# Patient Record
Sex: MALE | Race: WHITE | NOT HISPANIC OR LATINO | Employment: OTHER | URBAN - METROPOLITAN AREA
[De-identification: names, ages, dates, MRNs, and addresses within clinical notes are randomized per-mention and may not be internally consistent; named-entity substitution may affect disease eponyms.]

---

## 2017-01-27 ENCOUNTER — OFFICE VISIT (OUTPATIENT)
Dept: FAMILY MEDICINE CLINIC | Facility: CLINIC | Age: 77
End: 2017-01-27

## 2017-01-27 VITALS
TEMPERATURE: 97.5 F | DIASTOLIC BLOOD PRESSURE: 84 MMHG | HEART RATE: 82 BPM | SYSTOLIC BLOOD PRESSURE: 136 MMHG | WEIGHT: 228.4 LBS | BODY MASS INDEX: 32.7 KG/M2 | HEIGHT: 70 IN | OXYGEN SATURATION: 89 %

## 2017-01-27 DIAGNOSIS — J01.91 ACUTE RECURRENT SINUSITIS, UNSPECIFIED LOCATION: Primary | ICD-10-CM

## 2017-01-27 DIAGNOSIS — I10 BENIGN ESSENTIAL HYPERTENSION: ICD-10-CM

## 2017-01-27 DIAGNOSIS — J40 BRONCHITIS: ICD-10-CM

## 2017-01-27 PROCEDURE — 99214 OFFICE O/P EST MOD 30 MIN: CPT | Performed by: FAMILY MEDICINE

## 2017-01-27 RX ORDER — BENAZEPRIL HYDROCHLORIDE 20 MG/1
20 TABLET ORAL DAILY
Qty: 90 TABLET | Refills: 1 | Status: SHIPPED | OUTPATIENT
Start: 2017-01-27 | End: 2017-05-22

## 2017-01-27 RX ORDER — PREDNISONE 20 MG/1
20 TABLET ORAL 2 TIMES DAILY
Qty: 10 TABLET | Refills: 1 | Status: SHIPPED | OUTPATIENT
Start: 2017-01-27 | End: 2017-02-01

## 2017-01-27 RX ORDER — LEVOFLOXACIN 500 MG/1
500 TABLET, FILM COATED ORAL DAILY
Qty: 10 TABLET | Refills: 1 | Status: SHIPPED | OUTPATIENT
Start: 2017-01-27 | End: 2017-02-06

## 2017-01-27 RX ORDER — AMLODIPINE BESYLATE 5 MG/1
5 TABLET ORAL DAILY
Qty: 90 TABLET | Refills: 1 | Status: SHIPPED | OUTPATIENT
Start: 2017-01-27 | End: 2017-05-22 | Stop reason: SDUPTHER

## 2017-01-27 NOTE — MR AVS SNAPSHOT
Ankit Mack Sr.   1/27/2017 9:45 AM   Office Visit    Provider:  Chloe Erickson MD   Department:  [facility]   Dept Phone:  [phone]                Your Full Care Plan              Today's Medication Changes          These changes are accurate as of: 1/27/17 10:19 AM.  If you have any questions, ask your nurse or doctor.               New Medication(s)Ordered:     levoFLOXacin 500 MG tablet   Commonly known as:  LEVAQUIN   Take 1 tablet by mouth Daily for 10 days.   Started by:  Chloe Erickson MD       predniSONE 20 MG tablet   Commonly known as:  DELTASONE   Take 1 tablet by mouth 2 (Two) Times a Day for 5 days.   Started by:  Chloe Erickson MD         Medication(s)that have changed:     amLODIPine 5 MG tablet   Commonly known as:  NORVASC   Take 1 tablet by mouth Daily.   What changed:  See the new instructions.   Changed by:  Chloe Erickson MD       benazepril 20 MG tablet   Commonly known as:  LOTENSIN   Take 1 tablet by mouth Daily.   What changed:  when to take this   Changed by:  Chloe Erickson MD            Where to Get Your Medications      These medications were sent to [facility]/pharmacy #[id number] - [facility], [place] - [address] AT [facility] [phone] [facility] [phone]   [address] [facility] [address]     Phone:  [phone]     amLODIPine 5 MG tablet    benazepril 20 MG tablet    levoFLOXacin 500 MG tablet    predniSONE 20 MG tablet                  Your Updated Medication List          This list is accurate as of: 1/27/17 10:19 AM.  Always use your most recent med list.                albuterol 2 MG tablet   Commonly known as:  PROVENTIL       ALPRAZolam 1 MG tablet   Commonly known as:  XANAX   Take 1 tablet by mouth 4 (Four) Times a Day As Needed for anxiety.       amLODIPine 5 MG tablet   Commonly known as:  NORVASC   Take 1 tablet by mouth Daily.       * atorvastatin 10 MG tablet      Commonly known as:  LIPITOR       * atorvastatin 10 MG tablet   Commonly known as:  LIPITOR   TAKE 1 TABLET BY MOUTH EVERY DAY       azelastine 0.1 % nasal spray   Commonly known as:  ASTELIN       benazepril 20 MG tablet   Commonly known as:  LOTENSIN   Take 1 tablet by mouth Daily.       budesonide 0.5 MG/2ML nebulizer solution   Commonly known as:  PULMICORT       CIPRODEX 0.3-0.1 % otic suspension   Generic drug:  ciprofloxacin-dexamethasone       cromolyn 4 % ophthalmic solution   Commonly known as:  OPTICROM       finasteride 5 MG tablet   Commonly known as:  PROSCAR       fluticasone 50 MCG/ACT nasal spray   Commonly known as:  FLONASE       levoFLOXacin 500 MG tablet   Commonly known as:  LEVAQUIN   Take 1 tablet by mouth Daily for 10 days.       lisinopril 20 MG tablet   Commonly known as:  PRINIVIL,ZESTRIL   Take 1 tablet by mouth Daily.       predniSONE 20 MG tablet   Commonly known as:  DELTASONE   Take 1 tablet by mouth 2 (Two) Times a Day for 5 days.       SYMBICORT 160-4.5 MCG/ACT inhaler   Generic drug:  budesonide-formoterol       triamcinolone 0.1 % cream   Commonly known as:  KENALOG       * Notice:  This list has 2 medication(s) that are the same as other medications prescribed for you. Read the directions carefully, and ask your doctor or other care provider to review them with you.            You Were Diagnosed With        Codes Comments    Acute recurrent sinusitis, unspecified location    -  Primary ICD-10-CM: J01.91  ICD-9-CM: 461.9     Bronchitis     ICD-10-CM: J40  ICD-9-CM: 490     Benign essential hypertension     ICD-10-CM: I10  ICD-9-CM: 401.1       Instructions     None    Patient Instructions History      Vatler Signup     [facility] allows you to send messages to your doctor, view your test results, renew your prescriptions, schedule appointments, and more. To sign up, go to [employer] and click on the Sign Up Now link in the New User? box. Enter your  "[employer] Activation Code exactly as it appears below along with the last four digits of your Social Security Number and your Date of Birth () to complete the sign-up process. If you do not sign up before the expiration date, you must request a new code.    [employer] Activation Code: [email or web address]  Expires: 2/10/2017 10:19 AM    If you have questions, you can email [email or web address] or call [phone] to talk to our [employer] staff. Remember, [employer] is NOT to be used for urgent needs. For medical emergencies, dial 911.               Other Info from Your Visit           Your Appointments     Feb 15, 2017 10:00 AM EST   CT betty chest wo contrast with BETTY CT 2   [facility] ([place])    [address] [facility] [address] [phone]           Bring current list of meds Please arrive 15 minutes prior to exam            2017  1:00 PM EST   Office Visit with Tanner Mendoza III, MD   [facility] (--)    [address]  [facility] [address] [phone]           Arrive 15 minutes prior to appointment.              Allergies     No Known Allergies      Reason for Visit     Sore Throat     Shortness of Breath     Cough     Nasal Congestion     URI     Wheezing     Med Refill           Vital Signs     Blood Pressure Pulse Temperature Height Weight Oxygen Saturation    136/84 (BP Location: Left arm, Patient Position: Sitting, Cuff Size: Large Adult) 82 97.5 °F (36.4 °C) (Oral) 70\" (177.8 cm) 228 lb 6.4 oz (104 kg) 89%    Body Mass Index Smoking Status                32.77 kg/m2 Former Smoker          Problems and Diagnoses Noted     Benign essential hypertension    Recurrent acute sinusitis    -  Primary    Bronchitis          "

## 2017-01-27 NOTE — PROGRESS NOTES
Subjective   Ankit Mack Sr. is a 76 y.o. male.     History of Present Illness Ankit Mack Sr. 76 y.o. male who presents for evaluation of upper respiratory congestion, sinus pressure and congestion. Symptoms include congestion, sore throat, post nasal drip, productive cough, exertional SOA and wheezing.  Onset of symptoms was 2 days ago, gradually worsening since that time. Patient denies fever, vomiting.   Evaluation to date: none Treatment to date:  none.       The following portions of the patient's history were reviewed and updated as appropriate: allergies, current medications, past family history, past medical history, past social history, past surgical history and problem list.    Review of Systems   Constitutional: Negative for fever.   HENT: Positive for congestion, postnasal drip, sinus pressure and sore throat.    Respiratory: Positive for cough, shortness of breath and wheezing.    Gastrointestinal: Negative for nausea and vomiting.   Skin: Negative.        Objective   Physical Exam   Constitutional: He appears well-developed and well-nourished.   HENT:   Head: Normocephalic.   Right Ear: Tympanic membrane normal.   Left Ear: Tympanic membrane normal.   Mouth/Throat: Posterior oropharyngeal erythema present.   Eyes: Conjunctivae and EOM are normal. Pupils are equal, round, and reactive to light.   Cardiovascular: Normal rate, regular rhythm and normal heart sounds.    Pulmonary/Chest: Effort normal and breath sounds normal.   Lymphadenopathy:     He has no cervical adenopathy.   Skin: Skin is warm and dry.   Psychiatric: He has a normal mood and affect. His behavior is normal. Judgment and thought content normal.   Vitals reviewed.      Assessment/Plan   Ankit was seen today for sore throat, shortness of breath, cough, nasal congestion, uri, wheezing and med refill.    Diagnoses and all orders for this visit:    Acute recurrent sinusitis, unspecified location  -     levoFLOXacin (LEVAQUIN) 500  MG tablet; Take 1 tablet by mouth Daily for 10 days.  -     predniSONE (DELTASONE) 20 MG tablet; Take 1 tablet by mouth 2 (Two) Times a Day for 5 days.    Bronchitis  -     levoFLOXacin (LEVAQUIN) 500 MG tablet; Take 1 tablet by mouth Daily for 10 days.  -     predniSONE (DELTASONE) 20 MG tablet; Take 1 tablet by mouth 2 (Two) Times a Day for 5 days.    Benign essential hypertension  -     amLODIPine (NORVASC) 5 MG tablet; Take 1 tablet by mouth Daily.  -     benazepril (LOTENSIN) 20 MG tablet; Take 1 tablet by mouth Daily.

## 2017-02-03 DIAGNOSIS — E78.5 HYPERLIPIDEMIA: ICD-10-CM

## 2017-02-03 RX ORDER — ATORVASTATIN CALCIUM 10 MG/1
TABLET, FILM COATED ORAL
Qty: 90 TABLET | Refills: 1 | Status: SHIPPED | OUTPATIENT
Start: 2017-02-03 | End: 2017-05-22 | Stop reason: SDUPTHER

## 2017-02-15 ENCOUNTER — HOSPITAL ENCOUNTER (OUTPATIENT)
Dept: CT IMAGING | Facility: HOSPITAL | Age: 77
Discharge: HOME | End: 2017-02-15

## 2017-03-04 ENCOUNTER — OFFICE VISIT (OUTPATIENT)
Dept: RETAIL CLINIC | Facility: CLINIC | Age: 77
End: 2017-03-04

## 2017-03-04 DIAGNOSIS — T14.8XXA ABRASION: Primary | ICD-10-CM

## 2017-03-04 PROCEDURE — 99213 OFFICE O/P EST LOW 20 MIN: CPT | Performed by: NURSE PRACTITIONER

## 2017-03-04 NOTE — PROGRESS NOTES
Subjective   Patient ID: Ankit Mack Sr. is a 77 y.o. male presents with   Chief Complaint   Patient presents with   • Abrasion     3d       HPI Comments: 76 yo wm  Cc: skin tear to R dorsal FA x 3d. He sustained injury by bumping arm on a door. Are cont to bleed with removal of dressings.      Abrasion   Pertinent negatives include no abdominal pain, chest pain, fatigue, fever, joint swelling, nausea, rash or vomiting.       No Known Allergies    The following portions of the patient's history were reviewed and updated as appropriate: allergies, current medications, past family history, past medical history, past social history, past surgical history and problem list.      Review of Systems   Constitutional: Negative for activity change, appetite change, fatigue, fever and unexpected weight change.   HENT: Negative for facial swelling, mouth sores and trouble swallowing.    Eyes: Negative for pain, discharge, itching and visual disturbance.   Respiratory: Negative for chest tightness, shortness of breath and wheezing.    Cardiovascular: Negative for chest pain and palpitations.   Gastrointestinal: Negative for abdominal pain, diarrhea, nausea and vomiting.   Endocrine: Negative.    Genitourinary: Negative.    Musculoskeletal: Negative for joint swelling.   Skin: Positive for wound. Negative for color change and rash.   Allergic/Immunologic: Negative.    Neurological: Negative for seizures and syncope.   Hematological: Does not bruise/bleed easily.   Psychiatric/Behavioral: Negative.        Objective     There were no vitals filed for this visit.      Physical Exam   Constitutional: He is oriented to person, place, and time. He appears well-developed and well-nourished. No distress.   HENT:   Head: Normocephalic and atraumatic.   Right Ear: External ear normal.   Left Ear: External ear normal.   Eyes: Conjunctivae and EOM are normal. Pupils are equal, round, and reactive to light. Right eye exhibits no  discharge. Left eye exhibits no discharge. No scleral icterus.   Neck: Normal range of motion. Neck supple.   Pulmonary/Chest: Effort normal.   Musculoskeletal: Normal range of motion.   Neurological: He is alert and oriented to person, place, and time. He exhibits normal muscle tone.   Skin: Skin is warm and dry. Lesion (triangular superficial skin tear approx 1.5cm emilee to R dorsal fa. no evidence of infection. mild sanguinous d/c easily controlled with gentle pressure.) noted. No rash noted. He is not diaphoretic.   Psychiatric: He has a normal mood and affect. His behavior is normal. Judgment and thought content normal.   Nursing note and vitals reviewed.        Ankit was seen today for abrasion.    Diagnoses and all orders for this visit:    Abrasion  paper rx given to patient for bactroban tristian 2% APPLY TOPICALLY BID, telfa dsg changes bid. Observe for inf    Follow-up with Primary Care Physician in 24-48 hours if these symptoms worsen or fail to improve as anticipated.

## 2017-05-09 ENCOUNTER — HOSPITAL ENCOUNTER (OUTPATIENT)
Dept: CT IMAGING | Facility: HOSPITAL | Age: 77
Discharge: HOME | End: 2017-05-09
Admitting: NURSE PRACTITIONER

## 2017-05-09 DIAGNOSIS — R91.8 PULMONARY NODULES/LESIONS, MULTIPLE: ICD-10-CM

## 2017-05-09 PROCEDURE — 71250 CT THORAX DX C-: CPT

## 2017-05-16 ENCOUNTER — OFFICE VISIT (OUTPATIENT)
Dept: SURGERY | Facility: CLINIC | Age: 77
End: 2017-05-16

## 2017-05-16 VITALS
SYSTOLIC BLOOD PRESSURE: 134 MMHG | WEIGHT: 220 LBS | BODY MASS INDEX: 31.5 KG/M2 | DIASTOLIC BLOOD PRESSURE: 78 MMHG | RESPIRATION RATE: 18 BRPM | HEIGHT: 70 IN | HEART RATE: 90 BPM | OXYGEN SATURATION: 93 %

## 2017-05-16 DIAGNOSIS — Z48.3 AFTERCARE FOLLOWING SURGERY FOR NEOPLASM: ICD-10-CM

## 2017-05-16 DIAGNOSIS — C34.32 MALIGNANT NEOPLASM OF LOWER LOBE OF LEFT LUNG (HCC): Primary | ICD-10-CM

## 2017-05-16 PROBLEM — C34.30: Status: ACTIVE | Noted: 2017-05-16

## 2017-05-16 PROCEDURE — 99213 OFFICE O/P EST LOW 20 MIN: CPT | Performed by: THORACIC SURGERY (CARDIOTHORACIC VASCULAR SURGERY)

## 2017-05-16 RX ORDER — AMLODIPINE BESYLATE AND BENAZEPRIL HYDROCHLORIDE 5; 20 MG/1; MG/1
1 CAPSULE ORAL
COMMUNITY
End: 2017-05-22

## 2017-05-22 ENCOUNTER — OFFICE VISIT (OUTPATIENT)
Dept: FAMILY MEDICINE CLINIC | Facility: CLINIC | Age: 77
End: 2017-05-22

## 2017-05-22 VITALS
HEIGHT: 70 IN | BODY MASS INDEX: 32.93 KG/M2 | TEMPERATURE: 98.4 F | SYSTOLIC BLOOD PRESSURE: 124 MMHG | WEIGHT: 230 LBS | OXYGEN SATURATION: 95 % | DIASTOLIC BLOOD PRESSURE: 72 MMHG | HEART RATE: 102 BPM | RESPIRATION RATE: 16 BRPM

## 2017-05-22 DIAGNOSIS — G47.33 OBSTRUCTIVE SLEEP APNEA SYNDROME: ICD-10-CM

## 2017-05-22 DIAGNOSIS — F41.9 ANXIETY: ICD-10-CM

## 2017-05-22 DIAGNOSIS — I10 BENIGN ESSENTIAL HYPERTENSION: Primary | ICD-10-CM

## 2017-05-22 DIAGNOSIS — E78.2 MIXED HYPERLIPIDEMIA: ICD-10-CM

## 2017-05-22 PROBLEM — G47.30 SLEEP APNEA: Status: ACTIVE | Noted: 2017-05-22

## 2017-05-22 PROCEDURE — 99213 OFFICE O/P EST LOW 20 MIN: CPT | Performed by: PHYSICIAN ASSISTANT

## 2017-05-22 RX ORDER — LISINOPRIL 20 MG/1
20 TABLET ORAL DAILY
Qty: 90 TABLET | Refills: 1 | Status: SHIPPED | OUTPATIENT
Start: 2017-05-22 | End: 2017-11-22

## 2017-05-22 RX ORDER — ATORVASTATIN CALCIUM 10 MG/1
10 TABLET, FILM COATED ORAL DAILY
Qty: 90 TABLET | Refills: 1 | Status: SHIPPED | OUTPATIENT
Start: 2017-05-22 | End: 2017-11-22 | Stop reason: SDUPTHER

## 2017-05-22 RX ORDER — AMLODIPINE BESYLATE 5 MG/1
5 TABLET ORAL DAILY
Qty: 90 TABLET | Refills: 1 | Status: SHIPPED | OUTPATIENT
Start: 2017-05-22 | End: 2017-11-22 | Stop reason: SDUPTHER

## 2017-05-22 RX ORDER — LISINOPRIL 20 MG/1
20 TABLET ORAL DAILY
Qty: 90 TABLET | Refills: 1 | Status: SHIPPED | OUTPATIENT
Start: 2017-05-22 | End: 2017-05-22 | Stop reason: SDUPTHER

## 2017-06-05 LAB
ALBUMIN SERPL-MCNC: 4.6 G/DL (ref 3.5–5.2)
ALBUMIN/GLOB SERPL: 1.9 G/DL
ALP SERPL-CCNC: 79 U/L (ref 39–117)
ALT SERPL-CCNC: 22 U/L (ref 1–41)
AST SERPL-CCNC: 23 U/L (ref 1–40)
BILIRUB SERPL-MCNC: 0.6 MG/DL (ref 0.1–1.2)
BUN SERPL-MCNC: 21 MG/DL (ref 8–23)
BUN/CREAT SERPL: 19.1 (ref 7–25)
CALCIUM SERPL-MCNC: 9.5 MG/DL (ref 8.6–10.5)
CHLORIDE SERPL-SCNC: 104 MMOL/L (ref 98–107)
CO2 SERPL-SCNC: 27.1 MMOL/L (ref 22–29)
CREAT SERPL-MCNC: 1.1 MG/DL (ref 0.76–1.27)
GLOBULIN SER CALC-MCNC: 2.4 GM/DL
GLUCOSE SERPL-MCNC: 138 MG/DL (ref 65–99)
POTASSIUM SERPL-SCNC: 4 MMOL/L (ref 3.5–5.2)
PROT SERPL-MCNC: 7 G/DL (ref 6–8.5)
SODIUM SERPL-SCNC: 146 MMOL/L (ref 136–145)

## 2017-06-06 DIAGNOSIS — R73.01 IMPAIRED FASTING GLUCOSE: Primary | ICD-10-CM

## 2017-06-10 LAB — HBA1C MFR BLD: 6.3 % (ref 4.8–5.6)

## 2017-10-06 ENCOUNTER — FLU SHOT (OUTPATIENT)
Dept: FAMILY MEDICINE CLINIC | Facility: CLINIC | Age: 77
End: 2017-10-06

## 2017-10-06 PROCEDURE — G0008 ADMIN INFLUENZA VIRUS VAC: HCPCS | Performed by: PHYSICIAN ASSISTANT

## 2017-10-23 DIAGNOSIS — I10 BENIGN ESSENTIAL HYPERTENSION: ICD-10-CM

## 2017-10-24 RX ORDER — LISINOPRIL 20 MG/1
TABLET ORAL
Qty: 30 TABLET | Refills: 0 | Status: SHIPPED | OUTPATIENT
Start: 2017-10-24 | End: 2017-11-20 | Stop reason: SDUPTHER

## 2017-10-31 ENCOUNTER — OFFICE VISIT (OUTPATIENT)
Dept: SURGERY | Facility: CLINIC | Age: 77
End: 2017-10-31

## 2017-10-31 VITALS
HEIGHT: 70 IN | HEART RATE: 88 BPM | BODY MASS INDEX: 32.5 KG/M2 | OXYGEN SATURATION: 88 % | WEIGHT: 227 LBS | DIASTOLIC BLOOD PRESSURE: 90 MMHG | SYSTOLIC BLOOD PRESSURE: 140 MMHG

## 2017-10-31 DIAGNOSIS — Z48.3 AFTERCARE FOLLOWING SURGERY FOR NEOPLASM: ICD-10-CM

## 2017-10-31 DIAGNOSIS — Z85.29 HISTORY OF MALIGNANT NEOPLASM OF THORACIC CAVITY STRUCTURE: Primary | ICD-10-CM

## 2017-10-31 PROCEDURE — 99212 OFFICE O/P EST SF 10 MIN: CPT | Performed by: THORACIC SURGERY (CARDIOTHORACIC VASCULAR SURGERY)

## 2017-10-31 NOTE — PROGRESS NOTES
Subjective   Patient ID: Ankit Mack Sr. is a 77 y.o. male is here today for follow-up.    History of Present Illness  Dear Colleague,  Ankit Mack Sr. 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  Since his last visit here he continues to do well.  He remains short of breath with moderate exertion he uses CPAP and oxygen at night.  He does not require oxygen during the day.  He reports no cough or hemoptysis.  He has no hoarseness or change in his voice.  He has had no unexplained weight loss.  He has no pleuritic pain.    The following portions of the patient's history were reviewed and updated as appropriate: allergies, current medications, past family history, past medical history, past social history, past surgical history and problem list.  Review of Systems   Constitution: Negative.   HENT: Negative.    Eyes: Negative.    Cardiovascular: Negative.    Respiratory: Positive for shortness of breath.    Endocrine: Negative.    Hematologic/Lymphatic: Negative.    Skin: Negative.    Musculoskeletal: Negative.    Gastrointestinal: Negative.    Genitourinary: Negative.    Neurological: Negative.    Psychiatric/Behavioral: Negative.      Patient Active Problem List   Diagnosis   • Anxiety   • Hyperlipidemia   • Benign essential hypertension   • Osteoarthritis, knee   • Abrasion   • Aneurysm of thoracic aorta   • Chronic obstructive pulmonary disease   • Mechanical complication of aortic graft   • History of malignant neoplasm of thoracic cavity structure   • Hypertension   • Cancer of lower lobe of lung   • Adiposity   • Sleep apnea     Past Medical History:   Diagnosis Date   • Anxiety    • Benign essential hypertension    • COPD (chronic obstructive pulmonary disease)    • Essential hypertension    • Heart attack    • History of chest x-ray 12/08/2013    RLL infiltrate   • History of  pulmonary function tests 06/10/2014   • Hyperlipidemia    • Lung cancer    • Osteoarthritis, knee    • Prostate cancer    • Sleep apnea      Past Surgical History:   Procedure Laterality Date   • ABDOMINAL AORTIC ANEURYSM REPAIR  2010    Dr. Adarsh Dennis   • CATARACT EXTRACTION  10/2014    also 11/14   • JOINT REPLACEMENT Left 10/2010    knee; Dr. Wolf   • JOINT REPLACEMENT Right 09/2011    knee; Dr. Wolf   • LUNG LOBECTOMY Left 01/16/2012    LLL; Dr. Mendoza   • PROSTATE SURGERY  2000    Dr. Naranjo     Family History   Problem Relation Age of Onset   • Cancer Mother    • Cancer Father      lung   • Cancer Other      colon   • Diabetes Other    • Emphysema Other    • Hypertension Other    • Kidney disease Other    • Lung cancer Other      Social History     Social History   • Marital status: Single     Spouse name: N/A   • Number of children: N/A   • Years of education: N/A     Occupational History   • Not on file.     Social History Main Topics   • Smoking status: Former Smoker     Packs/day: 1.00   • Smokeless tobacco: Never Used   • Alcohol use Yes      Comment: rare   • Drug use: Not on file   • Sexual activity: Defer     Other Topics Concern   • Not on file     Social History Narrative       Current Outpatient Prescriptions:   •  albuterol (PROVENTIL) 2 MG tablet, Take 2 mg by mouth., Disp: , Rfl:   •  ALPRAZolam (XANAX) 1 MG tablet, Take 1 tablet by mouth 4 (Four) Times a Day As Needed for anxiety., Disp: 360 tablet, Rfl: 1  •  amLODIPine (NORVASC) 5 MG tablet, Take 1 tablet by mouth Daily. For BP with Lisinopril, Disp: 90 tablet, Rfl: 1  •  atorvastatin (LIPITOR) 10 MG tablet, Take 1 tablet by mouth Daily. For cholesterol, Disp: 90 tablet, Rfl: 1  •  budesonide (PULMICORT) 0.5 MG/2ML nebulizer solution, Inhale 0.5 mg., Disp: , Rfl:   •  budesonide-formoterol (SYMBICORT) 160-4.5 MCG/ACT inhaler, , Disp: , Rfl:   •  lisinopril (PRINIVIL,ZESTRIL) 20 MG tablet, TAKE 1 TABLET BY MOUTH DAILY FOR 90 DAYS., Disp:  30 tablet, Rfl: 0  •  triamcinolone (KENALOG) 0.1 % cream, APPLY TWICE A DAY, Disp: , Rfl: 2  •  lisinopril (PRINIVIL,ZESTRIL) 20 MG tablet, Take 1 tablet by mouth Daily for 90 days. For BP with Amlodipine, Disp: 90 tablet, Rfl: 1  No Known Allergies     Objective   Vitals:    10/31/17 0856   BP: 140/90   Pulse: 88   SpO2: (!) 88%     Physical Exam   Constitutional: He is oriented to person, place, and time. He appears well-developed and well-nourished.   HENT:   Head: Normocephalic.   Eyes: Conjunctivae, EOM and lids are normal. Pupils are equal, round, and reactive to light.   Neck: Trachea normal and normal range of motion. Neck supple. No hepatojugular reflux and no JVD present. Carotid bruit is not present. No thyroid mass and no thyromegaly present.   Cardiovascular: Normal rate, regular rhythm, S1 normal, S2 normal, normal heart sounds and normal pulses.   No extrasystoles are present. PMI is not displaced.    Pulmonary/Chest: Effort normal and breath sounds normal.   Abdominal: Soft. Normal appearance and bowel sounds are normal. He exhibits no mass. There is no hepatosplenomegaly. There is no tenderness. No hernia.   Musculoskeletal: Normal range of motion.   Neurological: He is alert and oriented to person, place, and time. He has normal strength and normal reflexes. No cranial nerve deficit or sensory deficit. He displays a negative Romberg sign.   Skin: Skin is warm, dry and intact.   Psychiatric: He has a normal mood and affect. His speech is normal and behavior is normal. Judgment and thought content normal. Cognition and memory are normal.     Independent Review of Radiographic Studies:    CT angiogram performed at the [facility] on October 4 was reviewed.  There are no new pulmonary infiltrates nodules or masses.  There is parenchymal scarring and atelectasis at both bases.  There is no hilar or mediastinal adenopathy.  Upper lobe predominant centrilobular emphysema and mild diffuse  bronchial wall thickening is unchanged.    Assessment/Plan       Overall the patient remained stable.  There has been no evidence for recurrence of his fibrous tumor.  We will recheck him in one year with a repeat CT of the chest.  I will be glad to see him sooner if you think it's necessary.  Thank you for allowing me to dissipate in the care of Mr. Mack.    Diagnoses and all orders for this visit:    History of malignant neoplasm of thoracic cavity structure    Aftercare following surgery for neoplasm

## 2017-11-06 ENCOUNTER — APPOINTMENT (OUTPATIENT)
Dept: GENERAL RADIOLOGY | Facility: HOSPITAL | Age: 77
End: 2017-11-06

## 2017-11-06 PROCEDURE — 71020 HC CHEST PA AND LATERAL: CPT | Performed by: EMERGENCY MEDICINE

## 2017-11-20 DIAGNOSIS — I10 BENIGN ESSENTIAL HYPERTENSION: ICD-10-CM

## 2017-11-20 RX ORDER — LISINOPRIL 20 MG/1
TABLET ORAL
Qty: 30 TABLET | Refills: 0 | Status: SHIPPED | OUTPATIENT
Start: 2017-11-20 | End: 2017-11-22

## 2017-11-22 ENCOUNTER — OFFICE VISIT (OUTPATIENT)
Dept: FAMILY MEDICINE CLINIC | Facility: CLINIC | Age: 77
End: 2017-11-22

## 2017-11-22 VITALS
HEIGHT: 70 IN | RESPIRATION RATE: 16 BRPM | HEART RATE: 99 BPM | SYSTOLIC BLOOD PRESSURE: 158 MMHG | DIASTOLIC BLOOD PRESSURE: 85 MMHG | BODY MASS INDEX: 32.93 KG/M2 | WEIGHT: 230 LBS | TEMPERATURE: 98.1 F | OXYGEN SATURATION: 90 %

## 2017-11-22 DIAGNOSIS — F41.9 ANXIETY: ICD-10-CM

## 2017-11-22 DIAGNOSIS — R73.01 IFG (IMPAIRED FASTING GLUCOSE): Primary | ICD-10-CM

## 2017-11-22 DIAGNOSIS — E78.2 MIXED HYPERLIPIDEMIA: ICD-10-CM

## 2017-11-22 DIAGNOSIS — I10 BENIGN ESSENTIAL HYPERTENSION: ICD-10-CM

## 2017-11-22 DIAGNOSIS — J44.0 CHRONIC OBSTRUCTIVE PULMONARY DISEASE WITH ACUTE LOWER RESPIRATORY INFECTION (HCC): ICD-10-CM

## 2017-11-22 PROCEDURE — 99214 OFFICE O/P EST MOD 30 MIN: CPT | Performed by: FAMILY MEDICINE

## 2017-11-22 RX ORDER — PREDNISONE 20 MG/1
TABLET ORAL
Qty: 20 TABLET | Refills: 0 | OUTPATIENT
Start: 2017-11-22 | End: 2018-04-03

## 2017-11-22 RX ORDER — ATORVASTATIN CALCIUM 10 MG/1
10 TABLET, FILM COATED ORAL DAILY
Qty: 90 TABLET | Refills: 1 | Status: SHIPPED | OUTPATIENT
Start: 2017-11-22 | End: 2018-01-31 | Stop reason: SDUPTHER

## 2017-11-22 RX ORDER — AMLODIPINE BESYLATE 5 MG/1
5 TABLET ORAL DAILY
Qty: 90 TABLET | Refills: 1 | Status: SHIPPED | OUTPATIENT
Start: 2017-11-22 | End: 2018-06-12 | Stop reason: SDUPTHER

## 2017-11-22 RX ORDER — LISINOPRIL 20 MG/1
20 TABLET ORAL DAILY
Qty: 90 TABLET | Refills: 1 | Status: SHIPPED | OUTPATIENT
Start: 2017-11-22 | End: 2018-06-12 | Stop reason: SDUPTHER

## 2017-11-22 RX ORDER — ALPRAZOLAM 1 MG/1
1 TABLET ORAL NIGHTLY PRN
Qty: 90 TABLET | Refills: 0 | Status: SHIPPED | OUTPATIENT
Start: 2017-11-22 | End: 2018-06-12 | Stop reason: SDUPTHER

## 2017-11-22 NOTE — PROGRESS NOTES
"Subjective   Ankit Mack Sr. is a 77 y.o. male.     History of Present Illness     Chief Complaint:   Chief Complaint   Patient presents with   • acute bronchitis     follow up urgent care   - cxr done    • Abdominal Pain     lower abd pain since this am - no diarrhea    • COPD     shortness of breath today 02 - 90%    • Hypertension     MED REIFLL - MARY    • Hyperlipidemia   • Anxiety       Ankit Mack Sr. 77 y.o. male who presents today for Medical Management of the below listed issues and medication refills.  he has a problem list of   Patient Active Problem List   Diagnosis   • Anxiety   • Hyperlipidemia   • Benign essential hypertension   • Osteoarthritis, knee   • Abrasion   • Aneurysm of thoracic aorta   • Chronic obstructive pulmonary disease   • Mechanical complication of aortic graft   • History of malignant neoplasm of thoracic cavity structure   • Hypertension   • Cancer of lower lobe of lung   • Adiposity   • Sleep apnea   .  Since the last visit, he has been trying to recover from a URI he developed and [place] [facility] yet is still dyspnic and coughing, although is some better. Still with mild cough along with \"gravely voice\". Is using home NC O2 and this helps, too.   he has been compliant with   Current Outpatient Prescriptions:   •  albuterol (PROVENTIL) 2 MG tablet, Take 2 mg by mouth., Disp: , Rfl:   •  ALPRAZolam (XANAX) 1 MG tablet, Take 1 tablet by mouth At Night As Needed for Anxiety., Disp: 90 tablet, Rfl: 0  •  amLODIPine (NORVASC) 5 MG tablet, Take 1 tablet by mouth Daily. For BP with Lisinopril, Disp: 90 tablet, Rfl: 1  •  atorvastatin (LIPITOR) 10 MG tablet, Take 1 tablet by mouth Daily. For cholesterol, Disp: 90 tablet, Rfl: 1  •  budesonide (PULMICORT) 0.5 MG/2ML nebulizer solution, Inhale 0.5 mg., Disp: , Rfl:   •  budesonide-formoterol (SYMBICORT) 160-4.5 MCG/ACT inhaler, , Disp: , Rfl:   •  lisinopril (PRINIVIL,ZESTRIL) 20 MG tablet, Take 1 tablet by mouth " "Daily for 90 days. For BP with Amlodipine, Disp: 90 tablet, Rfl: 1  •  predniSONE (DELTASONE) 20 MG tablet, Take 3 tabs QDPC x 3 days then 2 tabs QDPC x 4 days then 1 tab QDPC x 3 days, Disp: 20 tablet, Rfl: 0  •  triamcinolone (KENALOG) 0.1 % cream, APPLY TWICE A DAY, Disp: , Rfl: 2.  he denies medication side effects.    All of the chronic condition(s) listed above are stable w/o issues.    /85  Pulse 99  Temp 98.1 °F (36.7 °C) (Oral)   Resp 16  Ht 70\" (177.8 cm)  Wt 230 lb (104 kg)  SpO2 90%  BMI 33 kg/m2    Results for orders placed or performed during the hospital encounter of 11/06/17   POCT Influenza A/B   Result Value Ref Range    Rapid Influenza A Ag negative     Rapid Influenza B Ag negative     Internal Control Passed Passed    Lot Number [id number]     Expiration Date 2102020            The following portions of the patient's history were reviewed and updated as appropriate: allergies, current medications, past family history, past medical history, past social history, past surgical history and problem list.    Review of Systems   Constitutional: Positive for fatigue. Negative for activity change, chills and fever.   Respiratory: Positive for cough. Negative for shortness of breath.    Cardiovascular: Negative for chest pain and palpitations.   Gastrointestinal: Negative for abdominal pain.   Endocrine: Negative for cold intolerance.   Psychiatric/Behavioral: Negative for behavioral problems and dysphoric mood. The patient is not nervous/anxious.        Objective   Physical Exam   Constitutional: He appears well-developed and well-nourished.   Neck: Neck supple. No thyromegaly present.   Cardiovascular: Normal rate and regular rhythm.    No murmur heard.  Pulmonary/Chest: Effort normal and breath sounds normal.   Abdominal: Bowel sounds are normal.   Psychiatric: He has a normal mood and affect. His behavior is normal.   Nursing note and vitals reviewed.  Take just one Xanax QHS due to his CPAP " machine use and works very well.    The patient has read and signed the [facility] Controlled Substance Contract.  I will continue to see patient for regular follow up appointments.  They are well controlled on their medication.  MARY has been reviewed by me and is updated every 3 months. The patient is aware of the potential for addiction and dependence.      Assessment/Plan   Ankit was seen today for acute bronchitis, abdominal pain, copd, hypertension, hyperlipidemia and anxiety.    Diagnoses and all orders for this visit:    IFG (impaired fasting glucose)  -     Hemoglobin A1c    Benign essential hypertension  -     lisinopril (PRINIVIL,ZESTRIL) 20 MG tablet; Take 1 tablet by mouth Daily for 90 days. For BP with Amlodipine  -     amLODIPine (NORVASC) 5 MG tablet; Take 1 tablet by mouth Daily. For BP with Lisinopril  -     Comprehensive metabolic panel  -     CBC and Differential  -     TSH    Mixed hyperlipidemia  -     atorvastatin (LIPITOR) 10 MG tablet; Take 1 tablet by mouth Daily. For cholesterol  -     Lipid panel    Anxiety  -     ALPRAZolam (XANAX) 1 MG tablet; Take 1 tablet by mouth At Night As Needed for Anxiety.    Chronic obstructive pulmonary disease with acute lower respiratory infection  -     predniSONE (DELTASONE) 20 MG tablet; Take 3 tabs QDPC x 3 days then 2 tabs QDPC x 4 days then 1 tab QDPC x 3 days

## 2017-12-08 ENCOUNTER — APPOINTMENT (OUTPATIENT)
Dept: CT IMAGING | Facility: HOSPITAL | Age: 77
End: 2017-12-08

## 2017-12-08 ENCOUNTER — APPOINTMENT (OUTPATIENT)
Dept: GENERAL RADIOLOGY | Facility: HOSPITAL | Age: 77
End: 2017-12-08

## 2017-12-08 ENCOUNTER — HOSPITAL ENCOUNTER (EMERGENCY)
Facility: HOSPITAL | Age: 77
Discharge: HOME | End: 2017-12-09
Attending: EMERGENCY MEDICINE | Admitting: NURSE PRACTITIONER

## 2017-12-08 DIAGNOSIS — J44.1 COPD WITH EXACERBATION (HCC): Primary | ICD-10-CM

## 2017-12-08 DIAGNOSIS — N20.0 KIDNEY STONE ON LEFT SIDE: ICD-10-CM

## 2017-12-08 LAB
ALBUMIN SERPL-MCNC: 4 G/DL (ref 3.5–5.2)
ALBUMIN/GLOB SERPL: 1.4 G/DL
ALP SERPL-CCNC: 62 U/L (ref 39–117)
ALT SERPL W P-5'-P-CCNC: 18 U/L (ref 1–41)
ANION GAP SERPL CALCULATED.3IONS-SCNC: 7.4 MMOL/L
ARTERIAL PATENCY WRIST A: POSITIVE
AST SERPL-CCNC: 16 U/L (ref 1–40)
ATMOSPHERIC PRESS: 750.5 MMHG
BASE EXCESS BLDA CALC-SCNC: 1.2 MMOL/L (ref 0–2)
BDY SITE: ABNORMAL
BILIRUB SERPL-MCNC: 0.8 MG/DL (ref 0.1–1.2)
BUN BLD-MCNC: 25 MG/DL (ref 8–23)
BUN/CREAT SERPL: 22.9 (ref 7–25)
CALCIUM SPEC-SCNC: 8.9 MG/DL (ref 8.6–10.5)
CHLORIDE SERPL-SCNC: 105 MMOL/L (ref 98–107)
CO2 SERPL-SCNC: 31.6 MMOL/L (ref 22–29)
CREAT BLD-MCNC: 1.09 MG/DL (ref 0.76–1.27)
D-LACTATE SERPL-SCNC: 1 MMOL/L (ref 0.5–2)
DEPRECATED RDW RBC AUTO: 51.9 FL (ref 37–54)
EOSINOPHIL # BLD MANUAL: 0.3 10*3/MM3 (ref 0–0.7)
EOSINOPHIL NFR BLD MANUAL: 7 % (ref 0.3–6.2)
ERYTHROCYTE [DISTWIDTH] IN BLOOD BY AUTOMATED COUNT: 13.9 % (ref 11.5–14.5)
FLUAV AG NPH QL: NEGATIVE
FLUBV AG NPH QL IA: NEGATIVE
GFR SERPL CREATININE-BSD FRML MDRD: 66 ML/MIN/1.73
GLOBULIN UR ELPH-MCNC: 2.9 GM/DL
GLUCOSE BLD-MCNC: 99 MG/DL (ref 65–99)
HCO3 BLDA-SCNC: 25.5 MMOL/L (ref 22–28)
HCT VFR BLD AUTO: 39.5 % (ref 40.4–52.2)
HGB BLD-MCNC: 13.2 G/DL (ref 13.7–17.6)
LIPASE SERPL-CCNC: 38 U/L (ref 13–60)
LYMPHOCYTES # BLD MANUAL: 0.68 10*3/MM3 (ref 0.9–4.8)
LYMPHOCYTES NFR BLD MANUAL: 16 % (ref 19.6–45.3)
LYMPHOCYTES NFR BLD MANUAL: 27 % (ref 5–12)
MCH RBC QN AUTO: 34.5 PG (ref 27–32.7)
MCHC RBC AUTO-ENTMCNC: 33.4 G/DL (ref 32.6–36.4)
MCV RBC AUTO: 103.1 FL (ref 79.8–96.2)
MODALITY: ABNORMAL
MONOCYTES # BLD AUTO: 1.15 10*3/MM3 (ref 0.2–1.2)
NEUTROPHILS # BLD AUTO: 2.13 10*3/MM3 (ref 1.9–8.1)
NEUTROPHILS NFR BLD MANUAL: 50 % (ref 42.7–76)
NRBC SPEC MANUAL: 1 /100 WBC (ref 0–0)
NT-PROBNP SERPL-MCNC: 297.9 PG/ML (ref 0–1800)
PCO2 BLDA: 38.2 MM HG (ref 35–45)
PH BLDA: 7.43 PH UNITS (ref 7.35–7.45)
PLAT MORPH BLD: NORMAL
PLATELET # BLD AUTO: 123 10*3/MM3 (ref 140–500)
PMV BLD AUTO: 11.3 FL (ref 6–12)
PO2 BLDA: 61.2 MM HG (ref 80–100)
POTASSIUM BLD-SCNC: 3.9 MMOL/L (ref 3.5–5.2)
PROT SERPL-MCNC: 6.9 G/DL (ref 6–8.5)
RBC # BLD AUTO: 3.83 10*6/MM3 (ref 4.6–6)
RBC MORPH BLD: NORMAL
SAO2 % BLDCOA: 91.8 % (ref 92–99)
SCAN SLIDE: NORMAL
SET MECH RESP RATE: 20
SODIUM BLD-SCNC: 144 MMOL/L (ref 136–145)
TOTAL RATE: 20 BREATHS/MINUTE
WBC MORPH BLD: NORMAL
WBC NRBC COR # BLD: 4.25 10*3/MM3 (ref 4.5–10.7)

## 2017-12-08 PROCEDURE — 71020 HC CHEST PA AND LATERAL: CPT

## 2017-12-08 PROCEDURE — 83690 ASSAY OF LIPASE: CPT | Performed by: NURSE PRACTITIONER

## 2017-12-08 PROCEDURE — 99284 EMERGENCY DEPT VISIT MOD MDM: CPT

## 2017-12-08 PROCEDURE — 94799 UNLISTED PULMONARY SVC/PX: CPT

## 2017-12-08 PROCEDURE — 94640 AIRWAY INHALATION TREATMENT: CPT

## 2017-12-08 PROCEDURE — 83880 ASSAY OF NATRIURETIC PEPTIDE: CPT | Performed by: NURSE PRACTITIONER

## 2017-12-08 PROCEDURE — 0 IOPAMIDOL 61 % SOLUTION: Performed by: NURSE PRACTITIONER

## 2017-12-08 PROCEDURE — 74177 CT ABD & PELVIS W/CONTRAST: CPT

## 2017-12-08 PROCEDURE — 85007 BL SMEAR W/DIFF WBC COUNT: CPT | Performed by: NURSE PRACTITIONER

## 2017-12-08 PROCEDURE — 83605 ASSAY OF LACTIC ACID: CPT | Performed by: NURSE PRACTITIONER

## 2017-12-08 PROCEDURE — 85025 COMPLETE CBC W/AUTO DIFF WBC: CPT | Performed by: NURSE PRACTITIONER

## 2017-12-08 PROCEDURE — 36600 WITHDRAWAL OF ARTERIAL BLOOD: CPT

## 2017-12-08 PROCEDURE — 80053 COMPREHEN METABOLIC PANEL: CPT | Performed by: NURSE PRACTITIONER

## 2017-12-08 PROCEDURE — 87804 INFLUENZA ASSAY W/OPTIC: CPT | Performed by: NURSE PRACTITIONER

## 2017-12-08 PROCEDURE — 71275 CT ANGIOGRAPHY CHEST: CPT

## 2017-12-08 PROCEDURE — 82803 BLOOD GASES ANY COMBINATION: CPT

## 2017-12-08 RX ORDER — IPRATROPIUM BROMIDE AND ALBUTEROL SULFATE 2.5; .5 MG/3ML; MG/3ML
3 SOLUTION RESPIRATORY (INHALATION) ONCE
Status: COMPLETED | OUTPATIENT
Start: 2017-12-08 | End: 2017-12-08

## 2017-12-08 RX ORDER — ALBUTEROL SULFATE 2.5 MG/3ML
2.5 SOLUTION RESPIRATORY (INHALATION)
COMMUNITY

## 2017-12-08 RX ORDER — ALBUTEROL SULFATE 90 UG/1
2 AEROSOL, METERED RESPIRATORY (INHALATION) EVERY 4 HOURS PRN
COMMUNITY

## 2017-12-08 RX ADMIN — IOPAMIDOL 95 ML: 612 INJECTION, SOLUTION INTRAVENOUS at 19:57

## 2017-12-08 RX ADMIN — IPRATROPIUM BROMIDE AND ALBUTEROL SULFATE 3 ML: .5; 3 SOLUTION RESPIRATORY (INHALATION) at 22:25

## 2017-12-08 NOTE — ED PROVIDER NOTES
EMERGENCY DEPARTMENT ENCOUNTER    CHIEF COMPLAINT  Chief Complaint: abd bloating  History given by: pt   History limited by: none  Room Number: 06/06  PMD: MD Dr. Saadia Wen, pulmonologist   Dr. Gilliland, pulmonologist    HPI:  Pt is a 77 y.o. male who presents with abd bloating for the past 4 weeks. Pt also c/o SOA, congestion, productive cough with generally yellow sputum, and subjective fever. Pt denies CP, chills, or generalized myalgias. Pt state that that he initially was seen at [facility] by Dr. Lagos for this, and was found to have pneumonia. Pt states that he was d/c on antibiotics and steroids with improvement, but that his symptoms worsened after he finished his steroids. Pt states that he does not normally use O2. Pt states a Hx of lung cancer, prostates cancer, and aortic aneurysm. Pt states that he has received a flu vaccine this year.     Duration: 4 weeks   Timing: constant   Location: abd   Quality: bloating   Intensity/Severity: moderate   Progression: unchanged   Associated Symptoms: SOA, congestion, cough with yellow sputum, subjective fever  Aggravating Factors: none stated   Alleviating Factors: steroids, antibiotics  Previous Episodes:none stated   Treatment before arrival: steroids, antibiotics    PAST MEDICAL HISTORY  Active Ambulatory Problems     Diagnosis Date Noted   • Anxiety    • Hyperlipidemia    • Benign essential hypertension    • Osteoarthritis, knee    • Abrasion 03/04/2017   • Aneurysm of thoracic aorta 06/13/2013   • Chronic obstructive pulmonary disease 12/17/2013   • 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   • History of malignant neoplasm of thoracic cavity structure 12/13/2012   • Hypertension 12/13/2012   • Cancer of lower lobe of lung 05/16/2017   • Adiposity 12/17/2013   • Sleep apnea 05/22/2017     Resolved Ambulatory Problems     Diagnosis Date Noted   • No Resolved Ambulatory Problems     Past Medical History:   Diagnosis Date   •  Anxiety    • Benign essential hypertension    • COPD (chronic obstructive pulmonary disease)    • Essential hypertension    • H/O complete eye exam 01/01/2016   • Heart attack    • History of chest x-ray 12/08/2013   • History of pulmonary function tests 06/10/2014   • Hyperlipidemia    • Lung cancer    • Osteoarthritis, knee    • Prostate cancer    • Sleep apnea        PAST SURGICAL HISTORY  Past Surgical History:   Procedure Laterality Date   • ABDOMINAL AORTIC ANEURYSM REPAIR  2010    Dr. Adarsh Dennis   • CATARACT EXTRACTION  10/2014    also 11/14   • COLONOSCOPY     • JOINT REPLACEMENT Left 10/2010    knee; Dr. Wolf   • JOINT REPLACEMENT Right 09/2011    knee; Dr. Wolf   • LUNG LOBECTOMY Left 01/16/2012    LLL; Dr. Mendoza   • PROSTATE SURGERY  2000    Dr. Naranjo       FAMILY HISTORY  Family History   Problem Relation Age of Onset   • Cancer Mother    • Cancer Father      lung   • Cancer Other      colon   • Diabetes Other    • Emphysema Other    • Hypertension Other    • Kidney disease Other    • Lung cancer Other        SOCIAL HISTORY  Social History     Social History   • Marital status: Single     Spouse name: N/A   • Number of children: N/A   • Years of education: N/A     Occupational History   • Not on file.     Social History Main Topics   • Smoking status: Former Smoker     Packs/day: 1.00   • Smokeless tobacco: Never Used   • Alcohol use Yes      Comment: rare   • Drug use: Not on file   • Sexual activity: Defer     Other Topics Concern   • Not on file     Social History Narrative         ALLERGIES  Erythromycin    REVIEW OF SYSTEMS  Review of Systems   Constitutional: Positive for fever (subjective). Negative for activity change, appetite change ( decreased) and chills.   HENT: Positive for congestion. Negative for ear pain, rhinorrhea, sinus pressure and sore throat.    Eyes: Negative.    Respiratory: Positive for cough (yellow sputum) and shortness of breath.    Cardiovascular: Negative.   Negative for chest pain, palpitations and leg swelling ( pedal).   Gastrointestinal: Positive for abdominal distention (bloating sensation). Negative for abdominal pain, diarrhea, nausea and vomiting.   Endocrine: Negative.    Genitourinary: Negative.  Negative for decreased urine volume, difficulty urinating, dysuria, frequency and urgency.   Musculoskeletal: Negative.  Negative for back pain and myalgias.   Skin: Negative.  Negative for rash.   Allergic/Immunologic: Negative.    Neurological: Negative.  Negative for dizziness, weakness, light-headedness, numbness and headaches.   Hematological: Negative.    Psychiatric/Behavioral: Negative.  The patient is not nervous/anxious.    All other systems reviewed and are negative.      PHYSICAL EXAM  ED Triage Vitals   Temp Heart Rate Resp BP SpO2   -- 12/08/17 1756 12/08/17 1756 -- 12/08/17 1756    88 24  83 %      Temp src Heart Rate Source Patient Position BP Location FiO2 (%)   -- 12/08/17 1756 -- -- --    Monitor          Physical Exam   Constitutional: He is well-developed, well-nourished, and in no distress. No distress.   HENT:   Head: Normocephalic.   Mouth/Throat: Oropharynx is clear and moist and mucous membranes are normal.   Eyes: Pupils are equal, round, and reactive to light.   Neck: Normal range of motion.   Cardiovascular: Normal rate, regular rhythm and normal heart sounds.    Pulmonary/Chest: Effort normal. Tachypnea noted. He has decreased breath sounds. He has no wheezes.   Abdominal: Soft. Bowel sounds are normal. There is no tenderness. There is no rigidity and no guarding.   Musculoskeletal: Normal range of motion. He exhibits no edema.   Neurological: He is alert.   Skin: Skin is warm and dry. No rash noted.   Psychiatric: Mood, memory, affect and judgment normal.   Nursing note and vitals reviewed.      LAB RESULTS  Recent Results (from the past 24 hour(s))   Comprehensive Metabolic Panel    Collection Time: 12/08/17  6:32 PM   Result Value Ref  Range    Glucose 99 65 - 99 mg/dL    BUN 25 (H) 8 - 23 mg/dL    Creatinine 1.09 0.76 - 1.27 mg/dL    Sodium 144 136 - 145 mmol/L    Potassium 3.9 3.5 - 5.2 mmol/L    Chloride 105 98 - 107 mmol/L    CO2 31.6 (H) 22.0 - 29.0 mmol/L    Calcium 8.9 8.6 - 10.5 mg/dL    Total Protein 6.9 6.0 - 8.5 g/dL    Albumin 4.00 3.50 - 5.20 g/dL    ALT (SGPT) 18 1 - 41 U/L    AST (SGOT) 16 1 - 40 U/L    Alkaline Phosphatase 62 39 - 117 U/L    Total Bilirubin 0.8 0.1 - 1.2 mg/dL    eGFR Non African Amer 66 >60 mL/min/1.73    Globulin 2.9 gm/dL    A/G Ratio 1.4 g/dL    BUN/Creatinine Ratio 22.9 7.0 - 25.0    Anion Gap 7.4 mmol/L   CBC Auto Differential    Collection Time: 12/08/17  6:32 PM   Result Value Ref Range    WBC 4.25 (L) 4.50 - 10.70 10*3/mm3    RBC 3.83 (L) 4.60 - 6.00 10*6/mm3    Hemoglobin 13.2 (L) 13.7 - 17.6 g/dL    Hematocrit 39.5 (L) 40.4 - 52.2 %    .1 (H) 79.8 - 96.2 fL    MCH 34.5 (H) 27.0 - 32.7 pg    MCHC 33.4 32.6 - 36.4 g/dL    RDW 13.9 11.5 - 14.5 %    RDW-SD 51.9 37.0 - 54.0 fl    MPV 11.3 6.0 - 12.0 fL    Platelets 123 (L) 140 - 500 10*3/mm3   Lactic Acid, Plasma    Collection Time: 12/08/17  6:32 PM   Result Value Ref Range    Lactate 1.0 0.5 - 2.0 mmol/L   Lipase    Collection Time: 12/08/17  6:32 PM   Result Value Ref Range    Lipase 38 13 - 60 U/L   Scan Slide    Collection Time: 12/08/17  6:32 PM   Result Value Ref Range    Scan Slide     Manual Differential    Collection Time: 12/08/17  6:32 PM   Result Value Ref Range    Neutrophil % 50.0 42.7 - 76.0 %    Lymphocyte % 16.0 (L) 19.6 - 45.3 %    Monocyte % 27.0 (H) 5.0 - 12.0 %    Eosinophil % 7.0 (H) 0.3 - 6.2 %    Neutrophils Absolute 2.13 1.90 - 8.10 10*3/mm3    Lymphocytes Absolute 0.68 (L) 0.90 - 4.80 10*3/mm3    Monocytes Absolute 1.15 0.20 - 1.20 10*3/mm3    Eosinophils Absolute 0.30 0.00 - 0.70 10*3/mm3    nRBC 1.0 (H) 0.0 - 0.0 /100 WBC    RBC Morphology Normal Normal    WBC Morphology Normal Normal    Platelet Morphology Normal Normal  "  Influenza Antigen, Rapid - Swab, Nasopharynx    Collection Time: 12/08/17  6:35 PM   Result Value Ref Range    Influenza A Ag, EIA Negative Negative    Influenza B Ag, EIA Negative Negative       I ordered the above labs and reviewed the results    RADIOLOGY  XR Chest 2 View   Final Result   Small likely atelectasis in the lower lungs. Mild pulmonary   vascular congestion and edema, follow-up suggested. COPD change.       This report was finalized on 12/8/2017 7:28 PM by Dr. Jakob Siu MD.          CT Abdomen Pelvis With Contrast    (Results Pending)   CT Angiogram Chest With Contrast    (Results Pending)         PROGRESS AND CONSULTS  1943  Rechecked pt, who is resting comfortably. Discussed the plan to CTA the pt's chest and CT the pt's abd/pel for further evaluation. Pt states at this time that his aortic aneurysm has a known leak, and that he was told to \"not worry about it,\" and understands and agrees with the plan; all questions were answered.     Reviewed pt's history and workup with Dr. Love.  After a bedside evaluation; Dr Love agrees with the plan of care    COURSE & MEDICAL DECISION MAKING  Pertinent Labs and Imaging studies that were ordered and reviewed are noted above.  Results were reviewed/discussed with the patient and they were also made aware of online assess.   Pt also made aware that some labs, such as cultures, will not be resulted during ER visit and follow up with PMD is necessary.     MEDICATIONS GIVEN IN ER  Medications - No data to display    /83  Pulse 97  Temp 98.1 °F (36.7 °C) (Oral)   Resp 18  Ht 177.8 cm (70\")  Wt 104 kg (229 lb)  SpO2 (!) 89%  BMI 32.86 kg/m2    Pt care turned over to  Dr. Love pending CT and CTA results.       Documentation assistance provided by ramon Durand for SCOTTY Arriaga.  Information recorded by the ramon was done at my direction and has been verified and validated by me.       Theodore Durand  12/08/17 1946     "   Cathy Hwang, APRN  12/08/17 1959

## 2017-12-09 VITALS
DIASTOLIC BLOOD PRESSURE: 96 MMHG | HEART RATE: 87 BPM | BODY MASS INDEX: 32.78 KG/M2 | RESPIRATION RATE: 18 BRPM | OXYGEN SATURATION: 90 % | TEMPERATURE: 97.8 F | WEIGHT: 229 LBS | SYSTOLIC BLOOD PRESSURE: 148 MMHG | HEIGHT: 70 IN

## 2017-12-09 RX ORDER — PREDNISONE 20 MG/1
20 TABLET ORAL DAILY
Qty: 30 TABLET | Refills: 0 | Status: SHIPPED | OUTPATIENT
Start: 2017-12-09 | End: 2018-05-30 | Stop reason: HOSPADM

## 2017-12-09 NOTE — ED PROVIDER NOTES
The patient presents complaining of abd pain and bloating. The pt has oxygen but does not use it very often. The pt has LLQ pain due to a kidney stone which passed. The pt had an appointment with a pulmonologist yesterday who stated that the pt's lungs were clear. The pt states that he has the most difficult times breathing after eating.   Discussed CTA chest, which shows no blood clot but does show new nodules on the lungs. The pt's problems are not arising from a blood clot.   Discussed the plan to discharge the pt with a prescription for a low dose steroid, and for the pt to follow up with his doctor for the new lung nodules.    PEx:  Lungs clear with no wheezing, but diminished throughout  Alert, no distress  Exhibits pursed lip breathing   Abd soft, nontender    I supervised care provided by the midlevel provider.   We have discussed this patient's history, physical exam, and treatment plan.  I have reviewed the note and personally saw and examined the patient and agree with the plan of care. See attending note.    Documentation assistance provided by ramon Zimmerman for Dr. Love.  Information recorded by the kdibe was done at my direction and has been verified and validated by me.             Becki Zimmerman  12/09/17 0116       Last Love MD  12/09/17 0132

## 2017-12-11 ENCOUNTER — TELEPHONE (OUTPATIENT)
Dept: SOCIAL WORK | Facility: HOSPITAL | Age: 77
End: 2017-12-11

## 2017-12-18 ENCOUNTER — TRANSCRIBE ORDERS (OUTPATIENT)
Dept: ADMINISTRATIVE | Facility: HOSPITAL | Age: 77
End: 2017-12-18

## 2017-12-18 DIAGNOSIS — R91.1 LUNG NODULE: Primary | ICD-10-CM

## 2018-01-25 LAB
ALBUMIN SERPL-MCNC: 4.5 G/DL (ref 3.5–5.2)
ALBUMIN/GLOB SERPL: 2.5 G/DL
ALP SERPL-CCNC: 80 U/L (ref 39–117)
ALT SERPL-CCNC: 14 U/L (ref 1–41)
AST SERPL-CCNC: 16 U/L (ref 1–40)
BASOPHILS # BLD AUTO: 0.02 10*3/MM3 (ref 0–0.2)
BASOPHILS NFR BLD AUTO: 0.6 % (ref 0–1.5)
BILIRUB SERPL-MCNC: 0.7 MG/DL (ref 0.1–1.2)
BUN SERPL-MCNC: 21 MG/DL (ref 8–23)
BUN/CREAT SERPL: 18.3 (ref 7–25)
CALCIUM SERPL-MCNC: 8.9 MG/DL (ref 8.6–10.5)
CHLORIDE SERPL-SCNC: 108 MMOL/L (ref 98–107)
CHOLEST SERPL-MCNC: 102 MG/DL (ref 0–200)
CO2 SERPL-SCNC: 28.1 MMOL/L (ref 22–29)
CREAT SERPL-MCNC: 1.15 MG/DL (ref 0.76–1.27)
DIFFERENTIAL COMMENT: NORMAL
EOSINOPHIL # BLD AUTO: 0.22 10*3/MM3 (ref 0–0.7)
EOSINOPHIL NFR BLD AUTO: 6.8 % (ref 0.3–6.2)
ERYTHROCYTE [DISTWIDTH] IN BLOOD BY AUTOMATED COUNT: 15.3 % (ref 11.5–14.5)
GFR SERPLBLD CREATININE-BSD FMLA CKD-EPI: 62 ML/MIN/1.73
GFR SERPLBLD CREATININE-BSD FMLA CKD-EPI: 75 ML/MIN/1.73
GLOBULIN SER CALC-MCNC: 1.8 GM/DL
GLUCOSE SERPL-MCNC: 121 MG/DL (ref 65–99)
HBA1C MFR BLD: 5.6 % (ref 4.8–5.6)
HCT VFR BLD AUTO: 36.4 % (ref 40.4–52.2)
HDLC SERPL-MCNC: 40 MG/DL (ref 40–60)
HGB BLD-MCNC: 12.1 G/DL (ref 13.7–17.6)
IMM GRANULOCYTES # BLD: 0.03 10*3/MM3 (ref 0–0.03)
IMM GRANULOCYTES NFR BLD: 0.9 % (ref 0–0.5)
LDLC SERPL CALC-MCNC: 40 MG/DL (ref 0–100)
LYMPHOCYTES # BLD AUTO: 1.12 10*3/MM3 (ref 0.9–4.8)
LYMPHOCYTES NFR BLD AUTO: 34.5 % (ref 19.6–45.3)
MCH RBC QN AUTO: 34.8 PG (ref 27–32.7)
MCHC RBC AUTO-ENTMCNC: 33.2 G/DL (ref 32.6–36.4)
MCV RBC AUTO: 104.6 FL (ref 79.8–96.2)
MONOCYTES # BLD AUTO: 0.66 10*3/MM3 (ref 0.2–1.2)
MONOCYTES NFR BLD AUTO: 20.3 % (ref 5–12)
NEUTROPHILS # BLD AUTO: 1.2 10*3/MM3 (ref 1.9–8.1)
NEUTROPHILS NFR BLD AUTO: 36.9 % (ref 42.7–76)
PLATELET # BLD AUTO: 212 10*3/MM3 (ref 140–500)
PLATELET BLD QL SMEAR: NORMAL
POTASSIUM SERPL-SCNC: 4.6 MMOL/L (ref 3.5–5.2)
PROT SERPL-MCNC: 6.3 G/DL (ref 6–8.5)
RBC # BLD AUTO: 3.48 10*6/MM3 (ref 4.6–6)
RBC MORPH BLD: NORMAL
SODIUM SERPL-SCNC: 149 MMOL/L (ref 136–145)
TRIGL SERPL-MCNC: 110 MG/DL (ref 0–150)
TSH SERPL DL<=0.005 MIU/L-ACNC: 1.34 MIU/ML (ref 0.27–4.2)
VLDLC SERPL CALC-MCNC: 22 MG/DL (ref 5–40)
WBC # BLD AUTO: 3.25 10*3/MM3 (ref 4.5–10.7)

## 2018-01-29 ENCOUNTER — TELEPHONE (OUTPATIENT)
Dept: FAMILY MEDICINE CLINIC | Facility: CLINIC | Age: 78
End: 2018-01-29

## 2018-01-29 DIAGNOSIS — D64.9 ANEMIA, UNSPECIFIED TYPE: Primary | ICD-10-CM

## 2018-01-29 NOTE — TELEPHONE ENCOUNTER
----- Message from Gopi Lagos MD sent at 1/25/2018  7:46 AM EST -----  Please call the patient regarding his abnormal result. Slowly more anemic. Send to GI as looks like due.

## 2018-01-31 ENCOUNTER — OFFICE VISIT (OUTPATIENT)
Dept: FAMILY MEDICINE CLINIC | Facility: CLINIC | Age: 78
End: 2018-01-31

## 2018-01-31 VITALS
RESPIRATION RATE: 18 BRPM | HEART RATE: 77 BPM | BODY MASS INDEX: 32.5 KG/M2 | OXYGEN SATURATION: 91 % | WEIGHT: 227 LBS | SYSTOLIC BLOOD PRESSURE: 136 MMHG | DIASTOLIC BLOOD PRESSURE: 83 MMHG | TEMPERATURE: 97.4 F | HEIGHT: 70 IN

## 2018-01-31 DIAGNOSIS — D64.9 ANEMIA, UNSPECIFIED TYPE: Primary | ICD-10-CM

## 2018-01-31 DIAGNOSIS — R49.0 HOARSENESS OF VOICE: ICD-10-CM

## 2018-01-31 DIAGNOSIS — I10 BENIGN ESSENTIAL HYPERTENSION: ICD-10-CM

## 2018-01-31 DIAGNOSIS — E78.2 MIXED HYPERLIPIDEMIA: ICD-10-CM

## 2018-01-31 PROCEDURE — 99214 OFFICE O/P EST MOD 30 MIN: CPT | Performed by: FAMILY MEDICINE

## 2018-01-31 RX ORDER — ATORVASTATIN CALCIUM 10 MG/1
10 TABLET, FILM COATED ORAL DAILY
Qty: 90 TABLET | Refills: 1 | Status: SHIPPED | OUTPATIENT
Start: 2018-01-31 | End: 2018-06-12 | Stop reason: SDUPTHER

## 2018-01-31 RX ORDER — AMOXICILLIN 500 MG/1
500 CAPSULE ORAL 3 TIMES DAILY
Qty: 30 CAPSULE | Refills: 2 | Status: SHIPPED | OUTPATIENT
Start: 2018-01-31 | End: 2018-05-30 | Stop reason: HOSPADM

## 2018-01-31 NOTE — PROGRESS NOTES
Subjective   Ankit Mack Sr. is a 77 y.o. male.     History of Present Illness     Chief Complaint:   Chief Complaint   Patient presents with   • Hyperlipidemia     med refill   • Hypertension       Ankit Mack Sr. 77 y.o. male who presents today for Medical Management of the below listed issues and medication refills.  he has a problem list of   Patient Active Problem List   Diagnosis   • Anxiety   • Hyperlipidemia   • Benign essential hypertension   • Osteoarthritis, knee   • Abrasion   • Aneurysm of thoracic aorta   • Chronic obstructive pulmonary disease   • Mechanical complication of aortic graft   • History of malignant neoplasm of thoracic cavity structure   • Hypertension   • Cancer of lower lobe of lung   • Adiposity   • Sleep apnea   .  Since the last visit, he has been having some continual hoarseness issues and desires to look into that. He has a h/o several cancers and doesn't have a local hematologist.   he has been compliant with   Current Outpatient Prescriptions:   •  atorvastatin (LIPITOR) 10 MG tablet, Take 1 tablet by mouth Daily. For cholesterol, Disp: 90 tablet, Rfl: 1  •  albuterol (PROVENTIL HFA;VENTOLIN HFA) 108 (90 Base) MCG/ACT inhaler, Inhale 2 puffs Every 4 (Four) Hours As Needed for Wheezing., Disp: , Rfl:   •  albuterol (PROVENTIL) (2.5 MG/3ML) 0.083% nebulizer solution, Take 2.5 mg by nebulization 4 (Four) Times a Day., Disp: , Rfl:   •  albuterol (PROVENTIL) 2 MG tablet, Take 2 mg by mouth., Disp: , Rfl:   •  ALPRAZolam (XANAX) 1 MG tablet, Take 1 tablet by mouth At Night As Needed for Anxiety., Disp: 90 tablet, Rfl: 0  •  amLODIPine (NORVASC) 5 MG tablet, Take 1 tablet by mouth Daily. For BP with Lisinopril, Disp: 90 tablet, Rfl: 1  •  amoxicillin (AMOXIL) 500 MG capsule, Take 1 capsule by mouth 3 (Three) Times a Day., Disp: 30 capsule, Rfl: 2  •  budesonide (PULMICORT) 0.5 MG/2ML nebulizer solution, Inhale 0.5 mg., Disp: , Rfl:   •  budesonide-formoterol (SYMBICORT) 160-4.5  "MCG/ACT inhaler, , Disp: , Rfl:   •  ipratropium (ATROVENT) 0.02 % nebulizer solution, Take 500 mcg by nebulization 2 (Two) Times a Day., Disp: , Rfl:   •  lisinopril (PRINIVIL,ZESTRIL) 20 MG tablet, Take 1 tablet by mouth Daily for 90 days. For BP with Amlodipine, Disp: 90 tablet, Rfl: 1  •  predniSONE (DELTASONE) 20 MG tablet, Take 3 tabs QDPC x 3 days then 2 tabs QDPC x 4 days then 1 tab QDPC x 3 days, Disp: 20 tablet, Rfl: 0  •  predniSONE (DELTASONE) 20 MG tablet, Take 1 tablet by mouth Daily., Disp: 30 tablet, Rfl: 0  •  triamcinolone (KENALOG) 0.1 % cream, APPLY TWICE A DAY, Disp: , Rfl: 2.  he denies medication side effects.    All of the chronic condition(s) listed above are stable w/o issues.    /83  Pulse 77  Temp 97.4 °F (36.3 °C)  Resp 18  Ht 177.8 cm (70\")  Wt 103 kg (227 lb)  SpO2 91%  BMI 32.57 kg/m2    Results for orders placed or performed during the hospital encounter of 12/08/17   Influenza Antigen, Rapid - Swab, Nasopharynx   Result Value Ref Range    Influenza A Ag, EIA Negative Negative    Influenza B Ag, EIA Negative Negative   Comprehensive Metabolic Panel   Result Value Ref Range    Glucose 99 65 - 99 mg/dL    BUN 25 (H) 8 - 23 mg/dL    Creatinine 1.09 0.76 - 1.27 mg/dL    Sodium 144 136 - 145 mmol/L    Potassium 3.9 3.5 - 5.2 mmol/L    Chloride 105 98 - 107 mmol/L    CO2 31.6 (H) 22.0 - 29.0 mmol/L    Calcium 8.9 8.6 - 10.5 mg/dL    Total Protein 6.9 6.0 - 8.5 g/dL    Albumin 4.00 3.50 - 5.20 g/dL    ALT (SGPT) 18 1 - 41 U/L    AST (SGOT) 16 1 - 40 U/L    Alkaline Phosphatase 62 39 - 117 U/L    Total Bilirubin 0.8 0.1 - 1.2 mg/dL    eGFR Non African Amer 66 >60 mL/min/1.73    Globulin 2.9 gm/dL    A/G Ratio 1.4 g/dL    BUN/Creatinine Ratio 22.9 7.0 - 25.0    Anion Gap 7.4 mmol/L   CBC Auto Differential   Result Value Ref Range    WBC 4.25 (L) 4.50 - 10.70 10*3/mm3    RBC 3.83 (L) 4.60 - 6.00 10*6/mm3    Hemoglobin 13.2 (L) 13.7 - 17.6 g/dL    Hematocrit 39.5 (L) 40.4 - 52.2 % "    .1 (H) 79.8 - 96.2 fL    MCH 34.5 (H) 27.0 - 32.7 pg    MCHC 33.4 32.6 - 36.4 g/dL    RDW 13.9 11.5 - 14.5 %    RDW-SD 51.9 37.0 - 54.0 fl    MPV 11.3 6.0 - 12.0 fL    Platelets 123 (L) 140 - 500 10*3/mm3   Lactic Acid, Plasma   Result Value Ref Range    Lactate 1.0 0.5 - 2.0 mmol/L   Lipase   Result Value Ref Range    Lipase 38 13 - 60 U/L   Scan Slide   Result Value Ref Range    Scan Slide     BNP   Result Value Ref Range    proBNP 297.9 0.0 - 1800.0 pg/mL   Blood Gas, Arterial   Result Value Ref Range    Site Arterial: right radial     Tim's Test Positive     pH, Arterial 7.432 7.350 - 7.450 pH units    pCO2, Arterial 38.2 35.0 - 45.0 mm Hg    pO2, Arterial 61.2 (L) 80.0 - 100.0 mm Hg    HCO3, Arterial 25.5 22.0 - 28.0 mmol/L    Base Excess, Arterial 1.2 0.0 - 2.0 mmol/L    O2 Saturation Calculated 91.8 (L) 92.0 - 99.0 %    Barometric Pressure for Blood Gas 750.5 mmHg    Modality Cannula     Set [place] Resp Rate 20     Rate 20 Breaths/minute   Manual Differential   Result Value Ref Range    Neutrophil % 50.0 42.7 - 76.0 %    Lymphocyte % 16.0 (L) 19.6 - 45.3 %    Monocyte % 27.0 (H) 5.0 - 12.0 %    Eosinophil % 7.0 (H) 0.3 - 6.2 %    Neutrophils Absolute 2.13 1.90 - 8.10 10*3/mm3    Lymphocytes Absolute 0.68 (L) 0.90 - 4.80 10*3/mm3    Monocytes Absolute 1.15 0.20 - 1.20 10*3/mm3    Eosinophils Absolute 0.30 0.00 - 0.70 10*3/mm3    nRBC 1.0 (H) 0.0 - 0.0 /100 WBC    RBC Morphology Normal Normal    WBC Morphology Normal Normal    Platelet Morphology Normal Normal           The following portions of the patient's history were reviewed and updated as appropriate: allergies, current medications, past family history, past medical history, past social history, past surgical history and problem list.    Review of Systems   Constitutional: Negative for activity change, chills, fatigue and fever.   Respiratory: Negative for cough and shortness of breath.    Cardiovascular: Negative for chest pain and  palpitations.   Gastrointestinal: Negative for abdominal pain.   Endocrine: Negative for cold intolerance.   Psychiatric/Behavioral: Negative for behavioral problems and dysphoric mood. The patient is not nervous/anxious.        Objective   Physical Exam   Constitutional: He appears well-developed and well-nourished.   Neck: Neck supple. No thyromegaly present.   Cardiovascular: Normal rate and regular rhythm.    Murmur heard.   Systolic murmur is present with a grade of 3/6   Pulmonary/Chest: Effort normal and breath sounds normal.   Abdominal: Bowel sounds are normal.   Psychiatric: He has a normal mood and affect. His behavior is normal.   Nursing note and vitals reviewed.  Labs reviewed with pt today during visit. All questions answered.      Assessment/Plan   Ankit was seen today for hyperlipidemia and hypertension.    Diagnoses and all orders for this visit:    Anemia, unspecified type  -     Ambulatory Referral to Hematology / Oncology    Hoarseness of voice  -     Ambulatory Referral to ENT (Otolaryngology)    Mixed hyperlipidemia  -     atorvastatin (LIPITOR) 10 MG tablet; Take 1 tablet by mouth Daily. For cholesterol    Benign essential hypertension    Other orders  -     amoxicillin (AMOXIL) 500 MG capsule; Take 1 capsule by mouth 3 (Three) Times a Day.

## 2018-02-21 ENCOUNTER — APPOINTMENT (OUTPATIENT)
Dept: OTHER | Facility: HOSPITAL | Age: 78
End: 2018-02-21
Attending: FAMILY MEDICINE

## 2018-02-21 ENCOUNTER — APPOINTMENT (OUTPATIENT)
Dept: ONCOLOGY | Facility: CLINIC | Age: 78
End: 2018-02-21

## 2018-04-02 ENCOUNTER — HOSPITAL ENCOUNTER (OUTPATIENT)
Dept: CT IMAGING | Facility: HOSPITAL | Age: 78
Discharge: HOME | End: 2018-04-02
Attending: INTERNAL MEDICINE | Admitting: INTERNAL MEDICINE

## 2018-04-02 DIAGNOSIS — R91.1 LUNG NODULE: ICD-10-CM

## 2018-04-02 PROCEDURE — 71250 CT THORAX DX C-: CPT

## 2018-04-18 ENCOUNTER — TRANSCRIBE ORDERS (OUTPATIENT)
Dept: ADMINISTRATIVE | Facility: HOSPITAL | Age: 78
End: 2018-04-18

## 2018-04-18 DIAGNOSIS — R91.8 LUNG NODULES: Primary | ICD-10-CM

## 2018-05-27 ENCOUNTER — APPOINTMENT (OUTPATIENT)
Dept: GENERAL RADIOLOGY | Facility: HOSPITAL | Age: 78
End: 2018-05-27

## 2018-05-27 ENCOUNTER — HOSPITAL ENCOUNTER (INPATIENT)
Facility: HOSPITAL | Age: 78
LOS: 3 days | Discharge: HOME | End: 2018-05-30
Attending: EMERGENCY MEDICINE | Admitting: INTERNAL MEDICINE

## 2018-05-27 DIAGNOSIS — J20.9 COPD (CHRONIC OBSTRUCTIVE PULMONARY DISEASE) WITH ACUTE BRONCHITIS (HCC): Primary | ICD-10-CM

## 2018-05-27 DIAGNOSIS — J44.0 COPD (CHRONIC OBSTRUCTIVE PULMONARY DISEASE) WITH ACUTE BRONCHITIS (HCC): Primary | ICD-10-CM

## 2018-05-27 DIAGNOSIS — J96.01 ACUTE RESPIRATORY FAILURE WITH HYPOXIA (HCC): ICD-10-CM

## 2018-05-27 LAB
ALBUMIN SERPL-MCNC: 4.4 G/DL (ref 3.5–5.2)
ALBUMIN/GLOB SERPL: 2 G/DL
ALP SERPL-CCNC: 86 U/L (ref 39–117)
ALT SERPL W P-5'-P-CCNC: 15 U/L (ref 1–41)
ANION GAP SERPL CALCULATED.3IONS-SCNC: 14.5 MMOL/L
AST SERPL-CCNC: 13 U/L (ref 1–40)
BASOPHILS # BLD AUTO: 0.03 10*3/MM3 (ref 0–0.2)
BASOPHILS NFR BLD AUTO: 0.2 % (ref 0–1.5)
BILIRUB SERPL-MCNC: 0.7 MG/DL (ref 0.1–1.2)
BUN BLD-MCNC: 20 MG/DL (ref 8–23)
BUN/CREAT SERPL: 19 (ref 7–25)
CALCIUM SPEC-SCNC: 8.9 MG/DL (ref 8.6–10.5)
CHLORIDE SERPL-SCNC: 104 MMOL/L (ref 98–107)
CO2 SERPL-SCNC: 25.5 MMOL/L (ref 22–29)
CREAT BLD-MCNC: 1.05 MG/DL (ref 0.76–1.27)
D-LACTATE SERPL-SCNC: 1.7 MMOL/L (ref 0.5–2)
DEPRECATED RDW RBC AUTO: 51.3 FL (ref 37–54)
EOSINOPHIL # BLD AUTO: 0.23 10*3/MM3 (ref 0–0.7)
EOSINOPHIL NFR BLD AUTO: 1.8 % (ref 0.3–6.2)
ERYTHROCYTE [DISTWIDTH] IN BLOOD BY AUTOMATED COUNT: 13.8 % (ref 11.5–14.5)
GFR SERPL CREATININE-BSD FRML MDRD: 68 ML/MIN/1.73
GLOBULIN UR ELPH-MCNC: 2.2 GM/DL
GLUCOSE BLD-MCNC: 122 MG/DL (ref 65–99)
HCT VFR BLD AUTO: 39.6 % (ref 40.4–52.2)
HGB BLD-MCNC: 13.3 G/DL (ref 13.7–17.6)
IMM GRANULOCYTES # BLD: 0.13 10*3/MM3 (ref 0–0.03)
IMM GRANULOCYTES NFR BLD: 1 % (ref 0–0.5)
INR PPP: 1.08 (ref 0.9–1.1)
LYMPHOCYTES # BLD AUTO: 1.26 10*3/MM3 (ref 0.9–4.8)
LYMPHOCYTES NFR BLD AUTO: 9.7 % (ref 19.6–45.3)
MCH RBC QN AUTO: 34.4 PG (ref 27–32.7)
MCHC RBC AUTO-ENTMCNC: 33.6 G/DL (ref 32.6–36.4)
MCV RBC AUTO: 102.3 FL (ref 79.8–96.2)
MONOCYTES # BLD AUTO: 2.32 10*3/MM3 (ref 0.2–1.2)
MONOCYTES NFR BLD AUTO: 17.9 % (ref 5–12)
NEUTROPHILS # BLD AUTO: 9.13 10*3/MM3 (ref 1.9–8.1)
NEUTROPHILS NFR BLD AUTO: 70.4 % (ref 42.7–76)
NT-PROBNP SERPL-MCNC: 556.8 PG/ML (ref 0–1800)
PLATELET # BLD AUTO: 169 10*3/MM3 (ref 140–500)
PMV BLD AUTO: 11.8 FL (ref 6–12)
POTASSIUM BLD-SCNC: 4.2 MMOL/L (ref 3.5–5.2)
PROCALCITONIN SERPL-MCNC: 0.15 NG/ML (ref 0.1–0.25)
PROT SERPL-MCNC: 6.6 G/DL (ref 6–8.5)
PROTHROMBIN TIME: 13.8 SECONDS (ref 11.7–14.2)
RBC # BLD AUTO: 3.87 10*6/MM3 (ref 4.6–6)
SODIUM BLD-SCNC: 144 MMOL/L (ref 136–145)
TROPONIN T SERPL-MCNC: 0.04 NG/ML (ref 0–0.03)
WBC NRBC COR # BLD: 12.97 10*3/MM3 (ref 4.5–10.7)

## 2018-05-27 PROCEDURE — 83880 ASSAY OF NATRIURETIC PEPTIDE: CPT | Performed by: EMERGENCY MEDICINE

## 2018-05-27 PROCEDURE — 85025 COMPLETE CBC W/AUTO DIFF WBC: CPT | Performed by: EMERGENCY MEDICINE

## 2018-05-27 PROCEDURE — 84484 ASSAY OF TROPONIN QUANT: CPT | Performed by: EMERGENCY MEDICINE

## 2018-05-27 PROCEDURE — 87205 SMEAR GRAM STAIN: CPT | Performed by: INTERNAL MEDICINE

## 2018-05-27 PROCEDURE — 85610 PROTHROMBIN TIME: CPT | Performed by: EMERGENCY MEDICINE

## 2018-05-27 PROCEDURE — 87486 CHLMYD PNEUM DNA AMP PROBE: CPT | Performed by: INTERNAL MEDICINE

## 2018-05-27 PROCEDURE — 99284 EMERGENCY DEPT VISIT MOD MDM: CPT

## 2018-05-27 PROCEDURE — 93010 ELECTROCARDIOGRAM REPORT: CPT | Performed by: INTERNAL MEDICINE

## 2018-05-27 PROCEDURE — 87070 CULTURE OTHR SPECIMN AEROBIC: CPT | Performed by: INTERNAL MEDICINE

## 2018-05-27 PROCEDURE — 80053 COMPREHEN METABOLIC PANEL: CPT | Performed by: EMERGENCY MEDICINE

## 2018-05-27 PROCEDURE — 94799 UNLISTED PULMONARY SVC/PX: CPT

## 2018-05-27 PROCEDURE — 94640 AIRWAY INHALATION TREATMENT: CPT

## 2018-05-27 PROCEDURE — 25010000002 PIPERACILLIN SOD-TAZOBACTAM PER 1 G: Performed by: EMERGENCY MEDICINE

## 2018-05-27 PROCEDURE — 84145 PROCALCITONIN (PCT): CPT | Performed by: EMERGENCY MEDICINE

## 2018-05-27 PROCEDURE — 25010000002 METHYLPREDNISOLONE PER 125 MG: Performed by: EMERGENCY MEDICINE

## 2018-05-27 PROCEDURE — 87040 BLOOD CULTURE FOR BACTERIA: CPT | Performed by: EMERGENCY MEDICINE

## 2018-05-27 PROCEDURE — 71046 X-RAY EXAM CHEST 2 VIEWS: CPT

## 2018-05-27 PROCEDURE — 87633 RESP VIRUS 12-25 TARGETS: CPT | Performed by: INTERNAL MEDICINE

## 2018-05-27 PROCEDURE — 83605 ASSAY OF LACTIC ACID: CPT | Performed by: EMERGENCY MEDICINE

## 2018-05-27 PROCEDURE — 87798 DETECT AGENT NOS DNA AMP: CPT | Performed by: INTERNAL MEDICINE

## 2018-05-27 PROCEDURE — 87581 M.PNEUMON DNA AMP PROBE: CPT | Performed by: INTERNAL MEDICINE

## 2018-05-27 PROCEDURE — 93005 ELECTROCARDIOGRAM TRACING: CPT | Performed by: EMERGENCY MEDICINE

## 2018-05-27 RX ORDER — METHYLPREDNISOLONE SODIUM SUCCINATE 125 MG/2ML
125 INJECTION, POWDER, LYOPHILIZED, FOR SOLUTION INTRAMUSCULAR; INTRAVENOUS ONCE
Status: COMPLETED | OUTPATIENT
Start: 2018-05-27 | End: 2018-05-27

## 2018-05-27 RX ORDER — SODIUM CHLORIDE 0.9 % (FLUSH) 0.9 %
10 SYRINGE (ML) INJECTION AS NEEDED
Status: DISCONTINUED | OUTPATIENT
Start: 2018-05-27 | End: 2018-05-30 | Stop reason: HOSPADM

## 2018-05-27 RX ORDER — SODIUM CHLORIDE 9 MG/ML
75 INJECTION, SOLUTION INTRAVENOUS CONTINUOUS
Status: DISCONTINUED | OUTPATIENT
Start: 2018-05-27 | End: 2018-05-29

## 2018-05-27 RX ORDER — PREDNISONE 20 MG/1
40 TABLET ORAL
Status: DISCONTINUED | OUTPATIENT
Start: 2018-05-28 | End: 2018-05-30 | Stop reason: HOSPADM

## 2018-05-27 RX ORDER — ALPRAZOLAM 0.5 MG/1
1 TABLET ORAL NIGHTLY PRN
Status: DISCONTINUED | OUTPATIENT
Start: 2018-05-27 | End: 2018-05-30 | Stop reason: HOSPADM

## 2018-05-27 RX ORDER — BISACODYL 10 MG
10 SUPPOSITORY, RECTAL RECTAL DAILY PRN
Status: DISCONTINUED | OUTPATIENT
Start: 2018-05-27 | End: 2018-05-30 | Stop reason: HOSPADM

## 2018-05-27 RX ORDER — SODIUM CHLORIDE 0.9 % (FLUSH) 0.9 %
1-10 SYRINGE (ML) INJECTION AS NEEDED
Status: DISCONTINUED | OUTPATIENT
Start: 2018-05-27 | End: 2018-05-30 | Stop reason: HOSPADM

## 2018-05-27 RX ORDER — ACETAMINOPHEN 325 MG/1
650 TABLET ORAL EVERY 4 HOURS PRN
Status: DISCONTINUED | OUTPATIENT
Start: 2018-05-27 | End: 2018-05-30 | Stop reason: HOSPADM

## 2018-05-27 RX ORDER — BISACODYL 5 MG/1
5 TABLET, DELAYED RELEASE ORAL DAILY PRN
Status: DISCONTINUED | OUTPATIENT
Start: 2018-05-27 | End: 2018-05-30 | Stop reason: HOSPADM

## 2018-05-27 RX ORDER — IPRATROPIUM BROMIDE AND ALBUTEROL SULFATE 2.5; .5 MG/3ML; MG/3ML
3 SOLUTION RESPIRATORY (INHALATION)
Status: DISCONTINUED | OUTPATIENT
Start: 2018-05-27 | End: 2018-05-30 | Stop reason: HOSPADM

## 2018-05-27 RX ORDER — ATORVASTATIN CALCIUM 10 MG/1
10 TABLET, FILM COATED ORAL NIGHTLY
Status: DISCONTINUED | OUTPATIENT
Start: 2018-05-28 | End: 2018-05-30 | Stop reason: HOSPADM

## 2018-05-27 RX ORDER — HYDROCODONE BITARTRATE AND ACETAMINOPHEN 5; 325 MG/1; MG/1
1 TABLET ORAL EVERY 4 HOURS PRN
Status: DISCONTINUED | OUTPATIENT
Start: 2018-05-27 | End: 2018-05-30 | Stop reason: HOSPADM

## 2018-05-27 RX ADMIN — DOXYCYCLINE 100 MG: 100 INJECTION, POWDER, LYOPHILIZED, FOR SOLUTION INTRAVENOUS at 23:27

## 2018-05-27 RX ADMIN — SODIUM CHLORIDE 75 ML/HR: 9 INJECTION, SOLUTION INTRAVENOUS at 22:21

## 2018-05-27 RX ADMIN — TAZOBACTAM SODIUM AND PIPERACILLIN SODIUM 4.5 G: 500; 4 INJECTION, SOLUTION INTRAVENOUS at 19:35

## 2018-05-27 RX ADMIN — METHYLPREDNISOLONE SODIUM SUCCINATE 125 MG: 125 INJECTION, POWDER, FOR SOLUTION INTRAMUSCULAR; INTRAVENOUS at 19:35

## 2018-05-27 RX ADMIN — IPRATROPIUM BROMIDE AND ALBUTEROL SULFATE 3 ML: .5; 3 SOLUTION RESPIRATORY (INHALATION) at 22:53

## 2018-05-27 RX ADMIN — ATORVASTATIN CALCIUM 10 MG: 10 TABLET, FILM COATED ORAL at 23:49

## 2018-05-27 RX ADMIN — ALPRAZOLAM 1 MG: 0.5 TABLET ORAL at 23:49

## 2018-05-28 LAB
B PERT DNA SPEC QL NAA+PROBE: NOT DETECTED
C PNEUM DNA NPH QL NAA+NON-PROBE: NOT DETECTED
FLUAV H1 2009 PAND RNA NPH QL NAA+PROBE: NOT DETECTED
FLUAV H1 HA GENE NPH QL NAA+PROBE: NOT DETECTED
FLUAV H3 RNA NPH QL NAA+PROBE: NOT DETECTED
FLUAV SUBTYP SPEC NAA+PROBE: NOT DETECTED
FLUBV RNA ISLT QL NAA+PROBE: NOT DETECTED
HADV DNA SPEC NAA+PROBE: NOT DETECTED
HCOV 229E RNA SPEC QL NAA+PROBE: NOT DETECTED
HCOV HKU1 RNA SPEC QL NAA+PROBE: NOT DETECTED
HCOV NL63 RNA SPEC QL NAA+PROBE: NOT DETECTED
HCOV OC43 RNA SPEC QL NAA+PROBE: NOT DETECTED
HMPV RNA NPH QL NAA+NON-PROBE: NOT DETECTED
HPIV1 RNA SPEC QL NAA+PROBE: NOT DETECTED
HPIV2 RNA SPEC QL NAA+PROBE: NOT DETECTED
HPIV3 RNA NPH QL NAA+PROBE: NOT DETECTED
HPIV4 P GENE NPH QL NAA+PROBE: NOT DETECTED
M PNEUMO IGG SER IA-ACNC: NOT DETECTED
RHINOVIRUS RNA SPEC NAA+PROBE: NOT DETECTED
RSV RNA NPH QL NAA+NON-PROBE: NOT DETECTED
TROPONIN T SERPL-MCNC: 0.01 NG/ML (ref 0–0.03)
TROPONIN T SERPL-MCNC: 0.02 NG/ML (ref 0–0.03)
TROPONIN T SERPL-MCNC: 0.03 NG/ML (ref 0–0.03)

## 2018-05-28 PROCEDURE — 94799 UNLISTED PULMONARY SVC/PX: CPT

## 2018-05-28 PROCEDURE — 63710000001 PREDNISONE PER 1 MG: Performed by: INTERNAL MEDICINE

## 2018-05-28 PROCEDURE — 84484 ASSAY OF TROPONIN QUANT: CPT | Performed by: INTERNAL MEDICINE

## 2018-05-28 PROCEDURE — 94640 AIRWAY INHALATION TREATMENT: CPT

## 2018-05-28 PROCEDURE — 36415 COLL VENOUS BLD VENIPUNCTURE: CPT | Performed by: INTERNAL MEDICINE

## 2018-05-28 RX ORDER — ALBUTEROL SULFATE 2.5 MG/3ML
2.5 SOLUTION RESPIRATORY (INHALATION)
Status: DISCONTINUED | OUTPATIENT
Start: 2018-05-28 | End: 2018-05-30 | Stop reason: HOSPADM

## 2018-05-28 RX ORDER — CETIRIZINE HYDROCHLORIDE 10 MG/1
5 TABLET ORAL DAILY
Status: DISCONTINUED | OUTPATIENT
Start: 2018-05-28 | End: 2018-05-29

## 2018-05-28 RX ORDER — CETIRIZINE HYDROCHLORIDE 10 MG/1
10 TABLET ORAL DAILY PRN
Status: DISCONTINUED | OUTPATIENT
Start: 2018-05-28 | End: 2018-05-30 | Stop reason: HOSPADM

## 2018-05-28 RX ORDER — FOLIC ACID 1 MG/1
1 TABLET ORAL DAILY
COMMUNITY

## 2018-05-28 RX ADMIN — IPRATROPIUM BROMIDE AND ALBUTEROL SULFATE 3 ML: .5; 3 SOLUTION RESPIRATORY (INHALATION) at 15:39

## 2018-05-28 RX ADMIN — DOXYCYCLINE 100 MG: 100 INJECTION, POWDER, LYOPHILIZED, FOR SOLUTION INTRAVENOUS at 10:01

## 2018-05-28 RX ADMIN — IPRATROPIUM BROMIDE AND ALBUTEROL SULFATE 3 ML: .5; 3 SOLUTION RESPIRATORY (INHALATION) at 20:18

## 2018-05-28 RX ADMIN — IPRATROPIUM BROMIDE AND ALBUTEROL SULFATE 3 ML: .5; 3 SOLUTION RESPIRATORY (INHALATION) at 07:27

## 2018-05-28 RX ADMIN — CETIRIZINE HYDROCHLORIDE 10 MG: 10 TABLET, FILM COATED ORAL at 05:30

## 2018-05-28 RX ADMIN — CETIRIZINE HYDROCHLORIDE 5 MG: 10 TABLET, FILM COATED ORAL at 17:18

## 2018-05-28 RX ADMIN — IPRATROPIUM BROMIDE AND ALBUTEROL SULFATE 3 ML: .5; 3 SOLUTION RESPIRATORY (INHALATION) at 11:31

## 2018-05-28 RX ADMIN — PREDNISONE 40 MG: 20 TABLET ORAL at 08:25

## 2018-05-28 RX ADMIN — ATORVASTATIN CALCIUM 10 MG: 10 TABLET, FILM COATED ORAL at 20:27

## 2018-05-28 RX ADMIN — DOXYCYCLINE 100 MG: 100 INJECTION, POWDER, LYOPHILIZED, FOR SOLUTION INTRAVENOUS at 20:27

## 2018-05-28 RX ADMIN — ALBUTEROL SULFATE 2.5 MG: 2.5 SOLUTION RESPIRATORY (INHALATION) at 23:51

## 2018-05-28 RX ADMIN — SODIUM CHLORIDE 75 ML/HR: 9 INJECTION, SOLUTION INTRAVENOUS at 14:14

## 2018-05-29 PROCEDURE — 94799 UNLISTED PULMONARY SVC/PX: CPT

## 2018-05-29 PROCEDURE — 63710000001 PREDNISONE PER 1 MG: Performed by: INTERNAL MEDICINE

## 2018-05-29 RX ORDER — OXYMETAZOLINE HYDROCHLORIDE 0.05 G/100ML
2 SPRAY NASAL 2 TIMES DAILY
Status: COMPLETED | OUTPATIENT
Start: 2018-05-29 | End: 2018-05-29

## 2018-05-29 RX ADMIN — IPRATROPIUM BROMIDE AND ALBUTEROL SULFATE 3 ML: .5; 3 SOLUTION RESPIRATORY (INHALATION) at 07:26

## 2018-05-29 RX ADMIN — DOXYCYCLINE 100 MG: 100 INJECTION, POWDER, LYOPHILIZED, FOR SOLUTION INTRAVENOUS at 09:50

## 2018-05-29 RX ADMIN — CETIRIZINE HYDROCHLORIDE 5 MG: 10 TABLET, FILM COATED ORAL at 09:50

## 2018-05-29 RX ADMIN — IPRATROPIUM BROMIDE AND ALBUTEROL SULFATE 3 ML: .5; 3 SOLUTION RESPIRATORY (INHALATION) at 11:25

## 2018-05-29 RX ADMIN — SODIUM CHLORIDE 75 ML/HR: 9 INJECTION, SOLUTION INTRAVENOUS at 04:46

## 2018-05-29 RX ADMIN — PREDNISONE 40 MG: 20 TABLET ORAL at 09:50

## 2018-05-29 RX ADMIN — ALBUTEROL SULFATE 2.5 MG: 2.5 SOLUTION RESPIRATORY (INHALATION) at 23:16

## 2018-05-29 RX ADMIN — OXYMETAZOLINE HYDROCHLORIDE 2 SPRAY: 5 SPRAY NASAL at 23:00

## 2018-05-29 RX ADMIN — OXYMETAZOLINE HYDROCHLORIDE 2 SPRAY: 5 SPRAY NASAL at 15:47

## 2018-05-29 RX ADMIN — ALPRAZOLAM 1 MG: 0.5 TABLET ORAL at 00:11

## 2018-05-29 RX ADMIN — IPRATROPIUM BROMIDE AND ALBUTEROL SULFATE 3 ML: .5; 3 SOLUTION RESPIRATORY (INHALATION) at 15:38

## 2018-05-29 RX ADMIN — ATORVASTATIN CALCIUM 10 MG: 10 TABLET, FILM COATED ORAL at 21:07

## 2018-05-29 RX ADMIN — ALPRAZOLAM 1 MG: 0.5 TABLET ORAL at 23:00

## 2018-05-29 RX ADMIN — ACETAMINOPHEN 650 MG: 325 TABLET, FILM COATED ORAL at 10:36

## 2018-05-29 RX ADMIN — IPRATROPIUM BROMIDE AND ALBUTEROL SULFATE 3 ML: .5; 3 SOLUTION RESPIRATORY (INHALATION) at 20:08

## 2018-05-30 VITALS
WEIGHT: 221.4 LBS | SYSTOLIC BLOOD PRESSURE: 115 MMHG | RESPIRATION RATE: 16 BRPM | OXYGEN SATURATION: 94 % | TEMPERATURE: 98.4 F | HEIGHT: 71 IN | BODY MASS INDEX: 31 KG/M2 | HEART RATE: 98 BPM | DIASTOLIC BLOOD PRESSURE: 81 MMHG

## 2018-05-30 LAB
BACTERIA SPEC RESP CULT: NORMAL
GRAM STN SPEC: NORMAL

## 2018-05-30 PROCEDURE — 94799 UNLISTED PULMONARY SVC/PX: CPT

## 2018-05-30 PROCEDURE — 94618 PULMONARY STRESS TESTING: CPT

## 2018-05-30 PROCEDURE — 63710000001 PREDNISONE PER 1 MG: Performed by: INTERNAL MEDICINE

## 2018-05-30 RX ORDER — ECHINACEA PURPUREA EXTRACT 125 MG
2 TABLET ORAL AS NEEDED
Status: DISCONTINUED | OUTPATIENT
Start: 2018-05-30 | End: 2018-05-30 | Stop reason: HOSPADM

## 2018-05-30 RX ORDER — PREDNISONE 20 MG/1
40 TABLET ORAL
Qty: 5 TABLET | Refills: 0 | Status: SHIPPED | OUTPATIENT
Start: 2018-05-31 | End: 2018-06-05

## 2018-05-30 RX ADMIN — IPRATROPIUM BROMIDE AND ALBUTEROL SULFATE 3 ML: .5; 3 SOLUTION RESPIRATORY (INHALATION) at 14:59

## 2018-05-30 RX ADMIN — IPRATROPIUM BROMIDE AND ALBUTEROL SULFATE 3 ML: .5; 3 SOLUTION RESPIRATORY (INHALATION) at 07:37

## 2018-05-30 RX ADMIN — IPRATROPIUM BROMIDE AND ALBUTEROL SULFATE 3 ML: .5; 3 SOLUTION RESPIRATORY (INHALATION) at 11:14

## 2018-05-30 RX ADMIN — PREDNISONE 40 MG: 20 TABLET ORAL at 10:05

## 2018-05-30 RX ADMIN — CETIRIZINE HYDROCHLORIDE 10 MG: 10 TABLET, FILM COATED ORAL at 12:56

## 2018-05-31 ENCOUNTER — EPISODE CHANGES (OUTPATIENT)
Dept: CASE MANAGEMENT | Facility: OTHER | Age: 78
End: 2018-05-31

## 2018-06-01 LAB
BACTERIA SPEC AEROBE CULT: NORMAL
BACTERIA SPEC AEROBE CULT: NORMAL

## 2018-06-12 ENCOUNTER — OFFICE VISIT (OUTPATIENT)
Dept: FAMILY MEDICINE CLINIC | Facility: CLINIC | Age: 78
End: 2018-06-12

## 2018-06-12 VITALS
HEART RATE: 80 BPM | BODY MASS INDEX: 31.36 KG/M2 | HEIGHT: 71 IN | DIASTOLIC BLOOD PRESSURE: 75 MMHG | WEIGHT: 224 LBS | OXYGEN SATURATION: 94 % | RESPIRATION RATE: 16 BRPM | TEMPERATURE: 97.7 F | SYSTOLIC BLOOD PRESSURE: 158 MMHG

## 2018-06-12 DIAGNOSIS — E78.2 MIXED HYPERLIPIDEMIA: ICD-10-CM

## 2018-06-12 DIAGNOSIS — I10 BENIGN ESSENTIAL HYPERTENSION: Primary | ICD-10-CM

## 2018-06-12 DIAGNOSIS — J44.0 CHRONIC OBSTRUCTIVE PULMONARY DISEASE WITH ACUTE LOWER RESPIRATORY INFECTION (HCC): ICD-10-CM

## 2018-06-12 DIAGNOSIS — F41.9 ANXIETY: ICD-10-CM

## 2018-06-12 DIAGNOSIS — Z09 HOSPITAL DISCHARGE FOLLOW-UP: ICD-10-CM

## 2018-06-12 PROCEDURE — 99214 OFFICE O/P EST MOD 30 MIN: CPT | Performed by: FAMILY MEDICINE

## 2018-06-12 RX ORDER — ALPRAZOLAM 1 MG/1
1 TABLET ORAL NIGHTLY PRN
Qty: 90 TABLET | Refills: 0 | Status: SHIPPED | OUTPATIENT
Start: 2018-06-12 | End: 2018-08-07 | Stop reason: SDUPTHER

## 2018-06-12 RX ORDER — AMLODIPINE BESYLATE 5 MG/1
5 TABLET ORAL DAILY
Qty: 90 TABLET | Refills: 1 | Status: SHIPPED | OUTPATIENT
Start: 2018-06-12 | End: 2018-11-14

## 2018-06-12 RX ORDER — ATORVASTATIN CALCIUM 10 MG/1
10 TABLET, FILM COATED ORAL DAILY
Qty: 90 TABLET | Refills: 1 | Status: SHIPPED | OUTPATIENT
Start: 2018-06-12 | End: 2018-12-04 | Stop reason: SDUPTHER

## 2018-06-12 RX ORDER — LISINOPRIL 20 MG/1
20 TABLET ORAL DAILY
Qty: 90 TABLET | Refills: 1 | Status: SHIPPED | OUTPATIENT
Start: 2018-06-12 | End: 2018-11-14

## 2018-06-12 NOTE — PROGRESS NOTES
Subjective   Ankit Mack Sr. is a 78 y.o. male.     CC: Hospital F/U for COPD Exacerbation         Medical Management    History of Present Illness     Chief Complaint:   Chief Complaint   Patient presents with   • COPD     hospital follow up    • acute respitory failure   • Hypertension     med refill - joseline    • Hyperlipidemia   • Anxiety       Ankit Mack Sr. 78 y.o. male who presents today for Medical Management of the below listed issues and medication refills.  he has a problem list of   Patient Active Problem List   Diagnosis   • Anxiety   • Hyperlipidemia   • Benign essential hypertension   • Osteoarthritis, knee   • Abrasion   • Aneurysm of thoracic aorta   • Chronic obstructive pulmonary disease   • Mechanical complication of aortic graft   • History of malignant neoplasm of thoracic cavity structure   • Hypertension   • Cancer of lower lobe of lung   • Adiposity   • Sleep apnea   • COPD (chronic obstructive pulmonary disease) with acute bronchitis   .  Since the last visit, he has overall felt well until a recent hospitalization. That visit was as follows:    Admit date: 5/27/2018  Discharge date and time: May 30, 2018   Discharged Condition: good     Discharge Diagnoses:   COPD (chronic obstructive pulmonary disease) with acute bronchitis  COPD exacerbation  Acute bronchitis  Acute respiratory failure        Hospital Course: Ankit Mack Sr. presented to [facility]  with shortness of breath, wheezing, chest tightness and cough.  He was severely dyspneic.  His dyspnea has improved, but he is still very limited during exertion.  He adamantly refuses discharge to a rehabilitation skilled unit, and he adamantly refuses home health.     In the hospital, it was determined that the patient needs additional oxygen.  He does have oxygen at home which she uses only at night, but here in the hospital and ambulatory oximetry was obtained and it showed that he needed 3 L at  baseline, and 5 L during exertion in order to keep oxygen saturation greater than 83%.     I tried convincing the patient to be discharged to inpatient pulmonary rehabilitation or skilled rehabilitation facility but he adamantly refuses.  He says he can function at home independently and he has 2 people who live with him that we'll help him daily.     All respiratory cultures were negative for any pathogens.     He received intravenous steroids, bronchodilators and supplemental oxygen.     He will follow-up in the office with Dr. Alejandro within 1-2 weeks.    Current outpatient and discharge medications have been reconciled for the patient.  Reviewed by: Gopi Lagos MD      he has been compliant with   Current Outpatient Prescriptions:   •  ALPRAZolam (XANAX) 1 MG tablet, Take 1 tablet by mouth At Night As Needed for Anxiety., Disp: 90 tablet, Rfl: 0  •  amLODIPine (NORVASC) 5 MG tablet, Take 1 tablet by mouth Daily. For BP with Lisinopril, Disp: 90 tablet, Rfl: 1  •  atorvastatin (LIPITOR) 10 MG tablet, Take 1 tablet by mouth Daily. For cholesterol, Disp: 90 tablet, Rfl: 1  •  lisinopril (PRINIVIL,ZESTRIL) 20 MG tablet, Take 1 tablet by mouth Daily for 90 days. For BP with Amlodipine, Disp: 90 tablet, Rfl: 1  •  albuterol (PROVENTIL HFA;VENTOLIN HFA) 108 (90 Base) MCG/ACT inhaler, Inhale 2 puffs Every 4 (Four) Hours As Needed for Wheezing., Disp: , Rfl:   •  albuterol (PROVENTIL) (2.5 MG/3ML) 0.083% nebulizer solution, Take 2.5 mg by nebulization 4 (Four) Times a Day., Disp: , Rfl:   •  budesonide (PULMICORT) 0.5 MG/2ML nebulizer solution, Inhale 0.5 mg., Disp: , Rfl:   •  folic acid (FOLVITE) 1 MG tablet, Take 1 mg by mouth Daily., Disp: , Rfl:   •  ipratropium (ATROVENT) 0.02 % nebulizer solution, Take 500 mcg by nebulization 2 (Two) Times a Day., Disp: , Rfl: .  he denies medication side effects.    All of the chronic condition(s) listed above are stable w/o issues.    /75   Pulse 80   Temp 97.7 °F (36.5  "°C) (Oral)   Resp 16   Ht 180.3 cm (71\")   Wt 102 kg (224 lb)   SpO2 94%   BMI 31.24 kg/m²     Results for orders placed or performed during the hospital encounter of 05/27/18   Blood Culture - Blood,   Result Value Ref Range    Blood Culture No growth at 5 days    Blood Culture - Blood,   Result Value Ref Range    Blood Culture No growth at 5 days    Respiratory Panel, PCR - Swab, Nasopharynx   Result Value Ref Range    ADENOVIRUS, PCR Not Detected Not Detected    Coronavirus 229E Not Detected Not Detected    Coronavirus HKU1 Not Detected Not Detected    Coronavirus NL63 Not Detected Not Detected    Coronavirus OC43 Not Detected Not Detected    Human Metapneumovirus Not Detected Not Detected    Human Rhinovirus/Enterovirus Not Detected Not Detected    Influenza B PCR Not Detected Not Detected    Parainfluenza Virus 1 Not Detected Not Detected    Parainfluenza Virus 2 Not Detected Not Detected    Parainfluenza Virus 3 Not Detected Not Detected    Parainfluenza Virus 4 Not Detected Not Detected    Bordetella pertussis pcr Not Detected Not Detected    Influenza A H1 2009 PCR Not Detected Not Detected    Chlamydophila pneumoniae PCR Not Detected Not Detected    Mycoplasma pneumo by PCR Not Detected Not Detected    Influenza A PCR Not Detected Not Detected    Influenza A H3 Not Detected Not Detected    Influenza A H1 Not Detected Not Detected    RSV, PCR Not Detected Not Detected   Respiratory Culture - Sputum, Cough   Result Value Ref Range    Respiratory Culture Scant growth (1+) Normal Respiratory Harleen     Gram Stain Result Many (4+) WBCs per low power field     Gram Stain Result Occasional Gram positive cocci     Gram Stain Result Rare (1+) Epithelial cells per low power field    Comprehensive Metabolic Panel   Result Value Ref Range    Glucose 122 (H) 65 - 99 mg/dL    BUN 20 8 - 23 mg/dL    Creatinine 1.05 0.76 - 1.27 mg/dL    Sodium 144 136 - 145 mmol/L    Potassium 4.2 3.5 - 5.2 mmol/L    Chloride 104 98 - " 107 mmol/L    CO2 25.5 22.0 - 29.0 mmol/L    Calcium 8.9 8.6 - 10.5 mg/dL    Total Protein 6.6 6.0 - 8.5 g/dL    Albumin 4.40 3.50 - 5.20 g/dL    ALT (SGPT) 15 1 - 41 U/L    AST (SGOT) 13 1 - 40 U/L    Alkaline Phosphatase 86 39 - 117 U/L    Total Bilirubin 0.7 0.1 - 1.2 mg/dL    eGFR Non African Amer 68 >60 mL/min/1.73    Globulin 2.2 gm/dL    A/G Ratio 2.0 g/dL    BUN/Creatinine Ratio 19.0 7.0 - 25.0    Anion Gap 14.5 mmol/L   Protime-INR   Result Value Ref Range    Protime 13.8 11.7 - 14.2 Seconds    INR 1.08 0.90 - 1.10   BNP   Result Value Ref Range    proBNP 556.8 0.0-1,800.0 pg/mL   Troponin   Result Value Ref Range    Troponin T 0.040 (H) 0.000 - 0.030 ng/mL   Lactic Acid, Plasma   Result Value Ref Range    Lactate 1.7 0.5 - 2.0 mmol/L   Procalcitonin   Result Value Ref Range    Procalcitonin 0.15 0.10 - 0.25 ng/mL   CBC Auto Differential   Result Value Ref Range    WBC 12.97 (H) 4.50 - 10.70 10*3/mm3    RBC 3.87 (L) 4.60 - 6.00 10*6/mm3    Hemoglobin 13.3 (L) 13.7 - 17.6 g/dL    Hematocrit 39.6 (L) 40.4 - 52.2 %    .3 (H) 79.8 - 96.2 fL    MCH 34.4 (H) 27.0 - 32.7 pg    MCHC 33.6 32.6 - 36.4 g/dL    RDW 13.8 11.5 - 14.5 %    RDW-SD 51.3 37.0 - 54.0 fl    MPV 11.8 6.0 - 12.0 fL    Platelets 169 140 - 500 10*3/mm3    Neutrophil % 70.4 42.7 - 76.0 %    Lymphocyte % 9.7 (L) 19.6 - 45.3 %    Monocyte % 17.9 (H) 5.0 - 12.0 %    Eosinophil % 1.8 0.3 - 6.2 %    Basophil % 0.2 0.0 - 1.5 %    Immature Grans % 1.0 (H) 0.0 - 0.5 %    Neutrophils, Absolute 9.13 (H) 1.90 - 8.10 10*3/mm3    Lymphocytes, Absolute 1.26 0.90 - 4.80 10*3/mm3    Monocytes, Absolute 2.32 (H) 0.20 - 1.20 10*3/mm3    Eosinophils, Absolute 0.23 0.00 - 0.70 10*3/mm3    Basophils, Absolute 0.03 0.00 - 0.20 10*3/mm3    Immature Grans, Absolute 0.13 (H) 0.00 - 0.03 10*3/mm3   Troponin   Result Value Ref Range    Troponin T 0.027 0.000 - 0.030 ng/mL   Troponin   Result Value Ref Range    Troponin T 0.018 0.000 - 0.030 ng/mL   Troponin   Result  Value Ref Range    Troponin T 0.014 0.000 - 0.030 ng/mL           The following portions of the patient's history were reviewed and updated as appropriate: allergies, current medications, past family history, past medical history, past social history, past surgical history and problem list.    Review of Systems   Constitutional: Negative for activity change, chills, fatigue and fever.   Respiratory: Negative for cough and shortness of breath.    Cardiovascular: Negative for chest pain and palpitations.   Gastrointestinal: Negative for abdominal pain.   Endocrine: Negative for cold intolerance.   Psychiatric/Behavioral: Negative for behavioral problems and dysphoric mood. The patient is not nervous/anxious.        Objective   Physical Exam   Constitutional: He appears well-developed and well-nourished.   Neck: Neck supple. No thyromegaly present.   Cardiovascular: Normal rate and regular rhythm.    No murmur heard.  Pulmonary/Chest: Effort normal and breath sounds normal.   Abdominal: Bowel sounds are normal. There is no tenderness.   Psychiatric: He has a normal mood and affect. His behavior is normal.   Nursing note and vitals reviewed.  Hospital records reviewed with pt confirming HPI.      Assessment/Plan   Ankit was seen today for copd, acute respitory failure, hypertension, hyperlipidemia and anxiety.    Diagnoses and all orders for this visit:    Benign essential hypertension  -     amLODIPine (NORVASC) 5 MG tablet; Take 1 tablet by mouth Daily. For BP with Lisinopril  -     lisinopril (PRINIVIL,ZESTRIL) 20 MG tablet; Take 1 tablet by mouth Daily for 90 days. For BP with Amlodipine    Mixed hyperlipidemia  -     atorvastatin (LIPITOR) 10 MG tablet; Take 1 tablet by mouth Daily. For cholesterol    Anxiety  -     ALPRAZolam (XANAX) 1 MG tablet; Take 1 tablet by mouth At Night As Needed for Anxiety.    Chronic obstructive pulmonary disease with acute lower respiratory infection    Hospital discharge  follow-up

## 2018-06-18 ENCOUNTER — ON CAMPUS - OUTPATIENT (AMBULATORY)
Dept: URBAN - METROPOLITAN AREA HOSPITAL 108 | Facility: HOSPITAL | Age: 78
End: 2018-06-18

## 2018-06-18 DIAGNOSIS — D50.9 IRON DEFICIENCY ANEMIA, UNSPECIFIED: ICD-10-CM

## 2018-06-18 PROCEDURE — 43235 EGD DIAGNOSTIC BRUSH WASH: CPT | Performed by: INTERNAL MEDICINE

## 2018-06-18 PROCEDURE — 45378 DIAGNOSTIC COLONOSCOPY: CPT | Performed by: INTERNAL MEDICINE

## 2018-07-05 ENCOUNTER — EPISODE CHANGES (OUTPATIENT)
Dept: CASE MANAGEMENT | Facility: OTHER | Age: 78
End: 2018-07-05

## 2018-07-06 ENCOUNTER — APPOINTMENT (OUTPATIENT)
Dept: CT IMAGING | Facility: HOSPITAL | Age: 78
End: 2018-07-06
Attending: INTERNAL MEDICINE

## 2018-07-08 ENCOUNTER — HOSPITAL ENCOUNTER (OUTPATIENT)
Dept: CT IMAGING | Facility: HOSPITAL | Age: 78
Discharge: HOME | End: 2018-07-08
Attending: INTERNAL MEDICINE | Admitting: INTERNAL MEDICINE

## 2018-07-08 DIAGNOSIS — R91.8 LUNG NODULES: ICD-10-CM

## 2018-07-08 PROCEDURE — 71250 CT THORAX DX C-: CPT

## 2018-07-11 ENCOUNTER — TRANSCRIBE ORDERS (OUTPATIENT)
Dept: ADMINISTRATIVE | Facility: HOSPITAL | Age: 78
End: 2018-07-11

## 2018-07-11 ENCOUNTER — APPOINTMENT (OUTPATIENT)
Dept: CT IMAGING | Facility: HOSPITAL | Age: 78
End: 2018-07-11
Attending: INTERNAL MEDICINE

## 2018-07-11 DIAGNOSIS — R06.00 DYSPNEA, UNSPECIFIED TYPE: Primary | ICD-10-CM

## 2018-07-19 ENCOUNTER — HOSPITAL ENCOUNTER (OUTPATIENT)
Dept: CARDIOLOGY | Facility: HOSPITAL | Age: 78
Discharge: HOME | End: 2018-07-19
Attending: INTERNAL MEDICINE | Admitting: INTERNAL MEDICINE

## 2018-07-19 VITALS
HEART RATE: 76 BPM | SYSTOLIC BLOOD PRESSURE: 136 MMHG | OXYGEN SATURATION: 88 % | WEIGHT: 221 LBS | BODY MASS INDEX: 31.64 KG/M2 | DIASTOLIC BLOOD PRESSURE: 72 MMHG | HEIGHT: 70 IN

## 2018-07-19 DIAGNOSIS — R06.00 DYSPNEA, UNSPECIFIED TYPE: ICD-10-CM

## 2018-07-19 LAB
ASCENDING AORTA: 3.5 CM
BH CV ECHO MEAS - ACS: 2.5 CM
BH CV ECHO MEAS - AO MAX PG (FULL): 11 MMHG
BH CV ECHO MEAS - AO MAX PG: 15.1 MMHG
BH CV ECHO MEAS - AO MEAN PG (FULL): 6.3 MMHG
BH CV ECHO MEAS - AO MEAN PG: 8.7 MMHG
BH CV ECHO MEAS - AO ROOT AREA (BSA CORRECTED): 2.2
BH CV ECHO MEAS - AO ROOT AREA: 17.6 CM^2
BH CV ECHO MEAS - AO ROOT DIAM: 3.7 CM
BH CV ECHO MEAS - AO V2 MAX: 194.6 CM/SEC
BH CV ECHO MEAS - AO V2 MEAN: 138 CM/SEC
BH CV ECHO MEAS - AO V2 VTI: 36.9 CM
BH CV ECHO MEAS - AVA(I,A): 2.7 CM^2
BH CV ECHO MEAS - AVA(I,D): 2.7 CM^2
BH CV ECHO MEAS - AVA(V,A): 2.2 CM^2
BH CV ECHO MEAS - AVA(V,D): 2.2 CM^2
BH CV ECHO MEAS - BSA(HAYCOCK): 2.3 M^2
BH CV ECHO MEAS - BSA: 2.2 M^2
BH CV ECHO MEAS - BZI_BMI: 31.7 KILOGRAMS/M^2
BH CV ECHO MEAS - BZI_METRIC_HEIGHT: 177.8 CM
BH CV ECHO MEAS - BZI_METRIC_WEIGHT: 100.2 KG
BH CV ECHO MEAS - CONTRAST EF (2CH): 53.8 ML/M^2
BH CV ECHO MEAS - CONTRAST EF 4CH: 58.2 ML/M^2
BH CV ECHO MEAS - EDV(MOD-SP2): 78 ML
BH CV ECHO MEAS - EDV(MOD-SP4): 98 ML
BH CV ECHO MEAS - EDV(TEICH): 159.4 ML
BH CV ECHO MEAS - EF(CUBED): 51.4 %
BH CV ECHO MEAS - EF(MOD-BP): 58 %
BH CV ECHO MEAS - EF(MOD-SP2): 53.8 %
BH CV ECHO MEAS - EF(MOD-SP4): 58.2 %
BH CV ECHO MEAS - EF(TEICH): 42.8 %
BH CV ECHO MEAS - ESV(MOD-SP2): 36 ML
BH CV ECHO MEAS - ESV(MOD-SP4): 41 ML
BH CV ECHO MEAS - ESV(TEICH): 91.2 ML
BH CV ECHO MEAS - IVS/LVPW: 0.97
BH CV ECHO MEAS - IVSD: 1.2 CM
BH CV ECHO MEAS - LAT PEAK E' VEL: 8 CM/SEC
BH CV ECHO MEAS - LV DIASTOLIC VOL/BSA (35-75): 45 ML/M^2
BH CV ECHO MEAS - LV MASS(C)D: 300.3 GRAMS
BH CV ECHO MEAS - LV MASS(C)DI: 137.9 GRAMS/M^2
BH CV ECHO MEAS - LV MAX PG: 4.2 MMHG
BH CV ECHO MEAS - LV MEAN PG: 2.5 MMHG
BH CV ECHO MEAS - LV SYSTOLIC VOL/BSA (12-30): 18.8 ML/M^2
BH CV ECHO MEAS - LV V1 MAX: 102.3 CM/SEC
BH CV ECHO MEAS - LV V1 MEAN: 74 CM/SEC
BH CV ECHO MEAS - LV V1 VTI: 24 CM
BH CV ECHO MEAS - LVIDD: 5.7 CM
BH CV ECHO MEAS - LVIDS: 4.5 CM
BH CV ECHO MEAS - LVLD AP2: 7.3 CM
BH CV ECHO MEAS - LVLD AP4: 8.4 CM
BH CV ECHO MEAS - LVLS AP2: 6.6 CM
BH CV ECHO MEAS - LVLS AP4: 7.2 CM
BH CV ECHO MEAS - LVOT AREA (M): 4.2 CM^2
BH CV ECHO MEAS - LVOT AREA: 4.2 CM^2
BH CV ECHO MEAS - LVOT DIAM: 2.3 CM
BH CV ECHO MEAS - LVPWD: 1.3 CM
BH CV ECHO MEAS - MED PEAK E' VEL: 7 CM/SEC
BH CV ECHO MEAS - MV A DUR: 0.15 SEC
BH CV ECHO MEAS - MV A MAX VEL: 105.3 CM/SEC
BH CV ECHO MEAS - MV DEC SLOPE: 145.6 CM/SEC^2
BH CV ECHO MEAS - MV DEC TIME: 0.35 SEC
BH CV ECHO MEAS - MV E MAX VEL: 54.4 CM/SEC
BH CV ECHO MEAS - MV E/A: 0.52
BH CV ECHO MEAS - MV MAX PG: 8 MMHG
BH CV ECHO MEAS - MV MEAN PG: 2.1 MMHG
BH CV ECHO MEAS - MV P1/2T MAX VEL: 52.9 CM/SEC
BH CV ECHO MEAS - MV P1/2T: 106.3 MSEC
BH CV ECHO MEAS - MV V2 MAX: 141.6 CM/SEC
BH CV ECHO MEAS - MV V2 MEAN: 63.7 CM/SEC
BH CV ECHO MEAS - MV V2 VTI: 31.4 CM
BH CV ECHO MEAS - MVA P1/2T LCG: 4.2 CM^2
BH CV ECHO MEAS - MVA(P1/2T): 2.1 CM^2
BH CV ECHO MEAS - MVA(VTI): 3.2 CM^2
BH CV ECHO MEAS - PA MAX PG (FULL): 2.2 MMHG
BH CV ECHO MEAS - PA MAX PG: 5.8 MMHG
BH CV ECHO MEAS - PA V2 MAX: 120.2 CM/SEC
BH CV ECHO MEAS - PULM A REVS DUR: 0.11 SEC
BH CV ECHO MEAS - PULM A REVS VEL: 34.1 CM/SEC
BH CV ECHO MEAS - PULM DIAS VEL: 48.3 CM/SEC
BH CV ECHO MEAS - PULM S/D: 1.4
BH CV ECHO MEAS - PULM SYS VEL: 66 CM/SEC
BH CV ECHO MEAS - RAP SYSTOLE: 8 MMHG
BH CV ECHO MEAS - RV MAX PG: 3.6 MMHG
BH CV ECHO MEAS - RV MEAN PG: 2.4 MMHG
BH CV ECHO MEAS - RV V1 MAX: 95.1 CM/SEC
BH CV ECHO MEAS - RV V1 MEAN: 72.2 CM/SEC
BH CV ECHO MEAS - RV V1 VTI: 18 CM
BH CV ECHO MEAS - SI(AO): 298.5 ML/M^2
BH CV ECHO MEAS - SI(CUBED): 43.5 ML/M^2
BH CV ECHO MEAS - SI(LVOT): 46.5 ML/M^2
BH CV ECHO MEAS - SI(MOD-SP2): 19.3 ML/M^2
BH CV ECHO MEAS - SI(MOD-SP4): 26.2 ML/M^2
BH CV ECHO MEAS - SI(TEICH): 31.3 ML/M^2
BH CV ECHO MEAS - SUP REN AO DIAM: 3 CM
BH CV ECHO MEAS - SV(AO): 650 ML
BH CV ECHO MEAS - SV(CUBED): 94.7 ML
BH CV ECHO MEAS - SV(LVOT): 101.3 ML
BH CV ECHO MEAS - SV(MOD-SP2): 42 ML
BH CV ECHO MEAS - SV(MOD-SP4): 57 ML
BH CV ECHO MEAS - SV(TEICH): 68.2 ML
BH CV ECHO MEAS - TAPSE (>1.6): 1.7 CM2
BH CV ECHO MEAS - TR MAX VEL: 193.9 CM/SEC
BH CV ECHO MEASUREMENTS AVERAGE E/E' RATIO: 7.25
BH CV XLRA - RV BASE: 3.7 CM
BH CV XLRA - TDI S': 17 CM/SEC
LEFT ATRIUM VOLUME INDEX: 23 ML/M2
LV EF 2D ECHO EST: 58 %
SINUS: 3.6 CM
STJ: 3.4 CM

## 2018-07-19 PROCEDURE — 93306 TTE W/DOPPLER COMPLETE: CPT

## 2018-07-19 PROCEDURE — 93306 TTE W/DOPPLER COMPLETE: CPT | Performed by: INTERNAL MEDICINE

## 2018-08-07 ENCOUNTER — OFFICE VISIT (OUTPATIENT)
Dept: FAMILY MEDICINE CLINIC | Facility: CLINIC | Age: 78
End: 2018-08-07

## 2018-08-07 VITALS
OXYGEN SATURATION: 93 % | TEMPERATURE: 97.9 F | HEIGHT: 70 IN | DIASTOLIC BLOOD PRESSURE: 78 MMHG | SYSTOLIC BLOOD PRESSURE: 139 MMHG | BODY MASS INDEX: 32.21 KG/M2 | WEIGHT: 225 LBS | HEART RATE: 88 BPM | RESPIRATION RATE: 18 BRPM

## 2018-08-07 DIAGNOSIS — J40 BRONCHITIS: Primary | ICD-10-CM

## 2018-08-07 DIAGNOSIS — F41.9 ANXIETY: ICD-10-CM

## 2018-08-07 PROCEDURE — 99214 OFFICE O/P EST MOD 30 MIN: CPT | Performed by: FAMILY MEDICINE

## 2018-08-07 RX ORDER — ALPRAZOLAM 1 MG/1
1 TABLET ORAL NIGHTLY PRN
Qty: 90 TABLET | Refills: 0 | Status: SHIPPED | OUTPATIENT
Start: 2018-08-07 | End: 2018-12-04 | Stop reason: SDUPTHER

## 2018-08-07 RX ORDER — PREDNISONE 20 MG/1
TABLET ORAL
Qty: 20 TABLET | Refills: 0 | Status: SHIPPED | OUTPATIENT
Start: 2018-08-07 | End: 2018-09-26

## 2018-08-07 RX ORDER — AMOXICILLIN AND CLAVULANATE POTASSIUM 875; 125 MG/1; MG/1
1 TABLET, FILM COATED ORAL EVERY 12 HOURS SCHEDULED
Qty: 20 TABLET | Refills: 0 | Status: SHIPPED | OUTPATIENT
Start: 2018-08-07 | End: 2018-09-14 | Stop reason: SDUPTHER

## 2018-08-07 NOTE — PROGRESS NOTES
Subjective   Ankit Mack Sr. is a 78 y.o. male.     History of Present Illness     Chief Complaint:   Chief Complaint   Patient presents with   • Cough   • Laryngitis     this am    • Anxiety     med refill - joseline        Ankit Mack Sr. 78 y.o. male who presents today for Medical Management of the below listed issues and medication refills.  he has a problem list of   Patient Active Problem List   Diagnosis   • Anxiety   • Hyperlipidemia   • Benign essential hypertension   • Osteoarthritis, knee   • Abrasion   • Aneurysm of thoracic aorta (CMS/HCC)   • Chronic obstructive pulmonary disease (CMS/HCC)   • 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HCC)   • History of malignant neoplasm of thoracic cavity structure   • Hypertension   • Cancer of lower lobe of lung (CMS/HCC)   • Adiposity   • Sleep apnea   • COPD (chronic obstructive pulmonary disease) with acute bronchitis (CMS/HCC)   .  Since the last visit, he has overall felt until the past several days. Has been traveling and is back from [place] yesterday and is coughing and with yellow sputum. Denies f/c. Does have some ST and voice changes. Using his NC O2 machine.  he has been compliant with   Current Outpatient Prescriptions:   •  ALPRAZolam (XANAX) 1 MG tablet, Take 1 tablet by mouth At Night As Needed for Anxiety., Disp: 90 tablet, Rfl: 0  •  albuterol (PROVENTIL HFA;VENTOLIN HFA) 108 (90 Base) MCG/ACT inhaler, Inhale 2 puffs Every 4 (Four) Hours As Needed for Wheezing., Disp: , Rfl:   •  albuterol (PROVENTIL) (2.5 MG/3ML) 0.083% nebulizer solution, Take 2.5 mg by nebulization 4 (Four) Times a Day., Disp: , Rfl:   •  amLODIPine (NORVASC) 5 MG tablet, Take 1 tablet by mouth Daily. For BP with Lisinopril, Disp: 90 tablet, Rfl: 1  •  amoxicillin-clavulanate (AUGMENTIN) 875-125 MG per tablet, Take 1 tablet by mouth Every 12 (Twelve) Hours., Disp: 20 tablet, Rfl: 0  •  atorvastatin (LIPITOR) 10 MG tablet, Take 1 tablet by mouth Daily. For cholesterol,  "Disp: 90 tablet, Rfl: 1  •  budesonide (PULMICORT) 0.5 MG/2ML nebulizer solution, Inhale 0.5 mg., Disp: , Rfl:   •  folic acid (FOLVITE) 1 MG tablet, Take 1 mg by mouth Daily., Disp: , Rfl:   •  ipratropium (ATROVENT) 0.02 % nebulizer solution, Take 500 mcg by nebulization 2 (Two) Times a Day., Disp: , Rfl:   •  lisinopril (PRINIVIL,ZESTRIL) 20 MG tablet, Take 1 tablet by mouth Daily for 90 days. For BP with Amlodipine, Disp: 90 tablet, Rfl: 1  •  predniSONE (DELTASONE) 20 MG tablet, Take 3 tabs QDPC x 3 days then 2 tabs QDPC x 4 days then 1 tab QDPC x 3 days, Disp: 20 tablet, Rfl: 0.  he denies medication side effects.    All of the chronic condition(s) listed above are stable w/o issues.    /78   Pulse 88   Temp 97.9 °F (36.6 °C) (Oral)   Resp 18   Ht 177.8 cm (70\")   Wt 102 kg (225 lb)   SpO2 93%   BMI 32.28 kg/m²     Results for orders placed or performed during the hospital encounter of 07/19/18   Adult Transthoracic Echo Complete W/ Cont if Necessary Per Protocol   Result Value Ref Range    Sinus 3.60 cm    STJ 3.40 cm    Ascending aorta 3.50 cm    TDI S' 17.00 cm/sec    RV Base 3.70 cm    LA Volume Index 23.0 mL/m2    EF(MOD-bp) 58 %    Lat Peak E' Shaan 8.0 cm/sec    Med Peak E' Shaan 7.00 cm/sec    RAP systole 8 mmHg    Abdo Ao Diam 3.00 cm    TAPSE (>1.6) 1.70 cm2    BSA 2.2 m^2    IVSd 1.2 cm    LVIDd 5.7 cm    LVIDs 4.5 cm    LVPWd 1.3 cm    IVS/LVPW 0.97     EDV(Teich) 159.4 ml    ESV(Teich) 91.2 ml    EF(Teich) 42.8 %    EF(cubed) 51.4 %    LV mass(C)d 300.3 grams    LV mass(C)dI 137.9 grams/m^2    SV(Teich) 68.2 ml    SI(Teich) 31.3 ml/m^2    SV(cubed) 94.7 ml    SI(cubed) 43.5 ml/m^2    Ao root diam 3.7 cm    Ao root area 17.6 cm^2    ACS 2.5 cm    LVOT diam 2.3 cm    LVOT area 4.2 cm^2    LVOT area(traced) 4.2 cm^2    LVLd ap4 8.4 cm    EDV(MOD-sp4) 98.0 ml    LVLs ap4 7.2 cm    ESV(MOD-sp4) 41.0 ml    EF(MOD-sp4) 58.2 %    LVLd ap2 7.3 cm    EDV(MOD-sp2) 78.0 ml    LVLs ap2 6.6 cm    " ESV(MOD-sp2) 36.0 ml    EF(MOD-sp2) 53.8 %    SV(MOD-sp4) 57.0 ml    SI(MOD-sp4) 26.2 ml/m^2    SV(MOD-sp2) 42.0 ml    SI(MOD-sp2) 19.3 ml/m^2    Ao root area (BSA corrected) 2.2     EF - Contrast (2Ch) 53.8 ml/m^2    EF - Contrast (4Ch) 58.2 ml/m^2    LV Saunders Vol (BSA corrected) 45.0 ml/m^2    LV Sys Vol (BSA corrected) 18.8 ml/m^2    MV A dur 0.15 sec    MV E max shaan 54.4 cm/sec    MV A max shaan 105.3 cm/sec    MV E/A 0.52     MV V2 max 141.6 cm/sec    MV max PG 8.0 mmHg    MV V2 mean 63.7 cm/sec    MV mean PG 2.1 mmHg    MV V2 VTI 31.4 cm    MVA(VTI) 3.2 cm^2    MV P1/2t max shaan 52.9 cm/sec    MV P1/2t 106.3 msec    MVA(P1/2t) 2.1 cm^2    MV dec slope 145.6 cm/sec^2    MV dec time 0.35 sec    Ao pk shaan 194.6 cm/sec    Ao max PG 15.1 mmHg    Ao max PG (full) 11.0 mmHg    Ao V2 mean 138.0 cm/sec    Ao mean PG 8.7 mmHg    Ao mean PG (full) 6.3 mmHg    Ao V2 VTI 36.9 cm    POONAM(I,A) 2.7 cm^2    POONAM(I,D) 2.7 cm^2    POONAM(V,A) 2.2 cm^2    POONAM(V,D) 2.2 cm^2    LV V1 max PG 4.2 mmHg    LV V1 mean PG 2.5 mmHg    LV V1 max 102.3 cm/sec    LV V1 mean 74.0 cm/sec    LV V1 VTI 24.0 cm    SV(Ao) 650.0 ml    SI(Ao) 298.5 ml/m^2    SV(LVOT) 101.3 ml    SI(LVOT) 46.5 ml/m^2    PA V2 max 120.2 cm/sec    PA max PG 5.8 mmHg    PA max PG (full) 2.2 mmHg    RV V1 max PG 3.6 mmHg    RV V1 mean PG 2.4 mmHg    RV V1 max 95.1 cm/sec    RV V1 mean 72.2 cm/sec    RV V1 VTI 18.0 cm    TR max shaan 193.9 cm/sec    Pulm Sys Shaan 66.0 cm/sec    Pulm Saunders Shaan 48.3 cm/sec    Pulm S/D 1.4     Pulm A Revs Dur 0.11 sec    Pulm A Revs Shaan 34.1 cm/sec    MVA P1/2T LCG 4.2 cm^2     CV ECHO GAUTAM - BZI_BMI 31.7 kilograms/m^2     CV ECHO GAUTAM - BSA(HAYCOCK) 2.3 m^2     CV ECHO GAUTAM - BZI_METRIC_WEIGHT 100.2 kg     CV ECHO GAUTAM - BZI_METRIC_HEIGHT 177.8 cm    Avg E/e' ratio 7.25     Echo EF Estimated 58 %           The following portions of the patient's history were reviewed and updated as appropriate: allergies, current medications, past family  history, past medical history, past social history, past surgical history and problem list.    Review of Systems   Constitutional: Negative for activity change, chills, fatigue and fever.   HENT: Positive for voice change.    Respiratory: Positive for cough. Negative for shortness of breath.    Cardiovascular: Negative for chest pain and palpitations.   Gastrointestinal: Negative for abdominal pain.   Endocrine: Negative for cold intolerance.   Psychiatric/Behavioral: Negative for behavioral problems and dysphoric mood. The patient is not nervous/anxious.        Objective   Physical Exam   Constitutional: He appears well-developed and well-nourished.   Neck: Neck supple. No thyromegaly present.   Cardiovascular: Normal rate and regular rhythm.    No murmur heard.  Pulmonary/Chest: Effort normal. He has wheezes (mild).   Abdominal: Bowel sounds are normal. There is no tenderness.   Psychiatric: He has a normal mood and affect. His behavior is normal.   Nursing note and vitals reviewed.    The patient has read and signed the [facility] Controlled Substance Contract.  I will continue to see patient for regular follow up appointments.  They are well controlled on their medication.  MARY has been reviewed by me and is updated every 3 months. The patient is aware of the potential for addiction and dependence.    Assessment/Plan   Ankit was seen today for cough, laryngitis and anxiety.    Diagnoses and all orders for this visit:    Bronchitis  -     amoxicillin-clavulanate (AUGMENTIN) 875-125 MG per tablet; Take 1 tablet by mouth Every 12 (Twelve) Hours.  -     predniSONE (DELTASONE) 20 MG tablet; Take 3 tabs QDPC x 3 days then 2 tabs QDPC x 4 days then 1 tab QDPC x 3 days    Anxiety  -     ALPRAZolam (XANAX) 1 MG tablet; Take 1 tablet by mouth At Night As Needed for Anxiety.

## 2018-08-15 ENCOUNTER — TELEPHONE (OUTPATIENT)
Dept: FAMILY MEDICINE CLINIC | Facility: CLINIC | Age: 78
End: 2018-08-15

## 2018-08-15 NOTE — TELEPHONE ENCOUNTER
[facility] called  And states that pt is >than 30 days ahead of his rx on xanax. Also the pt states he cant sleep without it. The pharmacy and I agree maybe we can change to trazadone until able to get filled

## 2018-08-16 RX ORDER — TRAZODONE HYDROCHLORIDE 50 MG/1
TABLET ORAL
Qty: 45 TABLET | Refills: 0 | Status: SHIPPED | OUTPATIENT
Start: 2018-08-16 | End: 2018-09-13 | Stop reason: SDUPTHER

## 2018-09-05 RX ORDER — TRAZODONE HYDROCHLORIDE 50 MG/1
TABLET ORAL
Qty: 45 TABLET | Refills: 0 | OUTPATIENT
Start: 2018-09-05

## 2018-09-14 ENCOUNTER — OFFICE VISIT (OUTPATIENT)
Dept: FAMILY MEDICINE CLINIC | Facility: CLINIC | Age: 78
End: 2018-09-14

## 2018-09-14 VITALS
DIASTOLIC BLOOD PRESSURE: 70 MMHG | WEIGHT: 228 LBS | BODY MASS INDEX: 32.64 KG/M2 | TEMPERATURE: 97.8 F | HEART RATE: 76 BPM | SYSTOLIC BLOOD PRESSURE: 168 MMHG | OXYGEN SATURATION: 97 % | RESPIRATION RATE: 16 BRPM | HEIGHT: 70 IN

## 2018-09-14 DIAGNOSIS — J06.9 ACUTE URI: Primary | ICD-10-CM

## 2018-09-14 PROCEDURE — 99213 OFFICE O/P EST LOW 20 MIN: CPT | Performed by: NURSE PRACTITIONER

## 2018-09-14 RX ORDER — SACCHAROMYCES BOULARDII 250 MG
250 CAPSULE ORAL DAILY
Qty: 30 CAPSULE | Refills: 0 | Status: ON HOLD | OUTPATIENT
Start: 2018-09-14 | End: 2018-11-04

## 2018-09-14 RX ORDER — AMOXICILLIN AND CLAVULANATE POTASSIUM 875; 125 MG/1; MG/1
1 TABLET, FILM COATED ORAL EVERY 12 HOURS SCHEDULED
Qty: 20 TABLET | Refills: 0 | Status: SHIPPED | OUTPATIENT
Start: 2018-09-14 | End: 2018-09-26

## 2018-09-14 NOTE — PROGRESS NOTES
Subjective   Ankit Mack Sr. is a 78 y.o. male.     History of Present Illness   Ankit Mack Sr. 78 y.o. male who presents for evaluation of upper respiratory congestion. Symptoms include congestion, sore throat, post nasal drip and productive cough.  Onset of symptoms was 2 days ago, gradually worsening since that time. Patient denies shortness of breath, fever.   Evaluation to date: none Treatment to date:  none.   Has hx of lung cancer and had left lower lobe removed. and recurrent pneumonia.     The following portions of the patient's history were reviewed and updated as appropriate: allergies, current medications, past family history, past medical history, past social history, past surgical history and problem list.    Review of Systems   Constitutional: Negative for chills and fever.   HENT: Positive for congestion, postnasal drip and sore throat. Negative for ear pain, sinus pain and sinus pressure.    Respiratory: Positive for cough and chest tightness. Negative for shortness of breath and wheezing.        Objective   Physical Exam   Constitutional: He is oriented to person, place, and time. He appears well-developed and well-nourished.   HENT:   Right Ear: Tympanic membrane, external ear and ear canal normal.   Left Ear: Tympanic membrane, external ear and ear canal normal.   Nose: Right sinus exhibits no maxillary sinus tenderness and no frontal sinus tenderness. Left sinus exhibits no maxillary sinus tenderness and no frontal sinus tenderness.   Mouth/Throat: Uvula is midline and oropharynx is clear and moist.   Cardiovascular: Normal rate and regular rhythm.    Pulmonary/Chest: Effort normal. He has decreased breath sounds in the left lower field.   Neurological: He is oriented to person, place, and time.   Skin: Skin is warm and dry.   Psychiatric: He has a normal mood and affect. His behavior is normal. Judgment and thought content normal.   Nursing note and vitals  reviewed.      Assessment/Plan   Ankit was seen today for sore throat.    Diagnoses and all orders for this visit:    Acute URI  -     amoxicillin-clavulanate (AUGMENTIN) 875-125 MG per tablet; Take 1 tablet by mouth Every 12 (Twelve) Hours.    Other orders  -     saccharomyces boulardii (FLORASTOR) 250 MG capsule; Take 1 capsule by mouth Daily.

## 2018-09-15 RX ORDER — TRAZODONE HYDROCHLORIDE 50 MG/1
TABLET ORAL
Qty: 45 TABLET | Refills: 0 | Status: SHIPPED | OUTPATIENT
Start: 2018-09-15 | End: 2018-11-17 | Stop reason: ALTCHOICE

## 2018-09-26 ENCOUNTER — OFFICE VISIT (OUTPATIENT)
Dept: FAMILY MEDICINE CLINIC | Facility: CLINIC | Age: 78
End: 2018-09-26

## 2018-09-26 VITALS
WEIGHT: 228 LBS | SYSTOLIC BLOOD PRESSURE: 136 MMHG | OXYGEN SATURATION: 91 % | HEART RATE: 50 BPM | HEIGHT: 70 IN | TEMPERATURE: 97.8 F | RESPIRATION RATE: 16 BRPM | BODY MASS INDEX: 32.64 KG/M2 | DIASTOLIC BLOOD PRESSURE: 64 MMHG

## 2018-09-26 DIAGNOSIS — J44.0 CHRONIC OBSTRUCTIVE PULMONARY DISEASE WITH ACUTE LOWER RESPIRATORY INFECTION (HCC): Primary | ICD-10-CM

## 2018-09-26 PROCEDURE — 99213 OFFICE O/P EST LOW 20 MIN: CPT | Performed by: FAMILY MEDICINE

## 2018-09-26 RX ORDER — TRAZODONE HYDROCHLORIDE 50 MG/1
TABLET ORAL
Qty: 45 TABLET | Refills: 0 | OUTPATIENT
Start: 2018-09-26

## 2018-09-26 RX ORDER — PREDNISONE 20 MG/1
TABLET ORAL
Qty: 20 TABLET | Refills: 0 | Status: SHIPPED | OUTPATIENT
Start: 2018-09-26 | End: 2018-10-29 | Stop reason: SDUPTHER

## 2018-09-26 RX ORDER — AZITHROMYCIN 250 MG/1
TABLET, FILM COATED ORAL
Qty: 6 TABLET | Refills: 0 | Status: SHIPPED | OUTPATIENT
Start: 2018-09-26 | End: 2018-10-03

## 2018-09-26 NOTE — PROGRESS NOTES
"Subjective   Ankit Mack Sr. is a 78 y.o. male.     CC: Management of URI    History of Present Illness     Pt returns after seeing Lynsey on 9/14. That HPI was as follows:    History of Present Illness   Ankit Mack Sr. 78 y.o. male who presents for evaluation of upper respiratory congestion. Symptoms include congestion, sore throat, post nasal drip and productive cough.  Onset of symptoms was 2 days ago, gradually worsening since that time. Patient denies shortness of breath, fever.   Evaluation to date: none Treatment to date:  none.   Has hx of lung cancer and had left lower lobe removed. and recurrent pneumonia.     He was treated for 10 days with Augmentin x 10 days.    Did get some better with the Augmentin yet reports continued cough (green) and some mild dyspnea. Sees pulmonary and is on NC O2. Denies F/C.    The following portions of the patient's history were reviewed and updated as appropriate: allergies, current medications, past family history, past medical history, past social history, past surgical history and problem list.    Review of Systems   Constitutional: Negative for activity change, chills, fatigue and fever.   Respiratory: Positive for cough and shortness of breath.    Cardiovascular: Negative for chest pain and palpitations.   Gastrointestinal: Negative for abdominal pain.   Endocrine: Negative for cold intolerance.   Psychiatric/Behavioral: Negative for behavioral problems and dysphoric mood. The patient is not nervous/anxious.      /64   Pulse 50   Temp 97.8 °F (36.6 °C) (Oral)   Resp 16   Ht 177.8 cm (70\")   Wt 103 kg (228 lb)   SpO2 91%   BMI 32.71 kg/m²     Objective   Physical Exam   Constitutional: He appears well-developed and well-nourished.  Non-toxic appearance. He does not have a sickly appearance. He does not appear ill.   Neck: Neck supple. No thyromegaly present.   Cardiovascular: Normal rate and regular rhythm.    No murmur heard.  Pulmonary/Chest: Effort " normal. He has wheezes (faint, diffuse).   Abdominal: Bowel sounds are normal. There is no tenderness.   Neurological: He is alert.   Psychiatric: He has a normal mood and affect. His behavior is normal.   Nursing note and vitals reviewed.      Assessment/Plan   Ankit was seen today for acute uri and shortness of breath.    Diagnoses and all orders for this visit:    Chronic obstructive pulmonary disease with acute lower respiratory infection (CMS/HCC)  Comments:  with exacerbation  Orders:  -     azithromycin (ZITHROMAX Z-BEATRIZ) 250 MG tablet; Take 2 tablets the first day, then 1 tablet daily for 4 days.  -     predniSONE (DELTASONE) 20 MG tablet; Take 3 tabs QDPC x 3 days then 2 tabs QDPC x 4 days then 1 tab QDPC x 3 days

## 2018-10-03 ENCOUNTER — OFFICE VISIT (OUTPATIENT)
Dept: SURGERY | Facility: CLINIC | Age: 78
End: 2018-10-03

## 2018-10-03 VITALS
WEIGHT: 228 LBS | SYSTOLIC BLOOD PRESSURE: 181 MMHG | BODY MASS INDEX: 32.64 KG/M2 | HEIGHT: 70 IN | DIASTOLIC BLOOD PRESSURE: 83 MMHG | OXYGEN SATURATION: 92 % | HEART RATE: 82 BPM

## 2018-10-03 DIAGNOSIS — C34.32 MALIGNANT NEOPLASM OF LOWER LOBE OF LEFT LUNG (HCC): Primary | ICD-10-CM

## 2018-10-03 PROCEDURE — 99213 OFFICE O/P EST LOW 20 MIN: CPT | Performed by: THORACIC SURGERY (CARDIOTHORACIC VASCULAR SURGERY)

## 2018-10-03 NOTE — PROGRESS NOTES
Subjective   Patient ID: Ankit Mack Sr. is a 78 y.o. male is here today for follow-up.    History of Present Illness  Dear Colleague,  Ankit Mack Sr. 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  Patient has bad COPD.  It seems to be getting worse.  He was recently hospitalized with what sounds like an exacerbation of his COPD.  He has a chronic cough that is sometimes productive of thick sputum.  He is short of breath and this seems to be worsening.  He has no hemoptysis.  He has no pleuritic pain.  He has no hoarseness or change in his voice.  He has had no unexplained weight loss.    The following portions of the patient's history were reviewed and updated as appropriate: allergies, current medications, past family history, past medical history, past social history, past surgical history and problem list.  Review of Systems   Constitution: Negative.   HENT: Negative.    Eyes: Negative.    Cardiovascular: Negative.    Respiratory: Positive for shortness of breath.    Endocrine: Negative.    Hematologic/Lymphatic: Negative.    Skin: Negative.    Musculoskeletal: Negative.    Gastrointestinal: Negative.    Genitourinary: Negative.    Neurological: Negative.    Psychiatric/Behavioral: Negative.      Patient Active Problem List   Diagnosis   • Anxiety   • Hyperlipidemia   • Benign essential hypertension   • Osteoarthritis, knee   • Abrasion   • Aneurysm of thoracic aorta (CMS/HCC)   • Chronic obstructive pulmonary disease (CMS/HCC)   • 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HCC)   • History of malignant neoplasm of thoracic cavity structure   • Hypertension   • Cancer of lower lobe of lung (CMS/HCC)   • Adiposity   • Sleep apnea   • COPD (chronic obstructive pulmonary disease) with acute bronchitis (CMS/HCC)     Past Medical History:   Diagnosis Date   • Anxiety    •  Benign essential hypertension    • COPD (chronic obstructive pulmonary disease) (CMS/[facility])    • Essential hypertension    • H/O complete eye exam 06/2018   • Heart attack (CMS/[facility])    • History of chest x-ray 12/08/2013    RLL infiltrate   • History of pulmonary function tests 06/10/2014   • Hyperlipidemia    • Lung cancer (CMS/[facility])    • Osteoarthritis, knee    • Prostate cancer (CMS/[facility])    • Sleep apnea      Past Surgical History:   Procedure Laterality Date   • ABDOMINAL AORTIC ANEURYSM REPAIR  2010    Dr. Adarsh Dennis   • CATARACT EXTRACTION  10/2014    also 11/14   • COLONOSCOPY     • JOINT REPLACEMENT Left 10/2010    knee; Dr. Wolf   • JOINT REPLACEMENT Right 09/2011    knee; Dr. Wolf   • LUNG LOBECTOMY Left 01/16/2012    LLL; Dr. Mendoza   • PROSTATE SURGERY  2000    Dr. Naranjo     Family History   Problem Relation Age of Onset   • Cancer Mother    • Cancer Father         lung   • Cancer Other         colon   • Diabetes Other    • Emphysema Other    • Hypertension Other    • Kidney disease Other    • Lung cancer Other      Social History     Social History   • Marital status: Single     Spouse name: N/A   • Number of children: N/A   • Years of education: N/A     Occupational History   • Not on file.     Social History Main Topics   • Smoking status: Former Smoker     Packs/day: 1.00   • Smokeless tobacco: Never Used   • Alcohol use Yes      Comment: rare   • Drug use: Unknown   • Sexual activity: Defer     Other Topics Concern   • Not on file     Social History Narrative   • No narrative on file       Current Outpatient Prescriptions:   •  albuterol (PROVENTIL HFA;VENTOLIN HFA) 108 (90 Base) MCG/ACT inhaler, Inhale 2 puffs Every 4 (Four) Hours As Needed for Wheezing., Disp: , Rfl:   •  albuterol (PROVENTIL) (2.5 MG/3ML) 0.083% nebulizer solution, Take 2.5 mg by nebulization 4 (Four) Times a Day., Disp: , Rfl:   •  ALPRAZolam (XANAX) 1 MG tablet, Take 1 tablet by mouth At Night As Needed for Anxiety., Disp:  90 tablet, Rfl: 0  •  amLODIPine (NORVASC) 5 MG tablet, Take 1 tablet by mouth Daily. For BP with Lisinopril, Disp: 90 tablet, Rfl: 1  •  atorvastatin (LIPITOR) 10 MG tablet, Take 1 tablet by mouth Daily. For cholesterol, Disp: 90 tablet, Rfl: 1  •  budesonide (PULMICORT) 0.5 MG/2ML nebulizer solution, Inhale 0.5 mg., Disp: , Rfl:   •  folic acid (FOLVITE) 1 MG tablet, Take 1 mg by mouth Daily., Disp: , Rfl:   •  ipratropium (ATROVENT) 0.02 % nebulizer solution, Take 500 mcg by nebulization 2 (Two) Times a Day., Disp: , Rfl:   •  predniSONE (DELTASONE) 20 MG tablet, Take 3 tabs QDPC x 3 days then 2 tabs QDPC x 4 days then 1 tab QDPC x 3 days, Disp: 20 tablet, Rfl: 0  •  saccharomyces boulardii (FLORASTOR) 250 MG capsule, Take 1 capsule by mouth Daily., Disp: 30 capsule, Rfl: 0  •  traZODone (DESYREL) 50 MG tablet, TAKE 1-2 TABLETS BY MOUTH EVERY DAY AT BEDTIME *DO NOT TAKE WITH AMBIEN, Disp: 45 tablet, Rfl: 0  •  lisinopril (PRINIVIL,ZESTRIL) 20 MG tablet, Take 1 tablet by mouth Daily for 90 days. For BP with Amlodipine, Disp: 90 tablet, Rfl: 1  Allergies   Allergen Reactions   • Erythromycin Itching        Objective   Vitals:    10/03/18 0912   BP: (!) 181/83   Pulse: 82   SpO2: 92%     Physical Exam   Constitutional: He is oriented to person, place, and time. He appears well-developed and well-nourished.   HENT:   Head: Normocephalic.   Eyes: Pupils are equal, round, and reactive to light. Conjunctivae, EOM and lids are normal.   Neck: Trachea normal and normal range of motion. Neck supple. No hepatojugular reflux and no JVD present. Carotid bruit is not present. No thyroid mass and no thyromegaly present.   Cardiovascular: Normal rate, regular rhythm, S1 normal, S2 normal, normal heart sounds and normal pulses.   No extrasystoles are present. PMI is not displaced.    Pulmonary/Chest: Effort normal. He has rhonchi in the right lower field and the left lower field.   Incisions are well healed.  No subcutaneous  masses or nodules.  Chest wall is stable   Abdominal: Soft. Normal appearance and bowel sounds are normal. He exhibits no mass. There is no hepatosplenomegaly. There is no tenderness. No hernia.   Musculoskeletal: Normal range of motion.   Neurological: He is alert and oriented to person, place, and time. He has normal strength and normal reflexes. No cranial nerve deficit or sensory deficit. He displays a negative Romberg sign.   Skin: Skin is warm, dry and intact.   Psychiatric: He has a normal mood and affect. His speech is normal and behavior is normal. Judgment and thought content normal. Cognition and memory are normal.     Independent Review of Radiographic Studies:    CT scan of the chest performed at [facility] in July was independently reviewed and compared to CT scan of the chest performed at the [facility] September 27.  Nodules in the left lower lobe have resolved.  There are new nodules and groundglass opacities in the right lower lobe which have improved from July to September.  There is no evidence for recurrence of this tumor.  There is no suspicious hilar or mediastinal adenopathy.  There is no pleural effusion.  Diffuse changes of COPD    Assessment/Plan     See no evidence of recurrence of his tumor.  I believe that the changes in the left and right lower lobes are related to inflammatory/infectious process.  Patient is quite obese and has significant gastroesophageal reflux disease.  Is most likely that these changes are due to reflux with nocturnal aspiration.  I discussed with him elevation of the head of the bed, not eating or drinking for 3 ours prior to going to bed, and taking antacids at bedtime.  He will return to see me in one year with a CT scan of the [facility].  I will keep you informed of his progress.    Diagnoses and all orders for this visit:    Malignant neoplasm of lower lobe of left lung (CMS/HCC)

## 2018-10-29 ENCOUNTER — OFFICE VISIT (OUTPATIENT)
Dept: FAMILY MEDICINE CLINIC | Facility: CLINIC | Age: 78
End: 2018-10-29

## 2018-10-29 VITALS
DIASTOLIC BLOOD PRESSURE: 56 MMHG | OXYGEN SATURATION: 90 % | HEIGHT: 70 IN | BODY MASS INDEX: 32.64 KG/M2 | WEIGHT: 228 LBS | HEART RATE: 64 BPM | SYSTOLIC BLOOD PRESSURE: 108 MMHG | RESPIRATION RATE: 20 BRPM | TEMPERATURE: 97.9 F

## 2018-10-29 DIAGNOSIS — J44.0 CHRONIC OBSTRUCTIVE PULMONARY DISEASE WITH ACUTE LOWER RESPIRATORY INFECTION (HCC): ICD-10-CM

## 2018-10-29 PROCEDURE — 99213 OFFICE O/P EST LOW 20 MIN: CPT | Performed by: NURSE PRACTITIONER

## 2018-10-29 RX ORDER — LEVOFLOXACIN 500 MG/1
500 TABLET, FILM COATED ORAL DAILY
Qty: 10 TABLET | Refills: 0 | Status: SHIPPED | OUTPATIENT
Start: 2018-10-29 | End: 2018-10-29 | Stop reason: SDUPTHER

## 2018-10-29 RX ORDER — LEVOFLOXACIN 500 MG/1
500 TABLET, FILM COATED ORAL DAILY
Qty: 10 TABLET | Refills: 1 | Status: SHIPPED | OUTPATIENT
Start: 2018-10-29 | End: 2018-11-06 | Stop reason: HOSPADM

## 2018-10-29 RX ORDER — PREDNISONE 20 MG/1
TABLET ORAL
Qty: 20 TABLET | Refills: 0 | Status: ON HOLD | OUTPATIENT
Start: 2018-10-29 | End: 2018-11-06

## 2018-10-29 NOTE — PROGRESS NOTES
Subjective   Ankit Mack Sr. is a 78 y.o. male.     History of Present Illness   Ankit Mack Sr. 78 y.o. male who presents for evaluation of sore throat, cough. Symptoms include sore throat, productive cough, shortness of breath and wheezing.  Onset of symptoms was several days ago, gradually worsening since that time. Patient denies fever.   Evaluation to date: none Treatment to date:  Albuterol per nebulizer.     Patient uses continuous oxygen at 3 liters.  He left his oxygen in the car and is 90 on RA.       Patient sees Dr. Alejandro with pulmonology.  Last appt was 2 weeks ago.     The following portions of the patient's history were reviewed and updated as appropriate: allergies, current medications, past family history, past medical history, past social history, past surgical history and problem list.    Review of Systems   Constitutional: Positive for fatigue. Negative for chills and fever.   HENT: Positive for sore throat. Negative for congestion, postnasal drip, sinus pain, sinus pressure and trouble swallowing.    Respiratory: Positive for cough, chest tightness, shortness of breath and wheezing.        Objective   Physical Exam   Constitutional: He is oriented to person, place, and time. He appears well-developed and well-nourished.   HENT:   Right Ear: Tympanic membrane, external ear and ear canal normal.   Left Ear: Tympanic membrane, external ear and ear canal normal.   Nose: No mucosal edema or rhinorrhea. Right sinus exhibits no maxillary sinus tenderness and no frontal sinus tenderness. Left sinus exhibits no maxillary sinus tenderness and no frontal sinus tenderness.   Mouth/Throat: Uvula is midline. Posterior oropharyngeal erythema present. No oropharyngeal exudate.   Cardiovascular: Normal rate and regular rhythm.    Pulmonary/Chest: Effort normal. He has decreased breath sounds in the right lower field and the left lower field.   Neurological: He is oriented to person, place, and time.    Skin: Skin is warm and dry.   Psychiatric: He has a normal mood and affect. His behavior is normal. Judgment and thought content normal.   Nursing note and vitals reviewed.      Assessment/Plan   Ankit was seen today for uri and shortness of breath.    Diagnoses and all orders for this visit:    Chronic obstructive pulmonary disease with acute lower respiratory infection (CMS/HCC)  Comments:  with exacerbation  Orders:  -     Discontinue: levoFLOXacin (LEVAQUIN) 500 MG tablet; Take 1 tablet by mouth Daily for 10 days.  -     predniSONE (DELTASONE) 20 MG tablet; Take 3 tabs QDPC x 3 days then 2 tabs QDPC x 4 days then 1 tab QDPC x 3 days  -     levoFLOXacin (LEVAQUIN) 500 MG tablet; Take 1 tablet by mouth Daily for 10 days.

## 2018-11-04 ENCOUNTER — HOSPITAL ENCOUNTER (INPATIENT)
Facility: HOSPITAL | Age: 78
LOS: 2 days | Discharge: HOME HEALTH | End: 2018-11-06
Attending: EMERGENCY MEDICINE | Admitting: INTERNAL MEDICINE

## 2018-11-04 ENCOUNTER — APPOINTMENT (OUTPATIENT)
Dept: GENERAL RADIOLOGY | Facility: HOSPITAL | Age: 78
End: 2018-11-04

## 2018-11-04 DIAGNOSIS — I50.9 ACUTE CONGESTIVE HEART FAILURE, UNSPECIFIED HEART FAILURE TYPE (HCC): Primary | ICD-10-CM

## 2018-11-04 DIAGNOSIS — J44.0 CHRONIC OBSTRUCTIVE PULMONARY DISEASE WITH ACUTE LOWER RESPIRATORY INFECTION (HCC): ICD-10-CM

## 2018-11-04 PROBLEM — J30.89 NON-SEASONAL ALLERGIC RHINITIS: Status: ACTIVE | Noted: 2018-11-04

## 2018-11-04 LAB
ALBUMIN SERPL-MCNC: 3.8 G/DL (ref 3.5–5.2)
ALBUMIN/GLOB SERPL: 2 G/DL
ALP SERPL-CCNC: 77 U/L (ref 39–117)
ALT SERPL W P-5'-P-CCNC: 15 U/L (ref 1–41)
ANION GAP SERPL CALCULATED.3IONS-SCNC: 10.7 MMOL/L
AST SERPL-CCNC: 14 U/L (ref 1–40)
B PERT DNA SPEC QL NAA+PROBE: NOT DETECTED
BILIRUB SERPL-MCNC: 0.4 MG/DL (ref 0.1–1.2)
BUN BLD-MCNC: 26 MG/DL (ref 8–23)
BUN/CREAT SERPL: 23 (ref 7–25)
C PNEUM DNA NPH QL NAA+NON-PROBE: NOT DETECTED
CALCIUM SPEC-SCNC: 8.8 MG/DL (ref 8.6–10.5)
CHLORIDE SERPL-SCNC: 104 MMOL/L (ref 98–107)
CO2 SERPL-SCNC: 29.3 MMOL/L (ref 22–29)
CREAT BLD-MCNC: 1.13 MG/DL (ref 0.76–1.27)
DEPRECATED RDW RBC AUTO: 51.6 FL (ref 37–54)
EOSINOPHIL # BLD MANUAL: 0.05 10*3/MM3 (ref 0–0.7)
EOSINOPHIL NFR BLD MANUAL: 1 % (ref 0.3–6.2)
ERYTHROCYTE [DISTWIDTH] IN BLOOD BY AUTOMATED COUNT: 13.7 % (ref 11.5–14.5)
FLUAV H1 2009 PAND RNA NPH QL NAA+PROBE: NOT DETECTED
FLUAV H1 HA GENE NPH QL NAA+PROBE: NOT DETECTED
FLUAV H3 RNA NPH QL NAA+PROBE: NOT DETECTED
FLUAV SUBTYP SPEC NAA+PROBE: NOT DETECTED
FLUBV RNA ISLT QL NAA+PROBE: NOT DETECTED
GFR SERPL CREATININE-BSD FRML MDRD: 63 ML/MIN/1.73
GLOBULIN UR ELPH-MCNC: 1.9 GM/DL
GLUCOSE BLD-MCNC: 217 MG/DL (ref 65–99)
HADV DNA SPEC NAA+PROBE: NOT DETECTED
HCOV 229E RNA SPEC QL NAA+PROBE: NOT DETECTED
HCOV HKU1 RNA SPEC QL NAA+PROBE: NOT DETECTED
HCOV NL63 RNA SPEC QL NAA+PROBE: NOT DETECTED
HCOV OC43 RNA SPEC QL NAA+PROBE: NOT DETECTED
HCT VFR BLD AUTO: 35.3 % (ref 40.4–52.2)
HGB BLD-MCNC: 11.9 G/DL (ref 13.7–17.6)
HMPV RNA NPH QL NAA+NON-PROBE: NOT DETECTED
HPIV1 RNA SPEC QL NAA+PROBE: NOT DETECTED
HPIV2 RNA SPEC QL NAA+PROBE: NOT DETECTED
HPIV3 RNA NPH QL NAA+PROBE: NOT DETECTED
HPIV4 P GENE NPH QL NAA+PROBE: NOT DETECTED
LYMPHOCYTES # BLD MANUAL: 0.77 10*3/MM3 (ref 0.9–4.8)
LYMPHOCYTES NFR BLD MANUAL: 10 % (ref 5–12)
LYMPHOCYTES NFR BLD MANUAL: 14 % (ref 19.6–45.3)
M PNEUMO IGG SER IA-ACNC: NOT DETECTED
MCH RBC QN AUTO: 35.2 PG (ref 27–32.7)
MCHC RBC AUTO-ENTMCNC: 33.7 G/DL (ref 32.6–36.4)
MCV RBC AUTO: 104.4 FL (ref 79.8–96.2)
MONOCYTES # BLD AUTO: 0.55 10*3/MM3 (ref 0.2–1.2)
NEUTROPHILS # BLD AUTO: 4.1 10*3/MM3 (ref 1.9–8.1)
NEUTROPHILS NFR BLD MANUAL: 75 % (ref 42.7–76)
NT-PROBNP SERPL-MCNC: 3601 PG/ML (ref 0–1800)
PLAT MORPH BLD: NORMAL
PLATELET # BLD AUTO: 157 10*3/MM3 (ref 140–500)
PMV BLD AUTO: 12.1 FL (ref 6–12)
POTASSIUM BLD-SCNC: 4.3 MMOL/L (ref 3.5–5.2)
PROT SERPL-MCNC: 5.7 G/DL (ref 6–8.5)
RBC # BLD AUTO: 3.38 10*6/MM3 (ref 4.6–6)
RBC MORPH BLD: NORMAL
RHINOVIRUS RNA SPEC NAA+PROBE: NOT DETECTED
RSV RNA NPH QL NAA+NON-PROBE: DETECTED
SCAN SLIDE: NORMAL
SODIUM BLD-SCNC: 144 MMOL/L (ref 136–145)
TROPONIN T SERPL-MCNC: 0.01 NG/ML (ref 0–0.03)
WBC MORPH BLD: NORMAL
WBC NRBC COR # BLD: 5.47 10*3/MM3 (ref 4.5–10.7)

## 2018-11-04 PROCEDURE — 93010 ELECTROCARDIOGRAM REPORT: CPT | Performed by: INTERNAL MEDICINE

## 2018-11-04 PROCEDURE — 94799 UNLISTED PULMONARY SVC/PX: CPT

## 2018-11-04 PROCEDURE — 94640 AIRWAY INHALATION TREATMENT: CPT

## 2018-11-04 PROCEDURE — 93005 ELECTROCARDIOGRAM TRACING: CPT | Performed by: HOSPITALIST

## 2018-11-04 PROCEDURE — 85025 COMPLETE CBC W/AUTO DIFF WBC: CPT | Performed by: EMERGENCY MEDICINE

## 2018-11-04 PROCEDURE — 87486 CHLMYD PNEUM DNA AMP PROBE: CPT | Performed by: INTERNAL MEDICINE

## 2018-11-04 PROCEDURE — 25010000002 FUROSEMIDE PER 20 MG: Performed by: EMERGENCY MEDICINE

## 2018-11-04 PROCEDURE — 99285 EMERGENCY DEPT VISIT HI MDM: CPT

## 2018-11-04 PROCEDURE — 94660 CPAP INITIATION&MGMT: CPT

## 2018-11-04 PROCEDURE — 85007 BL SMEAR W/DIFF WBC COUNT: CPT | Performed by: EMERGENCY MEDICINE

## 2018-11-04 PROCEDURE — 87633 RESP VIRUS 12-25 TARGETS: CPT | Performed by: INTERNAL MEDICINE

## 2018-11-04 PROCEDURE — 25010000002 METHYLPREDNISOLONE PER 125 MG: Performed by: EMERGENCY MEDICINE

## 2018-11-04 PROCEDURE — 83880 ASSAY OF NATRIURETIC PEPTIDE: CPT | Performed by: EMERGENCY MEDICINE

## 2018-11-04 PROCEDURE — 87581 M.PNEUMON DNA AMP PROBE: CPT | Performed by: INTERNAL MEDICINE

## 2018-11-04 PROCEDURE — 87798 DETECT AGENT NOS DNA AMP: CPT | Performed by: INTERNAL MEDICINE

## 2018-11-04 PROCEDURE — 84484 ASSAY OF TROPONIN QUANT: CPT | Performed by: EMERGENCY MEDICINE

## 2018-11-04 PROCEDURE — 71045 X-RAY EXAM CHEST 1 VIEW: CPT

## 2018-11-04 PROCEDURE — 25010000002 METHYLPREDNISOLONE PER 125 MG: Performed by: INTERNAL MEDICINE

## 2018-11-04 PROCEDURE — 80053 COMPREHEN METABOLIC PANEL: CPT | Performed by: EMERGENCY MEDICINE

## 2018-11-04 RX ORDER — ACETAMINOPHEN 325 MG/1
650 TABLET ORAL EVERY 4 HOURS PRN
Status: DISCONTINUED | OUTPATIENT
Start: 2018-11-04 | End: 2018-11-06 | Stop reason: HOSPADM

## 2018-11-04 RX ORDER — LISINOPRIL 20 MG/1
20 TABLET ORAL DAILY
Status: DISCONTINUED | OUTPATIENT
Start: 2018-11-04 | End: 2018-11-06 | Stop reason: HOSPADM

## 2018-11-04 RX ORDER — TRAZODONE HYDROCHLORIDE 50 MG/1
50 TABLET ORAL NIGHTLY PRN
Status: DISCONTINUED | OUTPATIENT
Start: 2018-11-04 | End: 2018-11-06 | Stop reason: HOSPADM

## 2018-11-04 RX ORDER — IPRATROPIUM BROMIDE AND ALBUTEROL SULFATE 2.5; .5 MG/3ML; MG/3ML
3 SOLUTION RESPIRATORY (INHALATION)
Status: DISCONTINUED | OUTPATIENT
Start: 2018-11-04 | End: 2018-11-06 | Stop reason: HOSPADM

## 2018-11-04 RX ORDER — SODIUM CHLORIDE 0.9 % (FLUSH) 0.9 %
3 SYRINGE (ML) INJECTION EVERY 12 HOURS SCHEDULED
Status: DISCONTINUED | OUTPATIENT
Start: 2018-11-04 | End: 2018-11-06 | Stop reason: HOSPADM

## 2018-11-04 RX ORDER — CETIRIZINE HYDROCHLORIDE 10 MG/1
5 TABLET ORAL DAILY
Status: DISCONTINUED | OUTPATIENT
Start: 2018-11-04 | End: 2018-11-06 | Stop reason: HOSPADM

## 2018-11-04 RX ORDER — BUDESONIDE 0.5 MG/2ML
0.5 INHALANT ORAL
Status: DISCONTINUED | OUTPATIENT
Start: 2018-11-04 | End: 2018-11-06 | Stop reason: HOSPADM

## 2018-11-04 RX ORDER — ALPRAZOLAM 0.5 MG/1
1 TABLET ORAL NIGHTLY PRN
Status: DISCONTINUED | OUTPATIENT
Start: 2018-11-04 | End: 2018-11-06 | Stop reason: HOSPADM

## 2018-11-04 RX ORDER — FOLIC ACID 1 MG/1
1 TABLET ORAL DAILY
Status: DISCONTINUED | OUTPATIENT
Start: 2018-11-04 | End: 2018-11-06 | Stop reason: HOSPADM

## 2018-11-04 RX ORDER — METHYLPREDNISOLONE SODIUM SUCCINATE 125 MG/2ML
125 INJECTION, POWDER, LYOPHILIZED, FOR SOLUTION INTRAMUSCULAR; INTRAVENOUS ONCE
Status: COMPLETED | OUTPATIENT
Start: 2018-11-04 | End: 2018-11-04

## 2018-11-04 RX ORDER — HYDROCODONE BITARTRATE AND ACETAMINOPHEN 5; 325 MG/1; MG/1
1 TABLET ORAL EVERY 4 HOURS PRN
Status: DISCONTINUED | OUTPATIENT
Start: 2018-11-04 | End: 2018-11-06 | Stop reason: HOSPADM

## 2018-11-04 RX ORDER — ALBUTEROL SULFATE 2.5 MG/3ML
2.5 SOLUTION RESPIRATORY (INHALATION)
Status: DISCONTINUED | OUTPATIENT
Start: 2018-11-04 | End: 2018-11-04

## 2018-11-04 RX ORDER — ATORVASTATIN CALCIUM 10 MG/1
10 TABLET, FILM COATED ORAL DAILY
Status: DISCONTINUED | OUTPATIENT
Start: 2018-11-04 | End: 2018-11-06 | Stop reason: HOSPADM

## 2018-11-04 RX ORDER — SODIUM CHLORIDE 0.9 % (FLUSH) 0.9 %
3-10 SYRINGE (ML) INJECTION AS NEEDED
Status: DISCONTINUED | OUTPATIENT
Start: 2018-11-04 | End: 2018-11-06 | Stop reason: HOSPADM

## 2018-11-04 RX ORDER — METHYLPREDNISOLONE SODIUM SUCCINATE 125 MG/2ML
80 INJECTION, POWDER, LYOPHILIZED, FOR SOLUTION INTRAMUSCULAR; INTRAVENOUS EVERY 8 HOURS
Status: DISCONTINUED | OUTPATIENT
Start: 2018-11-04 | End: 2018-11-04

## 2018-11-04 RX ORDER — DOCUSATE SODIUM 100 MG/1
100 CAPSULE, LIQUID FILLED ORAL 2 TIMES DAILY PRN
Status: DISCONTINUED | OUTPATIENT
Start: 2018-11-04 | End: 2018-11-06 | Stop reason: HOSPADM

## 2018-11-04 RX ORDER — METHYLPREDNISOLONE SODIUM SUCCINATE 40 MG/ML
40 INJECTION, POWDER, LYOPHILIZED, FOR SOLUTION INTRAMUSCULAR; INTRAVENOUS EVERY 8 HOURS
Status: DISCONTINUED | OUTPATIENT
Start: 2018-11-05 | End: 2018-11-05

## 2018-11-04 RX ORDER — FUROSEMIDE 10 MG/ML
40 INJECTION INTRAMUSCULAR; INTRAVENOUS ONCE
Status: COMPLETED | OUTPATIENT
Start: 2018-11-04 | End: 2018-11-04

## 2018-11-04 RX ORDER — AMLODIPINE BESYLATE 5 MG/1
5 TABLET ORAL DAILY
Status: DISCONTINUED | OUTPATIENT
Start: 2018-11-04 | End: 2018-11-06 | Stop reason: HOSPADM

## 2018-11-04 RX ORDER — IPRATROPIUM BROMIDE AND ALBUTEROL SULFATE 2.5; .5 MG/3ML; MG/3ML
3 SOLUTION RESPIRATORY (INHALATION) ONCE
Status: COMPLETED | OUTPATIENT
Start: 2018-11-04 | End: 2018-11-04

## 2018-11-04 RX ORDER — BENZONATATE 100 MG/1
200 CAPSULE ORAL 3 TIMES DAILY PRN
Status: DISCONTINUED | OUTPATIENT
Start: 2018-11-04 | End: 2018-11-06 | Stop reason: HOSPADM

## 2018-11-04 RX ORDER — ONDANSETRON 2 MG/ML
4 INJECTION INTRAMUSCULAR; INTRAVENOUS EVERY 6 HOURS PRN
Status: DISCONTINUED | OUTPATIENT
Start: 2018-11-04 | End: 2018-11-06 | Stop reason: HOSPADM

## 2018-11-04 RX ADMIN — Medication 3 ML: at 20:44

## 2018-11-04 RX ADMIN — DOXYCYCLINE 100 MG: 100 INJECTION, POWDER, LYOPHILIZED, FOR SOLUTION INTRAVENOUS at 23:35

## 2018-11-04 RX ADMIN — DOXYCYCLINE 100 MG: 100 INJECTION, POWDER, LYOPHILIZED, FOR SOLUTION INTRAVENOUS at 12:50

## 2018-11-04 RX ADMIN — ALBUTEROL SULFATE 2.5 MG: 2.5 SOLUTION RESPIRATORY (INHALATION) at 12:14

## 2018-11-04 RX ADMIN — NYSTATIN 500000 UNITS: 500000 SUSPENSION ORAL at 12:50

## 2018-11-04 RX ADMIN — FOLIC ACID 1 MG: 1 TABLET ORAL at 11:07

## 2018-11-04 RX ADMIN — NYSTATIN 500000 UNITS: 500000 SUSPENSION ORAL at 17:32

## 2018-11-04 RX ADMIN — FUROSEMIDE 40 MG: 10 INJECTION, SOLUTION INTRAMUSCULAR; INTRAVENOUS at 06:28

## 2018-11-04 RX ADMIN — IPRATROPIUM BROMIDE AND ALBUTEROL SULFATE 3 ML: 2.5; .5 SOLUTION RESPIRATORY (INHALATION) at 20:08

## 2018-11-04 RX ADMIN — LISINOPRIL 20 MG: 20 TABLET ORAL at 11:07

## 2018-11-04 RX ADMIN — Medication 3 ML: at 12:51

## 2018-11-04 RX ADMIN — IPRATROPIUM BROMIDE AND ALBUTEROL SULFATE 3 ML: .5; 3 SOLUTION RESPIRATORY (INHALATION) at 02:58

## 2018-11-04 RX ADMIN — ALPRAZOLAM 1 MG: 0.5 TABLET ORAL at 23:14

## 2018-11-04 RX ADMIN — METHYLPREDNISOLONE SODIUM SUCCINATE 125 MG: 125 INJECTION, POWDER, FOR SOLUTION INTRAMUSCULAR; INTRAVENOUS at 03:21

## 2018-11-04 RX ADMIN — IPRATROPIUM BROMIDE 0.5 MG: 0.5 SOLUTION RESPIRATORY (INHALATION) at 09:13

## 2018-11-04 RX ADMIN — ATORVASTATIN CALCIUM 10 MG: 10 TABLET, FILM COATED ORAL at 11:08

## 2018-11-04 RX ADMIN — AMLODIPINE BESYLATE 5 MG: 5 TABLET ORAL at 11:08

## 2018-11-04 RX ADMIN — NYSTATIN 500000 UNITS: 500000 SUSPENSION ORAL at 20:44

## 2018-11-04 RX ADMIN — METHYLPREDNISOLONE SODIUM SUCCINATE 80 MG: 125 INJECTION, POWDER, FOR SOLUTION INTRAMUSCULAR; INTRAVENOUS at 11:08

## 2018-11-04 RX ADMIN — CETIRIZINE HYDROCHLORIDE 5 MG: 10 TABLET, FILM COATED ORAL at 12:49

## 2018-11-04 RX ADMIN — BUDESONIDE 0.5 MG: 0.5 INHALANT RESPIRATORY (INHALATION) at 09:13

## 2018-11-04 RX ADMIN — METHYLPREDNISOLONE SODIUM SUCCINATE 80 MG: 125 INJECTION, POWDER, FOR SOLUTION INTRAMUSCULAR; INTRAVENOUS at 17:32

## 2018-11-04 RX ADMIN — BUDESONIDE 0.5 MG: 0.5 INHALANT RESPIRATORY (INHALATION) at 20:08

## 2018-11-04 RX ADMIN — ALBUTEROL SULFATE 2.5 MG: 2.5 SOLUTION RESPIRATORY (INHALATION) at 14:58

## 2018-11-04 NOTE — ED PROVIDER NOTES
EMERGENCY DEPARTMENT ENCOUNTER    CHIEF COMPLAINT  Chief Complaint: SOA  History given by: Patient   History limited by: None  Room Number: 16/16  PMD: Gopi Lagos MD      HPI:  Pt is a 78 y.o. male who presents complaining of SOA which began 6 days ago. Pt also notes dry cough and mild bilateral leg swelling, but he denies fever and further sx at this time. Pt notes multiple respiratory issues for which he sees Dr. 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 Pt is on the end of a course of 10 mg prednisone taper given by PCP for COPD flareup.    Duration:  6 days  Onset: Gradual  Timing: Constant  Location: N/A  Radiation: N/A  Quality: SOA  Intensity/Severity: Moderate  Progression: Unchanged  Associated Symptoms: Dry cough, mild BLE swelling  Aggravating Factors: None specified  Alleviating Factors: None specified  Previous Episodes: Pt notes multiple respiratory issues for which he sees Dr. 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   Treatment before arrival: Pt is on the end of a course of 10 mg prednisone taper give by PCP for COPD flareup.    PAST MEDICAL HISTORY  Active Ambulatory Problems     Diagnosis Date Noted   • Anxiety    • Hyperlipidemia    • Benign essential hypertension    • Osteoarthritis, knee    • Abrasion 03/04/2017   • Aneurysm of thoracic aorta (CMS/[facility]) 06/13/2013   • Chronic obstructive pulmonary disease (CMS/[facility]) 12/17/2013   • 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[facility]) 10/04/2016   • History of malignant neoplasm of thoracic cavity structure 12/13/2012   • Hypertension 12/13/2012   • Cancer of lower lobe of lung (CMS/HCC) 05/16/2017   • Adiposity 12/17/2013   • Sleep apnea 05/22/2017   • COPD (chronic obstructive pulmonary disease) with acute bronchitis (CMS/[facility]) 05/27/2018     Resolved Ambulatory  Problems     Diagnosis Date Noted   • No Resolved Ambulatory Problems     Past Medical History:   Diagnosis Date   • Anxiety    • Benign essential hypertension    • COPD (chronic obstructive pulmonary disease) (CMS/[facility])    • Essential hypertension    • H/O complete eye exam 06/2018   • Heart attack (CMS/HCC)    • History of chest x-ray 12/08/2013   • History of pulmonary function tests 06/10/2014   • Hyperlipidemia    • Lung cancer (CMS/HCC)    • Osteoarthritis, knee    • Prostate cancer (CMS/[facility])    • Sleep apnea        PAST SURGICAL HISTORY  Past Surgical History:   Procedure Laterality Date   • ABDOMINAL AORTIC ANEURYSM REPAIR  2010    Dr. Adarsh Dennis   • CATARACT EXTRACTION  10/2014    also 11/14   • COLONOSCOPY     • JOINT REPLACEMENT Left 10/2010    knee; Dr. Wolf   • JOINT REPLACEMENT Right 09/2011    knee; Dr. Wolf   • LUNG LOBECTOMY Left 01/16/2012    LLL; Dr. Mendoza   • PROSTATE SURGERY  2000    Dr. Naranjo       FAMILY HISTORY  Family History   Problem Relation Age of Onset   • Cancer Mother    • Cancer Father         lung   • Cancer Other         colon   • Diabetes Other    • Emphysema Other    • Hypertension Other    • Kidney disease Other    • Lung cancer Other        SOCIAL HISTORY  Social History     Social History   • Marital status: Single     Spouse name: N/A   • Number of children: N/A   • Years of education: N/A     Occupational History   • Not on file.     Social History Main Topics   • Smoking status: Former Smoker     Packs/day: 1.00   • Smokeless tobacco: Never Used   • Alcohol use Yes      Comment: rare   • Drug use: No   • Sexual activity: Defer     Other Topics Concern   • Not on file     Social History Narrative   • No narrative on file       ALLERGIES  Erythromycin    REVIEW OF SYSTEMS  Review of Systems   Constitutional: Negative for activity change, appetite change and fever.   HENT: Negative for congestion and sore throat.    Eyes: Negative.    Respiratory: Positive for cough  (dry) and shortness of breath.    Cardiovascular: Positive for leg swelling (mild BLE). Negative for chest pain.   Gastrointestinal: Negative for abdominal pain, diarrhea and vomiting.   Endocrine: Negative.    Genitourinary: Negative for decreased urine volume and dysuria.   Musculoskeletal: Negative for neck pain.   Skin: Negative for rash and wound.   Allergic/Immunologic: Negative.    Neurological: Negative for weakness, numbness and headaches.   Hematological: Negative.    Psychiatric/Behavioral: Negative.    All other systems reviewed and are negative.      PHYSICAL EXAM  ED Triage Vitals [11/04/18 0258]   Temp Heart Rate Resp BP SpO2   -- 81 18 -- 94 %      Temp src Heart Rate Source Patient Position BP Location FiO2 (%)   -- Monitor -- -- --       Physical Exam   Constitutional: He is oriented to person, place, and time. No distress.   HENT:   Head: Normocephalic and atraumatic.   Eyes: Pupils are equal, round, and reactive to light. EOM are normal.   Neck: Normal range of motion. Neck supple.   Cardiovascular: Normal rate, regular rhythm and normal heart sounds.    Pulmonary/Chest: He is in respiratory distress (mild). He has no wheezes. He has rhonchi (course, bilateral). He has no rales.   Abdominal: Soft. There is no tenderness. There is no rebound and no guarding.   Musculoskeletal: Normal range of motion. He exhibits no edema (no pedal edema).   Neurological: He is alert and oriented to person, place, and time. He has normal sensation and normal strength.   Skin: Skin is warm and dry.   Psychiatric: Mood and affect normal.   Nursing note and vitals reviewed.      LAB RESULTS  Lab Results (last 24 hours)     Procedure Component Value Units Date/Time    CBC & Differential [[id number]] Collected:  11/04/18 0327    Specimen:  Blood Updated:  11/04/18 0437    Narrative:       The following orders were created for panel order CBC & Differential.  Procedure                               Abnormality          Status                     ---------                               -----------         ------                     Manual Differential[[id number]]          Abnormal            Final result               Scan Slide[[id number]]                                       Final result               CBC Auto Differential[[id number]]        Abnormal            Final result                 Please view results for these tests on the individual orders.    Comprehensive Metabolic Panel [[id number]]  (Abnormal) Collected:  11/04/18 0327    Specimen:  Blood Updated:  11/04/18 0406     Glucose 217 (H) mg/dL      BUN 26 (H) mg/dL      Creatinine 1.13 mg/dL      Sodium 144 mmol/L      Potassium 4.3 mmol/L      Chloride 104 mmol/L      CO2 29.3 (H) mmol/L      Calcium 8.8 mg/dL      Total Protein 5.7 (L) g/dL      Albumin 3.80 g/dL      ALT (SGPT) 15 U/L      AST (SGOT) 14 U/L      Alkaline Phosphatase 77 U/L      Total Bilirubin 0.4 mg/dL      eGFR Non African Amer 63 mL/min/1.73      Globulin 1.9 gm/dL      A/G Ratio 2.0 g/dL      BUN/Creatinine Ratio 23.0     Anion Gap 10.7 mmol/L     Narrative:       The MDRD GFR formula is only valid for adults with stable renal function between ages 18 and 70.    BNP [[id number]]  (Abnormal) Collected:  11/04/18 0327    Specimen:  Blood Updated:  11/04/18 0404     proBNP 3,601.0 (H) pg/mL     Narrative:       Among patients with dyspnea, NT-proBNP is highly sensitive for the detection of acute congestive heart failure. In addition NT-proBNP of <300 pg/ml effectively rules out acute congestive heart failure with 99% negative predictive value.    Troponin [[id number]]  (Normal) Collected:  11/04/18 0327    Specimen:  Blood Updated:  11/04/18 0406     Troponin T 0.011 ng/mL     Narrative:       Troponin T Reference Ranges:  Less than 0.03 ng/mL:    Negative for AMI  0.03 to 0.09 ng/mL:      Indeterminant for AMI  Greater than 0.09 ng/mL: Positive for AMI    CBC Auto Differential [[id number]]  (Abnormal)  Collected:  11/04/18 0327    Specimen:  Blood Updated:  11/04/18 0437     WBC 5.47 10*3/mm3      RBC 3.38 (L) 10*6/mm3      Hemoglobin 11.9 (L) g/dL      Hematocrit 35.3 (L) %      .4 (H) fL      MCH 35.2 (H) pg      MCHC 33.7 g/dL      RDW 13.7 %      RDW-SD 51.6 fl      MPV 12.1 (H) fL      Platelets 157 10*3/mm3     Scan Slide [[id number]] Collected:  11/04/18 0327    Specimen:  Blood Updated:  11/04/18 0437     Scan Slide --     Comment: See Manual Differential Results       Manual Differential [[id number]]  (Abnormal) Collected:  11/04/18 0327    Specimen:  Blood Updated:  11/04/18 0437     Neutrophil % 75.0 %      Lymphocyte % 14.0 (L) %      Monocyte % 10.0 %      Eosinophil % 1.0 %      Neutrophils Absolute 4.10 10*3/mm3      Lymphocytes Absolute 0.77 (L) 10*3/mm3      Monocytes Absolute 0.55 10*3/mm3      Eosinophils Absolute 0.05 10*3/mm3      RBC Morphology Normal     WBC Morphology Normal     Platelet Morphology Normal        I ordered the above labs and reviewed the results    RADIOLOGY  XR Chest 1 View    (Results Pending)     I ordered the above noted radiological studies. Interpreted by radiologist.  Reviewed by me in PACS.     PROCEDURES  Procedures   EKG          EKG time: 0218  Rhythm/Rate: Normal sinus rhythm  P waves and CT: NML  QRS, axis: RBBB   ST and T waves: No acute ST changes     Interpreted Contemporaneously by me, independently viewed  No old for comparison    PROGRESS AND CONSULTS   (DST change)     0228-Checked patient and discussed plan to order IV steroid, breathing treatment, and blood work.     0235-Ordered IV solu-medrol, duo-neb breathing treatment, and blood work.    0557-Rechecked patient who reports some relief with solu-medrol injection and discussed results of blood work which shows elevated BNP, results of CXR which shows some diffuse fluid congestion, and plan to admit for cardiac workup and to give diuretic. Pt understands and agrees with the plan, all questions  answered.    0620-Ordered lasix to treat fluid retention.    0621-Discussed patient's case with Dr. Mensah ([facility]) who agrees to admit the patient.    MEDICAL DECISION MAKING  Results were reviewed/discussed with the patient and they were also made aware of online access. Pt also made aware that some labs, such as cultures, will not be resulted during ER visit and follow up with PMD is necessary.     MDM  Number of Diagnoses or Management Options  Acute congestive heart failure, unspecified heart failure type (CMS/HCC):      Amount and/or Complexity of Data Reviewed  Clinical lab tests: reviewed and ordered (Troponin=0.011, proBNP=3,601, hemoglobin=11.9, )  Tests in the radiology section of CPT®: reviewed and ordered (CXR shows difuse fluid congestion)  Tests in the medicine section of CPT®: ordered and reviewed (See EKG procedure note.)  Decide to obtain previous medical records or to obtain history from someone other than the patient: yes  Discuss the patient with other providers: yes (Dr. Mensah ([facility]))    Patient Progress  Patient progress: stable     DIAGNOSIS  Final diagnoses:   Acute congestive heart failure, unspecified heart failure type (CMS/HCC)     DISPOSITION  ADMISSION    Discussed treatment plan and reason for admission with pt/family and admitting physician.  Pt/family voiced understanding of the plan for admission for further testing/treatment as needed.     Latest Documented Vital Signs:  As of 6:24 AM  BP- 179/92 HR- 89 Temp- 98 °F (36.7 °C) (Oral) O2 sat- 96%    --  Documentation assistance provided by ramon Portillo for Dr. Durant.  Information recorded by the scrraul was done at my direction and has been verified and validated by me.              Kadi Portillo  11/04/18 6644       Brennon Durant MD  11/04/18 3001

## 2018-11-04 NOTE — CONSULTS
CONSULT NOTE    Patient Identification:  Ankit Mack Sr.  78 y.o.  male  1940  [id number]            Requesting physician: Dr Méndez    Reason for Consultation:  COPD    CC: soa    History of Present Illness:  She is a very nice 78-year-old male with a history of COPD with an FEV1 around 50% who follows with Dr. Fred Alejandro in our group.  He describes 1 week of worsening shortness of breath associated with a dry nonproductive cough.  No high fevers.  Also noticed some intermittent chest discomfort with some lower extremity swelling.  Denies any excessive salt intake.  Using his bronchodilator therapy as prescribed.  No improvement with outpatient therapy presented to the ER today.  Chest x-ray showed potential for faint infiltrate in the right lower lobe as well as some vascular congestion.  Patient noted that out patient has bronchodilator therapy did not seem to make any difference however when he got the dose of IV Lasix with 3 L out he noticed significant improvement.    Review of Systems:  CONSTITUTIONAL:  Denies fevers or chills  EYE:  No new vision changes  EAR:  No change in hearing  CARDIAC:  + hsitory of chest pain  PULMONARY:  + non  productive cough +shortness of breath  GI:  No diarrhea, hematemesis or hematochezia,  RENAL:  No dysuria or urinary frequency  MUSCULOSKELETAL:  No musculoskeletal complaints +le swelilng  ENDOCRINE:  No heat or cold intolerance  INTEGUMENTARY: No skin rashes  NEUROLOGICAL:  No dizziness or confusion.  No seizure activity  PSYCHIATRIC:  No new anxiety or depression  12 system review of systems performed and all else negative    Past Medical History:   Diagnosis Date   • Anxiety    • Benign essential hypertension    • COPD (chronic obstructive pulmonary disease) (CMS/[facility])    • Essential hypertension    • H/O complete eye exam 06/2018   • Heart attack (CMS/HCC)    • History of chest x-ray 12/08/2013    RLL infiltrate   • History of pulmonary function tests  06/10/2014   • Hyperlipidemia    • Lung cancer (CMS/HCC)    • Osteoarthritis, knee    • Prostate cancer (CMS/[facility])    • Sleep apnea        Past Surgical History:   Procedure Laterality Date   • ABDOMINAL AORTIC ANEURYSM REPAIR  2010    Dr. Adarsh Dennis   • CATARACT EXTRACTION  10/2014    also 11/14   • COLONOSCOPY     • JOINT REPLACEMENT Left 10/2010    knee; Dr. Wolf   • JOINT REPLACEMENT Right 09/2011    knee; Dr. Wolf   • LUNG LOBECTOMY Left 01/16/2012    LLL; Dr. Mendoza   • PROSTATE SURGERY  2000    Dr. Naranjo        Prescriptions Prior to Admission   Medication Sig Dispense Refill Last Dose   • albuterol (PROVENTIL HFA;VENTOLIN HFA) 108 (90 Base) MCG/ACT inhaler Inhale 2 puffs Every 4 (Four) Hours As Needed for Wheezing.   Taking   • albuterol (PROVENTIL) (2.5 MG/3ML) 0.083% nebulizer solution Take 2.5 mg by nebulization 4 (Four) Times a Day.   Taking   • ALPRAZolam (XANAX) 1 MG tablet Take 1 tablet by mouth At Night As Needed for Anxiety. 90 tablet 0 Taking   • amLODIPine (NORVASC) 5 MG tablet Take 1 tablet by mouth Daily. For BP with Lisinopril 90 tablet 1 Taking   • atorvastatin (LIPITOR) 10 MG tablet Take 1 tablet by mouth Daily. For cholesterol 90 tablet 1 Taking   • budesonide (PULMICORT) 0.5 MG/2ML nebulizer solution Inhale 0.5 mg.   Taking   • folic acid (FOLVITE) 1 MG tablet Take 1 mg by mouth Daily.   Taking   • ipratropium (ATROVENT) 0.02 % nebulizer solution Take 500 mcg by nebulization 2 (Two) Times a Day.   Taking   • levoFLOXacin (LEVAQUIN) 500 MG tablet Take 1 tablet by mouth Daily for 10 days. 10 tablet 1    • lisinopril (PRINIVIL,ZESTRIL) 20 MG tablet Take 1 tablet by mouth Daily for 90 days. For BP with Amlodipine 90 tablet 1    • predniSONE (DELTASONE) 20 MG tablet Take 3 tabs QDPC x 3 days then 2 tabs QDPC x 4 days then 1 tab QDPC x 3 days 20 tablet 0    • traZODone (DESYREL) 50 MG tablet TAKE 1-2 TABLETS BY MOUTH EVERY DAY AT BEDTIME *DO NOT TAKE WITH AMBIEN 45 tablet 0 Taking  "      Allergies   Allergen Reactions   • Erythromycin Itching       Social History     Social History   • Marital status: Single     Spouse name: N/A   • Number of children: N/A   • Years of education: N/A     Occupational History   • Not on file.     Social History Main Topics   • Smoking status: Former Smoker     Packs/day: 1.00   • Smokeless tobacco: Never Used   • Alcohol use Yes      Comment: rare   • Drug use: No   • Sexual activity: Defer     Other Topics Concern   • Not on file     Social History Narrative   • No narrative on file       Family History   Problem Relation Age of Onset   • Cancer Mother    • Cancer Father         lung   • Cancer Other         colon   • Diabetes Other    • Emphysema Other    • Hypertension Other    • Kidney disease Other    • Lung cancer Other        Physical Exam:  /91 (BP Location: Left arm, Patient Position: Lying)   Pulse 106   Temp 97.7 °F (36.5 °C) (Oral)   Resp 20   Ht 177.8 cm (70\")   Wt 103 kg (226 lb 3.2 oz)   SpO2 93%   BMI 32.46 kg/m²   Body mass index is 32.46 kg/m².   GENERAL:  NAD, Aox3  HEENT:  EOMI, nonicteric sclera, no JVD  PULMONARY:    Diminished air entry bilaterally, no wheeze, no crackles, o rhonchi, symmetric air entry  CARDIAC:  RRR no MRG, S1 S2  ABD: SNTND BS+  EXT: trace le edema pulses symmetric 2+ bilaterally  NEURO:  CNs II-XII intact, no focal deficits  SKIN: no lesions, no rash  PSYCH: slightly anxious  LYMPH: no cervical LAD    LABS:    Results from last 7 days  Lab Units 11/04/18  0327   WBC 10*3/mm3 5.47   HEMOGLOBIN g/dL 11.9*   PLATELETS 10*3/mm3 157     Results from last 7 days  Lab Units 11/04/18  0327   SODIUM mmol/L 144   POTASSIUM mmol/L 4.3   CHLORIDE mmol/L 104   CO2 mmol/L 29.3*   BUN mg/dL 26*   CREATININE mg/dL 1.13   GLUCOSE mg/dL 217*   CALCIUM mg/dL 8.8   Estimated Creatinine Clearance: 64.8 mL/min (by C-G formula based on SCr of 1.13 mg/dL).    Imaging: I personally visualized the images of scans/x-rays performed " within last 3 days.  Imaging Results (most recent)     Procedure Component Value Units Date/Time    XR Chest 1 View [[id number]] Collected:  11/04/18 0955     Updated:  11/04/18 1105    Narrative:       ONE VIEW PORTABLE CHEST AT 2:52 AM     HISTORY: Shortness of breath. Prostate cancer.     FINDINGS: The examination has now been submitted for interpretation and  was unavailable earlier. The lungs are moderately expanded with some  minimal atelectasis at the right base. There is cardiomegaly with a  stent in the descending thoracic aorta and this shows no change from  05/27/2018.     This report was finalized on 11/4/2018 11:02 AM by Dr. Lior Peñaloza M.D.             Assessment / Recommendations:  COPD without AE  Pulmonary Edema bilateral - lasix given in ed, repeat cxr in am  Oral Candidiasis - nystatin ordered  DOMINGO - continue pt home cpap  AR - cont home meds      Unclear etiology at present of his acute soa.  He has no wheeze at present however he has received therapy at this point.  We will see what the respiratory viral panel shows  Fair to continue antibiotics recheck pro-calcitonin in the morning.  As well as a chest x-ray.  Hopefully this will help.  I do agree with investigations into his fluid status and cardiac evaluation, ill repeat trop in am as well.    Discussed with patient and his daughter.  Dr Alejandro, his outpatient pulmonologist will assume care in am.    Thanks for allowing us to participate in the care of this patient.  LPC is always available to discuss any concerns, questions or to help coordinate the patient's care.      Zurdo Cleary MD  [facility]  11/04/18  5:17 PM

## 2018-11-04 NOTE — PLAN OF CARE
Problem: Patient Care Overview  Goal: Plan of Care Review  Outcome: Ongoing (interventions implemented as appropriate)   11/04/18 1411 11/04/18 1714   Plan of Care Review   Progress --  no change   OTHER   Outcome Summary --  VSS; no complaints of pain or discomfort since patient had been admitted; daily weights; strict Is and Os; pt on solu-medrol and doxycycline; on 3 1/2 L Oxygen; continue to monitor   Coping/Psychosocial   Plan of Care Reviewed With patient --      Goal: Discharge Needs Assessment   11/04/18 0831   Disability   Equipment Currently Used at Home none   Discharge Needs Assessment   Patient/Family Anticipates Transition to home   Patient/Family Anticipated Services at Transition none   Transportation Concerns car, none   Transportation Anticipated family or friend will provide       Problem: Chronic Obstructive Pulmonary Disease (Adult)  Goal: Signs and Symptoms of Listed Potential Problems Will be Absent, Minimized or Managed (Chronic Obstructive Pulmonary Disease)  Outcome: Ongoing (interventions implemented as appropriate)

## 2018-11-04 NOTE — ED NOTES
Pt presents to ED [facility] with C/O abd pain, back pain, nasal congestion and nonproductive cough since last Monday. He repots he was seen by his PCP and placed on prednisone but has had no relief of his sx. He took a stool softener Friday and has had three BMs yesterday but is is still experiencing pain. He reports taking six Benadryl yesterday with no relief. He is a&o x 4. Skin is PWD, bed is locked and in low position. Call light is in reach. Family is at bedside. Will CTM -Narrated by Aurea Torrez RN, RN  11/04/18 2711

## 2018-11-05 ENCOUNTER — APPOINTMENT (OUTPATIENT)
Dept: GENERAL RADIOLOGY | Facility: HOSPITAL | Age: 78
End: 2018-11-05
Attending: INTERNAL MEDICINE

## 2018-11-05 LAB
ANION GAP SERPL CALCULATED.3IONS-SCNC: 13.9 MMOL/L
BUN BLD-MCNC: 36 MG/DL (ref 8–23)
BUN/CREAT SERPL: 30.5 (ref 7–25)
CALCIUM SPEC-SCNC: 9 MG/DL (ref 8.6–10.5)
CHLORIDE SERPL-SCNC: 100 MMOL/L (ref 98–107)
CO2 SERPL-SCNC: 29.1 MMOL/L (ref 22–29)
CREAT BLD-MCNC: 1.18 MG/DL (ref 0.76–1.27)
DEPRECATED RDW RBC AUTO: 49.6 FL (ref 37–54)
ERYTHROCYTE [DISTWIDTH] IN BLOOD BY AUTOMATED COUNT: 13.4 % (ref 11.5–14.5)
GFR SERPL CREATININE-BSD FRML MDRD: 60 ML/MIN/1.73
GLUCOSE BLD-MCNC: 225 MG/DL (ref 65–99)
HCT VFR BLD AUTO: 34.4 % (ref 40.4–52.2)
HGB BLD-MCNC: 11.8 G/DL (ref 13.7–17.6)
MAGNESIUM SERPL-MCNC: 2.3 MG/DL (ref 1.6–2.4)
MCH RBC QN AUTO: 34.7 PG (ref 27–32.7)
MCHC RBC AUTO-ENTMCNC: 34.3 G/DL (ref 32.6–36.4)
MCV RBC AUTO: 101.2 FL (ref 79.8–96.2)
NT-PROBNP SERPL-MCNC: 3122 PG/ML (ref 0–1800)
PLATELET # BLD AUTO: 154 10*3/MM3 (ref 140–500)
PMV BLD AUTO: 11.9 FL (ref 6–12)
POTASSIUM BLD-SCNC: 3.8 MMOL/L (ref 3.5–5.2)
POTASSIUM BLD-SCNC: 4.1 MMOL/L (ref 3.5–5.2)
PROCALCITONIN SERPL-MCNC: 0.11 NG/ML (ref 0.1–0.25)
RBC # BLD AUTO: 3.4 10*6/MM3 (ref 4.6–6)
SODIUM BLD-SCNC: 143 MMOL/L (ref 136–145)
T4 FREE SERPL-MCNC: 1.02 NG/DL (ref 0.93–1.7)
TROPONIN T SERPL-MCNC: 0.01 NG/ML (ref 0–0.03)
TSH SERPL DL<=0.05 MIU/L-ACNC: 0.18 MIU/ML (ref 0.27–4.2)
WBC NRBC COR # BLD: 4.96 10*3/MM3 (ref 4.5–10.7)

## 2018-11-05 PROCEDURE — 94799 UNLISTED PULMONARY SVC/PX: CPT

## 2018-11-05 PROCEDURE — 80048 BASIC METABOLIC PNL TOTAL CA: CPT | Performed by: INTERNAL MEDICINE

## 2018-11-05 PROCEDURE — 87070 CULTURE OTHR SPECIMN AEROBIC: CPT | Performed by: INTERNAL MEDICINE

## 2018-11-05 PROCEDURE — 85027 COMPLETE CBC AUTOMATED: CPT | Performed by: INTERNAL MEDICINE

## 2018-11-05 PROCEDURE — 83735 ASSAY OF MAGNESIUM: CPT | Performed by: INTERNAL MEDICINE

## 2018-11-05 PROCEDURE — 84443 ASSAY THYROID STIM HORMONE: CPT | Performed by: INTERNAL MEDICINE

## 2018-11-05 PROCEDURE — 87205 SMEAR GRAM STAIN: CPT | Performed by: INTERNAL MEDICINE

## 2018-11-05 PROCEDURE — 71046 X-RAY EXAM CHEST 2 VIEWS: CPT

## 2018-11-05 PROCEDURE — 25010000002 METHYLPREDNISOLONE PER 40 MG: Performed by: INTERNAL MEDICINE

## 2018-11-05 PROCEDURE — 84484 ASSAY OF TROPONIN QUANT: CPT | Performed by: INTERNAL MEDICINE

## 2018-11-05 PROCEDURE — 84439 ASSAY OF FREE THYROXINE: CPT | Performed by: INTERNAL MEDICINE

## 2018-11-05 PROCEDURE — 99222 1ST HOSP IP/OBS MODERATE 55: CPT | Performed by: NURSE PRACTITIONER

## 2018-11-05 PROCEDURE — 83880 ASSAY OF NATRIURETIC PEPTIDE: CPT | Performed by: INTERNAL MEDICINE

## 2018-11-05 PROCEDURE — 84132 ASSAY OF SERUM POTASSIUM: CPT | Performed by: INTERNAL MEDICINE

## 2018-11-05 PROCEDURE — 84145 PROCALCITONIN (PCT): CPT | Performed by: INTERNAL MEDICINE

## 2018-11-05 RX ORDER — METHYLPREDNISOLONE SODIUM SUCCINATE 40 MG/ML
40 INJECTION, POWDER, LYOPHILIZED, FOR SOLUTION INTRAMUSCULAR; INTRAVENOUS EVERY 12 HOURS
Status: DISCONTINUED | OUTPATIENT
Start: 2018-11-06 | End: 2018-11-06 | Stop reason: HOSPADM

## 2018-11-05 RX ADMIN — ALPRAZOLAM 1 MG: 0.5 TABLET ORAL at 23:25

## 2018-11-05 RX ADMIN — METOPROLOL TARTRATE 12.5 MG: 25 TABLET ORAL at 20:49

## 2018-11-05 RX ADMIN — NYSTATIN 500000 UNITS: 500000 SUSPENSION ORAL at 18:40

## 2018-11-05 RX ADMIN — IPRATROPIUM BROMIDE AND ALBUTEROL SULFATE 3 ML: 2.5; .5 SOLUTION RESPIRATORY (INHALATION) at 11:23

## 2018-11-05 RX ADMIN — NYSTATIN 500000 UNITS: 500000 SUSPENSION ORAL at 20:50

## 2018-11-05 RX ADMIN — METHYLPREDNISOLONE SODIUM SUCCINATE 40 MG: 40 INJECTION, POWDER, FOR SOLUTION INTRAMUSCULAR; INTRAVENOUS at 15:50

## 2018-11-05 RX ADMIN — NYSTATIN 500000 UNITS: 500000 SUSPENSION ORAL at 08:31

## 2018-11-05 RX ADMIN — ATORVASTATIN CALCIUM 10 MG: 10 TABLET, FILM COATED ORAL at 08:30

## 2018-11-05 RX ADMIN — IPRATROPIUM BROMIDE AND ALBUTEROL SULFATE 3 ML: 2.5; .5 SOLUTION RESPIRATORY (INHALATION) at 07:38

## 2018-11-05 RX ADMIN — IPRATROPIUM BROMIDE AND ALBUTEROL SULFATE 3 ML: 2.5; .5 SOLUTION RESPIRATORY (INHALATION) at 22:11

## 2018-11-05 RX ADMIN — BUDESONIDE 0.5 MG: 0.5 INHALANT RESPIRATORY (INHALATION) at 22:11

## 2018-11-05 RX ADMIN — BUDESONIDE 0.5 MG: 0.5 INHALANT RESPIRATORY (INHALATION) at 07:39

## 2018-11-05 RX ADMIN — FOLIC ACID 1 MG: 1 TABLET ORAL at 08:30

## 2018-11-05 RX ADMIN — Medication 3 ML: at 08:32

## 2018-11-05 RX ADMIN — AMLODIPINE BESYLATE 5 MG: 5 TABLET ORAL at 08:31

## 2018-11-05 RX ADMIN — IPRATROPIUM BROMIDE AND ALBUTEROL SULFATE 3 ML: 2.5; .5 SOLUTION RESPIRATORY (INHALATION) at 16:01

## 2018-11-05 RX ADMIN — CETIRIZINE HYDROCHLORIDE 5 MG: 10 TABLET, FILM COATED ORAL at 08:30

## 2018-11-05 RX ADMIN — Medication 3 ML: at 20:50

## 2018-11-05 RX ADMIN — METHYLPREDNISOLONE SODIUM SUCCINATE 40 MG: 40 INJECTION, POWDER, FOR SOLUTION INTRAMUSCULAR; INTRAVENOUS at 06:09

## 2018-11-05 RX ADMIN — NYSTATIN 500000 UNITS: 500000 SUSPENSION ORAL at 15:50

## 2018-11-05 RX ADMIN — LISINOPRIL 20 MG: 20 TABLET ORAL at 08:30

## 2018-11-05 NOTE — CONSULTS
"    Patient Name: Ankit Mack Sr.  :1940  78 y.o.    Date of Admission: 2018  Date of Consultation:  18  Encounter Provider: SCOTTY Echevarria  Place of Service: [facility] CARDIOLOGY  Referring Provider: Rodney Mensah MD  Patient Care Team:  Gopi Lagos MD as PCP - General (Family Medicine)  Amando Black MD as PCP - Claims Attributed  Amando Black MD as Consulting Physician (Pulmonary Disease)  Tanner Mendoza III, MD as Surgeon (Thoracic Surgery)  Trey Salcedo MD as Consulting Physician (Urology)  Fred Alejandro MD as Consulting Physician (Pulmonary Disease)      Chief complaint: shortness of breath.     History of Present Illness: Mr. Mack is a 78 year old male, who is new to [facility], with history of COPD, sleep apnea (compliant with cpap), HTN, and thoracic aortic aneurysm s/p stent grafting in [place], [place]. He states he has had 2 prior \"heart attacks\" but no coronary artery disease. For the  Past week, he has had progressive shortness of breath. He has been on steroids and levaquin and has not really started to feel better. He presented to the emergency room on  where he was noted to have increased proBNP and pulmonary edema noted on CXR (report does not mention this). He was treated with IV lasix as well as IV steroids for COPD exacerbation. Viral panel was positive for RSV. An echocardiogram was obtained today and showed normal LV systolic function with grade 1 diastolic dysfunction.     Patient is feeling some better. He denies orthopnea. He eats out a lot and has eaten Chinese food once or twice a week for the past several weeks. He has long standing HTN, but states it has been well controlled with medication for over 10 years. He diuresed 1200ML. Recorded weight is actually up 3 lbs. He denies any chest pain, palpitations, syncope, near syncope.     Echo 18  · Left ventricular systolic " function is normal. Estimated EF = 58%.  · Left ventricular wall thickness is consistent with mild concentric hypertrophy.  · Left ventricular diastolic dysfunction (grade I) consistent with impaired relaxation.      Past Medical History:   Diagnosis Date   • Anxiety    • Benign essential hypertension    • COPD (chronic obstructive pulmonary disease) (CMS/[facility])    • Essential hypertension    • H/O complete eye exam 06/2018   • Heart attack (CMS/[facility])    • History of chest x-ray 12/08/2013    RLL infiltrate   • History of pulmonary function tests 06/10/2014   • Hyperlipidemia    • Lung cancer (CMS/[facility])    • Osteoarthritis, knee    • Prostate cancer (CMS/[facility])    • Sleep apnea        Past Surgical History:   Procedure Laterality Date   • ABDOMINAL AORTIC ANEURYSM REPAIR  2010    Dr. Adarsh Dennis   • CATARACT EXTRACTION  10/2014    also 11/14   • COLONOSCOPY     • JOINT REPLACEMENT Left 10/2010    knee; Dr. Wolf   • JOINT REPLACEMENT Right 09/2011    knee; Dr. Wolf   • LUNG LOBECTOMY Left 01/16/2012    LLL; Dr. Mendoza   • PROSTATE SURGERY  2000    Dr. Naranjo         Prior to Admission medications    Medication Sig Start Date End Date Taking? Authorizing Provider   albuterol (PROVENTIL HFA;VENTOLIN HFA) 108 (90 Base) MCG/ACT inhaler Inhale 2 puffs Every 4 (Four) Hours As Needed for Wheezing.    ProviderRisa MD   albuterol (PROVENTIL) (2.5 MG/3ML) 0.083% nebulizer solution Take 2.5 mg by nebulization 4 (Four) Times a Day.    ProviderRisa MD   ALPRAZolam (XANAX) 1 MG tablet Take 1 tablet by mouth At Night As Needed for Anxiety. 8/7/18   Gopi Lagos MD   amLODIPine (NORVASC) 5 MG tablet Take 1 tablet by mouth Daily. For BP with Lisinopril 6/12/18   Gopi Lagos MD   atorvastatin (LIPITOR) 10 MG tablet Take 1 tablet by mouth Daily. For cholesterol 6/12/18   Gopi Lagos MD   budesonide (PULMICORT) 0.5 MG/2ML nebulizer solution Inhale 0.5 mg.    ProviderRisa MD   folic acid (FOLVITE) 1  MG tablet Take 1 mg by mouth Daily.    Provider, MD Risa   ipratropium (ATROVENT) 0.02 % nebulizer solution Take 500 mcg by nebulization 2 (Two) Times a Day.    Provider, MD Risa   levoFLOXacin (LEVAQUIN) 500 MG tablet Take 1 tablet by mouth Daily for 10 days. 10/29/18 11/8/18  Lynsey Vela APRN   lisinopril (PRINIVIL,ZESTRIL) 20 MG tablet Take 1 tablet by mouth Daily for 90 days. For BP with Amlodipine 6/12/18 9/10/18  Gopi Lagos MD   predniSONE (DELTASONE) 20 MG tablet Take 3 tabs QDPC x 3 days then 2 tabs QDPC x 4 days then 1 tab QDPC x 3 days 10/29/18   Lynsey Vela APRN   traZODone (DESYREL) 50 MG tablet TAKE 1-2 TABLETS BY MOUTH EVERY DAY AT BEDTIME *DO NOT TAKE WITH AMBIEN 9/15/18   Gopi Lagos MD       Allergies   Allergen Reactions   • Erythromycin Itching       Social History     Social History   • Marital status: Single     Social History Main Topics   • Smoking status: Former Smoker     Packs/day: 1.00   • Smokeless tobacco: Never Used   • Alcohol use Yes      Comment: rare   • Drug use: No   • Sexual activity: Defer     Other Topics Concern   • Not on file       Family History   Problem Relation Age of Onset   • Cancer Mother    • Cancer Father         lung   • Cancer Other         colon   • Diabetes Other    • Emphysema Other    • Hypertension Other    • Kidney disease Other    • Lung cancer Other        REVIEW OF SYSTEMS:   All systems reviewed.  Pertinent positives identified in HPI.  All other systems are negative.      Objective:     Vitals:    11/05/18 1310 11/05/18 1445 11/05/18 1601 11/05/18 1611   BP: (!) 127/107 135/82     BP Location: Left arm Left arm     Patient Position: Lying Lying     Pulse: 117 114 103    Resp: 18 20 18   Temp: 96.7 °F (35.9 °C)      TempSrc: Oral      SpO2: 91%  91%    Weight:       Height:         Body mass index is 32.86 kg/m².    Physical Exam:  Constitutional: He is oriented to person, place, and time. He appears  well-developed. He does not appear ill.   HENT:   Head: Normocephalic and atraumatic. Head is without contusion.   Right Ear: Hearing normal. No drainage.   Left Ear: Hearing normal. No drainage.   Nose: No nasal deformity. No epistaxis.   Eyes: Lids are normal. Right eye exhibits no exudate. Left eye exhibits no exudate.  Neck: No JVD present. Carotid bruit is not present. No tracheal deviation present. No thyroid mass and no thyromegaly present.   Cardiovascular: Normal rate, regular rhythm and normal heart sounds.    Pulses:       Posterior tibial pulses are 2+ on the right side, and 2+ on the left side.   Pulmonary/Chest: Effort normal and diminished breath sounds. Mild expiratory wheeze RUL.   Abdominal: Soft. Normal appearance and bowel sounds are normal. There is no tenderness.   Musculoskeletal: Normal range of motion.        Right shoulder: He exhibits no deformity.        Left shoulder: He exhibits no deformity.   Neurological: He is alert and oriented to person, place, and time. He has normal strength.   Skin: Skin is warm, dry and intact. No rash noted.   Psychiatric: He has a normal mood and affect. His behavior is normal. Thought content normal.   Vitals reviewed      Lab Review:       Results from last 7 days  Lab Units 11/05/18  0454 11/04/18  0327   SODIUM mmol/L 143 144   POTASSIUM mmol/L 3.8 4.3   CHLORIDE mmol/L 100 104   CO2 mmol/L 29.1* 29.3*   BUN mg/dL 36* 26*   CREATININE mg/dL 1.18 1.13   CALCIUM mg/dL 9.0 8.8   BILIRUBIN mg/dL  --  0.4   ALK PHOS U/L  --  77   ALT (SGPT) U/L  --  15   AST (SGOT) U/L  --  14   GLUCOSE mg/dL 225* 217*       Results from last 7 days  Lab Units 11/05/18  0454 11/04/18  0327   TROPONIN T ng/mL 0.011 0.011       Results from last 7 days  Lab Units 11/05/18  0454   WBC 10*3/mm3 4.96   HEMOGLOBIN g/dL 11.8*   HEMATOCRIT % 34.4*   PLATELETS 10*3/mm3 154                            I personally viewed and interpreted the patient's EKG/Telemetry data.      Current  Facility-Administered Medications:   •  acetaminophen (TYLENOL) tablet 650 mg, 650 mg, Oral, Q4H PRN, Tiny Méndez MD  •  ALPRAZolam (XANAX) tablet 1 mg, 1 mg, Oral, Nightly PRN, Tiny Méndez MD, 1 mg at 11/04/18 2314  •  amLODIPine (NORVASC) tablet 5 mg, 5 mg, Oral, Daily, Tiny Méndez MD, 5 mg at 11/05/18 0831  •  atorvastatin (LIPITOR) tablet 10 mg, 10 mg, Oral, Daily, Tiny Méndez MD, 10 mg at 11/05/18 0830  •  benzonatate (TESSALON) capsule 200 mg, 200 mg, Oral, TID PRN, Tiny Méndez MD  •  budesonide (PULMICORT) nebulizer solution 0.5 mg, 0.5 mg, Nebulization, BID - RT, Tiny Méndez MD, 0.5 mg at 11/05/18 0739  •  cetirizine (zyrTEC) tablet 5 mg, 5 mg, Oral, Daily, Tiny Méndez MD, 5 mg at 11/05/18 0830  •  docusate sodium (COLACE) capsule 100 mg, 100 mg, Oral, BID PRN, Tiny Méndez MD  •  folic acid (FOLVITE) tablet 1 mg, 1 mg, Oral, Daily, Tiny Méndez MD, 1 mg at 11/05/18 0830  •  HYDROcodone-acetaminophen (NORCO) 5-325 MG per tablet 1 tablet, 1 tablet, Oral, Q4H PRN, Tiny Méndez MD  •  ipratropium-albuterol (DUO-NEB) nebulizer solution 3 mL, 3 mL, Nebulization, 4x Daily - RT, Zurdo Cleary MD, 3 mL at 11/05/18 1601  •  lisinopril (PRINIVIL,ZESTRIL) tablet 20 mg, 20 mg, Oral, Daily, Tiny Méndez MD, 20 mg at 11/05/18 0830  •  [START ON 11/6/2018] methylPREDNISolone sodium succinate (SOLU-Medrol) injection 40 mg, 40 mg, Intravenous, Q12H, Selma Calloway APRN  •  metoprolol tartrate (LOPRESSOR) tablet 12.5 mg, 12.5 mg, Oral, Q12H, Selma Calloway APRN  •  nystatin (MYCOSTATIN) 247008 UNIT/ML suspension 500,000 Units, 5 mL, Oral, 4x Daily, Tiny Méndez MD, 500,000 Units at 11/05/18 1550  •  ondansetron (ZOFRAN) injection 4 mg, 4 mg, Intravenous, Q6H PRN, Tiny Méndez MD  •  sodium chloride 0.9 % flush 3 mL, 3 mL, Intravenous, Q12H, Tiny Méndez MD, 3 mL at 11/05/18 0832  •  sodium chloride 0.9 % flush 3-10 mL, 3-10 mL, Intravenous, PRN,  Tiny Méndez MD  •  traZODone (DESYREL) tablet 50 mg, 50 mg, Oral, Nightly PRN, Tiny Méndez MD    Assessment and Plan:       1. Acute on chronic hypoxic respiratory failure - multifactorial due to significant lung disease, RSV, and some component of diastolic heart failure. He was diuresed and feels some better.     2. Acute diastolic heart failure - dietary indiscretions contributing. He feels better after diuresis. BUN and creatin are creeping up. Hold off on further diuresis and reassess in AM.     3. HTN - modest control. May need some med adjustments. Will monitor.     4. Sleep apnea - compliant with cpap.     5. History of thoracic aortic aneurysm s/p TEAVR and repair due to Endoleak.     6. History of L carotid-subclavian bypass    Monica Escamilla, APRN  11/05/18  5:28 PM

## 2018-11-05 NOTE — PROGRESS NOTES
"  PROGRESS NOTE  Patient Name: Ankit Mack .  Age/Sex: 78 y.o. male  : 1940  MRN: [id number]    Date of Admission: 2018  Date of Encounter Visit: 18   LOS: 1 day   Patient Care Team:  Gopi Lagos MD as PCP - General (Family Medicine)  Amando Black MD as PCP - Claims Attributed  Amando Black MD as Consulting Physician (Pulmonary Disease)  Tanner Mendoza III, MD as Surgeon (Thoracic Surgery)  Trey Salcedo MD as Consulting Physician (Urology)  Fred Alejandro MD as Consulting Physician (Pulmonary Disease)    Chief Complaint: bilateral pulmonary edema    Hospital course: patient was admitted on 18 with worsening shortness of breath and found to have bilateral pulmonary edema treated with lasix. He tested positive for RSV.     Interval History: still has swelling and shortness of breath, but improved with diuretics.     REVIEW OF SYSTEMS:   CONSTITUTIONAL: no fever or chills  CARDIOVASCULAR: No chest pain, chest pressure or chest discomfort. No palpitations or edema.   RESPIRATORY:  Shortness of breath better. No cough  GASTROINTESTINAL: No anorexia, nausea, vomiting or diarrhea. No abdominal pain or blood.   HEMATOLOGIC: No bleeding or bruising.     Ventilator/Non-Invasive Ventilation Settings     None            Vital Signs  Temp:  [96.7 °F (35.9 °C)-98 °F (36.7 °C)] 96.7 °F (35.9 °C)  Heart Rate:  [] 103  Resp:  [18-20] 18  BP: (127-151)/() 135/82  SpO2:  [91 %-95 %] 91 %  on  Flow (L/min):  [3-3.5] 3 Device (Oxygen Therapy): nasal cannula    Intake/Output Summary (Last 24 hours) at 18 1707  Last data filed at 18 0626   Gross per 24 hour   Intake                0 ml   Output              600 ml   Net             -600 ml     Flowsheet Rows      First Filed Value   Admission Height  177.8 cm (70\") Documented at 2018 032   Admission Weight  107 kg (236 lb 1.6 oz) Documented at 2018 0322        Body mass index is 32.86 " kg/m².  1    11/04/18  0322 11/04/18  0754 11/05/18  0626   Weight: 107 kg (236 lb 1.6 oz) 103 kg (226 lb 3.2 oz) 104 kg (229 lb)       Physical Exam:  GEN:  No acute distress, alert, cooperative, well developed, on oxygen at 3 LPM  EYES:   Sclera clear. No icterus. PERRL  ENT:   External ears/nose normal, no oral lesions, no thrush, mucous membranes moist  NECK:  Supple, midline trachea, no JVD  LUNGS: Normal chest on inspection, diminished BS bilaterally, no wheezes. No rhonchi. No crackles. Respirations regular, even and unlabored.   CV:  Regular rhythm and tachycardic. Normal S1/S2. No murmurs, gallops, or rubs noted.  ABD:  Soft, non-tender and non-distended. Normal bowel sounds. No guarding  EXT:  Moves all extremities well. No cyanosis. No redness. No edema.   Skin: dry, intact, no bleeding    Results Review:        Results from last 7 days  Lab Units 11/05/18  0454 11/04/18  0327   SODIUM mmol/L 143 144   POTASSIUM mmol/L 3.8 4.3   CHLORIDE mmol/L 100 104   CO2 mmol/L 29.1* 29.3*   BUN mg/dL 36* 26*   CREATININE mg/dL 1.18 1.13   CALCIUM mg/dL 9.0 8.8   AST (SGOT) U/L  --  14   ALT (SGPT) U/L  --  15   ANION GAP mmol/L 13.9 10.7   ALBUMIN g/dL  --  3.80       Results from last 7 days  Lab Units 11/05/18  0454 11/04/18  0327   TROPONIN T ng/mL 0.011 0.011       Results from last 7 days  Lab Units 11/05/18  0454   TSH mIU/mL 0.185*   Results for TATIANA GARIBAY SR. (MRN [id number]) as of 11/5/2018 15:46   Ref. Range 11/5/2018 04:54   Free T4 Latest Ref Range: 0.93 - 1.70 ng/dL 1.02     Results for TATIANA GARIBAY SR. (MRN [id number]) as of 11/5/2018 15:46   Ref. Range 11/9/2016 09:57 1/24/2018 16:01   TSH Baseline Latest Ref Range: 0.270 - 4.200 mIU/mL 1.980 1.340       Results from last 7 days  Lab Units 11/05/18  0454 11/04/18  0327   PROBNP pg/mL 3,122.0* 3,601.0*       Results from last 7 days  Lab Units 11/05/18  0454 11/04/18  0327   WBC 10*3/mm3 4.96 5.47   HEMOGLOBIN g/dL 11.8* 11.9*   HEMATOCRIT %  34.4* 35.3*   PLATELETS 10*3/mm3 154 157   MCV fL 101.2* 104.4*                   Invalid input(s): LDLCALC          No results found for: POCGLU    Results from last 7 days  Lab Units 11/05/18  0454   PROCALCITONIN ng/mL 0.11               Results from last 7 days  Lab Units 11/04/18  1737   ADENOVIRUS DETECTION BY PCR  Not Detected   CORONAVIRUS 229E  Not Detected   CORONAVIRUS HKU1  Not Detected   CORONAVIRUS NL63  Not Detected   CORONAVIRUS OC43  Not Detected   HUMAN METAPNEUMOVIRUS  Not Detected   HUMAN RHINOVIRUS/ENTEROVIRUS  Not Detected   INFLUENZA B PCR  Not Detected   PARAINFLUENZA 1  Not Detected   PARAINFLUENZA VIRUS 2  Not Detected   PARAINFLUENZA VIRUS 3  Not Detected   PARAINFLUENZA VIRUS 4  Not Detected   BORDETELLA PERTUSSIS PCR  Not Detected   CHLAMYDOPHILA PNEUMONIAE PCR  Not Detected   MYCOPLAMA PNEUMO PCR  Not Detected   INFLUENZA A PCR  Not Detected   INFLUENZA A H3  Not Detected   INFLUENZA A H1  Not Detected   RSV, PCR  Detected*           Echo: 7/19/18  · Left ventricular systolic function is normal. Estimated EF = 58%.  · Left ventricular wall thickness is consistent with mild concentric hypertrophy.  · Left ventricular diastolic dysfunction (grade I) consistent with impaired relaxation.    Imaging:   Imaging Results (all)     Procedure Component Value Units Date/Time    XR Chest 2 View [[id number]] Collected:  11/05/18 1057     Updated:  11/05/18 1106    Narrative:       XR CHEST 2 VW-     Clinical:Eval lungs.  COPD, HTN.  Former smoker.  AAA repair     COMPARISON 11/4/2018     FINDINGS: Thoracic aortic stent is again demonstrated. There is ectasia  of the aorta. Cardiac enlargement similar to the previous examination.  Stable mediastinum. Multiple hemostatic surgical clips at the left neck  base/thoracic inlet.     There is chronic parenchymal change, no acute consolidation, effusion or  edema has developed. 2 cm pleural-based nodule seen along the right  lower lobe costophrenic sulcus on  7/8/2018 CT is reidentified on the  current chest radiograph. Imaging surveillance advised. The remainder is  unremarkable.     This report was finalized on 11/5/2018 11:03 AM by Dr. Tom Concepcion M.D.       XR Chest 1 View [[id number]] Collected:  11/04/18 0955     Updated:  11/04/18 1105    Narrative:       ONE VIEW PORTABLE CHEST AT 2:52 AM     HISTORY: Shortness of breath. Prostate cancer.     FINDINGS: The examination has now been submitted for interpretation and  was unavailable earlier. The lungs are moderately expanded with some  minimal atelectasis at the right base. There is cardiomegaly with a  stent in the descending thoracic aorta and this shows no change from  05/27/2018.     This report was finalized on 11/4/2018 11:02 AM by Dr. Lior Peñaloza M.D.             I reviewed the patient's new clinical results.  I personally viewed and interpreted the patient's imaging results:        Medication Review:     amLODIPine 5 mg Oral Daily   atorvastatin 10 mg Oral Daily   budesonide 0.5 mg Nebulization BID - RT   cetirizine 5 mg Oral Daily   folic acid 1 mg Oral Daily   ipratropium-albuterol 3 mL Nebulization 4x Daily - RT   lisinopril 20 mg Oral Daily   [START ON 11/6/2018] methylPREDNISolone sodium succinate 40 mg Intravenous Q12H   metoprolol tartrate 12.5 mg Oral Q12H   nystatin 5 mL Oral 4x Daily   sodium chloride 3 mL Intravenous Q12H            ASSESSMENT:   1. Pulmonary Edema- bilateral  2. Diastolic CHF with acute exacerbation  3. COPD with AE  4. RSV viral illness  5. Oral Candidiasis   6. DOMINGO on CPAP  7. Allergic rhinitis  8. Abnormal TSH with normal Free T4  9. Anemia     PLAN:  Continue IV steroids and reduce to BID   Consult cardiology for new diastolic CHF and pulmonary edema improved with diuretics.   Continue nystatin for thrush   Continue home CPAP     Discussed with patient and staff     Disposition: per admitting   Follow up with Dr. Javon Alejandro MD  11/05/18  5:07  PM            Dictated utilizing Dragon dictation

## 2018-11-05 NOTE — PLAN OF CARE
Problem: Patient Care Overview  Goal: Plan of Care Review  Outcome: Ongoing (interventions implemented as appropriate)   11/05/18 0015 11/05/18 0337   Plan of Care Review   Progress --  no change   OTHER   Outcome Summary --  VSS. Pt up ad liv. No complaints of pain. CPAP at HS. Pt feet swollen after sitting in chair. Continue to monitor.    Coping/Psychosocial   Plan of Care Reviewed With patient --      Goal: Individualization and Mutuality  Outcome: Ongoing (interventions implemented as appropriate)    Goal: Discharge Needs Assessment  Outcome: Ongoing (interventions implemented as appropriate)    Goal: Interprofessional Rounds/Family Conf  Outcome: Ongoing (interventions implemented as appropriate)      Problem: Chronic Obstructive Pulmonary Disease (Adult)  Goal: Signs and Symptoms of Listed Potential Problems Will be Absent, Minimized or Managed (Chronic Obstructive Pulmonary Disease)  Outcome: Ongoing (interventions implemented as appropriate)

## 2018-11-05 NOTE — PROGRESS NOTES
"     LOS: 1 day   Primary Care Physician: Gopi Lagos MD     Subjective   Feels better.  Not coughing now.    Vital Signs  Body mass index is 32.86 kg/m².  Temp:  [96.7 °F (35.9 °C)-98 °F (36.7 °C)] 96.7 °F (35.9 °C)  Heart Rate:  [] 103  Resp:  [18-20] 18  BP: (127-151)/() 135/82      Objective:  General Appearance:  In no acute distress.    Vital signs: (most recent): Blood pressure 135/82, pulse 103, temperature 96.7 °F (35.9 °C), temperature source Oral, resp. rate 18, height 177.8 cm (70\"), weight 104 kg (229 lb), SpO2 91 %.    Lungs:  He is not in respiratory distress.  No stridor.  There are decreased breath sounds.  No rales, wheezes or rhonchi.    Heart: Normal rate.  Regular rhythm.  No murmur.   Abdomen: Abdomen is soft and non-distended.  Bowel sounds are normal.   There is no abdominal tenderness.   There is no splenomegaly. There is no hepatomegaly.   Extremities: There is no dependent edema.    Neurological: Patient is alert.    Skin:  Warm and dry.          Results Review:    I reviewed the patient's new clinical results.      Results from last 7 days  Lab Units 11/05/18  0454 11/04/18  0327   WBC 10*3/mm3 4.96 5.47   HEMOGLOBIN g/dL 11.8* 11.9*   PLATELETS 10*3/mm3 154 157       Results from last 7 days  Lab Units 11/05/18  0454 11/04/18  0327   SODIUM mmol/L 143 144   POTASSIUM mmol/L 3.8 4.3   CHLORIDE mmol/L 100 104   CO2 mmol/L 29.1* 29.3*   BUN mg/dL 36* 26*   CREATININE mg/dL 1.18 1.13   CALCIUM mg/dL 9.0 8.8   GLUCOSE mg/dL 225* 217*         Hemoglobin A1C:  Lab Results   Component Value Date    HGBA1C 5.60 01/24/2018       Glucose Range:No results found for: POCGLU    No results found for: CJRPDIHV24    Lab Results   Component Value Date    TSH 0.185 (L) 11/05/2018       Assessment & Plan      Medication Review: Yes    Active Hospital Problems    Diagnosis Date Noted   • **Chronic obstructive pulmonary disease with acute exacerbation (CMS/HCC) [J44.1] 12/17/2013   • Acute " congestive heart failure (CMS/HCC) [I50.9] 11/04/2018   • Non-seasonal allergic rhinitis [J30.89] 11/04/2018   • Sleep apnea [G47.30] 05/22/2017   • Cancer of lower lobe of lung (CMS/[facility]) [C34.30] 05/16/2017   • Hypertension [I10] 12/13/2012   • 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       Assessment/Plan  1.  RSV with increased shortness of breath and cough, better now.  Antibiotics discontinued.  Prednisone taper begun.  O2 dependent COPD  2.  Thrush.  Continue nystatin  3.  Possible congestive heart failure.  Chest x-ray today clear.  Last echo July 2018.  Cardiology consulted per pulmonary.  Volume status appears fine today.  4.  Probable sick euthyroid.  Outpatient follow-up.  Free T4 is normal  5.  Hypertension.  Numbers noted.  No changes for now.  Continue Norvasc and lisinopril    Tiny Méndez MD  11/05/18  6:14 PM

## 2018-11-06 VITALS
TEMPERATURE: 96.9 F | SYSTOLIC BLOOD PRESSURE: 126 MMHG | BODY MASS INDEX: 32.87 KG/M2 | HEIGHT: 70 IN | DIASTOLIC BLOOD PRESSURE: 68 MMHG | OXYGEN SATURATION: 91 % | WEIGHT: 229.6 LBS | HEART RATE: 102 BPM | RESPIRATION RATE: 18 BRPM

## 2018-11-06 PROBLEM — J21.0 RSV (ACUTE BRONCHIOLITIS DUE TO RESPIRATORY SYNCYTIAL VIRUS): Status: ACTIVE | Noted: 2018-11-06

## 2018-11-06 LAB
ANION GAP SERPL CALCULATED.3IONS-SCNC: 11.8 MMOL/L
BUN BLD-MCNC: 44 MG/DL (ref 8–23)
BUN/CREAT SERPL: 43.1 (ref 7–25)
CALCIUM SPEC-SCNC: 8.8 MG/DL (ref 8.6–10.5)
CHLORIDE SERPL-SCNC: 100 MMOL/L (ref 98–107)
CO2 SERPL-SCNC: 28.2 MMOL/L (ref 22–29)
CREAT BLD-MCNC: 1.02 MG/DL (ref 0.76–1.27)
GFR SERPL CREATININE-BSD FRML MDRD: 71 ML/MIN/1.73
GLUCOSE BLD-MCNC: 196 MG/DL (ref 65–99)
POTASSIUM BLD-SCNC: 4.2 MMOL/L (ref 3.5–5.2)
SODIUM BLD-SCNC: 140 MMOL/L (ref 136–145)

## 2018-11-06 PROCEDURE — 94799 UNLISTED PULMONARY SVC/PX: CPT

## 2018-11-06 PROCEDURE — 99232 SBSQ HOSP IP/OBS MODERATE 35: CPT | Performed by: INTERNAL MEDICINE

## 2018-11-06 PROCEDURE — 80048 BASIC METABOLIC PNL TOTAL CA: CPT | Performed by: NURSE PRACTITIONER

## 2018-11-06 PROCEDURE — 25010000002 METHYLPREDNISOLONE PER 40 MG: Performed by: NURSE PRACTITIONER

## 2018-11-06 RX ORDER — PREDNISONE 20 MG/1
TABLET ORAL
Qty: 20 TABLET | Refills: 0 | Status: SHIPPED | OUTPATIENT
Start: 2018-11-06 | End: 2018-11-12

## 2018-11-06 RX ADMIN — Medication 3 ML: at 08:37

## 2018-11-06 RX ADMIN — ATORVASTATIN CALCIUM 10 MG: 10 TABLET, FILM COATED ORAL at 08:36

## 2018-11-06 RX ADMIN — LISINOPRIL 20 MG: 20 TABLET ORAL at 08:36

## 2018-11-06 RX ADMIN — NYSTATIN 500000 UNITS: 500000 SUSPENSION ORAL at 08:36

## 2018-11-06 RX ADMIN — METOPROLOL TARTRATE 12.5 MG: 25 TABLET ORAL at 08:35

## 2018-11-06 RX ADMIN — FOLIC ACID 1 MG: 1 TABLET ORAL at 08:36

## 2018-11-06 RX ADMIN — BUDESONIDE 0.5 MG: 0.5 INHALANT RESPIRATORY (INHALATION) at 06:55

## 2018-11-06 RX ADMIN — IPRATROPIUM BROMIDE AND ALBUTEROL SULFATE 3 ML: 2.5; .5 SOLUTION RESPIRATORY (INHALATION) at 06:56

## 2018-11-06 RX ADMIN — AMLODIPINE BESYLATE 5 MG: 5 TABLET ORAL at 08:36

## 2018-11-06 RX ADMIN — CETIRIZINE HYDROCHLORIDE 5 MG: 10 TABLET, FILM COATED ORAL at 08:36

## 2018-11-06 RX ADMIN — METHYLPREDNISOLONE SODIUM SUCCINATE 40 MG: 40 INJECTION, POWDER, FOR SOLUTION INTRAMUSCULAR; INTRAVENOUS at 05:54

## 2018-11-06 NOTE — PROGRESS NOTES
"Patient Name: Ankit Mack Sr.  :1940  78 y.o.      Patient Care Team:  Gopi Lagos MD as PCP - General (Family Medicine)  Amando Black MD as PCP - Claims Attributed  Amando Black MD as Consulting Physician (Pulmonary Disease)  Tanner Mendoza III, MD as Surgeon (Thoracic Surgery)  Trey Salcedo MD as Consulting Physician (Urology)  Fred Alejandro MD as Consulting Physician (Pulmonary Disease)    Interval History:   No further diuretics yesterday    Subjective:  Following for diastolic volume overload     Objective   Vital Signs  Temp:  [96.7 °F (35.9 °C)-97.5 °F (36.4 °C)] 96.9 °F (36.1 °C)  Heart Rate:  [] 102  Resp:  [14-20] 18  BP: (122-177)/() 126/68    Intake/Output Summary (Last 24 hours) at 18 0838  Last data filed at 18 0506   Gross per 24 hour   Intake                0 ml   Output             1250 ml   Net            -1250 ml     Flowsheet Rows      First Filed Value   Admission Height  177.8 cm (70\") Documented at 2018 0322   Admission Weight  107 kg (236 lb 1.6 oz) Documented at 2018 0322          Physical Exam:   General Appearance:    Alert, cooperative, in no acute distress   Lungs:     Clear to auscultation.  Normal respiratory effort and rate.      Heart:    Regular rhythm and normal rate, normal S1 and S2, no murmurs, gallops or rubs.     Chest Wall:    No abnormalities observed   Abdomen:     Soft, nontender, positive bowel sounds.     Extremities:   no cyanosis, clubbing or edema.  No marked joint deformities.  Adequate musculoskeletal strength.       Results Review:      Results from last 7 days  Lab Units 18  0352   SODIUM mmol/L 140   POTASSIUM mmol/L 4.2   CHLORIDE mmol/L 100   CO2 mmol/L 28.2   BUN mg/dL 44*   CREATININE mg/dL 1.02   GLUCOSE mg/dL 196*   CALCIUM mg/dL 8.8       Results from last 7 days  Lab Units 18  0454 18  0327   TROPONIN T ng/mL 0.011 0.011       Results from last 7 " days  Lab Units 11/05/18  0454   WBC 10*3/mm3 4.96   HEMOGLOBIN g/dL 11.8*   HEMATOCRIT % 34.4*   PLATELETS 10*3/mm3 154               Results from last 7 days  Lab Units 11/05/18  2157   MAGNESIUM mg/dL 2.3             Medication Review:     amLODIPine 5 mg Oral Daily   atorvastatin 10 mg Oral Daily   budesonide 0.5 mg Nebulization BID - RT   cetirizine 5 mg Oral Daily   folic acid 1 mg Oral Daily   ipratropium-albuterol 3 mL Nebulization 4x Daily - RT   lisinopril 20 mg Oral Daily   methylPREDNISolone sodium succinate 40 mg Intravenous Q12H   metoprolol tartrate 12.5 mg Oral Q12H   nystatin 5 mL Oral 4x Daily   sodium chloride 3 mL Intravenous Q12H             Assessment/Plan     1.  Acute on chronic hypoxic respiratory failure.  COPD/RSV/diastolic heart failure.  Follow up with me in the office. Agree with not DC'ing on lasix at this time.  2.  Acute on chronic diastolic heart failure.  Better after Lasix.  3.  Hypertension  4.  Obstructive sleep apnea.  CPAP.  5.  History of thoracic aortic aneurysm status post TEAVR  6.  History of left carotid subclavian bypass.    Amna Gomez MD, [facility]  11/06/18  8:38 AM

## 2018-11-06 NOTE — DISCHARGE SUMMARY
"Date of Admission: 11/4/2018  Date of Discharge:  11/6/2018  Primary Care Physician: Gopi Lagos MD     Discharge Diagnosis:  Active Hospital Problems    Diagnosis Date Noted   • **Chronic obstructive pulmonary disease with acute exacerbation (CMS/[facility]) [J44.1] 12/17/2013   • RSV (acute bronchiolitis due to respiratory syncytial virus) [J21.0] 11/06/2018   • Acute congestive heart failure (CMS/[facility]) [I50.9] 11/04/2018   • Non-seasonal allergic rhinitis [J30.89] 11/04/2018   • Sleep apnea [G47.30] 05/22/2017   • Cancer of lower lobe of lung (CMS/[facility]) [C34.30] 05/16/2017   • Hypertension [I10] 12/13/2012   • 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       Presenting Problem/History of Present Illness:  Acute congestive heart failure, unspecified heart failure type (CMS/[facility]) [I50.9]     Hospital Course:  The patient is a 78 y.o. man admitted for shortness of breath and cough.  He had COPD exacerbation secondary to RSV.  Initial chest x-ray was read as vascular congestion.  He received 1 dose of Lasix in the emergency room.  He was seen by pulmonary and cardiology.  He required no further diuresis.  He was started on mini nebs and IV steroids.  He was treated for oral thrush.  Thyroid levels consistent with sick euthyroid.  His symptoms have improved significantly.  He is feeling better and ready for discharge home.      Stable condition; good prognosis    Exam Today:  Feels \"great\".  Minimal cough.  Vital signs noted.  I checked blood pressure with the manual cuff.  It was 128/68 on the left.  No distress lungs fairly clear with equal though decreased breath sounds.  Heart regular without murmur.  Extremities no edema    Procedures Performed:  None         Labs:  Lab Results   Component Value Date    WBC 4.96 11/05/2018    HGB 11.8 (L) 11/05/2018    HCT 34.4 (L) 11/05/2018    "  11/05/2018     Lab Results   Component Value Date     11/06/2018    K 4.2 11/06/2018     11/06/2018    CO2 28.2 11/06/2018    BUN 44 (H) 11/06/2018    CREATININE 1.02 11/06/2018    GLUCOSE 196 (H) 11/06/2018     Lab Results   Component Value Date    TROPONINT 0.011 11/05/2018     Magnesium 2.3  ProBNP 3122  TSH 0.185 with normal free T4 at 1  Viral respiratory panel positive for RSV      Results from last 7 days  Lab Units 11/05/18  0044   RESPCX  Heavy growth (4+) Normal Respiratory Harleen   GRAM STAIN RESULT  Rare (1+) Epithelial cells per low power field  Rare (1+) WBCs seen  Moderate (3+) Mixed bacterial morphotypes seen on Gram Stain       Consults:   Dr. Zurdo Gomez     Discharge Disposition:  Home or Self Care    Discharge Medications:     Discharge Medications      New Medications      Instructions Start Date   metoprolol tartrate 25 MG tablet  Commonly known as:  LOPRESSOR   12.5 mg, Oral, Every 12 Hours Scheduled         Continue These Medications      Instructions Start Date   albuterol (2.5 MG/3ML) 0.083% nebulizer solution  Commonly known as:  PROVENTIL   2.5 mg, Nebulization, 4 Times Daily - RT      albuterol 108 (90 Base) MCG/ACT inhaler  Commonly known as:  PROVENTIL HFA;VENTOLIN HFA   2 puffs, Inhalation, Every 4 Hours PRN      ALPRAZolam 1 MG tablet  Commonly known as:  XANAX   1 mg, Oral, Nightly PRN      amLODIPine 5 MG tablet  Commonly known as:  NORVASC   5 mg, Oral, Daily, For BP with Lisinopril      atorvastatin 10 MG tablet  Commonly known as:  LIPITOR   10 mg, Oral, Daily, For cholesterol      budesonide 0.5 MG/2ML nebulizer solution  Commonly known as:  PULMICORT   0.5 mg, Inhalation      folic acid 1 MG tablet  Commonly known as:  FOLVITE   1 mg, Oral, Daily      ipratropium 0.02 % nebulizer solution  Commonly known as:  ATROVENT   500 mcg, Nebulization, 2 Times Daily - RT      lisinopril 20 MG tablet  Commonly known as:  PRINIVIL,ZESTRIL   20 mg,  Oral, Daily, For BP with Amlodipine      predniSONE 20 MG tablet  Commonly known as:  DELTASONE   Take 3 tabs QDPC x 3 days then 2 tabs QDPC x 4 days then 1 tab QDPC x 3 days      traZODone 50 MG tablet  Commonly known as:  DESYREL   TAKE 1-2 TABLETS BY MOUTH EVERY DAY AT BEDTIME *DO NOT TAKE WITH AMBIEN         Stop These Medications    levoFLOXacin 500 MG tablet  Commonly known as:  LEVAQUIN            Discharge Diet:   Diet Instructions     Diet: Regular       Discharge Diet:  Regular          Activity at Discharge:   Activity Instructions     Activity as Tolerated             Follow-up Appointments:  Future Appointments  Date Time Provider Department Center   11/12/2018 11:00 AM Gopi Lagos MD [id number] None     Follow-up Information     Gopi Lagos MD. Go on 11/12/2018.    Specialty:  Family Medicine  Why:  at 11:00am  Contact information:  [address] [facility] [address]  [facility] [id number] [phone]             Amando Black MD Follow up.    Specialties:  Pulmonary Disease, Sleep Medicine, Internal Medicine  Contact information:  [address] [facility] [address]  [place] [id number] [phone]                     Test Results Pending at Discharge:   Order Current Status    Respiratory Culture - Sputum, Cough Preliminary result           Tiny Méndez MD  11/06/18  12:02 PM    Time Spent on Discharge Activities: 35 minutes.  Discussed with Dr. Gomez.  Medical record reviewed

## 2018-11-06 NOTE — PLAN OF CARE
Problem: Patient Care Overview  Goal: Plan of Care Review  Outcome: Ongoing (interventions implemented as appropriate)   11/06/18 0031 11/06/18 0102   Plan of Care Review   Progress --  no change   OTHER   Outcome Summary --  VSS. Pt up ad liv. CPAP at HS. No complaints of pain. Continue to monitor.    Coping/Psychosocial   Plan of Care Reviewed With patient --      Goal: Individualization and Mutuality  Outcome: Ongoing (interventions implemented as appropriate)    Goal: Discharge Needs Assessment  Outcome: Ongoing (interventions implemented as appropriate)    Goal: Interprofessional Rounds/Family Conf  Outcome: Ongoing (interventions implemented as appropriate)      Problem: Chronic Obstructive Pulmonary Disease (Adult)  Goal: Signs and Symptoms of Listed Potential Problems Will be Absent, Minimized or Managed (Chronic Obstructive Pulmonary Disease)  Outcome: Ongoing (interventions implemented as appropriate)

## 2018-11-06 NOTE — PROGRESS NOTES
Case Management Discharge Note    Final Note: Home with [facility]    Destination     No service has been selected for the patient.      Durable Medical Equipment     No service has been selected for the patient.      Dialysis/Infusion     No service has been selected for the patient.      Home Medical Care     No service has been selected for the patient.      Social Care     No service has been selected for the patient.        Other: Other    Final Discharge Disposition Code: 06 - home with home health care

## 2018-11-06 NOTE — PROGRESS NOTES
Discharge Planning Assessment  [facility]     Patient Name: Ankit Mack Sr.  MRN: [id number]  Today's Date: 11/6/2018    Admit Date: 11/4/2018          Discharge Needs Assessment    No documentation.           Discharge Plan     Row Name 11/06/18 1123       Plan    Plan Home with [facility]    Plan Comments IMM 11/04   CCP spoke to patient at bedside.  CCP role explained and discharge planning discussed   Face sheet verified.  IMM noted. Pt's emergency contact is his son Ankit, [phone].  The patient PCP is Dr Gopi Lagos.  Pt lives in a house with his granddaughter.  He states she assists him when needed.  He is independent with ADL's.  He uses oxygen from [facility] patient.  He obtains his medications from [facility] pharmacy on [facility] [address] [facility] [address] [facility].  Pt agrees to Home Health with [facility] for CHF.  Ana notified and will follow.  Plan is home with [facility].  CCP following.         Destination     No service coordination in this encounter.      Durable Medical Equipment     No service coordination in this encounter.      Dialysis/Infusion     No service coordination in this encounter.      Home Medical Care     Service Request Status Selected Specialties Address Phone Number Fax Number    [facility] Pending - Request Sent N/A [address] [address] [phone]      Social Care     No service coordination in this encounter.        Expected Discharge Date and Time     Expected Discharge Date Expected Discharge Time    Nov 6, 2018               Demographic Summary    No documentation.           Functional Status    No documentation.           Psychosocial    No documentation.           Abuse/Neglect    No documentation.           Legal    No documentation.           Substance Abuse    No documentation.           Patient Forms    No documentation.         Bonita Rivera RN

## 2018-11-06 NOTE — DISCHARGE PLACEMENT REQUEST
"Martina Tatiana L (78 y.o. Male)     Date of Birth Social Security Number Address Home Phone MRN    [id number]  [id number]  [facility] [phone] [id number]    Rastafarian Marital Status          None Single       Admission Date Admission Type Admitting Provider Attending Provider Department, Room/Bed    11/4/18 Emergency RayRodney MD Hayden, Juliana, MD [address], [address]    Discharge Date Discharge Disposition Discharge Destination         Home or Self Care              Attending Provider:  Tiny Méndez MD    Allergies:  Erythromycin    Isolation:  Contact   Infection:  RSV (11/04/18)   Code Status:  CPR    Ht:  177.8 cm (70\")   Wt:  104 kg (229 lb 9.6 oz)    Admission Cmt:  None   Principal Problem:  Chronic obstructive pulmonary disease with acute exacerbation (CMS/HCC) [J44.1]                 Active Insurance as of 11/4/2018     Primary Coverage     Payor Plan Insurance Group Employer/Plan Group    MEDICARE MEDICARE A & B      Payor Plan Address Payor Plan Phone Number Effective From Effective To    [address] [phone] 2/1/2005     [facility] [id number]       Subscriber Name Subscriber Birth Date Member ID       TATIANA MACK SR. 1940 [id number]           Secondary Coverage     Payor Plan Insurance Group Employer/Plan Group    [facility] SUPP [id number]     Payor Plan Address Payor Plan Phone Number Effective From Effective To    [address]  12/1/2016     [facility] [id number]       Subscriber Name Subscriber Birth Date Member ID       TATIANA MACK SR. 1940 [id number]                 Emergency Contacts      (Rel.) Home Phone Work Phone Mobile Phone    Tatiana Mack Jr (Son) [phone] -- [phone]              "

## 2018-11-07 ENCOUNTER — TELEPHONE (OUTPATIENT)
Dept: FAMILY MEDICINE CLINIC | Facility: CLINIC | Age: 78
End: 2018-11-07

## 2018-11-07 ENCOUNTER — READMISSION MANAGEMENT (OUTPATIENT)
Dept: CALL CENTER | Facility: HOSPITAL | Age: 78
End: 2018-11-07

## 2018-11-07 LAB
BACTERIA SPEC RESP CULT: NORMAL
GRAM STN SPEC: NORMAL

## 2018-11-07 NOTE — OUTREACH NOTE
Prep Survey      Responses   Facility patient discharged from?  [facility]   Is patient eligible?  Yes   Discharge diagnosis  Copd with acute exac. 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 on neoplasm of thoracic cavity structure,   Does the patient have one of the following disease processes/diagnoses(primary or secondary)?  CHF   Does the patient have Home health ordered?  Yes   What is the Home health agency?   [facility]   Is there a DME ordered?  No   What DME was ordered?  Pt h as oxygen from [facility]   Prep survey completed?  Yes          Yara Watson RN

## 2018-11-09 ENCOUNTER — TELEPHONE (OUTPATIENT)
Dept: FAMILY MEDICINE CLINIC | Facility: CLINIC | Age: 78
End: 2018-11-09

## 2018-11-09 NOTE — TELEPHONE ENCOUNTER
Yes. He needs to contact his cardiologist as was JUST in the hospital for CHF and has NOT been back here since, thus they are better acquainted with his care.

## 2018-11-09 NOTE — TELEPHONE ENCOUNTER
PT left message stating he has gained 3 lbs today and was told to call PCP if that happened    Spoke to pt, he has gained 2.5 lb from 9am to 1 pm. I advised pt Dr. Lagos is not in the office today and that he should call Dr. Mclaughlin office. If he did not speak to anyone to go to the ER.     Pt states he is not going to the ER that he will call Dr. Mclaughlin office. I did tell him I would go ahead and send a message to Dr. Lagos too.

## 2018-11-10 ENCOUNTER — READMISSION MANAGEMENT (OUTPATIENT)
Dept: CALL CENTER | Facility: HOSPITAL | Age: 78
End: 2018-11-10

## 2018-11-10 NOTE — OUTREACH NOTE
CHF Week 1 Survey      Responses   Facility patient discharged from?  [facility]   Does the patient have one of the following disease processes/diagnoses(primary or secondary)?  CHF   Is there a successful TCM telephone encounter documented?  No   CHF Week 1 attempt successful?  Yes   Call start time  1610   Call end time  1614   Discharge diagnosis  Copd with acute exac. 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 on neoplasm of thoracic cavity structure,   Meds reviewed with patient/caregiver?  Yes   Is the patient having any side effects they believe may be caused by any medication additions or changes?  No   Does the patient have all medications ordered at discharge?  Yes   Is the patient taking all medications as directed (includes completed medication regime)?  Yes   Does the patient have a primary care provider?   Yes   Does the patient have an appointment with their PCP within 7 days of discharge?  Yes   Has the patient kept scheduled appointments due by today?  N/A   What is the Home health agency?   [facility]   Has home health visited the patient within 72 hours of discharge?  Yes   Comments  Pt is not monitoring BP at home but is monitoring daily wts.    Did the patient receive a copy of their discharge instructions?  Yes   Nursing interventions  Reviewed instructions with patient   What is the patient's perception of their health status since discharge?  Improving   Nursing interventions  Nurse provided patient education   Is the patient weighing daily?  Yes   Does the patient have scales?  Yes   Daily weight interventions  Education provided on importance of daily weight   Is the patient able to teach back Heart Failure diet management?  Yes   Is the patient able to teach back Heart Failure Zones?  Yes   Is the patient able to teach back signs and symptoms of worsening condition? (i.e. weight gain, shortness of air, etc.)  Yes   Is the  patient/caregiver able to teach back the hierarchy of who to call/visit for symptoms/problems? PCP, Specialist, Home health nurse, Urgent Care, ED, 911  Yes   Additional teach back comments  Pt went up 2.5# from Thurs to Fri but down 3# this morning.     CHF Week 1 call completed?  Yes          Mary Oliver RN

## 2018-11-12 ENCOUNTER — OFFICE VISIT (OUTPATIENT)
Dept: FAMILY MEDICINE CLINIC | Facility: CLINIC | Age: 78
End: 2018-11-12

## 2018-11-12 VITALS
BODY MASS INDEX: 32.35 KG/M2 | RESPIRATION RATE: 18 BRPM | OXYGEN SATURATION: 94 % | HEIGHT: 70 IN | SYSTOLIC BLOOD PRESSURE: 129 MMHG | WEIGHT: 226 LBS | TEMPERATURE: 97.5 F | HEART RATE: 78 BPM | DIASTOLIC BLOOD PRESSURE: 76 MMHG

## 2018-11-12 DIAGNOSIS — I50.9 ACUTE CONGESTIVE HEART FAILURE, UNSPECIFIED HEART FAILURE TYPE (HCC): ICD-10-CM

## 2018-11-12 DIAGNOSIS — R53.81 PHYSICAL DECONDITIONING: ICD-10-CM

## 2018-11-12 DIAGNOSIS — J44.1 CHRONIC OBSTRUCTIVE PULMONARY DISEASE WITH ACUTE EXACERBATION (HCC): Primary | ICD-10-CM

## 2018-11-12 DIAGNOSIS — Z09 HOSPITAL DISCHARGE FOLLOW-UP: ICD-10-CM

## 2018-11-12 PROBLEM — D61.818 PANCYTOPENIA: Status: ACTIVE | Noted: 2018-02-06

## 2018-11-12 PROCEDURE — 99214 OFFICE O/P EST MOD 30 MIN: CPT | Performed by: FAMILY MEDICINE

## 2018-11-12 RX ORDER — AZITHROMYCIN 250 MG/1
TABLET, FILM COATED ORAL
Qty: 6 TABLET | Refills: 0 | Status: SHIPPED | OUTPATIENT
Start: 2018-11-12 | End: 2018-11-14

## 2018-11-12 NOTE — PROGRESS NOTES
Subjective   Ankit Mack Sr. is a 78 y.o. male.     CC: Hospital F/U for COPD Exacerbation/CHF    History of Present Illness     Pt returns today after recent hospitalization. That visit was as follows:    Discharge Summary        Date of Admission: 11/4/2018  Date of Discharge:  11/6/2018  Primary Care Physician: Gopi Lagos MD      Discharge Diagnosis:        Active Hospital Problems     Diagnosis Date Noted   • **Chronic obstructive pulmonary disease with acute exacerbation (CMS/[facility]) [J44.1] 12/17/2013   • RSV (acute bronchiolitis due to respiratory syncytial virus) [J21.0] 11/06/2018   • Acute congestive heart failure (CMS/[facility]) [I50.9] 11/04/2018   • Non-seasonal allergic rhinitis [J30.89] 11/04/2018   • Sleep apnea [G47.30] 05/22/2017   • Cancer of lower lobe of lung (CMS/[facility]) [C34.30] 05/16/2017   • Hypertension [I10] 12/13/2012   • 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         Presenting Problem/History of Present Illness:  Acute congestive heart failure, unspecified heart failure type (CMS/[facility]) [I50.9]        Hospital Course:  The patient is a 78 y.o. man admitted for shortness of breath and cough.  He had COPD exacerbation secondary to RSV.  Initial chest x-ray was read as vascular congestion.  He received 1 dose of Lasix in the emergency room.  He was seen by pulmonary and cardiology.  He required no further diuresis.  He was started on mini nebs and IV steroids.  He was treated for oral thrush.  Thyroid levels consistent with sick euthyroid.  His symptoms have improved significantly.  He is feeling better and ready for discharge home.        New Medications      Instructions Start Date   metoprolol tartrate 25 MG tablet  Commonly known as:  LOPRESSOR    12.5 mg, Oral, Every 12 Hours Scheduled          He has f/u appts with cardiology and  pulmonary.    Current medication list is compared to recent hospital d/c and the medications are reconciled.    He has been improving yet is weakened physically since his admission, leading to some difficulties moving around.    The following portions of the patient's history were reviewed and updated as appropriate: allergies, current medications, past family history, past medical history, past social history, past surgical history and problem list.    Review of Systems   Constitutional: Negative for activity change, chills, fatigue and fever.   Respiratory: Negative for cough and shortness of breath.    Cardiovascular: Negative for chest pain and palpitations.   Gastrointestinal: Negative for abdominal pain.   Endocrine: Negative for cold intolerance.   Psychiatric/Behavioral: Negative for behavioral problems and dysphoric mood. The patient is not nervous/anxious.        Objective   Physical Exam   Constitutional: He appears well-developed and well-nourished.   Neck: Neck supple. No thyromegaly present.   Cardiovascular: Normal rate and regular rhythm.   No murmur heard.  Pulmonary/Chest: Effort normal and breath sounds normal.   Abdominal: Bowel sounds are normal. There is no tenderness.   Psychiatric: He has a normal mood and affect. His behavior is normal.   Nursing note and vitals reviewed.  Hospital records reviewed with pt confirming HPI.      Assessment/Plan   Ankit was seen today for congestive heart failure and copd.    Diagnoses and all orders for this visit:    Chronic obstructive pulmonary disease with acute exacerbation (CMS/HCC)  -     Ambulatory Referral to Pulmonology    Acute congestive heart failure, unspecified heart failure type (CMS/HCC)  -     metoprolol tartrate (LOPRESSOR) 25 MG tablet; Take 0.5 tablets by mouth Every 12 (Twelve) Hours.    Hospital discharge follow-up    Physical deconditioning  -     Ambulatory Referral to Physical Therapy Evaluate and treat    Other orders  -      azithromycin (ZITHROMAX Z-BEATRIZ) 250 MG tablet; Take 2 tablets the first day, then 1 tablet daily for 4 days. Don't fill until pt calls    Pt to slowly increase activity and keep f/u with his specialists.

## 2018-11-14 ENCOUNTER — HOSPITAL ENCOUNTER (OUTPATIENT)
Dept: GENERAL RADIOLOGY | Facility: HOSPITAL | Age: 78
Discharge: HOME | End: 2018-11-14

## 2018-11-14 ENCOUNTER — HOSPITAL ENCOUNTER (OUTPATIENT)
Dept: CARDIOLOGY | Facility: HOSPITAL | Age: 78
Discharge: HOME | End: 2018-11-14
Admitting: INTERNAL MEDICINE

## 2018-11-14 ENCOUNTER — OFFICE VISIT (OUTPATIENT)
Dept: CARDIOLOGY | Facility: CLINIC | Age: 78
End: 2018-11-14

## 2018-11-14 VITALS
DIASTOLIC BLOOD PRESSURE: 77 MMHG | OXYGEN SATURATION: 97 % | HEART RATE: 152 BPM | SYSTOLIC BLOOD PRESSURE: 103 MMHG | RESPIRATION RATE: 16 BRPM

## 2018-11-14 VITALS
OXYGEN SATURATION: 87 % | BODY MASS INDEX: 33.21 KG/M2 | WEIGHT: 232 LBS | DIASTOLIC BLOOD PRESSURE: 80 MMHG | HEART RATE: 168 BPM | RESPIRATION RATE: 20 BRPM | SYSTOLIC BLOOD PRESSURE: 142 MMHG | HEIGHT: 70 IN

## 2018-11-14 DIAGNOSIS — I10 BENIGN ESSENTIAL HYPERTENSION: ICD-10-CM

## 2018-11-14 DIAGNOSIS — R00.2 PALPITATIONS: ICD-10-CM

## 2018-11-14 DIAGNOSIS — J44.1 CHRONIC OBSTRUCTIVE PULMONARY DISEASE WITH ACUTE EXACERBATION (HCC): ICD-10-CM

## 2018-11-14 DIAGNOSIS — R05.9 COUGH: ICD-10-CM

## 2018-11-14 DIAGNOSIS — R06.02 SHORTNESS OF BREATH: ICD-10-CM

## 2018-11-14 DIAGNOSIS — R07.9 CHEST PAIN, UNSPECIFIED TYPE: ICD-10-CM

## 2018-11-14 DIAGNOSIS — I48.92 ATRIAL FLUTTER WITH RAPID VENTRICULAR RESPONSE (HCC): Primary | ICD-10-CM

## 2018-11-14 DIAGNOSIS — I71.20 THORACIC AORTIC ANEURYSM WITHOUT RUPTURE (HCC): ICD-10-CM

## 2018-11-14 DIAGNOSIS — D72.829 LEUKOCYTOSIS, UNSPECIFIED TYPE: ICD-10-CM

## 2018-11-14 LAB
ALBUMIN SERPL-MCNC: 3.4 G/DL (ref 3.5–5.2)
ALBUMIN/GLOB SERPL: 1.5 G/DL
ALP SERPL-CCNC: 58 U/L (ref 39–117)
ALT SERPL W P-5'-P-CCNC: 25 U/L (ref 1–41)
ANION GAP SERPL CALCULATED.3IONS-SCNC: 7.9 MMOL/L
AST SERPL-CCNC: 15 U/L (ref 1–40)
BILIRUB SERPL-MCNC: 0.6 MG/DL (ref 0.1–1.2)
BUN BLD-MCNC: 37 MG/DL (ref 8–23)
BUN/CREAT SERPL: 32.2 (ref 7–25)
CALCIUM SPEC-SCNC: 8.8 MG/DL (ref 8.6–10.5)
CHLORIDE SERPL-SCNC: 103 MMOL/L (ref 98–107)
CO2 SERPL-SCNC: 32.1 MMOL/L (ref 22–29)
CREAT BLD-MCNC: 1.15 MG/DL (ref 0.76–1.27)
D DIMER PPP FEU-MCNC: 0.45 MCGFEU/ML (ref 0–0.49)
DEPRECATED RDW RBC AUTO: 54.8 FL (ref 37–54)
EOSINOPHIL # BLD MANUAL: 0.34 10*3/MM3 (ref 0–0.7)
EOSINOPHIL NFR BLD MANUAL: 3 % (ref 0.3–6.2)
ERYTHROCYTE [DISTWIDTH] IN BLOOD BY AUTOMATED COUNT: 14.1 % (ref 11.5–14.5)
GFR SERPL CREATININE-BSD FRML MDRD: 62 ML/MIN/1.73
GLOBULIN UR ELPH-MCNC: 2.2 GM/DL
GLUCOSE BLD-MCNC: 152 MG/DL (ref 65–99)
HCT VFR BLD AUTO: 38.3 % (ref 40.4–52.2)
HGB BLD-MCNC: 12.2 G/DL (ref 13.7–17.6)
LYMPHOCYTES # BLD MANUAL: 1.35 10*3/MM3 (ref 0.9–4.8)
LYMPHOCYTES NFR BLD MANUAL: 12 % (ref 19.6–45.3)
LYMPHOCYTES NFR BLD MANUAL: 15 % (ref 5–12)
MCH RBC QN AUTO: 34.8 PG (ref 27–32.7)
MCHC RBC AUTO-ENTMCNC: 31.9 G/DL (ref 32.6–36.4)
MCV RBC AUTO: 109.1 FL (ref 79.8–96.2)
METAMYELOCYTES NFR BLD MANUAL: 3 % (ref 0–0)
MONOCYTES # BLD AUTO: 1.69 10*3/MM3 (ref 0.2–1.2)
MYELOCYTES NFR BLD MANUAL: 2 % (ref 0–0)
NEUTROPHILS # BLD AUTO: 6.55 10*3/MM3 (ref 1.9–8.1)
NEUTROPHILS NFR BLD MANUAL: 58 % (ref 42.7–76)
NT-PROBNP SERPL-MCNC: 2600 PG/ML (ref 0–1800)
PLAT MORPH BLD: NORMAL
PLATELET # BLD AUTO: 100 10*3/MM3 (ref 140–500)
PMV BLD AUTO: 11.7 FL (ref 6–12)
POTASSIUM BLD-SCNC: 4.4 MMOL/L (ref 3.5–5.2)
PROT SERPL-MCNC: 5.6 G/DL (ref 6–8.5)
RBC # BLD AUTO: 3.51 10*6/MM3 (ref 4.6–6)
RBC MORPH BLD: NORMAL
SCAN SLIDE: NORMAL
SODIUM BLD-SCNC: 143 MMOL/L (ref 136–145)
T-UPTAKE NFR SERPL: 1 TBI (ref 0.8–1.3)
T4 SERPL-MCNC: 5.16 MCG/DL (ref 4.5–11.7)
TROPONIN T SERPL-MCNC: 0.03 NG/ML (ref 0–0.03)
TSH SERPL DL<=0.05 MIU/L-ACNC: 2.54 MIU/ML (ref 0.27–4.2)
VARIANT LYMPHS NFR BLD MANUAL: 7 % (ref 0–5)
WBC MORPH BLD: NORMAL
WBC NRBC COR # BLD: 11.29 10*3/MM3 (ref 4.5–10.7)

## 2018-11-14 PROCEDURE — 71046 X-RAY EXAM CHEST 2 VIEWS: CPT

## 2018-11-14 PROCEDURE — 84479 ASSAY OF THYROID (T3 OR T4): CPT | Performed by: INTERNAL MEDICINE

## 2018-11-14 PROCEDURE — 85379 FIBRIN DEGRADATION QUANT: CPT | Performed by: INTERNAL MEDICINE

## 2018-11-14 PROCEDURE — 99215 OFFICE O/P EST HI 40 MIN: CPT | Performed by: NURSE PRACTITIONER

## 2018-11-14 PROCEDURE — 84484 ASSAY OF TROPONIN QUANT: CPT | Performed by: INTERNAL MEDICINE

## 2018-11-14 PROCEDURE — 93005 ELECTROCARDIOGRAM TRACING: CPT

## 2018-11-14 PROCEDURE — 85007 BL SMEAR W/DIFF WBC COUNT: CPT | Performed by: INTERNAL MEDICINE

## 2018-11-14 PROCEDURE — 93000 ELECTROCARDIOGRAM COMPLETE: CPT | Performed by: NURSE PRACTITIONER

## 2018-11-14 PROCEDURE — 84436 ASSAY OF TOTAL THYROXINE: CPT | Performed by: INTERNAL MEDICINE

## 2018-11-14 PROCEDURE — 94760 N-INVAS EAR/PLS OXIMETRY 1: CPT

## 2018-11-14 PROCEDURE — 85025 COMPLETE CBC W/AUTO DIFF WBC: CPT | Performed by: INTERNAL MEDICINE

## 2018-11-14 PROCEDURE — 36415 COLL VENOUS BLD VENIPUNCTURE: CPT

## 2018-11-14 PROCEDURE — 80053 COMPREHEN METABOLIC PANEL: CPT | Performed by: INTERNAL MEDICINE

## 2018-11-14 PROCEDURE — 83880 ASSAY OF NATRIURETIC PEPTIDE: CPT | Performed by: INTERNAL MEDICINE

## 2018-11-14 PROCEDURE — 84443 ASSAY THYROID STIM HORMONE: CPT | Performed by: INTERNAL MEDICINE

## 2018-11-14 RX ORDER — DILTIAZEM HYDROCHLORIDE 60 MG/1
60 TABLET, FILM COATED ORAL EVERY 6 HOURS
Qty: 120 TABLET | Refills: 0 | Status: SHIPPED | OUTPATIENT
Start: 2018-11-14 | End: 2018-11-21 | Stop reason: HOSPADM

## 2018-11-14 RX ORDER — NITROGLYCERIN 0.4 MG/1
0.4 TABLET SUBLINGUAL
Status: DISCONTINUED | OUTPATIENT
Start: 2018-11-14 | End: 2018-11-14

## 2018-11-14 RX ORDER — SODIUM CHLORIDE 0.9 % (FLUSH) 0.9 %
10 SYRINGE (ML) INJECTION AS NEEDED
Status: DISCONTINUED | OUTPATIENT
Start: 2018-11-14 | End: 2018-11-14

## 2018-11-14 NOTE — PROGRESS NOTES
Date of Office Visit: 2018  Encounter Provider: Cheri Méndez, MARCIAL, APRN  Place of Service: [facility] CARDIOLOGY  Patient Name: Ankit Mack Sr.  :1940      Subjective:     Chief Complaint:  Follow-up diastolic heart failure, hypertension, sleep apnea.    History of Present Illness:  Ankit Mack Sr. is a 78 y.o. male patient of Dr. Gomez.  This is my first time seeing this patient in the office and I reviewed his records.    Patient has a history of COPD, sleep apnea on CPAP therapy, hypertension, thoracic aortic aneurysm status post stent grafting in [place], [place].    Patient was admitted 18 after complaining of progressive shortness of breath.  He had been on steroids and Levaquin but had not started to feel better.  He presented to the ER  where it was noted that he had an increased BNP and pulmonary edema noted on chest x-ray.  He was treated with IV Lasix as well as IV steroids for COPD exacerbation.  Viral panel was positive for RSV.  An echocardiogram was obtained which showed normal LV systolic function with grade 1 diastolic dysfunction.  Patient had reported that he would eat out a lot and had eaten Chinese food 1-2 times a week for the previous several weeks.  He reported his hypertension had been well-controlled with medication for over 10 years.  He denied any chest pain, palpitations, syncope, or near-syncope. It was noted that patient diuresed 1200ml.  patient improved and was felt to be stable to go home.  He was instructed to follow-up outpatient in one week.    Echo 18  · Left ventricular systolic function is normal. Estimated EF = 58%.  · Left ventricular wall thickness is consistent with mild concentric hypertrophy.  · Left ventricular diastolic dysfunction (grade I) consistent with impaired relaxation.      Patient presents to office today for 1 week hospital follow-up appointment.  Patient reports he has been doing okay  overall since hospital discharge.  Patient reports chronic shortness of breath with exertion, however he reports that it is not worsening.  He has a history of sleep apnea and reports he uses his CPAP machine all night every night with oxygen.  Patient has not seen sleep medicine recently and will schedule next available follow-up appointment with his sleep medicine provider.  Patient reports he did see his primary care provider since hospital discharge but does not think he had labs done.  He is continuing to watch his salt intake and check daily weights.  Blood pressure in office today is stable at 142/80 and he reports blood pressure outside the office yesterday was around 120/80.  He denies any chest pain, palpitations, lower extremity edema, dizziness, lightheadedness, syncope, near syncope, falls, abnormal fatigue, or abnormal bleeding.  He does feel like he sometimes gets a racing heartbeat sensation with exertion.  Patient did not know that he was in atrial flutter with rapid ventricular rates until he was told so today.  Patient was transferred over to [facility] and had labs drawn, was on the cardiac monitor, and seen by Dr. Gomez.  His initial EKG looked like SVT, however repeat EKG was clear her to read and after consulting with Dr. Agustin he felt it was atrial flutter with rapid ventricular rate.  Dr. Agustin feels that since patient is clinically stable, oxygen levels look good, and he is asymptomatic that it is fine to send him home and transfer him over to diltiazem for better rate control.  Patient to notify the office right away if he develops any problems or concerns.  We will start patient on Eliquis 5 mg twice a day, per Dr. Agustin's recommendations.  GFR normal today.  All patient's questions were answered, as well as family members.  Strict return to office/go to ER instructions were provided.  Patient will follow-up in our office in one to 2 weeks for an EKG check and heart rate check.  He  currently has home health checking his blood pressures.  He is to return sooner if needed.        Past Medical History:   Diagnosis Date   • Acute congestive heart failure (CMS/HCC)    • Anxiety    • Benign essential hypertension    • COPD (chronic obstructive pulmonary disease) (CMS/[facility])    • Essential hypertension    • H/O complete eye exam 06/2018   • Heart attack (CMS/HCC)    • History of chest x-ray 12/08/2013    RLL infiltrate   • History of pulmonary function tests 06/10/2014   • Hyperlipidemia    • Lung cancer (CMS/[facility])    • Osteoarthritis, knee    • Prostate cancer (CMS/[facility])    • Sleep apnea      Past Surgical History:   Procedure Laterality Date   • ABDOMINAL AORTIC ANEURYSM REPAIR  2010    Dr. Adarsh Dennis   • CATARACT EXTRACTION  10/2014    also 11/14   • COLONOSCOPY     • JOINT REPLACEMENT Left 10/2010    knee; Dr. Wolf   • JOINT REPLACEMENT Right 09/2011    knee; Dr. Wolf   • LUNG LOBECTOMY Left 01/16/2012    LLL; Dr. Mendoza   • PROSTATE SURGERY  2000    Dr. Naranjo     Outpatient Medications Prior to Visit   Medication Sig Dispense Refill   • albuterol (PROVENTIL HFA;VENTOLIN HFA) 108 (90 Base) MCG/ACT inhaler Inhale 2 puffs Every 4 (Four) Hours As Needed for Wheezing.     • albuterol (PROVENTIL) (2.5 MG/3ML) 0.083% nebulizer solution Take 2.5 mg by nebulization 4 (Four) Times a Day.     • ALPRAZolam (XANAX) 1 MG tablet Take 1 tablet by mouth At Night As Needed for Anxiety. 90 tablet 0   • atorvastatin (LIPITOR) 10 MG tablet Take 1 tablet by mouth Daily. For cholesterol 90 tablet 1   • budesonide (PULMICORT) 0.5 MG/2ML nebulizer solution Inhale 0.5 mg.     • folic acid (FOLVITE) 1 MG tablet Take 1 mg by mouth Daily.     • ipratropium (ATROVENT) 0.02 % nebulizer solution Take 500 mcg by nebulization 2 (Two) Times a Day.     • metoprolol tartrate (LOPRESSOR) 25 MG tablet Take 0.5 tablets by mouth Every 12 (Twelve) Hours. 30 tablet 5   • traZODone (DESYREL) 50 MG tablet TAKE 1-2 TABLETS BY MOUTH  EVERY DAY AT BEDTIME *DO NOT TAKE WITH AMBIEN 45 tablet 0   • amLODIPine (NORVASC) 5 MG tablet Take 1 tablet by mouth Daily. For BP with Lisinopril 90 tablet 1   • azithromycin (ZITHROMAX Z-BEATRIZ) 250 MG tablet Take 2 tablets the first day, then 1 tablet daily for 4 days. Don't fill until pt calls 6 tablet 0   • lisinopril (PRINIVIL,ZESTRIL) 20 MG tablet Take 1 tablet by mouth Daily for 90 days. For BP with Amlodipine 90 tablet 1     No facility-administered medications prior to visit.        Allergies as of 11/14/2018 - Reviewed 11/14/2018   Allergen Reaction Noted   • Erythromycin Itching 11/22/2017     Social History     Socioeconomic History   • Marital status: Single     Spouse name: Not on file   • Number of children: Not on file   • Years of education: Not on file   • Highest education level: Not on file   Social Needs   • Financial resource strain: Not on file   • Food insecurity - worry: Not on file   • Food insecurity - inability: Not on file   • Transportation needs - medical: Not on file   • Transportation needs - non-medical: Not on file   Occupational History   • Not on file   Tobacco Use   • Smoking status: Former Smoker     Packs/day: 1.00   • Smokeless tobacco: Never Used   Substance and Sexual Activity   • Alcohol use: Yes     Comment: rare   • Drug use: No   • Sexual activity: Defer   Other Topics Concern   • Not on file   Social History Narrative   • Not on file     Family History   Problem Relation Age of Onset   • Cancer Mother    • Cancer Father         lung   • Cancer Other         colon   • Diabetes Other    • Emphysema Other    • Hypertension Other    • Kidney disease Other    • Lung cancer Other        Review of Systems   Constitution: Negative for chills, fever, malaise/fatigue, night sweats, weight gain and weight loss.   HENT: Negative for ear pain, hearing loss, nosebleeds and sore throat.    Eyes: Negative for blurred vision, double vision, redness, vision loss in left eye, vision loss  "in right eye and visual disturbance.   Cardiovascular: Positive for dyspnea on exertion.        SEE HPI.    Respiratory: Negative for cough, hemoptysis, shortness of breath, snoring and wheezing.    Endocrine: Negative for cold intolerance and heat intolerance.   Skin: Negative for itching, rash and suspicious lesions.   Musculoskeletal: Negative for joint pain, joint swelling and myalgias.   Gastrointestinal: Negative for abdominal pain, diarrhea, hematemesis, melena, nausea and vomiting.   Genitourinary: Negative for dysuria, frequency and hematuria.   Neurological: Negative for dizziness, headaches, numbness, paresthesias and seizures.   Psychiatric/Behavioral: Negative for altered mental status and depression. The patient is not nervous/anxious.           Objective:     Vitals:    11/14/18 0936   BP: 142/80   BP Location: Right arm   Patient Position: Sitting   Cuff Size: Large Adult   Pulse: (!) 168   Resp: 20   SpO2: (!) 87%   Weight: 105 kg (232 lb)   Height: 177.8 cm (70\")     Body mass index is 33.29 kg/m².      PHYSICAL EXAM:  Physical Exam   Constitutional: He is oriented to person, place, and time. He appears well-developed and well-nourished. No distress.   Obese   HENT:   Head: Normocephalic and atraumatic.   Eyes: Pupils are equal, round, and reactive to light.   Neck: Neck supple. No JVD present. Carotid bruit is not present. No tracheal deviation present.   Cardiovascular: Normal rate, regular rhythm, normal heart sounds and intact distal pulses. Exam reveals no gallop and no friction rub.   No murmur heard.  Pulses:       Radial pulses are 2+ on the right side, and 2+ on the left side.        Posterior tibial pulses are 2+ on the right side, and 2+ on the left side.   Pulmonary/Chest: Effort normal. No respiratory distress. He has wheezes (Faint wheezes). He has no rales.   Mildly diminished breath sounds, throughout lung fields.    Abdominal: Soft. Bowel sounds are normal. He exhibits no " distension. There is no tenderness. There is no guarding.   Musculoskeletal: He exhibits no edema (Trace sockline edema), tenderness or deformity.   Neurological: He is alert and oriented to person, place, and time.   Skin: Skin is warm and dry. No rash noted. He is not diaphoretic. No erythema.   Psychiatric: He has a normal mood and affect. His behavior is normal. Judgment normal.         ECG 12 Lead  Date/Time: 11/14/2018 10:27 AM  Performed by: Cheri Méndez DNP, SCOTTY  Authorized by: Cheri Méndez DNP, SCOTTY   Comparison: compared with previous ECG   Rhythm: atrial flutter  Ectopy: PVCs  Rate: tachycardic  BPM: 168  Conduction: incomplete RBBB and LAFB  Clinical impression: abnormal ECG  Comments: PATIENT TAKEN TO [facility], PER DR. NAVARRO            Assessment:       Diagnosis Plan   1. Atrial flutter with rapid ventricular response (CMS/HCC)  diltiaZEM (CARDIZEM) 60 MG tablet    apixaban (ELIQUIS) 5 MG tablet tablet   2. Benign essential hypertension     3. Leukocytosis, unspecified type  XR Chest 2 View   4. Cough  XR Chest 2 View   5. Chronic obstructive pulmonary disease with acute exacerbation (CMS/HCC)     6. Thoracic aortic aneurysm without rupture (CMS/HCC)           Plan:     1. Atrial flutter with rapid ventricular rates: Patient had new onset of what appeared to be SVT in office today but turned out to be atrial flutter.  He was also having PVCs and rapid ventricular rate.  Patient reports he did notice some racing heartbeat sensations with exertion, such as walking in from the parking lot.  However he denies any chest pain, palpitations, lower extremity edema, dizziness, lightheadedness, syncope, near-syncope, falls, abnormal fatigue, or feeling poorly.  Dr. Agustin was consulted who felt that since patient was feeling well he could be transitioned till diltiazem outpatient.  Patient instructed to call office and go to the ER if he develops any symptoms or concerns.  We also discussed benefits  versus risks of blood thinner.  Patient verbalized understanding.  He denies any recent falls.  He was instructed that if he were to fall and hit his head he would need to go to the ER for head CT.  Patient also to notify office of any bleeding occurs.  Patient verbalized understanding of plan.  2. Diastolic heart failure: Patient reports chronic shortness of breath with exertion, however he denies any worsening of symptoms since hospital discharge.  He only has trace sock line edema at this time.  His BNP today has improved.  He will continue to avoid salt and check daily weights and notify office of any worsening edema, shortness of breath, or increased weight.  3. Hypertension: Blood pressure stable in office today at 142/80 mmHg.  Patient on amlodipine and lisinopril as well as metoprolol at home.  Per Dr. Agustin's recommendations, we will DC amlodipine and lisinopril at this time and start patient on diltiazem 60 mg 4 times daily for better rate control.  Patient currently has home health nurse who is checking his blood pressure.  4. Sleep apnea: On CPAP machine.  Patient to schedule next available follow-up appointment with his sleep medicine provider to have CPAP machine checked and make sure there are no leaks and that settings do not need to be adjusted.  Patient reports he uses CPAP machine at night with oxygen all night every night.  5. COPD with history of hypoxic respiratory failure: O2 sats stable in office today, in the 90s.  Patient denies any worsening shortness of breath.  He remains on his home oxygen.  We will check a chest x-ray today as white blood cell count was elevated and patient has some wheezing on exam.  Patient reports he is feeling well overall.  He will schedule next available follow-up appointment with Dr. Alejandro.  6. History of thoracic aortic aneurysm status post repair.  7. History of left carotid subclavian bypass.      Plan of care reviewed with Dr. Patricia MD.    Patient to  schedule one to two-week office follow-up appointment with myself and a 3 month follow-up with Dr. Gomez.      Greater than 45 minutes was spent face-to-face with this patient in the office today.         Your medication list           Accurate as of 11/14/18  1:00 PM. If you have any questions, ask your nurse or doctor.               START taking these medications      Instructions Last Dose Given Next Dose Due   apixaban 5 MG tablet tablet  Commonly known as:  ELIQUIS  Started by:  Cheri Méndez DNP, APRN      Take 1 tablet by mouth Every 12 (Twelve) Hours.       diltiaZEM 60 MG tablet  Commonly known as:  CARDIZEM  Started by:  Cheri Méndez DNP, APRN      Take 1 tablet by mouth Every 6 (Six) Hours.          CONTINUE taking these medications      Instructions Last Dose Given Next Dose Due   albuterol (2.5 MG/3ML) 0.083% nebulizer solution  Commonly known as:  PROVENTIL      Take 2.5 mg by nebulization 4 (Four) Times a Day.       albuterol 108 (90 Base) MCG/ACT inhaler  Commonly known as:  PROVENTIL HFA;VENTOLIN HFA      Inhale 2 puffs Every 4 (Four) Hours As Needed for Wheezing.       ALPRAZolam 1 MG tablet  Commonly known as:  XANAX      Take 1 tablet by mouth At Night As Needed for Anxiety.       atorvastatin 10 MG tablet  Commonly known as:  LIPITOR      Take 1 tablet by mouth Daily. For cholesterol       budesonide 0.5 MG/2ML nebulizer solution  Commonly known as:  PULMICORT      Inhale 0.5 mg.       folic acid 1 MG tablet  Commonly known as:  FOLVITE      Take 1 mg by mouth Daily.       ipratropium 0.02 % nebulizer solution  Commonly known as:  ATROVENT      Take 500 mcg by nebulization 2 (Two) Times a Day.       metoprolol tartrate 25 MG tablet  Commonly known as:  LOPRESSOR      Take 0.5 tablets by mouth Every 12 (Twelve) Hours.       traZODone 50 MG tablet  Commonly known as:  DESYREL      TAKE 1-2 TABLETS BY MOUTH EVERY DAY AT BEDTIME *DO NOT TAKE WITH AMBIEN          STOP taking these medications     amLODIPine 5 MG tablet  Commonly known as:  NORVASC  Stopped by:  Cheri Méndez DNP, SCOTTY        azithromycin 250 MG tablet  Commonly known as:  ZITHROMAX Z-BEATRIZ  Stopped by:  Cheri Méndez DNP, SCOTTY        lisinopril 20 MG tablet  Commonly known as:  PRINIVIL,ZESTRIL  Stopped by:  Cheri Méndez DNP, APRN              Where to Get Your Medications      These medications were sent to [facility]/pharmacy #[id number] - [facility], [place] - [address] AT [facility] [phone] [facility] [phone]   [address], [facility] [address]    Phone:  [phone]   · apixaban 5 MG tablet tablet  · diltiaZEM 60 MG tablet     I did not stop or change the above medications.  Patient's medication list was updated to reflect medications he is currently taking including medication changes made by other providers.           Thanks,    Cheri Méndez DNP, APRN  11/14/2018       Dictated utilizing Dragon dictation

## 2018-11-15 ENCOUNTER — TELEPHONE (OUTPATIENT)
Dept: CARDIOLOGY | Facility: CLINIC | Age: 78
End: 2018-11-15

## 2018-11-15 NOTE — TELEPHONE ENCOUNTER
[facility] is requesting that the new orders be faxed to their office so that they can go over them with the Pt today

## 2018-11-15 NOTE — PROGRESS NOTES
Please let patient know that there were no signs of any acute infection.    The small right lung nodule that was seen on previous CT scan of his chest is still present.  He should follow-up with his primary care provider to find out whether or not repeat CT chest is needed.

## 2018-11-17 ENCOUNTER — APPOINTMENT (OUTPATIENT)
Dept: CT IMAGING | Facility: HOSPITAL | Age: 78
End: 2018-11-17

## 2018-11-17 ENCOUNTER — HOSPITAL ENCOUNTER (INPATIENT)
Facility: HOSPITAL | Age: 78
LOS: 4 days | Discharge: HOME HEALTH | End: 2018-11-21
Attending: EMERGENCY MEDICINE | Admitting: INTERNAL MEDICINE

## 2018-11-17 ENCOUNTER — APPOINTMENT (OUTPATIENT)
Dept: GENERAL RADIOLOGY | Facility: HOSPITAL | Age: 78
End: 2018-11-17

## 2018-11-17 DIAGNOSIS — J96.12 CHRONIC RESPIRATORY ACIDOSIS (HCC): ICD-10-CM

## 2018-11-17 DIAGNOSIS — J96.01 ACUTE HYPOXEMIC RESPIRATORY FAILURE (HCC): ICD-10-CM

## 2018-11-17 DIAGNOSIS — I48.91 ATRIAL FIBRILLATION, UNSPECIFIED TYPE (HCC): ICD-10-CM

## 2018-11-17 DIAGNOSIS — R53.1 WEAKNESS: ICD-10-CM

## 2018-11-17 DIAGNOSIS — R04.2 HEMOPTYSIS: Primary | ICD-10-CM

## 2018-11-17 DIAGNOSIS — J20.9 COPD (CHRONIC OBSTRUCTIVE PULMONARY DISEASE) WITH ACUTE BRONCHITIS (HCC): ICD-10-CM

## 2018-11-17 DIAGNOSIS — J44.1 COPD EXACERBATION (HCC): ICD-10-CM

## 2018-11-17 DIAGNOSIS — J44.0 COPD (CHRONIC OBSTRUCTIVE PULMONARY DISEASE) WITH ACUTE BRONCHITIS (HCC): ICD-10-CM

## 2018-11-17 DIAGNOSIS — J18.9 HCAP (HEALTHCARE-ASSOCIATED PNEUMONIA): ICD-10-CM

## 2018-11-17 DIAGNOSIS — I50.9 ACUTE CONGESTIVE HEART FAILURE, UNSPECIFIED HEART FAILURE TYPE (HCC): ICD-10-CM

## 2018-11-17 PROBLEM — J96.21 ACUTE ON CHRONIC RESPIRATORY FAILURE WITH HYPOXEMIA (HCC): Status: ACTIVE | Noted: 2018-11-17

## 2018-11-17 PROBLEM — D69.6 THROMBOCYTOPENIA (HCC): Status: ACTIVE | Noted: 2018-11-17

## 2018-11-17 PROBLEM — I48.19 PERSISTENT ATRIAL FIBRILLATION (HCC): Status: ACTIVE | Noted: 2018-11-17

## 2018-11-17 PROBLEM — R73.9 HYPERGLYCEMIA: Status: ACTIVE | Noted: 2018-11-17

## 2018-11-17 LAB
ALBUMIN SERPL-MCNC: 3.4 G/DL (ref 3.5–5.2)
ALBUMIN/GLOB SERPL: 1.3 G/DL
ALP SERPL-CCNC: 56 U/L (ref 39–117)
ALT SERPL W P-5'-P-CCNC: 19 U/L (ref 1–41)
ANION GAP SERPL CALCULATED.3IONS-SCNC: 7 MMOL/L
ARTERIAL PATENCY WRIST A: POSITIVE
AST SERPL-CCNC: 11 U/L (ref 1–40)
ATMOSPHERIC PRESS: 758.9 MMHG
B PERT DNA SPEC QL NAA+PROBE: NOT DETECTED
BASE EXCESS BLDA CALC-SCNC: 5.7 MMOL/L (ref 0–2)
BDY SITE: ABNORMAL
BILIRUB SERPL-MCNC: 0.6 MG/DL (ref 0.1–1.2)
BUN BLD-MCNC: 19 MG/DL (ref 8–23)
BUN/CREAT SERPL: 18.6 (ref 7–25)
C PNEUM DNA NPH QL NAA+NON-PROBE: NOT DETECTED
CALCIUM SPEC-SCNC: 8.6 MG/DL (ref 8.6–10.5)
CHLORIDE SERPL-SCNC: 104 MMOL/L (ref 98–107)
CO2 SERPL-SCNC: 32 MMOL/L (ref 22–29)
CREAT BLD-MCNC: 1.02 MG/DL (ref 0.76–1.27)
D-LACTATE SERPL-SCNC: 0.9 MMOL/L (ref 0.5–2)
DEPRECATED RDW RBC AUTO: 57.6 FL (ref 37–54)
EOSINOPHIL # BLD MANUAL: 0.32 10*3/MM3 (ref 0–0.7)
EOSINOPHIL NFR BLD MANUAL: 4 % (ref 0.3–6.2)
ERYTHROCYTE [DISTWIDTH] IN BLOOD BY AUTOMATED COUNT: 14 % (ref 11.5–14.5)
FLUAV H1 2009 PAND RNA NPH QL NAA+PROBE: NOT DETECTED
FLUAV H1 HA GENE NPH QL NAA+PROBE: NOT DETECTED
FLUAV H3 RNA NPH QL NAA+PROBE: NOT DETECTED
FLUAV SUBTYP SPEC NAA+PROBE: NOT DETECTED
FLUBV RNA ISLT QL NAA+PROBE: NOT DETECTED
GAS FLOW AIRWAY: 3 LPM
GFR SERPL CREATININE-BSD FRML MDRD: 71 ML/MIN/1.73
GLOBULIN UR ELPH-MCNC: 2.6 GM/DL
GLUCOSE BLD-MCNC: 194 MG/DL (ref 65–99)
HADV DNA SPEC NAA+PROBE: NOT DETECTED
HCO3 BLDA-SCNC: 32.3 MMOL/L (ref 22–28)
HCOV 229E RNA SPEC QL NAA+PROBE: NOT DETECTED
HCOV HKU1 RNA SPEC QL NAA+PROBE: NOT DETECTED
HCOV NL63 RNA SPEC QL NAA+PROBE: NOT DETECTED
HCOV OC43 RNA SPEC QL NAA+PROBE: NOT DETECTED
HCT VFR BLD AUTO: 36.1 % (ref 40.4–52.2)
HGB BLD-MCNC: 11 G/DL (ref 13.7–17.6)
HMPV RNA NPH QL NAA+NON-PROBE: NOT DETECTED
HPIV1 RNA SPEC QL NAA+PROBE: NOT DETECTED
HPIV2 RNA SPEC QL NAA+PROBE: NOT DETECTED
HPIV3 RNA NPH QL NAA+PROBE: NOT DETECTED
HPIV4 P GENE NPH QL NAA+PROBE: NOT DETECTED
INR PPP: 1.27 (ref 0.9–1.1)
LYMPHOCYTES # BLD MANUAL: 1.61 10*3/MM3 (ref 0.9–4.8)
LYMPHOCYTES NFR BLD MANUAL: 14 % (ref 5–12)
LYMPHOCYTES NFR BLD MANUAL: 20 % (ref 19.6–45.3)
M PNEUMO IGG SER IA-ACNC: NOT DETECTED
MCH RBC QN AUTO: 34.5 PG (ref 27–32.7)
MCHC RBC AUTO-ENTMCNC: 30.5 G/DL (ref 32.6–36.4)
MCV RBC AUTO: 113.2 FL (ref 79.8–96.2)
MODALITY: ABNORMAL
MONOCYTES # BLD AUTO: 1.13 10*3/MM3 (ref 0.2–1.2)
NEUTROPHILS # BLD AUTO: 4.98 10*3/MM3 (ref 1.9–8.1)
NEUTROPHILS NFR BLD MANUAL: 62 % (ref 42.7–76)
NT-PROBNP SERPL-MCNC: 3246 PG/ML (ref 0–1800)
PCO2 BLDA: 56.5 MM HG (ref 35–45)
PH BLDA: 7.37 PH UNITS (ref 7.35–7.45)
PLAT MORPH BLD: NORMAL
PLATELET # BLD AUTO: 95 10*3/MM3 (ref 140–500)
PMV BLD AUTO: 11.3 FL (ref 6–12)
PO2 BLDA: 59.3 MM HG (ref 80–100)
POTASSIUM BLD-SCNC: 4.3 MMOL/L (ref 3.5–5.2)
PROCALCITONIN SERPL-MCNC: 0.15 NG/ML (ref 0.1–0.25)
PROT SERPL-MCNC: 6 G/DL (ref 6–8.5)
PROTHROMBIN TIME: 15.6 SECONDS (ref 11.7–14.2)
RBC # BLD AUTO: 3.19 10*6/MM3 (ref 4.6–6)
RBC MORPH BLD: NORMAL
RHINOVIRUS RNA SPEC NAA+PROBE: NOT DETECTED
RSV RNA NPH QL NAA+NON-PROBE: NOT DETECTED
SAO2 % BLDCOA: 88.6 % (ref 92–99)
SCAN SLIDE: NORMAL
SODIUM BLD-SCNC: 143 MMOL/L (ref 136–145)
TOTAL RATE: 24 BREATHS/MINUTE
TROPONIN T SERPL-MCNC: 0.03 NG/ML (ref 0–0.03)
WBC MORPH BLD: NORMAL
WBC NRBC COR # BLD: 8.04 10*3/MM3 (ref 4.5–10.7)

## 2018-11-17 PROCEDURE — 87633 RESP VIRUS 12-25 TARGETS: CPT | Performed by: EMERGENCY MEDICINE

## 2018-11-17 PROCEDURE — 25010000002 METHYLPREDNISOLONE PER 125 MG: Performed by: EMERGENCY MEDICINE

## 2018-11-17 PROCEDURE — 80053 COMPREHEN METABOLIC PANEL: CPT | Performed by: EMERGENCY MEDICINE

## 2018-11-17 PROCEDURE — 94640 AIRWAY INHALATION TREATMENT: CPT

## 2018-11-17 PROCEDURE — 84145 PROCALCITONIN (PCT): CPT | Performed by: EMERGENCY MEDICINE

## 2018-11-17 PROCEDURE — 94799 UNLISTED PULMONARY SVC/PX: CPT

## 2018-11-17 PROCEDURE — 71260 CT THORAX DX C+: CPT

## 2018-11-17 PROCEDURE — 87798 DETECT AGENT NOS DNA AMP: CPT | Performed by: EMERGENCY MEDICINE

## 2018-11-17 PROCEDURE — 36600 WITHDRAWAL OF ARTERIAL BLOOD: CPT

## 2018-11-17 PROCEDURE — 25010000002 IOPAMIDOL 61 % SOLUTION: Performed by: HOSPITALIST

## 2018-11-17 PROCEDURE — 82803 BLOOD GASES ANY COMBINATION: CPT

## 2018-11-17 PROCEDURE — 83880 ASSAY OF NATRIURETIC PEPTIDE: CPT | Performed by: EMERGENCY MEDICINE

## 2018-11-17 PROCEDURE — 93005 ELECTROCARDIOGRAM TRACING: CPT | Performed by: EMERGENCY MEDICINE

## 2018-11-17 PROCEDURE — 85610 PROTHROMBIN TIME: CPT | Performed by: EMERGENCY MEDICINE

## 2018-11-17 PROCEDURE — 87040 BLOOD CULTURE FOR BACTERIA: CPT | Performed by: INTERNAL MEDICINE

## 2018-11-17 PROCEDURE — 85025 COMPLETE CBC W/AUTO DIFF WBC: CPT | Performed by: EMERGENCY MEDICINE

## 2018-11-17 PROCEDURE — 25010000002 PIPERACILLIN SOD-TAZOBACTAM PER 1 G: Performed by: EMERGENCY MEDICINE

## 2018-11-17 PROCEDURE — 71045 X-RAY EXAM CHEST 1 VIEW: CPT

## 2018-11-17 PROCEDURE — 87040 BLOOD CULTURE FOR BACTERIA: CPT | Performed by: EMERGENCY MEDICINE

## 2018-11-17 PROCEDURE — 83605 ASSAY OF LACTIC ACID: CPT | Performed by: EMERGENCY MEDICINE

## 2018-11-17 PROCEDURE — 87581 M.PNEUMON DNA AMP PROBE: CPT | Performed by: EMERGENCY MEDICINE

## 2018-11-17 PROCEDURE — 84484 ASSAY OF TROPONIN QUANT: CPT | Performed by: EMERGENCY MEDICINE

## 2018-11-17 PROCEDURE — 93010 ELECTROCARDIOGRAM REPORT: CPT | Performed by: INTERNAL MEDICINE

## 2018-11-17 PROCEDURE — 25010000002 VANCOMYCIN 10 G RECONSTITUTED SOLUTION: Performed by: EMERGENCY MEDICINE

## 2018-11-17 PROCEDURE — 85007 BL SMEAR W/DIFF WBC COUNT: CPT | Performed by: EMERGENCY MEDICINE

## 2018-11-17 PROCEDURE — 87486 CHLMYD PNEUM DNA AMP PROBE: CPT | Performed by: EMERGENCY MEDICINE

## 2018-11-17 PROCEDURE — 25010000002 FUROSEMIDE PER 20 MG: Performed by: EMERGENCY MEDICINE

## 2018-11-17 PROCEDURE — 99285 EMERGENCY DEPT VISIT HI MDM: CPT

## 2018-11-17 RX ORDER — ACETAMINOPHEN 325 MG/1
650 TABLET ORAL EVERY 4 HOURS PRN
Status: DISCONTINUED | OUTPATIENT
Start: 2018-11-17 | End: 2018-11-21 | Stop reason: HOSPADM

## 2018-11-17 RX ORDER — IPRATROPIUM BROMIDE AND ALBUTEROL SULFATE 2.5; .5 MG/3ML; MG/3ML
3 SOLUTION RESPIRATORY (INHALATION)
Status: DISCONTINUED | OUTPATIENT
Start: 2018-11-17 | End: 2018-11-21 | Stop reason: HOSPADM

## 2018-11-17 RX ORDER — ACETAMINOPHEN 500 MG
1000 TABLET ORAL ONCE
Status: COMPLETED | OUTPATIENT
Start: 2018-11-17 | End: 2018-11-17

## 2018-11-17 RX ORDER — IPRATROPIUM BROMIDE AND ALBUTEROL SULFATE 2.5; .5 MG/3ML; MG/3ML
3 SOLUTION RESPIRATORY (INHALATION) ONCE
Status: COMPLETED | OUTPATIENT
Start: 2018-11-17 | End: 2018-11-17

## 2018-11-17 RX ORDER — SODIUM CHLORIDE 0.9 % (FLUSH) 0.9 %
3 SYRINGE (ML) INJECTION EVERY 12 HOURS SCHEDULED
Status: DISCONTINUED | OUTPATIENT
Start: 2018-11-17 | End: 2018-11-21 | Stop reason: HOSPADM

## 2018-11-17 RX ORDER — SODIUM CHLORIDE 0.9 % (FLUSH) 0.9 %
10 SYRINGE (ML) INJECTION AS NEEDED
Status: DISCONTINUED | OUTPATIENT
Start: 2018-11-17 | End: 2018-11-21 | Stop reason: HOSPADM

## 2018-11-17 RX ORDER — ALPRAZOLAM 0.5 MG/1
1 TABLET ORAL NIGHTLY PRN
Status: DISCONTINUED | OUTPATIENT
Start: 2018-11-17 | End: 2018-11-21 | Stop reason: HOSPADM

## 2018-11-17 RX ORDER — ATORVASTATIN CALCIUM 10 MG/1
10 TABLET, FILM COATED ORAL DAILY
Status: DISCONTINUED | OUTPATIENT
Start: 2018-11-18 | End: 2018-11-21 | Stop reason: HOSPADM

## 2018-11-17 RX ORDER — DILTIAZEM HYDROCHLORIDE 60 MG/1
60 TABLET, FILM COATED ORAL EVERY 6 HOURS
Status: DISCONTINUED | OUTPATIENT
Start: 2018-11-17 | End: 2018-11-18

## 2018-11-17 RX ORDER — IPRATROPIUM BROMIDE AND ALBUTEROL SULFATE 2.5; .5 MG/3ML; MG/3ML
3 SOLUTION RESPIRATORY (INHALATION) EVERY 4 HOURS PRN
Status: DISCONTINUED | OUTPATIENT
Start: 2018-11-17 | End: 2018-11-21 | Stop reason: HOSPADM

## 2018-11-17 RX ORDER — FOLIC ACID 1 MG/1
1 TABLET ORAL DAILY
Status: DISCONTINUED | OUTPATIENT
Start: 2018-11-18 | End: 2018-11-21 | Stop reason: HOSPADM

## 2018-11-17 RX ORDER — ALBUTEROL SULFATE 2.5 MG/3ML
2.5 SOLUTION RESPIRATORY (INHALATION)
Status: COMPLETED | OUTPATIENT
Start: 2018-11-17 | End: 2018-11-17

## 2018-11-17 RX ORDER — PREDNISONE 20 MG/1
40 TABLET ORAL
Status: DISCONTINUED | OUTPATIENT
Start: 2018-11-18 | End: 2018-11-20

## 2018-11-17 RX ORDER — BUDESONIDE 0.5 MG/2ML
0.5 INHALANT ORAL
Status: DISCONTINUED | OUTPATIENT
Start: 2018-11-17 | End: 2018-11-21 | Stop reason: HOSPADM

## 2018-11-17 RX ORDER — ONDANSETRON 2 MG/ML
4 INJECTION INTRAMUSCULAR; INTRAVENOUS EVERY 6 HOURS PRN
Status: DISCONTINUED | OUTPATIENT
Start: 2018-11-17 | End: 2018-11-21 | Stop reason: HOSPADM

## 2018-11-17 RX ORDER — FUROSEMIDE 10 MG/ML
40 INJECTION INTRAMUSCULAR; INTRAVENOUS ONCE
Status: COMPLETED | OUTPATIENT
Start: 2018-11-17 | End: 2018-11-17

## 2018-11-17 RX ORDER — SODIUM CHLORIDE 0.9 % (FLUSH) 0.9 %
3-10 SYRINGE (ML) INJECTION AS NEEDED
Status: DISCONTINUED | OUTPATIENT
Start: 2018-11-17 | End: 2018-11-21 | Stop reason: HOSPADM

## 2018-11-17 RX ORDER — METOPROLOL SUCCINATE 50 MG/1
50 TABLET, EXTENDED RELEASE ORAL ONCE
Status: COMPLETED | OUTPATIENT
Start: 2018-11-17 | End: 2018-11-18

## 2018-11-17 RX ORDER — METHYLPREDNISOLONE SODIUM SUCCINATE 125 MG/2ML
125 INJECTION, POWDER, LYOPHILIZED, FOR SOLUTION INTRAMUSCULAR; INTRAVENOUS ONCE
Status: COMPLETED | OUTPATIENT
Start: 2018-11-17 | End: 2018-11-17

## 2018-11-17 RX ADMIN — SODIUM CHLORIDE, PRESERVATIVE FREE 3 ML: 5 INJECTION INTRAVENOUS at 22:53

## 2018-11-17 RX ADMIN — ALBUTEROL SULFATE 2.5 MG: 2.5 SOLUTION RESPIRATORY (INHALATION) at 18:05

## 2018-11-17 RX ADMIN — IOPAMIDOL 75 ML: 612 INJECTION, SOLUTION INTRAVENOUS at 19:28

## 2018-11-17 RX ADMIN — VANCOMYCIN HYDROCHLORIDE 2000 MG: 10 INJECTION, POWDER, LYOPHILIZED, FOR SOLUTION INTRAVENOUS at 21:26

## 2018-11-17 RX ADMIN — FUROSEMIDE 40 MG: 10 INJECTION, SOLUTION INTRAMUSCULAR; INTRAVENOUS at 18:34

## 2018-11-17 RX ADMIN — TAZOBACTAM SODIUM AND PIPERACILLIN SODIUM 3.38 G: 375; 3 INJECTION, SOLUTION INTRAVENOUS at 20:09

## 2018-11-17 RX ADMIN — METHYLPREDNISOLONE SODIUM SUCCINATE 125 MG: 125 INJECTION, POWDER, FOR SOLUTION INTRAMUSCULAR; INTRAVENOUS at 18:30

## 2018-11-17 RX ADMIN — DILTIAZEM HYDROCHLORIDE 60 MG: 60 TABLET, FILM COATED ORAL at 22:52

## 2018-11-17 RX ADMIN — ACETAMINOPHEN 1000 MG: 500 TABLET, FILM COATED ORAL at 18:58

## 2018-11-17 RX ADMIN — IPRATROPIUM BROMIDE AND ALBUTEROL SULFATE 3 ML: 2.5; .5 SOLUTION RESPIRATORY (INHALATION) at 17:06

## 2018-11-17 RX ADMIN — METOPROLOL TARTRATE 25 MG: 25 TABLET ORAL at 22:52

## 2018-11-17 RX ADMIN — ALPRAZOLAM 1 MG: 0.5 TABLET ORAL at 23:05

## 2018-11-17 RX ADMIN — ALBUTEROL SULFATE 2.5 MG: 2.5 SOLUTION RESPIRATORY (INHALATION) at 18:11

## 2018-11-17 RX ADMIN — IPRATROPIUM BROMIDE AND ALBUTEROL SULFATE 3 ML: 2.5; .5 SOLUTION RESPIRATORY (INHALATION) at 21:49

## 2018-11-17 RX ADMIN — APIXABAN 5 MG: 5 TABLET, FILM COATED ORAL at 22:52

## 2018-11-18 ENCOUNTER — READMISSION MANAGEMENT (OUTPATIENT)
Dept: CALL CENTER | Facility: HOSPITAL | Age: 78
End: 2018-11-18

## 2018-11-18 LAB
ANION GAP SERPL CALCULATED.3IONS-SCNC: 12.7 MMOL/L
BASOPHILS # BLD AUTO: 0.02 10*3/MM3 (ref 0–0.2)
BASOPHILS NFR BLD AUTO: 0.6 % (ref 0–1.5)
BUN BLD-MCNC: 23 MG/DL (ref 8–23)
BUN/CREAT SERPL: 18.1 (ref 7–25)
CALCIUM SPEC-SCNC: 8.7 MG/DL (ref 8.6–10.5)
CHLORIDE SERPL-SCNC: 103 MMOL/L (ref 98–107)
CO2 SERPL-SCNC: 28.3 MMOL/L (ref 22–29)
CREAT BLD-MCNC: 1.27 MG/DL (ref 0.76–1.27)
DEPRECATED RDW RBC AUTO: 53.4 FL (ref 37–54)
DEPRECATED RDW RBC AUTO: 54 FL (ref 37–54)
EOSINOPHIL # BLD AUTO: 0 10*3/MM3 (ref 0–0.7)
EOSINOPHIL NFR BLD AUTO: 0 % (ref 0.3–6.2)
ERYTHROCYTE [DISTWIDTH] IN BLOOD BY AUTOMATED COUNT: 13.7 % (ref 11.5–14.5)
ERYTHROCYTE [DISTWIDTH] IN BLOOD BY AUTOMATED COUNT: 13.8 % (ref 11.5–14.5)
GFR SERPL CREATININE-BSD FRML MDRD: 55 ML/MIN/1.73
GLUCOSE BLD-MCNC: 295 MG/DL (ref 65–99)
GLUCOSE BLDC GLUCOMTR-MCNC: 250 MG/DL (ref 70–130)
GLUCOSE BLDC GLUCOMTR-MCNC: 305 MG/DL (ref 70–130)
GLUCOSE BLDC GLUCOMTR-MCNC: 329 MG/DL (ref 70–130)
HCT VFR BLD AUTO: 32.2 % (ref 40.4–52.2)
HCT VFR BLD AUTO: 32.7 % (ref 40.4–52.2)
HGB BLD-MCNC: 10.5 G/DL (ref 13.7–17.6)
HGB BLD-MCNC: 10.5 G/DL (ref 13.7–17.6)
IMM GRANULOCYTES # BLD: 0.05 10*3/MM3 (ref 0–0.03)
IMM GRANULOCYTES NFR BLD: 1.5 % (ref 0–0.5)
LYMPHOCYTES # BLD AUTO: 0.34 10*3/MM3 (ref 0.9–4.8)
LYMPHOCYTES NFR BLD AUTO: 10 % (ref 19.6–45.3)
MCH RBC QN AUTO: 34.8 PG (ref 27–32.7)
MCH RBC QN AUTO: 35 PG (ref 27–32.7)
MCHC RBC AUTO-ENTMCNC: 32.1 G/DL (ref 32.6–36.4)
MCHC RBC AUTO-ENTMCNC: 32.6 G/DL (ref 32.6–36.4)
MCV RBC AUTO: 107.3 FL (ref 79.8–96.2)
MCV RBC AUTO: 108.3 FL (ref 79.8–96.2)
MONOCYTES # BLD AUTO: 0.41 10*3/MM3 (ref 0.2–1.2)
MONOCYTES NFR BLD AUTO: 12.1 % (ref 5–12)
NEUTROPHILS # BLD AUTO: 2.63 10*3/MM3 (ref 1.9–8.1)
NEUTROPHILS NFR BLD AUTO: 77.3 % (ref 42.7–76)
PLATELET # BLD AUTO: 90 10*3/MM3 (ref 140–500)
PLATELET # BLD AUTO: 97 10*3/MM3 (ref 140–500)
PMV BLD AUTO: 11.5 FL (ref 6–12)
PMV BLD AUTO: 11.8 FL (ref 6–12)
POTASSIUM BLD-SCNC: 4.5 MMOL/L (ref 3.5–5.2)
RBC # BLD AUTO: 3 10*6/MM3 (ref 4.6–6)
RBC # BLD AUTO: 3.02 10*6/MM3 (ref 4.6–6)
SODIUM BLD-SCNC: 144 MMOL/L (ref 136–145)
VANCOMYCIN TROUGH SERPL-MCNC: 16.1 MCG/ML (ref 5–20)
WBC NRBC COR # BLD: 3.28 10*3/MM3 (ref 4.5–10.7)
WBC NRBC COR # BLD: 3.4 10*3/MM3 (ref 4.5–10.7)

## 2018-11-18 PROCEDURE — 94799 UNLISTED PULMONARY SVC/PX: CPT

## 2018-11-18 PROCEDURE — 80048 BASIC METABOLIC PNL TOTAL CA: CPT | Performed by: HOSPITALIST

## 2018-11-18 PROCEDURE — 63710000001 PREDNISONE PER 1 MG: Performed by: INTERNAL MEDICINE

## 2018-11-18 PROCEDURE — 85027 COMPLETE CBC AUTOMATED: CPT | Performed by: HOSPITALIST

## 2018-11-18 PROCEDURE — 87081 CULTURE SCREEN ONLY: CPT | Performed by: INTERNAL MEDICINE

## 2018-11-18 PROCEDURE — 99222 1ST HOSP IP/OBS MODERATE 55: CPT | Performed by: INTERNAL MEDICINE

## 2018-11-18 PROCEDURE — 25010000002 PIPERACILLIN SOD-TAZOBACTAM PER 1 G: Performed by: INTERNAL MEDICINE

## 2018-11-18 PROCEDURE — 82962 GLUCOSE BLOOD TEST: CPT

## 2018-11-18 PROCEDURE — 25010000002 DIGOXIN PER 500 MCG: Performed by: INTERNAL MEDICINE

## 2018-11-18 PROCEDURE — 63710000001 INSULIN LISPRO (HUMAN) PER 5 UNITS: Performed by: INTERNAL MEDICINE

## 2018-11-18 PROCEDURE — 85025 COMPLETE CBC W/AUTO DIFF WBC: CPT | Performed by: INTERNAL MEDICINE

## 2018-11-18 PROCEDURE — 25010000002 VANCOMYCIN 10 G RECONSTITUTED SOLUTION: Performed by: INTERNAL MEDICINE

## 2018-11-18 PROCEDURE — 80202 ASSAY OF VANCOMYCIN: CPT | Performed by: INTERNAL MEDICINE

## 2018-11-18 RX ORDER — DIGOXIN 0.25 MG/ML
500 INJECTION INTRAMUSCULAR; INTRAVENOUS ONCE
Status: COMPLETED | OUTPATIENT
Start: 2018-11-18 | End: 2018-11-18

## 2018-11-18 RX ORDER — DEXTROSE MONOHYDRATE 25 G/50ML
25 INJECTION, SOLUTION INTRAVENOUS
Status: DISCONTINUED | OUTPATIENT
Start: 2018-11-18 | End: 2018-11-21 | Stop reason: HOSPADM

## 2018-11-18 RX ORDER — DIGOXIN 0.25 MG/ML
250 INJECTION INTRAMUSCULAR; INTRAVENOUS EVERY 6 HOURS
Status: COMPLETED | OUTPATIENT
Start: 2018-11-18 | End: 2018-11-19

## 2018-11-18 RX ORDER — NICOTINE POLACRILEX 4 MG
15 LOZENGE BUCCAL
Status: DISCONTINUED | OUTPATIENT
Start: 2018-11-18 | End: 2018-11-21 | Stop reason: HOSPADM

## 2018-11-18 RX ADMIN — DILTIAZEM HYDROCHLORIDE 30 MG: 30 TABLET, FILM COATED ORAL at 16:52

## 2018-11-18 RX ADMIN — IPRATROPIUM BROMIDE AND ALBUTEROL SULFATE 3 ML: 2.5; .5 SOLUTION RESPIRATORY (INHALATION) at 16:50

## 2018-11-18 RX ADMIN — METOPROLOL SUCCINATE 50 MG: 50 TABLET, FILM COATED, EXTENDED RELEASE ORAL at 01:08

## 2018-11-18 RX ADMIN — FOLIC ACID 1 MG: 1 TABLET ORAL at 08:28

## 2018-11-18 RX ADMIN — DIGOXIN 500 MCG: 0.25 INJECTION INTRAMUSCULAR; INTRAVENOUS at 12:25

## 2018-11-18 RX ADMIN — PREDNISONE 40 MG: 20 TABLET ORAL at 08:27

## 2018-11-18 RX ADMIN — TAZOBACTAM SODIUM AND PIPERACILLIN SODIUM 3.38 G: 375; 3 INJECTION, SOLUTION INTRAVENOUS at 03:07

## 2018-11-18 RX ADMIN — SODIUM CHLORIDE, PRESERVATIVE FREE 3 ML: 5 INJECTION INTRAVENOUS at 08:29

## 2018-11-18 RX ADMIN — BUDESONIDE 0.5 MG: 0.5 INHALANT RESPIRATORY (INHALATION) at 20:19

## 2018-11-18 RX ADMIN — DIGOXIN 250 MCG: 0.25 INJECTION INTRAMUSCULAR; INTRAVENOUS at 18:01

## 2018-11-18 RX ADMIN — IPRATROPIUM BROMIDE AND ALBUTEROL SULFATE 3 ML: 2.5; .5 SOLUTION RESPIRATORY (INHALATION) at 20:19

## 2018-11-18 RX ADMIN — APIXABAN 5 MG: 5 TABLET, FILM COATED ORAL at 21:15

## 2018-11-18 RX ADMIN — INSULIN LISPRO 6 UNITS: 100 INJECTION, SOLUTION INTRAVENOUS; SUBCUTANEOUS at 09:06

## 2018-11-18 RX ADMIN — BUDESONIDE 0.5 MG: 0.5 INHALANT RESPIRATORY (INHALATION) at 07:11

## 2018-11-18 RX ADMIN — ALPRAZOLAM 1 MG: 0.5 TABLET ORAL at 21:15

## 2018-11-18 RX ADMIN — TAZOBACTAM SODIUM AND PIPERACILLIN SODIUM 3.38 G: 375; 3 INJECTION, SOLUTION INTRAVENOUS at 10:57

## 2018-11-18 RX ADMIN — INSULIN LISPRO 7 UNITS: 100 INJECTION, SOLUTION INTRAVENOUS; SUBCUTANEOUS at 12:24

## 2018-11-18 RX ADMIN — SODIUM CHLORIDE, PRESERVATIVE FREE 3 ML: 5 INJECTION INTRAVENOUS at 21:17

## 2018-11-18 RX ADMIN — INSULIN LISPRO 7 UNITS: 100 INJECTION, SOLUTION INTRAVENOUS; SUBCUTANEOUS at 21:16

## 2018-11-18 RX ADMIN — ATORVASTATIN CALCIUM 10 MG: 10 TABLET, FILM COATED ORAL at 08:28

## 2018-11-18 RX ADMIN — DILTIAZEM HYDROCHLORIDE 60 MG: 60 TABLET, FILM COATED ORAL at 05:10

## 2018-11-18 RX ADMIN — TAZOBACTAM SODIUM AND PIPERACILLIN SODIUM 3.38 G: 375; 3 INJECTION, SOLUTION INTRAVENOUS at 18:00

## 2018-11-18 RX ADMIN — DILTIAZEM HYDROCHLORIDE 30 MG: 30 TABLET, FILM COATED ORAL at 23:02

## 2018-11-18 RX ADMIN — IPRATROPIUM BROMIDE AND ALBUTEROL SULFATE 3 ML: 2.5; .5 SOLUTION RESPIRATORY (INHALATION) at 07:06

## 2018-11-18 RX ADMIN — INSULIN LISPRO 6 UNITS: 100 INJECTION, SOLUTION INTRAVENOUS; SUBCUTANEOUS at 17:33

## 2018-11-18 RX ADMIN — VANCOMYCIN HYDROCHLORIDE 1250 MG: 10 INJECTION, POWDER, LYOPHILIZED, FOR SOLUTION INTRAVENOUS at 08:34

## 2018-11-18 RX ADMIN — APIXABAN 5 MG: 5 TABLET, FILM COATED ORAL at 08:28

## 2018-11-19 ENCOUNTER — EPISODE CHANGES (OUTPATIENT)
Dept: SOCIAL WORK | Facility: HOSPITAL | Age: 78
End: 2018-11-19

## 2018-11-19 LAB
ANION GAP SERPL CALCULATED.3IONS-SCNC: 9.2 MMOL/L
BASOPHILS # BLD AUTO: 0.01 10*3/MM3 (ref 0–0.2)
BASOPHILS NFR BLD AUTO: 0.1 % (ref 0–1.5)
BUN BLD-MCNC: 34 MG/DL (ref 8–23)
BUN/CREAT SERPL: 29.8 (ref 7–25)
CALCIUM SPEC-SCNC: 8.6 MG/DL (ref 8.6–10.5)
CHLORIDE SERPL-SCNC: 103 MMOL/L (ref 98–107)
CO2 SERPL-SCNC: 30.8 MMOL/L (ref 22–29)
CREAT BLD-MCNC: 1.14 MG/DL (ref 0.76–1.27)
DEPRECATED RDW RBC AUTO: 53.3 FL (ref 37–54)
EOSINOPHIL # BLD AUTO: 0 10*3/MM3 (ref 0–0.7)
EOSINOPHIL NFR BLD AUTO: 0 % (ref 0.3–6.2)
ERYTHROCYTE [DISTWIDTH] IN BLOOD BY AUTOMATED COUNT: 13.6 % (ref 11.5–14.5)
GFR SERPL CREATININE-BSD FRML MDRD: 62 ML/MIN/1.73
GLUCOSE BLD-MCNC: 185 MG/DL (ref 65–99)
GLUCOSE BLDC GLUCOMTR-MCNC: 174 MG/DL (ref 70–130)
GLUCOSE BLDC GLUCOMTR-MCNC: 255 MG/DL (ref 70–130)
GLUCOSE BLDC GLUCOMTR-MCNC: 355 MG/DL (ref 70–130)
GLUCOSE BLDC GLUCOMTR-MCNC: 417 MG/DL (ref 70–130)
HBA1C MFR BLD: 6.5 % (ref 4.8–5.6)
HCT VFR BLD AUTO: 30.8 % (ref 40.4–52.2)
HGB BLD-MCNC: 9.9 G/DL (ref 13.7–17.6)
IMM GRANULOCYTES # BLD: 0.29 10*3/MM3 (ref 0–0.03)
IMM GRANULOCYTES NFR BLD: 3.9 % (ref 0–0.5)
LYMPHOCYTES # BLD AUTO: 0.47 10*3/MM3 (ref 0.9–4.8)
LYMPHOCYTES NFR BLD AUTO: 6.3 % (ref 19.6–45.3)
MACROCYTES BLD QL SMEAR: NORMAL
MCH RBC QN AUTO: 34.9 PG (ref 27–32.7)
MCHC RBC AUTO-ENTMCNC: 32.1 G/DL (ref 32.6–36.4)
MCV RBC AUTO: 108.5 FL (ref 79.8–96.2)
MONOCYTES # BLD AUTO: 0.95 10*3/MM3 (ref 0.2–1.2)
MONOCYTES NFR BLD AUTO: 12.6 % (ref 5–12)
NEUTROPHILS # BLD AUTO: 5.79 10*3/MM3 (ref 1.9–8.1)
NEUTROPHILS NFR BLD AUTO: 77.1 % (ref 42.7–76)
OVALOCYTES BLD QL SMEAR: NORMAL
PLAT MORPH BLD: NORMAL
PLATELET # BLD AUTO: 99 10*3/MM3 (ref 140–500)
PMV BLD AUTO: 10.8 FL (ref 6–12)
POLYCHROMASIA BLD QL SMEAR: NORMAL
POTASSIUM BLD-SCNC: 4.3 MMOL/L (ref 3.5–5.2)
RBC # BLD AUTO: 2.84 10*6/MM3 (ref 4.6–6)
SODIUM BLD-SCNC: 143 MMOL/L (ref 136–145)
WBC MORPH BLD: NORMAL
WBC NRBC COR # BLD: 7.51 10*3/MM3 (ref 4.5–10.7)

## 2018-11-19 PROCEDURE — 63710000001 INSULIN LISPRO (HUMAN) PER 5 UNITS: Performed by: HOSPITALIST

## 2018-11-19 PROCEDURE — 25010000002 DIGOXIN PER 500 MCG: Performed by: INTERNAL MEDICINE

## 2018-11-19 PROCEDURE — 93010 ELECTROCARDIOGRAM REPORT: CPT | Performed by: INTERNAL MEDICINE

## 2018-11-19 PROCEDURE — 25010000002 PIPERACILLIN SOD-TAZOBACTAM PER 1 G: Performed by: INTERNAL MEDICINE

## 2018-11-19 PROCEDURE — 85007 BL SMEAR W/DIFF WBC COUNT: CPT | Performed by: INTERNAL MEDICINE

## 2018-11-19 PROCEDURE — 94799 UNLISTED PULMONARY SVC/PX: CPT

## 2018-11-19 PROCEDURE — 63710000001 PREDNISONE PER 1 MG: Performed by: INTERNAL MEDICINE

## 2018-11-19 PROCEDURE — 99232 SBSQ HOSP IP/OBS MODERATE 35: CPT | Performed by: INTERNAL MEDICINE

## 2018-11-19 PROCEDURE — 85025 COMPLETE CBC W/AUTO DIFF WBC: CPT | Performed by: INTERNAL MEDICINE

## 2018-11-19 PROCEDURE — 25010000002 VANCOMYCIN 10 G RECONSTITUTED SOLUTION: Performed by: INTERNAL MEDICINE

## 2018-11-19 PROCEDURE — 63710000001 INSULIN LISPRO (HUMAN) PER 5 UNITS: Performed by: INTERNAL MEDICINE

## 2018-11-19 PROCEDURE — 80048 BASIC METABOLIC PNL TOTAL CA: CPT | Performed by: INTERNAL MEDICINE

## 2018-11-19 PROCEDURE — 82962 GLUCOSE BLOOD TEST: CPT

## 2018-11-19 PROCEDURE — 93005 ELECTROCARDIOGRAM TRACING: CPT | Performed by: INTERNAL MEDICINE

## 2018-11-19 PROCEDURE — 83036 HEMOGLOBIN GLYCOSYLATED A1C: CPT | Performed by: INTERNAL MEDICINE

## 2018-11-19 RX ORDER — DILTIAZEM HYDROCHLORIDE 60 MG/1
60 TABLET, FILM COATED ORAL EVERY 6 HOURS
Status: DISCONTINUED | OUTPATIENT
Start: 2018-11-19 | End: 2018-11-20

## 2018-11-19 RX ADMIN — PREDNISONE 40 MG: 20 TABLET ORAL at 08:25

## 2018-11-19 RX ADMIN — INSULIN LISPRO 2 UNITS: 100 INJECTION, SOLUTION INTRAVENOUS; SUBCUTANEOUS at 08:25

## 2018-11-19 RX ADMIN — BUDESONIDE 0.5 MG: 0.5 INHALANT RESPIRATORY (INHALATION) at 07:45

## 2018-11-19 RX ADMIN — DILTIAZEM HYDROCHLORIDE 60 MG: 60 TABLET, FILM COATED ORAL at 22:01

## 2018-11-19 RX ADMIN — DILTIAZEM HYDROCHLORIDE 30 MG: 30 TABLET, FILM COATED ORAL at 04:37

## 2018-11-19 RX ADMIN — IPRATROPIUM BROMIDE AND ALBUTEROL SULFATE 3 ML: 2.5; .5 SOLUTION RESPIRATORY (INHALATION) at 21:40

## 2018-11-19 RX ADMIN — DIGOXIN 250 MCG: 0.25 INJECTION INTRAMUSCULAR; INTRAVENOUS at 00:55

## 2018-11-19 RX ADMIN — INSULIN LISPRO 3 UNITS: 100 INJECTION, SOLUTION INTRAVENOUS; SUBCUTANEOUS at 20:41

## 2018-11-19 RX ADMIN — DILTIAZEM HYDROCHLORIDE 60 MG: 60 TABLET, FILM COATED ORAL at 16:15

## 2018-11-19 RX ADMIN — VANCOMYCIN HYDROCHLORIDE 1750 MG: 10 INJECTION, POWDER, LYOPHILIZED, FOR SOLUTION INTRAVENOUS at 08:50

## 2018-11-19 RX ADMIN — INSULIN LISPRO 8 UNITS: 100 INJECTION, SOLUTION INTRAVENOUS; SUBCUTANEOUS at 17:31

## 2018-11-19 RX ADMIN — APIXABAN 5 MG: 5 TABLET, FILM COATED ORAL at 08:25

## 2018-11-19 RX ADMIN — DILTIAZEM HYDROCHLORIDE 30 MG: 30 TABLET, FILM COATED ORAL at 11:13

## 2018-11-19 RX ADMIN — IPRATROPIUM BROMIDE AND ALBUTEROL SULFATE 3 ML: 2.5; .5 SOLUTION RESPIRATORY (INHALATION) at 16:16

## 2018-11-19 RX ADMIN — SODIUM CHLORIDE, PRESERVATIVE FREE 3 ML: 5 INJECTION INTRAVENOUS at 08:26

## 2018-11-19 RX ADMIN — ATORVASTATIN CALCIUM 10 MG: 10 TABLET, FILM COATED ORAL at 08:25

## 2018-11-19 RX ADMIN — INSULIN LISPRO 6 UNITS: 100 INJECTION, SOLUTION INTRAVENOUS; SUBCUTANEOUS at 12:35

## 2018-11-19 RX ADMIN — INSULIN LISPRO 9 UNITS: 100 INJECTION, SOLUTION INTRAVENOUS; SUBCUTANEOUS at 20:40

## 2018-11-19 RX ADMIN — FOLIC ACID 1 MG: 1 TABLET ORAL at 08:25

## 2018-11-19 RX ADMIN — APIXABAN 5 MG: 5 TABLET, FILM COATED ORAL at 20:40

## 2018-11-19 RX ADMIN — TAZOBACTAM SODIUM AND PIPERACILLIN SODIUM 3.38 G: 375; 3 INJECTION, SOLUTION INTRAVENOUS at 03:53

## 2018-11-19 RX ADMIN — ALPRAZOLAM 1 MG: 0.5 TABLET ORAL at 23:45

## 2018-11-19 RX ADMIN — TAZOBACTAM SODIUM AND PIPERACILLIN SODIUM 4.5 G: 500; 4 INJECTION, SOLUTION INTRAVENOUS at 18:36

## 2018-11-19 RX ADMIN — BUDESONIDE 0.5 MG: 0.5 INHALANT RESPIRATORY (INHALATION) at 21:40

## 2018-11-19 RX ADMIN — TAZOBACTAM SODIUM AND PIPERACILLIN SODIUM 4.5 G: 500; 4 INJECTION, SOLUTION INTRAVENOUS at 12:07

## 2018-11-19 RX ADMIN — IPRATROPIUM BROMIDE AND ALBUTEROL SULFATE 3 ML: 2.5; .5 SOLUTION RESPIRATORY (INHALATION) at 12:26

## 2018-11-19 RX ADMIN — SODIUM CHLORIDE, PRESERVATIVE FREE 3 ML: 5 INJECTION INTRAVENOUS at 20:46

## 2018-11-19 RX ADMIN — IPRATROPIUM BROMIDE AND ALBUTEROL SULFATE 3 ML: 2.5; .5 SOLUTION RESPIRATORY (INHALATION) at 07:45

## 2018-11-19 NOTE — OUTREACH NOTE
CHF Week 2 Survey      Responses   Facility patient discharged from?  [facility]   Does the patient have one of the following disease processes/diagnoses(primary or secondary)?  CHF   Week 2 attempt successful?  No   Revoke  Readmitted          Miri Dunbar RN

## 2018-11-20 ENCOUNTER — EPISODE CHANGES (OUTPATIENT)
Dept: CASE MANAGEMENT | Facility: OTHER | Age: 78
End: 2018-11-20

## 2018-11-20 LAB
ANION GAP SERPL CALCULATED.3IONS-SCNC: 10.8 MMOL/L
BUN BLD-MCNC: 31 MG/DL (ref 8–23)
BUN/CREAT SERPL: 26.5 (ref 7–25)
CALCIUM SPEC-SCNC: 8.7 MG/DL (ref 8.6–10.5)
CHLORIDE SERPL-SCNC: 103 MMOL/L (ref 98–107)
CO2 SERPL-SCNC: 28.2 MMOL/L (ref 22–29)
CREAT BLD-MCNC: 1.17 MG/DL (ref 0.76–1.27)
DEPRECATED RDW RBC AUTO: 53.7 FL (ref 37–54)
ERYTHROCYTE [DISTWIDTH] IN BLOOD BY AUTOMATED COUNT: 13.7 % (ref 11.5–14.5)
FOLATE SERPL-MCNC: >20 NG/ML (ref 4.78–24.2)
GFR SERPL CREATININE-BSD FRML MDRD: 60 ML/MIN/1.73
GLUCOSE BLD-MCNC: 191 MG/DL (ref 65–99)
GLUCOSE BLDC GLUCOMTR-MCNC: 164 MG/DL (ref 70–130)
GLUCOSE BLDC GLUCOMTR-MCNC: 252 MG/DL (ref 70–130)
GLUCOSE BLDC GLUCOMTR-MCNC: 257 MG/DL (ref 70–130)
GLUCOSE BLDC GLUCOMTR-MCNC: 284 MG/DL (ref 70–130)
HCT VFR BLD AUTO: 31.7 % (ref 40.4–52.2)
HGB BLD-MCNC: 10.1 G/DL (ref 13.7–17.6)
MCH RBC QN AUTO: 34.7 PG (ref 27–32.7)
MCHC RBC AUTO-ENTMCNC: 31.9 G/DL (ref 32.6–36.4)
MCV RBC AUTO: 108.9 FL (ref 79.8–96.2)
MRSA SPEC QL CULT: NORMAL
PLATELET # BLD AUTO: 105 10*3/MM3 (ref 140–500)
PMV BLD AUTO: 10.8 FL (ref 6–12)
POTASSIUM BLD-SCNC: 3.8 MMOL/L (ref 3.5–5.2)
RBC # BLD AUTO: 2.91 10*6/MM3 (ref 4.6–6)
SODIUM BLD-SCNC: 142 MMOL/L (ref 136–145)
VIT B12 BLD-MCNC: 1410 PG/ML (ref 211–946)
WBC NRBC COR # BLD: 6.46 10*3/MM3 (ref 4.5–10.7)

## 2018-11-20 PROCEDURE — 82746 ASSAY OF FOLIC ACID SERUM: CPT | Performed by: INTERNAL MEDICINE

## 2018-11-20 PROCEDURE — 94799 UNLISTED PULMONARY SVC/PX: CPT

## 2018-11-20 PROCEDURE — 85027 COMPLETE CBC AUTOMATED: CPT | Performed by: INTERNAL MEDICINE

## 2018-11-20 PROCEDURE — 80048 BASIC METABOLIC PNL TOTAL CA: CPT | Performed by: INTERNAL MEDICINE

## 2018-11-20 PROCEDURE — 63710000001 INSULIN LISPRO (HUMAN) PER 5 UNITS: Performed by: INTERNAL MEDICINE

## 2018-11-20 PROCEDURE — 63710000001 PREDNISONE PER 1 MG: Performed by: INTERNAL MEDICINE

## 2018-11-20 PROCEDURE — 82607 VITAMIN B-12: CPT | Performed by: INTERNAL MEDICINE

## 2018-11-20 PROCEDURE — 82962 GLUCOSE BLOOD TEST: CPT

## 2018-11-20 PROCEDURE — 25010000002 PIPERACILLIN SOD-TAZOBACTAM PER 1 G: Performed by: INTERNAL MEDICINE

## 2018-11-20 PROCEDURE — 99233 SBSQ HOSP IP/OBS HIGH 50: CPT | Performed by: INTERNAL MEDICINE

## 2018-11-20 RX ORDER — AMOXICILLIN AND CLAVULANATE POTASSIUM 875; 125 MG/1; MG/1
1 TABLET, FILM COATED ORAL EVERY 12 HOURS SCHEDULED
Status: DISCONTINUED | OUTPATIENT
Start: 2018-11-20 | End: 2018-11-21 | Stop reason: HOSPADM

## 2018-11-20 RX ORDER — PREDNISONE 10 MG/1
10 TABLET ORAL DAILY
Status: DISCONTINUED | OUTPATIENT
Start: 2018-11-24 | End: 2018-11-21 | Stop reason: HOSPADM

## 2018-11-20 RX ORDER — PREDNISONE 10 MG/1
10 TABLET ORAL DAILY
Status: DISCONTINUED | OUTPATIENT
Start: 2018-11-23 | End: 2018-11-20

## 2018-11-20 RX ORDER — DILTIAZEM HCL 90 MG
90 TABLET ORAL EVERY 6 HOURS
Status: DISCONTINUED | OUTPATIENT
Start: 2018-11-20 | End: 2018-11-21 | Stop reason: HOSPADM

## 2018-11-20 RX ORDER — PREDNISONE 20 MG/1
20 TABLET ORAL
Status: DISCONTINUED | OUTPATIENT
Start: 2018-11-21 | End: 2018-11-20

## 2018-11-20 RX ORDER — PREDNISONE 20 MG/1
20 TABLET ORAL
Status: DISCONTINUED | OUTPATIENT
Start: 2018-11-21 | End: 2018-11-21 | Stop reason: HOSPADM

## 2018-11-20 RX ORDER — DIGOXIN 250 MCG
250 TABLET ORAL
Status: DISCONTINUED | OUTPATIENT
Start: 2018-11-20 | End: 2018-11-21 | Stop reason: HOSPADM

## 2018-11-20 RX ADMIN — DILTIAZEM HYDROCHLORIDE 90 MG: 90 TABLET, FILM COATED ORAL at 10:13

## 2018-11-20 RX ADMIN — TAZOBACTAM SODIUM AND PIPERACILLIN SODIUM 4.5 G: 500; 4 INJECTION, SOLUTION INTRAVENOUS at 02:56

## 2018-11-20 RX ADMIN — BUDESONIDE 0.5 MG: 0.5 INHALANT RESPIRATORY (INHALATION) at 09:04

## 2018-11-20 RX ADMIN — IPRATROPIUM BROMIDE AND ALBUTEROL SULFATE 3 ML: 2.5; .5 SOLUTION RESPIRATORY (INHALATION) at 15:48

## 2018-11-20 RX ADMIN — INSULIN LISPRO 6 UNITS: 100 INJECTION, SOLUTION INTRAVENOUS; SUBCUTANEOUS at 20:17

## 2018-11-20 RX ADMIN — INSULIN LISPRO 6 UNITS: 100 INJECTION, SOLUTION INTRAVENOUS; SUBCUTANEOUS at 11:23

## 2018-11-20 RX ADMIN — APIXABAN 5 MG: 5 TABLET, FILM COATED ORAL at 08:37

## 2018-11-20 RX ADMIN — FOLIC ACID 1 MG: 1 TABLET ORAL at 08:37

## 2018-11-20 RX ADMIN — DILTIAZEM HYDROCHLORIDE 60 MG: 60 TABLET, FILM COATED ORAL at 04:56

## 2018-11-20 RX ADMIN — TAZOBACTAM SODIUM AND PIPERACILLIN SODIUM 4.5 G: 500; 4 INJECTION, SOLUTION INTRAVENOUS at 10:13

## 2018-11-20 RX ADMIN — METFORMIN HYDROCHLORIDE 1000 MG: 1000 TABLET ORAL at 08:37

## 2018-11-20 RX ADMIN — INSULIN LISPRO 6 UNITS: 100 INJECTION, SOLUTION INTRAVENOUS; SUBCUTANEOUS at 17:46

## 2018-11-20 RX ADMIN — PREDNISONE 40 MG: 20 TABLET ORAL at 08:37

## 2018-11-20 RX ADMIN — BUDESONIDE 0.5 MG: 0.5 INHALANT RESPIRATORY (INHALATION) at 23:10

## 2018-11-20 RX ADMIN — APIXABAN 5 MG: 5 TABLET, FILM COATED ORAL at 20:17

## 2018-11-20 RX ADMIN — IPRATROPIUM BROMIDE AND ALBUTEROL SULFATE 3 ML: 2.5; .5 SOLUTION RESPIRATORY (INHALATION) at 23:11

## 2018-11-20 RX ADMIN — AMOXICILLIN AND CLAVULANATE POTASSIUM 1 TABLET: 875; 125 TABLET, FILM COATED ORAL at 20:17

## 2018-11-20 RX ADMIN — IPRATROPIUM BROMIDE AND ALBUTEROL SULFATE 3 ML: 2.5; .5 SOLUTION RESPIRATORY (INHALATION) at 09:04

## 2018-11-20 RX ADMIN — DIGOXIN 250 MCG: 250 TABLET ORAL at 11:24

## 2018-11-20 RX ADMIN — ALPRAZOLAM 1 MG: 0.5 TABLET ORAL at 23:29

## 2018-11-20 RX ADMIN — SODIUM CHLORIDE, PRESERVATIVE FREE 3 ML: 5 INJECTION INTRAVENOUS at 20:17

## 2018-11-20 RX ADMIN — INSULIN LISPRO 2 UNITS: 100 INJECTION, SOLUTION INTRAVENOUS; SUBCUTANEOUS at 08:37

## 2018-11-20 RX ADMIN — DILTIAZEM HYDROCHLORIDE 90 MG: 90 TABLET, FILM COATED ORAL at 23:29

## 2018-11-20 RX ADMIN — DILTIAZEM HYDROCHLORIDE 90 MG: 90 TABLET, FILM COATED ORAL at 17:47

## 2018-11-20 RX ADMIN — IPRATROPIUM BROMIDE AND ALBUTEROL SULFATE 3 ML: 2.5; .5 SOLUTION RESPIRATORY (INHALATION) at 12:50

## 2018-11-20 RX ADMIN — SODIUM CHLORIDE, PRESERVATIVE FREE 3 ML: 5 INJECTION INTRAVENOUS at 08:38

## 2018-11-20 RX ADMIN — ATORVASTATIN CALCIUM 10 MG: 10 TABLET, FILM COATED ORAL at 10:12

## 2018-11-20 NOTE — TELEPHONE ENCOUNTER
I am sorry I do not know, I spoke to Alisha about this Pt and sent the message to Kay. I was uncertain about any of the orders because this Pt had not been seen in office by . He was seen in office by you initially.

## 2018-11-21 ENCOUNTER — NURSE TRIAGE (OUTPATIENT)
Dept: CALL CENTER | Facility: HOSPITAL | Age: 78
End: 2018-11-21

## 2018-11-21 VITALS
WEIGHT: 229.6 LBS | SYSTOLIC BLOOD PRESSURE: 136 MMHG | TEMPERATURE: 97.5 F | DIASTOLIC BLOOD PRESSURE: 77 MMHG | BODY MASS INDEX: 43.35 KG/M2 | HEART RATE: 113 BPM | HEIGHT: 61 IN | OXYGEN SATURATION: 94 % | RESPIRATION RATE: 18 BRPM

## 2018-11-21 PROBLEM — R04.2 HEMOPTYSIS: Status: RESOLVED | Noted: 2018-11-17 | Resolved: 2018-11-21

## 2018-11-21 LAB
ANION GAP SERPL CALCULATED.3IONS-SCNC: 7.5 MMOL/L
BUN BLD-MCNC: 27 MG/DL (ref 8–23)
BUN/CREAT SERPL: 26.5 (ref 7–25)
CALCIUM SPEC-SCNC: 8.8 MG/DL (ref 8.6–10.5)
CHLORIDE SERPL-SCNC: 103 MMOL/L (ref 98–107)
CO2 SERPL-SCNC: 32.5 MMOL/L (ref 22–29)
CREAT BLD-MCNC: 1.02 MG/DL (ref 0.76–1.27)
DEPRECATED RDW RBC AUTO: 51.9 FL (ref 37–54)
ERYTHROCYTE [DISTWIDTH] IN BLOOD BY AUTOMATED COUNT: 13.8 % (ref 11.5–14.5)
GFR SERPL CREATININE-BSD FRML MDRD: 71 ML/MIN/1.73
GLUCOSE BLD-MCNC: 162 MG/DL (ref 65–99)
GLUCOSE BLDC GLUCOMTR-MCNC: 130 MG/DL (ref 70–130)
GLUCOSE BLDC GLUCOMTR-MCNC: 161 MG/DL (ref 70–130)
HCT VFR BLD AUTO: 31 % (ref 40.4–52.2)
HGB BLD-MCNC: 10.2 G/DL (ref 13.7–17.6)
MCH RBC QN AUTO: 34.5 PG (ref 27–32.7)
MCHC RBC AUTO-ENTMCNC: 32.9 G/DL (ref 32.6–36.4)
MCV RBC AUTO: 104.7 FL (ref 79.8–96.2)
PLATELET # BLD AUTO: 100 10*3/MM3 (ref 140–500)
PMV BLD AUTO: 11.1 FL (ref 6–12)
POTASSIUM BLD-SCNC: 4.3 MMOL/L (ref 3.5–5.2)
RBC # BLD AUTO: 2.96 10*6/MM3 (ref 4.6–6)
SODIUM BLD-SCNC: 143 MMOL/L (ref 136–145)
VANCOMYCIN TROUGH SERPL-MCNC: 6.3 MCG/ML (ref 5–20)
WBC NRBC COR # BLD: 4.03 10*3/MM3 (ref 4.5–10.7)

## 2018-11-21 PROCEDURE — 85027 COMPLETE CBC AUTOMATED: CPT | Performed by: INTERNAL MEDICINE

## 2018-11-21 PROCEDURE — 80202 ASSAY OF VANCOMYCIN: CPT | Performed by: INTERNAL MEDICINE

## 2018-11-21 PROCEDURE — 63710000001 PREDNISONE PER 1 MG: Performed by: NURSE PRACTITIONER

## 2018-11-21 PROCEDURE — 99232 SBSQ HOSP IP/OBS MODERATE 35: CPT | Performed by: INTERNAL MEDICINE

## 2018-11-21 PROCEDURE — 80048 BASIC METABOLIC PNL TOTAL CA: CPT | Performed by: INTERNAL MEDICINE

## 2018-11-21 PROCEDURE — 82962 GLUCOSE BLOOD TEST: CPT

## 2018-11-21 PROCEDURE — 94799 UNLISTED PULMONARY SVC/PX: CPT

## 2018-11-21 PROCEDURE — 63710000001 INSULIN LISPRO (HUMAN) PER 5 UNITS: Performed by: INTERNAL MEDICINE

## 2018-11-21 RX ORDER — DILTIAZEM HYDROCHLORIDE 120 MG/1
240 CAPSULE, EXTENDED RELEASE ORAL 2 TIMES DAILY
Qty: 120 CAPSULE | Refills: 11 | Status: SHIPPED | OUTPATIENT
Start: 2018-11-21 | End: 2018-11-29

## 2018-11-21 RX ORDER — AMOXICILLIN AND CLAVULANATE POTASSIUM 875; 125 MG/1; MG/1
1 TABLET, FILM COATED ORAL EVERY 12 HOURS SCHEDULED
Qty: 12 TABLET | Refills: 0 | Status: SHIPPED | OUTPATIENT
Start: 2018-11-21 | End: 2018-11-27

## 2018-11-21 RX ORDER — PREDNISONE 20 MG/1
20 TABLET ORAL
Qty: 2 TABLET | Refills: 0 | Status: SHIPPED | OUTPATIENT
Start: 2018-11-22 | End: 2018-11-24

## 2018-11-21 RX ORDER — DIGOXIN 250 MCG
250 TABLET ORAL
Qty: 30 TABLET | Refills: 0 | Status: SHIPPED | OUTPATIENT
Start: 2018-11-22 | End: 2018-11-29

## 2018-11-21 RX ORDER — PREDNISONE 10 MG/1
10 TABLET ORAL DAILY
Qty: 3 TABLET | Refills: 0 | Status: SHIPPED | OUTPATIENT
Start: 2018-11-24 | End: 2018-11-27

## 2018-11-21 RX ORDER — BUDESONIDE 0.5 MG/2ML
0.5 INHALANT ORAL
Qty: 120 EACH | Refills: 1 | Status: SHIPPED | OUTPATIENT
Start: 2018-11-21 | End: 2020-01-01

## 2018-11-21 RX ADMIN — IPRATROPIUM BROMIDE AND ALBUTEROL SULFATE 3 ML: 2.5; .5 SOLUTION RESPIRATORY (INHALATION) at 09:26

## 2018-11-21 RX ADMIN — DIGOXIN 250 MCG: 250 TABLET ORAL at 11:35

## 2018-11-21 RX ADMIN — DILTIAZEM HYDROCHLORIDE 90 MG: 90 TABLET, FILM COATED ORAL at 04:56

## 2018-11-21 RX ADMIN — SODIUM CHLORIDE, PRESERVATIVE FREE 3 ML: 5 INJECTION INTRAVENOUS at 10:01

## 2018-11-21 RX ADMIN — METFORMIN HYDROCHLORIDE 1000 MG: 1000 TABLET ORAL at 09:00

## 2018-11-21 RX ADMIN — BUDESONIDE 0.5 MG: 0.5 INHALANT RESPIRATORY (INHALATION) at 09:26

## 2018-11-21 RX ADMIN — PREDNISONE 20 MG: 20 TABLET ORAL at 09:00

## 2018-11-21 RX ADMIN — INSULIN LISPRO 2 UNITS: 100 INJECTION, SOLUTION INTRAVENOUS; SUBCUTANEOUS at 09:00

## 2018-11-21 RX ADMIN — ATORVASTATIN CALCIUM 10 MG: 10 TABLET, FILM COATED ORAL at 09:00

## 2018-11-21 RX ADMIN — DILTIAZEM HYDROCHLORIDE 90 MG: 90 TABLET, FILM COATED ORAL at 11:35

## 2018-11-21 RX ADMIN — AMOXICILLIN AND CLAVULANATE POTASSIUM 1 TABLET: 875; 125 TABLET, FILM COATED ORAL at 09:00

## 2018-11-21 RX ADMIN — FOLIC ACID 1 MG: 1 TABLET ORAL at 09:00

## 2018-11-21 RX ADMIN — IPRATROPIUM BROMIDE AND ALBUTEROL SULFATE 3 ML: 2.5; .5 SOLUTION RESPIRATORY (INHALATION) at 12:41

## 2018-11-21 RX ADMIN — APIXABAN 5 MG: 5 TABLET, FILM COATED ORAL at 09:00

## 2018-11-22 ENCOUNTER — NURSE TRIAGE (OUTPATIENT)
Dept: CALL CENTER | Facility: HOSPITAL | Age: 78
End: 2018-11-22

## 2018-11-22 ENCOUNTER — READMISSION MANAGEMENT (OUTPATIENT)
Dept: CALL CENTER | Facility: HOSPITAL | Age: 78
End: 2018-11-22

## 2018-11-22 LAB
BACTERIA SPEC AEROBE CULT: NORMAL

## 2018-11-22 RX ORDER — FUROSEMIDE 40 MG/1
40 TABLET ORAL DAILY
Qty: 30 TABLET | Refills: 0
Start: 2018-11-22 | End: 2018-11-26 | Stop reason: SDUPTHER

## 2018-11-22 NOTE — TELEPHONE ENCOUNTER
Caller is the daughter, who states her father has developed swelling of  Feet and legs since last evening,  Has some shoirtness of air,  Dc from the hospital yesterday and feet were not swollen, uses a CPAP machine, last night he took the machine off in sleep, oxygen level  Dropped to 84,but now is 94 %    Reason for Disposition  • [1] Difficulty breathing with exertion (e.g., walking) AND [2] new onset or worsening    Additional Information  • Negative: Chest pain  • Negative: Followed an ankle injury  • Negative: Ankle pain is main symptom  • Negative: Swelling of calf or leg  • Negative: Difficulty breathing  • Negative: Entire foot is cool or blue in comparison to other side  • Negative: Fever  • Negative: Patient sounds very sick or weak to the triager  • Negative: [1] SEVERE pain (e.g., excruciating, unable to walk) AND [2] not improved after 2 hours of pain medicine  • Negative: [1] Can't move swollen joint at all AND [2] no fever  • Negative: [1] Redness AND [2] painful when touched AND [3] no fever  • Negative: [1] Red area or streak [2] large (> 2 in. or 5 cm)  • Negative: [1] Thigh or calf pain AND [2] only 1 side AND [3] present > 1 hour  • Negative: [1] Thigh, calf, or ankle swelling AND [2] only 1 side  • Negative: [1] Thigh, calf, or ankle swelling AND [2] bilateral AND [3] 1 side is more swollen  • Swelling of both ankles (i.e., pedal edema)  • Negative: Severe difficulty breathing (e.g., struggling for each breath, speaks in single words)  • Negative: Looks like a broken bone or dislocated joint (e.g., crooked or deformed)  • Negative: Sounds like a life-threatening emergency to the triager  • Negative: Chest pain  • Negative: Followed a leg injury  • Negative: [1] Small area of swelling AND [2] followed an insect bite to the area  • Negative: Swelling of one ankle joint  • Negative: Swelling of knee is main symptom  • Negative: Pregnant  • Negative: Postpartum (< 1 month since delivery)  •  "Negative: Difficulty breathing at rest  • Negative: Entire foot is cool or blue in comparison to other side  • Negative: [1] Can't walk or can barely walk AND [2] new onset    Answer Assessment - Initial Assessment Questions  1. LOCATION: \"Which joint is swollen?\"      Symptoms started yesterday and unable to get his shoes on, no reddness  2. ONSET: \"When did the swelling start?\"      Symptoms started yesterday  3. SIZE: \"How large is the swelling?\"      Unable to get shoes on ,  Some shortness of air  4. PAIN: \"Is there any pain?\" If so, ask: \"How bad is it?\" (Scale 1-10; or mild, moderate, severe)      No pain  5. CAUSE: \"What do you think caused the swollen joint?\"      unknown  6. OTHER SYMPTOMS: \"Do you have any other symptoms?\" (e.g., fever, chest pain, difficulty breathing, calf pain)      Has recently discharge  7. PREGNANCY: \"Is there any chance you are pregnant?\" \"When was your last menstrual period?\"      na    Answer Assessment - Initial Assessment Questions  1. ONSET: \"When did the swelling start?\" (e.g., minutes, hours, days)      Patient awaken with swelling of feet and legs and unable to wear shoes  2. LOCATION: \"What part of the leg is swollen?\"  \"Are both legs swollen or just one leg?\"      Feet and legs  3. SEVERITY: \"How bad is the swelling?\" (e.g., localized; mild, moderate, severe)   - Localized - small area of swelling localized to one leg   - MILD pedal edema - swelling limited to foot and ankle, pitting edema < 1/4 inch (6 mm) deep, rest and elevation eliminate most or all swelling   - MODERATE edema - swelling of lower leg to knee, pitting edema > 1/4 inch (6 mm) deep, rest and elevation only partially reduce swelling   - SEVERE edema - swelling extends above knee, facial or hand swelling present       severe  4. REDNESS: \"Does the swelling look red or infected?\"      none  5. PAIN: \"Is the swelling painful to touch?\" If so, ask: \"How painful is it?\"   (Scale 1-10; mild, moderate or " "severe)      none  6. FEVER: \"Do you have a fever?\" If so, ask: \"What is it, how was it measured, and when did it start?\"       afebrile  7. CAUSE: \"What do you think is causing the leg swelling?\"      unsure  8. MEDICAL HISTORY: \"Do you have a history of heart failure, kidney disease, liver failure, or cancer?\"      no  9. RECURRENT SYMPTOM: \"Have you had leg swelling before?\" If so, ask: \"When was the last time?\" \"What happened that time?\"      unknown  10. OTHER SYMPTOMS: \"Do you have any other symptoms?\" (e.g., chest pain, difficulty breathing)        Some shortness of air and productive cough  11. PREGNANCY: \"Is there any chance you are pregnant?\" \"When was your last menstrual period?\"        na    Protocols used: LEG SWELLING AND EDEMA-ADULT-AH, ANKLE SWELLING-ADULT-AH      "

## 2018-11-22 NOTE — OUTREACH NOTE
Prep Survey      Responses   Facility patient discharged from?  [facility]   Is patient eligible?  Yes   Discharge diagnosis  Acute on chronic respiratory failure with hypoxemia COPD   Does the patient have one of the following disease processes/diagnoses(primary or secondary)?  COPD/Pneumonia   Does the patient have Home health ordered?  Yes   What is the Home health agency?   [facility]   Is there a DME ordered?  No   Prep survey completed?  Yes          Jonny Correa RN

## 2018-11-22 NOTE — TELEPHONE ENCOUNTER
"Caller advised speak with Cardiologist about not being able to obtain medication at pharmacy. Caller states they are all telling me they have to order it and it will be day's. Caller advised call back if unable to reach.     Reason for Disposition  • [1] Prescription not at pharmacy AND [2] was prescribed today by PCP    Additional Information  • Negative: Drug overdose and nurse unable to answer question  • Negative: Caller requesting information not related to medicine  • Negative: Caller requesting a prescription for Strep throat and has a positive culture result  • Negative: Rash while taking a medication or within 3 days of stopping it  • Negative: Immunization reaction suspected  • Negative: [1] Asthma and [2] having symptoms of asthma (cough, wheezing, etc)  • Negative: MORE THAN A DOUBLE DOSE of a prescription or over-the-counter (OTC) drug  • Negative: [1] DOUBLE DOSE (an extra dose or lesser amount) of over-the-counter (OTC) drug AND [2] any symptoms (e.g., dizziness, nausea, pain, sleepiness)  • Negative: [1] DOUBLE DOSE (an extra dose or lesser amount) of prescription drug AND [2] any symptoms (e.g., dizziness, nausea, pain, sleepiness)  • Negative: Took another person's prescription drug  • Negative: [1] DOUBLE DOSE (an extra dose or lesser amount) of prescription drug AND [2] NO symptoms (Exception: a double dose of antibiotics)  • Negative: Diabetes drug error or overdose (e.g., insulin or extra dose)  • Negative: [1] Request for URGENT new prescription or refill of \"essential\" medication (i.e., likelihood of harm to patient if not taken) AND [2] triager unable to fill per unit policy    Answer Assessment - Initial Assessment Questions  1. SYMPTOMS: \"Do you have any symptoms?\"      Denies   2. SEVERITY: If symptoms are present, ask \"Are they mild, moderate or severe?\"      Denies states the pharmacy is out of [facility] what do I do?    Protocols used: MEDICATION QUESTION CALL-ADULT-      "

## 2018-11-23 ENCOUNTER — EPISODE CHANGES (OUTPATIENT)
Dept: SOCIAL WORK | Facility: HOSPITAL | Age: 78
End: 2018-11-23

## 2018-11-24 ENCOUNTER — TELEPHONE (OUTPATIENT)
Dept: CARDIOLOGY | Facility: CLINIC | Age: 78
End: 2018-11-24

## 2018-11-24 DIAGNOSIS — I50.9 ACUTE CONGESTIVE HEART FAILURE, UNSPECIFIED HEART FAILURE TYPE (HCC): Primary | ICD-10-CM

## 2018-11-24 NOTE — TELEPHONE ENCOUNTER
Ankit Mack Sr.  1940    Mr. Mack's daughter called on 11/22/18. He was discharged on 11/21/18. She reports that his feet are very swollen. This was noticed upon waking on 11/22/18. He does not have worsening dyspnea or chest pain.    She denies that Mr. Mack has eaten too much sodium.     He has no diuretics ordered but has received IV doses of lasix intermittently.    I spoke with Dr. Brooks about this patient. She ordered 40mg oral lasix. He was instructed to take a dose on 11/22 and 11/23. If edema was not improved, they are to call back this weekend.    Aruna HASSAN RN

## 2018-11-25 ENCOUNTER — READMISSION MANAGEMENT (OUTPATIENT)
Dept: CALL CENTER | Facility: HOSPITAL | Age: 78
End: 2018-11-25

## 2018-11-25 NOTE — OUTREACH NOTE
COPD/PN Week 1 Survey      Responses   Facility patient discharged from?  [place]   Does the patient have one of the following disease processes/diagnoses(primary or secondary)?  COPD/Pneumonia   Is there a successful TCM telephone encounter documented?  No   Was the primary reason for admission:  COPD exacerbation   Week 1 attempt successful?  Yes   Call start time  1508   Call end time  1514   Medication alerts for this patient  has  been placed on  water pill because feet were swollen   Meds reviewed with patient/caregiver?  Yes   Is the patient having any side effects they believe may be caused by any medication additions or changes?  No   Does the patient have all medications ordered at discharge?  Yes   Is the patient taking all medications as directed (includes completed medication regime)?  Yes   Comments regarding appointments  will be keeping his appointment   Does the patient have a primary care provider?   Yes   Does the patient have an appointment with their PCP or pulmonologist within 7 days of discharge?  Yes   Has the patient kept scheduled appointments due by today?  N/A   What is the Home health agency?   [facility]   Has home health visited the patient within 72 hours of discharge?  Yes   What DME was ordered?  Pt h as oxygen from [facility]   Comments  133/76 bp today and weight 236   Did the patient receive a copy of their discharge instructions?  Yes   Nursing interventions  Reviewed instructions with patient, Educated on MyChart   What is the patient's perception of their health status since discharge?  Improving   Are the patient's immunizations up to date?   Yes   Is the patient/caregiver able to teach back the hierarchy of who to call/visit for symptoms/problems? PCP, Specialist, Home health nurse, Urgent Care, ED, 911  Yes   Is the patient able to teach back COPD zones?  Yes   Patient reports what zone on this call?  Yellow Zone   Yellow Zone  Unable to complete daily activities, Increased  swelling of ankles   Yellow interventions  Continue to use daily medications, Use oxygen as ordered, Get plenty of rest   Week 1 call completed?  Yes   Wrap up additional comments  feels he is in the yellow zone, feet are swelling and weight today 236          Julieta Lewis RN

## 2018-11-26 ENCOUNTER — EPISODE CHANGES (OUTPATIENT)
Dept: CASE MANAGEMENT | Facility: OTHER | Age: 78
End: 2018-11-26

## 2018-11-26 DIAGNOSIS — F41.9 ANXIETY: ICD-10-CM

## 2018-11-27 RX ORDER — FUROSEMIDE 40 MG/1
TABLET ORAL
Qty: 30 TABLET | Refills: 0 | Status: SHIPPED | OUTPATIENT
Start: 2018-11-27 | End: 2018-11-29

## 2018-11-27 RX ORDER — ALPRAZOLAM 1 MG/1
TABLET ORAL
Qty: 90 TABLET | Refills: 0 | OUTPATIENT
Start: 2018-11-27

## 2018-11-28 NOTE — TELEPHONE ENCOUNTER
I did not place home health orders, they were placed by his PCP.    Please find out if they still need orders since hospital discharge.    If so, please as patient the name of the home health agency.        Thanks,  SCOTTY Choi

## 2018-11-29 ENCOUNTER — LAB (OUTPATIENT)
Dept: LAB | Facility: HOSPITAL | Age: 78
End: 2018-11-29
Attending: INTERNAL MEDICINE

## 2018-11-29 ENCOUNTER — OFFICE VISIT (OUTPATIENT)
Dept: CARDIOLOGY | Facility: CLINIC | Age: 78
End: 2018-11-29

## 2018-11-29 ENCOUNTER — OFFICE VISIT (OUTPATIENT)
Dept: SLEEP MEDICINE | Facility: HOSPITAL | Age: 78
End: 2018-11-29
Attending: INTERNAL MEDICINE

## 2018-11-29 VITALS
BODY MASS INDEX: 32.2 KG/M2 | SYSTOLIC BLOOD PRESSURE: 126 MMHG | HEIGHT: 70 IN | RESPIRATION RATE: 16 BRPM | WEIGHT: 224.9 LBS | HEART RATE: 65 BPM | DIASTOLIC BLOOD PRESSURE: 70 MMHG

## 2018-11-29 VITALS
HEART RATE: 74 BPM | OXYGEN SATURATION: 86 % | WEIGHT: 224 LBS | BODY MASS INDEX: 32.07 KG/M2 | HEIGHT: 70 IN | DIASTOLIC BLOOD PRESSURE: 61 MMHG | SYSTOLIC BLOOD PRESSURE: 133 MMHG

## 2018-11-29 DIAGNOSIS — J44.1 CHRONIC OBSTRUCTIVE PULMONARY DISEASE WITH ACUTE EXACERBATION (HCC): ICD-10-CM

## 2018-11-29 DIAGNOSIS — T82.390S: ICD-10-CM

## 2018-11-29 DIAGNOSIS — G47.33 OSA ON CPAP: Primary | ICD-10-CM

## 2018-11-29 DIAGNOSIS — G47.33 OBSTRUCTIVE SLEEP APNEA SYNDROME: ICD-10-CM

## 2018-11-29 DIAGNOSIS — I71.20 THORACIC AORTIC ANEURYSM WITHOUT RUPTURE (HCC): ICD-10-CM

## 2018-11-29 DIAGNOSIS — I50.9 ACUTE CONGESTIVE HEART FAILURE, UNSPECIFIED HEART FAILURE TYPE (HCC): ICD-10-CM

## 2018-11-29 DIAGNOSIS — I10 BENIGN ESSENTIAL HYPERTENSION: ICD-10-CM

## 2018-11-29 DIAGNOSIS — R53.1 WEAKNESS: ICD-10-CM

## 2018-11-29 DIAGNOSIS — E78.2 MIXED HYPERLIPIDEMIA: ICD-10-CM

## 2018-11-29 DIAGNOSIS — I48.19 PERSISTENT ATRIAL FIBRILLATION (HCC): Primary | ICD-10-CM

## 2018-11-29 DIAGNOSIS — Z99.89 OSA ON CPAP: Primary | ICD-10-CM

## 2018-11-29 LAB
ANION GAP SERPL CALCULATED.3IONS-SCNC: 8.3 MMOL/L
BUN BLD-MCNC: 23 MG/DL (ref 8–23)
BUN/CREAT SERPL: 21.1 (ref 7–25)
CALCIUM SPEC-SCNC: 9 MG/DL (ref 8.6–10.5)
CHLORIDE SERPL-SCNC: 103 MMOL/L (ref 98–107)
CO2 SERPL-SCNC: 33.7 MMOL/L (ref 22–29)
CREAT BLD-MCNC: 1.09 MG/DL (ref 0.76–1.27)
GFR SERPL CREATININE-BSD FRML MDRD: 65 ML/MIN/1.73
GLUCOSE BLD-MCNC: 162 MG/DL (ref 65–99)
POTASSIUM BLD-SCNC: 4.2 MMOL/L (ref 3.5–5.2)
SODIUM BLD-SCNC: 145 MMOL/L (ref 136–145)

## 2018-11-29 PROCEDURE — 99214 OFFICE O/P EST MOD 30 MIN: CPT | Performed by: INTERNAL MEDICINE

## 2018-11-29 PROCEDURE — 93000 ELECTROCARDIOGRAM COMPLETE: CPT | Performed by: INTERNAL MEDICINE

## 2018-11-29 PROCEDURE — 80048 BASIC METABOLIC PNL TOTAL CA: CPT

## 2018-11-29 PROCEDURE — 36415 COLL VENOUS BLD VENIPUNCTURE: CPT

## 2018-11-29 PROCEDURE — G0463 HOSPITAL OUTPT CLINIC VISIT: HCPCS

## 2018-11-29 RX ORDER — FUROSEMIDE 20 MG/1
20 TABLET ORAL DAILY
Qty: 90 TABLET | Refills: 3 | Status: SHIPPED | OUTPATIENT
Start: 2018-11-29 | End: 2019-07-16

## 2018-11-29 RX ORDER — DILTIAZEM HYDROCHLORIDE 240 MG/1
240 CAPSULE, EXTENDED RELEASE ORAL DAILY
Qty: 90 CAPSULE | Refills: 3 | Status: SHIPPED | OUTPATIENT
Start: 2018-11-29 | End: 2019-05-31 | Stop reason: HOSPADM

## 2018-11-29 RX ORDER — DIGOXIN 125 MCG
125 TABLET ORAL DAILY
Qty: 90 TABLET | Refills: 3 | Status: SHIPPED | OUTPATIENT
Start: 2018-11-29 | End: 2018-12-19

## 2018-11-29 NOTE — PROGRESS NOTES
SLEEP CLINIC FOLLOW UP PROGRESS NOTE.    [facility] SLEEP MEDICINE    Ankit Mack Sr.  78 y.o.  male  1940    PCP: Gopi Lagos MD   Ref Dr PARISA Alejandro. MD [facility]      Date of visit: 11/29/2018    INTERM HISTORY:  This is a 78 y.o. years old patient who has a history of sleep apnea and is on CPAP.  Patient reports good compliance with the device and has no problems.  He also has a very advanced COPD along with chronic hypoxic respiratory failure was on home oxygen.  He is on 4-5 L of oxygen and also uses oxygen at nighttime.  Recently he was admitted to the hospital with RSV pneumonia and feels that he is tired all the time.  He was my patient at [facility] for many years and now he follows Dr. Alejandro.  Patient reports that when he wakes up and checks his oxygen his oxygen reads in mid eighties even though he is using oxygen and the CPAP.    The Smart card download has been reviewed and shows the following..  Compliance;99 %  > 4 hr use, 92 %  Residual AHI: 3.1 /hr (normal less than 5)      PAST MEDICAL HISTORY:  · Obstructive sleep apnea  Past Medical History:   Diagnosis Date   • Acute congestive heart failure (CMS/HCC)    • Anxiety    • Atrial flutter with rapid ventricular response (CMS/HCC)    • Benign essential hypertension    • COPD (chronic obstructive pulmonary disease) (CMS/HCC)    • Essential hypertension    • H/O complete eye exam 06/2018   • Heart attack (CMS/HCC)    • History of chest x-ray 12/08/2013    RLL infiltrate   • History of pulmonary function tests 06/10/2014   • Hyperglycemia    • Hyperlipidemia    • Leukocytosis    • Lung cancer (CMS/HCC)    • Osteoarthritis, knee    • Persistent atrial fibrillation (CMS/HCC)    • Prostate cancer (CMS/HCC)    • Sleep apnea    • Thoracic aortic aneurysm without rupture (CMS/HCC)    • Thrombocytopenia (CMS/HCC)        MEDICATIONS:    Current Outpatient Medications:   •  albuterol (PROVENTIL HFA;VENTOLIN HFA) 108 (90 Base) MCG/ACT inhaler, Inhale  2 puffs Every 4 (Four) Hours As Needed for Wheezing., Disp: , Rfl:   •  albuterol (PROVENTIL) (2.5 MG/3ML) 0.083% nebulizer solution, Take 2.5 mg by nebulization 4 (Four) Times a Day., Disp: , Rfl:   •  ALPRAZolam (XANAX) 1 MG tablet, Take 1 tablet by mouth At Night As Needed for Anxiety., Disp: 90 tablet, Rfl: 0  •  apixaban (ELIQUIS) 5 MG tablet tablet, Take 1 tablet by mouth Every 12 (Twelve) Hours., Disp: 60 tablet, Rfl: 1  •  atorvastatin (LIPITOR) 10 MG tablet, Take 1 tablet by mouth Daily. For cholesterol, Disp: 90 tablet, Rfl: 1  •  budesonide (PULMICORT) 0.5 MG/2ML nebulizer solution, Inhale 0.5 mg Daily., Disp: , Rfl:   •  budesonide (PULMICORT) 0.5 MG/2ML nebulizer solution, Take 2 mL by nebulization 2 (Two) Times a Day., Disp: 120 each, Rfl: 1  •  digoxin (LANOXIN) 125 MCG tablet, Take 1 tablet by mouth Daily., Disp: 90 tablet, Rfl: 3  •  diltiazem XR (DILACOR XR) 240 MG 24 hr capsule, Take 1 capsule by mouth Daily., Disp: 90 capsule, Rfl: 3  •  folic acid (FOLVITE) 1 MG tablet, Take 1 mg by mouth Daily., Disp: , Rfl:   •  furosemide (LASIX) 20 MG tablet, Take 1 tablet by mouth Daily., Disp: 90 tablet, Rfl: 3  •  ipratropium (ATROVENT) 0.02 % nebulizer solution, Take 500 mcg by nebulization 2 (Two) Times a Day., Disp: , Rfl:   •  metFORMIN (GLUCOPHAGE) 1000 MG tablet, Take 1 tablet by mouth Daily With Breakfast., Disp: 30 tablet, Rfl: 0  •  metoprolol tartrate (LOPRESSOR) 25 MG tablet, Take 0.5 tablets by mouth Every 12 (Twelve) Hours., Disp: 30 tablet, Rfl: 5    Allergies   Allergen Reactions   • Erythromycin Itching       SOCIAL, FAMILY HISTORY: Medical records are reviewed and noted.    REVIEW OF SYSTEMS:   [place] Sleepiness Scale :Total score: 4   Snoring no  Feels refreshed after waking up: no  Morning headaches no  Nasal congestion no  Leg movements no    PHYSICAL EXAMINATION:  Vitals:    11/29/18 1419   BP: 133/61   BP Location: Left arm   Patient Position: Sitting   Pulse: 74   SpO2: (!) 86%  "  Weight: 102 kg (224 lb)   Height: 177.8 cm (70\")    Body mass index is 32.14 kg/m². Neck Circumference: 16.5 inches  HEENT: Unremarkable, pupils are round equal and reacting to light, nasal passage is clear   NECK: No lymphadenopathy, throat is clear, oral airway Mallampati class III  RESPRATORY SYSTEM: Breath sounds are equal on both sides and are normal, no wheezes or crackles  CARDIOVASULAR SYSTEM: Heart rate is regular without murmur  ABDOMEN: Soft, no ascites, no hepatosplenomegaly.  EXTREMITIES: No cyanosis, clubbing or edema       ASSESSMENT AND PLAN:  · Obstructive sleep apnea, patient is using the CPAP with good compliance for treatment of sleep apnea.  I have reviewed the smart card down load and discussed with the patient the download data and encouarged the patient to continue to use the CPAP. The residual AHI is acceptable.  The device is benefiting the patient.  The patient is also instructed to get the CPAP supplies from the DME company and see me in 1 year for follow-up.  The DME company is [employer]  · Obesity, I have discussed weight reduction and the health benefits.  I have also discuss the relationship between the weight and the sleep apnea and encouraged the patient to lose weight  · Chronic hypoxic respiratory failure on home oxygen.  I have asked her [employer] to do a nighttime oximetry on CPAP on oxygen at 4 L to see adequacy of oxygen.  · Patient to follow-up with the Dr. Alejandro at [facility] office       Amando Black MD, [facility]  Pulmonary, Critical Care and sleep Medicine                 "

## 2018-11-29 NOTE — PROGRESS NOTES
PATIENTINFORMATION    Date of Office Visit: 2018  Encounter Provider: Amna Gomez MD  Place of Service: [facility] CARDIOLOGY  Patient Name: Ankit Mack Sr.  : 1940    Subjective:     Encounter Date:2018      Patient ID: Ankit Mack Sr. is a 78 y.o. male.      History of Present Illness    Patient was admitted 18 after complaining of progressive shortness of breath.  He had been on steroids and Levaquin but had not started to feel better.  He presented to the ER  where it was noted that he had an increased BNP and pulmonary edema noted on chest x-ray.  He was treated with IV Lasix as well as IV steroids for COPD exacerbation.  Viral panel was positive for RSV.  An echocardiogram was obtained which showed normal LV systolic function with grade 1 diastolic dysfunction.  Patient had reported that he would eat out a lot and had eaten Chinese food 1-2 times a week for the previous several weeks.  He reported his hypertension had been well-controlled with medication for over 10 years.  He denied any chest pain, palpitations, syncope, or near-syncope. It was noted that patient diuresed 1200ml.  patient improved and was felt to be stable to go home.  He was instructed to follow-up outpatient in one week.     Echo 18  · Left ventricular systolic function is normal. Estimated EF = 58%.  · Left ventricular wall thickness is consistent with mild concentric hypertrophy.  · Left ventricular diastolic dysfunction (grade I) consistent with impaired relaxation.     He came to the office and saw Evelyn Méndez on 2018.  At that time he was markedly tachycardic.  Dr. Agustin looked at the EKG and felt like he was in rapid atrial flutter.  He was started on Eliquis and diltiazem.  He was admitted to the hospital on 2018 with RSV and acute respiratory failure.  I titrated his diltiazem and started him on digoxin and his heart rate  improved.  He was discharged on November 21, 2018.    He comes in today for his one week follow-up.  His EKG shows he has converted to sinus rhythm with a heart rate of 65 bpm.  He has improved in the sense that he is no longer swelling is much.  He has no chest pain.  He wears oxygen all the time and a CPAP at night.  He has no palpitations.  He cannot tell that he is back in a normal rhythm or when he was in atrial fibrillation.  He has poor energy which is his biggest complaint.  He nods off constantly during the day.  He is not exerting himself.  He feels weak.    Review of Systems   Constitution: Positive for malaise/fatigue and weight loss. Negative for fever and weight gain.   HENT: Negative for ear pain, hearing loss, nosebleeds and sore throat.    Eyes: Negative for double vision, pain, vision loss in left eye and vision loss in right eye.   Cardiovascular:        See history of present illness.   Respiratory: Positive for shortness of breath. Negative for cough, sleep disturbances due to breathing, snoring and wheezing.    Endocrine: Negative for cold intolerance, heat intolerance and polyuria.   Skin: Negative for itching, poor wound healing and rash.   Musculoskeletal: Positive for joint swelling. Negative for joint pain and myalgias.   Gastrointestinal: Negative for abdominal pain, diarrhea, hematochezia, nausea and vomiting.   Genitourinary: Positive for frequency. Negative for hematuria and hesitancy.   Neurological: Negative for numbness, paresthesias and seizures.   Psychiatric/Behavioral: Negative for depression. The patient is not nervous/anxious.            ECG 12 Lead  Date/Time: 11/29/2018 11:24 AM  Performed by: Amna Gomez MD  Authorized by: Amna Gomez MD   Comparison: compared with previous ECG from 11/29/2018  Comparison to previous ECG: No longer in atrial fibrillation  Rhythm: sinus rhythm  BPM: 65  Conduction: complete RBBB and LAFB  Clinical impression: abnormal  "ECG               Objective:     /70 (BP Location: Right arm, Patient Position: Sitting, Cuff Size: Adult)   Pulse 65   Resp 16   Ht 177.8 cm (70\")   Wt 102 kg (224 lb 14.4 oz)   BMI 32.27 kg/m²  Body mass index is 32.27 kg/m².     Physical Exam   Constitutional: He appears well-developed.   HENT:   Head: Normocephalic and atraumatic.   Eyes: Conjunctivae and lids are normal. Pupils are equal, round, and reactive to light. Lids are everted and swept, no foreign bodies found.   Neck: Normal range of motion. No JVD present. Carotid bruit is not present. No tracheal deviation present. No thyroid mass present.   Cardiovascular: Normal rate, regular rhythm and normal heart sounds.   Pulses:       Dorsalis pedis pulses are 2+ on the right side, and 2+ on the left side.   Pulmonary/Chest: Effort normal and breath sounds normal.   Abdominal: Normal appearance and bowel sounds are normal.   Musculoskeletal: Normal range of motion.   Neurological: He is alert. He has normal strength.   Skin: Skin is warm, dry and intact.   Psychiatric: He has a normal mood and affect. His behavior is normal.   Vitals reviewed.      Lab Review: I reviewed his lab work from today.  Kidney function stable.      Assessment/Plan:       1.  Persistent atrial fibrillation with rapid ventricular response.  He has converted to sinus rhythm.  He cannot tell the difference between when he is in sinus versus atrial fibrillation.  I'm going to decrease the digoxin to 0.125 mg once a day.  They are having trouble finding the 12 hour sustained-release diltiazem.  I'm going to switch that to 24 acting diltiazem 240 mg once a day.  He is anticoagulated with Eliquis.  Atrial Fibrillation and Atrial Flutter  Assessment  • The patient has paroxysmal atrial fibrillation  • This is non-valvular in etiology  • The patient's CHADS2-VASc score is 3  • A [id number] score of 2 or more is considered a high risk for a thromboembolic event    2.  Acute on " chronic respiratory failure with hypoxia/community acquired pneumonia/COPD. Resolved.   3.  Chronic diastolic congestive heart failure.  Volume getting better with being off steroids and being back in NSR.   4.  Obstructive sleep apnea.  CPAP  5.  Hypertension. Controlled.     Follow up with me in 4 weeks.     Orders Placed This Encounter   Procedures   • Ambulatory Referral to Physical Therapy Evaluate and treat; Full weight bearing     Referral Priority:   Routine     Referral Type:   Therapy     Referral Reason:   Specialty Services Required     Requested Specialty:   Physical Therapy     Number of Visits Requested:   1   • ECG 12 Lead     This order was created via procedure documentation        Discharge Medications           Accurate as of 11/29/18 12:42 PM. If you have any questions, ask your nurse or doctor.               New Medications      Instructions Start Date   diltiazem  MG 24 hr capsule  Commonly known as:  DILACOR XR  Replaces:  diltiaZEM  MG 12 hr capsule  Started by:  Amna Gomez MD   240 mg, Oral, Daily         Changes to Medications      Instructions Start Date   digoxin 125 MCG tablet  Commonly known as:  LANOXIN  What changed:    · medication strength  · how much to take  · when to take this  Changed by:  Amna Gomez MD   125 mcg, Oral, Daily      furosemide 20 MG tablet  Commonly known as:  LASIX  What changed:    · medication strength  · how much to take  Changed by:  Amna Gomez MD   20 mg, Oral, Daily         Continue These Medications      Instructions Start Date   albuterol (2.5 MG/3ML) 0.083% nebulizer solution  Commonly known as:  PROVENTIL   2.5 mg, Nebulization, 4 Times Daily - RT      albuterol 108 (90 Base) MCG/ACT inhaler  Commonly known as:  PROVENTIL HFA;VENTOLIN HFA;PROAIR HFA   2 puffs, Inhalation, Every 4 Hours PRN      ALPRAZolam 1 MG tablet  Commonly known as:  XANAX   1 mg, Oral, Nightly PRN      apixaban 5 MG tablet tablet  Commonly known  as:  ELIQUIS   5 mg, Oral, Every 12 Hours Scheduled      atorvastatin 10 MG tablet  Commonly known as:  LIPITOR   10 mg, Oral, Daily, For cholesterol      budesonide 0.5 MG/2ML nebulizer solution  Commonly known as:  PULMICORT   0.5 mg, Inhalation, Daily - RT      budesonide 0.5 MG/2ML nebulizer solution  Commonly known as:  PULMICORT   0.5 mg, Nebulization, 2 Times Daily - RT      folic acid 1 MG tablet  Commonly known as:  FOLVITE   1 mg, Oral, Daily      ipratropium 0.02 % nebulizer solution  Commonly known as:  ATROVENT   500 mcg, Nebulization, 2 Times Daily - RT      metFORMIN 1000 MG tablet  Commonly known as:  GLUCOPHAGE   1,000 mg, Oral, Daily With Breakfast      metoprolol tartrate 25 MG tablet  Commonly known as:  LOPRESSOR   12.5 mg, Oral, Every 12 Hours Scheduled         Stop These Medications    diltiaZEM  MG 12 hr capsule  Commonly known as:  CARDIZEM SR  Replaced by:  diltiazem  MG 24 hr capsule  Stopped by:  MD Amna Quintana MD  11/29/18  12:42 PM

## 2018-11-30 ENCOUNTER — TELEPHONE (OUTPATIENT)
Dept: CARDIOLOGY | Facility: CLINIC | Age: 78
End: 2018-11-30

## 2018-12-03 ENCOUNTER — READMISSION MANAGEMENT (OUTPATIENT)
Dept: CALL CENTER | Facility: HOSPITAL | Age: 78
End: 2018-12-03

## 2018-12-03 NOTE — OUTREACH NOTE
COPD/PN Week 2 Survey      Responses   Facility patient discharged from?  [place]   Does the patient have one of the following disease processes/diagnoses(primary or secondary)?  COPD/Pneumonia   Was the primary reason for admission:  COPD exacerbation   Week 2 attempt successful?  Yes   Call start time  1321   Discharge diagnosis  Acute on chronic respiratory failure with hypoxemia COPD   Meds reviewed with patient/caregiver?  Yes   Is the patient taking all medications as directed (includes completed medication regime)?  Yes   Medication comments  Cardiologist changed B/P medication   Has the patient kept scheduled appointments due by today?  Yes   What is the Home health agency?   [facility]   What DME was ordered?  Oxygen 24/7   Psychosocial issues?  No   What is the patient's perception of their health status since discharge?  Improving   Is the patient able to teach back COPD zones?  Yes   Patient reports what zone on this call?  Yellow Zone   Yellow Zone  Unable to complete daily activities   Yellow interventions  Continue to use daily medications, Use oxygen as ordered, Call provider immediatly if symptoms do not improve   Week 2 call completed?  Yes          Chelsy Valencia RN

## 2018-12-04 ENCOUNTER — OFFICE VISIT (OUTPATIENT)
Dept: FAMILY MEDICINE CLINIC | Facility: CLINIC | Age: 78
End: 2018-12-04

## 2018-12-04 VITALS
RESPIRATION RATE: 18 BRPM | BODY MASS INDEX: 32.07 KG/M2 | SYSTOLIC BLOOD PRESSURE: 142 MMHG | OXYGEN SATURATION: 93 % | HEIGHT: 70 IN | DIASTOLIC BLOOD PRESSURE: 73 MMHG | TEMPERATURE: 97.9 F | HEART RATE: 98 BPM | WEIGHT: 224 LBS

## 2018-12-04 DIAGNOSIS — I48.19 PERSISTENT ATRIAL FIBRILLATION (HCC): ICD-10-CM

## 2018-12-04 DIAGNOSIS — E11.9 TYPE 2 DIABETES MELLITUS WITHOUT COMPLICATION, WITHOUT LONG-TERM CURRENT USE OF INSULIN (HCC): ICD-10-CM

## 2018-12-04 DIAGNOSIS — F41.9 ANXIETY: ICD-10-CM

## 2018-12-04 DIAGNOSIS — Z09 HOSPITAL DISCHARGE FOLLOW-UP: ICD-10-CM

## 2018-12-04 DIAGNOSIS — E78.2 MIXED HYPERLIPIDEMIA: ICD-10-CM

## 2018-12-04 DIAGNOSIS — J44.1 CHRONIC OBSTRUCTIVE PULMONARY DISEASE WITH ACUTE EXACERBATION (HCC): Primary | ICD-10-CM

## 2018-12-04 PROCEDURE — 99214 OFFICE O/P EST MOD 30 MIN: CPT | Performed by: FAMILY MEDICINE

## 2018-12-04 RX ORDER — ATORVASTATIN CALCIUM 10 MG/1
10 TABLET, FILM COATED ORAL DAILY
Qty: 90 TABLET | Refills: 1 | Status: SHIPPED | OUTPATIENT
Start: 2018-12-04 | End: 2019-07-15 | Stop reason: SDUPTHER

## 2018-12-04 RX ORDER — ALPRAZOLAM 1 MG/1
1 TABLET ORAL NIGHTLY PRN
Qty: 90 TABLET | Refills: 0 | Status: SHIPPED | OUTPATIENT
Start: 2018-12-04 | End: 2019-03-10 | Stop reason: SDUPTHER

## 2018-12-04 NOTE — PROGRESS NOTES
Subjective   Ankit Mack Sr. is a 78 y.o. male.     CC: Hospital F/U for COPD Exacerbation and A.Fib.    History of Present Illness     Pt returns today after recent hospitalization for the above. That visit was as follows:    Discharge Summary        Date of Admission: 11/17/2018  Date of Discharge:  11/21/2018  Primary Care Physician: Gopi Lagos MD      Discharge Diagnosis:        Active Hospital Problems     Diagnosis Date Noted   • **Acute on chronic respiratory failure with hypoxemia (CMS/HCC) [J96.21] 11/17/2018   • Persistent atrial fibrillation (CMS/HCC) [I48.1] 11/17/2018   • Pneumonia due to infectious organism [J18.9] 11/17/2018   • Thrombocytopenia (CMS/HCC) [D69.6] 11/17/2018   • Hyperglycemia [R73.9] 11/17/2018   • COPD (chronic obstructive pulmonary disease) with acute bronchitis (CMS/HCC) [J44.0, J20.9] 05/27/2018   • Sleep apnea [G47.30] 05/22/2017   • Chronic obstructive pulmonary disease with acute exacerbation (CMS/HCC) [J44.1] 12/17/2013   • Hypertension [I10] 12/13/2012       Resolved Hospital Problems     Diagnosis Date Noted Date Resolved   • Hemoptysis [R04.2] 11/17/2018 11/21/2018         Presenting Problem/History of Present Illness:  Chronic respiratory acidosis [E87.2]  COPD exacerbation (CMS/HCC) [J44.1]  HCAP (healthcare-associated pneumonia) [J18.9]  Hemoptysis [R04.2]  Acute hypoxemic respiratory failure (CMS/HCC) [J96.01]  Atrial fibrillation, unspecified type (CMS/HCC) [I48.91]  Acute congestive heart failure, unspecified heart failure type (CMS/HCC) [I50.9]        Hospital Course:  The patient is a 78 y.o. male with a history of COPD, afib, and chronic diastolic CHF who presented with hemoptysis.  Please see admission H&P from 11/17/18 for further details.  He was found to have RSV with evidence of superimposed bacterial pneumonia.  He was started on antibiotics and bronchodilators as well as expectorants and steroids.  He steadily improved and was on his home oxygen dose  "of 4L by nasal cannula at the time of discharge.  He will continue his nebulizer treatments as well as finish his courses of steroids and antibiotics as an outpatient.  He will need to follow up with Dr. Alejandro at [facility] in 2 weeks.  He will also need follow up with Dr. Black in sleep clinic for his DOMINGO.  He did have issues with his atrial fibrillation with rapid ventricular response.  Dr. Gomez with [facility] evaluated the patient and increased his diltiazem as well as started him on digoxin.  His hear rate improved and was controlled at the time of discharge.  He will need to follow up with [facility] in clinic in a week.    He was also noted to be hyperglycemic with steroids.  His A1C was 6.5%.  Metformin was prescribed and he tolerated this well.  He is to follow this up with his PCP.          Current outpatient and discharge medications have been reconciled for the patient.  Reviewed by: Gopi Lagos MD    The following portions of the patient's history were reviewed and updated as appropriate: allergies, current medications, past family history, past medical history, past social history, past surgical history and problem list.    Review of Systems   Constitutional: Negative for activity change, chills, fatigue and fever.   Respiratory: Negative for cough and shortness of breath.    Cardiovascular: Negative for chest pain and palpitations.   Gastrointestinal: Negative for abdominal pain.   Endocrine: Negative for cold intolerance.   Psychiatric/Behavioral: Negative for behavioral problems and dysphoric mood. The patient is not nervous/anxious.      /73   Pulse 98   Temp 97.9 °F (36.6 °C) (Oral)   Resp 18   Ht 177.8 cm (70\")   Wt 102 kg (224 lb)   SpO2 93%   BMI 32.14 kg/m²     Objective   Physical Exam   Constitutional: He appears well-developed and well-nourished.   Neck: Neck supple. No thyromegaly present.   Cardiovascular: Normal rate. An irregularly irregular rhythm present.   No murmur " heard.  Pulmonary/Chest: Effort normal and breath sounds normal.   Abdominal: Bowel sounds are normal. There is no tenderness.   Psychiatric: He has a normal mood and affect. His behavior is normal.   Nursing note and vitals reviewed.  Hospital records reviewed with pt confirming HPI.    The patient has read and signed the [facility] Controlled Substance Contract.  I will continue to see patient for regular follow up appointments.  They are well controlled on their medication.  MARY has been reviewed by me and is updated every 3 months. The patient is aware of the potential for addiction and dependence.    Assessment/Plan   Ankit was seen today for hemoptysis, copd, atrial fibrillation and congestive heart failure.    Diagnoses and all orders for this visit:    Chronic obstructive pulmonary disease with acute exacerbation (CMS/HCC)    Persistent atrial fibrillation (CMS/HCC)    Hospital discharge follow-up    Type 2 diabetes mellitus without complication, without long-term current use of insulin (CMS/HCC)  -     metFORMIN (GLUCOPHAGE) 1000 MG tablet; Take 1 tablet by mouth Daily With Breakfast.    Anxiety  -     ALPRAZolam (XANAX) 1 MG tablet; Take 1 tablet by mouth At Night As Needed for Anxiety.    Mixed hyperlipidemia  -     atorvastatin (LIPITOR) 10 MG tablet; Take 1 tablet by mouth Daily. For cholesterol    Pt seeing pulmonary tomorrow and is getting Home Health.

## 2018-12-11 ENCOUNTER — READMISSION MANAGEMENT (OUTPATIENT)
Dept: CALL CENTER | Facility: HOSPITAL | Age: 78
End: 2018-12-11

## 2018-12-11 DIAGNOSIS — I48.92 ATRIAL FLUTTER WITH RAPID VENTRICULAR RESPONSE (HCC): ICD-10-CM

## 2018-12-11 NOTE — OUTREACH NOTE
COPD/PN Week 3 Survey      Responses   Facility patient discharged from?  [facility]   Does the patient have one of the following disease processes/diagnoses(primary or secondary)?  COPD/Pneumonia   Was the primary reason for admission:  COPD exacerbation   Week 3 attempt successful?  Yes   Call start time  1257   Call end time  1308   Discharge diagnosis  Acute on chronic respiratory failure with hypoxemia COPD   Meds reviewed with patient/caregiver?  Yes   Is the patient having any side effects they believe may be caused by any medication additions or changes?  Yes   Side effects comments   diarrhea but he has called the MD and is taking immodium   Does the patient have all medications ordered at discharge?  Yes   Is the patient taking all medications as directed (includes completed medication regime)?  Yes   Comments regarding appointments  will be keeping his appointment   Does the patient have a primary care provider?   Yes   Has the patient kept scheduled appointments due by today?  Yes   What is the Home health agency?   [facility]   What DME was ordered?  Oxygen 24/7,  3L during day and 4L at night   Psychosocial issues?  No   Did the patient receive a copy of their discharge instructions?  Yes   Nursing interventions  Reviewed instructions with patient   What is the patient's perception of their health status since discharge?  Improving   Nursing Interventions  Nurse provided patient education   Are the patient's immunizations up to date?   Yes   Is the patient/caregiver able to teach back the hierarchy of who to call/visit for symptoms/problems? PCP, Specialist, Home health nurse, Urgent Care, ED, 911  Yes   Is the patient able to teach back COPD zones?  Yes   Nursing interventions  Education provided on various zones   Patient reports what zone on this call?  Green Zone   Green Zone  Reports doing well, Breathing without shortness of breath, Sleeping well   Green Zone interventions:  Take daily medications, Use  oxygen as prescribed, Do not smoke, Avoid indoor/outdoor triggers   Week 3 call completed?  Yes   Wrap up additional comments  States he is doing pretty well. Has overnight pulse ox delivered and will do tonight.           Amna Hebert RN

## 2018-12-12 RX ORDER — DILTIAZEM HYDROCHLORIDE 60 MG/1
TABLET, FILM COATED ORAL
Qty: 120 TABLET | Refills: 0 | OUTPATIENT
Start: 2018-12-12

## 2018-12-13 ENCOUNTER — DOCUMENTATION (OUTPATIENT)
Dept: SLEEP MEDICINE | Facility: HOSPITAL | Age: 78
End: 2018-12-13

## 2018-12-13 NOTE — PROGRESS NOTES
Patient requested to switch from [facility] to [facility] as patient has had issues with [facility]. All required documents faxed to [facility] per patient request.     Note entered in for Mary Ann RUCKER

## 2018-12-14 ENCOUNTER — TELEPHONE (OUTPATIENT)
Dept: CARDIOLOGY | Facility: CLINIC | Age: 78
End: 2018-12-14

## 2018-12-14 NOTE — TELEPHONE ENCOUNTER
12/14/18  3:05 PM  Ankit Mack Sr.  1940    Home Phone [phone]   Mobile [phone]       Ankit Mack Sr. is a patient of   with history of Persistent A-fib, acute chronic respiratory failure with hypoxia, chronic diastolic congestive heart failure, DOIMNGO, hypertension. He was last seen in the office 11/29/18. Ivanna with [employer] , calls to report that the Pt has been salting his food with killian's seasoning and eating processed food. After some more education on heart healthy diet she is calling asking for recommendations due to her assessment revealing 2+ edema bilateral lower extremities and crackles in bilateral lower lungs. Pt baseline weight is 222, he has increased to 227 in just 2 days. I have left a message for her to contact me with vitals on the Pt, she is concerned with going into a weekend and would like to know if you would like to increase his diuretic    Atorvastatin 10 mg QD  Digoxin 125 mcg QD  Diltiazem 240 mg QD  Furosemide 20 mg QD  Eliquis 5 mg BID  Metoprolol Tart 25 mg, 1/2 tablet BID        Do they need medication adjustment?    Do they need to be seen ([facility]/ in office). The next available appointment is on (date/ time) with (provider).

## 2018-12-17 ENCOUNTER — TELEPHONE (OUTPATIENT)
Dept: CARDIOLOGY | Facility: CLINIC | Age: 78
End: 2018-12-17

## 2018-12-17 NOTE — TELEPHONE ENCOUNTER
Pt only took the increased dose of lasix on Saturday, he is down three pounds. He has +1 edema in bilateral lower extremities and he does not have any worsening shortness of breath.     Pt has an appointment with you on Wednesday.     Ivanna wants to know if you want the Pt to continue with the increased dose for today and tomorrow of to resume his normal dose?

## 2018-12-17 NOTE — TELEPHONE ENCOUNTER
I called and spoke with patient.  He reports that he only took the Lasix 3 times a day on Saturday because he forgot to take it 3 times a day on Sunday.  He only took it once on Sunday.    He does report that his shortness of breath and lower extremity edema have improved, though the lower extremity edema is still slightly present.    I instructed patient to take the Lasix 20mg twice a day both today and tomorrow and then keep his Wednesday follow-up appointment with Dr. Gomez as scheduled for further evaluation/instructions.    He will call sooner if he has any new, recurrent, or worsening symptoms or other problems/concerns.

## 2018-12-17 NOTE — TELEPHONE ENCOUNTER
Dr. Patricia Rivas was only rounding this morning and the Pt needs to have this addressed. She left at 12. Can you advise?

## 2018-12-18 ENCOUNTER — READMISSION MANAGEMENT (OUTPATIENT)
Dept: CALL CENTER | Facility: HOSPITAL | Age: 78
End: 2018-12-18

## 2018-12-18 NOTE — OUTREACH NOTE
COPD/PN Week 4 Survey      Responses   Facility patient discharged from?  [facility]   Does the patient have one of the following disease processes/diagnoses(primary or secondary)?  COPD/Pneumonia   Was the primary reason for admission:  COPD exacerbation   Week 4 attempt successful?  Yes   Call start time  1622   Call end time  1627   Discharge diagnosis  Acute on chronic respiratory failure with hypoxemia COPD   Meds reviewed with patient/caregiver?  Yes   Is the patient having any side effects they believe may be caused by any medication additions or changes?  Yes   Side effects comments   diarrhea but he has called the MD and is taking immodium   Is the patient taking all medications as directed (includes completed medication regime)?  Yes   Medication comments  Cardiologist changed B/P medication   Has the patient kept scheduled appointments due by today?  Yes   Is the patient still receiving Home Health Services?  N/A   Psychosocial issues?  No   What is the patient's perception of their health status since discharge?  Improving   Nursing Interventions  Nurse provided patient education   Are the patient's immunizations up to date?   Yes   Nursing interventions  Educated on importance of maintaining up to date immunizations as advised by provider   Is the patient/caregiver able to teach back the hierarchy of who to call/visit for symptoms/problems? PCP, Specialist, Home health nurse, Urgent Care, ED, 911  Yes   Is the patient able to teach back COPD zones?  Yes   Nursing interventions  Education provided on various zones   Patient reports what zone on this call?  Green Zone   Green Zone  Reports doing well, Breathing without shortness of breath, Sleeping well, Appetite is good, Usual activity and exercise level, Usual amount of phlegm/mucus without difficulty coughing up   Green Zone interventions:  Take daily medications, Use oxygen as prescribed, Do not smoke, Avoid indoor/outdoor triggers   Week 4 call  completed?  Yes   Would the patient like one additional call?  No   Graduated  Yes   Did the patient feel the follow up calls were helpful during their recovery period?  Yes   Was the number of calls appropriate?  Yes          Maximo Ochoa RN

## 2018-12-19 ENCOUNTER — PATIENT OUTREACH (OUTPATIENT)
Dept: CASE MANAGEMENT | Facility: OTHER | Age: 78
End: 2018-12-19

## 2018-12-19 ENCOUNTER — OFFICE VISIT (OUTPATIENT)
Dept: CARDIOLOGY | Facility: CLINIC | Age: 78
End: 2018-12-19

## 2018-12-19 ENCOUNTER — EPISODE CHANGES (OUTPATIENT)
Dept: CASE MANAGEMENT | Facility: OTHER | Age: 78
End: 2018-12-19

## 2018-12-19 VITALS
RESPIRATION RATE: 16 BRPM | HEART RATE: 77 BPM | SYSTOLIC BLOOD PRESSURE: 150 MMHG | BODY MASS INDEX: 32.33 KG/M2 | DIASTOLIC BLOOD PRESSURE: 90 MMHG | WEIGHT: 225.8 LBS | HEIGHT: 70 IN

## 2018-12-19 DIAGNOSIS — J44.0 COPD (CHRONIC OBSTRUCTIVE PULMONARY DISEASE) WITH ACUTE BRONCHITIS (HCC): ICD-10-CM

## 2018-12-19 DIAGNOSIS — J20.9 COPD (CHRONIC OBSTRUCTIVE PULMONARY DISEASE) WITH ACUTE BRONCHITIS (HCC): ICD-10-CM

## 2018-12-19 DIAGNOSIS — I10 ESSENTIAL HYPERTENSION: ICD-10-CM

## 2018-12-19 DIAGNOSIS — I48.19 PERSISTENT ATRIAL FIBRILLATION (HCC): Primary | ICD-10-CM

## 2018-12-19 DIAGNOSIS — I50.32 CHRONIC DIASTOLIC CONGESTIVE HEART FAILURE (HCC): ICD-10-CM

## 2018-12-19 PROBLEM — I50.9 ACUTE CONGESTIVE HEART FAILURE (HCC): Status: RESOLVED | Noted: 2018-11-04 | Resolved: 2018-12-19

## 2018-12-19 PROCEDURE — 99214 OFFICE O/P EST MOD 30 MIN: CPT | Performed by: INTERNAL MEDICINE

## 2018-12-19 PROCEDURE — 93000 ELECTROCARDIOGRAM COMPLETE: CPT | Performed by: INTERNAL MEDICINE

## 2018-12-19 NOTE — PROGRESS NOTES
PATIENTINFORMATION    Date of Office Visit: 2018  Encounter Provider: Amna Gomez MD  Place of Service: [facility]  Patient Name: Ankit Mack Sr.  : 1940    Subjective:     Encounter Date:2018      Patient ID: Ankit Mack Sr. is a 78 y.o. male.      History of Present Illness    Patient was admitted 18 after complaining of progressive shortness of breath.  He had been on steroids and Levaquin but had not started to feel better.  He presented to the ER  where it was noted that he had an increased BNP and pulmonary edema noted on chest x-ray.  He was treated with IV Lasix as well as IV steroids for COPD exacerbation.  Viral panel was positive for RSV.  An echocardiogram was obtained which showed normal LV systolic function with grade 1 diastolic dysfunction.  Patient had reported that he would eat out a lot and had eaten Chinese food 1-2 times a week for the previous several weeks.  He reported his hypertension had been well-controlled with medication for over 10 years.  He denied any chest pain, palpitations, syncope, or near-syncope. It was noted that patient diuresed 1200ml.  patient improved and was felt to be stable to go home.  He was instructed to follow-up outpatient in one week.     Echo 18  · Left ventricular systolic function is normal. Estimated EF = 58%.  · Left ventricular wall thickness is consistent with mild concentric hypertrophy.  · Left ventricular diastolic dysfunction (grade I) consistent with impaired relaxation.     He came to the office and saw Evelyn Méndez on 2018.  At that time he was markedly tachycardic.  Dr. Agustin looked at the EKG and felt like he was in rapid atrial flutter.  He was started on Eliquis and diltiazem.  He was admitted to the hospital on 2018 with RSV and acute respiratory failure.  I titrated his diltiazem and started him on digoxin and his heart rate  improved.  He was discharged on November 21, 2018.     He followed up with me in November 2018 and was back into normal rhythm.  He has back in again today and remains in a normal rhythm.  He says when he checks his blood pressure at home he gets a systolic in the 120s and diastolic in the 60s or 70s.  He never sees a fast heart rate when he checks his blood pressure.  His biggest complaint is fatigue.  He says he just doesn't have any energy.  He has some shortness of breath with walking.  He is on oxygen.  He has some mild right ankle edema but nothing severe.  He is anxious to start coming off of some of the medication he is taking particularly the Eliquis because of the cost     Review of Systems   Constitution: Positive for malaise/fatigue. Negative for fever, weight gain and weight loss.   HENT: Positive for hearing loss. Negative for ear pain, nosebleeds and sore throat.    Eyes: Negative for double vision, pain, vision loss in left eye and vision loss in right eye.   Cardiovascular: Positive for leg swelling.        See history of present illness.   Respiratory: Positive for shortness of breath. Negative for cough, sleep disturbances due to breathing, snoring and wheezing.    Endocrine: Negative for cold intolerance, heat intolerance and polyuria.   Skin: Positive for color change. Negative for itching, poor wound healing and rash.   Musculoskeletal: Negative for joint pain, joint swelling and myalgias.   Gastrointestinal: Negative for abdominal pain, diarrhea, hematochezia, nausea and vomiting.   Genitourinary: Negative for hematuria and hesitancy.   Neurological: Negative for numbness, paresthesias and seizures.   Psychiatric/Behavioral: Negative for depression. The patient is not nervous/anxious.            ECG 12 Lead  Date/Time: 12/19/2018 1:04 PM  Performed by: Amna Gomez MD  Authorized by: Amna Gomez MD   Comparison: compared with previous ECG from 11/29/2018  Similar to previous  "ECG  Rhythm: sinus rhythm  Rhythm comments:  Ectopic atrial rhythm  Conduction: right bundle branch block and LAFB  Clinical impression: abnormal ECG               Objective:     /90 (BP Location: Right arm, Patient Position: Sitting, Cuff Size: Adult)   Pulse 77   Resp 16   Ht 177.8 cm (70\")   Wt 102 kg (225 lb 12.8 oz)   BMI 32.40 kg/m²  Body mass index is 32.4 kg/m².     Physical Exam   Constitutional: He appears well-developed.   HENT:   Head: Normocephalic and atraumatic.   Eyes: Conjunctivae and lids are normal. Pupils are equal, round, and reactive to light. Lids are everted and swept, no foreign bodies found.   Neck: Normal range of motion. No JVD present. Carotid bruit is not present. No tracheal deviation present. No thyroid mass present.   Cardiovascular: Normal rate, regular rhythm and normal heart sounds.   Pulses:       Dorsalis pedis pulses are 2+ on the right side, and 2+ on the left side.   Pulmonary/Chest: Effort normal and breath sounds normal.   Abdominal: Normal appearance and bowel sounds are normal.   Musculoskeletal: Normal range of motion.   Neurological: He is alert. He has normal strength.   Skin: Skin is warm, dry and intact.   Psychiatric: He has a normal mood and affect. His behavior is normal.   Vitals reviewed.      Lab Review:       Assessment/Plan:       1.  Persistent atrial fibrillation with rapid ventricular response.  He has converted to sinus rhythm.   he was in atrial fibrillation/flutter when he was ill with an upper respiratory tract infection.  He has been in sinus rhythm the last 2 visits.  He wants to come off Eliquis because of cost.  I am going to have him wear a Zio patch.  If he does not have any recurrence of the A. fib, we can have a longer discussion on risks and benefits of anticoagulation.  If he does have paroxysms of A. fib, I will want him to stay on the medication.  I will discontinue digoxin today.  Continue with diltiazem.    Atrial Fibrillation " and Atrial Flutter  Assessment  • The patient has paroxysmal atrial fibrillation  • This is non-valvular in etiology  • The patient's CHADS2-VASc score is 3  • A [id number] score of 2 or more is considered a high risk for a thromboembolic event     2.  Acute on chronic respiratory failure with hypoxia/community acquired pneumonia/COPD. Resolved.   3.  Chronic diastolic congestive heart failure.  Volume getting better with being off steroids and being back in NSR.  he did note that he ate salt one time and he swelled  4.  Obstructive sleep apnea.  CPAP  5.  Hypertension. Controlled.     He will have a Zio patch placed today.  I will call him with the results and we can make a decision on anticoagulation.    FU with Evelyn in 3 months.        Orders Placed This Encounter   Procedures   • Holter Monitor - 72 Hour Up To 21 Days     Standing Status:   Future     Number of Occurrences:   1     Standing Expiration Date:   12/19/2019     Order Specific Question:   Reason for exam?     Answer:   AFib   • ECG 12 Lead     This order was created via procedure documentation        Discharge Medications           Accurate as of 12/19/18  1:29 PM. If you have any questions, ask your nurse or doctor.               Continue These Medications      Instructions Start Date   albuterol (2.5 MG/3ML) 0.083% nebulizer solution  Commonly known as:  PROVENTIL   2.5 mg, Nebulization, 4 Times Daily - RT      albuterol sulfate  (90 Base) MCG/ACT inhaler  Commonly known as:  PROVENTIL HFA;VENTOLIN HFA;PROAIR HFA   2 puffs, Inhalation, Every 4 Hours PRN      ALPRAZolam 1 MG tablet  Commonly known as:  XANAX   1 mg, Oral, Nightly PRN      apixaban 5 MG tablet tablet  Commonly known as:  ELIQUIS   5 mg, Oral, Every 12 Hours Scheduled      atorvastatin 10 MG tablet  Commonly known as:  LIPITOR   10 mg, Oral, Daily, For cholesterol      budesonide 0.5 MG/2ML nebulizer solution  Commonly known as:  PULMICORT   0.5 mg, Inhalation, Daily - RT       budesonide 0.5 MG/2ML nebulizer solution  Commonly known as:  PULMICORT   0.5 mg, Nebulization, 2 Times Daily - RT      diltiazem  MG 24 hr capsule  Commonly known as:  DILACOR XR   240 mg, Oral, Daily      folic acid 1 MG tablet  Commonly known as:  FOLVITE   1 mg, Oral, Daily      furosemide 20 MG tablet  Commonly known as:  LASIX   20 mg, Oral, Daily      ipratropium 0.02 % nebulizer solution  Commonly known as:  ATROVENT   500 mcg, Nebulization, 2 Times Daily - RT      metFORMIN 1000 MG tablet  Commonly known as:  GLUCOPHAGE   1,000 mg, Oral, Daily With Breakfast      metoprolol tartrate 25 MG tablet  Commonly known as:  LOPRESSOR   12.5 mg, Oral, Every 12 Hours Scheduled         Stop These Medications    digoxin 125 MCG tablet  Commonly known as:  LANOXIN  Stopped by:  MD Amna Quintana MD  12/19/18  1:29 PM

## 2018-12-31 ENCOUNTER — TELEPHONE (OUTPATIENT)
Dept: FAMILY MEDICINE CLINIC | Facility: CLINIC | Age: 78
End: 2018-12-31

## 2018-12-31 NOTE — TELEPHONE ENCOUNTER
Rosalina from [facility] calling for referral/order update.  Patient is scheduled for PT evaluate and treat 1/3/2019 needs new order dated for 1/3/2019 faxed to [phone]

## 2019-01-02 ENCOUNTER — TELEPHONE (OUTPATIENT)
Dept: FAMILY MEDICINE CLINIC | Facility: CLINIC | Age: 79
End: 2019-01-02

## 2019-01-02 DIAGNOSIS — R53.81 PHYSICAL DECONDITIONING: Primary | ICD-10-CM

## 2019-01-11 ENCOUNTER — TELEPHONE (OUTPATIENT)
Dept: CARDIOLOGY | Facility: CLINIC | Age: 79
End: 2019-01-11

## 2019-01-11 NOTE — TELEPHONE ENCOUNTER
Result Text   ·   A relatively benign monitor study.  · Occasional premature atrial and premature ventricular contractions were noted.89                     1.  Persistent atrial fibrillation with rapid ventricular response.  He has converted to sinus rhythm.   he was in atrial fibrillation/flutter when he was ill with an upper respiratory tract infection.  He has been in sinus rhythm the last 2 visits.  He wants to come off Eliquis because of cost.  I am going to have him wear a Zio patch.  If he does not have any recurrence of the A. fib, we can have a longer discussion on risks and benefits of anticoagulation.  If he does have paroxysms of A. fib, I will want him to stay on the medication.  I will discontinue digoxin today.  Continue with diltiazem.     Atrial Fibrillation and Atrial Flutter  Assessment  • The patient has paroxysmal atrial fibrillation  • This is non-valvular in etiology  • The patient's CHADS2-VASc score is 3  • A [id number] score of 2 or more is considered a high risk for a thromboembolic event     2.  Acute on chronic respiratory failure with hypoxia/community acquired pneumonia/COPD. Resolved.   3.  Chronic diastolic congestive heart failure.  Volume getting better with being off steroids and being back in NSR.  he did note that he ate salt one time and he swelled  4.  Obstructive sleep apnea.  CPAP  5.  Hypertension. Controlled.      He will have a Zio patch placed today.  I will call him with the results and we can make a decision on anticoagulation.      I called and spoke with him.  Monitor did not show any atrial fibrillation.  He would like to discontinue Eliquis.  He is going to do so when he runs out and then follow-up with us in March

## 2019-01-16 ENCOUNTER — PATIENT OUTREACH (OUTPATIENT)
Dept: CASE MANAGEMENT | Facility: OTHER | Age: 79
End: 2019-01-16

## 2019-01-16 NOTE — OUTREACH NOTE
"Care Management Plan 1/16/2019   Lifestyle Goals Fewer ER/urgent care visits;Increase physical activity;Routine follow-up with doctor(s)   Barriers Disease education   Self Management Medication Adherence;Increase Physical Activities   Annual Wellness Visit:  Care Coordinator Will Schedule   Does patient have depression diagnosis? No   Advanced Directives: Patient Has   Hospitalizations past 12 months 2 or 3   Medication Adherence Medications understood   Goal Progress Making Progress Toward Goal(s)   Readiness Scale 8   Confidence Scale 7   Health Literacy Good     The main concerns and/or symptoms the patient would like to address are: Pt. Reports he is \"feeling better than he has in the past 5 years\" Pt. Has good knowledge base for care of COPD \"I'll have it forever\" Pt. Has not smoked in greater than 5 years, he is watching his salt intake closely and has lost 25 lbs. He will be starting Physical therapy classes next week to help build his strength up. Reviewed need to schedule his AWV, he asked CA to schedule this for him. Pt given info on date for AWV he will be seen 5/15/19 at 2pm. Reviewed use of low salt diet and increased hydration. Pt. States he drinks \"lots of tea\" and can not drink water.    Education/instruction provided by Care Coordinator: Explained role of Care Advisor and contact information given to patient.Nurse provided patient education.No other questions or concerns voiced at this time.    Follow Up Outreach Due: 3 weeks    Demetria Licona RN    "

## 2019-01-18 RX ORDER — FUROSEMIDE 40 MG/1
TABLET ORAL
Qty: 30 TABLET | Refills: 0 | Status: SHIPPED | OUTPATIENT
Start: 2019-01-18 | End: 2019-02-21 | Stop reason: SDUPTHER

## 2019-01-28 ENCOUNTER — APPOINTMENT (OUTPATIENT)
Dept: GENERAL RADIOLOGY | Facility: HOSPITAL | Age: 79
End: 2019-01-28

## 2019-01-28 PROCEDURE — 71046 X-RAY EXAM CHEST 2 VIEWS: CPT | Performed by: FAMILY MEDICINE

## 2019-02-06 ENCOUNTER — PATIENT OUTREACH (OUTPATIENT)
Dept: CASE MANAGEMENT | Facility: OTHER | Age: 79
End: 2019-02-06

## 2019-02-06 NOTE — OUTREACH NOTE
"Pt. Reports he was seen 1/28/19 at an  and diagnosed with an URI. He continues to take his antibiotic and steroid regimen prescribed 1/28/19. He continues to have a cough but it is not productive each time he coughs, sputum color is light yellow. Pt. States he is better but \"can't knock this stuff\" and plans to make an appointment with his PCP if he is not feeling any better by this afternoon. He continues to wear his oxygen at 3 LPM continuous. Nurse provided patient education.No other questions or concerns voiced at this time.  "

## 2019-02-08 ENCOUNTER — PATIENT OUTREACH (OUTPATIENT)
Dept: CASE MANAGEMENT | Facility: OTHER | Age: 79
End: 2019-02-08

## 2019-02-08 NOTE — OUTREACH NOTE
"Pt. Continues to cough and sounds congested, encouraged him to see PCP or Dr. Alejandro. Pt. Is using his prescribed mini-neb treatments and inhalers. Pt. States \"he has done this a long time and this is the best it gets\" Pt. Knows to seek help if condition worsens. Nurse provided patient education.No other questions or concerns voiced at this time.  "

## 2019-02-15 ENCOUNTER — PATIENT OUTREACH (OUTPATIENT)
Dept: CASE MANAGEMENT | Facility: OTHER | Age: 79
End: 2019-02-15

## 2019-02-15 NOTE — OUTREACH NOTE
"Pt. Reports he feels \"so much better\" he is no longer coughing and does not feel as SOA. Reviewed use of Mini-neb treatments and mouth care after treatments. Pt. Continues to use Oxygen 24/7 but does not have humidified air. He c/o of nostrils feeling dry and his nasal cannula's feeling hard. Teaching done on maintenance of concentrator and need to call [facility] for more supplies. Pt. states he is in the process of getting [employer] to take over his O2 needs. But will call [facility] for supplies.Nurse provided patient education.No other questions or concerns voiced at this time.  AWV is scheduled for 5/15/19, ACP on file.   "

## 2019-02-21 ENCOUNTER — OFFICE VISIT (OUTPATIENT)
Dept: FAMILY MEDICINE CLINIC | Facility: CLINIC | Age: 79
End: 2019-02-21

## 2019-02-21 VITALS
HEIGHT: 70 IN | BODY MASS INDEX: 32.5 KG/M2 | DIASTOLIC BLOOD PRESSURE: 92 MMHG | OXYGEN SATURATION: 95 % | RESPIRATION RATE: 16 BRPM | HEART RATE: 84 BPM | WEIGHT: 227 LBS | TEMPERATURE: 97.5 F | SYSTOLIC BLOOD PRESSURE: 150 MMHG

## 2019-02-21 DIAGNOSIS — J20.9 COPD (CHRONIC OBSTRUCTIVE PULMONARY DISEASE) WITH ACUTE BRONCHITIS (HCC): Primary | ICD-10-CM

## 2019-02-21 DIAGNOSIS — J44.0 COPD (CHRONIC OBSTRUCTIVE PULMONARY DISEASE) WITH ACUTE BRONCHITIS (HCC): Primary | ICD-10-CM

## 2019-02-21 PROCEDURE — 99214 OFFICE O/P EST MOD 30 MIN: CPT | Performed by: FAMILY MEDICINE

## 2019-02-21 RX ORDER — PREDNISONE 20 MG/1
TABLET ORAL
Qty: 20 TABLET | Refills: 0 | Status: SHIPPED | OUTPATIENT
Start: 2019-02-21 | End: 2019-03-12

## 2019-02-21 RX ORDER — LEVOFLOXACIN 500 MG/1
500 TABLET, FILM COATED ORAL DAILY
Qty: 10 TABLET | Refills: 0 | Status: SHIPPED | OUTPATIENT
Start: 2019-02-21 | End: 2019-03-03

## 2019-02-21 RX ORDER — FUROSEMIDE 40 MG/1
TABLET ORAL
Qty: 30 TABLET | Refills: 0 | Status: SHIPPED | OUTPATIENT
Start: 2019-02-21 | End: 2019-03-12 | Stop reason: DRUGHIGH

## 2019-02-21 NOTE — PROGRESS NOTES
Subjective   Ankit Mack Maria is a 79 y.o. male.     CC: Illness    History of Present Illness     Pt with COPD, sees pulmonary, comes in today with a several week h/o increasing cough and dyspnea and some chills. Denies fever or other sx.  Uses NC O2 at 3 liters.    The following portions of the patient's history were reviewed and updated as appropriate: allergies, current medications, past family history, past medical history, past social history, past surgical history and problem list.    Review of Systems   Constitutional: Negative for activity change, chills, fatigue and fever.   HENT: Positive for congestion.    Respiratory: Positive for cough, shortness of breath and wheezing.    Cardiovascular: Negative for chest pain and palpitations.   Gastrointestinal: Negative for abdominal pain.   Endocrine: Negative for cold intolerance.   Psychiatric/Behavioral: Negative for behavioral problems and dysphoric mood. The patient is not nervous/anxious.        Objective   Physical Exam   Constitutional: He appears well-developed and well-nourished. No distress.   Neck: Neck supple. No thyromegaly present.   Cardiovascular: Normal rate and regular rhythm.   No murmur heard.  Pulmonary/Chest: Effort normal. He has wheezes (faint, diffuse). He has no rales.   Abdominal: Bowel sounds are normal. There is no tenderness.   Skin: He is not diaphoretic.   Psychiatric: He has a normal mood and affect. His behavior is normal.   Nursing note and vitals reviewed.      Assessment/Plan   Ankit was seen today for nasal congestion, sinusitis, cough, copd and chest pressure.    Diagnoses and all orders for this visit:    COPD (chronic obstructive pulmonary disease) with acute bronchitis (CMS/[facility])  -     predniSONE (DELTASONE) 20 MG tablet; Take 3 tabs QDPC x 3 days then 2 tabs QDPC x 4 days then 1 tab QDPC x 3 days  -     levoFLOXacin (LEVAQUIN) 500 MG tablet; Take 1 tablet by mouth Daily for 10 days.  -     Ambulatory Referral to  Pulmonology    Pt encouraged to go to the ED is ANY worsening of sx. Pt and daughter agree. Pt to use his MN machine q6 right now.

## 2019-03-01 ENCOUNTER — PATIENT OUTREACH (OUTPATIENT)
Dept: CASE MANAGEMENT | Facility: OTHER | Age: 79
End: 2019-03-01

## 2019-03-04 ENCOUNTER — PATIENT OUTREACH (OUTPATIENT)
Dept: CASE MANAGEMENT | Facility: OTHER | Age: 79
End: 2019-03-04

## 2019-03-10 DIAGNOSIS — F41.9 ANXIETY: ICD-10-CM

## 2019-03-11 RX ORDER — ALPRAZOLAM 1 MG/1
1 TABLET ORAL NIGHTLY PRN
Qty: 90 TABLET | Refills: 0 | Status: SHIPPED | OUTPATIENT
Start: 2019-03-11 | End: 2019-05-15 | Stop reason: SDUPTHER

## 2019-03-12 ENCOUNTER — OFFICE VISIT (OUTPATIENT)
Dept: CARDIOLOGY | Facility: CLINIC | Age: 79
End: 2019-03-12

## 2019-03-12 VITALS
DIASTOLIC BLOOD PRESSURE: 70 MMHG | HEART RATE: 75 BPM | HEIGHT: 70 IN | BODY MASS INDEX: 31.92 KG/M2 | WEIGHT: 223 LBS | SYSTOLIC BLOOD PRESSURE: 130 MMHG

## 2019-03-12 DIAGNOSIS — E11.9 TYPE 2 DIABETES MELLITUS WITHOUT COMPLICATION, WITHOUT LONG-TERM CURRENT USE OF INSULIN (HCC): ICD-10-CM

## 2019-03-12 DIAGNOSIS — G47.33 OSA ON CPAP: ICD-10-CM

## 2019-03-12 DIAGNOSIS — I10 ESSENTIAL HYPERTENSION: ICD-10-CM

## 2019-03-12 DIAGNOSIS — Z87.09 HISTORY OF COPD: ICD-10-CM

## 2019-03-12 DIAGNOSIS — I48.0 PAROXYSMAL ATRIAL FIBRILLATION (HCC): Primary | ICD-10-CM

## 2019-03-12 DIAGNOSIS — Z99.89 OSA ON CPAP: ICD-10-CM

## 2019-03-12 PROCEDURE — 99214 OFFICE O/P EST MOD 30 MIN: CPT | Performed by: NURSE PRACTITIONER

## 2019-03-12 PROCEDURE — 93000 ELECTROCARDIOGRAM COMPLETE: CPT | Performed by: NURSE PRACTITIONER

## 2019-03-12 NOTE — PROGRESS NOTES
Date of Office Visit: 2019  Encounter Provider: Cheri Méndez, MARCIAL, APRN  Place of Service: [facility] CARDIOLOGY  Patient Name: Ankit Mack Sr.  :1940        Subjective:     Chief Complaint:  Follow-up, paroxysmal atrial fibrillation, hypertension,      History of Present Illness:  Ankti Mack Sr. is a 79 y.o. male patient of Dr. Gomez.  I am seeing this patient in the office today and I have reviewed his records.     Patient has a history of COPD, paroxysmal atrial fibrillation, diastolic CHF, sleep apnea on CPAP therapy, hypertension, thoracic aortic aneurysm status post stent grafting in [place], [place].     Patient was admitted 18 after complaining of progressive shortness of breath.  He had been on steroids and Levaquin but had not started to feel better.  He presented to the ER  where it was noted that he had an increased BNP and pulmonary edema noted on chest x-ray.  He was treated with IV Lasix as well as IV steroids for COPD exacerbation.  Viral panel was positive for RSV.  An echocardiogram was obtained which showed normal LV systolic function with grade 1 diastolic dysfunction.  Patient had reported that he would eat out a lot and had eaten Chinese food 1-2 times a week for the previous several weeks.  He reported his hypertension had been well-controlled with medication for over 10 years.  He denied any chest pain, palpitations, syncope, or near-syncope. It was noted that patient diuresed 1200ml.  patient improved and was felt to be stable to go home.  He was instructed to follow-up outpatient in one week.     Echo 18  · Left ventricular systolic function is normal. Estimated EF = 58%.  · Left ventricular wall thickness is consistent with mild concentric hypertrophy.  · Left ventricular diastolic dysfunction (grade I) consistent with impaired relaxation.     When patient came in for his 18 office visit he had market tachycardia.   Dr. Agustin reviewed the EKG and felt that he was in rapid atrial flutter.  He was started on Eliquis and diltiazem.  He ended up being admitted 11/17/18 with RSV and acute respiratory failure.  His diltiazem was titrated.  He was started on digoxin and heart rate improved.  He was discharged 11/21/18.    Patient was seen back in office by Dr. Gomez November 2018 and he was back in normal sinus rhythm.  He was seen again 12/19/18 and was still in normal rhythm.  Blood pressure at home had been reported to be staying 120s over 60s or 70s.  He did not see any elevated heart rates when checking blood pressure.  Fatigue was his biggest complaint.  He had shortness of breath with walking was on home oxygen.  He had some mild right ankle swelling, but nothing severe.  He was wanting to stop Eliquis because the cost as well as other medications.  His digoxin was stopped and he was continued on diltiazem.  He had a Zio patch ordered with plans to stop the Eliquis if there are no signs of atrial fibrillation on it.    Zio patch did not show any signs of atrial fibrillation.  Patient wanted to discontinue Eliquis so he decided to do so when he ran out.      Patient presents to office today for follow-up appointment.  Family member with patient in the office today, per patient preference.  Patient reports he has been doing okay overall since last visit.  He reports he recently saw his pulmonologist who told him that he does not need to be seen for a year, so he was happy with this news.  He will of course follow-up sooner if he develops any new or worsening symptoms.  He has chronic shortness of breath which she reports is not any worse since last visit.  He remains on 3 L home oxygen.  Chronic fatigue, also about the same.  History of sleep apnea using CPAP with oxygen every night.  He denies chest pain, palpitations, racing heartbeat sensation, lower extremity edema, dizziness, syncope, near syncope, falls, or abnormal  bleeding.  Unfortunately does have some heel pain and will discuss this with his primary care provider.  He reports that for the last few months we will get an occasional lower left-sided chest pressure that occurs at the lower left chest/upper abdominal area on his left side.  He does not know of any associated symptoms or factors or alleviating or worsening factors.  He does have a history of left lower lung lobe being removed due to cancer.  He will follow-up with primary care provider to rule out other causes.  If he has any reproducible or develops atypical chest discomfort symptoms he will notify the office right away.  Blood pressure well controlled in the office today.  Patient remains in sinus rhythm.    Patient to follow-up with Dr. Gomez in 3 months or sooner if needed for any new, recurrent, or worsening symptoms or other problems/concerns.        Past Medical History:   Diagnosis Date   • Acute congestive heart failure (CMS/HCC)    • Anxiety    • Atrial flutter with rapid ventricular response (CMS/HCC)    • Benign essential hypertension    • COPD (chronic obstructive pulmonary disease) (CMS/HCC)    • Essential hypertension    • H/O complete eye exam 06/2018   • Heart attack (CMS/HCC)    • History of chest x-ray 12/08/2013    RLL infiltrate   • History of pulmonary function tests 06/10/2014   • Hyperglycemia    • Hyperlipidemia    • Leukocytosis    • Lung cancer (CMS/HCC)    • Osteoarthritis, knee    • Persistent atrial fibrillation (CMS/HCC)    • Prostate cancer (CMS/HCC)    • Sleep apnea    • Thoracic aortic aneurysm without rupture (CMS/HCC)    • Thrombocytopenia (CMS/HCC)      Past Surgical History:   Procedure Laterality Date   • ABDOMINAL AORTIC ANEURYSM REPAIR  2010    Dr. Adarsh Dennis   • CATARACT EXTRACTION  10/2014    also 11/14   • COLONOSCOPY     • JOINT REPLACEMENT Left 10/2010    knee; Dr. Wolf   • JOINT REPLACEMENT Right 09/2011    knee; Dr. Wolf   • LUNG LOBECTOMY Left 01/16/2012    LLL;  Dr. Mendoza   • LUNG LOBECTOMY Left 2012   • PROSTATE SURGERY  2000    Dr. Naranjo     Outpatient Medications Prior to Visit   Medication Sig Dispense Refill   • albuterol (PROVENTIL HFA;VENTOLIN HFA) 108 (90 Base) MCG/ACT inhaler Inhale 2 puffs Every 4 (Four) Hours As Needed for Wheezing.     • albuterol (PROVENTIL) (2.5 MG/3ML) 0.083% nebulizer solution Take 2.5 mg by nebulization 4 (Four) Times a Day.     • ALPRAZolam (XANAX) 1 MG tablet TAKE 1 TABLET BY MOUTH AT NIGHT AS NEEDED FOR ANXIETY. 90 tablet 0   • atorvastatin (LIPITOR) 10 MG tablet Take 1 tablet by mouth Daily. For cholesterol 90 tablet 1   • budesonide (PULMICORT) 0.5 MG/2ML nebulizer solution Inhale 0.5 mg As Needed.     • budesonide (PULMICORT) 0.5 MG/2ML nebulizer solution Take 2 mL by nebulization 2 (Two) Times a Day. 120 each 1   • diltiazem XR (DILACOR XR) 240 MG 24 hr capsule Take 1 capsule by mouth Daily. 90 capsule 3   • folic acid (FOLVITE) 1 MG tablet Take 1 mg by mouth Daily.     • furosemide (LASIX) 20 MG tablet Take 1 tablet by mouth Daily. 90 tablet 3   • metFORMIN (GLUCOPHAGE) 1000 MG tablet Take 1 tablet by mouth Daily With Breakfast. 90 tablet 1   • metoprolol tartrate (LOPRESSOR) 25 MG tablet Take 0.5 tablets by mouth Every 12 (Twelve) Hours. 30 tablet 5   • O2 (OXYGEN) Inhale 3 L/min 1 (One) Time. Patient using 3-4 liters, depending on activity level     • apixaban (ELIQUIS) 5 MG tablet tablet Take 1 tablet by mouth Every 12 (Twelve) Hours. 60 tablet 1   • cefdinir (OMNICEF) 300 MG capsule Take 1 capsule by mouth 2 (Two) Times a Day. 20 capsule 0   • furosemide (LASIX) 40 MG tablet TAKE 1 TABLET BY MOUTH EVERY DAY 30 tablet 0   • ipratropium (ATROVENT) 0.02 % nebulizer solution Take 500 mcg by nebulization 2 (Two) Times a Day.     • predniSONE (DELTASONE) 20 MG tablet Take 3 tabs QDPC x 3 days then 2 tabs QDPC x 4 days then 1 tab QDPC x 3 days 20 tablet 0     No facility-administered medications prior to visit.        Allergies as  of 03/12/2019 - Reviewed 03/12/2019   Allergen Reaction Noted   • Erythromycin Itching 11/22/2017     Social History     Socioeconomic History   • Marital status: Single     Spouse name: Not on file   • Number of children: Not on file   • Years of education: Not on file   • Highest education level: Not on file   Social Needs   • Financial resource strain: Not on file   • Food insecurity - worry: Not on file   • Food insecurity - inability: Not on file   • Transportation needs - medical: Not on file   • Transportation needs - non-medical: Not on file   Occupational History   • Not on file   Tobacco Use   • Smoking status: Former Smoker     Packs/day: 1.00   • Smokeless tobacco: Never Used   Substance and Sexual Activity   • Alcohol use: No     Frequency: Never     Comment: rare   • Drug use: No   • Sexual activity: Defer   Other Topics Concern   • Not on file   Social History Narrative   • Not on file     Family History   Problem Relation Age of Onset   • Cancer Mother    • Cancer Father         lung   • Cancer Other         colon   • Diabetes Other    • Emphysema Other    • Hypertension Other    • Kidney disease Other    • Lung cancer Other        Review of Systems   Constitution: Positive for malaise/fatigue. Negative for chills, fever, weight gain and weight loss.   HENT: Positive for hearing loss. Negative for ear pain, nosebleeds and sore throat.    Eyes: Negative for blurred vision, double vision, redness, vision loss in left eye, vision loss in right eye and visual disturbance.   Cardiovascular: Positive for dyspnea on exertion.   Respiratory: Positive for shortness of breath. Negative for cough, snoring and wheezing.    Endocrine: Negative for cold intolerance and heat intolerance.   Hematologic/Lymphatic: Negative for bleeding problem.   Skin: Positive for itching. Negative for rash and suspicious lesions.   Musculoskeletal: Negative for falls, joint pain, joint swelling and myalgias.   Gastrointestinal:  "Negative for abdominal pain, diarrhea, hematemesis, melena, nausea and vomiting.   Genitourinary: Negative for dysuria, frequency and hematuria.   Neurological: Negative for dizziness, headaches, numbness, paresthesias and seizures.   Psychiatric/Behavioral: Negative for altered mental status and depression. The patient is not nervous/anxious.           Objective:     Vitals:    03/12/19 1347   BP: 130/70   BP Location: Left arm   Pulse: 75   Weight: 101 kg (223 lb)   Height: 177.8 cm (70\")     Body mass index is 32 kg/m².      PHYSICAL EXAM:  Physical Exam   Constitutional: He is oriented to person, place, and time. He appears well-developed and well-nourished. No distress.   Obese   HENT:   Head: Normocephalic and atraumatic.   NC O2 in place   Eyes: Pupils are equal, round, and reactive to light.   Neck: Neck supple. No JVD present. Carotid bruit is not present. No tracheal deviation present.   Cardiovascular: Normal rate, regular rhythm, normal heart sounds and intact distal pulses. Exam reveals no gallop and no friction rub.   No murmur heard.  Pulses:       Radial pulses are 2+ on the right side, and 2+ on the left side.        Posterior tibial pulses are 2+ on the right side, and 2+ on the left side.   Pulmonary/Chest: Effort normal and breath sounds normal. No respiratory distress. He has no wheezes. He has no rales.   Abdominal: Soft. Bowel sounds are normal. He exhibits no distension. There is no tenderness.   Musculoskeletal: He exhibits no edema, tenderness or deformity.   Neurological: He is alert and oriented to person, place, and time.   Skin: Skin is warm and dry. No rash noted. He is not diaphoretic. No erythema.   Psychiatric: He has a normal mood and affect. His behavior is normal. Judgment normal.           ECG 12 Lead  Date/Time: 3/13/2019 8:59 AM  Performed by: Cheri Méndez DNP, APRN  Authorized by: Cheri Méndez DNP, APRN   Comparison: compared with previous ECG from " 12/19/2018  Similar to previous ECG  Rhythm: sinus rhythm  Rate: normal  BPM: 75  Conduction: incomplete right bundle branch block and left anterior fascicular block  Conduction comments:  ms    Clinical impression: abnormal EKG              Assessment:       Diagnosis Plan   1. Paroxysmal atrial fibrillation (CMS/HCC)     2. Essential hypertension     3. DOMINGO on CPAP     4. History of COPD     5. Type 2 diabetes mellitus without complication, without long-term current use of insulin (CMS/HCC)           Plan:     1. Paroxysmal atrial fibrillation: Patient remains in sinus rhythm today.  Denies any palpitations or racing heartbeat sensation.    Atrial Fibrillation and Atrial Flutter  Assessment  • The patient has paroxysmal atrial fibrillation  • This is non-valvular in etiology  • The patient's CHADS2-VASc score is 4  • A [id number] score of 2 or more is considered a high risk for a thromboembolic event    2. Intermittent lower left-sided chest pressure/upper abdominal pressure: Reports it is right where he had his left lower lung lobectomy in the past.  Not exertional or worse with activities.  No known associated factors or alleviating or relieving factors.  Has been occurring for at least the last few months.  Sounds atypical for cardiac pain.  Follow-up with PCP and pulmonology.  Patient to notify the office if symptoms become reproducible or exertional or occurring more often.  We will continue to monitor at this time  3. History of respiratory failure: Resolved.  4. COPD: On home oxygen.  Managed by pulmonology.  5. Chronic diastolic congestive heart failure: Patient appears euvolemic in the office today.  He is watching salt intake.  Recommend continuing to watch salt intake.  6. Obstructive sleep apnea: On CPAP with oxygen at night.  He uses every night.  7. Hypertension: Blood pressure well controlled in the office today.  8. Type 2 diabetes: Managed by outside provider.    Plan of care reviewed with  Dr. Patricia MD.    Patient to follow-up with Dr. Gomez in 3 months or sooner if needed for any new, recurrent, or worsening symptoms or other problems/concerns.    Greater than 13 minutes of 25 minute office visit was spent face to face with patient counseling on monitoring of heart rate and blood pressure at home, watching salt intake, reading nutrition labels, needed follow-up with our office and outside offices, as well as signs/symptoms that warrant sooner follow-up with our office or ER visit.         Your medication list           Accurate as of 3/12/19 11:59 PM. If you have any questions, ask your nurse or doctor.               CHANGE how you take these medications      Instructions Last Dose Given Next Dose Due   furosemide 20 MG tablet  Commonly known as:  LASIX  What changed:  Another medication with the same name was removed. Continue taking this medication, and follow the directions you see here.  Changed by:  Cheri Méndez, DNP, APRN      Take 1 tablet by mouth Daily.          CONTINUE taking these medications      Instructions Last Dose Given Next Dose Due   albuterol (2.5 MG/3ML) 0.083% nebulizer solution  Commonly known as:  PROVENTIL      Take 2.5 mg by nebulization 4 (Four) Times a Day.       albuterol sulfate  (90 Base) MCG/ACT inhaler  Commonly known as:  PROVENTIL HFA;VENTOLIN HFA;PROAIR HFA      Inhale 2 puffs Every 4 (Four) Hours As Needed for Wheezing.       ALPRAZolam 1 MG tablet  Commonly known as:  XANAX      TAKE 1 TABLET BY MOUTH AT NIGHT AS NEEDED FOR ANXIETY.       atorvastatin 10 MG tablet  Commonly known as:  LIPITOR      Take 1 tablet by mouth Daily. For cholesterol       budesonide 0.5 MG/2ML nebulizer solution  Commonly known as:  PULMICORT      Inhale 0.5 mg As Needed.       budesonide 0.5 MG/2ML nebulizer solution  Commonly known as:  PULMICORT      Take 2 mL by nebulization 2 (Two) Times a Day.       diltiazem  MG 24 hr capsule  Commonly known as:  DILACOR XR       Take 1 capsule by mouth Daily.       folic acid 1 MG tablet  Commonly known as:  FOLVITE      Take 1 mg by mouth Daily.       metFORMIN 1000 MG tablet  Commonly known as:  GLUCOPHAGE      Take 1 tablet by mouth Daily With Breakfast.       metoprolol tartrate 25 MG tablet  Commonly known as:  LOPRESSOR      Take 0.5 tablets by mouth Every 12 (Twelve) Hours.       O2  Commonly known as:  OXYGEN      Inhale 3 L/min 1 (One) Time. Patient using 3-4 liters, depending on activity level              I did not stop or change the above medications.  Patient's medication list was updated to reflect medications they are currently taking including medication changes made by other providers.            Thanks,    Cheri Méndez, DNP, APRN  03/12/2019             Dictated utilizing Dragon dictation

## 2019-03-13 PROBLEM — Z87.09 HISTORY OF COPD: Status: ACTIVE | Noted: 2019-03-13

## 2019-03-13 PROBLEM — I48.0 PAROXYSMAL ATRIAL FIBRILLATION: Status: ACTIVE | Noted: 2018-11-17

## 2019-03-19 ENCOUNTER — PATIENT OUTREACH (OUTPATIENT)
Dept: CASE MANAGEMENT | Facility: OTHER | Age: 79
End: 2019-03-19

## 2019-03-19 NOTE — OUTREACH NOTE
"Pt. Reports he is doing well, he saw his cardiologist 3/12/19, and \"got a good report\" He has recently changed oxygen companies to [employer] which he is adjusting to. He will see PCP 5/15/19 for his AWV. No needs voiced at this call, patient is looking forward to warmer weather and \"staying well\" Nurse provided patient education.No other questions or concerns voiced at this time.  "

## 2019-03-26 ENCOUNTER — EPISODE CHANGES (OUTPATIENT)
Dept: CASE MANAGEMENT | Facility: OTHER | Age: 79
End: 2019-03-26

## 2019-04-08 ENCOUNTER — OFFICE VISIT (OUTPATIENT)
Dept: FAMILY MEDICINE CLINIC | Facility: CLINIC | Age: 79
End: 2019-04-08

## 2019-04-08 VITALS
WEIGHT: 223 LBS | SYSTOLIC BLOOD PRESSURE: 170 MMHG | DIASTOLIC BLOOD PRESSURE: 83 MMHG | HEART RATE: 82 BPM | HEIGHT: 70 IN | RESPIRATION RATE: 16 BRPM | TEMPERATURE: 97.8 F | BODY MASS INDEX: 31.92 KG/M2

## 2019-04-08 DIAGNOSIS — L50.9 URTICARIA: Primary | ICD-10-CM

## 2019-04-08 PROCEDURE — 99213 OFFICE O/P EST LOW 20 MIN: CPT | Performed by: FAMILY MEDICINE

## 2019-04-08 RX ORDER — TRIAMCINOLONE ACETONIDE 1 MG/G
CREAM TOPICAL
COMMUNITY
Start: 2019-04-01 | End: 2019-06-20

## 2019-04-08 RX ORDER — PREDNISONE 20 MG/1
20 TABLET ORAL 2 TIMES DAILY
Qty: 14 TABLET | Refills: 0 | Status: SHIPPED | OUTPATIENT
Start: 2019-04-08 | End: 2019-04-15

## 2019-04-08 NOTE — PROGRESS NOTES
"Subjective   Ankit Mack . is a 79 y.o. male.     CC: Hives    History of Present Illness     Pt returns today reporting a 4 week h/o hives and itching. Went to a dermatologist and was given a topical (helping) yet still wants to discuss it. Reports Dermatology did a bx confirming Urticaria and the ointment, while helpful, is a \"temporary fix\". Random in occurrence and comes and goes. Pt stopped the Metformin and Diltiazem for several days thinking that was the issue, yet the hives came back. Pt only new contact is changing soda to Ginger Ale about a month ago.       The following portions of the patient's history were reviewed and updated as appropriate: allergies, current medications, past family history, past medical history, past social history, past surgical history and problem list.    Review of Systems   Constitutional: Negative for activity change, chills, fatigue and fever.   Respiratory: Negative for cough and shortness of breath.    Cardiovascular: Negative for chest pain and palpitations.   Gastrointestinal: Negative for abdominal pain.   Endocrine: Negative for cold intolerance.   Skin: Positive for rash.   Psychiatric/Behavioral: Negative for behavioral problems and dysphoric mood. The patient is not nervous/anxious.      /83   Pulse 82   Temp 97.8 °F (36.6 °C) (Oral)   Resp 16   Ht 177.8 cm (70\")   Wt 101 kg (223 lb)   BMI 32.00 kg/m²     Objective   Physical Exam   Constitutional: He appears well-developed and well-nourished.   Neck: Neck supple. No thyromegaly present.   Cardiovascular: Normal rate and regular rhythm.   No murmur heard.  Pulmonary/Chest: Effort normal and breath sounds normal.   Abdominal: Bowel sounds are normal. There is no tenderness.   Skin: Rash (scattered erythematous papules noted) noted.   Psychiatric: He has a normal mood and affect. His behavior is normal.   Nursing note and vitals reviewed.      Assessment/Plan   Ankit was seen today for urticaria and " to discuss medications.    Diagnoses and all orders for this visit:    Urticaria  -     predniSONE (DELTASONE) 20 MG tablet; Take 1 tablet by mouth 2 (Two) Times a Day for 7 days.    Pt to stop the Ginger Ale and increase water. Will send to Dr Gary for allergy testing is rash comes back.

## 2019-05-04 DIAGNOSIS — I50.9 ACUTE CONGESTIVE HEART FAILURE, UNSPECIFIED HEART FAILURE TYPE (HCC): ICD-10-CM

## 2019-05-15 ENCOUNTER — OFFICE VISIT (OUTPATIENT)
Dept: FAMILY MEDICINE CLINIC | Facility: CLINIC | Age: 79
End: 2019-05-15

## 2019-05-15 VITALS
TEMPERATURE: 97.7 F | WEIGHT: 228 LBS | RESPIRATION RATE: 16 BRPM | HEART RATE: 72 BPM | BODY MASS INDEX: 32.64 KG/M2 | DIASTOLIC BLOOD PRESSURE: 66 MMHG | HEIGHT: 70 IN | SYSTOLIC BLOOD PRESSURE: 146 MMHG

## 2019-05-15 DIAGNOSIS — R53.81 PHYSICAL DECONDITIONING: ICD-10-CM

## 2019-05-15 DIAGNOSIS — E11.9 TYPE 2 DIABETES MELLITUS WITHOUT COMPLICATION, WITHOUT LONG-TERM CURRENT USE OF INSULIN (HCC): ICD-10-CM

## 2019-05-15 DIAGNOSIS — D69.6 THROMBOCYTOPENIA (HCC): ICD-10-CM

## 2019-05-15 DIAGNOSIS — Z00.00 MEDICARE ANNUAL WELLNESS VISIT, SUBSEQUENT: Primary | ICD-10-CM

## 2019-05-15 DIAGNOSIS — D61.818 PANCYTOPENIA (HCC): ICD-10-CM

## 2019-05-15 DIAGNOSIS — F41.9 ANXIETY: ICD-10-CM

## 2019-05-15 DIAGNOSIS — C34.32 MALIGNANT NEOPLASM OF LOWER LOBE OF LEFT LUNG (HCC): ICD-10-CM

## 2019-05-15 DIAGNOSIS — I71.20 THORACIC AORTIC ANEURYSM WITHOUT RUPTURE (HCC): ICD-10-CM

## 2019-05-15 DIAGNOSIS — I50.32 CHRONIC DIASTOLIC CONGESTIVE HEART FAILURE (HCC): ICD-10-CM

## 2019-05-15 DIAGNOSIS — J96.21 ACUTE ON CHRONIC RESPIRATORY FAILURE WITH HYPOXEMIA (HCC): ICD-10-CM

## 2019-05-15 PROCEDURE — G0439 PPPS, SUBSEQ VISIT: HCPCS | Performed by: FAMILY MEDICINE

## 2019-05-15 RX ORDER — ALPRAZOLAM 1 MG/1
1 TABLET ORAL NIGHTLY PRN
Qty: 90 TABLET | Refills: 0 | Status: SHIPPED | OUTPATIENT
Start: 2019-05-15 | End: 2019-09-04 | Stop reason: SDUPTHER

## 2019-05-15 NOTE — PROGRESS NOTES
QUICK REFERENCE INFORMATION:  The ABCs of the Annual Wellness Visit    Subsequent Medicare Wellness Visit     HEALTH RISK ASSESSMENT    : 1940    Recent Hospitalizations:  Recently treated at the following:  [facility].  [facility]      Current Medical Providers:  Patient Care Team:  Gopi Lagos MD as PCP - General (Family Medicine)  Gopi Lagos MD as PCP - Claims Attributed  LinkerTanner III, MD as Surgeon (Thoracic Surgery)  Trey Salcedo MD as Consulting Physician (Urology)  Fred Alejandro MD as Consulting Physician (Pulmonary Disease)  Amna Gomez MD as Consulting Physician (Cardiology)  Gopi Pemberton MD as Consulting Physician (Dermatology)        Smoking Status:  Social History     Tobacco Use   Smoking Status Former Smoker   • Packs/day: 1.00   Smokeless Tobacco Never Used       Alcohol Consumption:  Social History     Substance and Sexual Activity   Alcohol Use No   • Frequency: Never    Comment: rare       Depression Screen:   PHQ-2/PHQ-9 Depression Screening 5/15/2019   Little interest or pleasure in doing things 0   Feeling down, depressed, or hopeless 0   Total Score 0       Health Habits and Functional and Cognitive Screening:  Functional & Cognitive Status 5/15/2019   Do you have difficulty preparing food and eating? No   Do you have difficulty bathing yourself, getting dressed or grooming yourself? No   Do you have difficulty using the toilet? No   Do you have difficulty moving around from place to place? No   Do you have trouble with steps or getting out of a bed or a chair? No   In the past year have you fallen or experienced a near fall? No   Current Diet Well Balanced Diet   Dental Exam Up to date   Eye Exam Up to date   Exercise (times per week) 0 times per week   Current Exercise Activities Include None   Do you need help using the phone?  No   Are you deaf or do you have serious difficulty hearing?  Yes   Do you need help with  transportation? No   Do you need help shopping? Yes   Do you need help preparing meals?  No   Do you need help with housework?  Yes   Do you need help with laundry? Yes   Do you need help taking your medications? No   Do you need help managing money? No   Do you ever drive or ride in a car without wearing a seat belt? No   Have you felt unusual stress, anger or loneliness in the last month? No   Who do you live with? Other   If you need help, do you have trouble finding someone available to you? Yes   Have you been bothered in the last four weeks by sexual problems? No   Do you have difficulty concentrating, remembering or making decisions? No           Does the patient have evidence of cognitive impairment? No    Asiprin use counseling: Does not need ASA (and currently is not on it)      Recent Lab Results:    Lab Results   Component Value Date     (H) 01/24/2018     Lab Results   Component Value Date    HGBA1C 6.50 (H) 11/19/2018     Lab Results   Component Value Date    TRIG 110 01/24/2018    HDL 40 01/24/2018    VLDL 22 01/24/2018           Age-appropriate Screening Schedule:  Refer to the list below for future screening recommendations based on patient's age, sex and/or medical conditions. Orders for these recommended tests are listed in the plan section. The patient has been provided with a written plan.    Health Maintenance   Topic Date Due   • URINE MICROALBUMIN  1940   • LIPID PANEL  01/24/2019   • HEMOGLOBIN A1C  05/19/2019   • INFLUENZA VACCINE  08/01/2019   • COLONOSCOPY  06/18/2023   • TDAP/TD VACCINES (2 - Td) 05/22/2027   • PNEUMOCOCCAL VACCINES (65+ LOW/MEDIUM RISK)  Discontinued   • ZOSTER VACCINE  Discontinued        Subjective   History of Present Illness    Ankit Mack . is a 79 y.o. male who presents for an Annual Wellness Visit.    The following portions of the patient's history were reviewed and updated as appropriate: allergies, current medications, past family history,  past medical history, past social history, past surgical history and problem list.    Outpatient Medications Prior to Visit   Medication Sig Dispense Refill   • ALPRAZolam (XANAX) 1 MG tablet TAKE 1 TABLET BY MOUTH AT NIGHT AS NEEDED FOR ANXIETY. 90 tablet 0   • albuterol (PROVENTIL HFA;VENTOLIN HFA) 108 (90 Base) MCG/ACT inhaler Inhale 2 puffs Every 4 (Four) Hours As Needed for Wheezing.     • albuterol (PROVENTIL) (2.5 MG/3ML) 0.083% nebulizer solution Take 2.5 mg by nebulization 4 (Four) Times a Day.     • atorvastatin (LIPITOR) 10 MG tablet Take 1 tablet by mouth Daily. For cholesterol 90 tablet 1   • budesonide (PULMICORT) 0.5 MG/2ML nebulizer solution Inhale 0.5 mg As Needed.     • budesonide (PULMICORT) 0.5 MG/2ML nebulizer solution Take 2 mL by nebulization 2 (Two) Times a Day. 120 each 1   • diltiazem XR (DILACOR XR) 240 MG 24 hr capsule Take 1 capsule by mouth Daily. 90 capsule 3   • folic acid (FOLVITE) 1 MG tablet Take 1 mg by mouth Daily.     • furosemide (LASIX) 20 MG tablet Take 1 tablet by mouth Daily. 90 tablet 3   • metFORMIN (GLUCOPHAGE) 1000 MG tablet Take 1 tablet by mouth Daily With Breakfast. 90 tablet 1   • metoprolol tartrate (LOPRESSOR) 25 MG tablet TAKE A HALF TABLET BY MOUTH EVERY 12 (TWELVE) HOURS. 30 tablet 5   • O2 (OXYGEN) Inhale 3 L/min 1 (One) Time. Patient using 3-4 liters, depending on activity level     • triamcinolone (KENALOG) 0.1 % cream        No facility-administered medications prior to visit.        Patient Active Problem List   Diagnosis   • Anxiety   • Hyperlipidemia   • Benign essential hypertension   • Osteoarthritis, knee   • Abrasion   • Aneurysm of thoracic aorta (CMS/HCC)   • Chronic obstructive pulmonary disease with acute exacerbation (CMS/HCC)   • 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HCC)   • History of malignant neoplasm of thoracic cavity structure   • Hypertension   • Cancer of lower lobe of lung (CMS/HCC)   • Adiposity   • DOMINGO on CPAP   • COPD  (chronic obstructive pulmonary disease) with acute bronchitis (CMS/[facility])   • Non-seasonal allergic rhinitis   • RSV (acute bronchiolitis due to respiratory syncytial virus)   • Pancytopenia (CMS/[facility])   • Aneurysm of thoracic aorta (CMS/[facility])   • Acute on chronic respiratory failure with hypoxemia (CMS/[facility])   • Paroxysmal atrial fibrillation (CMS/[facility])   • Pneumonia due to infectious organism   • Thrombocytopenia (CMS/[facility])   • Hyperglycemia   • Type 2 diabetes mellitus without complication, without long-term current use of insulin (CMS/[facility])   • Chronic diastolic congestive heart failure (CMS/[facility])   • History of COPD       Advance Care Planning:  Patient has an advance directive - a copy has been provided and is visible in patient header    Identification of Risk Factors:  Risk factors include: cardiovascular risk and COPD.    Review of Systems   Constitutional: Negative for activity change, chills, fatigue and fever.   Respiratory: Negative for cough and shortness of breath.    Cardiovascular: Negative for chest pain and palpitations.   Gastrointestinal: Negative for abdominal pain.   Endocrine: Negative for cold intolerance.   Psychiatric/Behavioral: Negative for behavioral problems and dysphoric mood. The patient is not nervous/anxious.        Compared to one year ago, the patient feels his physical health is worse.  Compared to one year ago, the patient feels his mental health is the same.    Objective     Physical Exam   Constitutional: He appears well-developed and well-nourished.   Neck: Neck supple. No thyromegaly present.   Cardiovascular: Normal rate and regular rhythm.   No murmur heard.  Pulmonary/Chest: Effort normal and breath sounds normal.   Abdominal: Bowel sounds are normal. There is no tenderness.   Psychiatric: He has a normal mood and affect. His behavior is normal.   Nursing note and vitals reviewed.      Vitals:    05/15/19 1403   BP: 146/66   Pulse: 72   Resp: 16   Temp: 97.7 °F (36.5 °C)  "  TempSrc: Oral   Weight: 103 kg (228 lb)   Height: 177.8 cm (70\")   PainSc: 0-No pain       Patient's Body mass index is 32.71 kg/m². BMI is above normal parameters. Recommendations include: exercise counseling and nutrition counseling.    Having a lot of issues with conditioning and getting around.    Assessment/Plan   Patient Self-Management and Personalized Health Advice  The patient has been provided with information about: diet, exercise and weight management and preventive services including:   · Exercise counseling provided, Fall Risk assessment done.    Visit Diagnoses:    ICD-10-CM ICD-9-CM   1. Medicare annual wellness visit, subsequent Z00.00 V70.0   2. Anxiety F41.9 300.00   3. Type 2 diabetes mellitus without complication, without long-term current use of insulin (CMS/HCC) E11.9 250.00   4. Thrombocytopenia (CMS/HCC) D69.6 287.5   5. Pancytopenia (CMS/HCC) D61.818 284.19   6. Chronic diastolic congestive heart failure (CMS/HCC) I50.32 428.32     428.0   7. Thoracic aortic aneurysm without rupture (CMS/HCC) I71.2 441.2   8. Malignant neoplasm of lower lobe of left lung (CMS/HCC) C34.32 162.5   9. Acute on chronic respiratory failure with hypoxemia (CMS/HCC) J96.21 518.84   10. Physical deconditioning R53.81 799.3       Orders Placed This Encounter   Procedures   • Comprehensive metabolic panel   • Lipid panel   • TSH   • Hemoglobin A1c   • MicroAlbumin, Urine, Random - Urine, Clean Catch   • Ambulatory Referral to Physical Therapy Evaluate and treat     Referral Priority:   Routine     Referral Type:   Therapy     Referral Reason:   Specialty Services Required     Requested Specialty:   Physical Therapy     Number of Visits Requested:   1   • CBC and Differential     Order Specific Question:   Manual Differential     Answer:   Yes       Outpatient Encounter Medications as of 5/15/2019   Medication Sig Dispense Refill   • ALPRAZolam (XANAX) 1 MG tablet Take 1 tablet by mouth At Night As Needed for " Anxiety. 90 tablet 0   • [DISCONTINUED] ALPRAZolam (XANAX) 1 MG tablet TAKE 1 TABLET BY MOUTH AT NIGHT AS NEEDED FOR ANXIETY. 90 tablet 0   • albuterol (PROVENTIL HFA;VENTOLIN HFA) 108 (90 Base) MCG/ACT inhaler Inhale 2 puffs Every 4 (Four) Hours As Needed for Wheezing.     • albuterol (PROVENTIL) (2.5 MG/3ML) 0.083% nebulizer solution Take 2.5 mg by nebulization 4 (Four) Times a Day.     • atorvastatin (LIPITOR) 10 MG tablet Take 1 tablet by mouth Daily. For cholesterol 90 tablet 1   • budesonide (PULMICORT) 0.5 MG/2ML nebulizer solution Inhale 0.5 mg As Needed.     • budesonide (PULMICORT) 0.5 MG/2ML nebulizer solution Take 2 mL by nebulization 2 (Two) Times a Day. 120 each 1   • diltiazem XR (DILACOR XR) 240 MG 24 hr capsule Take 1 capsule by mouth Daily. 90 capsule 3   • folic acid (FOLVITE) 1 MG tablet Take 1 mg by mouth Daily.     • furosemide (LASIX) 20 MG tablet Take 1 tablet by mouth Daily. 90 tablet 3   • metFORMIN (GLUCOPHAGE) 1000 MG tablet Take 1 tablet by mouth Daily With Breakfast. 90 tablet 1   • metoprolol tartrate (LOPRESSOR) 25 MG tablet TAKE A HALF TABLET BY MOUTH EVERY 12 (TWELVE) HOURS. 30 tablet 5   • O2 (OXYGEN) Inhale 3 L/min 1 (One) Time. Patient using 3-4 liters, depending on activity level     • triamcinolone (KENALOG) 0.1 % cream        No facility-administered encounter medications on file as of 5/15/2019.        Reviewed use of high risk medication in the elderly: not applicable  Reviewed for potential of harmful drug interactions in the elderly: not applicable    Follow Up:  No Follow-up on file.     An After Visit Summary and PPPS with all of these plans were given to the patient.

## 2019-05-15 NOTE — PATIENT INSTRUCTIONS
Medicare Wellness  Personal Prevention Plan of Service     Date of Office Visit:  05/15/2019  Encounter Provider:  Gopi Lagos MD  Place of Service:  [facility]  Patient Name: Ankit Mack Sr.  :  1940    As part of the Medicare Wellness portion of your visit today, we are providing you with this personalized preventive plan of services (PPPS). This plan is based upon recommendations of the [place] Preventive Services Task Force (USPSTF) and the Advisory Committee on Immunization Practices (ACIP).    This lists the preventive care services that should be considered, and provides dates of when you are due. Items listed as completed are up-to-date and do not require any further intervention.    Health Maintenance   Topic Date Due   • URINE MICROALBUMIN  1940   • LIPID PANEL  2019   • HEMOGLOBIN A1C  2019   • INFLUENZA VACCINE  2019   • MEDICARE ANNUAL WELLNESS  05/15/2020   • COLONOSCOPY  2023   • TDAP/TD VACCINES (2 - Td) 2027   • PNEUMOCOCCAL VACCINES (65+ LOW/MEDIUM RISK)  Discontinued   • ZOSTER VACCINE  Discontinued       Orders Placed This Encounter   Procedures   • Comprehensive metabolic panel   • Lipid panel   • TSH   • Hemoglobin A1c   • MicroAlbumin, Urine, Random - Urine, Clean Catch   • Ambulatory Referral to Physical Therapy Evaluate and treat     Referral Priority:   Routine     Referral Type:   Therapy     Referral Reason:   Specialty Services Required     Requested Specialty:   Physical Therapy     Number of Visits Requested:   1   • CBC and Differential     Order Specific Question:   Manual Differential     Answer:   Yes       Return in about 3 months (around 8/15/2019) for Recheck.

## 2019-05-16 LAB
ALBUMIN SERPL-MCNC: 4.2 G/DL (ref 3.5–5.2)
ALBUMIN/GLOB SERPL: 2.1 G/DL
ALP SERPL-CCNC: 72 U/L (ref 39–117)
ALT SERPL-CCNC: 10 U/L (ref 1–41)
AST SERPL-CCNC: 14 U/L (ref 1–40)
BASOPHILS # BLD AUTO: (no result) 10*3/UL
BASOPHILS # BLD MANUAL: 0.04 10*3/MM3 (ref 0–0.2)
BASOPHILS NFR BLD MANUAL: 1 % (ref 0–1.5)
BILIRUB SERPL-MCNC: 0.4 MG/DL (ref 0.2–1.2)
BUN SERPL-MCNC: 24 MG/DL (ref 8–23)
BUN/CREAT SERPL: 19.8 (ref 7–25)
CALCIUM SERPL-MCNC: 9.6 MG/DL (ref 8.6–10.5)
CHLORIDE SERPL-SCNC: 104 MMOL/L (ref 98–107)
CHOLEST SERPL-MCNC: 86 MG/DL (ref 0–200)
CO2 SERPL-SCNC: 31.8 MMOL/L (ref 22–29)
CREAT SERPL-MCNC: 1.21 MG/DL (ref 0.76–1.27)
DIFFERENTIAL COMMENT: ABNORMAL
EOSINOPHIL # BLD AUTO: (no result) 10*3/UL
EOSINOPHIL # BLD MANUAL: 0.35 10*3/MM3 (ref 0–0.4)
EOSINOPHIL NFR BLD AUTO: (no result) %
EOSINOPHIL NFR BLD MANUAL: 9.3 % (ref 0.3–6.2)
ERYTHROCYTE [DISTWIDTH] IN BLOOD BY AUTOMATED COUNT: 13.8 % (ref 12.3–15.4)
GLOBULIN SER CALC-MCNC: 2 GM/DL
GLUCOSE SERPL-MCNC: 109 MG/DL (ref 65–99)
HBA1C MFR BLD: 5.5 % (ref 4.8–5.6)
HCT VFR BLD AUTO: 33.7 % (ref 37.5–51)
HDLC SERPL-MCNC: 43 MG/DL (ref 40–60)
HGB BLD-MCNC: 10.6 G/DL (ref 13–17.7)
LDLC SERPL CALC-MCNC: 25 MG/DL (ref 0–100)
LYMPHOCYTES # BLD AUTO: (no result) 10*3/UL
LYMPHOCYTES # BLD MANUAL: 1.29 10*3/MM3 (ref 0.7–3.1)
LYMPHOCYTES NFR BLD AUTO: (no result) %
LYMPHOCYTES NFR BLD MANUAL: 34 % (ref 19.6–45.3)
MCH RBC QN AUTO: 34.8 PG (ref 26.6–33)
MCHC RBC AUTO-ENTMCNC: 31.5 G/DL (ref 31.5–35.7)
MCV RBC AUTO: 110.5 FL (ref 79–97)
MICROALBUMIN UR-MCNC: 265.1 UG/ML
MONOCYTES # BLD MANUAL: 0.35 10*3/MM3 (ref 0.1–0.9)
MONOCYTES NFR BLD AUTO: (no result) %
MONOCYTES NFR BLD MANUAL: 9.3 % (ref 5–12)
NEUTROPHILS # BLD MANUAL: 1.76 10*3/MM3 (ref 1.7–7)
NEUTROPHILS NFR BLD AUTO: (no result) %
NEUTROPHILS NFR BLD MANUAL: 46.4 % (ref 42.7–76)
PLATELET # BLD AUTO: 171 10*3/MM3 (ref 140–450)
PLATELET BLD QL SMEAR: ABNORMAL
POTASSIUM SERPL-SCNC: 4.9 MMOL/L (ref 3.5–5.2)
PROT SERPL-MCNC: 6.2 G/DL (ref 6–8.5)
RBC # BLD AUTO: 3.05 10*6/MM3 (ref 4.14–5.8)
RBC MORPH BLD: ABNORMAL
SODIUM SERPL-SCNC: 149 MMOL/L (ref 136–145)
TRIGL SERPL-MCNC: 89 MG/DL (ref 0–150)
TSH SERPL DL<=0.005 MIU/L-ACNC: 2.09 MIU/ML (ref 0.27–4.2)
VLDLC SERPL CALC-MCNC: 17.8 MG/DL
WBC # BLD AUTO: 3.79 10*3/MM3 (ref 3.4–10.8)

## 2019-05-20 DIAGNOSIS — R71.8 ELEVATED MCV: Primary | ICD-10-CM

## 2019-05-25 ENCOUNTER — APPOINTMENT (OUTPATIENT)
Dept: GENERAL RADIOLOGY | Facility: HOSPITAL | Age: 79
End: 2019-05-25

## 2019-05-25 ENCOUNTER — HOSPITAL ENCOUNTER (INPATIENT)
Facility: HOSPITAL | Age: 79
LOS: 6 days | Discharge: HOME HEALTH | End: 2019-05-31
Attending: EMERGENCY MEDICINE | Admitting: INTERNAL MEDICINE

## 2019-05-25 DIAGNOSIS — Z74.09 IMPAIRED FUNCTIONAL MOBILITY, BALANCE, GAIT, AND ENDURANCE: ICD-10-CM

## 2019-05-25 DIAGNOSIS — R77.8 ELEVATED TROPONIN: Primary | ICD-10-CM

## 2019-05-25 DIAGNOSIS — J96.02 ACUTE RESPIRATORY FAILURE WITH HYPOXIA AND HYPERCAPNIA (HCC): ICD-10-CM

## 2019-05-25 DIAGNOSIS — J96.01 ACUTE RESPIRATORY FAILURE WITH HYPOXIA AND HYPERCAPNIA (HCC): ICD-10-CM

## 2019-05-25 PROBLEM — J96.90 RESPIRATORY FAILURE (HCC): Status: ACTIVE | Noted: 2019-05-25

## 2019-05-25 PROBLEM — R79.89 ELEVATED TROPONIN: Status: ACTIVE | Noted: 2019-05-25

## 2019-05-25 LAB
ALBUMIN SERPL-MCNC: 4.4 G/DL (ref 3.5–5.2)
ALBUMIN/GLOB SERPL: 2.6 G/DL
ALP SERPL-CCNC: 71 U/L (ref 39–117)
ALT SERPL W P-5'-P-CCNC: 11 U/L (ref 1–41)
ANION GAP SERPL CALCULATED.3IONS-SCNC: 13.3 MMOL/L
ARTERIAL PATENCY WRIST A: POSITIVE
ARTERIAL PATENCY WRIST A: POSITIVE
AST SERPL-CCNC: 18 U/L (ref 1–40)
ATMOSPHERIC PRESS: 754.5 MMHG
ATMOSPHERIC PRESS: 755.5 MMHG
B PARAPERT DNA SPEC QL NAA+PROBE: NOT DETECTED
B PERT DNA SPEC QL NAA+PROBE: NOT DETECTED
BASE EXCESS BLDA CALC-SCNC: 5.4 MMOL/L (ref 0–2)
BASE EXCESS BLDA CALC-SCNC: 7.3 MMOL/L (ref 0–2)
BDY SITE: ABNORMAL
BDY SITE: ABNORMAL
BILIRUB SERPL-MCNC: 0.5 MG/DL (ref 0.2–1.2)
BUN BLD-MCNC: 25 MG/DL (ref 8–23)
BUN/CREAT SERPL: 18.1 (ref 7–25)
C PNEUM DNA NPH QL NAA+NON-PROBE: NOT DETECTED
CALCIUM SPEC-SCNC: 8.7 MG/DL (ref 8.6–10.5)
CHLORIDE SERPL-SCNC: 103 MMOL/L (ref 98–107)
CO2 SERPL-SCNC: 31.7 MMOL/L (ref 22–29)
CREAT BLD-MCNC: 1.38 MG/DL (ref 0.76–1.27)
DEPRECATED RDW RBC AUTO: 56.4 FL (ref 37–54)
EOSINOPHIL # BLD MANUAL: 0.1 10*3/MM3 (ref 0–0.4)
EOSINOPHIL NFR BLD MANUAL: 2 % (ref 0.3–6.2)
ERYTHROCYTE [DISTWIDTH] IN BLOOD BY AUTOMATED COUNT: 13.8 % (ref 12.3–15.4)
FLUAV H1 2009 PAND RNA NPH QL NAA+PROBE: NOT DETECTED
FLUAV H1 HA GENE NPH QL NAA+PROBE: NOT DETECTED
FLUAV H3 RNA NPH QL NAA+PROBE: NOT DETECTED
FLUAV SUBTYP SPEC NAA+PROBE: NOT DETECTED
FLUBV RNA ISLT QL NAA+PROBE: NOT DETECTED
GAS FLOW AIRWAY: 4 LPM
GFR SERPL CREATININE-BSD FRML MDRD: 50 ML/MIN/1.73
GLOBULIN UR ELPH-MCNC: 1.7 GM/DL
GLUCOSE BLD-MCNC: 161 MG/DL (ref 65–99)
GLUCOSE BLDC GLUCOMTR-MCNC: 138 MG/DL (ref 70–130)
GLUCOSE BLDC GLUCOMTR-MCNC: 171 MG/DL (ref 70–130)
HADV DNA SPEC NAA+PROBE: NOT DETECTED
HCO3 BLDA-SCNC: 32.7 MMOL/L (ref 22–28)
HCO3 BLDA-SCNC: 33.9 MMOL/L (ref 22–28)
HCOV 229E RNA SPEC QL NAA+PROBE: NOT DETECTED
HCOV HKU1 RNA SPEC QL NAA+PROBE: NOT DETECTED
HCOV NL63 RNA SPEC QL NAA+PROBE: NOT DETECTED
HCOV OC43 RNA SPEC QL NAA+PROBE: NOT DETECTED
HCT VFR BLD AUTO: 31 % (ref 37.5–51)
HGB BLD-MCNC: 9.5 G/DL (ref 13–17.7)
HMPV RNA NPH QL NAA+NON-PROBE: NOT DETECTED
HOROWITZ INDEX BLD+IHG-RTO: 25 %
HPIV1 RNA SPEC QL NAA+PROBE: NOT DETECTED
HPIV2 RNA SPEC QL NAA+PROBE: NOT DETECTED
HPIV3 RNA NPH QL NAA+PROBE: NOT DETECTED
HPIV4 P GENE NPH QL NAA+PROBE: NOT DETECTED
LYMPHOCYTES # BLD MANUAL: 0.93 10*3/MM3 (ref 0.7–3.1)
LYMPHOCYTES NFR BLD MANUAL: 10 % (ref 5–12)
LYMPHOCYTES NFR BLD MANUAL: 19 % (ref 19.6–45.3)
M PNEUMO IGG SER IA-ACNC: NOT DETECTED
MACROCYTES BLD QL SMEAR: ABNORMAL
MCH RBC QN AUTO: 34.7 PG (ref 26.6–33)
MCHC RBC AUTO-ENTMCNC: 30.6 G/DL (ref 31.5–35.7)
MCV RBC AUTO: 113.1 FL (ref 79–97)
METAMYELOCYTES NFR BLD MANUAL: 1 % (ref 0–0)
MODALITY: ABNORMAL
MODALITY: ABNORMAL
MONOCYTES # BLD AUTO: 0.49 10*3/MM3 (ref 0.1–0.9)
MYELOCYTES NFR BLD MANUAL: 2 % (ref 0–0)
NEUTROPHILS # BLD AUTO: 3.22 10*3/MM3 (ref 1.7–7)
NEUTROPHILS NFR BLD MANUAL: 66 % (ref 42.7–76)
NT-PROBNP SERPL-MCNC: 2715 PG/ML (ref 5–1800)
O2 A-A PPRESDIFF RESPIRATORY: 0.4 MMHG
PCO2 BLDA: 59.9 MM HG (ref 35–45)
PCO2 BLDA: 62.6 MM HG (ref 35–45)
PEEP RESPIRATORY: 8 CM[H2O]
PH BLDA: 7.33 PH UNITS (ref 7.35–7.45)
PH BLDA: 7.36 PH UNITS (ref 7.35–7.45)
PLAT MORPH BLD: NORMAL
PLATELET # BLD AUTO: 155 10*3/MM3 (ref 140–450)
PMV BLD AUTO: 12.1 FL (ref 6–12)
PO2 BLDA: 50.5 MM HG (ref 80–100)
PO2 BLDA: 55.2 MM HG (ref 80–100)
POLYCHROMASIA BLD QL SMEAR: ABNORMAL
POTASSIUM BLD-SCNC: 3.9 MMOL/L (ref 3.5–5.2)
PROT SERPL-MCNC: 6.1 G/DL (ref 6–8.5)
RBC # BLD AUTO: 2.74 10*6/MM3 (ref 4.14–5.8)
RHINOVIRUS RNA SPEC NAA+PROBE: NOT DETECTED
RSV RNA NPH QL NAA+NON-PROBE: NOT DETECTED
SAO2 % BLDCOA: 82.6 % (ref 92–99)
SAO2 % BLDCOA: 84.7 % (ref 92–99)
SET MECH RESP RATE: 20
SODIUM BLD-SCNC: 148 MMOL/L (ref 136–145)
TOTAL RATE: 20 BREATHS/MINUTE
TOTAL RATE: 28 BREATHS/MINUTE
TROPONIN T SERPL-MCNC: 0.08 NG/ML (ref 0–0.03)
VENTILATOR MODE: ABNORMAL
VT ON VENT VENT: 429 ML
WBC MORPH BLD: NORMAL
WBC NRBC COR # BLD: 4.88 10*3/MM3 (ref 3.4–10.8)

## 2019-05-25 PROCEDURE — 94799 UNLISTED PULMONARY SVC/PX: CPT

## 2019-05-25 PROCEDURE — 84484 ASSAY OF TROPONIN QUANT: CPT | Performed by: EMERGENCY MEDICINE

## 2019-05-25 PROCEDURE — 85007 BL SMEAR W/DIFF WBC COUNT: CPT | Performed by: EMERGENCY MEDICINE

## 2019-05-25 PROCEDURE — 85025 COMPLETE CBC W/AUTO DIFF WBC: CPT | Performed by: EMERGENCY MEDICINE

## 2019-05-25 PROCEDURE — 83880 ASSAY OF NATRIURETIC PEPTIDE: CPT | Performed by: EMERGENCY MEDICINE

## 2019-05-25 PROCEDURE — 80053 COMPREHEN METABOLIC PANEL: CPT | Performed by: EMERGENCY MEDICINE

## 2019-05-25 PROCEDURE — 36600 WITHDRAWAL OF ARTERIAL BLOOD: CPT

## 2019-05-25 PROCEDURE — 71045 X-RAY EXAM CHEST 1 VIEW: CPT

## 2019-05-25 PROCEDURE — 93010 ELECTROCARDIOGRAM REPORT: CPT | Performed by: INTERNAL MEDICINE

## 2019-05-25 PROCEDURE — 25010000002 METHYLPREDNISOLONE PER 40 MG: Performed by: INTERNAL MEDICINE

## 2019-05-25 PROCEDURE — 87581 M.PNEUMON DNA AMP PROBE: CPT | Performed by: INTERNAL MEDICINE

## 2019-05-25 PROCEDURE — 25010000002 HEPARIN (PORCINE) PER 1000 UNITS: Performed by: INTERNAL MEDICINE

## 2019-05-25 PROCEDURE — 63710000001 INSULIN LISPRO (HUMAN) PER 5 UNITS: Performed by: INTERNAL MEDICINE

## 2019-05-25 PROCEDURE — 94660 CPAP INITIATION&MGMT: CPT

## 2019-05-25 PROCEDURE — 94640 AIRWAY INHALATION TREATMENT: CPT

## 2019-05-25 PROCEDURE — 82803 BLOOD GASES ANY COMBINATION: CPT

## 2019-05-25 PROCEDURE — 82962 GLUCOSE BLOOD TEST: CPT

## 2019-05-25 PROCEDURE — 93005 ELECTROCARDIOGRAM TRACING: CPT | Performed by: EMERGENCY MEDICINE

## 2019-05-25 PROCEDURE — 99285 EMERGENCY DEPT VISIT HI MDM: CPT

## 2019-05-25 PROCEDURE — 87486 CHLMYD PNEUM DNA AMP PROBE: CPT | Performed by: INTERNAL MEDICINE

## 2019-05-25 PROCEDURE — 87633 RESP VIRUS 12-25 TARGETS: CPT | Performed by: INTERNAL MEDICINE

## 2019-05-25 PROCEDURE — 87798 DETECT AGENT NOS DNA AMP: CPT | Performed by: INTERNAL MEDICINE

## 2019-05-25 RX ORDER — SODIUM CHLORIDE 0.9 % (FLUSH) 0.9 %
3 SYRINGE (ML) INJECTION EVERY 12 HOURS SCHEDULED
Status: DISCONTINUED | OUTPATIENT
Start: 2019-05-25 | End: 2019-05-31

## 2019-05-25 RX ORDER — SODIUM CHLORIDE 0.9 % (FLUSH) 0.9 %
10 SYRINGE (ML) INJECTION AS NEEDED
Status: DISCONTINUED | OUTPATIENT
Start: 2019-05-25 | End: 2019-05-31 | Stop reason: HOSPADM

## 2019-05-25 RX ORDER — IPRATROPIUM BROMIDE AND ALBUTEROL SULFATE 2.5; .5 MG/3ML; MG/3ML
3 SOLUTION RESPIRATORY (INHALATION) ONCE
Status: COMPLETED | OUTPATIENT
Start: 2019-05-25 | End: 2019-05-25

## 2019-05-25 RX ORDER — IPRATROPIUM BROMIDE AND ALBUTEROL SULFATE 2.5; .5 MG/3ML; MG/3ML
3 SOLUTION RESPIRATORY (INHALATION)
Status: DISCONTINUED | OUTPATIENT
Start: 2019-05-25 | End: 2019-05-31 | Stop reason: HOSPADM

## 2019-05-25 RX ORDER — NICOTINE POLACRILEX 4 MG
15 LOZENGE BUCCAL
Status: DISCONTINUED | OUTPATIENT
Start: 2019-05-25 | End: 2019-05-31 | Stop reason: HOSPADM

## 2019-05-25 RX ORDER — DEXTROSE MONOHYDRATE 25 G/50ML
25 INJECTION, SOLUTION INTRAVENOUS
Status: DISCONTINUED | OUTPATIENT
Start: 2019-05-25 | End: 2019-05-31 | Stop reason: HOSPADM

## 2019-05-25 RX ORDER — DILTIAZEM HYDROCHLORIDE 240 MG/1
240 CAPSULE, COATED, EXTENDED RELEASE ORAL
Status: DISCONTINUED | OUTPATIENT
Start: 2019-05-25 | End: 2019-05-28

## 2019-05-25 RX ORDER — HEPARIN SODIUM 5000 [USP'U]/ML
5000 INJECTION, SOLUTION INTRAVENOUS; SUBCUTANEOUS EVERY 8 HOURS SCHEDULED
Status: DISCONTINUED | OUTPATIENT
Start: 2019-05-25 | End: 2019-05-27

## 2019-05-25 RX ORDER — FUROSEMIDE 20 MG/1
20 TABLET ORAL DAILY
Status: DISCONTINUED | OUTPATIENT
Start: 2019-05-25 | End: 2019-05-31 | Stop reason: HOSPADM

## 2019-05-25 RX ORDER — SODIUM CHLORIDE 0.9 % (FLUSH) 0.9 %
3-10 SYRINGE (ML) INJECTION AS NEEDED
Status: DISCONTINUED | OUTPATIENT
Start: 2019-05-25 | End: 2019-05-31 | Stop reason: HOSPADM

## 2019-05-25 RX ORDER — ARFORMOTEROL TARTRATE 15 UG/2ML
15 SOLUTION RESPIRATORY (INHALATION)
Status: DISCONTINUED | OUTPATIENT
Start: 2019-05-25 | End: 2019-05-31 | Stop reason: HOSPADM

## 2019-05-25 RX ORDER — ALPRAZOLAM 1 MG/1
1 TABLET ORAL NIGHTLY PRN
Status: DISCONTINUED | OUTPATIENT
Start: 2019-05-25 | End: 2019-05-31 | Stop reason: HOSPADM

## 2019-05-25 RX ORDER — METHYLPREDNISOLONE SODIUM SUCCINATE 40 MG/ML
40 INJECTION, POWDER, LYOPHILIZED, FOR SOLUTION INTRAMUSCULAR; INTRAVENOUS EVERY 8 HOURS
Status: COMPLETED | OUTPATIENT
Start: 2019-05-25 | End: 2019-05-27

## 2019-05-25 RX ORDER — BUDESONIDE 0.5 MG/2ML
0.5 INHALANT ORAL
Status: DISCONTINUED | OUTPATIENT
Start: 2019-05-25 | End: 2019-05-31 | Stop reason: HOSPADM

## 2019-05-25 RX ADMIN — IPRATROPIUM BROMIDE AND ALBUTEROL SULFATE 3 ML: 2.5; .5 SOLUTION RESPIRATORY (INHALATION) at 20:20

## 2019-05-25 RX ADMIN — BUDESONIDE 0.5 MG: 0.5 INHALANT RESPIRATORY (INHALATION) at 20:21

## 2019-05-25 RX ADMIN — METOPROLOL TARTRATE 12.5 MG: 25 TABLET ORAL at 20:58

## 2019-05-25 RX ADMIN — METHYLPREDNISOLONE SODIUM SUCCINATE 40 MG: 40 INJECTION, POWDER, FOR SOLUTION INTRAMUSCULAR; INTRAVENOUS at 18:37

## 2019-05-25 RX ADMIN — SODIUM CHLORIDE, PRESERVATIVE FREE 3 ML: 5 INJECTION INTRAVENOUS at 20:58

## 2019-05-25 RX ADMIN — SODIUM CHLORIDE, PRESERVATIVE FREE 3 ML: 5 INJECTION INTRAVENOUS at 18:36

## 2019-05-25 RX ADMIN — FUROSEMIDE 20 MG: 20 TABLET ORAL at 18:35

## 2019-05-25 RX ADMIN — IPRATROPIUM BROMIDE AND ALBUTEROL SULFATE 3 ML: .5; 3 SOLUTION RESPIRATORY (INHALATION) at 13:50

## 2019-05-25 RX ADMIN — ALPRAZOLAM 1 MG: 1 TABLET ORAL at 20:58

## 2019-05-25 RX ADMIN — INSULIN LISPRO 2 UNITS: 100 INJECTION, SOLUTION INTRAVENOUS; SUBCUTANEOUS at 21:16

## 2019-05-25 RX ADMIN — HEPARIN SODIUM 5000 UNITS: 5000 INJECTION INTRAVENOUS; SUBCUTANEOUS at 21:00

## 2019-05-25 RX ADMIN — HEPARIN SODIUM 5000 UNITS: 5000 INJECTION INTRAVENOUS; SUBCUTANEOUS at 14:59

## 2019-05-26 LAB
ANION GAP SERPL CALCULATED.3IONS-SCNC: 12.9 MMOL/L
BUN BLD-MCNC: 25 MG/DL (ref 8–23)
BUN/CREAT SERPL: 20.3 (ref 7–25)
CALCIUM SPEC-SCNC: 8.6 MG/DL (ref 8.6–10.5)
CHLORIDE SERPL-SCNC: 104 MMOL/L (ref 98–107)
CO2 SERPL-SCNC: 31.1 MMOL/L (ref 22–29)
CREAT BLD-MCNC: 1.23 MG/DL (ref 0.76–1.27)
DEPRECATED RDW RBC AUTO: 54.8 FL (ref 37–54)
EOSINOPHIL # BLD MANUAL: 0.05 10*3/MM3 (ref 0–0.4)
EOSINOPHIL # BLD MANUAL: 0.05 10*3/MM3 (ref 0–0.4)
EOSINOPHIL NFR BLD MANUAL: 1.4 % (ref 0.3–6.2)
EOSINOPHIL NFR BLD MANUAL: 1.4 % (ref 0.3–6.2)
ERYTHROCYTE [DISTWIDTH] IN BLOOD BY AUTOMATED COUNT: 13.7 % (ref 12.3–15.4)
GFR SERPL CREATININE-BSD FRML MDRD: 57 ML/MIN/1.73
GLUCOSE BLD-MCNC: 179 MG/DL (ref 65–99)
GLUCOSE BLDC GLUCOMTR-MCNC: 176 MG/DL (ref 70–130)
GLUCOSE BLDC GLUCOMTR-MCNC: 228 MG/DL (ref 70–130)
GLUCOSE BLDC GLUCOMTR-MCNC: 229 MG/DL (ref 70–130)
GLUCOSE BLDC GLUCOMTR-MCNC: 231 MG/DL (ref 70–130)
HCT VFR BLD AUTO: 29.9 % (ref 37.5–51)
HGB BLD-MCNC: 9.5 G/DL (ref 13–17.7)
LYMPHOCYTES # BLD MANUAL: 0.38 10*3/MM3 (ref 0.7–3.1)
LYMPHOCYTES # BLD MANUAL: 0.38 10*3/MM3 (ref 0.7–3.1)
LYMPHOCYTES NFR BLD MANUAL: 11.3 % (ref 19.6–45.3)
LYMPHOCYTES NFR BLD MANUAL: 11.3 % (ref 19.6–45.3)
MAGNESIUM SERPL-MCNC: 2 MG/DL (ref 1.6–2.4)
MCH RBC QN AUTO: 35.1 PG (ref 26.6–33)
MCHC RBC AUTO-ENTMCNC: 31.8 G/DL (ref 31.5–35.7)
MCV RBC AUTO: 110.3 FL (ref 79–97)
NEUTROPHILS # BLD AUTO: 2.95 10*3/MM3 (ref 1.7–7)
NEUTROPHILS # BLD AUTO: 2.95 10*3/MM3 (ref 1.7–7)
NEUTROPHILS NFR BLD MANUAL: 87.3 % (ref 42.7–76)
NEUTROPHILS NFR BLD MANUAL: 87.3 % (ref 42.7–76)
PLAT MORPH BLD: NORMAL
PLAT MORPH BLD: NORMAL
PLATELET # BLD AUTO: 140 10*3/MM3 (ref 140–450)
PMV BLD AUTO: 12.2 FL (ref 6–12)
POTASSIUM BLD-SCNC: 4.1 MMOL/L (ref 3.5–5.2)
POTASSIUM BLD-SCNC: 4.6 MMOL/L (ref 3.5–5.2)
RBC # BLD AUTO: 2.71 10*6/MM3 (ref 4.14–5.8)
RBC MORPH BLD: NORMAL
RBC MORPH BLD: NORMAL
SODIUM BLD-SCNC: 148 MMOL/L (ref 136–145)
TROPONIN T SERPL-MCNC: 0.02 NG/ML (ref 0–0.03)
TROPONIN T SERPL-MCNC: <0.01 NG/ML (ref 0–0.03)
WBC MORPH BLD: NORMAL
WBC MORPH BLD: NORMAL
WBC NRBC COR # BLD: 3.38 10*3/MM3 (ref 3.4–10.8)

## 2019-05-26 PROCEDURE — 85007 BL SMEAR W/DIFF WBC COUNT: CPT | Performed by: INTERNAL MEDICINE

## 2019-05-26 PROCEDURE — 94799 UNLISTED PULMONARY SVC/PX: CPT

## 2019-05-26 PROCEDURE — 82962 GLUCOSE BLOOD TEST: CPT

## 2019-05-26 PROCEDURE — 93010 ELECTROCARDIOGRAM REPORT: CPT | Performed by: INTERNAL MEDICINE

## 2019-05-26 PROCEDURE — 83735 ASSAY OF MAGNESIUM: CPT | Performed by: INTERNAL MEDICINE

## 2019-05-26 PROCEDURE — 25010000002 METHYLPREDNISOLONE PER 40 MG: Performed by: INTERNAL MEDICINE

## 2019-05-26 PROCEDURE — 63710000001 INSULIN LISPRO (HUMAN) PER 5 UNITS: Performed by: INTERNAL MEDICINE

## 2019-05-26 PROCEDURE — 93005 ELECTROCARDIOGRAM TRACING: CPT | Performed by: INTERNAL MEDICINE

## 2019-05-26 PROCEDURE — 84484 ASSAY OF TROPONIN QUANT: CPT | Performed by: INTERNAL MEDICINE

## 2019-05-26 PROCEDURE — 94660 CPAP INITIATION&MGMT: CPT

## 2019-05-26 PROCEDURE — 25010000002 HEPARIN (PORCINE) PER 1000 UNITS: Performed by: INTERNAL MEDICINE

## 2019-05-26 PROCEDURE — 84132 ASSAY OF SERUM POTASSIUM: CPT | Performed by: INTERNAL MEDICINE

## 2019-05-26 PROCEDURE — 85025 COMPLETE CBC W/AUTO DIFF WBC: CPT | Performed by: INTERNAL MEDICINE

## 2019-05-26 PROCEDURE — 80048 BASIC METABOLIC PNL TOTAL CA: CPT | Performed by: INTERNAL MEDICINE

## 2019-05-26 RX ORDER — NITROGLYCERIN 0.4 MG/1
0.4 TABLET SUBLINGUAL
Status: DISCONTINUED | OUTPATIENT
Start: 2019-05-26 | End: 2019-05-31 | Stop reason: HOSPADM

## 2019-05-26 RX ADMIN — INSULIN LISPRO 3 UNITS: 100 INJECTION, SOLUTION INTRAVENOUS; SUBCUTANEOUS at 17:10

## 2019-05-26 RX ADMIN — INSULIN LISPRO 3 UNITS: 100 INJECTION, SOLUTION INTRAVENOUS; SUBCUTANEOUS at 22:24

## 2019-05-26 RX ADMIN — METHYLPREDNISOLONE SODIUM SUCCINATE 40 MG: 40 INJECTION, POWDER, FOR SOLUTION INTRAMUSCULAR; INTRAVENOUS at 10:17

## 2019-05-26 RX ADMIN — ALPRAZOLAM 1 MG: 1 TABLET ORAL at 23:47

## 2019-05-26 RX ADMIN — IPRATROPIUM BROMIDE AND ALBUTEROL SULFATE 3 ML: 2.5; .5 SOLUTION RESPIRATORY (INHALATION) at 13:30

## 2019-05-26 RX ADMIN — ARFORMOTEROL TARTRATE 15 MCG: 15 SOLUTION RESPIRATORY (INHALATION) at 21:49

## 2019-05-26 RX ADMIN — SODIUM CHLORIDE, PRESERVATIVE FREE 3 ML: 5 INJECTION INTRAVENOUS at 22:25

## 2019-05-26 RX ADMIN — BUDESONIDE 0.5 MG: 0.5 INHALANT RESPIRATORY (INHALATION) at 21:49

## 2019-05-26 RX ADMIN — BUDESONIDE 0.5 MG: 0.5 INHALANT RESPIRATORY (INHALATION) at 07:44

## 2019-05-26 RX ADMIN — METHYLPREDNISOLONE SODIUM SUCCINATE 40 MG: 40 INJECTION, POWDER, FOR SOLUTION INTRAMUSCULAR; INTRAVENOUS at 01:49

## 2019-05-26 RX ADMIN — DILTIAZEM HYDROCHLORIDE 240 MG: 240 CAPSULE, COATED, EXTENDED RELEASE ORAL at 08:12

## 2019-05-26 RX ADMIN — SODIUM CHLORIDE, PRESERVATIVE FREE 3 ML: 5 INJECTION INTRAVENOUS at 10:18

## 2019-05-26 RX ADMIN — METOPROLOL TARTRATE 12.5 MG: 25 TABLET ORAL at 08:12

## 2019-05-26 RX ADMIN — HEPARIN SODIUM 5000 UNITS: 5000 INJECTION INTRAVENOUS; SUBCUTANEOUS at 14:41

## 2019-05-26 RX ADMIN — ARFORMOTEROL TARTRATE 15 MCG: 15 SOLUTION RESPIRATORY (INHALATION) at 07:44

## 2019-05-26 RX ADMIN — METOPROLOL TARTRATE 12.5 MG: 25 TABLET ORAL at 20:24

## 2019-05-26 RX ADMIN — METHYLPREDNISOLONE SODIUM SUCCINATE 40 MG: 40 INJECTION, POWDER, FOR SOLUTION INTRAMUSCULAR; INTRAVENOUS at 17:10

## 2019-05-26 RX ADMIN — IPRATROPIUM BROMIDE AND ALBUTEROL SULFATE 3 ML: 2.5; .5 SOLUTION RESPIRATORY (INHALATION) at 04:25

## 2019-05-26 RX ADMIN — HEPARIN SODIUM 5000 UNITS: 5000 INJECTION INTRAVENOUS; SUBCUTANEOUS at 05:53

## 2019-05-26 RX ADMIN — INSULIN LISPRO 3 UNITS: 100 INJECTION, SOLUTION INTRAVENOUS; SUBCUTANEOUS at 12:12

## 2019-05-26 RX ADMIN — FUROSEMIDE 20 MG: 20 TABLET ORAL at 08:12

## 2019-05-26 RX ADMIN — HEPARIN SODIUM 5000 UNITS: 5000 INJECTION INTRAVENOUS; SUBCUTANEOUS at 22:24

## 2019-05-26 RX ADMIN — INSULIN LISPRO 2 UNITS: 100 INJECTION, SOLUTION INTRAVENOUS; SUBCUTANEOUS at 08:10

## 2019-05-26 RX ADMIN — IPRATROPIUM BROMIDE AND ALBUTEROL SULFATE 3 ML: 2.5; .5 SOLUTION RESPIRATORY (INHALATION) at 15:43

## 2019-05-27 LAB
ANION GAP SERPL CALCULATED.3IONS-SCNC: 10.9 MMOL/L
BASOPHILS # BLD MANUAL: 0.04 10*3/MM3 (ref 0–0.2)
BASOPHILS NFR BLD AUTO: 1 % (ref 0–1.5)
BUN BLD-MCNC: 28 MG/DL (ref 8–23)
BUN/CREAT SERPL: 26.4 (ref 7–25)
CALCIUM SPEC-SCNC: 9 MG/DL (ref 8.6–10.5)
CHLORIDE SERPL-SCNC: 102 MMOL/L (ref 98–107)
CO2 SERPL-SCNC: 31.1 MMOL/L (ref 22–29)
CREAT BLD-MCNC: 1.06 MG/DL (ref 0.76–1.27)
DEPRECATED RDW RBC AUTO: 52.8 FL (ref 37–54)
ERYTHROCYTE [DISTWIDTH] IN BLOOD BY AUTOMATED COUNT: 13.4 % (ref 12.3–15.4)
GFR SERPL CREATININE-BSD FRML MDRD: 67 ML/MIN/1.73
GIANT PLATELETS: ABNORMAL
GLUCOSE BLD-MCNC: 175 MG/DL (ref 65–99)
GLUCOSE BLDC GLUCOMTR-MCNC: 197 MG/DL (ref 70–130)
GLUCOSE BLDC GLUCOMTR-MCNC: 215 MG/DL (ref 70–130)
GLUCOSE BLDC GLUCOMTR-MCNC: 245 MG/DL (ref 70–130)
GLUCOSE BLDC GLUCOMTR-MCNC: 256 MG/DL (ref 70–130)
HCT VFR BLD AUTO: 30.7 % (ref 37.5–51)
HGB BLD-MCNC: 9.9 G/DL (ref 13–17.7)
LYMPHOCYTES # BLD MANUAL: 0.48 10*3/MM3 (ref 0.7–3.1)
LYMPHOCYTES NFR BLD MANUAL: 11.1 % (ref 19.6–45.3)
MCH RBC QN AUTO: 34.6 PG (ref 26.6–33)
MCHC RBC AUTO-ENTMCNC: 32.2 G/DL (ref 31.5–35.7)
MCV RBC AUTO: 107.3 FL (ref 79–97)
METAMYELOCYTES NFR BLD MANUAL: 2 % (ref 0–0)
MYELOCYTES NFR BLD MANUAL: 2 % (ref 0–0)
NEUTROPHILS # BLD AUTO: 3.62 10*3/MM3 (ref 1.7–7)
NEUTROPHILS NFR BLD MANUAL: 83.8 % (ref 42.7–76)
NRBC SPEC MANUAL: 2 /100 WBC (ref 0–0.2)
PLATELET # BLD AUTO: 152 10*3/MM3 (ref 140–450)
PMV BLD AUTO: 11.8 FL (ref 6–12)
POLYCHROMASIA BLD QL SMEAR: ABNORMAL
POTASSIUM BLD-SCNC: 4.5 MMOL/L (ref 3.5–5.2)
RBC # BLD AUTO: 2.86 10*6/MM3 (ref 4.14–5.8)
SODIUM BLD-SCNC: 144 MMOL/L (ref 136–145)
WBC MORPH BLD: NORMAL
WBC NRBC COR # BLD: 4.32 10*3/MM3 (ref 3.4–10.8)

## 2019-05-27 PROCEDURE — 94799 UNLISTED PULMONARY SVC/PX: CPT

## 2019-05-27 PROCEDURE — 94660 CPAP INITIATION&MGMT: CPT

## 2019-05-27 PROCEDURE — 82962 GLUCOSE BLOOD TEST: CPT

## 2019-05-27 PROCEDURE — 25010000002 METHYLPREDNISOLONE PER 40 MG: Performed by: INTERNAL MEDICINE

## 2019-05-27 PROCEDURE — 25010000002 ENOXAPARIN PER 10 MG: Performed by: INTERNAL MEDICINE

## 2019-05-27 PROCEDURE — 25010000002 HEPARIN (PORCINE) PER 1000 UNITS: Performed by: INTERNAL MEDICINE

## 2019-05-27 PROCEDURE — 80048 BASIC METABOLIC PNL TOTAL CA: CPT | Performed by: INTERNAL MEDICINE

## 2019-05-27 PROCEDURE — 85025 COMPLETE CBC W/AUTO DIFF WBC: CPT | Performed by: INTERNAL MEDICINE

## 2019-05-27 PROCEDURE — 99222 1ST HOSP IP/OBS MODERATE 55: CPT | Performed by: INTERNAL MEDICINE

## 2019-05-27 PROCEDURE — 63710000001 INSULIN LISPRO (HUMAN) PER 5 UNITS: Performed by: INTERNAL MEDICINE

## 2019-05-27 PROCEDURE — 85007 BL SMEAR W/DIFF WBC COUNT: CPT | Performed by: INTERNAL MEDICINE

## 2019-05-27 RX ORDER — PREDNISONE 20 MG/1
40 TABLET ORAL
Status: DISCONTINUED | OUTPATIENT
Start: 2019-05-28 | End: 2019-05-30

## 2019-05-27 RX ORDER — DILTIAZEM HCL IN NACL,ISO-OSM 125 MG/125
5-15 PLASTIC BAG, INJECTION (ML) INTRAVENOUS
Status: DISCONTINUED | OUTPATIENT
Start: 2019-05-27 | End: 2019-05-28

## 2019-05-27 RX ORDER — DOXYCYCLINE 100 MG/1
100 CAPSULE ORAL EVERY 12 HOURS SCHEDULED
Status: DISCONTINUED | OUTPATIENT
Start: 2019-05-27 | End: 2019-05-31 | Stop reason: HOSPADM

## 2019-05-27 RX ADMIN — ARFORMOTEROL TARTRATE 15 MCG: 15 SOLUTION RESPIRATORY (INHALATION) at 19:40

## 2019-05-27 RX ADMIN — ALPRAZOLAM 1 MG: 1 TABLET ORAL at 21:47

## 2019-05-27 RX ADMIN — BUDESONIDE 0.5 MG: 0.5 INHALANT RESPIRATORY (INHALATION) at 19:40

## 2019-05-27 RX ADMIN — HEPARIN SODIUM 5000 UNITS: 5000 INJECTION INTRAVENOUS; SUBCUTANEOUS at 06:14

## 2019-05-27 RX ADMIN — SODIUM CHLORIDE, PRESERVATIVE FREE 3 ML: 5 INJECTION INTRAVENOUS at 10:17

## 2019-05-27 RX ADMIN — INSULIN LISPRO 3 UNITS: 100 INJECTION, SOLUTION INTRAVENOUS; SUBCUTANEOUS at 18:25

## 2019-05-27 RX ADMIN — DILTIAZEM HYDROCHLORIDE 5 MG/HR: 5 INJECTION INTRAVENOUS at 10:14

## 2019-05-27 RX ADMIN — METOPROLOL TARTRATE 25 MG: 25 TABLET ORAL at 21:46

## 2019-05-27 RX ADMIN — INSULIN LISPRO 2 UNITS: 100 INJECTION, SOLUTION INTRAVENOUS; SUBCUTANEOUS at 09:16

## 2019-05-27 RX ADMIN — METHYLPREDNISOLONE SODIUM SUCCINATE 40 MG: 40 INJECTION, POWDER, FOR SOLUTION INTRAMUSCULAR; INTRAVENOUS at 02:23

## 2019-05-27 RX ADMIN — METOPROLOL TARTRATE 12.5 MG: 25 TABLET ORAL at 09:16

## 2019-05-27 RX ADMIN — SODIUM CHLORIDE, PRESERVATIVE FREE 3 ML: 5 INJECTION INTRAVENOUS at 21:47

## 2019-05-27 RX ADMIN — FUROSEMIDE 20 MG: 20 TABLET ORAL at 09:16

## 2019-05-27 RX ADMIN — INSULIN LISPRO 4 UNITS: 100 INJECTION, SOLUTION INTRAVENOUS; SUBCUTANEOUS at 11:47

## 2019-05-27 RX ADMIN — DILTIAZEM HYDROCHLORIDE 240 MG: 240 CAPSULE, COATED, EXTENDED RELEASE ORAL at 09:16

## 2019-05-27 RX ADMIN — ENOXAPARIN SODIUM 110 MG: 120 INJECTION SUBCUTANEOUS at 18:24

## 2019-05-27 RX ADMIN — INSULIN LISPRO 3 UNITS: 100 INJECTION, SOLUTION INTRAVENOUS; SUBCUTANEOUS at 21:46

## 2019-05-27 RX ADMIN — IPRATROPIUM BROMIDE AND ALBUTEROL SULFATE 3 ML: 2.5; .5 SOLUTION RESPIRATORY (INHALATION) at 16:30

## 2019-05-27 RX ADMIN — METHYLPREDNISOLONE SODIUM SUCCINATE 40 MG: 40 INJECTION, POWDER, FOR SOLUTION INTRAMUSCULAR; INTRAVENOUS at 18:24

## 2019-05-27 RX ADMIN — METHYLPREDNISOLONE SODIUM SUCCINATE 40 MG: 40 INJECTION, POWDER, FOR SOLUTION INTRAMUSCULAR; INTRAVENOUS at 09:16

## 2019-05-27 RX ADMIN — DOXYCYCLINE 100 MG: 100 CAPSULE ORAL at 21:46

## 2019-05-28 ENCOUNTER — APPOINTMENT (OUTPATIENT)
Dept: ONCOLOGY | Facility: CLINIC | Age: 79
End: 2019-05-28

## 2019-05-28 ENCOUNTER — APPOINTMENT (OUTPATIENT)
Dept: CARDIOLOGY | Facility: HOSPITAL | Age: 79
End: 2019-05-28

## 2019-05-28 ENCOUNTER — APPOINTMENT (OUTPATIENT)
Dept: OTHER | Facility: HOSPITAL | Age: 79
End: 2019-05-28

## 2019-05-28 LAB
ANION GAP SERPL CALCULATED.3IONS-SCNC: 11.2 MMOL/L
AORTIC DIMENSIONLESS INDEX: 0.6 (DI)
ARTERIAL PATENCY WRIST A: POSITIVE
ATMOSPHERIC PRESS: 748 MMHG
BASE EXCESS BLDA CALC-SCNC: 6.8 MMOL/L (ref 0–2)
BDY SITE: ABNORMAL
BH CV ECHO MEAS - ACS: 1.9 CM
BH CV ECHO MEAS - AO MAX PG (FULL): 6.8 MMHG
BH CV ECHO MEAS - AO MAX PG: 15 MMHG
BH CV ECHO MEAS - AO MEAN PG (FULL): 5 MMHG
BH CV ECHO MEAS - AO MEAN PG: 10 MMHG
BH CV ECHO MEAS - AO ROOT AREA (BSA CORRECTED): 1.6
BH CV ECHO MEAS - AO ROOT AREA: 9.6 CM^2
BH CV ECHO MEAS - AO ROOT DIAM: 3.5 CM
BH CV ECHO MEAS - AO V2 MAX: 192 CM/SEC
BH CV ECHO MEAS - AO V2 MEAN: 153 CM/SEC
BH CV ECHO MEAS - AO V2 VTI: 38.6 CM
BH CV ECHO MEAS - ASC AORTA: 3.7 CM
BH CV ECHO MEAS - AVA(I,A): 2 CM^2
BH CV ECHO MEAS - AVA(I,D): 2 CM^2
BH CV ECHO MEAS - AVA(V,A): 2.3 CM^2
BH CV ECHO MEAS - AVA(V,D): 2.3 CM^2
BH CV ECHO MEAS - BSA(HAYCOCK): 2.3 M^2
BH CV ECHO MEAS - BSA: 2.2 M^2
BH CV ECHO MEAS - BZI_BMI: 35.5 KILOGRAMS/M^2
BH CV ECHO MEAS - BZI_METRIC_HEIGHT: 172 CM
BH CV ECHO MEAS - BZI_METRIC_WEIGHT: 105 KG
BH CV ECHO MEAS - EDV(CUBED): 175.6 ML
BH CV ECHO MEAS - EDV(MOD-SP2): 98 ML
BH CV ECHO MEAS - EDV(MOD-SP4): 128 ML
BH CV ECHO MEAS - EDV(TEICH): 153.7 ML
BH CV ECHO MEAS - EF(CUBED): 15.2 %
BH CV ECHO MEAS - EF(MOD-BP): 53 %
BH CV ECHO MEAS - EF(MOD-SP2): 52 %
BH CV ECHO MEAS - EF(MOD-SP4): 50.8 %
BH CV ECHO MEAS - EF(TEICH): 11.9 %
BH CV ECHO MEAS - ESV(CUBED): 148.9 ML
BH CV ECHO MEAS - ESV(MOD-SP2): 47 ML
BH CV ECHO MEAS - ESV(MOD-SP4): 63 ML
BH CV ECHO MEAS - ESV(TEICH): 135.3 ML
BH CV ECHO MEAS - FS: 5.4 %
BH CV ECHO MEAS - IVS/LVPW: 1.1
BH CV ECHO MEAS - IVSD: 1.6 CM
BH CV ECHO MEAS - LAT PEAK E' VEL: 11 CM/SEC
BH CV ECHO MEAS - LV DIASTOLIC VOL/BSA (35-75): 59 ML/M^2
BH CV ECHO MEAS - LV MASS(C)D: 383.7 GRAMS
BH CV ECHO MEAS - LV MASS(C)DI: 176.9 GRAMS/M^2
BH CV ECHO MEAS - LV MAX PG: 8 MMHG
BH CV ECHO MEAS - LV MEAN PG: 5 MMHG
BH CV ECHO MEAS - LV SYSTOLIC VOL/BSA (12-30): 29.1 ML/M^2
BH CV ECHO MEAS - LV V1 MAX: 141 CM/SEC
BH CV ECHO MEAS - LV V1 MEAN: 99.4 CM/SEC
BH CV ECHO MEAS - LV V1 VTI: 24.3 CM
BH CV ECHO MEAS - LVIDD: 5.6 CM
BH CV ECHO MEAS - LVIDS: 5.3 CM
BH CV ECHO MEAS - LVLD AP2: 8.1 CM
BH CV ECHO MEAS - LVLD AP4: 8.9 CM
BH CV ECHO MEAS - LVLS AP2: 7.6 CM
BH CV ECHO MEAS - LVLS AP4: 7.7 CM
BH CV ECHO MEAS - LVOT AREA (M): 3.1 CM^2
BH CV ECHO MEAS - LVOT AREA: 3.1 CM^2
BH CV ECHO MEAS - LVOT DIAM: 2 CM
BH CV ECHO MEAS - LVPWD: 1.4 CM
BH CV ECHO MEAS - MED PEAK E' VEL: 11 CM/SEC
BH CV ECHO MEAS - MV A DUR: 130 SEC
BH CV ECHO MEAS - MV A MAX VEL: 83.4 CM/SEC
BH CV ECHO MEAS - MV DEC SLOPE: 900 CM/SEC^2
BH CV ECHO MEAS - MV DEC TIME: 106 SEC
BH CV ECHO MEAS - MV E MAX VEL: 103 CM/SEC
BH CV ECHO MEAS - MV E/A: 1.2
BH CV ECHO MEAS - MV MAX PG: 8 MMHG
BH CV ECHO MEAS - MV MEAN PG: 3 MMHG
BH CV ECHO MEAS - MV P1/2T MAX VEL: 152 CM/SEC
BH CV ECHO MEAS - MV P1/2T: 49.5 MSEC
BH CV ECHO MEAS - MV V2 MAX: 141 CM/SEC
BH CV ECHO MEAS - MV V2 MEAN: 72.5 CM/SEC
BH CV ECHO MEAS - MV V2 VTI: 32.9 CM
BH CV ECHO MEAS - MVA P1/2T LCG: 1.4 CM^2
BH CV ECHO MEAS - MVA(P1/2T): 4.4 CM^2
BH CV ECHO MEAS - MVA(VTI): 2.3 CM^2
BH CV ECHO MEAS - PA ACC TIME: 0.1 SEC
BH CV ECHO MEAS - PA MAX PG (FULL): 6.1 MMHG
BH CV ECHO MEAS - PA MAX PG: 11 MMHG
BH CV ECHO MEAS - PA PR(ACCEL): 36.3 MMHG
BH CV ECHO MEAS - PA V2 MAX: 166 CM/SEC
BH CV ECHO MEAS - PVA(V,A): 2.2 CM^2
BH CV ECHO MEAS - PVA(V,D): 2.2 CM^2
BH CV ECHO MEAS - QP/QS: 0.77
BH CV ECHO MEAS - RV MAX PG: 4.9 MMHG
BH CV ECHO MEAS - RV MEAN PG: 2 MMHG
BH CV ECHO MEAS - RV V1 MAX: 111 CM/SEC
BH CV ECHO MEAS - RV V1 MEAN: 66.5 CM/SEC
BH CV ECHO MEAS - RV V1 VTI: 17.4 CM
BH CV ECHO MEAS - RVOT AREA: 3.4 CM^2
BH CV ECHO MEAS - RVOT DIAM: 2.1 CM
BH CV ECHO MEAS - SI(AO): 171.3 ML/M^2
BH CV ECHO MEAS - SI(CUBED): 12.3 ML/M^2
BH CV ECHO MEAS - SI(LVOT): 35.2 ML/M^2
BH CV ECHO MEAS - SI(MOD-SP2): 23.5 ML/M^2
BH CV ECHO MEAS - SI(MOD-SP4): 30 ML/M^2
BH CV ECHO MEAS - SI(TEICH): 8.4 ML/M^2
BH CV ECHO MEAS - SV(AO): 371.4 ML
BH CV ECHO MEAS - SV(CUBED): 26.7 ML
BH CV ECHO MEAS - SV(LVOT): 76.3 ML
BH CV ECHO MEAS - SV(MOD-SP2): 51 ML
BH CV ECHO MEAS - SV(MOD-SP4): 65 ML
BH CV ECHO MEAS - SV(RVOT): 58.5 ML
BH CV ECHO MEAS - SV(TEICH): 18.3 ML
BH CV ECHO MEAS - TAPSE (>1.6): 2.85 CM2
BH CV ECHO MEASUREMENTS AVERAGE E/E' RATIO: 9.36
BH CV XLRA - RV BASE: 4.3 CM
BH CV XLRA - TDI S': 19.6 CM/SEC
BUN BLD-MCNC: 35 MG/DL (ref 8–23)
BUN/CREAT SERPL: 31.5 (ref 7–25)
CALCIUM SPEC-SCNC: 9 MG/DL (ref 8.6–10.5)
CHLORIDE SERPL-SCNC: 101 MMOL/L (ref 98–107)
CO2 SERPL-SCNC: 29.8 MMOL/L (ref 22–29)
CREAT BLD-MCNC: 1.11 MG/DL (ref 0.76–1.27)
GAS FLOW AIRWAY: 4 LPM
GFR SERPL CREATININE-BSD FRML MDRD: 64 ML/MIN/1.73
GLUCOSE BLD-MCNC: 180 MG/DL (ref 65–99)
GLUCOSE BLDC GLUCOMTR-MCNC: 182 MG/DL (ref 70–130)
GLUCOSE BLDC GLUCOMTR-MCNC: 244 MG/DL (ref 70–130)
GLUCOSE BLDC GLUCOMTR-MCNC: 292 MG/DL (ref 70–130)
GLUCOSE BLDC GLUCOMTR-MCNC: 338 MG/DL (ref 70–130)
HCO3 BLDA-SCNC: 33.7 MMOL/L (ref 22–28)
LEFT ATRIUM VOLUME INDEX: 40 ML/M2
MAXIMAL PREDICTED HEART RATE: 141 BPM
MODALITY: ABNORMAL
PCO2 BLDA: 60.6 MM HG (ref 35–45)
PH BLDA: 7.35 PH UNITS (ref 7.35–7.45)
PO2 BLDA: 112.8 MM HG (ref 80–100)
POTASSIUM BLD-SCNC: 4.5 MMOL/L (ref 3.5–5.2)
SAO2 % BLDCOA: 98 % (ref 92–99)
SET MECH RESP RATE: 20
SODIUM BLD-SCNC: 142 MMOL/L (ref 136–145)
STRESS TARGET HR: 120 BPM
TOTAL RATE: 20 BREATHS/MINUTE

## 2019-05-28 PROCEDURE — 25010000002 PERFLUTREN (DEFINITY) 8.476 MG IN SODIUM CHLORIDE 0.9 % 10 ML INJECTION: Performed by: NURSE PRACTITIONER

## 2019-05-28 PROCEDURE — 36600 WITHDRAWAL OF ARTERIAL BLOOD: CPT

## 2019-05-28 PROCEDURE — 63710000001 PREDNISONE PER 1 MG: Performed by: INTERNAL MEDICINE

## 2019-05-28 PROCEDURE — 94799 UNLISTED PULMONARY SVC/PX: CPT

## 2019-05-28 PROCEDURE — 93005 ELECTROCARDIOGRAM TRACING: CPT | Performed by: NURSE PRACTITIONER

## 2019-05-28 PROCEDURE — 80048 BASIC METABOLIC PNL TOTAL CA: CPT | Performed by: INTERNAL MEDICINE

## 2019-05-28 PROCEDURE — 93010 ELECTROCARDIOGRAM REPORT: CPT | Performed by: INTERNAL MEDICINE

## 2019-05-28 PROCEDURE — 93306 TTE W/DOPPLER COMPLETE: CPT

## 2019-05-28 PROCEDURE — 94660 CPAP INITIATION&MGMT: CPT

## 2019-05-28 PROCEDURE — 93306 TTE W/DOPPLER COMPLETE: CPT | Performed by: INTERNAL MEDICINE

## 2019-05-28 PROCEDURE — 63710000001 INSULIN LISPRO (HUMAN) PER 5 UNITS: Performed by: INTERNAL MEDICINE

## 2019-05-28 PROCEDURE — 82803 BLOOD GASES ANY COMBINATION: CPT

## 2019-05-28 PROCEDURE — 25010000002 ENOXAPARIN PER 10 MG: Performed by: INTERNAL MEDICINE

## 2019-05-28 PROCEDURE — 99232 SBSQ HOSP IP/OBS MODERATE 35: CPT | Performed by: NURSE PRACTITIONER

## 2019-05-28 PROCEDURE — 82962 GLUCOSE BLOOD TEST: CPT

## 2019-05-28 RX ORDER — ECHINACEA PURPUREA EXTRACT 125 MG
1 TABLET ORAL AS NEEDED
Status: DISCONTINUED | OUTPATIENT
Start: 2019-05-28 | End: 2019-05-31 | Stop reason: HOSPADM

## 2019-05-28 RX ORDER — DILTIAZEM HYDROCHLORIDE 180 MG/1
360 CAPSULE, COATED, EXTENDED RELEASE ORAL
Status: DISCONTINUED | OUTPATIENT
Start: 2019-05-29 | End: 2019-05-31 | Stop reason: HOSPADM

## 2019-05-28 RX ADMIN — ALPRAZOLAM 1 MG: 1 TABLET ORAL at 23:46

## 2019-05-28 RX ADMIN — PERFLUTREN 2 ML: 6.52 INJECTION, SUSPENSION INTRAVENOUS at 16:50

## 2019-05-28 RX ADMIN — BUDESONIDE 0.5 MG: 0.5 INHALANT RESPIRATORY (INHALATION) at 07:13

## 2019-05-28 RX ADMIN — SODIUM CHLORIDE, PRESERVATIVE FREE 3 ML: 5 INJECTION INTRAVENOUS at 20:11

## 2019-05-28 RX ADMIN — IPRATROPIUM BROMIDE AND ALBUTEROL SULFATE 3 ML: 2.5; .5 SOLUTION RESPIRATORY (INHALATION) at 15:08

## 2019-05-28 RX ADMIN — INSULIN LISPRO 4 UNITS: 100 INJECTION, SOLUTION INTRAVENOUS; SUBCUTANEOUS at 18:27

## 2019-05-28 RX ADMIN — ENOXAPARIN SODIUM 110 MG: 120 INJECTION SUBCUTANEOUS at 04:15

## 2019-05-28 RX ADMIN — INSULIN LISPRO 2 UNITS: 100 INJECTION, SOLUTION INTRAVENOUS; SUBCUTANEOUS at 08:07

## 2019-05-28 RX ADMIN — METOPROLOL TARTRATE 25 MG: 25 TABLET ORAL at 08:07

## 2019-05-28 RX ADMIN — DOXYCYCLINE 100 MG: 100 CAPSULE ORAL at 08:07

## 2019-05-28 RX ADMIN — ENOXAPARIN SODIUM 110 MG: 120 INJECTION SUBCUTANEOUS at 15:36

## 2019-05-28 RX ADMIN — METOPROLOL TARTRATE 25 MG: 25 TABLET ORAL at 20:11

## 2019-05-28 RX ADMIN — ARFORMOTEROL TARTRATE 15 MCG: 15 SOLUTION RESPIRATORY (INHALATION) at 20:57

## 2019-05-28 RX ADMIN — BUDESONIDE 0.5 MG: 0.5 INHALANT RESPIRATORY (INHALATION) at 20:57

## 2019-05-28 RX ADMIN — PREDNISONE 40 MG: 20 TABLET ORAL at 08:07

## 2019-05-28 RX ADMIN — INSULIN LISPRO 3 UNITS: 100 INJECTION, SOLUTION INTRAVENOUS; SUBCUTANEOUS at 12:22

## 2019-05-28 RX ADMIN — IPRATROPIUM BROMIDE AND ALBUTEROL SULFATE 3 ML: 2.5; .5 SOLUTION RESPIRATORY (INHALATION) at 23:41

## 2019-05-28 RX ADMIN — ARFORMOTEROL TARTRATE 15 MCG: 15 SOLUTION RESPIRATORY (INHALATION) at 07:13

## 2019-05-28 RX ADMIN — INSULIN LISPRO 5 UNITS: 100 INJECTION, SOLUTION INTRAVENOUS; SUBCUTANEOUS at 21:47

## 2019-05-28 RX ADMIN — SALINE NASAL SPRAY 1 SPRAY: 1.5 SOLUTION NASAL at 05:04

## 2019-05-28 RX ADMIN — IPRATROPIUM BROMIDE AND ALBUTEROL SULFATE 3 ML: 2.5; .5 SOLUTION RESPIRATORY (INHALATION) at 11:22

## 2019-05-28 RX ADMIN — DOXYCYCLINE 100 MG: 100 CAPSULE ORAL at 20:11

## 2019-05-28 RX ADMIN — FUROSEMIDE 20 MG: 20 TABLET ORAL at 08:07

## 2019-05-28 RX ADMIN — DILTIAZEM HYDROCHLORIDE 240 MG: 240 CAPSULE, COATED, EXTENDED RELEASE ORAL at 08:07

## 2019-05-28 RX ADMIN — DILTIAZEM HYDROCHLORIDE 5 MG/HR: 5 INJECTION INTRAVENOUS at 05:04

## 2019-05-29 ENCOUNTER — TELEPHONE (OUTPATIENT)
Dept: FAMILY MEDICINE CLINIC | Facility: CLINIC | Age: 79
End: 2019-05-29

## 2019-05-29 LAB
ANION GAP SERPL CALCULATED.3IONS-SCNC: 9.6 MMOL/L
BUN BLD-MCNC: 38 MG/DL (ref 8–23)
BUN/CREAT SERPL: 31.9 (ref 7–25)
CALCIUM SPEC-SCNC: 8.8 MG/DL (ref 8.6–10.5)
CHLORIDE SERPL-SCNC: 102 MMOL/L (ref 98–107)
CO2 SERPL-SCNC: 31.4 MMOL/L (ref 22–29)
CREAT BLD-MCNC: 1.19 MG/DL (ref 0.76–1.27)
GFR SERPL CREATININE-BSD FRML MDRD: 59 ML/MIN/1.73
GLUCOSE BLD-MCNC: 131 MG/DL (ref 65–99)
GLUCOSE BLDC GLUCOMTR-MCNC: 117 MG/DL (ref 70–130)
GLUCOSE BLDC GLUCOMTR-MCNC: 175 MG/DL (ref 70–130)
GLUCOSE BLDC GLUCOMTR-MCNC: 235 MG/DL (ref 70–130)
GLUCOSE BLDC GLUCOMTR-MCNC: 239 MG/DL (ref 70–130)
POTASSIUM BLD-SCNC: 4.2 MMOL/L (ref 3.5–5.2)
SODIUM BLD-SCNC: 143 MMOL/L (ref 136–145)

## 2019-05-29 PROCEDURE — 63710000001 INSULIN LISPRO (HUMAN) PER 5 UNITS: Performed by: INTERNAL MEDICINE

## 2019-05-29 PROCEDURE — 99232 SBSQ HOSP IP/OBS MODERATE 35: CPT | Performed by: NURSE PRACTITIONER

## 2019-05-29 PROCEDURE — 94799 UNLISTED PULMONARY SVC/PX: CPT

## 2019-05-29 PROCEDURE — 97162 PT EVAL MOD COMPLEX 30 MIN: CPT

## 2019-05-29 PROCEDURE — 63710000001 PREDNISONE PER 1 MG: Performed by: INTERNAL MEDICINE

## 2019-05-29 PROCEDURE — 25010000002 DIGOXIN PER 500 MCG: Performed by: NURSE PRACTITIONER

## 2019-05-29 PROCEDURE — 97110 THERAPEUTIC EXERCISES: CPT

## 2019-05-29 PROCEDURE — 25010000002 ENOXAPARIN PER 10 MG: Performed by: INTERNAL MEDICINE

## 2019-05-29 PROCEDURE — 80048 BASIC METABOLIC PNL TOTAL CA: CPT | Performed by: INTERNAL MEDICINE

## 2019-05-29 PROCEDURE — 82962 GLUCOSE BLOOD TEST: CPT

## 2019-05-29 RX ORDER — DIGOXIN 0.25 MG/ML
250 INJECTION INTRAMUSCULAR; INTRAVENOUS ONCE
Status: COMPLETED | OUTPATIENT
Start: 2019-05-29 | End: 2019-05-29

## 2019-05-29 RX ORDER — METOPROLOL TARTRATE 50 MG/1
50 TABLET, FILM COATED ORAL EVERY 12 HOURS SCHEDULED
Status: DISCONTINUED | OUTPATIENT
Start: 2019-05-29 | End: 2019-05-30

## 2019-05-29 RX ADMIN — ENOXAPARIN SODIUM 110 MG: 120 INJECTION SUBCUTANEOUS at 04:47

## 2019-05-29 RX ADMIN — ARFORMOTEROL TARTRATE 15 MCG: 15 SOLUTION RESPIRATORY (INHALATION) at 20:36

## 2019-05-29 RX ADMIN — DIGOXIN 250 MCG: 0.25 INJECTION INTRAMUSCULAR; INTRAVENOUS at 17:24

## 2019-05-29 RX ADMIN — APIXABAN 5 MG: 5 TABLET, FILM COATED ORAL at 12:39

## 2019-05-29 RX ADMIN — ARFORMOTEROL TARTRATE 15 MCG: 15 SOLUTION RESPIRATORY (INHALATION) at 10:58

## 2019-05-29 RX ADMIN — DIGOXIN 250 MCG: 0.25 INJECTION INTRAMUSCULAR; INTRAVENOUS at 12:38

## 2019-05-29 RX ADMIN — DILTIAZEM HYDROCHLORIDE 360 MG: 180 CAPSULE, COATED, EXTENDED RELEASE ORAL at 08:14

## 2019-05-29 RX ADMIN — SODIUM CHLORIDE, PRESERVATIVE FREE 3 ML: 5 INJECTION INTRAVENOUS at 20:50

## 2019-05-29 RX ADMIN — IPRATROPIUM BROMIDE AND ALBUTEROL SULFATE 3 ML: 2.5; .5 SOLUTION RESPIRATORY (INHALATION) at 06:56

## 2019-05-29 RX ADMIN — BUDESONIDE 0.5 MG: 0.5 INHALANT RESPIRATORY (INHALATION) at 20:36

## 2019-05-29 RX ADMIN — PREDNISONE 40 MG: 20 TABLET ORAL at 08:14

## 2019-05-29 RX ADMIN — APIXABAN 5 MG: 5 TABLET, FILM COATED ORAL at 21:22

## 2019-05-29 RX ADMIN — DOXYCYCLINE 100 MG: 100 CAPSULE ORAL at 08:14

## 2019-05-29 RX ADMIN — METOPROLOL TARTRATE 25 MG: 25 TABLET ORAL at 08:14

## 2019-05-29 RX ADMIN — METOPROLOL TARTRATE 25 MG: 25 TABLET ORAL at 09:39

## 2019-05-29 RX ADMIN — BUDESONIDE 0.5 MG: 0.5 INHALANT RESPIRATORY (INHALATION) at 10:58

## 2019-05-29 RX ADMIN — METOPROLOL TARTRATE 5 MG: 5 INJECTION INTRAVENOUS at 09:40

## 2019-05-29 RX ADMIN — INSULIN LISPRO 2 UNITS: 100 INJECTION, SOLUTION INTRAVENOUS; SUBCUTANEOUS at 12:39

## 2019-05-29 RX ADMIN — INSULIN LISPRO 3 UNITS: 100 INJECTION, SOLUTION INTRAVENOUS; SUBCUTANEOUS at 17:25

## 2019-05-29 RX ADMIN — INSULIN LISPRO 3 UNITS: 100 INJECTION, SOLUTION INTRAVENOUS; SUBCUTANEOUS at 21:23

## 2019-05-29 RX ADMIN — IPRATROPIUM BROMIDE AND ALBUTEROL SULFATE 3 ML: 2.5; .5 SOLUTION RESPIRATORY (INHALATION) at 14:33

## 2019-05-29 RX ADMIN — DOXYCYCLINE 100 MG: 100 CAPSULE ORAL at 21:23

## 2019-05-29 RX ADMIN — METOPROLOL TARTRATE 50 MG: 50 TABLET, FILM COATED ORAL at 21:22

## 2019-05-29 RX ADMIN — FUROSEMIDE 20 MG: 20 TABLET ORAL at 08:14

## 2019-05-30 LAB
ANION GAP SERPL CALCULATED.3IONS-SCNC: 12.8 MMOL/L
BUN BLD-MCNC: 35 MG/DL (ref 8–23)
BUN/CREAT SERPL: 34 (ref 7–25)
CALCIUM SPEC-SCNC: 8.7 MG/DL (ref 8.6–10.5)
CHLORIDE SERPL-SCNC: 101 MMOL/L (ref 98–107)
CO2 SERPL-SCNC: 30.2 MMOL/L (ref 22–29)
CREAT BLD-MCNC: 1.03 MG/DL (ref 0.76–1.27)
GFR SERPL CREATININE-BSD FRML MDRD: 70 ML/MIN/1.73
GLUCOSE BLD-MCNC: 129 MG/DL (ref 65–99)
GLUCOSE BLDC GLUCOMTR-MCNC: 138 MG/DL (ref 70–130)
GLUCOSE BLDC GLUCOMTR-MCNC: 162 MG/DL (ref 70–130)
GLUCOSE BLDC GLUCOMTR-MCNC: 173 MG/DL (ref 70–130)
GLUCOSE BLDC GLUCOMTR-MCNC: 240 MG/DL (ref 70–130)
POTASSIUM BLD-SCNC: 4.3 MMOL/L (ref 3.5–5.2)
SODIUM BLD-SCNC: 144 MMOL/L (ref 136–145)

## 2019-05-30 PROCEDURE — 99233 SBSQ HOSP IP/OBS HIGH 50: CPT | Performed by: INTERNAL MEDICINE

## 2019-05-30 PROCEDURE — 97110 THERAPEUTIC EXERCISES: CPT

## 2019-05-30 PROCEDURE — 82962 GLUCOSE BLOOD TEST: CPT

## 2019-05-30 PROCEDURE — 63710000001 PREDNISONE PER 1 MG: Performed by: INTERNAL MEDICINE

## 2019-05-30 PROCEDURE — 94799 UNLISTED PULMONARY SVC/PX: CPT

## 2019-05-30 PROCEDURE — 80048 BASIC METABOLIC PNL TOTAL CA: CPT | Performed by: INTERNAL MEDICINE

## 2019-05-30 PROCEDURE — 63710000001 INSULIN LISPRO (HUMAN) PER 5 UNITS: Performed by: INTERNAL MEDICINE

## 2019-05-30 RX ORDER — METOPROLOL TARTRATE 100 MG/1
100 TABLET ORAL EVERY 12 HOURS SCHEDULED
Status: DISCONTINUED | OUTPATIENT
Start: 2019-05-30 | End: 2019-05-31 | Stop reason: HOSPADM

## 2019-05-30 RX ORDER — METOPROLOL TARTRATE 50 MG/1
50 TABLET, FILM COATED ORAL ONCE
Status: COMPLETED | OUTPATIENT
Start: 2019-05-30 | End: 2019-05-30

## 2019-05-30 RX ADMIN — IPRATROPIUM BROMIDE AND ALBUTEROL SULFATE 3 ML: 2.5; .5 SOLUTION RESPIRATORY (INHALATION) at 23:44

## 2019-05-30 RX ADMIN — METOPROLOL TARTRATE 50 MG: 50 TABLET, FILM COATED ORAL at 09:54

## 2019-05-30 RX ADMIN — DOXYCYCLINE 100 MG: 100 CAPSULE ORAL at 20:41

## 2019-05-30 RX ADMIN — FUROSEMIDE 20 MG: 20 TABLET ORAL at 08:26

## 2019-05-30 RX ADMIN — BUDESONIDE 0.5 MG: 0.5 INHALANT RESPIRATORY (INHALATION) at 07:45

## 2019-05-30 RX ADMIN — BUDESONIDE 0.5 MG: 0.5 INHALANT RESPIRATORY (INHALATION) at 20:53

## 2019-05-30 RX ADMIN — IPRATROPIUM BROMIDE AND ALBUTEROL SULFATE 3 ML: 2.5; .5 SOLUTION RESPIRATORY (INHALATION) at 00:10

## 2019-05-30 RX ADMIN — DOXYCYCLINE 100 MG: 100 CAPSULE ORAL at 08:26

## 2019-05-30 RX ADMIN — IPRATROPIUM BROMIDE AND ALBUTEROL SULFATE 3 ML: 2.5; .5 SOLUTION RESPIRATORY (INHALATION) at 12:16

## 2019-05-30 RX ADMIN — APIXABAN 5 MG: 5 TABLET, FILM COATED ORAL at 20:41

## 2019-05-30 RX ADMIN — METOPROLOL TARTRATE 100 MG: 100 TABLET, FILM COATED ORAL at 20:41

## 2019-05-30 RX ADMIN — ARFORMOTEROL TARTRATE 15 MCG: 15 SOLUTION RESPIRATORY (INHALATION) at 20:53

## 2019-05-30 RX ADMIN — SODIUM CHLORIDE, PRESERVATIVE FREE 3 ML: 5 INJECTION INTRAVENOUS at 20:43

## 2019-05-30 RX ADMIN — DILTIAZEM HYDROCHLORIDE 360 MG: 180 CAPSULE, COATED, EXTENDED RELEASE ORAL at 08:26

## 2019-05-30 RX ADMIN — INSULIN LISPRO 4 UNITS: 100 INJECTION, SOLUTION INTRAVENOUS; SUBCUTANEOUS at 20:41

## 2019-05-30 RX ADMIN — INSULIN LISPRO 2 UNITS: 100 INJECTION, SOLUTION INTRAVENOUS; SUBCUTANEOUS at 17:16

## 2019-05-30 RX ADMIN — IPRATROPIUM BROMIDE AND ALBUTEROL SULFATE 3 ML: 2.5; .5 SOLUTION RESPIRATORY (INHALATION) at 16:20

## 2019-05-30 RX ADMIN — ALPRAZOLAM 1 MG: 1 TABLET ORAL at 01:18

## 2019-05-30 RX ADMIN — INSULIN LISPRO 2 UNITS: 100 INJECTION, SOLUTION INTRAVENOUS; SUBCUTANEOUS at 12:14

## 2019-05-30 RX ADMIN — METOPROLOL TARTRATE 50 MG: 50 TABLET, FILM COATED ORAL at 08:26

## 2019-05-30 RX ADMIN — ALPRAZOLAM 1 MG: 1 TABLET ORAL at 23:32

## 2019-05-30 RX ADMIN — PREDNISONE 40 MG: 20 TABLET ORAL at 08:26

## 2019-05-30 RX ADMIN — ARFORMOTEROL TARTRATE 15 MCG: 15 SOLUTION RESPIRATORY (INHALATION) at 07:40

## 2019-05-30 RX ADMIN — APIXABAN 5 MG: 5 TABLET, FILM COATED ORAL at 08:26

## 2019-05-31 VITALS
OXYGEN SATURATION: 100 % | HEART RATE: 67 BPM | DIASTOLIC BLOOD PRESSURE: 90 MMHG | BODY MASS INDEX: 36.07 KG/M2 | HEIGHT: 68 IN | RESPIRATION RATE: 18 BRPM | WEIGHT: 238 LBS | SYSTOLIC BLOOD PRESSURE: 136 MMHG | TEMPERATURE: 97.6 F

## 2019-05-31 LAB
ANION GAP SERPL CALCULATED.3IONS-SCNC: 8.3 MMOL/L
BUN BLD-MCNC: 33 MG/DL (ref 8–23)
BUN/CREAT SERPL: 34.4 (ref 7–25)
CALCIUM SPEC-SCNC: 8.4 MG/DL (ref 8.6–10.5)
CHLORIDE SERPL-SCNC: 103 MMOL/L (ref 98–107)
CO2 SERPL-SCNC: 33.7 MMOL/L (ref 22–29)
CREAT BLD-MCNC: 0.96 MG/DL (ref 0.76–1.27)
GFR SERPL CREATININE-BSD FRML MDRD: 76 ML/MIN/1.73
GLUCOSE BLD-MCNC: 112 MG/DL (ref 65–99)
GLUCOSE BLDC GLUCOMTR-MCNC: 122 MG/DL (ref 70–130)
GLUCOSE BLDC GLUCOMTR-MCNC: 166 MG/DL (ref 70–130)
POTASSIUM BLD-SCNC: 4.2 MMOL/L (ref 3.5–5.2)
SODIUM BLD-SCNC: 145 MMOL/L (ref 136–145)

## 2019-05-31 PROCEDURE — 63710000001 INSULIN LISPRO (HUMAN) PER 5 UNITS: Performed by: INTERNAL MEDICINE

## 2019-05-31 PROCEDURE — 94799 UNLISTED PULMONARY SVC/PX: CPT

## 2019-05-31 PROCEDURE — 63710000001 PREDNISONE PER 1 MG: Performed by: INTERNAL MEDICINE

## 2019-05-31 PROCEDURE — 80048 BASIC METABOLIC PNL TOTAL CA: CPT | Performed by: INTERNAL MEDICINE

## 2019-05-31 PROCEDURE — 99233 SBSQ HOSP IP/OBS HIGH 50: CPT | Performed by: INTERNAL MEDICINE

## 2019-05-31 PROCEDURE — 82962 GLUCOSE BLOOD TEST: CPT

## 2019-05-31 PROCEDURE — 63710000001 PREDNISONE PER 5 MG: Performed by: INTERNAL MEDICINE

## 2019-05-31 RX ORDER — DOXYCYCLINE 100 MG/1
100 CAPSULE ORAL EVERY 12 HOURS SCHEDULED
Qty: 6 CAPSULE | Refills: 0 | Status: SHIPPED | OUTPATIENT
Start: 2019-05-31 | End: 2019-06-03

## 2019-05-31 RX ORDER — METOPROLOL TARTRATE 100 MG/1
100 TABLET ORAL EVERY 12 HOURS SCHEDULED
Qty: 60 TABLET | Refills: 2 | Status: SHIPPED | OUTPATIENT
Start: 2019-05-31 | End: 2019-07-10 | Stop reason: SDUPTHER

## 2019-05-31 RX ORDER — DILTIAZEM HYDROCHLORIDE 360 MG/1
360 CAPSULE, EXTENDED RELEASE ORAL
Qty: 30 CAPSULE | Refills: 2 | Status: SHIPPED | OUTPATIENT
Start: 2019-06-01 | End: 2019-07-10 | Stop reason: SDUPTHER

## 2019-05-31 RX ORDER — PREDNISONE 10 MG/1
TABLET ORAL
Qty: 12 TABLET | Refills: 0 | Status: SHIPPED | OUTPATIENT
Start: 2019-06-01 | End: 2019-06-20

## 2019-05-31 RX ADMIN — IPRATROPIUM BROMIDE AND ALBUTEROL SULFATE 3 ML: 2.5; .5 SOLUTION RESPIRATORY (INHALATION) at 11:09

## 2019-05-31 RX ADMIN — DILTIAZEM HYDROCHLORIDE 360 MG: 180 CAPSULE, COATED, EXTENDED RELEASE ORAL at 08:30

## 2019-05-31 RX ADMIN — SODIUM CHLORIDE, PRESERVATIVE FREE 3 ML: 5 INJECTION INTRAVENOUS at 09:30

## 2019-05-31 RX ADMIN — METOPROLOL TARTRATE 100 MG: 100 TABLET, FILM COATED ORAL at 08:31

## 2019-05-31 RX ADMIN — BUDESONIDE 0.5 MG: 0.5 INHALANT RESPIRATORY (INHALATION) at 07:34

## 2019-05-31 RX ADMIN — FUROSEMIDE 20 MG: 20 TABLET ORAL at 08:31

## 2019-05-31 RX ADMIN — ARFORMOTEROL TARTRATE 15 MCG: 15 SOLUTION RESPIRATORY (INHALATION) at 07:34

## 2019-05-31 RX ADMIN — APIXABAN 5 MG: 5 TABLET, FILM COATED ORAL at 08:31

## 2019-05-31 RX ADMIN — PREDNISONE 30 MG: 20 TABLET ORAL at 08:30

## 2019-05-31 RX ADMIN — DOXYCYCLINE 100 MG: 100 CAPSULE ORAL at 08:31

## 2019-05-31 RX ADMIN — INSULIN LISPRO 2 UNITS: 100 INJECTION, SOLUTION INTRAVENOUS; SUBCUTANEOUS at 12:09

## 2019-06-01 ENCOUNTER — READMISSION MANAGEMENT (OUTPATIENT)
Dept: CALL CENTER | Facility: HOSPITAL | Age: 79
End: 2019-06-01

## 2019-06-01 NOTE — OUTREACH NOTE
Prep Survey      Responses   Facility patient discharged from?  [facility]   Is patient eligible?  Yes   Discharge diagnosis  COPD exacerbation   Does the patient have one of the following disease processes/diagnoses(primary or secondary)?  COPD/Pneumonia   Does the patient have Home health ordered?  Yes   What is the Home health agency?   [facility]     Is there a DME ordered?  Yes   What DME was ordered?  [facility] from [place]    Prep survey completed?  Yes          Miri Dunbar RN

## 2019-06-03 ENCOUNTER — EPISODE CHANGES (OUTPATIENT)
Dept: CASE MANAGEMENT | Facility: OTHER | Age: 79
End: 2019-06-03

## 2019-06-03 NOTE — PROGRESS NOTES
Case Management Discharge Note    Final Note: pt dc'd home with [facility]    Destination      No service has been selected for the patient.      Durable Medical Equipment      No service has been selected for the patient.      Dialysis/Infusion      No service has been selected for the patient.      Home Medical Care      No service has been selected for the patient.      Therapy      No service has been selected for the patient.      Community Resources      No service has been selected for the patient.        Transportation Services  Private: Car    Final Discharge Disposition Code: 06 - home with home health care

## 2019-06-05 ENCOUNTER — READMISSION MANAGEMENT (OUTPATIENT)
Dept: CALL CENTER | Facility: HOSPITAL | Age: 79
End: 2019-06-05

## 2019-06-05 NOTE — OUTREACH NOTE
COPD/PN Week 1 Survey      Responses   Facility patient discharged from?  [facility]   Does the patient have one of the following disease processes/diagnoses(primary or secondary)?  COPD/Pneumonia   Is there a successful TCM telephone encounter documented?  No   Was the primary reason for admission:  COPD exacerbation   Week 1 attempt successful?  Yes   Call start time  1442   Call end time  1450   Discharge diagnosis  COPD exacerbation   Is patient permission given to speak with other caregiver?  Yes   List who call center can speak with  POA dtr   Meds reviewed with patient/caregiver?  Yes   Is the patient having any side effects they believe may be caused by any medication additions or changes?  No   Does the patient have all medications ordered at discharge?  Yes   Is the patient taking all medications as directed (includes completed medication regime)?  Yes   Does the patient have a primary care provider?   Yes   Does the patient have an appointment with their PCP or pulmonologist within 7 days of discharge?  Yes   Has the patient kept scheduled appointments due by today?  N/A   What is the Home health agency?   [facility]    Has home health visited the patient within 72 hours of discharge?  Yes   What DME was ordered?  [facility] from [facility]    Has all DME been delivered?  Yes   DME comments  He feels [facility] unit is helping tremendously. Home O2 @ 3L during days and 6L pm.   Psychosocial issues?  No   Comments  Appetite is WNL.    Did the patient receive a copy of their discharge instructions?  Yes   Nursing interventions  Reviewed instructions with patient   What is the patient's perception of their health status since discharge?  Returned to baseline/stable   Nursing Interventions  Nurse provided patient education   Is the patient/caregiver able to teach back the hierarchy of who to call/visit for symptoms/problems? PCP, Specialist, Home health nurse, Urgent Care, ED, 911  Yes   Is the patient able to teach  back COPD zones?  Yes   Nursing interventions  Education provided on various zones   Patient reports what zone on this call?  Green Zone   Green Zone  Reports doing well, Usual activity and exercise level, Sleeping well, Appetite is good   Green Zone interventions:  Take daily medications   Week 1 call completed?  Yes   Wrap up additional comments  .          Mary Oliver, RN

## 2019-06-12 ENCOUNTER — READMISSION MANAGEMENT (OUTPATIENT)
Dept: CALL CENTER | Facility: HOSPITAL | Age: 79
End: 2019-06-12

## 2019-06-12 NOTE — OUTREACH NOTE
COPD/PN Week 2 Survey      Responses   Facility patient discharged from?  [facility]   Does the patient have one of the following disease processes/diagnoses(primary or secondary)?  COPD/Pneumonia   Was the primary reason for admission:  COPD exacerbation   Week 2 attempt successful?  Yes   Call start time  1255   Call end time  1304   Discharge diagnosis  COPD exacerbation   Meds reviewed with patient/caregiver?  Yes   Is the patient having any side effects they believe may be caused by any medication additions or changes?  No   Does the patient have all medications ordered at discharge?  Yes   Is the patient taking all medications as directed (includes completed medication regime)?  Yes   Does the patient have a primary care provider?   Yes   Does the patient have an appointment with their PCP or pulmonologist within 7 days of discharge?  Yes   Comments regarding PCP  Dr. Lagos   Has the patient kept scheduled appointments due by today?  Yes   What is the Home health agency?   [facility]     Has home health visited the patient within 72 hours of discharge?  Yes   What DME was ordered?  [facility] from [place]    Has all DME been delivered?  Yes   DME comments  He feels [facility] unit is helping tremendously. Home O2 @ 3L during days and 6L pm.   Psychosocial issues?  No   Did the patient receive a copy of their discharge instructions?  Yes   Nursing interventions  Reviewed instructions with patient   What is the patient's perception of their health status since discharge?  Improving   Nursing Interventions  Nurse provided patient education   Are the patient's immunizations up to date?   Yes   Nursing interventions  Educated on importance of maintaining up to date immunizations as advised by provider   If the patient is a current smoker, are they able to teach back resources for cessation?  -- [hasn't smoked in 6-7 years]   Is the patient/caregiver able to teach back the hierarchy of who to call/visit for  symptoms/problems? PCP, Specialist, Home health nurse, Urgent Care, ED, 911  Yes   Additional teach back comments  no significant weight gain but does have some swelling in feet but nurse said it wasn't bad   Is the patient able to teach back COPD zones?  Yes   Nursing interventions  Education provided on various zones   Patient reports what zone on this call?  Green Zone   Green Zone  Reports doing well, Breathing without shortness of breath, Usual activity and exercise level, Usual amount of phlegm/mucus without difficulty coughing up, Sleeping well, Appetite is good   Green Zone interventions:  Take daily medications, Continue regular exercise/diet plan, Use oxygen as prescribed, Avoid indoor/outdoor triggers   Week 2 call completed?  Yes          Amna Hebert, RN

## 2019-06-13 ENCOUNTER — OFFICE VISIT (OUTPATIENT)
Dept: FAMILY MEDICINE CLINIC | Facility: CLINIC | Age: 79
End: 2019-06-13

## 2019-06-13 VITALS
TEMPERATURE: 97.6 F | HEART RATE: 74 BPM | RESPIRATION RATE: 18 BRPM | OXYGEN SATURATION: 95 % | DIASTOLIC BLOOD PRESSURE: 63 MMHG | HEIGHT: 68 IN | WEIGHT: 227 LBS | BODY MASS INDEX: 34.4 KG/M2 | SYSTOLIC BLOOD PRESSURE: 120 MMHG

## 2019-06-13 DIAGNOSIS — J44.1 CHRONIC OBSTRUCTIVE PULMONARY DISEASE WITH ACUTE EXACERBATION (HCC): Primary | ICD-10-CM

## 2019-06-13 DIAGNOSIS — Z09 HOSPITAL DISCHARGE FOLLOW-UP: ICD-10-CM

## 2019-06-13 PROCEDURE — 99213 OFFICE O/P EST LOW 20 MIN: CPT | Performed by: FAMILY MEDICINE

## 2019-06-13 NOTE — PROGRESS NOTES
Subjective   Ankit Mack Sr. is a 79 y.o. male.     CC: Hospital F/U for COPD Exacerbation and Respiratory Failure    History of Present Illness     Pt returns today after recent hospitalization. That visit was as follows:    Date of Admit: 5/25/2019  Date of Discharge:  05/31/19  Discharge Condition: Good     Discharge Diagnoses:  1. Acute on chronic hypercapnic respiratory failure  2. Acute on chronic hypoxic respiratory failure, on 3-4 L oxygen at baseline  3. COPD exacerbation  4. JOSE, improved  5. Chronic anemia  6. Diabetes mellitus type 2 with worsening hyperglycemia due to steroid  7. Atrial flutter with RVR  8. Chronic right bundle branch block  9. Elevated troponin, probably related to kidney disease  10. Obstructive sleep apnea on CPAP 9 cm H2O with oxygen 6 L/min        History of present illness from H&P from 5/25/2019:   This is a 79-year-old male patient, former smoker with history of COPD and respiratory failure on oxygen 3-4 L/min at baseline.  He follows in our clinic with Dr. Alejandro.  At baseline he uses albuterol neb 4 times a day and budesonide.      He was brought to the emergency department today due to increasing sleepiness and confusion.  According to the patient, he mixed up on time this morning and thought it was night.  On further questioning, he stated that he was having some chest tightness anteriorly with no radiation associated with shortness of breath which was not alleviated by the use of rescue nebulizer and cough productive of yellowish phlegm as opposed to clear phlegm at baseline.  He also said that he has been sick for about 2 days with runny nose and sinus congestion.  He denies sick contact but stated that he went for a card play on Friday and his portable oxygen concentrator ran out of battery and since then he has not been feeling well.  He also reported some sore throat yesterday.     On arrival to the ED, SPO2 was 79% reportedly.  He had decreased breath sounds on  examination and was placed on BiPAP.     Labs reviewed:  AB.32/62/55 on 4L, BNP 2700; Trop 0.076; Na 148; Cr 1.38 (baseline 1.1-1.2); Hb 9.5.        Hospital Course:   Patient was admitted with COPD exacerbation which was treated with steroid.  On discharge he was placed on 30 mg prednisone with a slow taper.  He was also treated with doxycycline which she will continue for few days until he finishes 7 days therapy.  His stay was complicated by A. fib with RVR which required intravenous Cardizem.  This was titrated off and his home medications such as metoprolol and Cardizem were increased to almost maximum dose.  His heart rate was around 60-70 on discharge.  In addition, patient was noted to be significantly hypercapnic with compensated pH despite the use of his home CPAP.  BiPAP was also tried and failed and therefore he was provided with a trilogy ventilator, adjusted to a tidal volume of 450, EPAP 9 (similar to his CPAP).  Patient use the trilogy during his stay in he clinically improved on it.  He was asked to use his usual inhalers which include inhaled corticosteroids and bronchodilators and he will see Dr. Alejandro in about 2-4 weeks.    Discharge Medications           Discharge Medications            New Medications      Instructions Start Date   apixaban 5 MG tablet tablet  Commonly known as:  ELIQUIS    5 mg, Oral, Every 12 Hours Scheduled        diltiaZEM  MG 24 hr capsule  Commonly known as:  CARDIZEM CD  Replaces:  diltiazem  MG 24 hr capsule    360 mg, Oral, Every 24 Hours Scheduled    Start Date:  2019      doxycycline 100 MG capsule  Commonly known as:  MONODOX    100 mg, Oral, Every 12 Hours Scheduled        predniSONE 10 MG tablet  Commonly known as:  DELTASONE    Take 3 tablets for 1 day then 2 tablets for 3 days then 1 tablets for 3 days    Start Date:  2019                    Changes to Medications      Instructions Start Date   budesonide 0.5 MG/2ML nebulizer  solution  Commonly known as:  PULMICORT  What changed:  Another medication with the same name was removed. Continue taking this medication, and follow the directions you see here.    0.5 mg, Nebulization, 2 Times Daily - RT        metoprolol tartrate 100 MG tablet  Commonly known as:  LOPRESSOR  What changed:    · medication strength  · See the new instructions.    100 mg, Oral, Every 12 Hours Scheduled                      Continue These Medications      Instructions Start Date   albuterol (2.5 MG/3ML) 0.083% nebulizer solution  Commonly known as:  PROVENTIL    2.5 mg, Nebulization, 4 Times Daily - RT        albuterol sulfate  (90 Base) MCG/ACT inhaler  Commonly known as:  PROVENTIL HFA;VENTOLIN HFA;PROAIR HFA    2 puffs, Inhalation, Every 4 Hours PRN        ALPRAZolam 1 MG tablet  Commonly known as:  XANAX    1 mg, Oral, Nightly PRN        atorvastatin 10 MG tablet  Commonly known as:  LIPITOR    10 mg, Oral, Daily, For cholesterol        folic acid 1 MG tablet  Commonly known as:  FOLVITE    1 mg, Oral, Daily        furosemide 20 MG tablet  Commonly known as:  LASIX    20 mg, Oral, Daily        metFORMIN 1000 MG tablet  Commonly known as:  GLUCOPHAGE    1,000 mg, Oral, Daily With Breakfast        O2  Commonly known as:  OXYGEN    3 L/min, Inhalation, Once, Patient using 3-4 liters, depending on activity level         triamcinolone 0.1 % cream  Commonly known as:  KENALOG    No dose, route, or frequency recorded.            Stop These Medications    diltiazem  MG 24 hr capsule  Commonly known as:  DILACOR XR  Replaced by:  diltiaZEM  MG 24 hr capsule       Current outpatient and discharge medications have been reconciled for the patient.  Reviewed by: Gopi Lagos MD    The following portions of the patient's history were reviewed and updated as appropriate: allergies, current medications, past family history, past medical history, past social history, past surgical history and problem  "list.    Review of Systems   Constitutional: Negative for activity change, chills, fatigue and fever.   Respiratory: Negative for cough and shortness of breath.    Cardiovascular: Negative for chest pain and palpitations.   Gastrointestinal: Negative for abdominal pain.   Endocrine: Negative for cold intolerance.   Psychiatric/Behavioral: Negative for behavioral problems and dysphoric mood. The patient is not nervous/anxious.      /63   Pulse 74   Temp 97.6 °F (36.4 °C) (Oral)   Resp 18   Ht 172 cm (67.7\")   Wt 103 kg (227 lb)   SpO2 95%   BMI 34.82 kg/m²     Objective   Physical Exam   Constitutional: He appears well-developed and well-nourished.   Neck: Neck supple. No thyromegaly present.   Cardiovascular: Normal rate. An irregularly irregular rhythm present.   No murmur heard.  Pulmonary/Chest: Effort normal and breath sounds normal.   Abdominal: Bowel sounds are normal. There is no tenderness.   Psychiatric: He has a normal mood and affect. His behavior is normal.   Nursing note and vitals reviewed.  Hospital records reviewed with pt confirming HPI.      Assessment/Plan   Ankit was seen today for respitory failure and copd.    Diagnoses and all orders for this visit:    Chronic obstructive pulmonary disease with acute exacerbation (CMS/HCC)  -     Ambulatory Referral to Pulmonology    Hospital discharge follow-up    Pt to slowly increase his activity and keep his specialist f/u.           "

## 2019-06-18 ENCOUNTER — LAB (OUTPATIENT)
Dept: OTHER | Facility: HOSPITAL | Age: 79
End: 2019-06-18

## 2019-06-18 ENCOUNTER — CONSULT (OUTPATIENT)
Dept: ONCOLOGY | Facility: CLINIC | Age: 79
End: 2019-06-18

## 2019-06-18 VITALS
DIASTOLIC BLOOD PRESSURE: 66 MMHG | HEART RATE: 70 BPM | BODY MASS INDEX: 35.43 KG/M2 | OXYGEN SATURATION: 85 % | WEIGHT: 233.8 LBS | TEMPERATURE: 97.6 F | RESPIRATION RATE: 20 BRPM | HEIGHT: 68 IN | SYSTOLIC BLOOD PRESSURE: 116 MMHG

## 2019-06-18 DIAGNOSIS — R04.2 HEMOPTYSIS: Primary | ICD-10-CM

## 2019-06-18 DIAGNOSIS — D53.9 MACROCYTIC ANEMIA: ICD-10-CM

## 2019-06-18 DIAGNOSIS — C34.32 MALIGNANT NEOPLASM OF LOWER LOBE OF LEFT LUNG (HCC): ICD-10-CM

## 2019-06-18 DIAGNOSIS — D61.818 PANCYTOPENIA (HCC): Primary | ICD-10-CM

## 2019-06-18 LAB
ALBUMIN SERPL-MCNC: 4.3 G/DL (ref 3.5–5.2)
ALBUMIN/GLOB SERPL: 2.3 G/DL
ALP SERPL-CCNC: 70 U/L (ref 39–117)
ALT SERPL W P-5'-P-CCNC: 13 U/L (ref 1–41)
ANION GAP SERPL CALCULATED.3IONS-SCNC: 9.9 MMOL/L
AST SERPL-CCNC: 12 U/L (ref 1–40)
BASOPHILS # BLD AUTO: 0.02 10*3/MM3 (ref 0–0.2)
BASOPHILS NFR BLD AUTO: 0.5 % (ref 0–1.5)
BILIRUB SERPL-MCNC: 0.7 MG/DL (ref 0.1–1.2)
BUN BLD-MCNC: 25 MG/DL (ref 8–23)
BUN/CREAT SERPL: 19.2 (ref 7–25)
CALCIUM SPEC-SCNC: 9.3 MG/DL (ref 8.6–10.5)
CHLORIDE SERPL-SCNC: 103 MMOL/L (ref 98–107)
CO2 SERPL-SCNC: 33.1 MMOL/L (ref 22–29)
CREAT BLD-MCNC: 1.3 MG/DL (ref 0.76–1.27)
DEPRECATED RDW RBC AUTO: 55.5 FL (ref 37–54)
EOSINOPHIL # BLD AUTO: 0.15 10*3/MM3 (ref 0–0.4)
EOSINOPHIL NFR BLD AUTO: 3.9 % (ref 0.3–6.2)
ERYTHROCYTE [DISTWIDTH] IN BLOOD BY AUTOMATED COUNT: 13.8 % (ref 12.3–15.4)
FERRITIN SERPL-MCNC: 275.9 NG/ML (ref 30–400)
FOLATE SERPL-MCNC: >20 NG/ML (ref 4.78–24.2)
GFR SERPL CREATININE-BSD FRML MDRD: 53 ML/MIN/1.73
GLOBULIN UR ELPH-MCNC: 1.9 GM/DL
GLUCOSE BLD-MCNC: 154 MG/DL (ref 65–99)
HCT VFR BLD AUTO: 30.5 % (ref 37.5–51)
HGB BLD-MCNC: 9.8 G/DL (ref 13–17.7)
HGB RETIC QN AUTO: 33 PG (ref 29.8–36.1)
HOLD SPECIMEN: NORMAL
IMM GRANULOCYTES # BLD AUTO: 0.05 10*3/MM3 (ref 0–0.05)
IMM GRANULOCYTES NFR BLD AUTO: 1.3 % (ref 0–0.5)
IMM RETICS NFR: 16.4 % (ref 3–15.8)
IRON 24H UR-MRATE: 89 MCG/DL (ref 59–158)
IRON SATN MFR SERPL: 31 % (ref 20–50)
LDH SERPL-CCNC: 211 U/L (ref 135–225)
LYMPHOCYTES # BLD AUTO: 0.65 10*3/MM3 (ref 0.7–3.1)
LYMPHOCYTES NFR BLD AUTO: 17.1 % (ref 19.6–45.3)
MCH RBC QN AUTO: 35 PG (ref 26.6–33)
MCHC RBC AUTO-ENTMCNC: 32.1 G/DL (ref 31.5–35.7)
MCV RBC AUTO: 108.9 FL (ref 79–97)
MONOCYTES # BLD AUTO: 0.63 10*3/MM3 (ref 0.1–0.9)
MONOCYTES NFR BLD AUTO: 16.6 % (ref 5–12)
NEUTROPHILS # BLD AUTO: 2.3 10*3/MM3 (ref 1.7–7)
NEUTROPHILS NFR BLD AUTO: 60.6 % (ref 42.7–76)
NRBC BLD AUTO-RTO: 0.5 /100 WBC (ref 0–0.2)
PLAT MORPH BLD: NORMAL
PLATELET # BLD AUTO: 165 10*3/MM3 (ref 140–450)
PMV BLD AUTO: 10.9 FL (ref 6–12)
POTASSIUM BLD-SCNC: 4.3 MMOL/L (ref 3.5–5.2)
PROT SERPL-MCNC: 6.2 G/DL (ref 6–8.5)
RBC # BLD AUTO: 2.8 10*6/MM3 (ref 4.14–5.8)
RETICS/RBC NFR AUTO: 1.44 % (ref 0.7–1.9)
SODIUM BLD-SCNC: 146 MMOL/L (ref 136–145)
SPHEROCYTES BLD QL SMEAR: NORMAL
TIBC SERPL-MCNC: 288 MCG/DL (ref 298–536)
TRANSFERRIN SERPL-MCNC: 193 MG/DL (ref 200–360)
VIT B12 BLD-MCNC: 1596 PG/ML (ref 211–946)
WBC MORPH BLD: NORMAL
WBC NRBC COR # BLD: 3.8 10*3/MM3 (ref 3.4–10.8)
WHOLE BLOOD HOLD SPECIMEN: NORMAL

## 2019-06-18 PROCEDURE — 83540 ASSAY OF IRON: CPT | Performed by: INTERNAL MEDICINE

## 2019-06-18 PROCEDURE — 85007 BL SMEAR W/DIFF WBC COUNT: CPT | Performed by: INTERNAL MEDICINE

## 2019-06-18 PROCEDURE — 85046 RETICYTE/HGB CONCENTRATE: CPT | Performed by: INTERNAL MEDICINE

## 2019-06-18 PROCEDURE — 83615 LACTATE (LD) (LDH) ENZYME: CPT | Performed by: INTERNAL MEDICINE

## 2019-06-18 PROCEDURE — 84165 PROTEIN E-PHORESIS SERUM: CPT | Performed by: INTERNAL MEDICINE

## 2019-06-18 PROCEDURE — 82746 ASSAY OF FOLIC ACID SERUM: CPT | Performed by: INTERNAL MEDICINE

## 2019-06-18 PROCEDURE — 80053 COMPREHEN METABOLIC PANEL: CPT | Performed by: INTERNAL MEDICINE

## 2019-06-18 PROCEDURE — 84466 ASSAY OF TRANSFERRIN: CPT | Performed by: INTERNAL MEDICINE

## 2019-06-18 PROCEDURE — 86334 IMMUNOFIX E-PHORESIS SERUM: CPT | Performed by: INTERNAL MEDICINE

## 2019-06-18 PROCEDURE — 83883 ASSAY NEPHELOMETRY NOT SPEC: CPT | Performed by: INTERNAL MEDICINE

## 2019-06-18 PROCEDURE — 82728 ASSAY OF FERRITIN: CPT | Performed by: INTERNAL MEDICINE

## 2019-06-18 PROCEDURE — 82607 VITAMIN B-12: CPT | Performed by: INTERNAL MEDICINE

## 2019-06-18 PROCEDURE — 36415 COLL VENOUS BLD VENIPUNCTURE: CPT

## 2019-06-18 PROCEDURE — 85025 COMPLETE CBC W/AUTO DIFF WBC: CPT | Performed by: INTERNAL MEDICINE

## 2019-06-18 PROCEDURE — 82784 ASSAY IGA/IGD/IGG/IGM EACH: CPT | Performed by: INTERNAL MEDICINE

## 2019-06-18 PROCEDURE — 99204 OFFICE O/P NEW MOD 45 MIN: CPT | Performed by: INTERNAL MEDICINE

## 2019-06-18 NOTE — PROGRESS NOTES
Subjective     REASON FOR CONSULTATION:    1. Macrocytic anemia  2. Lung mass on CT chest 11/17/2018  3. History of folate deficiency ( seen and treated by Dr. Tyrell Ca in 2018 )    Provide an opinion on any further workup or treatment                             REQUESTING PHYSICIAN: Gopi Lagos MD    RECORDS OBTAINED:  Records of the patients history including those obtained from the referring provider were reviewed and summarized in detail.    HISTORY OF PRESENT ILLNESS:  The patient is a 79 y.o. year old male who is here for an opinion about the above issue.  He is referred to us by his primary care office for evaluation of macrocytic anemia.  His MCV has been markedly elevated around 107-108..  He was hospitalized from 5/25/2019 through 5/31/2019 with COPD exacerbation and CHF.  During the hospitalization his hemoglobin was around 9.5 g/dL with an MCV as high as 113.    He had previously been evaluated at the [facility] by Dr. Tyrell Ca last year and was found to have a low folate levels.  He was placed on folic acid supplementation and apparently has continued on folic acid 1 mg daily.  He is anticoagulated with Eliquis for atrial fibrillation.  He has not seen any obvious bleeding but bruises easily.    In reviewing of the recent records on the computer he had a CT scan of the chest performed in November 2018 which showed 2.4 cm right upper lobe mass suspicious for malignancy.  We do not see any more recent CAT scans for follow-up.  He does have a prior history of non-small cell lung cancer resected about 6 years ago from the left lower lobe by Dr. Mendoza.    Reports he has had several hospitalizations for pneumonia and COPD exacerbations since November.  He currently seems to be feeling reasonably well and reports that his cough and sputum production have improved significantly since this last hospitalization in late May.    History of Present Illness     Past Medical History:    Diagnosis Date   • Acute congestive heart failure (CMS/HCC)    • Anemia    • Anxiety    • Atrial flutter with rapid ventricular response (CMS/HCC)    • Benign essential hypertension    • COPD (chronic obstructive pulmonary disease) (CMS/HCC)    • Elevated cholesterol    • Essential hypertension    • GERD (gastroesophageal reflux disease)    • H/O complete eye exam 06/2018   • Heart attack (CMS/HCC)    • History of chest x-ray 12/08/2013    RLL infiltrate   • History of pulmonary function tests 06/10/2014   • Hyperglycemia    • Hyperlipidemia    • Leukocytosis    • Lung cancer (CMS/HCC) 2012    Left   • Osteoarthritis, knee    • Persistent atrial fibrillation (CMS/HCC)    • Prostate cancer (CMS/HCC) 2000   • Seizures (CMS/HCC)    • Sleep apnea    • Thoracic aortic aneurysm without rupture (CMS/HCC)    • Thrombocytopenia (CMS/HCC)    • Type 2 diabetes mellitus without complication, without long-term current use of insulin (CMS/HCC)         Past Surgical History:   Procedure Laterality Date   • ABDOMINAL AORTIC ANEURYSM REPAIR  2010    Dr. Adarsh Dennis   • CARDIAC CATHETERIZATION Left 02/06/2004   • CATARACT EXTRACTION  10/2014    also 11/14   • COLONOSCOPY     • ENDOVASCULAR THORACIC ANEURYSM REPAIR     • JOINT REPLACEMENT Left 10/2010    an[facility]; Dr. Wolf   • JOINT REPLACEMENT Right 09/2011    anette; Dr. Wolf   • LUNG LOBECTOMY Left 01/16/2012    [facility]; Dr. Mendoza   • LUNG LOBECTOMY Left 2012   • PROSTATE SURGERY  2000    Dr. Naranjo   • VASCULAR SURGERY  05/2009    TEVAR of thoracic aortic aneurysm   • VASCULAR SURGERY  12/15/2003    Left carotid subclavian bypass graft, ligation of proximal left subclavian following carotid subclavian bypass. Right femoral sheath. Arch angiography with descending thoracic and abdominal aortography    • VASCULAR SURGERY  11/17/2003    Selective catheterization right vertebral artery, right subclavian artery, left subclavian artery, left vertebral artery.     • VASCULAR SURGERY   06/05/2002    Tracheostomy, bronchoscopy   • VASCULAR SURGERY  05/21/2002    Thoracic arteriogram and abdominal aortogram        Current Outpatient Medications on File Prior to Visit   Medication Sig Dispense Refill   • albuterol (PROVENTIL HFA;VENTOLIN HFA) 108 (90 Base) MCG/ACT inhaler Inhale 2 puffs Every 4 (Four) Hours As Needed for Wheezing.     • albuterol (PROVENTIL) (2.5 MG/3ML) 0.083% nebulizer solution Take 2.5 mg by nebulization 4 (Four) Times a Day.     • ALPRAZolam (XANAX) 1 MG tablet Take 1 tablet by mouth At Night As Needed for Anxiety. 90 tablet 0   • apixaban (ELIQUIS) 5 MG tablet tablet Take 1 tablet by mouth Every 12 (Twelve) Hours. 60 tablet 2   • atorvastatin (LIPITOR) 10 MG tablet Take 1 tablet by mouth Daily. For cholesterol 90 tablet 1   • budesonide (PULMICORT) 0.5 MG/2ML nebulizer solution Take 2 mL by nebulization 2 (Two) Times a Day. 120 each 1   • diltiaZEM (TIAZAC) 360 MG 24 hr capsule Take 1 capsule by mouth Daily. 30 capsule 2   • folic acid (FOLVITE) 1 MG tablet Take 1 mg by mouth Daily.     • furosemide (LASIX) 20 MG tablet Take 1 tablet by mouth Daily. 90 tablet 3   • metFORMIN (GLUCOPHAGE) 1000 MG tablet Take 1 tablet by mouth Daily With Breakfast. 90 tablet 1   • metoprolol tartrate (LOPRESSOR) 100 MG tablet Take 1 tablet by mouth Every 12 (Twelve) Hours. 60 tablet 2   • O2 (OXYGEN) Inhale 3 L/min 1 (One) Time. Patient using 3-4 liters, depending on activity level     • predniSONE (DELTASONE) 10 MG tablet Take 3 tablets for 1 day then 2 tablets for 3 days then 1 tablets for 3 days 12 tablet 0   • triamcinolone (KENALOG) 0.1 % cream        No current facility-administered medications on file prior to visit.         ALLERGIES:    Allergies   Allergen Reactions   • Erythromycin Itching        Social History     Socioeconomic History   • Marital status: Single     Spouse name: Not on file   • Number of children: Not on file   • Years of education: Not on file   • Highest education  "level: Not on file   Occupational History     Employer: RETIRED   Tobacco Use   • Smoking status: Former Smoker     Packs/day: 1.00     Types: Cigarettes     Last attempt to quit: 4/15/2010     Years since quittin.1   • Smokeless tobacco: Never Used   Substance and Sexual Activity   • Alcohol use: Yes     Frequency: Never     Comment: rare   • Drug use: No   • Sexual activity: Defer        Family History   Problem Relation Age of Onset   • Cancer Mother    • COPD Mother    • Cancer Father         lung   • Colon cancer Father    • Cancer Other         colon   • Diabetes Other    • Emphysema Other    • Hypertension Other    • Kidney disease Other    • Lung cancer Other         Review of Systems   Constitutional: Positive for fatigue. Negative for activity change, chills and fever.   HENT: Negative for mouth sores, trouble swallowing and voice change.    Eyes: Negative for pain and visual disturbance.   Respiratory: Positive for shortness of breath. Negative for cough and wheezing.    Cardiovascular: Positive for leg swelling. Negative for chest pain and palpitations.   Gastrointestinal: Positive for abdominal pain and nausea. Negative for constipation, diarrhea and vomiting.   Genitourinary: Negative for difficulty urinating, frequency and urgency.   Musculoskeletal: Positive for arthralgias and gait problem. Negative for joint swelling.   Skin: Negative for rash.   Neurological: Positive for weakness. Negative for dizziness, seizures and headaches.   Hematological: Negative for adenopathy. Bruises/bleeds easily.   Psychiatric/Behavioral: Negative for behavioral problems and confusion. The patient is not nervous/anxious.         Objective     Vitals:    19 1306   BP: 116/66   Pulse: 70   Resp: 20   Temp: 97.6 °F (36.4 °C)   SpO2: (!) 85%  Comment: ox on 3 liters.   Weight: 106 kg (233 lb 12.8 oz)   Height: 172 cm (67.72\")   PainSc: 0-No pain     Current Status 2019   ECOG score 0       Physical Exam "   Constitutional: He is oriented to person, place, and time. He appears well-developed and well-nourished. No distress.   Chronically ill appearing obese gentleman wearing nasal oxygen   HENT:   Head: Normocephalic.   Eyes: Conjunctivae and EOM are normal. Pupils are equal, round, and reactive to light. No scleral icterus.   Neck: Normal range of motion. Neck supple. No JVD present. No thyromegaly present.   Cardiovascular: Normal rate. An irregular rhythm present. Exam reveals distant heart sounds. Exam reveals no gallop and no friction rub.   No murmur heard.  Pulmonary/Chest: Effort normal. He has decreased breath sounds in the right lower field and the left lower field. He has rales.   Abdominal: Soft. He exhibits distension. He exhibits no mass. There is no tenderness.   Musculoskeletal: Normal range of motion. He exhibits edema. He exhibits no deformity.   Lymphadenopathy:     He has no cervical adenopathy.   Neurological: He is alert and oriented to person, place, and time. He has normal reflexes. No cranial nerve deficit.   Skin: Skin is warm and dry. Purpura and rash noted. No erythema.   Psychiatric: He has a normal mood and affect. His behavior is normal. Judgment normal.         RECENT LABS:  Hematology WBC   Date Value Ref Range Status   06/18/2019 3.80 3.40 - 10.80 10*3/mm3 Final   05/15/2019 3.79 3.40 - 10.80 10*3/mm3 Final   02/08/2018 4.11 (L) 4.5 - 11.0 10*3/uL Final     RBC   Date Value Ref Range Status   06/18/2019 2.80 (L) 4.14 - 5.80 10*6/mm3 Final   05/15/2019 3.05 (L) 4.14 - 5.80 10*6/mm3 Final   02/08/2018 3.65 (L) 4.5 - 5.9 10*6/uL Final     Hemoglobin   Date Value Ref Range Status   06/18/2019 9.8 (L) 13.0 - 17.7 g/dL Final   02/08/2018 13.0 (L) 13.5 - 17.5 g/dL Final     Hematocrit   Date Value Ref Range Status   06/18/2019 30.5 (L) 37.5 - 51.0 % Final   02/08/2018 37.5 (L) 41.0 - 53.0 % Final     Platelets   Date Value Ref Range Status   06/18/2019 165 140 - 450 10*3/mm3 Final    02/08/2018 164 140 - 440 10*3/uL Final          Lab Results   Component Value Date    GLUCOSE 154 (H) 06/18/2019    CALCIUM 9.3 06/18/2019     (H) 06/18/2019    K 4.3 06/18/2019    CO2 33.1 (H) 06/18/2019     06/18/2019    BUN 25 (H) 06/18/2019    CREATININE 1.30 (H) 06/18/2019    EGFRIFAFRI 70 05/15/2019    EGFRIFNONA 53 (L) 06/18/2019    BCR 19.2 06/18/2019    ANIONGAP 9.9 06/18/2019       CT CHEST WITH IV CONTRAST  11/18/2019     HISTORY: 78-year-old with hemoptysis. Lung mass. Coughing up blood.     TECHNIQUE: Chest CT includes axial imaging from the thoracic inlet to  the upper abdomen with IV contrast.  Radiation dose reduction techniques  were utilized, including automated exposure control and exposure  modulation based on body size.     COMPARISON: Chest CT without contrast 07/08/2018, 04/02/2018, CT  angiogram 12/08/2017.     FINDINGS: Since the CT 07/08/2018 there has developed abnormal patchy  infiltrates within the right upper lobe and superior segment right lower  lobe. Infiltrates superior segment right lower lobe and posteromedial  right upper lobe with somewhat nodular configuration with right upper  lobe nodular density measuring 2.4 cm. Small pleural effusions layer  dependently.     The heart size is enlarged. There is pulmonary arterial enlargement.  Thoracic aortic stent graft is present. There is contrast extending  external to the stent graft and along the native sac along the superior  right aspect of the distal thoracic aorta just above the diaphragm. This  segment of the stent graft exhibits native sac diameter measuring 5.4 cm  diameter when measured in the coronal plane on coronal reconstruction  sequence. This is consistent with a type I leak along the right margin  of the graft end.   There is nodular thickening of the adrenal glands.  Bilateral upper pole renal cysts are present.     IMPRESSION:  1. Since the previous CT there has developed multilobar pneumonia  best  demonstrated in the right upper lobe but also within the superior  segment right lower lobe. This exhibits nodular configuration and  recommend CT follow-up to resolution.  2. 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    Assessment/Plan   1.  Macrocytic anemia which is most likely multifactorial.  The patient likely has a component of anemia of chronic disease with multiple pulmonary infections recently.  He may also have a degree of anemia of chronic renal insufficiency with low erythropoietin.  He is on Eliquis and could be having some chronic bleeding although he has not seen any visible bleeding.  He previously been diagnosed with folic acid deficiency but has been taking folic acid supplementation.  2.  Severe emphysema/COPD.  Patient requires oxygen around-the-clock  3.  History of congestive heart failure  ? Cor pulmonale  4.  Previous aortic aneurysm stent graft.  5.  History of non-small cell lung cancer from the left lower lobe resected by Dr. Hart Linker 2013.  6.  2-1/2 cm nodular density in the left upper lobe noted on his CT scan from November.    Recommendations  1.  We will send additional lab studies to see if we can identify any potentially correctable causes of anemia.  This will include iron panel and ferritin, B12 and folate levels, reticulocyte count, serum protein electrophoresis with immunofixation, and a serum erythropoietin level.  2.  We also will order repeat CT scans of the chest abdomen and pelvis to follow-up on the 2-1/2 cm nodular density noted on his CT scan from November.  3.  Patient will follow-up in our office in 2 weeks and will review the results of the scans and lab studies and make further recommendations at that time based on the results.    Thanks for asking us to see this  nice gentleman in consultation.

## 2019-06-19 ENCOUNTER — READMISSION MANAGEMENT (OUTPATIENT)
Dept: CALL CENTER | Facility: HOSPITAL | Age: 79
End: 2019-06-19

## 2019-06-19 DIAGNOSIS — E11.9 TYPE 2 DIABETES MELLITUS WITHOUT COMPLICATION, WITHOUT LONG-TERM CURRENT USE OF INSULIN (HCC): ICD-10-CM

## 2019-06-19 LAB
ALBUMIN SERPL-MCNC: 3.6 G/DL (ref 2.9–4.4)
ALBUMIN/GLOB SERPL: 1.6 {RATIO} (ref 0.7–1.7)
ALPHA1 GLOB FLD ELPH-MCNC: 0.3 G/DL (ref 0–0.4)
ALPHA2 GLOB SERPL ELPH-MCNC: 0.7 G/DL (ref 0.4–1)
B-GLOBULIN SERPL ELPH-MCNC: 0.9 G/DL (ref 0.7–1.3)
GAMMA GLOB SERPL ELPH-MCNC: 0.4 G/DL (ref 0.4–1.8)
GLOBULIN SER CALC-MCNC: 2.3 G/DL (ref 2.2–3.9)
IGA SERPL-MCNC: 169 MG/DL (ref 61–437)
IGG SERPL-MCNC: 365 MG/DL (ref 700–1600)
IGM SERPL-MCNC: 40 MG/DL (ref 15–143)
INTERPRETATION SERPL IEP-IMP: ABNORMAL
KAPPA LC SERPL-MCNC: 25.2 MG/L (ref 3.3–19.4)
KAPPA LC/LAMBDA SER: 1.07 {RATIO} (ref 0.26–1.65)
LAMBDA LC FREE SERPL-MCNC: 23.6 MG/L (ref 5.7–26.3)
Lab: ABNORMAL
M-SPIKE: ABNORMAL G/DL
PROT SERPL-MCNC: 5.9 G/DL (ref 6–8.5)

## 2019-06-19 NOTE — OUTREACH NOTE
COPD/PN Week 3 Survey      Responses   Facility patient discharged from?  [facility]   Does the patient have one of the following disease processes/diagnoses(primary or secondary)?  COPD/Pneumonia   Was the primary reason for admission:  COPD exacerbation   Week 3 attempt successful?  Yes   Call start time  1724   Call end time  1727   Meds reviewed with patient/caregiver?  Yes   Is the patient having any side effects they believe may be caused by any medication additions or changes?  No   Does the patient have all medications ordered at discharge?  Yes   Does the patient have a primary care provider?   Yes   Does the patient have an appointment with their PCP or pulmonologist within 7 days of discharge?  Yes   Has the patient kept scheduled appointments due by today?  Yes   What is the Home health agency?   [facility]    Has home health visited the patient within 72 hours of discharge?  Yes   What DME was ordered?  Trilogy from [place]    Psychosocial issues?  No   Comments  pt states feels weak, due to anemia   Did the patient receive a copy of their discharge instructions?  Yes   Nursing interventions  Reviewed instructions with patient   What is the patient's perception of their health status since discharge?  Improving   Nursing Interventions  Nurse provided patient education   Is the patient/caregiver able to teach back the hierarchy of who to call/visit for symptoms/problems? PCP, Specialist, Home health nurse, Urgent Care, ED, 911  Yes   Is the patient able to teach back COPD zones?  Yes   Nursing interventions  Education provided on various zones   Patient reports what zone on this call?  Green Zone   Green Zone  Reports doing well, Breathing without shortness of breath, Usual activity and exercise level, Usual amount of phlegm/mucus without difficulty coughing up, Sleeping well, Appetite is good   Green Zone interventions:  Take daily medications, Continue regular exercise/diet plan   Week 3 call completed?   Yes          Tasia Naidu RN

## 2019-06-20 ENCOUNTER — APPOINTMENT (OUTPATIENT)
Dept: GENERAL RADIOLOGY | Facility: HOSPITAL | Age: 79
End: 2019-06-20

## 2019-06-20 ENCOUNTER — HOSPITAL ENCOUNTER (INPATIENT)
Facility: HOSPITAL | Age: 79
LOS: 11 days | Discharge: HOME HEALTH | End: 2019-07-01
Attending: EMERGENCY MEDICINE | Admitting: INTERNAL MEDICINE

## 2019-06-20 DIAGNOSIS — I50.9 ACUTE ON CHRONIC CONGESTIVE HEART FAILURE, UNSPECIFIED HEART FAILURE TYPE (HCC): ICD-10-CM

## 2019-06-20 DIAGNOSIS — J18.9 PNEUMONIA OF BOTH LOWER LOBES DUE TO INFECTIOUS ORGANISM: ICD-10-CM

## 2019-06-20 DIAGNOSIS — J96.21 ACUTE ON CHRONIC RESPIRATORY FAILURE WITH HYPOXIA (HCC): Primary | ICD-10-CM

## 2019-06-20 LAB
ALBUMIN SERPL-MCNC: 4.1 G/DL (ref 3.5–5.2)
ALBUMIN/GLOB SERPL: 2.2 G/DL
ALP SERPL-CCNC: 71 U/L (ref 39–117)
ALT SERPL W P-5'-P-CCNC: 12 U/L (ref 1–41)
ANION GAP SERPL CALCULATED.3IONS-SCNC: 15.1 MMOL/L
ARTERIAL PATENCY WRIST A: POSITIVE
AST SERPL-CCNC: 12 U/L (ref 1–40)
ATMOSPHERIC PRESS: 744 MMHG
BASE EXCESS BLDA CALC-SCNC: 3.2 MMOL/L (ref 0–2)
BASOPHILS # BLD MANUAL: 0.02 10*3/MM3 (ref 0–0.2)
BASOPHILS NFR BLD AUTO: 0.4 % (ref 0–1.5)
BDY SITE: ABNORMAL
BILIRUB SERPL-MCNC: 0.6 MG/DL (ref 0.2–1.2)
BUN BLD-MCNC: 28 MG/DL (ref 8–23)
BUN/CREAT SERPL: 23.7 (ref 7–25)
CALCIUM SPEC-SCNC: 8.9 MG/DL (ref 8.6–10.5)
CHLORIDE SERPL-SCNC: 104 MMOL/L (ref 98–107)
CO2 SERPL-SCNC: 26.9 MMOL/L (ref 22–29)
CREAT BLD-MCNC: 1.18 MG/DL (ref 0.76–1.27)
D-LACTATE SERPL-SCNC: 1.7 MMOL/L (ref 0.5–2)
D-LACTATE SERPL-SCNC: 2.7 MMOL/L (ref 0.5–2)
DEPRECATED RDW RBC AUTO: 60.2 FL (ref 37–54)
EOSINOPHIL # BLD MANUAL: 0.11 10*3/MM3 (ref 0–0.4)
EOSINOPHIL NFR BLD MANUAL: 1.7 % (ref 0.3–6.2)
ERYTHROCYTE [DISTWIDTH] IN BLOOD BY AUTOMATED COUNT: 14.2 % (ref 12.3–15.4)
GFR SERPL CREATININE-BSD FRML MDRD: 60 ML/MIN/1.73
GLOBULIN UR ELPH-MCNC: 1.9 GM/DL
GLUCOSE BLD-MCNC: 135 MG/DL (ref 65–99)
HCO3 BLDA-SCNC: 29 MMOL/L (ref 22–28)
HCT VFR BLD AUTO: 31.3 % (ref 37.5–51)
HGB BLD-MCNC: 9.6 G/DL (ref 13–17.7)
HOLD SPECIMEN: NORMAL
INR PPP: 1.47 (ref 0.9–1.1)
LYMPHOCYTES # BLD MANUAL: 0.47 10*3/MM3 (ref 0.7–3.1)
LYMPHOCYTES NFR BLD MANUAL: 13.3 % (ref 5–12)
LYMPHOCYTES NFR BLD MANUAL: 7.5 % (ref 19.6–45.3)
MACROCYTES BLD QL SMEAR: ABNORMAL
MCH RBC QN AUTO: 34.9 PG (ref 26.6–33)
MCHC RBC AUTO-ENTMCNC: 30.7 G/DL (ref 31.5–35.7)
MCV RBC AUTO: 113.8 FL (ref 79–97)
MODALITY: ABNORMAL
MONOCYTES # BLD AUTO: 0.83 10*3/MM3 (ref 0.1–0.9)
NEUTROPHILS # BLD AUTO: 4.81 10*3/MM3 (ref 1.7–7)
NEUTROPHILS NFR BLD MANUAL: 77.1 % (ref 42.7–76)
NRBC SPEC MANUAL: 0.8 /100 WBC (ref 0–0.2)
NT-PROBNP SERPL-MCNC: 3878 PG/ML (ref 5–1800)
PCO2 BLDA: 49.1 MM HG (ref 35–45)
PH BLDA: 7.38 PH UNITS (ref 7.35–7.45)
PLAT MORPH BLD: NORMAL
PLATELET # BLD AUTO: 189 10*3/MM3 (ref 140–450)
PMV BLD AUTO: 11.7 FL (ref 6–12)
PO2 BLDA: 38.3 MM HG (ref 80–100)
POIKILOCYTOSIS BLD QL SMEAR: ABNORMAL
POLYCHROMASIA BLD QL SMEAR: ABNORMAL
POTASSIUM BLD-SCNC: 4.4 MMOL/L (ref 3.5–5.2)
PROCALCITONIN SERPL-MCNC: 0.18 NG/ML (ref 0.1–0.25)
PROT SERPL-MCNC: 6 G/DL (ref 6–8.5)
PROTHROMBIN TIME: 17.5 SECONDS (ref 11.7–14.2)
RBC # BLD AUTO: 2.75 10*6/MM3 (ref 4.14–5.8)
SAO2 % BLDCOA: 70.5 % (ref 92–99)
SET MECH RESP RATE: 32
SODIUM BLD-SCNC: 146 MMOL/L (ref 136–145)
TROPONIN T SERPL-MCNC: 0.03 NG/ML (ref 0–0.03)
WBC MORPH BLD: NORMAL
WBC NRBC COR # BLD: 6.24 10*3/MM3 (ref 3.4–10.8)

## 2019-06-20 PROCEDURE — 87040 BLOOD CULTURE FOR BACTERIA: CPT | Performed by: EMERGENCY MEDICINE

## 2019-06-20 PROCEDURE — 80053 COMPREHEN METABOLIC PANEL: CPT | Performed by: EMERGENCY MEDICINE

## 2019-06-20 PROCEDURE — 93005 ELECTROCARDIOGRAM TRACING: CPT | Performed by: EMERGENCY MEDICINE

## 2019-06-20 PROCEDURE — 94799 UNLISTED PULMONARY SVC/PX: CPT

## 2019-06-20 PROCEDURE — 25010000002 FUROSEMIDE PER 20 MG: Performed by: EMERGENCY MEDICINE

## 2019-06-20 PROCEDURE — 85007 BL SMEAR W/DIFF WBC COUNT: CPT | Performed by: EMERGENCY MEDICINE

## 2019-06-20 PROCEDURE — 94640 AIRWAY INHALATION TREATMENT: CPT

## 2019-06-20 PROCEDURE — 99285 EMERGENCY DEPT VISIT HI MDM: CPT

## 2019-06-20 PROCEDURE — 71045 X-RAY EXAM CHEST 1 VIEW: CPT

## 2019-06-20 PROCEDURE — 25010000002 PIPERACILLIN SOD-TAZOBACTAM PER 1 G: Performed by: EMERGENCY MEDICINE

## 2019-06-20 PROCEDURE — 25010000002 METHYLPREDNISOLONE PER 125 MG: Performed by: EMERGENCY MEDICINE

## 2019-06-20 PROCEDURE — 84484 ASSAY OF TROPONIN QUANT: CPT | Performed by: EMERGENCY MEDICINE

## 2019-06-20 PROCEDURE — 83880 ASSAY OF NATRIURETIC PEPTIDE: CPT | Performed by: EMERGENCY MEDICINE

## 2019-06-20 PROCEDURE — 83605 ASSAY OF LACTIC ACID: CPT | Performed by: EMERGENCY MEDICINE

## 2019-06-20 PROCEDURE — 82803 BLOOD GASES ANY COMBINATION: CPT

## 2019-06-20 PROCEDURE — 85025 COMPLETE CBC W/AUTO DIFF WBC: CPT | Performed by: EMERGENCY MEDICINE

## 2019-06-20 PROCEDURE — 84145 PROCALCITONIN (PCT): CPT | Performed by: EMERGENCY MEDICINE

## 2019-06-20 PROCEDURE — 25010000002 VANCOMYCIN 10 G RECONSTITUTED SOLUTION: Performed by: EMERGENCY MEDICINE

## 2019-06-20 PROCEDURE — 85610 PROTHROMBIN TIME: CPT | Performed by: EMERGENCY MEDICINE

## 2019-06-20 PROCEDURE — 36600 WITHDRAWAL OF ARTERIAL BLOOD: CPT

## 2019-06-20 PROCEDURE — 93010 ELECTROCARDIOGRAM REPORT: CPT | Performed by: INTERNAL MEDICINE

## 2019-06-20 RX ORDER — DILTIAZEM HYDROCHLORIDE 180 MG/1
360 CAPSULE, COATED, EXTENDED RELEASE ORAL
Status: DISCONTINUED | OUTPATIENT
Start: 2019-06-21 | End: 2019-07-01 | Stop reason: HOSPADM

## 2019-06-20 RX ORDER — IPRATROPIUM BROMIDE AND ALBUTEROL SULFATE 2.5; .5 MG/3ML; MG/3ML
3 SOLUTION RESPIRATORY (INHALATION)
Status: DISCONTINUED | OUTPATIENT
Start: 2019-06-20 | End: 2019-07-01 | Stop reason: HOSPADM

## 2019-06-20 RX ORDER — ALBUTEROL SULFATE 2.5 MG/3ML
2.5 SOLUTION RESPIRATORY (INHALATION) ONCE
Status: COMPLETED | OUTPATIENT
Start: 2019-06-20 | End: 2019-06-20

## 2019-06-20 RX ORDER — IPRATROPIUM BROMIDE AND ALBUTEROL SULFATE 2.5; .5 MG/3ML; MG/3ML
3 SOLUTION RESPIRATORY (INHALATION) ONCE
Status: COMPLETED | OUTPATIENT
Start: 2019-06-20 | End: 2019-06-20

## 2019-06-20 RX ORDER — SODIUM CHLORIDE 0.9 % (FLUSH) 0.9 %
10 SYRINGE (ML) INJECTION AS NEEDED
Status: DISCONTINUED | OUTPATIENT
Start: 2019-06-20 | End: 2019-07-01 | Stop reason: HOSPADM

## 2019-06-20 RX ORDER — FUROSEMIDE 10 MG/ML
40 INJECTION INTRAMUSCULAR; INTRAVENOUS ONCE
Status: DISCONTINUED | OUTPATIENT
Start: 2019-06-21 | End: 2019-06-23

## 2019-06-20 RX ORDER — NICOTINE POLACRILEX 4 MG
15 LOZENGE BUCCAL
Status: DISCONTINUED | OUTPATIENT
Start: 2019-06-20 | End: 2019-07-01 | Stop reason: HOSPADM

## 2019-06-20 RX ORDER — FOLIC ACID 1 MG/1
1 TABLET ORAL DAILY
Status: DISCONTINUED | OUTPATIENT
Start: 2019-06-21 | End: 2019-07-01 | Stop reason: HOSPADM

## 2019-06-20 RX ORDER — METOPROLOL TARTRATE 100 MG/1
100 TABLET ORAL EVERY 12 HOURS SCHEDULED
Status: DISCONTINUED | OUTPATIENT
Start: 2019-06-21 | End: 2019-07-01 | Stop reason: HOSPADM

## 2019-06-20 RX ORDER — ALPRAZOLAM 0.5 MG/1
1 TABLET ORAL NIGHTLY PRN
Status: DISPENSED | OUTPATIENT
Start: 2019-06-20 | End: 2019-06-30

## 2019-06-20 RX ORDER — FUROSEMIDE 10 MG/ML
40 INJECTION INTRAMUSCULAR; INTRAVENOUS ONCE
Status: COMPLETED | OUTPATIENT
Start: 2019-06-20 | End: 2019-06-20

## 2019-06-20 RX ORDER — ATORVASTATIN CALCIUM 10 MG/1
10 TABLET, FILM COATED ORAL DAILY
Status: DISCONTINUED | OUTPATIENT
Start: 2019-06-21 | End: 2019-07-01 | Stop reason: HOSPADM

## 2019-06-20 RX ORDER — BUDESONIDE 0.5 MG/2ML
0.5 INHALANT ORAL
Status: DISCONTINUED | OUTPATIENT
Start: 2019-06-21 | End: 2019-07-01 | Stop reason: HOSPADM

## 2019-06-20 RX ORDER — METHYLPREDNISOLONE SODIUM SUCCINATE 125 MG/2ML
125 INJECTION, POWDER, LYOPHILIZED, FOR SOLUTION INTRAMUSCULAR; INTRAVENOUS ONCE
Status: COMPLETED | OUTPATIENT
Start: 2019-06-20 | End: 2019-06-20

## 2019-06-20 RX ORDER — ALBUTEROL SULFATE 2.5 MG/3ML
2.5 SOLUTION RESPIRATORY (INHALATION)
Status: COMPLETED | OUTPATIENT
Start: 2019-06-20 | End: 2019-06-20

## 2019-06-20 RX ORDER — DEXTROSE MONOHYDRATE 25 G/50ML
25 INJECTION, SOLUTION INTRAVENOUS
Status: DISCONTINUED | OUTPATIENT
Start: 2019-06-20 | End: 2019-07-01 | Stop reason: HOSPADM

## 2019-06-20 RX ADMIN — IPRATROPIUM BROMIDE AND ALBUTEROL SULFATE 3 ML: 2.5; .5 SOLUTION RESPIRATORY (INHALATION) at 18:39

## 2019-06-20 RX ADMIN — ALBUTEROL SULFATE 2.5 MG: 2.5 SOLUTION RESPIRATORY (INHALATION) at 18:43

## 2019-06-20 RX ADMIN — TAZOBACTAM SODIUM AND PIPERACILLIN SODIUM 3.38 G: 375; 3 INJECTION, SOLUTION INTRAVENOUS at 19:51

## 2019-06-20 RX ADMIN — ALBUTEROL SULFATE 2.5 MG: 2.5 SOLUTION RESPIRATORY (INHALATION) at 20:35

## 2019-06-20 RX ADMIN — METHYLPREDNISOLONE SODIUM SUCCINATE 125 MG: 125 INJECTION, POWDER, FOR SOLUTION INTRAMUSCULAR; INTRAVENOUS at 18:29

## 2019-06-20 RX ADMIN — VANCOMYCIN HYDROCHLORIDE 2250 MG: 10 INJECTION, POWDER, LYOPHILIZED, FOR SOLUTION INTRAVENOUS at 19:57

## 2019-06-20 RX ADMIN — FUROSEMIDE 40 MG: 10 INJECTION, SOLUTION INTRAMUSCULAR; INTRAVENOUS at 20:41

## 2019-06-20 RX ADMIN — ALBUTEROL SULFATE 2.5 MG: 2.5 SOLUTION RESPIRATORY (INHALATION) at 18:47

## 2019-06-21 ENCOUNTER — APPOINTMENT (OUTPATIENT)
Dept: GENERAL RADIOLOGY | Facility: HOSPITAL | Age: 79
End: 2019-06-21

## 2019-06-21 ENCOUNTER — READMISSION MANAGEMENT (OUTPATIENT)
Dept: CALL CENTER | Facility: HOSPITAL | Age: 79
End: 2019-06-21

## 2019-06-21 LAB
ANION GAP SERPL CALCULATED.3IONS-SCNC: 10.8 MMOL/L
BUN BLD-MCNC: 28 MG/DL (ref 8–23)
BUN/CREAT SERPL: 23.7 (ref 7–25)
CALCIUM SPEC-SCNC: 8.6 MG/DL (ref 8.6–10.5)
CHLORIDE SERPL-SCNC: 102 MMOL/L (ref 98–107)
CO2 SERPL-SCNC: 30.2 MMOL/L (ref 22–29)
CREAT BLD-MCNC: 1.18 MG/DL (ref 0.76–1.27)
DEPRECATED RDW RBC AUTO: 55.5 FL (ref 37–54)
ERYTHROCYTE [DISTWIDTH] IN BLOOD BY AUTOMATED COUNT: 13.8 % (ref 12.3–15.4)
GFR SERPL CREATININE-BSD FRML MDRD: 60 ML/MIN/1.73
GLUCOSE BLD-MCNC: 244 MG/DL (ref 65–99)
GLUCOSE BLDC GLUCOMTR-MCNC: 208 MG/DL (ref 70–130)
GLUCOSE BLDC GLUCOMTR-MCNC: 244 MG/DL (ref 70–130)
GLUCOSE BLDC GLUCOMTR-MCNC: 250 MG/DL (ref 70–130)
GLUCOSE BLDC GLUCOMTR-MCNC: 298 MG/DL (ref 70–130)
HCT VFR BLD AUTO: 25.7 % (ref 37.5–51)
HGB BLD-MCNC: 8.2 G/DL (ref 13–17.7)
MCH RBC QN AUTO: 35 PG (ref 26.6–33)
MCHC RBC AUTO-ENTMCNC: 31.9 G/DL (ref 31.5–35.7)
MCV RBC AUTO: 109.8 FL (ref 79–97)
NT-PROBNP SERPL-MCNC: 6227 PG/ML (ref 5–1800)
PLATELET # BLD AUTO: 155 10*3/MM3 (ref 140–450)
PMV BLD AUTO: 11.8 FL (ref 6–12)
POTASSIUM BLD-SCNC: 4.3 MMOL/L (ref 3.5–5.2)
RBC # BLD AUTO: 2.34 10*6/MM3 (ref 4.14–5.8)
SODIUM BLD-SCNC: 143 MMOL/L (ref 136–145)
TROPONIN T SERPL-MCNC: 0.01 NG/ML (ref 0–0.03)
WBC NRBC COR # BLD: 1.45 10*3/MM3 (ref 3.4–10.8)

## 2019-06-21 PROCEDURE — 93010 ELECTROCARDIOGRAM REPORT: CPT | Performed by: INTERNAL MEDICINE

## 2019-06-21 PROCEDURE — 94799 UNLISTED PULMONARY SVC/PX: CPT

## 2019-06-21 PROCEDURE — 63710000001 INSULIN GLARGINE PER 5 UNITS: Performed by: INTERNAL MEDICINE

## 2019-06-21 PROCEDURE — 99222 1ST HOSP IP/OBS MODERATE 55: CPT | Performed by: INTERNAL MEDICINE

## 2019-06-21 PROCEDURE — 80048 BASIC METABOLIC PNL TOTAL CA: CPT | Performed by: INTERNAL MEDICINE

## 2019-06-21 PROCEDURE — 63710000001 INSULIN LISPRO (HUMAN) PER 5 UNITS: Performed by: INTERNAL MEDICINE

## 2019-06-21 PROCEDURE — 71045 X-RAY EXAM CHEST 1 VIEW: CPT

## 2019-06-21 PROCEDURE — 83880 ASSAY OF NATRIURETIC PEPTIDE: CPT | Performed by: INTERNAL MEDICINE

## 2019-06-21 PROCEDURE — 93005 ELECTROCARDIOGRAM TRACING: CPT | Performed by: INTERNAL MEDICINE

## 2019-06-21 PROCEDURE — 82962 GLUCOSE BLOOD TEST: CPT

## 2019-06-21 PROCEDURE — 85027 COMPLETE CBC AUTOMATED: CPT | Performed by: INTERNAL MEDICINE

## 2019-06-21 PROCEDURE — 84484 ASSAY OF TROPONIN QUANT: CPT | Performed by: INTERNAL MEDICINE

## 2019-06-21 RX ORDER — INSULIN GLARGINE 100 [IU]/ML
15 INJECTION, SOLUTION SUBCUTANEOUS NIGHTLY
Status: DISCONTINUED | OUTPATIENT
Start: 2019-06-21 | End: 2019-07-01 | Stop reason: HOSPADM

## 2019-06-21 RX ORDER — DIPHENHYDRAMINE HCL 25 MG
50 CAPSULE ORAL NIGHTLY PRN
Status: DISCONTINUED | OUTPATIENT
Start: 2019-06-21 | End: 2019-07-01 | Stop reason: HOSPADM

## 2019-06-21 RX ADMIN — INSULIN LISPRO 4 UNITS: 100 INJECTION, SOLUTION INTRAVENOUS; SUBCUTANEOUS at 09:24

## 2019-06-21 RX ADMIN — INSULIN LISPRO 4 UNITS: 100 INJECTION, SOLUTION INTRAVENOUS; SUBCUTANEOUS at 18:18

## 2019-06-21 RX ADMIN — DILTIAZEM HYDROCHLORIDE 360 MG: 180 CAPSULE, COATED, EXTENDED RELEASE ORAL at 09:24

## 2019-06-21 RX ADMIN — BUDESONIDE 0.5 MG: 0.5 INHALANT RESPIRATORY (INHALATION) at 20:25

## 2019-06-21 RX ADMIN — METOPROLOL TARTRATE 100 MG: 100 TABLET, FILM COATED ORAL at 09:24

## 2019-06-21 RX ADMIN — BUDESONIDE 0.5 MG: 0.5 INHALANT RESPIRATORY (INHALATION) at 07:41

## 2019-06-21 RX ADMIN — IPRATROPIUM BROMIDE AND ALBUTEROL SULFATE 3 ML: 2.5; .5 SOLUTION RESPIRATORY (INHALATION) at 11:28

## 2019-06-21 RX ADMIN — APIXABAN 5 MG: 5 TABLET, FILM COATED ORAL at 01:50

## 2019-06-21 RX ADMIN — APIXABAN 5 MG: 5 TABLET, FILM COATED ORAL at 20:12

## 2019-06-21 RX ADMIN — IPRATROPIUM BROMIDE AND ALBUTEROL SULFATE 3 ML: 2.5; .5 SOLUTION RESPIRATORY (INHALATION) at 00:37

## 2019-06-21 RX ADMIN — IPRATROPIUM BROMIDE AND ALBUTEROL SULFATE 3 ML: 2.5; .5 SOLUTION RESPIRATORY (INHALATION) at 20:25

## 2019-06-21 RX ADMIN — ALPRAZOLAM 1 MG: 0.5 TABLET ORAL at 23:51

## 2019-06-21 RX ADMIN — METOPROLOL TARTRATE 100 MG: 100 TABLET, FILM COATED ORAL at 20:12

## 2019-06-21 RX ADMIN — APIXABAN 5 MG: 5 TABLET, FILM COATED ORAL at 09:24

## 2019-06-21 RX ADMIN — SODIUM CHLORIDE, PRESERVATIVE FREE 10 ML: 5 INJECTION INTRAVENOUS at 09:25

## 2019-06-21 RX ADMIN — IPRATROPIUM BROMIDE AND ALBUTEROL SULFATE 3 ML: 2.5; .5 SOLUTION RESPIRATORY (INHALATION) at 15:38

## 2019-06-21 RX ADMIN — ATORVASTATIN CALCIUM 10 MG: 10 TABLET, FILM COATED ORAL at 09:24

## 2019-06-21 RX ADMIN — BUDESONIDE 0.5 MG: 0.5 INHALANT RESPIRATORY (INHALATION) at 00:37

## 2019-06-21 RX ADMIN — INSULIN LISPRO 8 UNITS: 100 INJECTION, SOLUTION INTRAVENOUS; SUBCUTANEOUS at 20:12

## 2019-06-21 RX ADMIN — INSULIN GLARGINE 15 UNITS: 100 INJECTION, SOLUTION SUBCUTANEOUS at 20:12

## 2019-06-21 RX ADMIN — INSULIN LISPRO 6 UNITS: 100 INJECTION, SOLUTION INTRAVENOUS; SUBCUTANEOUS at 12:07

## 2019-06-21 RX ADMIN — ALPRAZOLAM 1 MG: 0.5 TABLET ORAL at 01:50

## 2019-06-21 RX ADMIN — IPRATROPIUM BROMIDE AND ALBUTEROL SULFATE 3 ML: 2.5; .5 SOLUTION RESPIRATORY (INHALATION) at 07:41

## 2019-06-21 RX ADMIN — FOLIC ACID 1 MG: 1 TABLET ORAL at 09:24

## 2019-06-21 NOTE — OUTREACH NOTE
COPD/PN Week 4 Survey      Responses   Facility patient discharged from?  [facility]   Does the patient have one of the following disease processes/diagnoses(primary or secondary)?  COPD/Pneumonia   Was the primary reason for admission:  COPD exacerbation   Week 4 attempt successful?  No   Revoke  Readmitted          Marielena Corey RN

## 2019-06-22 LAB
ALBUMIN SERPL-MCNC: 4 G/DL (ref 3.5–5.2)
ALBUMIN/GLOB SERPL: 1.9 G/DL
ALP SERPL-CCNC: 60 U/L (ref 39–117)
ALT SERPL W P-5'-P-CCNC: 12 U/L (ref 1–41)
ANION GAP SERPL CALCULATED.3IONS-SCNC: 12.2 MMOL/L
ANISOCYTOSIS BLD QL: ABNORMAL
AST SERPL-CCNC: 12 U/L (ref 1–40)
B PARAPERT DNA SPEC QL NAA+PROBE: NOT DETECTED
B PERT DNA SPEC QL NAA+PROBE: NOT DETECTED
BASOPHILS # BLD MANUAL: 0.06 10*3/MM3 (ref 0–0.2)
BASOPHILS NFR BLD AUTO: 1 % (ref 0–1.5)
BILIRUB SERPL-MCNC: 0.4 MG/DL (ref 0.2–1.2)
BUN BLD-MCNC: 33 MG/DL (ref 8–23)
BUN/CREAT SERPL: 26.8 (ref 7–25)
C PNEUM DNA NPH QL NAA+NON-PROBE: NOT DETECTED
CALCIUM SPEC-SCNC: 9.1 MG/DL (ref 8.6–10.5)
CHLORIDE SERPL-SCNC: 102 MMOL/L (ref 98–107)
CO2 SERPL-SCNC: 29.8 MMOL/L (ref 22–29)
CREAT BLD-MCNC: 1.23 MG/DL (ref 0.76–1.27)
DEPRECATED RDW RBC AUTO: 55.8 FL (ref 37–54)
ERYTHROCYTE [DISTWIDTH] IN BLOOD BY AUTOMATED COUNT: 13.9 % (ref 12.3–15.4)
FLUAV H1 2009 PAND RNA NPH QL NAA+PROBE: NOT DETECTED
FLUAV H1 HA GENE NPH QL NAA+PROBE: NOT DETECTED
FLUAV H3 RNA NPH QL NAA+PROBE: NOT DETECTED
FLUAV SUBTYP SPEC NAA+PROBE: NOT DETECTED
FLUBV RNA ISLT QL NAA+PROBE: NOT DETECTED
GFR SERPL CREATININE-BSD FRML MDRD: 57 ML/MIN/1.73
GIANT PLATELETS: ABNORMAL
GLOBULIN UR ELPH-MCNC: 2.1 GM/DL
GLUCOSE BLD-MCNC: 153 MG/DL (ref 65–99)
GLUCOSE BLDC GLUCOMTR-MCNC: 144 MG/DL (ref 70–130)
GLUCOSE BLDC GLUCOMTR-MCNC: 144 MG/DL (ref 70–130)
GLUCOSE BLDC GLUCOMTR-MCNC: 171 MG/DL (ref 70–130)
GLUCOSE BLDC GLUCOMTR-MCNC: 203 MG/DL (ref 70–130)
HADV DNA SPEC NAA+PROBE: NOT DETECTED
HCOV 229E RNA SPEC QL NAA+PROBE: NOT DETECTED
HCOV HKU1 RNA SPEC QL NAA+PROBE: NOT DETECTED
HCOV NL63 RNA SPEC QL NAA+PROBE: NOT DETECTED
HCOV OC43 RNA SPEC QL NAA+PROBE: NOT DETECTED
HCT VFR BLD AUTO: 29.3 % (ref 37.5–51)
HGB BLD-MCNC: 9.2 G/DL (ref 13–17.7)
HMPV RNA NPH QL NAA+NON-PROBE: NOT DETECTED
HPIV1 RNA SPEC QL NAA+PROBE: NOT DETECTED
HPIV2 RNA SPEC QL NAA+PROBE: NOT DETECTED
HPIV3 RNA NPH QL NAA+PROBE: NOT DETECTED
HPIV4 P GENE NPH QL NAA+PROBE: NOT DETECTED
HYPOCHROMIA BLD QL: ABNORMAL
LYMPHOCYTES # BLD MANUAL: 0.36 10*3/MM3 (ref 0.7–3.1)
LYMPHOCYTES NFR BLD MANUAL: 11.5 % (ref 5–12)
LYMPHOCYTES NFR BLD MANUAL: 6.3 % (ref 19.6–45.3)
M PNEUMO IGG SER IA-ACNC: NOT DETECTED
MACROCYTES BLD QL SMEAR: ABNORMAL
MCH RBC QN AUTO: 34.7 PG (ref 26.6–33)
MCHC RBC AUTO-ENTMCNC: 31.4 G/DL (ref 31.5–35.7)
MCV RBC AUTO: 110.6 FL (ref 79–97)
MONOCYTES # BLD AUTO: 0.66 10*3/MM3 (ref 0.1–0.9)
NEUTROPHILS # BLD AUTO: 4.67 10*3/MM3 (ref 1.7–7)
NEUTROPHILS NFR BLD MANUAL: 81.3 % (ref 42.7–76)
PLATELET # BLD AUTO: 177 10*3/MM3 (ref 140–450)
PMV BLD AUTO: 11.8 FL (ref 6–12)
POTASSIUM BLD-SCNC: 4.6 MMOL/L (ref 3.5–5.2)
PROCALCITONIN SERPL-MCNC: 0.24 NG/ML (ref 0.1–0.25)
PROT SERPL-MCNC: 6.1 G/DL (ref 6–8.5)
RBC # BLD AUTO: 2.65 10*6/MM3 (ref 4.14–5.8)
RHINOVIRUS RNA SPEC NAA+PROBE: NOT DETECTED
RSV RNA NPH QL NAA+NON-PROBE: NOT DETECTED
SODIUM BLD-SCNC: 144 MMOL/L (ref 136–145)
WBC MORPH BLD: NORMAL
WBC NRBC COR # BLD: 5.74 10*3/MM3 (ref 3.4–10.8)

## 2019-06-22 PROCEDURE — 87633 RESP VIRUS 12-25 TARGETS: CPT | Performed by: INTERNAL MEDICINE

## 2019-06-22 PROCEDURE — 63710000001 INSULIN GLARGINE PER 5 UNITS: Performed by: INTERNAL MEDICINE

## 2019-06-22 PROCEDURE — 82962 GLUCOSE BLOOD TEST: CPT

## 2019-06-22 PROCEDURE — 85025 COMPLETE CBC W/AUTO DIFF WBC: CPT | Performed by: INTERNAL MEDICINE

## 2019-06-22 PROCEDURE — 84145 PROCALCITONIN (PCT): CPT | Performed by: INTERNAL MEDICINE

## 2019-06-22 PROCEDURE — 85007 BL SMEAR W/DIFF WBC COUNT: CPT | Performed by: INTERNAL MEDICINE

## 2019-06-22 PROCEDURE — 94799 UNLISTED PULMONARY SVC/PX: CPT

## 2019-06-22 PROCEDURE — 87798 DETECT AGENT NOS DNA AMP: CPT | Performed by: INTERNAL MEDICINE

## 2019-06-22 PROCEDURE — 63710000001 INSULIN LISPRO (HUMAN) PER 5 UNITS: Performed by: INTERNAL MEDICINE

## 2019-06-22 PROCEDURE — 87581 M.PNEUMON DNA AMP PROBE: CPT | Performed by: INTERNAL MEDICINE

## 2019-06-22 PROCEDURE — 80053 COMPREHEN METABOLIC PANEL: CPT | Performed by: INTERNAL MEDICINE

## 2019-06-22 PROCEDURE — 25010000002 CEFEPIME PER 500 MG: Performed by: INTERNAL MEDICINE

## 2019-06-22 PROCEDURE — 87486 CHLMYD PNEUM DNA AMP PROBE: CPT | Performed by: INTERNAL MEDICINE

## 2019-06-22 RX ADMIN — IPRATROPIUM BROMIDE AND ALBUTEROL SULFATE 3 ML: 2.5; .5 SOLUTION RESPIRATORY (INHALATION) at 11:33

## 2019-06-22 RX ADMIN — BUDESONIDE 0.5 MG: 0.5 INHALANT RESPIRATORY (INHALATION) at 08:56

## 2019-06-22 RX ADMIN — INSULIN GLARGINE 15 UNITS: 100 INJECTION, SOLUTION SUBCUTANEOUS at 21:16

## 2019-06-22 RX ADMIN — ALPRAZOLAM 1 MG: 0.5 TABLET ORAL at 23:36

## 2019-06-22 RX ADMIN — DILTIAZEM HYDROCHLORIDE 360 MG: 180 CAPSULE, COATED, EXTENDED RELEASE ORAL at 08:40

## 2019-06-22 RX ADMIN — ATORVASTATIN CALCIUM 10 MG: 10 TABLET, FILM COATED ORAL at 08:40

## 2019-06-22 RX ADMIN — IPRATROPIUM BROMIDE AND ALBUTEROL SULFATE 3 ML: 2.5; .5 SOLUTION RESPIRATORY (INHALATION) at 23:51

## 2019-06-22 RX ADMIN — SODIUM CHLORIDE, PRESERVATIVE FREE 10 ML: 5 INJECTION INTRAVENOUS at 08:41

## 2019-06-22 RX ADMIN — APIXABAN 5 MG: 5 TABLET, FILM COATED ORAL at 08:40

## 2019-06-22 RX ADMIN — METOPROLOL TARTRATE 100 MG: 100 TABLET, FILM COATED ORAL at 08:41

## 2019-06-22 RX ADMIN — IPRATROPIUM BROMIDE AND ALBUTEROL SULFATE 3 ML: 2.5; .5 SOLUTION RESPIRATORY (INHALATION) at 00:17

## 2019-06-22 RX ADMIN — IPRATROPIUM BROMIDE AND ALBUTEROL SULFATE 3 ML: 2.5; .5 SOLUTION RESPIRATORY (INHALATION) at 20:31

## 2019-06-22 RX ADMIN — INSULIN LISPRO 3 UNITS: 100 INJECTION, SOLUTION INTRAVENOUS; SUBCUTANEOUS at 13:11

## 2019-06-22 RX ADMIN — METOPROLOL TARTRATE 100 MG: 100 TABLET, FILM COATED ORAL at 19:35

## 2019-06-22 RX ADMIN — INSULIN LISPRO 5 UNITS: 100 INJECTION, SOLUTION INTRAVENOUS; SUBCUTANEOUS at 21:15

## 2019-06-22 RX ADMIN — FOLIC ACID 1 MG: 1 TABLET ORAL at 08:41

## 2019-06-22 RX ADMIN — APIXABAN 5 MG: 5 TABLET, FILM COATED ORAL at 19:35

## 2019-06-22 RX ADMIN — CEFEPIME HYDROCHLORIDE 2 G: 2 INJECTION, POWDER, FOR SOLUTION INTRAVENOUS at 18:49

## 2019-06-22 RX ADMIN — IPRATROPIUM BROMIDE AND ALBUTEROL SULFATE 3 ML: 2.5; .5 SOLUTION RESPIRATORY (INHALATION) at 08:56

## 2019-06-22 RX ADMIN — BUDESONIDE 0.5 MG: 0.5 INHALANT RESPIRATORY (INHALATION) at 20:31

## 2019-06-22 RX ADMIN — IPRATROPIUM BROMIDE AND ALBUTEROL SULFATE 3 ML: 2.5; .5 SOLUTION RESPIRATORY (INHALATION) at 16:03

## 2019-06-23 ENCOUNTER — APPOINTMENT (OUTPATIENT)
Dept: CT IMAGING | Facility: HOSPITAL | Age: 79
End: 2019-06-23

## 2019-06-23 LAB
GLUCOSE BLDC GLUCOMTR-MCNC: 105 MG/DL (ref 70–130)
GLUCOSE BLDC GLUCOMTR-MCNC: 110 MG/DL (ref 70–130)
GLUCOSE BLDC GLUCOMTR-MCNC: 162 MG/DL (ref 70–130)
GLUCOSE BLDC GLUCOMTR-MCNC: 210 MG/DL (ref 70–130)

## 2019-06-23 PROCEDURE — 82962 GLUCOSE BLOOD TEST: CPT

## 2019-06-23 PROCEDURE — 25010000002 FUROSEMIDE PER 20 MG: Performed by: INTERNAL MEDICINE

## 2019-06-23 PROCEDURE — 63710000001 INSULIN GLARGINE PER 5 UNITS: Performed by: INTERNAL MEDICINE

## 2019-06-23 PROCEDURE — 94799 UNLISTED PULMONARY SVC/PX: CPT

## 2019-06-23 PROCEDURE — 25010000002 CEFEPIME PER 500 MG: Performed by: INTERNAL MEDICINE

## 2019-06-23 PROCEDURE — 63710000001 INSULIN LISPRO (HUMAN) PER 5 UNITS: Performed by: INTERNAL MEDICINE

## 2019-06-23 PROCEDURE — 71250 CT THORAX DX C-: CPT

## 2019-06-23 RX ORDER — POLYETHYLENE GLYCOL 3350 17 G/17G
17 POWDER, FOR SOLUTION ORAL DAILY
Status: DISCONTINUED | OUTPATIENT
Start: 2019-06-23 | End: 2019-07-01 | Stop reason: HOSPADM

## 2019-06-23 RX ORDER — FUROSEMIDE 10 MG/ML
40 INJECTION INTRAMUSCULAR; INTRAVENOUS ONCE
Status: COMPLETED | OUTPATIENT
Start: 2019-06-23 | End: 2019-06-23

## 2019-06-23 RX ADMIN — IPRATROPIUM BROMIDE AND ALBUTEROL SULFATE 3 ML: 2.5; .5 SOLUTION RESPIRATORY (INHALATION) at 11:35

## 2019-06-23 RX ADMIN — BUDESONIDE 0.5 MG: 0.5 INHALANT RESPIRATORY (INHALATION) at 20:36

## 2019-06-23 RX ADMIN — FOLIC ACID 1 MG: 1 TABLET ORAL at 10:58

## 2019-06-23 RX ADMIN — IPRATROPIUM BROMIDE AND ALBUTEROL SULFATE 3 ML: 2.5; .5 SOLUTION RESPIRATORY (INHALATION) at 20:36

## 2019-06-23 RX ADMIN — FUROSEMIDE 40 MG: 10 INJECTION, SOLUTION INTRAMUSCULAR; INTRAVENOUS at 14:18

## 2019-06-23 RX ADMIN — BUDESONIDE 0.5 MG: 0.5 INHALANT RESPIRATORY (INHALATION) at 07:39

## 2019-06-23 RX ADMIN — POLYETHYLENE GLYCOL 3350 17 G: 17 POWDER, FOR SOLUTION ORAL at 20:07

## 2019-06-23 RX ADMIN — IPRATROPIUM BROMIDE AND ALBUTEROL SULFATE 3 ML: 2.5; .5 SOLUTION RESPIRATORY (INHALATION) at 15:02

## 2019-06-23 RX ADMIN — INSULIN LISPRO 5 UNITS: 100 INJECTION, SOLUTION INTRAVENOUS; SUBCUTANEOUS at 21:31

## 2019-06-23 RX ADMIN — DILTIAZEM HYDROCHLORIDE 360 MG: 180 CAPSULE, COATED, EXTENDED RELEASE ORAL at 10:57

## 2019-06-23 RX ADMIN — CEFEPIME HYDROCHLORIDE 2 G: 2 INJECTION, POWDER, FOR SOLUTION INTRAVENOUS at 10:58

## 2019-06-23 RX ADMIN — CEFEPIME HYDROCHLORIDE 2 G: 2 INJECTION, POWDER, FOR SOLUTION INTRAVENOUS at 01:06

## 2019-06-23 RX ADMIN — INSULIN GLARGINE 15 UNITS: 100 INJECTION, SOLUTION SUBCUTANEOUS at 21:32

## 2019-06-23 RX ADMIN — IPRATROPIUM BROMIDE AND ALBUTEROL SULFATE 3 ML: 2.5; .5 SOLUTION RESPIRATORY (INHALATION) at 07:38

## 2019-06-23 RX ADMIN — APIXABAN 5 MG: 5 TABLET, FILM COATED ORAL at 10:58

## 2019-06-23 RX ADMIN — ALPRAZOLAM 1 MG: 0.5 TABLET ORAL at 23:05

## 2019-06-23 RX ADMIN — CEFEPIME HYDROCHLORIDE 2 G: 2 INJECTION, POWDER, FOR SOLUTION INTRAVENOUS at 17:47

## 2019-06-23 RX ADMIN — APIXABAN 5 MG: 5 TABLET, FILM COATED ORAL at 21:32

## 2019-06-23 RX ADMIN — IPRATROPIUM BROMIDE AND ALBUTEROL SULFATE 3 ML: 2.5; .5 SOLUTION RESPIRATORY (INHALATION) at 23:49

## 2019-06-23 RX ADMIN — ATORVASTATIN CALCIUM 10 MG: 10 TABLET, FILM COATED ORAL at 10:57

## 2019-06-23 RX ADMIN — METOPROLOL TARTRATE 100 MG: 100 TABLET, FILM COATED ORAL at 10:57

## 2019-06-23 RX ADMIN — METOPROLOL TARTRATE 100 MG: 100 TABLET, FILM COATED ORAL at 21:32

## 2019-06-23 RX ADMIN — INSULIN LISPRO 3 UNITS: 100 INJECTION, SOLUTION INTRAVENOUS; SUBCUTANEOUS at 12:20

## 2019-06-24 LAB
ARTERIAL PATENCY WRIST A: POSITIVE
ATMOSPHERIC PRESS: 739.3 MMHG
BASE EXCESS BLDA CALC-SCNC: 9.9 MMOL/L (ref 0–2)
BDY SITE: ABNORMAL
GAS FLOW AIRWAY: 15 LPM
GLUCOSE BLDC GLUCOMTR-MCNC: 120 MG/DL (ref 70–130)
GLUCOSE BLDC GLUCOMTR-MCNC: 132 MG/DL (ref 70–130)
GLUCOSE BLDC GLUCOMTR-MCNC: 140 MG/DL (ref 70–130)
GLUCOSE BLDC GLUCOMTR-MCNC: 179 MG/DL (ref 70–130)
GLUCOSE BLDC GLUCOMTR-MCNC: 192 MG/DL (ref 70–130)
HCO3 BLDA-SCNC: 35.5 MMOL/L (ref 22–28)
MODALITY: ABNORMAL
PCO2 BLDA: 52.8 MM HG (ref 35–45)
PH BLDA: 7.43 PH UNITS (ref 7.35–7.45)
PO2 BLDA: 79.1 MM HG (ref 80–100)
SAO2 % BLDCOA: 95.7 % (ref 92–99)
SET MECH RESP RATE: 20

## 2019-06-24 PROCEDURE — 82962 GLUCOSE BLOOD TEST: CPT

## 2019-06-24 PROCEDURE — 36600 WITHDRAWAL OF ARTERIAL BLOOD: CPT

## 2019-06-24 PROCEDURE — 99232 SBSQ HOSP IP/OBS MODERATE 35: CPT | Performed by: NURSE PRACTITIONER

## 2019-06-24 PROCEDURE — 94799 UNLISTED PULMONARY SVC/PX: CPT

## 2019-06-24 PROCEDURE — 63710000001 INSULIN LISPRO (HUMAN) PER 5 UNITS: Performed by: INTERNAL MEDICINE

## 2019-06-24 PROCEDURE — 63710000001 INSULIN GLARGINE PER 5 UNITS: Performed by: INTERNAL MEDICINE

## 2019-06-24 PROCEDURE — 25010000002 FUROSEMIDE PER 20 MG: Performed by: INTERNAL MEDICINE

## 2019-06-24 PROCEDURE — 82803 BLOOD GASES ANY COMBINATION: CPT

## 2019-06-24 PROCEDURE — 25010000002 CEFEPIME PER 500 MG: Performed by: INTERNAL MEDICINE

## 2019-06-24 RX ORDER — FUROSEMIDE 10 MG/ML
40 INJECTION INTRAMUSCULAR; INTRAVENOUS
Status: DISCONTINUED | OUTPATIENT
Start: 2019-06-24 | End: 2019-06-26

## 2019-06-24 RX ORDER — FUROSEMIDE 10 MG/ML
40 INJECTION INTRAMUSCULAR; INTRAVENOUS DAILY
Status: DISCONTINUED | OUTPATIENT
Start: 2019-06-24 | End: 2019-06-24

## 2019-06-24 RX ADMIN — APIXABAN 5 MG: 5 TABLET, FILM COATED ORAL at 08:09

## 2019-06-24 RX ADMIN — CEFEPIME HYDROCHLORIDE 2 G: 2 INJECTION, POWDER, FOR SOLUTION INTRAVENOUS at 03:59

## 2019-06-24 RX ADMIN — IPRATROPIUM BROMIDE AND ALBUTEROL SULFATE 3 ML: 2.5; .5 SOLUTION RESPIRATORY (INHALATION) at 07:47

## 2019-06-24 RX ADMIN — BUDESONIDE 0.5 MG: 0.5 INHALANT RESPIRATORY (INHALATION) at 20:54

## 2019-06-24 RX ADMIN — IPRATROPIUM BROMIDE AND ALBUTEROL SULFATE 3 ML: 2.5; .5 SOLUTION RESPIRATORY (INHALATION) at 20:54

## 2019-06-24 RX ADMIN — INSULIN LISPRO 3 UNITS: 100 INJECTION, SOLUTION INTRAVENOUS; SUBCUTANEOUS at 12:12

## 2019-06-24 RX ADMIN — POLYETHYLENE GLYCOL 3350 17 G: 17 POWDER, FOR SOLUTION ORAL at 08:10

## 2019-06-24 RX ADMIN — BUDESONIDE 0.5 MG: 0.5 INHALANT RESPIRATORY (INHALATION) at 07:47

## 2019-06-24 RX ADMIN — METOPROLOL TARTRATE 100 MG: 100 TABLET, FILM COATED ORAL at 08:09

## 2019-06-24 RX ADMIN — CEFEPIME HYDROCHLORIDE 2 G: 2 INJECTION, POWDER, FOR SOLUTION INTRAVENOUS at 17:48

## 2019-06-24 RX ADMIN — FOLIC ACID 1 MG: 1 TABLET ORAL at 08:09

## 2019-06-24 RX ADMIN — SODIUM CHLORIDE, PRESERVATIVE FREE 10 ML: 5 INJECTION INTRAVENOUS at 08:10

## 2019-06-24 RX ADMIN — IPRATROPIUM BROMIDE AND ALBUTEROL SULFATE 3 ML: 2.5; .5 SOLUTION RESPIRATORY (INHALATION) at 15:55

## 2019-06-24 RX ADMIN — ALPRAZOLAM 1 MG: 0.5 TABLET ORAL at 22:59

## 2019-06-24 RX ADMIN — DILTIAZEM HYDROCHLORIDE 360 MG: 180 CAPSULE, COATED, EXTENDED RELEASE ORAL at 08:09

## 2019-06-24 RX ADMIN — IPRATROPIUM BROMIDE AND ALBUTEROL SULFATE 3 ML: 2.5; .5 SOLUTION RESPIRATORY (INHALATION) at 11:30

## 2019-06-24 RX ADMIN — IPRATROPIUM BROMIDE AND ALBUTEROL SULFATE 3 ML: 2.5; .5 SOLUTION RESPIRATORY (INHALATION) at 23:49

## 2019-06-24 RX ADMIN — INSULIN GLARGINE 15 UNITS: 100 INJECTION, SOLUTION SUBCUTANEOUS at 21:58

## 2019-06-24 RX ADMIN — FUROSEMIDE 40 MG: 10 INJECTION, SOLUTION INTRAMUSCULAR; INTRAVENOUS at 09:53

## 2019-06-24 RX ADMIN — CEFEPIME HYDROCHLORIDE 2 G: 2 INJECTION, POWDER, FOR SOLUTION INTRAVENOUS at 11:22

## 2019-06-24 RX ADMIN — ATORVASTATIN CALCIUM 10 MG: 10 TABLET, FILM COATED ORAL at 08:09

## 2019-06-24 RX ADMIN — METOPROLOL TARTRATE 100 MG: 100 TABLET, FILM COATED ORAL at 21:58

## 2019-06-24 RX ADMIN — APIXABAN 5 MG: 5 TABLET, FILM COATED ORAL at 21:58

## 2019-06-25 ENCOUNTER — APPOINTMENT (OUTPATIENT)
Dept: GENERAL RADIOLOGY | Facility: HOSPITAL | Age: 79
End: 2019-06-25

## 2019-06-25 PROBLEM — D80.1 HYPOGAMMAGLOBULINEMIA (HCC): Status: ACTIVE | Noted: 2019-06-25

## 2019-06-25 LAB
ANION GAP SERPL CALCULATED.3IONS-SCNC: 7.8 MMOL/L
BACTERIA SPEC AEROBE CULT: NORMAL
BACTERIA SPEC AEROBE CULT: NORMAL
BASOPHILS # BLD AUTO: 0.02 10*3/MM3 (ref 0–0.2)
BASOPHILS NFR BLD AUTO: 0.8 % (ref 0–1.5)
BUN BLD-MCNC: 25 MG/DL (ref 8–23)
BUN/CREAT SERPL: 23.8 (ref 7–25)
CALCIUM SPEC-SCNC: 8.4 MG/DL (ref 8.6–10.5)
CHLORIDE SERPL-SCNC: 104 MMOL/L (ref 98–107)
CO2 SERPL-SCNC: 33.2 MMOL/L (ref 22–29)
CREAT BLD-MCNC: 1.05 MG/DL (ref 0.76–1.27)
DEPRECATED RDW RBC AUTO: 55.7 FL (ref 37–54)
EOSINOPHIL # BLD AUTO: 0.37 10*3/MM3 (ref 0–0.4)
EOSINOPHIL NFR BLD AUTO: 15.6 % (ref 0.3–6.2)
ERYTHROCYTE [DISTWIDTH] IN BLOOD BY AUTOMATED COUNT: 14 % (ref 12.3–15.4)
GFR SERPL CREATININE-BSD FRML MDRD: 68 ML/MIN/1.73
GLUCOSE BLD-MCNC: 90 MG/DL (ref 65–99)
GLUCOSE BLDC GLUCOMTR-MCNC: 154 MG/DL (ref 70–130)
GLUCOSE BLDC GLUCOMTR-MCNC: 157 MG/DL (ref 70–130)
GLUCOSE BLDC GLUCOMTR-MCNC: 225 MG/DL (ref 70–130)
HCT VFR BLD AUTO: 29.3 % (ref 37.5–51)
HGB BLD-MCNC: 9.1 G/DL (ref 13–17.7)
LYMPHOCYTES # BLD AUTO: 0.49 10*3/MM3 (ref 0.7–3.1)
LYMPHOCYTES NFR BLD AUTO: 20.7 % (ref 19.6–45.3)
MCH RBC QN AUTO: 33.6 PG (ref 26.6–33)
MCHC RBC AUTO-ENTMCNC: 31.1 G/DL (ref 31.5–35.7)
MCV RBC AUTO: 108.1 FL (ref 79–97)
MONOCYTES # BLD AUTO: 0.28 10*3/MM3 (ref 0.1–0.9)
MONOCYTES NFR BLD AUTO: 11.8 % (ref 5–12)
NEUTROPHILS # BLD AUTO: 1.13 10*3/MM3 (ref 1.7–7)
NEUTROPHILS NFR BLD AUTO: 47.7 % (ref 42.7–76)
NT-PROBNP SERPL-MCNC: 2318 PG/ML (ref 5–1800)
PLATELET # BLD AUTO: 149 10*3/MM3 (ref 140–450)
PMV BLD AUTO: 11.1 FL (ref 6–12)
POTASSIUM BLD-SCNC: 4.3 MMOL/L (ref 3.5–5.2)
RBC # BLD AUTO: 2.71 10*6/MM3 (ref 4.14–5.8)
SODIUM BLD-SCNC: 145 MMOL/L (ref 136–145)
TROPONIN T SERPL-MCNC: 0.05 NG/ML (ref 0–0.03)
WBC NRBC COR # BLD: 2.37 10*3/MM3 (ref 3.4–10.8)

## 2019-06-25 PROCEDURE — 99232 SBSQ HOSP IP/OBS MODERATE 35: CPT | Performed by: NURSE PRACTITIONER

## 2019-06-25 PROCEDURE — 82962 GLUCOSE BLOOD TEST: CPT

## 2019-06-25 PROCEDURE — 94799 UNLISTED PULMONARY SVC/PX: CPT

## 2019-06-25 PROCEDURE — 25010000002 IMMUNE GLOBULIN (HUMAN) 40 GM/400ML SOLUTION: Performed by: INTERNAL MEDICINE

## 2019-06-25 PROCEDURE — 63710000001 INSULIN GLARGINE PER 5 UNITS: Performed by: INTERNAL MEDICINE

## 2019-06-25 PROCEDURE — 25010000002 CEFEPIME PER 500 MG: Performed by: INTERNAL MEDICINE

## 2019-06-25 PROCEDURE — 63710000001 INSULIN LISPRO (HUMAN) PER 5 UNITS: Performed by: INTERNAL MEDICINE

## 2019-06-25 PROCEDURE — 84484 ASSAY OF TROPONIN QUANT: CPT | Performed by: INTERNAL MEDICINE

## 2019-06-25 PROCEDURE — 99223 1ST HOSP IP/OBS HIGH 75: CPT | Performed by: INTERNAL MEDICINE

## 2019-06-25 PROCEDURE — 83880 ASSAY OF NATRIURETIC PEPTIDE: CPT | Performed by: NURSE PRACTITIONER

## 2019-06-25 PROCEDURE — 25010000002 FUROSEMIDE PER 20 MG: Performed by: INTERNAL MEDICINE

## 2019-06-25 PROCEDURE — 71045 X-RAY EXAM CHEST 1 VIEW: CPT

## 2019-06-25 PROCEDURE — 80048 BASIC METABOLIC PNL TOTAL CA: CPT | Performed by: NURSE PRACTITIONER

## 2019-06-25 PROCEDURE — 85025 COMPLETE CBC W/AUTO DIFF WBC: CPT | Performed by: NURSE PRACTITIONER

## 2019-06-25 RX ORDER — FAMOTIDINE 10 MG/ML
20 INJECTION, SOLUTION INTRAVENOUS ONCE
Status: COMPLETED | OUTPATIENT
Start: 2019-06-25 | End: 2019-06-25

## 2019-06-25 RX ORDER — ACETAMINOPHEN 325 MG/1
650 TABLET ORAL DAILY
Status: COMPLETED | OUTPATIENT
Start: 2019-06-25 | End: 2019-06-25

## 2019-06-25 RX ADMIN — CEFEPIME HYDROCHLORIDE 2 G: 2 INJECTION, POWDER, FOR SOLUTION INTRAVENOUS at 02:35

## 2019-06-25 RX ADMIN — INSULIN GLARGINE 15 UNITS: 100 INJECTION, SOLUTION SUBCUTANEOUS at 21:02

## 2019-06-25 RX ADMIN — METOPROLOL TARTRATE 100 MG: 100 TABLET, FILM COATED ORAL at 09:59

## 2019-06-25 RX ADMIN — DILTIAZEM HYDROCHLORIDE 360 MG: 180 CAPSULE, COATED, EXTENDED RELEASE ORAL at 09:59

## 2019-06-25 RX ADMIN — CEFEPIME HYDROCHLORIDE 2 G: 2 INJECTION, POWDER, FOR SOLUTION INTRAVENOUS at 10:00

## 2019-06-25 RX ADMIN — FOLIC ACID 1 MG: 1 TABLET ORAL at 09:59

## 2019-06-25 RX ADMIN — IPRATROPIUM BROMIDE AND ALBUTEROL SULFATE 3 ML: 2.5; .5 SOLUTION RESPIRATORY (INHALATION) at 15:44

## 2019-06-25 RX ADMIN — APIXABAN 5 MG: 5 TABLET, FILM COATED ORAL at 09:59

## 2019-06-25 RX ADMIN — INSULIN LISPRO 3 UNITS: 100 INJECTION, SOLUTION INTRAVENOUS; SUBCUTANEOUS at 12:17

## 2019-06-25 RX ADMIN — IPRATROPIUM BROMIDE AND ALBUTEROL SULFATE 3 ML: 2.5; .5 SOLUTION RESPIRATORY (INHALATION) at 07:46

## 2019-06-25 RX ADMIN — IPRATROPIUM BROMIDE AND ALBUTEROL SULFATE 3 ML: 2.5; .5 SOLUTION RESPIRATORY (INHALATION) at 04:19

## 2019-06-25 RX ADMIN — FUROSEMIDE 40 MG: 10 INJECTION, SOLUTION INTRAMUSCULAR; INTRAVENOUS at 10:00

## 2019-06-25 RX ADMIN — POLYETHYLENE GLYCOL 3350 17 G: 17 POWDER, FOR SOLUTION ORAL at 10:00

## 2019-06-25 RX ADMIN — FAMOTIDINE 20 MG: 10 INJECTION INTRAVENOUS at 23:05

## 2019-06-25 RX ADMIN — CEFEPIME HYDROCHLORIDE 2 G: 2 INJECTION, POWDER, FOR SOLUTION INTRAVENOUS at 18:06

## 2019-06-25 RX ADMIN — ACETAMINOPHEN 650 MG: 325 TABLET, FILM COATED ORAL at 23:05

## 2019-06-25 RX ADMIN — ALPRAZOLAM 1 MG: 0.5 TABLET ORAL at 23:20

## 2019-06-25 RX ADMIN — FUROSEMIDE 40 MG: 10 INJECTION, SOLUTION INTRAMUSCULAR; INTRAVENOUS at 18:06

## 2019-06-25 RX ADMIN — BUDESONIDE 0.5 MG: 0.5 INHALANT RESPIRATORY (INHALATION) at 22:09

## 2019-06-25 RX ADMIN — METOPROLOL TARTRATE 100 MG: 100 TABLET, FILM COATED ORAL at 21:02

## 2019-06-25 RX ADMIN — ATORVASTATIN CALCIUM 10 MG: 10 TABLET, FILM COATED ORAL at 09:59

## 2019-06-25 RX ADMIN — INSULIN LISPRO 5 UNITS: 100 INJECTION, SOLUTION INTRAVENOUS; SUBCUTANEOUS at 21:02

## 2019-06-25 RX ADMIN — INSULIN LISPRO 3 UNITS: 100 INJECTION, SOLUTION INTRAVENOUS; SUBCUTANEOUS at 18:06

## 2019-06-25 RX ADMIN — IPRATROPIUM BROMIDE AND ALBUTEROL SULFATE 3 ML: 2.5; .5 SOLUTION RESPIRATORY (INHALATION) at 22:09

## 2019-06-25 RX ADMIN — IMMUNE GLOBULIN (HUMAN) 40 G: 10 INJECTION INTRAVENOUS; SUBCUTANEOUS at 23:07

## 2019-06-25 RX ADMIN — BUDESONIDE 0.5 MG: 0.5 INHALANT RESPIRATORY (INHALATION) at 07:46

## 2019-06-25 RX ADMIN — IPRATROPIUM BROMIDE AND ALBUTEROL SULFATE 3 ML: 2.5; .5 SOLUTION RESPIRATORY (INHALATION) at 11:48

## 2019-06-26 ENCOUNTER — APPOINTMENT (OUTPATIENT)
Dept: CT IMAGING | Facility: HOSPITAL | Age: 79
End: 2019-06-26

## 2019-06-26 LAB
ANION GAP SERPL CALCULATED.3IONS-SCNC: 8.9 MMOL/L
APTT PPP: 33.3 SECONDS (ref 22.7–35.4)
BUN BLD-MCNC: 27 MG/DL (ref 8–23)
BUN/CREAT SERPL: 24.8 (ref 7–25)
CALCIUM SPEC-SCNC: 8.1 MG/DL (ref 8.6–10.5)
CHLORIDE SERPL-SCNC: 99 MMOL/L (ref 98–107)
CO2 SERPL-SCNC: 33.1 MMOL/L (ref 22–29)
CREAT BLD-MCNC: 1.09 MG/DL (ref 0.76–1.27)
DEPRECATED RDW RBC AUTO: 53.2 FL (ref 37–54)
EOSINOPHIL # BLD MANUAL: 0.62 10*3/MM3 (ref 0–0.4)
EOSINOPHIL NFR BLD MANUAL: 21 % (ref 0.3–6.2)
ERYTHROCYTE [DISTWIDTH] IN BLOOD BY AUTOMATED COUNT: 14.2 % (ref 12.3–15.4)
GFR SERPL CREATININE-BSD FRML MDRD: 65 ML/MIN/1.73
GLUCOSE BLD-MCNC: 88 MG/DL (ref 65–99)
GLUCOSE BLDC GLUCOMTR-MCNC: 109 MG/DL (ref 70–130)
GLUCOSE BLDC GLUCOMTR-MCNC: 169 MG/DL (ref 70–130)
GLUCOSE BLDC GLUCOMTR-MCNC: 173 MG/DL (ref 70–130)
GLUCOSE BLDC GLUCOMTR-MCNC: 200 MG/DL (ref 70–130)
HCT VFR BLD AUTO: 26.4 % (ref 37.5–51)
HGB BLD-MCNC: 8.8 G/DL (ref 13–17.7)
INR PPP: 1.24 (ref 0.9–1.1)
LYMPHOCYTES # BLD MANUAL: 0.5 10*3/MM3 (ref 0.7–3.1)
LYMPHOCYTES NFR BLD MANUAL: 17 % (ref 19.6–45.3)
LYMPHOCYTES NFR BLD MANUAL: 17 % (ref 5–12)
MCH RBC QN AUTO: 34.4 PG (ref 26.6–33)
MCHC RBC AUTO-ENTMCNC: 33.3 G/DL (ref 31.5–35.7)
MCV RBC AUTO: 103.1 FL (ref 79–97)
MONOCYTES # BLD AUTO: 0.5 10*3/MM3 (ref 0.1–0.9)
NEUTROPHILS # BLD AUTO: 1.32 10*3/MM3 (ref 1.7–7)
NEUTROPHILS NFR BLD MANUAL: 45 % (ref 42.7–76)
NRBC BLD AUTO-RTO: 0.9 /100 WBC (ref 0–0.2)
NT-PROBNP SERPL-MCNC: 2423 PG/ML (ref 5–1800)
PLAT MORPH BLD: NORMAL
PLATELET # BLD AUTO: 146 10*3/MM3 (ref 140–450)
PMV BLD AUTO: 10.9 FL (ref 6–12)
POTASSIUM BLD-SCNC: 3.8 MMOL/L (ref 3.5–5.2)
PROCALCITONIN SERPL-MCNC: 0.15 NG/ML (ref 0.1–0.25)
PROTHROMBIN TIME: 15.3 SECONDS (ref 11.7–14.2)
RBC # BLD AUTO: 2.56 10*6/MM3 (ref 4.14–5.8)
RBC MORPH BLD: NORMAL
SODIUM BLD-SCNC: 141 MMOL/L (ref 136–145)
WBC MORPH BLD: NORMAL
WBC NRBC COR # BLD: 2.94 10*3/MM3 (ref 3.4–10.8)

## 2019-06-26 PROCEDURE — 99223 1ST HOSP IP/OBS HIGH 75: CPT | Performed by: INTERNAL MEDICINE

## 2019-06-26 PROCEDURE — 85610 PROTHROMBIN TIME: CPT | Performed by: INTERNAL MEDICINE

## 2019-06-26 PROCEDURE — 82962 GLUCOSE BLOOD TEST: CPT

## 2019-06-26 PROCEDURE — 83880 ASSAY OF NATRIURETIC PEPTIDE: CPT | Performed by: NURSE PRACTITIONER

## 2019-06-26 PROCEDURE — 82525 ASSAY OF COPPER: CPT | Performed by: INTERNAL MEDICINE

## 2019-06-26 PROCEDURE — 63710000001 INSULIN GLARGINE PER 5 UNITS: Performed by: INTERNAL MEDICINE

## 2019-06-26 PROCEDURE — 25010000002 CEFEPIME PER 500 MG: Performed by: INTERNAL MEDICINE

## 2019-06-26 PROCEDURE — 94799 UNLISTED PULMONARY SVC/PX: CPT

## 2019-06-26 PROCEDURE — 88313 SPECIAL STAINS GROUP 2: CPT

## 2019-06-26 PROCEDURE — 80048 BASIC METABOLIC PNL TOTAL CA: CPT | Performed by: NURSE PRACTITIONER

## 2019-06-26 PROCEDURE — 84630 ASSAY OF ZINC: CPT | Performed by: INTERNAL MEDICINE

## 2019-06-26 PROCEDURE — 88185 FLOWCYTOMETRY/TC ADD-ON: CPT

## 2019-06-26 PROCEDURE — 88323 CONSLTJ&REPRT MATRL PREP SLD: CPT

## 2019-06-26 PROCEDURE — 88311 DECALCIFY TISSUE: CPT | Performed by: INTERNAL MEDICINE

## 2019-06-26 PROCEDURE — 88184 FLOWCYTOMETRY/ TC 1 MARKER: CPT

## 2019-06-26 PROCEDURE — 85025 COMPLETE CBC W/AUTO DIFF WBC: CPT | Performed by: INTERNAL MEDICINE

## 2019-06-26 PROCEDURE — 85007 BL SMEAR W/DIFF WBC COUNT: CPT | Performed by: INTERNAL MEDICINE

## 2019-06-26 PROCEDURE — 82668 ASSAY OF ERYTHROPOIETIN: CPT | Performed by: INTERNAL MEDICINE

## 2019-06-26 PROCEDURE — 88342 IMHCHEM/IMCYTCHM 1ST ANTB: CPT

## 2019-06-26 PROCEDURE — 85730 THROMBOPLASTIN TIME PARTIAL: CPT | Performed by: INTERNAL MEDICINE

## 2019-06-26 PROCEDURE — 88182 CELL MARKER STUDY: CPT

## 2019-06-26 PROCEDURE — 25010000002 FUROSEMIDE PER 20 MG: Performed by: INTERNAL MEDICINE

## 2019-06-26 PROCEDURE — 88305 TISSUE EXAM BY PATHOLOGIST: CPT | Performed by: INTERNAL MEDICINE

## 2019-06-26 PROCEDURE — 94640 AIRWAY INHALATION TREATMENT: CPT

## 2019-06-26 PROCEDURE — 77012 CT SCAN FOR NEEDLE BIOPSY: CPT

## 2019-06-26 PROCEDURE — 88313 SPECIAL STAINS GROUP 2: CPT | Performed by: INTERNAL MEDICINE

## 2019-06-26 PROCEDURE — 07DR3ZX EXTRACTION OF ILIAC BONE MARROW, PERCUTANEOUS APPROACH, DIAGNOSTIC: ICD-10-PCS | Performed by: PATHOLOGY

## 2019-06-26 PROCEDURE — 99232 SBSQ HOSP IP/OBS MODERATE 35: CPT | Performed by: INTERNAL MEDICINE

## 2019-06-26 PROCEDURE — 84145 PROCALCITONIN (PCT): CPT | Performed by: INTERNAL MEDICINE

## 2019-06-26 PROCEDURE — 63710000001 INSULIN LISPRO (HUMAN) PER 5 UNITS: Performed by: INTERNAL MEDICINE

## 2019-06-26 RX ORDER — LIDOCAINE HYDROCHLORIDE 10 MG/ML
20 INJECTION, SOLUTION INFILTRATION; PERINEURAL ONCE
Status: COMPLETED | OUTPATIENT
Start: 2019-06-26 | End: 2019-06-26

## 2019-06-26 RX ORDER — FUROSEMIDE 40 MG/1
40 TABLET ORAL
Status: DISCONTINUED | OUTPATIENT
Start: 2019-06-26 | End: 2019-06-27

## 2019-06-26 RX ADMIN — INSULIN LISPRO 5 UNITS: 100 INJECTION, SOLUTION INTRAVENOUS; SUBCUTANEOUS at 12:55

## 2019-06-26 RX ADMIN — IPRATROPIUM BROMIDE AND ALBUTEROL SULFATE 3 ML: 2.5; .5 SOLUTION RESPIRATORY (INHALATION) at 19:49

## 2019-06-26 RX ADMIN — ATORVASTATIN CALCIUM 10 MG: 10 TABLET, FILM COATED ORAL at 08:18

## 2019-06-26 RX ADMIN — INSULIN LISPRO 3 UNITS: 100 INJECTION, SOLUTION INTRAVENOUS; SUBCUTANEOUS at 21:21

## 2019-06-26 RX ADMIN — BUDESONIDE 0.5 MG: 0.5 INHALANT RESPIRATORY (INHALATION) at 19:49

## 2019-06-26 RX ADMIN — IPRATROPIUM BROMIDE AND ALBUTEROL SULFATE 3 ML: 2.5; .5 SOLUTION RESPIRATORY (INHALATION) at 00:41

## 2019-06-26 RX ADMIN — INSULIN GLARGINE 15 UNITS: 100 INJECTION, SOLUTION SUBCUTANEOUS at 21:22

## 2019-06-26 RX ADMIN — IPRATROPIUM BROMIDE AND ALBUTEROL SULFATE 3 ML: 2.5; .5 SOLUTION RESPIRATORY (INHALATION) at 11:15

## 2019-06-26 RX ADMIN — FUROSEMIDE 40 MG: 40 TABLET ORAL at 18:42

## 2019-06-26 RX ADMIN — METOPROLOL TARTRATE 100 MG: 100 TABLET, FILM COATED ORAL at 21:21

## 2019-06-26 RX ADMIN — FUROSEMIDE 40 MG: 10 INJECTION, SOLUTION INTRAMUSCULAR; INTRAVENOUS at 08:17

## 2019-06-26 RX ADMIN — BUDESONIDE 0.5 MG: 0.5 INHALANT RESPIRATORY (INHALATION) at 07:45

## 2019-06-26 RX ADMIN — DILTIAZEM HYDROCHLORIDE 360 MG: 180 CAPSULE, COATED, EXTENDED RELEASE ORAL at 08:18

## 2019-06-26 RX ADMIN — APIXABAN 5 MG: 5 TABLET, FILM COATED ORAL at 21:21

## 2019-06-26 RX ADMIN — IPRATROPIUM BROMIDE AND ALBUTEROL SULFATE 3 ML: 2.5; .5 SOLUTION RESPIRATORY (INHALATION) at 07:45

## 2019-06-26 RX ADMIN — FOLIC ACID 1 MG: 1 TABLET ORAL at 08:18

## 2019-06-26 RX ADMIN — LIDOCAINE HYDROCHLORIDE 20 ML: 10 INJECTION, SOLUTION INFILTRATION; PERINEURAL at 17:04

## 2019-06-26 RX ADMIN — IPRATROPIUM BROMIDE AND ALBUTEROL SULFATE 3 ML: 2.5; .5 SOLUTION RESPIRATORY (INHALATION) at 23:52

## 2019-06-26 RX ADMIN — METOPROLOL TARTRATE 100 MG: 100 TABLET, FILM COATED ORAL at 08:17

## 2019-06-26 RX ADMIN — ALPRAZOLAM 1 MG: 0.5 TABLET ORAL at 23:13

## 2019-06-26 RX ADMIN — POLYETHYLENE GLYCOL 3350 17 G: 17 POWDER, FOR SOLUTION ORAL at 08:17

## 2019-06-26 RX ADMIN — CEFEPIME HYDROCHLORIDE 2 G: 2 INJECTION, POWDER, FOR SOLUTION INTRAVENOUS at 02:53

## 2019-06-27 ENCOUNTER — APPOINTMENT (OUTPATIENT)
Dept: GENERAL RADIOLOGY | Facility: HOSPITAL | Age: 79
End: 2019-06-27

## 2019-06-27 ENCOUNTER — TELEPHONE (OUTPATIENT)
Dept: ONCOLOGY | Facility: HOSPITAL | Age: 79
End: 2019-06-27

## 2019-06-27 LAB
ANION GAP SERPL CALCULATED.3IONS-SCNC: 8 MMOL/L (ref 5–15)
BASOPHILS # BLD AUTO: 0.02 10*3/MM3 (ref 0–0.2)
BASOPHILS NFR BLD AUTO: 0.7 % (ref 0–1.5)
BUN BLD-MCNC: 25 MG/DL (ref 8–23)
BUN/CREAT SERPL: 25.3 (ref 7–25)
CALCIUM SPEC-SCNC: 8.2 MG/DL (ref 8.6–10.5)
CHLORIDE SERPL-SCNC: 98 MMOL/L (ref 98–107)
CO2 SERPL-SCNC: 33 MMOL/L (ref 22–29)
CREAT BLD-MCNC: 0.99 MG/DL (ref 0.76–1.27)
DEPRECATED RDW RBC AUTO: 53.4 FL (ref 37–54)
EOSINOPHIL # BLD AUTO: 0.49 10*3/MM3 (ref 0–0.4)
EOSINOPHIL NFR BLD AUTO: 18.3 % (ref 0.3–6.2)
ERYTHROCYTE [DISTWIDTH] IN BLOOD BY AUTOMATED COUNT: 14.2 % (ref 12.3–15.4)
ETHNIC BACKGROUND STATED: 34.9 MIU/ML (ref 2.6–18.5)
GFR SERPL CREATININE-BSD FRML MDRD: 73 ML/MIN/1.73
GLUCOSE BLD-MCNC: 97 MG/DL (ref 65–99)
GLUCOSE BLDC GLUCOMTR-MCNC: 104 MG/DL (ref 70–130)
GLUCOSE BLDC GLUCOMTR-MCNC: 117 MG/DL (ref 70–130)
GLUCOSE BLDC GLUCOMTR-MCNC: 159 MG/DL (ref 70–130)
GLUCOSE BLDC GLUCOMTR-MCNC: 182 MG/DL (ref 70–130)
HCT VFR BLD AUTO: 25.7 % (ref 37.5–51)
HGB BLD-MCNC: 8.5 G/DL (ref 13–17.7)
HIV1+2 AB SER QL: NORMAL
IMM GRANULOCYTES # BLD AUTO: 0.06 10*3/MM3 (ref 0–0.05)
IMM GRANULOCYTES NFR BLD AUTO: 2.2 % (ref 0–0.5)
LYMPHOCYTES # BLD AUTO: 0.64 10*3/MM3 (ref 0.7–3.1)
LYMPHOCYTES NFR BLD AUTO: 23.9 % (ref 19.6–45.3)
MCH RBC QN AUTO: 34 PG (ref 26.6–33)
MCHC RBC AUTO-ENTMCNC: 33.1 G/DL (ref 31.5–35.7)
MCV RBC AUTO: 102.8 FL (ref 79–97)
MONOCYTES # BLD AUTO: 0.41 10*3/MM3 (ref 0.1–0.9)
MONOCYTES NFR BLD AUTO: 15.3 % (ref 5–12)
NEUTROPHILS # BLD AUTO: 1.12 10*3/MM3 (ref 1.7–7)
NEUTROPHILS NFR BLD AUTO: 41.8 % (ref 42.7–76)
NRBC BLD AUTO-RTO: 1 /100 WBC (ref 0–0.2)
PLATELET # BLD AUTO: 153 10*3/MM3 (ref 140–450)
PMV BLD AUTO: 11.4 FL (ref 6–12)
POTASSIUM BLD-SCNC: 3.7 MMOL/L (ref 3.5–5.2)
RBC # BLD AUTO: 2.5 10*6/MM3 (ref 4.14–5.8)
SODIUM BLD-SCNC: 139 MMOL/L (ref 136–145)
WBC NRBC COR # BLD: 2.68 10*3/MM3 (ref 3.4–10.8)

## 2019-06-27 PROCEDURE — 80048 BASIC METABOLIC PNL TOTAL CA: CPT | Performed by: NURSE PRACTITIONER

## 2019-06-27 PROCEDURE — 63710000001 INSULIN LISPRO (HUMAN) PER 5 UNITS: Performed by: INTERNAL MEDICINE

## 2019-06-27 PROCEDURE — 99232 SBSQ HOSP IP/OBS MODERATE 35: CPT | Performed by: INTERNAL MEDICINE

## 2019-06-27 PROCEDURE — 94799 UNLISTED PULMONARY SVC/PX: CPT

## 2019-06-27 PROCEDURE — 97110 THERAPEUTIC EXERCISES: CPT

## 2019-06-27 PROCEDURE — 63710000001 INSULIN GLARGINE PER 5 UNITS: Performed by: INTERNAL MEDICINE

## 2019-06-27 PROCEDURE — 82962 GLUCOSE BLOOD TEST: CPT

## 2019-06-27 PROCEDURE — G0432 EIA HIV-1/HIV-2 SCREEN: HCPCS | Performed by: INTERNAL MEDICINE

## 2019-06-27 PROCEDURE — 71045 X-RAY EXAM CHEST 1 VIEW: CPT

## 2019-06-27 PROCEDURE — 97162 PT EVAL MOD COMPLEX 30 MIN: CPT

## 2019-06-27 PROCEDURE — 85025 COMPLETE CBC W/AUTO DIFF WBC: CPT | Performed by: INTERNAL MEDICINE

## 2019-06-27 RX ORDER — FUROSEMIDE 40 MG/1
40 TABLET ORAL DAILY
Status: DISCONTINUED | OUTPATIENT
Start: 2019-06-28 | End: 2019-06-28

## 2019-06-27 RX ADMIN — FOLIC ACID 1 MG: 1 TABLET ORAL at 12:14

## 2019-06-27 RX ADMIN — ATORVASTATIN CALCIUM 10 MG: 10 TABLET, FILM COATED ORAL at 12:14

## 2019-06-27 RX ADMIN — INSULIN GLARGINE 15 UNITS: 100 INJECTION, SOLUTION SUBCUTANEOUS at 21:19

## 2019-06-27 RX ADMIN — BUDESONIDE 0.5 MG: 0.5 INHALANT RESPIRATORY (INHALATION) at 07:03

## 2019-06-27 RX ADMIN — METOPROLOL TARTRATE 100 MG: 100 TABLET, FILM COATED ORAL at 20:32

## 2019-06-27 RX ADMIN — APIXABAN 5 MG: 5 TABLET, FILM COATED ORAL at 12:14

## 2019-06-27 RX ADMIN — ALPRAZOLAM 1 MG: 0.5 TABLET ORAL at 23:53

## 2019-06-27 RX ADMIN — IPRATROPIUM BROMIDE AND ALBUTEROL SULFATE 3 ML: 2.5; .5 SOLUTION RESPIRATORY (INHALATION) at 11:22

## 2019-06-27 RX ADMIN — INSULIN LISPRO 3 UNITS: 100 INJECTION, SOLUTION INTRAVENOUS; SUBCUTANEOUS at 21:19

## 2019-06-27 RX ADMIN — METOPROLOL TARTRATE 100 MG: 100 TABLET, FILM COATED ORAL at 12:14

## 2019-06-27 RX ADMIN — DILTIAZEM HYDROCHLORIDE 360 MG: 180 CAPSULE, COATED, EXTENDED RELEASE ORAL at 12:13

## 2019-06-27 RX ADMIN — POLYETHYLENE GLYCOL 3350 17 G: 17 POWDER, FOR SOLUTION ORAL at 12:14

## 2019-06-27 RX ADMIN — INSULIN LISPRO 3 UNITS: 100 INJECTION, SOLUTION INTRAVENOUS; SUBCUTANEOUS at 12:13

## 2019-06-27 RX ADMIN — IPRATROPIUM BROMIDE AND ALBUTEROL SULFATE 3 ML: 2.5; .5 SOLUTION RESPIRATORY (INHALATION) at 07:02

## 2019-06-27 RX ADMIN — IPRATROPIUM BROMIDE AND ALBUTEROL SULFATE 3 ML: 2.5; .5 SOLUTION RESPIRATORY (INHALATION) at 14:39

## 2019-06-27 RX ADMIN — IPRATROPIUM BROMIDE AND ALBUTEROL SULFATE 3 ML: 2.5; .5 SOLUTION RESPIRATORY (INHALATION) at 23:30

## 2019-06-27 RX ADMIN — IPRATROPIUM BROMIDE AND ALBUTEROL SULFATE 3 ML: 2.5; .5 SOLUTION RESPIRATORY (INHALATION) at 20:05

## 2019-06-27 RX ADMIN — BUDESONIDE 0.5 MG: 0.5 INHALANT RESPIRATORY (INHALATION) at 20:05

## 2019-06-27 RX ADMIN — APIXABAN 5 MG: 5 TABLET, FILM COATED ORAL at 20:32

## 2019-06-27 NOTE — TELEPHONE ENCOUNTER
----- Message from Essence Carey sent at 6/27/2019  2:23 PM EDT -----  Contact: [phone]  Amanda   Calling about CT scan pt is to have on Monday.

## 2019-06-27 NOTE — TELEPHONE ENCOUNTER
PT'S DGT CALLING W/ SEVERAL QUESTIONS REGARDING PT. PT IS CURRENTLY INPT AT [facility]. ADVISED HER TO ASK THE PT'S NURSE THESE QUESTIONS AND THEY SHOULD BE ABLE TO HELP HER OUT. SHE V/U.

## 2019-06-28 LAB
ANION GAP SERPL CALCULATED.3IONS-SCNC: 6.8 MMOL/L (ref 5–15)
BASOPHILS # BLD MANUAL: 0.07 10*3/MM3 (ref 0–0.2)
BASOPHILS NFR BLD AUTO: 3 % (ref 0–1.5)
BUN BLD-MCNC: 24 MG/DL (ref 8–23)
BUN/CREAT SERPL: 20 (ref 7–25)
CALCIUM SPEC-SCNC: 9 MG/DL (ref 8.6–10.5)
CHLORIDE SERPL-SCNC: 103 MMOL/L (ref 98–107)
CO2 SERPL-SCNC: 33.2 MMOL/L (ref 22–29)
CREAT BLD-MCNC: 1.2 MG/DL (ref 0.76–1.27)
DEPRECATED RDW RBC AUTO: 55 FL (ref 37–54)
EOSINOPHIL # BLD MANUAL: 0.43 10*3/MM3 (ref 0–0.4)
EOSINOPHIL NFR BLD MANUAL: 19 % (ref 0.3–6.2)
ERYTHROCYTE [DISTWIDTH] IN BLOOD BY AUTOMATED COUNT: 14 % (ref 12.3–15.4)
GFR SERPL CREATININE-BSD FRML MDRD: 58 ML/MIN/1.73
GLUCOSE BLD-MCNC: 116 MG/DL (ref 65–99)
GLUCOSE BLDC GLUCOMTR-MCNC: 117 MG/DL (ref 70–130)
GLUCOSE BLDC GLUCOMTR-MCNC: 117 MG/DL (ref 70–130)
GLUCOSE BLDC GLUCOMTR-MCNC: 135 MG/DL (ref 70–130)
GLUCOSE BLDC GLUCOMTR-MCNC: 216 MG/DL (ref 70–130)
HCT VFR BLD AUTO: 30.5 % (ref 37.5–51)
HGB BLD-MCNC: 9.5 G/DL (ref 13–17.7)
HYPOCHROMIA BLD QL: ABNORMAL
LYMPHOCYTES # BLD MANUAL: 0.56 10*3/MM3 (ref 0.7–3.1)
LYMPHOCYTES NFR BLD MANUAL: 17 % (ref 5–12)
LYMPHOCYTES NFR BLD MANUAL: 25 % (ref 19.6–45.3)
MAGNESIUM SERPL-MCNC: 2.1 MG/DL (ref 1.6–2.4)
MCH RBC QN AUTO: 33.1 PG (ref 26.6–33)
MCHC RBC AUTO-ENTMCNC: 31.1 G/DL (ref 31.5–35.7)
MCV RBC AUTO: 106.3 FL (ref 79–97)
MONOCYTES # BLD AUTO: 0.38 10*3/MM3 (ref 0.1–0.9)
NEUTROPHILS # BLD AUTO: 0.81 10*3/MM3 (ref 1.7–7)
NEUTROPHILS NFR BLD MANUAL: 36 % (ref 42.7–76)
NRBC SPEC MANUAL: 8 /100 WBC (ref 0–0.2)
PLAT MORPH BLD: NORMAL
PLATELET # BLD AUTO: 152 10*3/MM3 (ref 140–450)
PMV BLD AUTO: 11.4 FL (ref 6–12)
POTASSIUM BLD-SCNC: 3.9 MMOL/L (ref 3.5–5.2)
POTASSIUM BLD-SCNC: 4.5 MMOL/L (ref 3.5–5.2)
RBC # BLD AUTO: 2.87 10*6/MM3 (ref 4.14–5.8)
SODIUM BLD-SCNC: 143 MMOL/L (ref 136–145)
WBC MORPH BLD: NORMAL
WBC NRBC COR # BLD: 2.25 10*3/MM3 (ref 3.4–10.8)
ZINC BLD-MCNC: 518 UG/DL (ref 440–860)

## 2019-06-28 PROCEDURE — 94799 UNLISTED PULMONARY SVC/PX: CPT

## 2019-06-28 PROCEDURE — 83735 ASSAY OF MAGNESIUM: CPT | Performed by: INTERNAL MEDICINE

## 2019-06-28 PROCEDURE — 80048 BASIC METABOLIC PNL TOTAL CA: CPT | Performed by: NURSE PRACTITIONER

## 2019-06-28 PROCEDURE — 85025 COMPLETE CBC W/AUTO DIFF WBC: CPT | Performed by: INTERNAL MEDICINE

## 2019-06-28 PROCEDURE — 84132 ASSAY OF SERUM POTASSIUM: CPT | Performed by: INTERNAL MEDICINE

## 2019-06-28 PROCEDURE — 99221 1ST HOSP IP/OBS SF/LOW 40: CPT | Performed by: THORACIC SURGERY (CARDIOTHORACIC VASCULAR SURGERY)

## 2019-06-28 PROCEDURE — 63710000001 INSULIN GLARGINE PER 5 UNITS: Performed by: INTERNAL MEDICINE

## 2019-06-28 PROCEDURE — 82962 GLUCOSE BLOOD TEST: CPT

## 2019-06-28 PROCEDURE — 85007 BL SMEAR W/DIFF WBC COUNT: CPT | Performed by: INTERNAL MEDICINE

## 2019-06-28 PROCEDURE — 63710000001 INSULIN LISPRO (HUMAN) PER 5 UNITS: Performed by: INTERNAL MEDICINE

## 2019-06-28 RX ORDER — FUROSEMIDE 20 MG/1
20 TABLET ORAL DAILY
Status: DISCONTINUED | OUTPATIENT
Start: 2019-06-29 | End: 2019-07-01 | Stop reason: HOSPADM

## 2019-06-28 RX ADMIN — METOPROLOL TARTRATE 100 MG: 100 TABLET, FILM COATED ORAL at 20:55

## 2019-06-28 RX ADMIN — IPRATROPIUM BROMIDE AND ALBUTEROL SULFATE 3 ML: 2.5; .5 SOLUTION RESPIRATORY (INHALATION) at 15:10

## 2019-06-28 RX ADMIN — IPRATROPIUM BROMIDE AND ALBUTEROL SULFATE 3 ML: 2.5; .5 SOLUTION RESPIRATORY (INHALATION) at 20:41

## 2019-06-28 RX ADMIN — DILTIAZEM HYDROCHLORIDE 360 MG: 180 CAPSULE, COATED, EXTENDED RELEASE ORAL at 08:53

## 2019-06-28 RX ADMIN — FOLIC ACID 1 MG: 1 TABLET ORAL at 08:53

## 2019-06-28 RX ADMIN — INSULIN LISPRO 5 UNITS: 100 INJECTION, SOLUTION INTRAVENOUS; SUBCUTANEOUS at 20:55

## 2019-06-28 RX ADMIN — BUDESONIDE 0.5 MG: 0.5 INHALANT RESPIRATORY (INHALATION) at 20:45

## 2019-06-28 RX ADMIN — IPRATROPIUM BROMIDE AND ALBUTEROL SULFATE 3 ML: 2.5; .5 SOLUTION RESPIRATORY (INHALATION) at 10:51

## 2019-06-28 RX ADMIN — FUROSEMIDE 40 MG: 40 TABLET ORAL at 08:53

## 2019-06-28 RX ADMIN — SODIUM CHLORIDE, PRESERVATIVE FREE 10 ML: 5 INJECTION INTRAVENOUS at 20:55

## 2019-06-28 RX ADMIN — ATORVASTATIN CALCIUM 10 MG: 10 TABLET, FILM COATED ORAL at 08:53

## 2019-06-28 RX ADMIN — APIXABAN 5 MG: 5 TABLET, FILM COATED ORAL at 20:55

## 2019-06-28 RX ADMIN — IPRATROPIUM BROMIDE AND ALBUTEROL SULFATE 3 ML: 2.5; .5 SOLUTION RESPIRATORY (INHALATION) at 23:20

## 2019-06-28 RX ADMIN — METOPROLOL TARTRATE 100 MG: 100 TABLET, FILM COATED ORAL at 08:53

## 2019-06-28 RX ADMIN — INSULIN GLARGINE 15 UNITS: 100 INJECTION, SOLUTION SUBCUTANEOUS at 20:54

## 2019-06-28 RX ADMIN — IPRATROPIUM BROMIDE AND ALBUTEROL SULFATE 3 ML: 2.5; .5 SOLUTION RESPIRATORY (INHALATION) at 06:51

## 2019-06-28 RX ADMIN — BUDESONIDE 0.5 MG: 0.5 INHALANT RESPIRATORY (INHALATION) at 06:51

## 2019-06-28 RX ADMIN — APIXABAN 5 MG: 5 TABLET, FILM COATED ORAL at 08:53

## 2019-06-28 RX ADMIN — POLYETHYLENE GLYCOL 3350 17 G: 17 POWDER, FOR SOLUTION ORAL at 08:53

## 2019-06-29 LAB
ANION GAP SERPL CALCULATED.3IONS-SCNC: 8.1 MMOL/L (ref 5–15)
BASOPHILS # BLD MANUAL: 0.03 10*3/MM3 (ref 0–0.2)
BASOPHILS NFR BLD AUTO: 1 % (ref 0–1.5)
BUN BLD-MCNC: 24 MG/DL (ref 8–23)
BUN/CREAT SERPL: 27.9 (ref 7–25)
CALCIUM SPEC-SCNC: 8.5 MG/DL (ref 8.6–10.5)
CHLORIDE SERPL-SCNC: 107 MMOL/L (ref 98–107)
CO2 SERPL-SCNC: 29.9 MMOL/L (ref 22–29)
COPPER SERPL-MCNC: 80 UG/DL (ref 72–166)
CREAT BLD-MCNC: 0.86 MG/DL (ref 0.76–1.27)
DEPRECATED RDW RBC AUTO: 55.4 FL (ref 37–54)
EOSINOPHIL # BLD MANUAL: 0.33 10*3/MM3 (ref 0–0.4)
EOSINOPHIL NFR BLD MANUAL: 13 % (ref 0.3–6.2)
ERYTHROCYTE [DISTWIDTH] IN BLOOD BY AUTOMATED COUNT: 14.1 % (ref 12.3–15.4)
GFR SERPL CREATININE-BSD FRML MDRD: 86 ML/MIN/1.73
GLUCOSE BLD-MCNC: 108 MG/DL (ref 65–99)
GLUCOSE BLDC GLUCOMTR-MCNC: 109 MG/DL (ref 70–130)
GLUCOSE BLDC GLUCOMTR-MCNC: 137 MG/DL (ref 70–130)
GLUCOSE BLDC GLUCOMTR-MCNC: 142 MG/DL (ref 70–130)
GLUCOSE BLDC GLUCOMTR-MCNC: 216 MG/DL (ref 70–130)
HCT VFR BLD AUTO: 25.9 % (ref 37.5–51)
HGB BLD-MCNC: 8.4 G/DL (ref 13–17.7)
LYMPHOCYTES # BLD MANUAL: 0.86 10*3/MM3 (ref 0.7–3.1)
LYMPHOCYTES NFR BLD MANUAL: 13 % (ref 5–12)
LYMPHOCYTES NFR BLD MANUAL: 34 % (ref 19.6–45.3)
MCH RBC QN AUTO: 35 PG (ref 26.6–33)
MCHC RBC AUTO-ENTMCNC: 32.4 G/DL (ref 31.5–35.7)
MCV RBC AUTO: 107.9 FL (ref 79–97)
MONOCYTES # BLD AUTO: 0.33 10*3/MM3 (ref 0.1–0.9)
NEUTROPHILS # BLD AUTO: 0.99 10*3/MM3 (ref 1.7–7)
NEUTROPHILS NFR BLD MANUAL: 39 % (ref 42.7–76)
NRBC BLD AUTO-RTO: 0.8 /100 WBC (ref 0–0.2)
PLAT MORPH BLD: NORMAL
PLATELET # BLD AUTO: 139 10*3/MM3 (ref 140–450)
PMV BLD AUTO: 11.8 FL (ref 6–12)
POTASSIUM BLD-SCNC: 4.4 MMOL/L (ref 3.5–5.2)
RBC # BLD AUTO: 2.4 10*6/MM3 (ref 4.14–5.8)
RBC MORPH BLD: NORMAL
SODIUM BLD-SCNC: 145 MMOL/L (ref 136–145)
WBC MORPH BLD: NORMAL
WBC NRBC COR # BLD: 2.53 10*3/MM3 (ref 3.4–10.8)

## 2019-06-29 PROCEDURE — 85007 BL SMEAR W/DIFF WBC COUNT: CPT | Performed by: INTERNAL MEDICINE

## 2019-06-29 PROCEDURE — 82962 GLUCOSE BLOOD TEST: CPT

## 2019-06-29 PROCEDURE — 85025 COMPLETE CBC W/AUTO DIFF WBC: CPT | Performed by: INTERNAL MEDICINE

## 2019-06-29 PROCEDURE — 94799 UNLISTED PULMONARY SVC/PX: CPT

## 2019-06-29 PROCEDURE — 63710000001 INSULIN GLARGINE PER 5 UNITS: Performed by: INTERNAL MEDICINE

## 2019-06-29 PROCEDURE — 63710000001 INSULIN LISPRO (HUMAN) PER 5 UNITS: Performed by: INTERNAL MEDICINE

## 2019-06-29 PROCEDURE — 80048 BASIC METABOLIC PNL TOTAL CA: CPT | Performed by: NURSE PRACTITIONER

## 2019-06-29 RX ADMIN — BUDESONIDE 0.5 MG: 0.5 INHALANT RESPIRATORY (INHALATION) at 19:26

## 2019-06-29 RX ADMIN — FOLIC ACID 1 MG: 1 TABLET ORAL at 08:03

## 2019-06-29 RX ADMIN — METOPROLOL TARTRATE 100 MG: 100 TABLET, FILM COATED ORAL at 08:03

## 2019-06-29 RX ADMIN — INSULIN GLARGINE 15 UNITS: 100 INJECTION, SOLUTION SUBCUTANEOUS at 21:35

## 2019-06-29 RX ADMIN — METOPROLOL TARTRATE 100 MG: 100 TABLET, FILM COATED ORAL at 21:37

## 2019-06-29 RX ADMIN — APIXABAN 5 MG: 5 TABLET, FILM COATED ORAL at 21:36

## 2019-06-29 RX ADMIN — IPRATROPIUM BROMIDE AND ALBUTEROL SULFATE 3 ML: 2.5; .5 SOLUTION RESPIRATORY (INHALATION) at 19:27

## 2019-06-29 RX ADMIN — ATORVASTATIN CALCIUM 10 MG: 10 TABLET, FILM COATED ORAL at 08:03

## 2019-06-29 RX ADMIN — ALPRAZOLAM 1 MG: 0.5 TABLET ORAL at 00:13

## 2019-06-29 RX ADMIN — INSULIN LISPRO 5 UNITS: 100 INJECTION, SOLUTION INTRAVENOUS; SUBCUTANEOUS at 21:36

## 2019-06-29 RX ADMIN — BUDESONIDE 0.5 MG: 0.5 INHALANT RESPIRATORY (INHALATION) at 06:57

## 2019-06-29 RX ADMIN — APIXABAN 5 MG: 5 TABLET, FILM COATED ORAL at 08:03

## 2019-06-29 RX ADMIN — IPRATROPIUM BROMIDE AND ALBUTEROL SULFATE 3 ML: 2.5; .5 SOLUTION RESPIRATORY (INHALATION) at 16:06

## 2019-06-29 RX ADMIN — DILTIAZEM HYDROCHLORIDE 360 MG: 180 CAPSULE, COATED, EXTENDED RELEASE ORAL at 08:03

## 2019-06-29 RX ADMIN — IPRATROPIUM BROMIDE AND ALBUTEROL SULFATE 3 ML: 2.5; .5 SOLUTION RESPIRATORY (INHALATION) at 06:51

## 2019-06-29 RX ADMIN — IPRATROPIUM BROMIDE AND ALBUTEROL SULFATE 3 ML: 2.5; .5 SOLUTION RESPIRATORY (INHALATION) at 11:03

## 2019-06-29 RX ADMIN — ALPRAZOLAM 1 MG: 0.5 TABLET ORAL at 23:16

## 2019-06-29 RX ADMIN — FUROSEMIDE 20 MG: 20 TABLET ORAL at 08:03

## 2019-06-29 RX ADMIN — POLYETHYLENE GLYCOL 3350 17 G: 17 POWDER, FOR SOLUTION ORAL at 08:03

## 2019-06-30 ENCOUNTER — APPOINTMENT (OUTPATIENT)
Dept: GENERAL RADIOLOGY | Facility: HOSPITAL | Age: 79
End: 2019-06-30

## 2019-06-30 LAB
ANION GAP SERPL CALCULATED.3IONS-SCNC: 8.6 MMOL/L (ref 5–15)
BASOPHILS # BLD AUTO: 0.02 10*3/MM3 (ref 0–0.2)
BASOPHILS NFR BLD AUTO: 0.8 % (ref 0–1.5)
BUN BLD-MCNC: 24 MG/DL (ref 8–23)
BUN/CREAT SERPL: 26.1 (ref 7–25)
CALCIUM SPEC-SCNC: 8.7 MG/DL (ref 8.6–10.5)
CHLORIDE SERPL-SCNC: 106 MMOL/L (ref 98–107)
CO2 SERPL-SCNC: 29.4 MMOL/L (ref 22–29)
CREAT BLD-MCNC: 0.92 MG/DL (ref 0.76–1.27)
DEPRECATED RDW RBC AUTO: 53.9 FL (ref 37–54)
EOSINOPHIL # BLD AUTO: 0.18 10*3/MM3 (ref 0–0.4)
EOSINOPHIL NFR BLD AUTO: 7.6 % (ref 0.3–6.2)
ERYTHROCYTE [DISTWIDTH] IN BLOOD BY AUTOMATED COUNT: 13.8 % (ref 12.3–15.4)
GFR SERPL CREATININE-BSD FRML MDRD: 79 ML/MIN/1.73
GLUCOSE BLD-MCNC: 103 MG/DL (ref 65–99)
GLUCOSE BLDC GLUCOMTR-MCNC: 111 MG/DL (ref 70–130)
GLUCOSE BLDC GLUCOMTR-MCNC: 125 MG/DL (ref 70–130)
GLUCOSE BLDC GLUCOMTR-MCNC: 160 MG/DL (ref 70–130)
GLUCOSE BLDC GLUCOMTR-MCNC: 186 MG/DL (ref 70–130)
HCT VFR BLD AUTO: 26.2 % (ref 37.5–51)
HGB BLD-MCNC: 8.5 G/DL (ref 13–17.7)
IMM GRANULOCYTES # BLD AUTO: 0.14 10*3/MM3 (ref 0–0.05)
IMM GRANULOCYTES NFR BLD AUTO: 5.9 % (ref 0–0.5)
LYMPHOCYTES # BLD AUTO: 0.72 10*3/MM3 (ref 0.7–3.1)
LYMPHOCYTES NFR BLD AUTO: 30.5 % (ref 19.6–45.3)
MCH RBC QN AUTO: 34.1 PG (ref 26.6–33)
MCHC RBC AUTO-ENTMCNC: 32.4 G/DL (ref 31.5–35.7)
MCV RBC AUTO: 105.2 FL (ref 79–97)
MONOCYTES # BLD AUTO: 0.59 10*3/MM3 (ref 0.1–0.9)
MONOCYTES NFR BLD AUTO: 25 % (ref 5–12)
NEUTROPHILS # BLD AUTO: 0.71 10*3/MM3 (ref 1.7–7)
NEUTROPHILS NFR BLD AUTO: 30.2 % (ref 42.7–76)
NRBC BLD AUTO-RTO: 1.3 /100 WBC (ref 0–0.2)
PLATELET # BLD AUTO: 142 10*3/MM3 (ref 140–450)
PMV BLD AUTO: 12 FL (ref 6–12)
POTASSIUM BLD-SCNC: 4.5 MMOL/L (ref 3.5–5.2)
RBC # BLD AUTO: 2.49 10*6/MM3 (ref 4.14–5.8)
SODIUM BLD-SCNC: 144 MMOL/L (ref 136–145)
WBC NRBC COR # BLD: 2.36 10*3/MM3 (ref 3.4–10.8)

## 2019-06-30 PROCEDURE — 94799 UNLISTED PULMONARY SVC/PX: CPT

## 2019-06-30 PROCEDURE — 71045 X-RAY EXAM CHEST 1 VIEW: CPT

## 2019-06-30 PROCEDURE — 80048 BASIC METABOLIC PNL TOTAL CA: CPT | Performed by: NURSE PRACTITIONER

## 2019-06-30 PROCEDURE — 63710000001 INSULIN GLARGINE PER 5 UNITS: Performed by: INTERNAL MEDICINE

## 2019-06-30 PROCEDURE — 85025 COMPLETE CBC W/AUTO DIFF WBC: CPT | Performed by: INTERNAL MEDICINE

## 2019-06-30 PROCEDURE — 82962 GLUCOSE BLOOD TEST: CPT

## 2019-06-30 PROCEDURE — 63710000001 INSULIN LISPRO (HUMAN) PER 5 UNITS: Performed by: INTERNAL MEDICINE

## 2019-06-30 RX ORDER — FUROSEMIDE 20 MG/1
20 TABLET ORAL ONCE
Status: COMPLETED | OUTPATIENT
Start: 2019-06-30 | End: 2019-06-30

## 2019-06-30 RX ADMIN — IPRATROPIUM BROMIDE AND ALBUTEROL SULFATE 3 ML: 2.5; .5 SOLUTION RESPIRATORY (INHALATION) at 11:03

## 2019-06-30 RX ADMIN — INSULIN LISPRO 3 UNITS: 100 INJECTION, SOLUTION INTRAVENOUS; SUBCUTANEOUS at 21:12

## 2019-06-30 RX ADMIN — METOPROLOL TARTRATE 100 MG: 100 TABLET, FILM COATED ORAL at 08:20

## 2019-06-30 RX ADMIN — BUDESONIDE 0.5 MG: 0.5 INHALANT RESPIRATORY (INHALATION) at 20:21

## 2019-06-30 RX ADMIN — IPRATROPIUM BROMIDE AND ALBUTEROL SULFATE 3 ML: 2.5; .5 SOLUTION RESPIRATORY (INHALATION) at 15:09

## 2019-06-30 RX ADMIN — ATORVASTATIN CALCIUM 10 MG: 10 TABLET, FILM COATED ORAL at 08:20

## 2019-06-30 RX ADMIN — FUROSEMIDE 20 MG: 20 TABLET ORAL at 16:17

## 2019-06-30 RX ADMIN — DILTIAZEM HYDROCHLORIDE 360 MG: 180 CAPSULE, COATED, EXTENDED RELEASE ORAL at 08:20

## 2019-06-30 RX ADMIN — INSULIN GLARGINE 15 UNITS: 100 INJECTION, SOLUTION SUBCUTANEOUS at 21:12

## 2019-06-30 RX ADMIN — APIXABAN 5 MG: 5 TABLET, FILM COATED ORAL at 08:20

## 2019-06-30 RX ADMIN — POLYETHYLENE GLYCOL 3350 17 G: 17 POWDER, FOR SOLUTION ORAL at 08:20

## 2019-06-30 RX ADMIN — INSULIN LISPRO 3 UNITS: 100 INJECTION, SOLUTION INTRAVENOUS; SUBCUTANEOUS at 12:00

## 2019-06-30 RX ADMIN — IPRATROPIUM BROMIDE AND ALBUTEROL SULFATE 3 ML: 2.5; .5 SOLUTION RESPIRATORY (INHALATION) at 00:00

## 2019-06-30 RX ADMIN — IPRATROPIUM BROMIDE AND ALBUTEROL SULFATE 3 ML: 2.5; .5 SOLUTION RESPIRATORY (INHALATION) at 20:21

## 2019-06-30 RX ADMIN — IPRATROPIUM BROMIDE AND ALBUTEROL SULFATE 3 ML: 2.5; .5 SOLUTION RESPIRATORY (INHALATION) at 23:36

## 2019-06-30 RX ADMIN — BUDESONIDE 0.5 MG: 0.5 INHALANT RESPIRATORY (INHALATION) at 07:15

## 2019-06-30 RX ADMIN — IPRATROPIUM BROMIDE AND ALBUTEROL SULFATE 3 ML: 2.5; .5 SOLUTION RESPIRATORY (INHALATION) at 07:10

## 2019-06-30 RX ADMIN — METOPROLOL TARTRATE 100 MG: 100 TABLET, FILM COATED ORAL at 21:13

## 2019-06-30 RX ADMIN — FOLIC ACID 1 MG: 1 TABLET ORAL at 08:20

## 2019-06-30 RX ADMIN — FUROSEMIDE 20 MG: 20 TABLET ORAL at 08:20

## 2019-06-30 RX ADMIN — APIXABAN 5 MG: 5 TABLET, FILM COATED ORAL at 21:13

## 2019-07-01 ENCOUNTER — HOSPITAL ENCOUNTER (OUTPATIENT)
Dept: CT IMAGING | Facility: HOSPITAL | Age: 79
End: 2019-07-01

## 2019-07-01 VITALS
OXYGEN SATURATION: 93 % | BODY MASS INDEX: 32.94 KG/M2 | HEIGHT: 71 IN | SYSTOLIC BLOOD PRESSURE: 135 MMHG | DIASTOLIC BLOOD PRESSURE: 63 MMHG | WEIGHT: 235.3 LBS | HEART RATE: 60 BPM | TEMPERATURE: 98 F | RESPIRATION RATE: 18 BRPM

## 2019-07-01 LAB
ANION GAP SERPL CALCULATED.3IONS-SCNC: 8.5 MMOL/L (ref 5–15)
BASOPHILS # BLD MANUAL: 0.08 10*3/MM3 (ref 0–0.2)
BASOPHILS NFR BLD AUTO: 3.1 % (ref 0–1.5)
BUN BLD-MCNC: 23 MG/DL (ref 8–23)
BUN/CREAT SERPL: 24.7 (ref 7–25)
C3 FRG RBC-MCNC: ABNORMAL
CALCIUM SPEC-SCNC: 8.6 MG/DL (ref 8.6–10.5)
CHLORIDE SERPL-SCNC: 104 MMOL/L (ref 98–107)
CO2 SERPL-SCNC: 28.5 MMOL/L (ref 22–29)
CREAT BLD-MCNC: 0.93 MG/DL (ref 0.76–1.27)
DEPRECATED RDW RBC AUTO: 54.9 FL (ref 37–54)
EOSINOPHIL # BLD MANUAL: 0.2 10*3/MM3 (ref 0–0.4)
EOSINOPHIL NFR BLD MANUAL: 7.3 % (ref 0.3–6.2)
ERYTHROCYTE [DISTWIDTH] IN BLOOD BY AUTOMATED COUNT: 14.2 % (ref 12.3–15.4)
GFR SERPL CREATININE-BSD FRML MDRD: 78 ML/MIN/1.73
GLUCOSE BLD-MCNC: 111 MG/DL (ref 65–99)
GLUCOSE BLDC GLUCOMTR-MCNC: 111 MG/DL (ref 70–130)
GLUCOSE BLDC GLUCOMTR-MCNC: 128 MG/DL (ref 70–130)
GLUCOSE BLDC GLUCOMTR-MCNC: 161 MG/DL (ref 70–130)
HCT VFR BLD AUTO: 26.5 % (ref 37.5–51)
HGB BLD-MCNC: 8.5 G/DL (ref 13–17.7)
HYPOCHROMIA BLD QL: ABNORMAL
LYMPHOCYTES # BLD MANUAL: 0.69 10*3/MM3 (ref 0.7–3.1)
LYMPHOCYTES NFR BLD MANUAL: 11.5 % (ref 5–12)
LYMPHOCYTES NFR BLD MANUAL: 25 % (ref 19.6–45.3)
MCH RBC QN AUTO: 33.9 PG (ref 26.6–33)
MCHC RBC AUTO-ENTMCNC: 32.1 G/DL (ref 31.5–35.7)
MCV RBC AUTO: 105.6 FL (ref 79–97)
MONOCYTES # BLD AUTO: 0.32 10*3/MM3 (ref 0.1–0.9)
MYELOCYTES NFR BLD MANUAL: 1 % (ref 0–0)
NEUTROPHILS # BLD AUTO: 1.4 10*3/MM3 (ref 1.7–7)
NEUTROPHILS NFR BLD MANUAL: 51 % (ref 42.7–76)
NRBC SPEC MANUAL: 4.2 /100 WBC (ref 0–0.2)
OVALOCYTES BLD QL SMEAR: ABNORMAL
PLAT MORPH BLD: NORMAL
PLATELET # BLD AUTO: 156 10*3/MM3 (ref 140–450)
PMV BLD AUTO: 11.6 FL (ref 6–12)
POTASSIUM BLD-SCNC: 4.2 MMOL/L (ref 3.5–5.2)
RBC # BLD AUTO: 2.51 10*6/MM3 (ref 4.14–5.8)
SODIUM BLD-SCNC: 141 MMOL/L (ref 136–145)
VARIANT LYMPHS NFR BLD MANUAL: 1 % (ref 0–5)
WBC MORPH BLD: NORMAL
WBC NRBC COR # BLD: 2.74 10*3/MM3 (ref 3.4–10.8)

## 2019-07-01 PROCEDURE — 85007 BL SMEAR W/DIFF WBC COUNT: CPT | Performed by: INTERNAL MEDICINE

## 2019-07-01 PROCEDURE — 94799 UNLISTED PULMONARY SVC/PX: CPT

## 2019-07-01 PROCEDURE — 85025 COMPLETE CBC W/AUTO DIFF WBC: CPT | Performed by: INTERNAL MEDICINE

## 2019-07-01 PROCEDURE — 80048 BASIC METABOLIC PNL TOTAL CA: CPT | Performed by: NURSE PRACTITIONER

## 2019-07-01 PROCEDURE — 82962 GLUCOSE BLOOD TEST: CPT

## 2019-07-01 RX ORDER — SODIUM CHLORIDE 0.9 % (FLUSH) 0.9 %
1-10 SYRINGE (ML) INJECTION AS NEEDED
Status: DISCONTINUED | OUTPATIENT
Start: 2019-07-01 | End: 2019-07-01 | Stop reason: HOSPADM

## 2019-07-01 RX ORDER — SODIUM CHLORIDE 0.9 % (FLUSH) 0.9 %
3 SYRINGE (ML) INJECTION EVERY 12 HOURS SCHEDULED
Status: DISCONTINUED | OUTPATIENT
Start: 2019-07-01 | End: 2019-07-01 | Stop reason: HOSPADM

## 2019-07-01 RX ORDER — POLYETHYLENE GLYCOL 3350 17 G/17G
17 POWDER, FOR SOLUTION ORAL DAILY
Start: 2019-07-02 | End: 2019-08-26

## 2019-07-01 RX ADMIN — IPRATROPIUM BROMIDE AND ALBUTEROL SULFATE 3 ML: 2.5; .5 SOLUTION RESPIRATORY (INHALATION) at 07:53

## 2019-07-01 RX ADMIN — IPRATROPIUM BROMIDE AND ALBUTEROL SULFATE 3 ML: 2.5; .5 SOLUTION RESPIRATORY (INHALATION) at 15:22

## 2019-07-01 RX ADMIN — ATORVASTATIN CALCIUM 10 MG: 10 TABLET, FILM COATED ORAL at 11:14

## 2019-07-01 RX ADMIN — IPRATROPIUM BROMIDE AND ALBUTEROL SULFATE 3 ML: 2.5; .5 SOLUTION RESPIRATORY (INHALATION) at 11:29

## 2019-07-01 RX ADMIN — METOPROLOL TARTRATE 100 MG: 100 TABLET, FILM COATED ORAL at 11:14

## 2019-07-01 RX ADMIN — FOLIC ACID 1 MG: 1 TABLET ORAL at 11:14

## 2019-07-01 RX ADMIN — ALPRAZOLAM 1 MG: 0.5 TABLET ORAL at 00:07

## 2019-07-01 RX ADMIN — POLYETHYLENE GLYCOL 3350 17 G: 17 POWDER, FOR SOLUTION ORAL at 11:14

## 2019-07-01 RX ADMIN — BUDESONIDE 0.5 MG: 0.5 INHALANT RESPIRATORY (INHALATION) at 07:54

## 2019-07-01 RX ADMIN — APIXABAN 5 MG: 5 TABLET, FILM COATED ORAL at 11:14

## 2019-07-01 RX ADMIN — DILTIAZEM HYDROCHLORIDE 360 MG: 180 CAPSULE, COATED, EXTENDED RELEASE ORAL at 11:14

## 2019-07-01 RX ADMIN — FUROSEMIDE 20 MG: 20 TABLET ORAL at 11:14

## 2019-07-01 NOTE — DISCHARGE SUMMARY
DISCHARGE SUMMARY    Patient Name: Ankit Mack Sr.  Age/Sex: 79 y.o. male  : 1940  MRN: [id number]  Patient Care Team:  Gopi Lagos MD as PCP - General (Family Medicine)  Gopi Lagos MD as PCP - Claims Attributed  Tanner Mendoza III, MD as Surgeon (Thoracic Surgery)  Trey Salcedo MD as Consulting Physician (Urology)  Fred Alejandro MD as Consulting Physician (Pulmonary Disease)  Amna Gomez MD as Consulting Physician (Cardiology)  Gopi Pemberton MD as Consulting Physician (Dermatology)  Gopi Lagos MD as Referring Physician (Family Medicine)  Gopi Mills II, MD as Consulting Physician (Hematology and Oncology)  Tanner Mendoza III, MD as Surgeon (Thoracic Surgery)  Demetria Licona RN as Care Coordinator (Population Health)  Beto Hayes MD as Consulting Physician (Hematology and Oncology)       Date of Admit: 2019  Date of Discharge:  19  Discharge Condition: Stable    Discharge Diagnoses:  1. Leukopenia, bone marrow biopsies showed hypercellular trilineage, awaiting final report, suspecting MDS  2. Acute on chronic hypoxic respiratory failure, gradually back to baseline  3. Diabetes II  4. Bilateral pleural effusions improving  5. Community-acquired pneumonia s/p abx course  6. Decompensated diastolic heart failure, diuresed with clinical improvement  7. Thrombocytopenia anemia, with work-up so far suggestive of  myelodysplastic syndromes, awaiting final confirmation from hematology  8. COPD with no acute exacerbation  9. Atrial for ablation with RVR currently rate controlled  10. History of non-small cell lung cancer in   11. Obesity  12. DOMINGO      History of present illness from H&P from 2019:   79-year-old male who follows Dr. Fred Alejandro in our office.  He was recently admitted at [facility] for acute on chronic hypercapnic respiratory failure with COPD exacerbation.  He was noted to have A. fib with RVR.  He was set up  with a trilogy on discharge and his cardiac meds were adjusted.  He had previously been in sinus rhythm and his anticoagulation for paroxysmal atrial fibrillation had been discontinued.  However on recent admission his anticoagulation was resumed.  Patient was thought to be euvolemic at the time of discharge.  He was advised follow-up in the office with Dr. Alejandro.  He has not had the opportunity to follow-up as yet.  Patient is on supplemental oxygen at 3 L at home.  He has been instructed to increase to 4 L at night with sleep.  With recent trilogy machine he is also using oxygen.  When he woke up this morning his saturations were in the 70s.  He increase his oxygen flow to 4 L and his saturation improved to the 80s.  However he was still very short of breath and he came into the ED.  He reports some chest pressure over the last 2 days which has since resolved coming to the ED.  He denies any cough, wheezing, phlegm, fever, chills, rigors.  He denies any increase in lower extremity edema.  He does not feel his abdomen is more distended.  He feels better now and is making some urine with the Lasix given in the ED.  He was also's given a dose of Solu-Medrol and antibiotics by the ED staff.  Patient states he quit smoking 9 years ago and used to smoke about a pack of cigarettes a day all his life.  He has not had a drink for 2 years but in the remote past had been a heavy drinker.     His medical problems include a history of prostate cancer, history of nephrolithiasis, chronic respiratory failure with hypoxia and hypercapnia, history of stage Ia lung cancer which was resected in January 2012, aortic aneurysm with graft, atrial fibrillation, obstructive sleep apnea on CPAP and now on trilogy device his last recorded FEV1 is 49% in our office and he is morbidly obese.  He has diabetes type 2 and was recently started on oral medicines.     Ankit Mack Sr.  reports that he drinks alcohol.,  reports that he quit  smoking about 9 years ago. His smoking use included cigarettes. He smoked 1.00 pack per day. He has never used smokeless tobacco.          Hospital Course:   Ankit Mack Sr. presented to [facility] with complaints of shortness of breath and was admitted and treated for both pneumonia and acute on chronic decompensated heart failure with COPD and compromised pulmonary status but no significant COPD exacerbation.  Patient has atrial fibrillation of the paroxysmal type with episode of rapid heart rate but was rate controlled specially at rest with the help of metoprolol and Cardizem.  He was resumed back on the Eliquis because of the paroxysmal A. fib in the past and seems to be tolerating the anticoagulation with no bleeding.  He was noted to have some pancytopenia, was seen in consultation by hematology and bone marrow was done, he does have hypercellular trilineage and myelodysplastic syndrome is being suspected with further follow-up in line with the hematology and it is to be continued after discharge.  He was seen in consultation by thoracic surgery with Dr. Malone, she recommended to follow-up with repeat CT scan on those areas of pulmonary infiltrate  He was seen in consultation by cardiology who manages the arrhythmia  She has diabetes and was well controlled on metformin as an outpatient, he was started on insulin and Lantus but the metformin was on hold on presentation so he will be discharged home in the metformin to follow-up with his primary care if he needs more adjustment on his oral home regimen.  Patient is doing fine with antibiotic.  He did walking oximetry and his oxygen requirement at rest or down to his baseline at 3 but was exercise he was having dyspnea and tachycardia which did improve when he was on a higher flow up to 6 L/min  At night he was using his home trilogy but 3 L/min was not enough and it took 6 L to keep his saturation above 90%    Consults:   IP CONSULT  TO PULMONOLOGY  IP CONSULT TO CARDIOLOGY  IP CONSULT TO CASE MANAGEMENT   IP CONSULT TO HEMATOLOGY AND ONCOLOGY  IP CONSULT TO INFECTIOUS DISEASES  IP CONSULT TO THORACIC SURGERY    Significant Discharge Diagnostics   Procedures Performed:         Pertinent Lab Results:  Results from last 7 days   Lab Units 07/01/19  0613 06/30/19  0606 06/29/19  0636 06/28/19  1600 06/28/19  0827 06/27/19  0608 06/26/19  0556 06/25/19  0645   SODIUM mmol/L 141 144 145  --  143 139 141 145   POTASSIUM mmol/L 4.2 4.5 4.4 4.5 3.9 3.7 3.8 4.3   CHLORIDE mmol/L 104 106 107  --  103 98 99 104   CO2 mmol/L 28.5 29.4* 29.9*  --  33.2* 33.0* 33.1* 33.2*   BUN mg/dL 23 24* 24*  --  24* 25* 27* 25*   CREATININE mg/dL 0.93 0.92 0.86  --  1.20 0.99 1.09 1.05   GLUCOSE mg/dL 111* 103* 108*  --  116* 97 88 90   CALCIUM mg/dL 8.6 8.7 8.5*  --  9.0 8.2* 8.1* 8.4*     Results from last 7 days   Lab Units 06/25/19  0645   TROPONIN T ng/mL 0.051*     Results from last 7 days   Lab Units 07/01/19  0613 06/30/19  0606 06/29/19  0636 06/28/19  0827  06/27/19  0608  06/26/19  0556  06/25/19  0913   WBC 10*3/mm3 2.74* 2.36* 2.53* 2.25*  --  2.68*  --  2.94*  --  2.37*   HEMOGLOBIN g/dL 8.5* 8.5* 8.4* 9.5*  --  8.5*  --  8.8*  --  9.1*   HEMATOCRIT % 26.5* 26.2* 25.9* 30.5*  --  25.7*  --  26.4*  --  29.3*   PLATELETS 10*3/mm3 156 142 139* 152  --  153  --  146  --  149   MCV fL 105.6* 105.2* 107.9* 106.3*  --  102.8*  --  103.1*  --  108.1*   MCH pg 33.9* 34.1* 35.0* 33.1*  --  34.0*  --  34.4*  --  33.6*   MCHC g/dL 32.1 32.4 32.4 31.1*  --  33.1  --  33.3  --  31.1*   RDW % 14.2 13.8 14.1 14.0  --  14.2  --  14.2  --  14.0   RDW-SD fl 54.9* 53.9 55.4* 55.0*  --  53.4  --  53.2  --  55.7*   MPV fL 11.6 12.0 11.8 11.4  --  11.4  --  10.9  --  11.1   NEUTROPHIL % %  --  30.2*  --   --   --  41.8*  --   --   --  47.7   LYMPHOCYTE % %  --  30.5  --   --   --  23.9  --   --   --  20.7   MONOCYTES % %  --  25.0*  --   --   --  15.3*  --   --   --   11.8   EOSINOPHIL % %  --  7.6*  --   --   --  18.3*  --   --   --  15.6*   BASOPHIL % %  --  0.8  --   --   --  0.7  --   --   --  0.8   IMM GRAN % %  --  5.9*  --   --   --  2.2*   < >  --   --   --    NEUTROS ABS 10*3/mm3 1.40* 0.71* 0.99* 0.81*  --  1.12*  --  1.32*   < > 1.13*   LYMPHS ABS 10*3/mm3  --  0.72  --   --   --  0.64*  --   --   --  0.49*   MONOS ABS 10*3/mm3  --  0.59  --   --   --  0.41  --   --   --  0.28   EOS ABS 10*3/mm3 0.20 0.18 0.33 0.43*  --  0.49*  --  0.62*   < > 0.37   BASOS ABS 10*3/mm3 0.08 0.02 0.03 0.07   < > 0.02  --   --   --  0.02   IMMATURE GRANS (ABS) 10*3/mm3  --  0.14*  --   --   --  0.06*   < >  --   --   --    NRBC /100 WBC 4.2* 1.3* 0.8* 8.0*   < > 1.0*  --  0.9*   < >  --     < > = values in this interval not displayed.     Results from last 7 days   Lab Units 06/26/19  0556   INR  1.24*   APTT seconds 33.3     Results from last 7 days   Lab Units 06/28/19  1600   MAGNESIUM mg/dL 2.1         Results from last 7 days   Lab Units 06/26/19  0556 06/25/19  0913   PROBNP pg/mL 2,423.0* 2,318.0*             Results from last 7 days   Lab Units 06/26/19  0556   PROCALCITONIN ng/mL 0.15                               Imaging Results:  Imaging Results (all)     Procedure Component Value Units Date/Time    XR Chest 1 View [[id number]] Collected:  06/30/19 1543     Updated:  06/30/19 1548    Narrative:       Portable chest radiograph     HISTORY:Shortness of     TECHNIQUE: Single AP portable radiograph of the chest     COMPARISON:Multiple chest radiographs dating back to 06/20/2019       Impression:       FINDINGS AND IMPRESSION:  Surgical clips overlie the left lower neck. There is also a stent within  the descending thoracic aorta.     Bibasilar pulmonary opacification is present, right greater than left,  with interval increased aeration on the right. Small right pleural  effusion is suspected. No pneumothorax is seen. The heart is mildly  enlarged.     This report was finalized on  6/30/2019 3:45 PM by Dr. Chuck Davison M.D.       CT Guided Biopsy Bone Marrow [[id number]] Collected:  06/26/19 1756     Updated:  06/27/19 1557    Narrative:       CT GUIDED BIOPSY BONE MARROW     HISTORY: Pancytopenia.      Radiation dose reduction techniques were utilized, including automated  exposure control and exposure modulation based on body size.     Procedure explained to the patient with potential complications. After  signed informed consent was obtained the patient was placed in the prone  position on the CT table. Scanning through the pelvis performed and the  left  iliac bone selected for sampling. After appropriate prep and drape  of the skin surface, 1% lidocaine for local anesthesia. A 14-gauge  trocar was placed into the medullary cavity without difficulty. Then,   12 mL of marrow was aspirated and submitted to pathology in 4 separate  vials. Before removing the needle, core sample obtained, submitted to  pathology in formalin. Patient tolerated the procedure well without  complication.     CONCLUSION: Successful bone marrow biopsy using CT guidance. No  complications encountered.     This report was finalized on 6/27/2019 3:54 PM by Dr. Chuck Davison M.D.       XR Chest 1 View [[id number]] Collected:  06/27/19 1304     Updated:  06/27/19 1316    Narrative:       AP PORTABLE UPRIGHT CHEST     HISTORY: COPD, CHF, thoracic aortic aneurysm, and post repair.       COMPARISON: AP chest 06/25/2019.      FINDINGS: There is cardiomegaly and there has been previous stent graft  repair of the descending thoracic aorta. There are bilateral pleural  effusions and there is patchy opacity in the right mid to lower lung  that is consistent with infiltrate, though there is also a component of  basilar atelectasis. Mid to upper lungs are comparatively clear, and  there is no evidence for pneumothorax. Cardiac monitoring leads are  evident. Numerous surgical clips overlie the left lower neck.       Impression:        1.  No significant change compared to exam 2 days ago. Right pleural  effusion is present.  There is patchy right basilar infiltrate as well  as bibasilar atelectasis.  2.  Cardiomegaly. Descending thoracic aortic stent graft.     This report was finalized on 6/27/2019 1:13 PM by Dr. Hakan Wray M.D.       XR Chest 1 View [[id number]] Collected:  06/25/19 0740     Updated:  06/25/19 0827    Narrative:       CLINICAL HISTORY: 79-year-old male for evaluation of the lungs. Right  pleural effusion.     PORTABLE AP SEMIERECT CHEST DATED 06/25/2019 AT 0539 HOURS     FINDINGS: The current exam is compared to most recent available prior  chest radiograph dated 06/21/2019 at 0514 hours, also a portable AP  semierect projection, and to CT chest exam multiplanar sections and AP   image dated 06/23/2019 at 0917 hours. Enlargement of cardiac  silhouette, aortic ectasia and descending thoracic aortic stent graft  metallic mesh components, pulmonary vascular engorgement, patchy opacity  in the perihilar to lower right lung and modest hazy to patchy opacity  in the lower lateral left lung, and blunting of right costophrenic angle  compatible with residual pleural fluid remain. Surgical clips at the  lower left neck are again demonstrated. Monitoring lead wires are  present. No acute change in bony thorax is seen. No pneumothorax is  seen.     CONCLUSION: Similar appearance of the chest. There appears to be  slightly greater patchy opacity in the right lower chest than was  demonstrated on  image for the CT scan 2 days ago.     This report was finalized on 6/25/2019 8:23 AM by Dr. Gopi Greene M.D.       CT Chest Without Contrast [[id number]] Collected:  06/23/19 0946     Updated:  06/23/19 0957    Narrative:       CT CHEST WO CONTRAST-     INDICATIONS: Pleural effusion     TECHNIQUE: Radiation dose reduction techniques were utilized, including  automated exposure control and exposure modulation based on body  size.  Unenhanced CHEST CT     COMPARISON: 11/17/2018      FINDINGS:           The heart size is borderline with minimal pericardial effusion. Coronary  artery calcification is present. A pretracheal lymph node measuring 1.5  cm in short axis on image 44 series 1, previously 1.0 cm, compatible  with interval increase, could be reactive in nature or potentially  evidence of neoplasm, clinical correlation follow-up recommended (if  indicated, positron emission tomography could be considered for further  evaluation). Dilated ascending aorta, 3.7 cm, appears stable. A stent is  noted in the aneurysm of the descending thoracic aorta, which is  measured at 5.6 cm transversely on image 101 of series 3, previously  measured at 5.4 cm at similar level. Assessment of vascular and  mediastinal structures is limited without intravenous contrast material.        The airways are patent, with minimal likely adherent mucus dependently  in the thoracic trachea.     Mild to moderate right, minimal left pleural effusions, increased from  prior exam.           The lungs show patchy and consolidative infiltrates in the right middle  lobe, right lower lobe, left lower lobe, as well as nodular density  posteriorly in the left upper lobe base to the major fissure (1.1 cm on  image 32 of series 2), appearance may represent pneumonia although  possibility of underlying neoplasm is not excluded, clinical correlation  and follow-up CT are recommended.     Upper abdominal structures show no acute findings. Bilateral renal cysts  appears similar to prior exam. Density in the gallbladder could reflect  cholelithiasis. Right adrenal myelolipoma. Left adrenal adenoma..     Degenerative changes are seen in the spine. No acute fracture is  identified.       Impression:             1. Multifocal patchy and consolidative opacities in the lung bases have  represent pneumonia although possibility of underlying neoplasm is not  excluded. Increased size of  mediastinal lymph node. Clinical correlation  and further evaluation/follow-up recommended.  2. Increased right more than left pleural effusions.  3. The descending thoracic aortic aneurysm measures slightly larger.     This report was finalized on 6/23/2019 9:54 AM by Dr. Jakob Siu M.D.       XR Chest 1 View [[id number]] Collected:  06/21/19 0819     Updated:  06/21/19 0848    Narrative:       CLINICAL HISTORY: 79-year-old male with congestive heart failure.     EXAM: PORTABLE AP SEMIERECT CHEST DATED 06/21/2019 AT 0514 HOURS.     FINDINGS: When compared to the most recent available prior chest  radiograph, the portable AP erect projection of 06/20/2019 at 1822  hours, there is diminished expansion of the lungs, even allowing for  differences of image acquisition and display. Cardiomegaly, aortic  ectasia, descending thoracic aortic stent graft metallic mesh framework,  surgical clips at the left lower neck, and monitoring lead wires are  present. There is similar to slightly diminished patchy opacity in the  left lung base but there is increased patchy to dense opacity in the  perihilar to basilar right lung, compatible with atelectasis or  infiltrate. No pneumothorax is seen. No acute change in bony thorax is  seen.     CONCLUSION: Decreased lung volumes with increased perihilar to basilar  opacity in the right lung and diminished patchy opacity in the lower  left lung. Signs of prior descending thoracic aortic stent graft  placement, cardiomegaly, and mild diffuse prominence of pulmonary  vascular and interstitial markings remain.     This report was finalized on 6/21/2019 8:44 AM by Dr. Gopi Greene M.D.       XR Chest 1 View [[id number]] Collected:  06/20/19 1923     Updated:  06/20/19 1927    Narrative:       XR CHEST 1 VW-     HISTORY: Male who is 79 years-old,  short of breath     TECHNIQUE: Frontal view of the chest     COMPARISON: 05/25/2019     FINDINGS: Heart size is borderline. Aorta is  tortuous with aortic stent  noted. Mild prominence of vascular and interstitial markings. Patchy  opacities at the mid to lower lungs suggests edema and/or infection,  minimal pleural effusions are apparent. No pneumothorax. No acute  osseous process.       Impression:       Infiltrates at the mid to lower lungs, minimal pleural  effusions, borderline heart size with pulmonary vascular congestion,  follow-up recommended. Tortuous aorta with aortic stent noted.     This report was finalized on 6/20/2019 7:24 PM by Dr. Jakob Siu M.D.             Objective:   Temp:  [97.5 °F (36.4 °C)-98.6 °F (37 °C)] 98 °F (36.7 °C)  Heart Rate:  [60-68] 60  Resp:  [18-20] 18  BP: (131-144)/(63-77) 135/63   SpO2:  [92 %-99 %] 93 %  on  Flow (L/min):  [3-6] 3 Device (Oxygen Therapy): nasal cannula    Intake/Output Summary (Last 24 hours) at 7/1/2019 1650  Last data filed at 7/1/2019 1300  Gross per 24 hour   Intake 1080 ml   Output 925 ml   Net 155 ml     Body mass index is 32.82 kg/m².      06/29/19  0500 06/30/19  0540 07/01/19  0601   Weight: 108 kg (237 lb) 107 kg (235 lb 3.2 oz) (Patient refused standing scale) 107 kg (235 lb 4.8 oz)     Weight change: 0.045 kg (1.6 oz)    Physical Exam:  GEN:  No acute distress, alert, cooperative, well developed on 3 L nasal cannula oxygen  EYES:   Sclera clear. No icterus. PERRL. Normal EOM  ENT:   External ears/nose normal, no oral lesions, no thrush, mucous membranes moist  NECK:  Supple, midline trachea, no JVD  LUNGS: Normal chest on inspection, diminished breath sounds bilaterally, very minimal fine crackles posteriorly, no wheezes or rhonchi.   CV:  Regular rate with regular rhythm . Normal S1/S2. No murmurs, gallops, or rubs noted.  ABD:  Soft, non-tender and non-distended. Normal bowel sounds. No guarding  EXT:  Moves all extremities well. No cyanosis. No redness.    Trace bilateral lower extremities edema.   Skin: dry, intact, no bleeding    .     Discharge Medications and  Instructions:     Discharge Medications     Discharge Medications      New Medications      Instructions Start Date   polyethylene glycol packet  Commonly known as:  MIRALAX   17 g, Oral, Daily   Start Date:  7/2/2019     Revefenacin 175 MCG/3ML solution  Commonly known as:  YUPELRI   175 mcg, Inhalation, Daily         Changes to Medications      Instructions Start Date   O2  Commonly known as:  OXYGEN  What changed:  how much to take   6 L/min, Inhalation, Once, Patient using 3-4 liters, depending on activity level          Continue These Medications      Instructions Start Date   albuterol (2.5 MG/3ML) 0.083% nebulizer solution  Commonly known as:  PROVENTIL   2.5 mg, Nebulization, 4 Times Daily - RT      albuterol sulfate  (90 Base) MCG/ACT inhaler  Commonly known as:  PROVENTIL HFA;VENTOLIN HFA;PROAIR HFA   2 puffs, Inhalation, Every 4 Hours PRN      ALPRAZolam 1 MG tablet  Commonly known as:  XANAX   1 mg, Oral, Nightly PRN      atorvastatin 10 MG tablet  Commonly known as:  LIPITOR   10 mg, Oral, Daily, For cholesterol      budesonide 0.5 MG/2ML nebulizer solution  Commonly known as:  PULMICORT   0.5 mg, Nebulization, 2 Times Daily - RT      diltiaZEM 360 MG 24 hr capsule  Commonly known as:  TIAZAC   360 mg, Oral, Every 24 Hours Scheduled      ELIQUIS 5 MG tablet tablet  Generic drug:  apixaban   5 mg, Oral, Every 12 Hours Scheduled      folic acid 1 MG tablet  Commonly known as:  FOLVITE   1 mg, Oral, Daily      furosemide 20 MG tablet  Commonly known as:  LASIX   20 mg, Oral, Daily      metFORMIN 1000 MG tablet  Commonly known as:  GLUCOPHAGE   1,000 mg, Oral, Daily With Breakfast      metoprolol tartrate 100 MG tablet  Commonly known as:  LOPRESSOR   100 mg, Oral, Every 12 Hours Scheduled             Discharge Diet:    Dietary Orders (From admission, onward)    Start     Ordered    06/21/19 0928  Diet Regular; Cardiac, Consistent Carbohydrate  Diet Effective Now     Question Answer Comment   Diet  Texture / Consistency Regular    Common Modifiers Cardiac    Common Modifiers Consistent Carbohydrate        06/21/19 0929          Activity at Discharge:   As tolerated    Discharge disposition: Home    Discharge Instructions and Follow ups:  Follow-up with me in 4 weeks, follow-up with Dr. Malone and with the hematology oncology group   Contact information for follow-up providers     Gopi Lagos MD Follow up in 5 day(s).    Specialty:  Family Medicine  Why:  Follow up on July 3rd @ 1:30 PM  [facility] information:  [address] [facility]  [address]  [facility] [address] [phone]                   Contact information for after-discharge care     Home Medical Care     [facility] Follow up.    Service:  Home Health Services  Contact information:  [address]  [facility] [address] [phone]                           Future Appointments   Date Time Provider Department Center   7/3/2019  1:30 PM Gopi Lagos MD [id number] None   7/9/2019  3:50 PM LAB CHAIR [facility] None   7/9/2019  4:20 PM Beto Hayes MD [facility]   7/16/2019 12:00 PM Amna Gomez MD [id number] None        Medication Reconciliation: Please see electronically completed Med Rec.    Total time spent discharging patient including evaluation, medication reconciliation, arranging follow up, and post hospitalization instructions and education total time exceeds 30 minutes.     Fred Alejandro MD  07/01/19  4:50 PM      Dictated utilizing Dragon dictation

## 2019-07-02 ENCOUNTER — READMISSION MANAGEMENT (OUTPATIENT)
Dept: CALL CENTER | Facility: HOSPITAL | Age: 79
End: 2019-07-02

## 2019-07-02 NOTE — PROGRESS NOTES
Case Management Discharge Note         Destination      No service has been selected for the patient.      Durable Medical Equipment      No service has been selected for the patient.      Dialysis/Infusion      No service has been selected for the patient.      Home Medical Care - Selection Complete      Service Provider Request Status Selected Services Address Phone Number Fax Number    [facility] Selected Home Health Services [address] [address] [phone]      Therapy      No service has been selected for the patient.      Community Resources      No service has been selected for the patient.

## 2019-07-03 ENCOUNTER — OFFICE VISIT (OUTPATIENT)
Dept: FAMILY MEDICINE CLINIC | Facility: CLINIC | Age: 79
End: 2019-07-03

## 2019-07-03 ENCOUNTER — TELEPHONE (OUTPATIENT)
Dept: CARDIOLOGY | Facility: CLINIC | Age: 79
End: 2019-07-03

## 2019-07-03 ENCOUNTER — READMISSION MANAGEMENT (OUTPATIENT)
Dept: CALL CENTER | Facility: HOSPITAL | Age: 79
End: 2019-07-03

## 2019-07-03 VITALS
OXYGEN SATURATION: 99 % | TEMPERATURE: 98.5 F | BODY MASS INDEX: 32.06 KG/M2 | HEIGHT: 71 IN | DIASTOLIC BLOOD PRESSURE: 63 MMHG | WEIGHT: 229 LBS | SYSTOLIC BLOOD PRESSURE: 127 MMHG | RESPIRATION RATE: 16 BRPM | HEART RATE: 68 BPM

## 2019-07-03 DIAGNOSIS — J96.21 ACUTE ON CHRONIC RESPIRATORY FAILURE WITH HYPOXEMIA (HCC): Primary | ICD-10-CM

## 2019-07-03 DIAGNOSIS — D72.819 LEUKOPENIA, UNSPECIFIED TYPE: ICD-10-CM

## 2019-07-03 DIAGNOSIS — Z09 HOSPITAL DISCHARGE FOLLOW-UP: ICD-10-CM

## 2019-07-03 DIAGNOSIS — R91.8 PULMONARY INFILTRATE: Primary | ICD-10-CM

## 2019-07-03 PROCEDURE — 99213 OFFICE O/P EST LOW 20 MIN: CPT | Performed by: FAMILY MEDICINE

## 2019-07-03 RX ORDER — FUROSEMIDE 40 MG/1
TABLET ORAL
Qty: 30 TABLET | Refills: 0 | Status: SHIPPED | OUTPATIENT
Start: 2019-07-03 | End: 2019-07-16

## 2019-07-03 NOTE — TELEPHONE ENCOUNTER
Pt's daughter is confused because in the hospital the Pt was taking 40 mg of Lasix twice a day, however no additional refills were sent into the pharmacy when he was discharged on Monday.    Per his medication list he takes 20 mg Lasix once daily.    Your Progress Note from 6/27/19    Medication Review:      apixaban 5 mg Oral Q12H   atorvastatin 10 mg Oral Daily   budesonide 0.5 mg Nebulization BID - RT   diltiaZEM  mg Oral Q24H   folic acid 1 mg Oral Daily   furosemide 40 mg Oral BID   insulin glargine 15 Units Subcutaneous Nightly   insulin lispro 0-14 Units Subcutaneous 4x Daily With Meals & Nightly   ipratropium-albuterol 3 mL Nebulization 4x Daily - RT   metoprolol tartrate 100 mg Oral Q12H   polyethylene glycol 17 g Oral Daily                 Assessment/Plan         1.  Paroxysmal atrial fibrillation.  Recent ZIO patch did not show atrial fibrillation so he stopped Eliquis.  However he was recently hospitalized and had more atrial fibrillation and the Eliquis was resumed.  Current heart rate and blood pressure are controlled.  Continue diltiazem and metoprolol.  2.  Acute on chronic hypoxic respiratory failure/home oxygen/COPD/bronchitis.  3.  Mildly elevated troponin.  EKG without ischemic changes.  Elevated troponin is likely due to hypoxia.  Plan was to consider stress test as an outpatient when he was more stable.  I recommend that we continue with that plan.  4.  Right bundle branch block  5.  History of chronic diastolic heart failure.  On oral Lasix.  Euvolemic.  6.  Sleep apnea.  Compliant with CPAP  7.  Thoracic aortic aneurysm status post TEAVR  8.  Left carotid subclavian bypass.  9.  Possible myelodysplastic syndrome.  Plan for a bone marrow biopsy today and follow-up with Dr. Hayes as an outpatient.     -Stable from a cardiac standpoint.  I am not going to change his medications.  He should follow-up with me in 2 weeks.     Amna Gomez MD, [facility]  06/27/19  8:26 AM       Listed in the Discharge summary is 20 mg Lasix once daily.     Pt daughter wants to make sure that he is getting the right dose due to his lengthy stay in the hosptial and his next office visit with you not being until 7/16

## 2019-07-03 NOTE — TELEPHONE ENCOUNTER
Pt's daughter calls back to report that the Pt has been taking 40 mg once daily at home prior to his hospitalization

## 2019-07-03 NOTE — TELEPHONE ENCOUNTER
Lasix 40mg QD is fine if it has been working for him. Cancel script for 20mg. OK with me if he wants to see Rdz

## 2019-07-03 NOTE — OUTREACH NOTE
Prep Survey      Responses   Facility patient discharged from?  [facility]   Is patient eligible?  Yes   Discharge diagnosis  pneumonia and acute on chronic decompensated heart failure     Does the patient have one of the following disease processes/diagnoses(primary or secondary)?  COPD/Pneumonia   Does the patient have Home health ordered?  Yes   What is the Home health agency?   [facility]     Is there a DME ordered?  Yes   What DME was ordered?  10 liter concentrator - [place]   Prep survey completed?  Yes          Miri Dunbar RN

## 2019-07-03 NOTE — PROGRESS NOTES
Subjective   Ankit Mack Sr. is a 79 y.o. male.     CC: Hospital F/U for Leukopenia and Respiratory Failuer    History of Present Illness     Pt returns today after recent hospitalization. That visit was as follows:    Date of Admit: 6/20/2019  Date of Discharge:  07/01/19  Discharge Condition: Stable     Discharge Diagnoses:  1. Leukopenia, bone marrow biopsies showed hypercellular trilineage, awaiting final report, suspecting MDS  2. Acute on chronic hypoxic respiratory failure, gradually back to baseline  3. Diabetes II  4. Bilateral pleural effusions improving  5. Community-acquired pneumonia s/p abx course  6. Decompensated diastolic heart failure, diuresed with clinical improvement  7. Thrombocytopenia anemia, with work-up so far suggestive of  myelodysplastic syndromes, awaiting final confirmation from hematology  8. COPD with no acute exacerbation  9. Atrial for ablation with RVR currently rate controlled  10. History of non-small cell lung cancer in 2012  11. Obesity  12. DOMINGO        History of present illness from H&P from 6/20/2019:   79-year-old male who follows Dr. Fred Alejandro in our office.  He was recently admitted at [facility] for acute on chronic hypercapnic respiratory failure with COPD exacerbation.  He was noted to have A. fib with RVR.  He was set up with a trilogy on discharge and his cardiac meds were adjusted.  He had previously been in sinus rhythm and his anticoagulation for paroxysmal atrial fibrillation had been discontinued.  However on recent admission his anticoagulation was resumed.  Patient was thought to be euvolemic at the time of discharge.  He was advised follow-up in the office with Dr. Alejandro.  He has not had the opportunity to follow-up as yet.  Patient is on supplemental oxygen at 3 L at home.  He has been instructed to increase to 4 L at night with sleep.  With recent trilogy machine he is also using oxygen.  When he woke up this morning his saturations were in the  70s.  He increase his oxygen flow to 4 L and his saturation improved to the 80s.  However he was still very short of breath and he came into the ED.  He reports some chest pressure over the last 2 days which has since resolved coming to the ED.  He denies any cough, wheezing, phlegm, fever, chills, rigors.  He denies any increase in lower extremity edema.  He does not feel his abdomen is more distended.  He feels better now and is making some urine with the Lasix given in the ED.  He was also's given a dose of Solu-Medrol and antibiotics by the ED staff.  Patient states he quit smoking 9 years ago and used to smoke about a pack of cigarettes a day all his life.  He has not had a drink for 2 years but in the remote past had been a heavy drinker.     His medical problems include a history of prostate cancer, history of nephrolithiasis, chronic respiratory failure with hypoxia and hypercapnia, history of stage Ia lung cancer which was resected in January 2012, aortic aneurysm with graft, atrial fibrillation, obstructive sleep apnea on CPAP and now on trilogy device his last recorded FEV1 is 49% in our office and he is morbidly obese.  He has diabetes type 2 and was recently started on oral medicines.     Ankit Bourneelin Sr.  reports that he drinks alcohol.,  reports that he quit smoking about 9 years ago. His smoking use included cigarettes. He smoked 1.00 pack per day. He has never used smokeless tobacco.              Hospital Course:   Ankit Bourneminerva Pastrana. presented to [facility] with complaints of shortness of breath and was admitted and treated for both pneumonia and acute on chronic decompensated heart failure with COPD and compromised pulmonary status but no significant COPD exacerbation.  Patient has atrial fibrillation of the paroxysmal type with episode of rapid heart rate but was rate controlled specially at rest with the help of metoprolol and Cardizem.  He was resumed back on the  Eliquis because of the paroxysmal A. fib in the past and seems to be tolerating the anticoagulation with no bleeding.  He was noted to have some pancytopenia, was seen in consultation by hematology and bone marrow was done, he does have hypercellular trilineage and myelodysplastic syndrome is being suspected with further follow-up in line with the hematology and it is to be continued after discharge.  He was seen in consultation by thoracic surgery with Dr. Malone, she recommended to follow-up with repeat CT scan on those areas of pulmonary infiltrate  He was seen in consultation by cardiology who manages the arrhythmia  She has diabetes and was well controlled on metformin as an outpatient, he was started on insulin and Lantus but the metformin was on hold on presentation so he will be discharged home in the metformin to follow-up with his primary care if he needs more adjustment on his oral home regimen.  Patient is doing fine with antibiotic.  He did walking oximetry and his oxygen requirement at rest or down to his baseline at 3 but was exercise he was having dyspnea and tachycardia which did improve when he was on a higher flow up to 6 L/min  At night he was using his home trilogy but 3 L/min was not enough and it took 6 L to keep his saturation above 90%    Current outpatient and discharge medications have been reconciled for the patient.  Reviewed by: Gopi Lagos MD      The following portions of the patient's history were reviewed and updated as appropriate: allergies, current medications, past family history, past medical history, past social history, past surgical history and problem list.    Review of Systems   Constitutional: Negative for activity change, chills, fatigue and fever.   Respiratory: Negative for cough and shortness of breath.    Cardiovascular: Negative for chest pain and palpitations.   Gastrointestinal: Negative for abdominal pain.   Endocrine: Negative for cold intolerance.  "  Psychiatric/Behavioral: Negative for behavioral problems and dysphoric mood. The patient is not nervous/anxious.        /63   Pulse 68   Temp 98.5 °F (36.9 °C) (Oral)   Resp 16   Ht 180.3 cm (71\")   Wt 104 kg (229 lb)   SpO2 99%   BMI 31.94 kg/m²     Objective   Physical Exam   Constitutional: He appears well-developed and well-nourished.   Neck: Neck supple. No thyromegaly present.   Cardiovascular: Normal rate and regular rhythm.   No murmur heard.  Pt is IN rhythm today   Pulmonary/Chest: Effort normal and breath sounds normal.   Abdominal: Bowel sounds are normal. There is no tenderness.   Psychiatric: He has a normal mood and affect. His behavior is normal.   Nursing note and vitals reviewed.  Hospital records reviewed with pt confirming HPI.      Assessment/Plan   Ankit was seen today for respiratory distress.    Diagnoses and all orders for this visit:    Acute on chronic respiratory failure with hypoxemia (CMS/HCC)    Leukopenia, unspecified type    Hospital discharge follow-up    Pt encouraged to keep his specialist f/u to continue the hematologic w/u.  Pt has chronic weakness and dyspnea issues related to his multiple medical issues. He is a fall risk. Would strongly recommend a wheelchair to be used from here on out.         "

## 2019-07-03 NOTE — TELEPHONE ENCOUNTER
Pts daughter calls in to ask if we can clarify with the pharmacy what dose Mr. Mack is actually on. She said they filled the 20 mg but would not fill the 40 mg.       Also pt would like to switch to Dr. Rdz if possible.     Pts daughter can be reached at [phone]. Her name is Anna Shaffer   [place]

## 2019-07-03 NOTE — OUTREACH NOTE
COPD/PN Week 1 Survey      Responses   Facility patient discharged from?  [facility]   Does the patient have one of the following disease processes/diagnoses(primary or secondary)?  COPD/Pneumonia   Is there a successful TCM telephone encounter documented?  No   Was the primary reason for admission:  Pneumonia   Week 1 attempt successful?  No   Unsuccessful attempts  Attempt 1          Chelsy Valencia RN

## 2019-07-05 ENCOUNTER — READMISSION MANAGEMENT (OUTPATIENT)
Dept: CALL CENTER | Facility: HOSPITAL | Age: 79
End: 2019-07-05

## 2019-07-05 NOTE — OUTREACH NOTE
COPD/PN Week 1 Survey      Responses   Facility patient discharged from?  [place]   Does the patient have one of the following disease processes/diagnoses(primary or secondary)?  COPD/Pneumonia   Is there a successful TCM telephone encounter documented?  No   Was the primary reason for admission:  Pneumonia   Week 1 attempt successful?  Yes   Call start time  1437   Call end time  1447   Discharge diagnosis  pneumonia and acute on chronic decompensated heart failure     Meds reviewed with patient/caregiver?  Yes   Is the patient having any side effects they believe may be caused by any medication additions or changes?  No   Does the patient have all medications ordered at discharge?  Yes   Is the patient taking all medications as directed (includes completed medication regime)?  Yes   Does the patient have a primary care provider?   Yes   Does the patient have an appointment with their PCP or pulmonologist within 7 days of discharge?  Yes   Has the patient kept scheduled appointments due by today?  Yes   Comments  Has seen Dr Lagos   What is the Home health agency?   [facility]     Has home health visited the patient within 72 hours of discharge?  Yes   Home health comments      What DME was ordered?  10 liter concentrator - [place]   Has all DME been delivered?  Yes   DME comments  using Trilogy and he has contacted [employer] about obtaining a new neb machine   Psychosocial issues?  No   Did the patient receive a copy of their discharge instructions?  Yes   Nursing interventions  Reviewed instructions with patient   What is the patient's perception of their health status since discharge?  Improving   Nursing Interventions  Nurse provided patient education   Is the patient/caregiver able to teach back the hierarchy of who to call/visit for symptoms/problems? PCP, Specialist, Home health nurse, Urgent Care, ED, 911  Yes   Additional teach back comments  Enc him to keep all f/u appts. He requests the therapist from  [facility] to call ahead of time before anyone comes to see him. Willl call HH with this information.   Is the patient/caregiver able to teach back signs and symptoms of worsening condition:  Fever/chills, Shortness of breath, Chest pain   Is the patient/caregiver able to teach back importance of completing antibiotic course of treatment?  Yes   Week 1 call completed?  Yes   Wrap up additional comments  Spoke to Loretta at [facility] to have them call before they come to see pt and he can plan for their visit.          Vera Momin RN

## 2019-07-09 ENCOUNTER — OFFICE VISIT (OUTPATIENT)
Dept: ONCOLOGY | Facility: CLINIC | Age: 79
End: 2019-07-09

## 2019-07-09 ENCOUNTER — LAB (OUTPATIENT)
Dept: OTHER | Facility: HOSPITAL | Age: 79
End: 2019-07-09

## 2019-07-09 VITALS
WEIGHT: 223.9 LBS | BODY MASS INDEX: 33.93 KG/M2 | TEMPERATURE: 98.1 F | DIASTOLIC BLOOD PRESSURE: 71 MMHG | OXYGEN SATURATION: 95 % | SYSTOLIC BLOOD PRESSURE: 109 MMHG | RESPIRATION RATE: 18 BRPM | HEART RATE: 65 BPM | HEIGHT: 68 IN

## 2019-07-09 DIAGNOSIS — C34.32 MALIGNANT NEOPLASM OF LOWER LOBE OF LEFT LUNG (HCC): ICD-10-CM

## 2019-07-09 DIAGNOSIS — D61.818 PANCYTOPENIA (HCC): ICD-10-CM

## 2019-07-09 DIAGNOSIS — D46.9 MYELODYSPLASIA (MYELODYSPLASTIC SYNDROME) (HCC): ICD-10-CM

## 2019-07-09 DIAGNOSIS — D53.9 MACROCYTIC ANEMIA: ICD-10-CM

## 2019-07-09 DIAGNOSIS — D80.1 HYPOGAMMAGLOBULINEMIA (HCC): Primary | ICD-10-CM

## 2019-07-09 LAB
BASOPHILS # BLD AUTO: 0.04 10*3/MM3 (ref 0–0.2)
BASOPHILS NFR BLD AUTO: 1.2 % (ref 0–1.5)
DEPRECATED RDW RBC AUTO: 53.6 FL (ref 37–54)
EOSINOPHIL # BLD AUTO: 0.12 10*3/MM3 (ref 0–0.4)
EOSINOPHIL NFR BLD AUTO: 3.7 % (ref 0.3–6.2)
ERYTHROCYTE [DISTWIDTH] IN BLOOD BY AUTOMATED COUNT: 14.1 % (ref 12.3–15.4)
HCT VFR BLD AUTO: 29.6 % (ref 37.5–51)
HGB BLD-MCNC: 9.8 G/DL (ref 13–17.7)
HGB RETIC QN AUTO: 36.1 PG (ref 29.8–36.1)
IMM GRANULOCYTES # BLD AUTO: 0.13 10*3/MM3 (ref 0–0.05)
IMM GRANULOCYTES NFR BLD AUTO: 4 % (ref 0–0.5)
IMM RETICS NFR: 6.9 % (ref 3–15.8)
LYMPHOCYTES # BLD AUTO: 0.96 10*3/MM3 (ref 0.7–3.1)
LYMPHOCYTES NFR BLD AUTO: 29.8 % (ref 19.6–45.3)
MCH RBC QN AUTO: 34.8 PG (ref 26.6–33)
MCHC RBC AUTO-ENTMCNC: 33.1 G/DL (ref 31.5–35.7)
MCV RBC AUTO: 105 FL (ref 79–97)
MONOCYTES # BLD AUTO: 0.69 10*3/MM3 (ref 0.1–0.9)
MONOCYTES NFR BLD AUTO: 21.4 % (ref 5–12)
NEUTROPHILS # BLD AUTO: 1.28 10*3/MM3 (ref 1.7–7)
NEUTROPHILS NFR BLD AUTO: 39.9 % (ref 42.7–76)
NRBC BLD AUTO-RTO: 0 /100 WBC (ref 0–0.2)
PLATELET # BLD AUTO: 191 10*3/MM3 (ref 140–450)
PMV BLD AUTO: 11.2 FL (ref 6–12)
RBC # BLD AUTO: 2.82 10*6/MM3 (ref 4.14–5.8)
RETICS/RBC NFR AUTO: 1.62 % (ref 0.7–1.9)
WBC NRBC COR # BLD: 3.22 10*3/MM3 (ref 3.4–10.8)

## 2019-07-09 PROCEDURE — 85025 COMPLETE CBC W/AUTO DIFF WBC: CPT | Performed by: INTERNAL MEDICINE

## 2019-07-09 PROCEDURE — 36415 COLL VENOUS BLD VENIPUNCTURE: CPT

## 2019-07-09 PROCEDURE — 99215 OFFICE O/P EST HI 40 MIN: CPT | Performed by: INTERNAL MEDICINE

## 2019-07-09 PROCEDURE — 82668 ASSAY OF ERYTHROPOIETIN: CPT | Performed by: INTERNAL MEDICINE

## 2019-07-09 PROCEDURE — 85046 RETICYTE/HGB CONCENTRATE: CPT | Performed by: INTERNAL MEDICINE

## 2019-07-09 NOTE — PROGRESS NOTES
Subjective     REASON FOR CONSULTATION:    1.  Myelodysplasia (refractory anemia with ring sideroblasts) diagnosed on bone marrow biopsy 6/26/2019  2. Lung mass on CT chest 11/17/2018  3.  Hypogammaglobulinemia.  First dose of IV immunoglobulin 40 g delivered 6/25/2019    HISTORY OF PRESENT ILLNESS:  The patient is a 79 y.o. year old male who was referred to us by his primary care office for evaluation of macrocytic anemia.  His MCV has been markedly elevated around 107-108..  He was hospitalized from 5/25/2019 through 5/31/2019 with COPD exacerbation and CHF.  During the hospitalization his hemoglobin was around 9.5 g/dL with an MCV as high as 113.    With his initial office consult on 6/18/2019 we ordered additional lab studies to try to further define the etiology of his anemia.  He was due to return to our office for follow-up to review those results but ended up being admitted to the hospital from 6/20/2019 to 7/1/2019 for treatment of pneumonia and COPD exacerbation.  He was seen for hematology consultation as an inpatient by Dr. Gupta on 6/25/2019.  Bone marrow biopsy was ordered which was performed on 6/26/2019 and showed dysplastic changes and ring sideroblasts.  Blasts were less than 5%.  Chromosome karyotype is pending.    Also during the hospitalization he was found to be profoundly hypogammaglobulinemic with an IgG level of 365.  He had a history of recurring pulmonary infections and during the admission received 1 dose of IV immunoglobulin at 40 g total dose on 6/25/2019.    He returns today for his first office follow-up since discharge home from the hospital on 7/1/2019.  He is accompanied by his daughter.  He reports he is feeling better in general although he still very weak.  His hemoglobin is actually improved at 9.8 g/dL today.    History of Present Illness     Past Medical History:   Diagnosis Date   • Acute congestive heart failure (CMS/HCC)    • Anemia    • Anxiety    • Atrial flutter with  rapid ventricular response (CMS/HCC)    • Benign essential hypertension    • COPD (chronic obstructive pulmonary disease) (CMS/HCC)    • Elevated cholesterol    • Essential hypertension    • GERD (gastroesophageal reflux disease)    • H/O complete eye exam 06/2018   • Heart attack (CMS/HCC)    • History of chest x-ray 12/08/2013    RLL infiltrate   • History of pulmonary function tests 06/10/2014   • Hyperglycemia    • Hyperlipidemia    • Leukocytosis    • Lung cancer (CMS/HCC) 2012    Left   • Obesity    • Osteoarthritis, knee    • Persistent atrial fibrillation (CMS/HCC)    • Prostate cancer (CMS/HCC) 2000   • Seizures (CMS/HCC)    • Sleep apnea    • Thoracic aortic aneurysm without rupture (CMS/HCC)    • Thrombocytopenia (CMS/HCC)    • Type 2 diabetes mellitus without complication, without long-term current use of insulin (CMS/HCC)         Past Surgical History:   Procedure Laterality Date   • ABDOMINAL AORTIC ANEURYSM REPAIR  2010    Dr. Adarsh Dennis   • CARDIAC CATHETERIZATION Left 02/06/2004   • CATARACT EXTRACTION  10/2014    also 11/14   • COLONOSCOPY     • ENDOVASCULAR THORACIC ANEURYSM REPAIR     • JOINT REPLACEMENT Left 10/2010    anette; Dr. Wolf   • JOINT REPLACEMENT Right 09/2011    knee; Dr. Wolf   • LUNG LOBECTOMY Left 01/16/2012    LLL; Dr. Mendoza   • LUNG LOBECTOMY Left 2012   • PROSTATE SURGERY  2000    Dr. Naranjo   • VASCULAR SURGERY  05/2009    TEVAR of thoracic aortic aneurysm   • VASCULAR SURGERY  12/15/2003    Left carotid subclavian bypass graft, ligation of proximal left subclavian following carotid subclavian bypass. Right femoral sheath. Arch angiography with descending thoracic and abdominal aortography    • VASCULAR SURGERY  11/17/2003    Selective catheterization right vertebral artery, right subclavian artery, left subclavian artery, left vertebral artery.     • VASCULAR SURGERY  06/05/2002    Tracheostomy, bronchoscopy   • VASCULAR SURGERY  05/21/2002    Thoracic arteriogram and  abdominal aortogram        Current Outpatient Medications on File Prior to Visit   Medication Sig Dispense Refill   • albuterol (PROVENTIL HFA;VENTOLIN HFA) 108 (90 Base) MCG/ACT inhaler Inhale 2 puffs Every 4 (Four) Hours As Needed for Wheezing.     • albuterol (PROVENTIL) (2.5 MG/3ML) 0.083% nebulizer solution Take 2.5 mg by nebulization 4 (Four) Times a Day.     • ALPRAZolam (XANAX) 1 MG tablet Take 1 tablet by mouth At Night As Needed for Anxiety. 90 tablet 0   • apixaban (ELIQUIS) 5 MG tablet tablet Take 1 tablet by mouth Every 12 (Twelve) Hours. 60 tablet 2   • atorvastatin (LIPITOR) 10 MG tablet Take 1 tablet by mouth Daily. For cholesterol 90 tablet 1   • budesonide (PULMICORT) 0.5 MG/2ML nebulizer solution Take 2 mL by nebulization 2 (Two) Times a Day. 120 each 1   • diltiaZEM (TIAZAC) 360 MG 24 hr capsule Take 1 capsule by mouth Daily. 30 capsule 2   • folic acid (FOLVITE) 1 MG tablet Take 1 mg by mouth Daily.     • furosemide (LASIX) 20 MG tablet Take 1 tablet by mouth Daily. 90 tablet 3   • furosemide (LASIX) 40 MG tablet TAKE 1 TABLET BY MOUTH EVERY DAY 30 tablet 0   • metFORMIN (GLUCOPHAGE) 1000 MG tablet Take 1,000 mg by mouth Daily With Breakfast.     • metoprolol tartrate (LOPRESSOR) 100 MG tablet Take 1 tablet by mouth Every 12 (Twelve) Hours. 60 tablet 2   • polyethylene glycol (MIRALAX) packet Take 17 g by mouth Daily.     • Revefenacin (YUPELRI) 175 MCG/3ML solution Inhale 3 mL (1 vial) by nebulization Daily. 90 mL 1     No current facility-administered medications on file prior to visit.         ALLERGIES:    Allergies   Allergen Reactions   • Erythromycin Itching        Social History     Socioeconomic History   • Marital status: Single     Spouse name: Not on file   • Number of children: Not on file   • Years of education: Not on file   • Highest education level: Not on file   Occupational History     Employer: RETIRED   Tobacco Use   • Smoking status: Former Smoker     Packs/day: 1.00      "Types: Cigarettes     Last attempt to quit: 4/15/2010     Years since quittin.2   • Smokeless tobacco: Never Used   Substance and Sexual Activity   • Alcohol use: Yes     Frequency: Never     Comment: rare   • Drug use: No   • Sexual activity: Defer        Family History   Problem Relation Age of Onset   • Cancer Mother    • COPD Mother    • Cancer Father         lung   • Colon cancer Father    • Cancer Other         colon   • Diabetes Other    • Emphysema Other    • Hypertension Other    • Kidney disease Other    • Lung cancer Other         Review of Systems   Constitutional: Positive for fatigue. Negative for activity change, chills and fever.   HENT: Negative for mouth sores, trouble swallowing and voice change.    Eyes: Negative for pain and visual disturbance.   Respiratory: Positive for shortness of breath. Negative for cough and wheezing.    Cardiovascular: Positive for leg swelling. Negative for chest pain and palpitations.   Gastrointestinal: Positive for abdominal pain and nausea. Negative for constipation, diarrhea and vomiting.   Genitourinary: Negative for difficulty urinating, frequency and urgency.   Musculoskeletal: Positive for arthralgias and gait problem. Negative for joint swelling.   Skin: Negative for rash.   Neurological: Positive for weakness. Negative for dizziness, seizures and headaches.   Hematological: Negative for adenopathy. Bruises/bleeds easily.   Psychiatric/Behavioral: Negative for behavioral problems and confusion. The patient is not nervous/anxious.         Objective     Vitals:    19 1526   BP: 109/71   Pulse: 65   Resp: 18   Temp: 98.1 °F (36.7 °C)   SpO2: 95%  Comment: OX ON 4L   Weight: 102 kg (223 lb 14.4 oz)   Height: 172 cm (67.72\")   PainSc: 0-No pain     Current Status 2019   ECOG score 0       Physical Exam   Constitutional: He is oriented to person, place, and time. He appears well-developed and well-nourished. No distress.   Chronically ill appearing " obese gentleman wearing nasal oxygen   HENT:   Head: Normocephalic.   Eyes: Conjunctivae and EOM are normal. Pupils are equal, round, and reactive to light. No scleral icterus.   Neck: Normal range of motion. Neck supple. No JVD present. No thyromegaly present.   Cardiovascular: Normal rate. An irregular rhythm present. Exam reveals distant heart sounds. Exam reveals no gallop and no friction rub.   No murmur heard.  Pulmonary/Chest: Effort normal. He has decreased breath sounds in the right lower field and the left lower field. He has rales.   Abdominal: Soft. He exhibits distension. He exhibits no mass. There is no tenderness.   Musculoskeletal: Normal range of motion. He exhibits edema. He exhibits no deformity.   Lymphadenopathy:     He has no cervical adenopathy.   Neurological: He is alert and oriented to person, place, and time. He has normal reflexes. No cranial nerve deficit.   Skin: Skin is warm and dry. Purpura and rash noted. No erythema.   Psychiatric: He has a normal mood and affect. His behavior is normal. Judgment normal.         RECENT LABS:  Hematology WBC   Date Value Ref Range Status   07/09/2019 3.22 (L) 3.40 - 10.80 10*3/mm3 Final   05/15/2019 3.79 3.40 - 10.80 10*3/mm3 Final   02/08/2018 4.11 (L) 4.5 - 11.0 10*3/uL Final     RBC   Date Value Ref Range Status   07/09/2019 2.82 (L) 4.14 - 5.80 10*6/mm3 Final   05/15/2019 3.05 (L) 4.14 - 5.80 10*6/mm3 Final   02/08/2018 3.65 (L) 4.5 - 5.9 10*6/uL Final     Hemoglobin   Date Value Ref Range Status   07/09/2019 9.8 (L) 13.0 - 17.7 g/dL Final   02/08/2018 13.0 (L) 13.5 - 17.5 g/dL Final     Hematocrit   Date Value Ref Range Status   07/09/2019 29.6 (L) 37.5 - 51.0 % Final   02/08/2018 37.5 (L) 41.0 - 53.0 % Final     Platelets   Date Value Ref Range Status   07/09/2019 191 140 - 450 10*3/mm3 Final   02/08/2018 164 140 - 440 10*3/uL Final          Lab Results   Component Value Date    GLUCOSE 111 (H) 07/01/2019    CALCIUM 8.6 07/01/2019      07/01/2019    K 4.2 07/01/2019    CO2 28.5 07/01/2019     07/01/2019    BUN 23 07/01/2019    CREATININE 0.93 07/01/2019    EGFRIFAFRI 70 05/15/2019    EGFRIFNONA 78 07/01/2019    BCR 24.7 07/01/2019    ANIONGAP 8.5 07/01/2019     Lab Results   Component Value Date    IRON 89 06/18/2019    TIBC 288 (L) 06/18/2019    FERRITIN 275.90 06/18/2019     Lab Results   Component Value Date    XUQYIHID90 1,596 (H) 06/18/2019     Lab Results   Component Value Date    FOLATE >20.00 06/18/2019       SPEP/ THO 6/18/2019  IgG 700 - 1600 mg/dL 365 Abnormally low     IgA 61 - 437 mg/dL 169    IgM 15 - 143 mg/dL 40    Total Protein 6.0 - 8.5 g/dL 5.9 Abnormally low     Albumin 2.9 - 4.4 g/dL 3.6    Alpha-1-Globulin 0.0 - 0.4 g/dL 0.3    Alpha-2-Globulin 0.4 - 1.0 g/dL 0.7    Beta Globulin 0.7 - 1.3 g/dL 0.9    Gamma Globulin 0.4 - 1.8 g/dL 0.4    M-Dejuan Not Observed g/dL Not Observed    Globulin 2.2 - 3.9 g/dL 2.3    A/G Ratio 0.7 - 1.7 1.6    Immunofixation Reflex, Serum  Comment    Comment: No monoclonality detected.     Erythropoietin 2.6 - 18.5 mIU/mL 34.9 Abnormally high         Bone marrow biopsy 6/26/2019  Final Diagnosis   CONSULTANT DIAGNOSIS:  Hypercellular Bone Marrow (60% total marrow cellularity) demonstrating significant dysplasia present in all three hematopoietic cell lines with 2-3+ stainable hemosiderin and 14% ringed sideroblasts.       No metastatic carcinoma, granulomas, vasculitis, viral inclusions, increase in myeloblasts, plasma cells or malignant lymphoma.       CONSULTANT COMMENT:  These changes would be most supportive of a low grade myelodysplasia; however, this must be correlated with appropriate B12 and RBC folate levels as well as pending karyotype for final disease classification.       CT CHEST WO CONTRAST 6/23/2019  IMPRESSION:   1. Multifocal patchy and consolidative opacities in the lung bases have  represent pneumonia although possibility of underlying neoplasm is not  excluded. Increased  size of mediastinal lymph node. Clinical correlation  and further evaluation/follow-up recommended.  2. Increased right more than left pleural effusions.  3. The descending thoracic aortic aneurysm measures slightly larger.      Assessment/Plan   1.  New diagnosis of myelodysplasia with chronic anemia and leukopenia.  Bone marrow biopsy seem to be consistent with low-grade myelodysplasia with refractory anemia with ringed sideroblasts.  Blasts in the marrow were less than 5%.  Marrow karyotype is pending.  2.  Severe emphysema/COPD.  Patient requires oxygen around-the-clock  3.  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  Patient received 1 dose of IV immune globulin 40 g during his hospitalization on 6/25/2019 and likely will require monthly IV immunoglobulin infusions once the supply of IVIG is restored.  4.  Pneumonia which is improved clinically following antibiotics and IV immunoglobulin.  5.  History of non-small cell lung cancer from the left lower lobe resected by Dr. Hart Linker 2013.  No definite evidence of recurrent malignancy noted on the most recent CT scan of the chest from 6/23/2019 as noted above.    Plan  1.  I discussed the diagnosis of myelodysplasia with the patient and his family at length and provided them with a printed copy of the bone marrow biopsy report.  We discussed there is no cure for myelodysplasia and that treatment may involve chemotherapy or supportive care.  Hopefully this is a low-grade myelodysplasia and in the future he may be a candidate for Procrit therapy and or periodic transfusions.  I do not think he will ever be a candidate for Vidaza or Dacogen therapy based on his marginal performance status and comorbid conditions.  2.  We also discussed his profound hypogammaglobulinemia and recurring pneumonias.  He would likely benefit from monthly IVIG infusions to try to prevent further pulmonary infections but unfortunately IVIG is in short supply  currently.  3.  As his hemoglobin has improved somewhat, we will plan to observe him for now with repeat lab in 2 weeks and MD follow-up with labs in 4 weeks.  If IV immunoglobulin is available at that time we may proceed with another dose of IVIG at that time with plans to continue monthly IVIG when available.  4.  If his hemoglobin declines over the next few weeks we may also consider initiating a trial of Procrit therapy.

## 2019-07-10 DIAGNOSIS — I50.9 ACUTE CONGESTIVE HEART FAILURE, UNSPECIFIED HEART FAILURE TYPE (HCC): ICD-10-CM

## 2019-07-10 RX ORDER — DILTIAZEM HYDROCHLORIDE 360 MG/1
360 CAPSULE, EXTENDED RELEASE ORAL
Qty: 30 CAPSULE | Refills: 2 | Status: SHIPPED | OUTPATIENT
Start: 2019-07-10 | End: 2019-10-24

## 2019-07-10 RX ORDER — METOPROLOL TARTRATE 100 MG/1
100 TABLET ORAL EVERY 12 HOURS SCHEDULED
Qty: 60 TABLET | Refills: 2 | Status: SHIPPED | OUTPATIENT
Start: 2019-07-10 | End: 2019-08-30 | Stop reason: SDUPTHER

## 2019-07-11 LAB
CYTO UR: NORMAL
DX PRELIMINARY: NORMAL
ETHNIC BACKGROUND STATED: 29.5 MIU/ML (ref 2.6–18.5)
LAB AP CASE REPORT: NORMAL
LAB AP CLINICAL INFORMATION: NORMAL
LAB AP DIAGNOSIS COMMENT: NORMAL
LAB AP FLOW CYTOMETRY SUMMARY: NORMAL
LAB AP SPECIAL STAINS: NORMAL
PATH REPORT.ADDENDUM SPEC: NORMAL
PATH REPORT.FINAL DX SPEC: NORMAL
PATH REPORT.GROSS SPEC: NORMAL

## 2019-07-12 ENCOUNTER — READMISSION MANAGEMENT (OUTPATIENT)
Dept: CALL CENTER | Facility: HOSPITAL | Age: 79
End: 2019-07-12

## 2019-07-12 NOTE — OUTREACH NOTE
COPD/PN Week 2 Survey      Responses   Facility patient discharged from?  [facility]   Does the patient have one of the following disease processes/diagnoses(primary or secondary)?  COPD/Pneumonia   Was the primary reason for admission:  Pneumonia   Week 2 attempt successful?  Yes   Call start time  1610   Call end time  1618   Discharge diagnosis  pneumonia and acute on chronic decompensated heart failure     Meds reviewed with patient/caregiver?  Yes   Is the patient having any side effects they believe may be caused by any medication additions or changes?  No   Does the patient have all medications ordered at discharge?  Yes   Is the patient taking all medications as directed (includes completed medication regime)?  Yes   Does the patient have a primary care provider?   Yes   Comments regarding PCP  Dr. Lagos   Has the patient kept scheduled appointments due by today?  Yes   What is the Home health agency?   [facility]     What DME was ordered?  Pt uses [employer] all the time   Psychosocial issues?  No   What is the patient's perception of their health status since discharge?  Improving   Nursing Interventions  Nurse provided patient education   Are the patient's immunizations up to date?   Yes   Nursing interventions  Educated on importance of maintaining up to date immunizations as advised by provider   Is the patient able to teach back COPD zones?  Yes   Nursing interventions  Education provided on various zones   Patient reports what zone on this call?  Green Zone   Green Zone  Reports doing well, Breathing without shortness of breath, Usual activity and exercise level, Sleeping well, Appetite is good, Usual amount of phlegm/mucus without difficulty coughing up   Green Zone interventions:  Take daily medications, Use oxygen as prescribed, Continue regular exercise/diet plan, Do not smoke, Avoid indoor/outdoor triggers   Is the patient/caregiver able to teach back signs and symptoms of worsening condition:  Chest  pain, Shortness of breath, Fever/chills   Is the patient/caregiver able to teach back importance of completing antibiotic course of treatment?  Yes   Week 2 call completed?  Yes          Audrey Bains RN

## 2019-07-15 DIAGNOSIS — E78.2 MIXED HYPERLIPIDEMIA: ICD-10-CM

## 2019-07-15 RX ORDER — ATORVASTATIN CALCIUM 10 MG/1
10 TABLET, FILM COATED ORAL DAILY
Qty: 90 TABLET | Refills: 1 | Status: SHIPPED | OUTPATIENT
Start: 2019-07-15 | End: 2019-12-30

## 2019-07-16 ENCOUNTER — OFFICE VISIT (OUTPATIENT)
Dept: CARDIOLOGY | Facility: CLINIC | Age: 79
End: 2019-07-16

## 2019-07-16 VITALS
DIASTOLIC BLOOD PRESSURE: 80 MMHG | OXYGEN SATURATION: 94 % | SYSTOLIC BLOOD PRESSURE: 142 MMHG | BODY MASS INDEX: 33.34 KG/M2 | HEART RATE: 70 BPM | HEIGHT: 68 IN | WEIGHT: 220 LBS

## 2019-07-16 DIAGNOSIS — G47.33 OSA ON CPAP: ICD-10-CM

## 2019-07-16 DIAGNOSIS — I45.10 RBBB: ICD-10-CM

## 2019-07-16 DIAGNOSIS — Z99.89 OSA ON CPAP: ICD-10-CM

## 2019-07-16 DIAGNOSIS — I50.32 CHRONIC DIASTOLIC CONGESTIVE HEART FAILURE (HCC): ICD-10-CM

## 2019-07-16 DIAGNOSIS — I48.0 PAROXYSMAL ATRIAL FIBRILLATION (HCC): Primary | ICD-10-CM

## 2019-07-16 PROCEDURE — 99214 OFFICE O/P EST MOD 30 MIN: CPT | Performed by: NURSE PRACTITIONER

## 2019-07-16 PROCEDURE — 93000 ELECTROCARDIOGRAM COMPLETE: CPT | Performed by: NURSE PRACTITIONER

## 2019-07-16 RX ORDER — FUROSEMIDE 40 MG/1
40 TABLET ORAL DAILY
Qty: 90 TABLET | Refills: 0 | Status: SHIPPED | OUTPATIENT
Start: 2019-07-16 | End: 2019-11-01 | Stop reason: SDUPTHER

## 2019-07-16 NOTE — PROGRESS NOTES
Date of Office Visit: 2019  Encounter Provider: Cheri Méndez, MARCIAL, APRN  Place of Service: [facility] CARDIOLOGY  Patient Name: Ankit Mack Sr.  :1940        Subjective:     Chief Complaint:  Follow-up, chronic diastolic heart failure, paroxysmal atrial fibrillation      History of Present Illness:  Ankit Mack Sr. is a 79 y.o. male patient of Dr. Rdz.  I am seeing this patient in the office today and I reviewed his records.    Patient has a history of COPD, paroxysmal atrial fibrillation, diastolic CHF, sleep apnea on CPAP therapy, hypertension, thoracic aortic aneurysm status post stent grafting in [place], [place].     Patient was admitted 18 after complaining of progressive shortness of breath.  He had been on steroids and Levaquin but had not started to feel better.  He presented to the ER  where it was noted that he had an increased BNP and pulmonary edema noted on chest x-ray.  He was treated with IV Lasix as well as IV steroids for COPD exacerbation.  Viral panel was positive for RSV.  An echocardiogram was obtained which showed normal LV systolic function with grade 1 diastolic dysfunction.  Patient had reported that he would eat out a lot and had eaten Chinese food 1-2 times a week for the previous several weeks.  He reported his hypertension had been well-controlled with medication for over 10 years.  He denied any chest pain, palpitations, syncope, or near-syncope. It was noted that patient diuresed 1200ml.  patient improved and was felt to be stable to go home.  He was instructed to follow-up outpatient in one week. When patient came in for his 18 office visit he had market tachycardia.  Dr. Agustin reviewed the EKG and felt that he was in rapid atrial flutter.  He was started on Eliquis and diltiazem.  He ended up being admitted 18 with RSV and acute respiratory failure.  His diltiazem was titrated.  He was started on digoxin  and heart rate improved.  He was discharged 11/21/18.     Patient was seen back in office by Dr. Gomez November 2018 and he was back in normal sinus rhythm.  He was seen again 12/19/18 and was still in normal rhythm. His digoxin was stopped and he was continued on diltiazem.  He had a Zio patch ordered with plans to stop the Eliquis if there are no signs of atrial fibrillation on it. Zio patch did not show any signs of atrial fibrillation.  Patient wanted to discontinue Eliquis so he decided to do so when he ran out.    Patient was admitted 5/27/2019 with recurrent atrial flutter.  Lifelong anticoagulation was recommended.  5/28/2019 echo showed normal left ventricular systolic function with EF of 56 to 60%, mildly dilated left ventricular cavity, mild to moderate concentric LVH, mildly dilated right ventricular cavity with normal right ventricular systolic function, moderately dilated left atrial cavity, mild aortic stenosis, mild mitral regurgitation.  He was discharged but later readmitted 6/21/2019 with shortness of breath and hypoxia and chest tightness.      Patient presents to office today for follow-up appointment.  His daughter phoned in on speaker phone, per patient preference.  Patient reports he has been doing well overall since hospital discharge.  He reports that he was able to walk into the office today where as in the past he has had to use a wheelchair.  He has some chronic shortness of breath and remains on home oxygen but feels like his shortness of breath has improved and is at baseline.  He gets some occasional lower extremity swelling, however nothing recently since he has been avoiding salt.  He appears euvolemic in the office today.  He has been using a trilogy machine for his sleep apnea and respiratory issues at home.  He does feel like his energy levels have improved and has noticed an increase in his stamina, as evidenced by being able to walk in today from the parking lot.  He reports  that physical therapy has also been coming to his house to help him with some strengthening and balance and he continues to pursue this at their office as he will soon no longer be eligible for home physical therapy.  He is followed by Dr. Hayes with hematology/oncology and reports that he has been diagnosed with myelodysplasia and he is going to be getting some IV infusions and injections as treatment.  The Eliquis is expensive for him so he is going to call [phone] to find out if he is eligible for reduced pricing.  He was given a month supply of samples today.  He will let us know within the next couple of weeks before running out what he is found out about whether he will be able to continue to afford it.  He denies any chest pain, palpitations, racing heartbeat sensation, dizziness, syncope, near syncope, falls, or abnormal bleeding.        Past Medical History:   Diagnosis Date   • Acute congestive heart failure (CMS/HCC)    • Anemia    • Anxiety    • Atrial flutter with rapid ventricular response (CMS/HCC)    • Benign essential hypertension    • COPD (chronic obstructive pulmonary disease) (CMS/HCC)    • Elevated cholesterol    • Essential hypertension    • GERD (gastroesophageal reflux disease)    • H/O complete eye exam 06/2018   • Heart attack (CMS/HCC)    • History of chest x-ray 12/08/2013    RLL infiltrate   • History of pulmonary function tests 06/10/2014   • Hyperglycemia    • Hyperlipidemia    • Leukocytosis    • Lung cancer (CMS/HCC) 2012    Left   • Obesity    • Osteoarthritis, knee    • Persistent atrial fibrillation (CMS/HCC)    • Prostate cancer (CMS/HCC) 2000   • Seizures (CMS/HCC)    • Sleep apnea    • Thoracic aortic aneurysm without rupture (CMS/HCC)    • Thrombocytopenia (CMS/HCC)    • Type 2 diabetes mellitus without complication, without long-term current use of insulin (CMS/HCC)      Past Surgical History:   Procedure Laterality Date   • ABDOMINAL AORTIC ANEURYSM REPAIR  2010      Adarsh Dennis   • CARDIAC CATHETERIZATION Left 02/06/2004   • CATARACT EXTRACTION  10/2014    also 11/14   • COLONOSCOPY     • ENDOVASCULAR THORACIC ANEURYSM REPAIR     • JOINT REPLACEMENT Left 10/2010    knee; Dr. Wolf   • JOINT REPLACEMENT Right 09/2011    knee; Dr. Wolf   • LUNG LOBECTOMY Left 01/16/2012    LLL; Dr. Mendoza   • LUNG LOBECTOMY Left 2012   • PROSTATE SURGERY  2000    Dr. Naranjo   • VASCULAR SURGERY  05/2009    TEVAR of thoracic aortic aneurysm   • VASCULAR SURGERY  12/15/2003    Left carotid subclavian bypass graft, ligation of proximal left subclavian following carotid subclavian bypass. Right femoral sheath. Arch angiography with descending thoracic and abdominal aortography    • VASCULAR SURGERY  11/17/2003    Selective catheterization right vertebral artery, right subclavian artery, left subclavian artery, left vertebral artery.     • VASCULAR SURGERY  06/05/2002    Tracheostomy, bronchoscopy   • VASCULAR SURGERY  05/21/2002    Thoracic arteriogram and abdominal aortogram     Outpatient Medications Prior to Visit   Medication Sig Dispense Refill   • albuterol (PROVENTIL HFA;VENTOLIN HFA) 108 (90 Base) MCG/ACT inhaler Inhale 2 puffs Every 4 (Four) Hours As Needed for Wheezing.     • albuterol (PROVENTIL) (2.5 MG/3ML) 0.083% nebulizer solution Take 2.5 mg by nebulization 4 (Four) Times a Day.     • ALPRAZolam (XANAX) 1 MG tablet Take 1 tablet by mouth At Night As Needed for Anxiety. 90 tablet 0   • apixaban (ELIQUIS) 5 MG tablet tablet Take 1 tablet by mouth Every 12 (Twelve) Hours. 60 tablet 2   • atorvastatin (LIPITOR) 10 MG tablet TAKE 1 TABLET BY MOUTH DAILY. FOR CHOLESTEROL 90 tablet 1   • budesonide (PULMICORT) 0.5 MG/2ML nebulizer solution Take 2 mL by nebulization 2 (Two) Times a Day. 120 each 1   • diltiaZEM (TIAZAC) 360 MG 24 hr capsule Take 1 capsule by mouth Daily. 30 capsule 2   • folic acid (FOLVITE) 1 MG tablet Take 1 mg by mouth Daily.     • metFORMIN (GLUCOPHAGE) 1000 MG  tablet Take 1,000 mg by mouth Daily With Breakfast.     • metoprolol tartrate (LOPRESSOR) 100 MG tablet Take 1 tablet by mouth Every 12 (Twelve) Hours. 60 tablet 2   • polyethylene glycol (MIRALAX) packet Take 17 g by mouth Daily.     • Revefenacin (YUPELRI) 175 MCG/3ML solution Inhale 3 mL (1 vial) by nebulization Daily. 90 mL 1   • furosemide (LASIX) 20 MG tablet Take 1 tablet by mouth Daily. 90 tablet 3   • furosemide (LASIX) 40 MG tablet TAKE 1 TABLET BY MOUTH EVERY DAY 30 tablet 0     No facility-administered medications prior to visit.        Allergies as of 2019 - Reviewed 2019   Allergen Reaction Noted   • Erythromycin Itching 2017     Social History     Socioeconomic History   • Marital status: Single     Spouse name: Not on file   • Number of children: Not on file   • Years of education: Not on file   • Highest education level: Not on file   Occupational History     Employer: RETIRED   Tobacco Use   • Smoking status: Former Smoker     Packs/day: 1.00     Types: Cigarettes     Last attempt to quit: 4/15/2010     Years since quittin.2   • Smokeless tobacco: Never Used   Substance and Sexual Activity   • Alcohol use: Yes     Frequency: Never     Comment: rare   • Drug use: No   • Sexual activity: Defer     Family History   Problem Relation Age of Onset   • Cancer Mother    • COPD Mother    • Cancer Father         lung   • Colon cancer Father    • Cancer Other         colon   • Diabetes Other    • Emphysema Other    • Hypertension Other    • Kidney disease Other    • Lung cancer Other        Review of Systems   Constitution: Negative for chills, fever, malaise/fatigue, weight gain and weight loss.   HENT: Negative for ear pain, hearing loss, nosebleeds and sore throat.    Eyes: Negative for blurred vision, double vision, redness, vision loss in left eye and vision loss in right eye.   Cardiovascular: Positive for dyspnea on exertion.        SEE HPI.    Respiratory: Positive for shortness  "of breath. Negative for cough, snoring and wheezing.    Endocrine: Negative for cold intolerance and heat intolerance.   Skin: Negative for itching, rash and suspicious lesions.   Musculoskeletal: Negative for joint pain, joint swelling and myalgias.   Gastrointestinal: Negative for abdominal pain, diarrhea, hematemesis, melena, nausea and vomiting.   Genitourinary: Negative for dysuria, frequency and hematuria.   Neurological: Negative for dizziness, headaches, numbness, paresthesias and seizures.   Psychiatric/Behavioral: Negative for altered mental status and depression. The patient is not nervous/anxious.           Objective:     Vitals:    07/16/19 1339   BP: 142/80   BP Location: Left arm   Pulse: 70   SpO2: 94%   Weight: 99.8 kg (220 lb)   Height: 172.7 cm (68\")     Body mass index is 33.45 kg/m².      PHYSICAL EXAM:  Physical Exam   Constitutional: He is oriented to person, place, and time. He appears well-developed and well-nourished. No distress.   Obese   HENT:   Head: Normocephalic and atraumatic.   NC O2 in place   Eyes: Pupils are equal, round, and reactive to light.   Neck: Neck supple. No JVD present. Carotid bruit is not present. No tracheal deviation present.   Cardiovascular: Normal rate, regular rhythm, normal heart sounds and intact distal pulses. Exam reveals no gallop and no friction rub.   No murmur heard.  Pulses:       Radial pulses are 2+ on the right side, and 2+ on the left side.        Posterior tibial pulses are 2+ on the right side, and 2+ on the left side.   Pulmonary/Chest: Effort normal and breath sounds normal. No respiratory distress. He has no wheezes. He has no rales.   Abdominal: Soft. Bowel sounds are normal. He exhibits no distension. There is no tenderness.   Musculoskeletal: He exhibits no edema, tenderness or deformity.   Neurological: He is alert and oriented to person, place, and time.   Skin: Skin is warm and dry. No rash noted. He is not diaphoretic. No erythema. "   Psychiatric: He has a normal mood and affect. His behavior is normal. Judgment normal.           ECG 12 Lead  Date/Time: 7/16/2019 1:52 PM  Performed by: Cheri Méndez DNP, APRN  Authorized by: Cheri Méndez DNP, APRN   Comparison: compared with previous ECG from 6/21/2019  Similar to previous ECG  Rhythm: sinus rhythm  Ectopy: atrial premature contractions  Rate: normal  BPM: 71  Conduction: right bundle branch block  Comments: No significant changes from previous ECG.              Assessment:       Diagnosis Plan   1. Paroxysmal atrial fibrillation (CMS/HCC)  ECG 12 Lead   2. Chronic diastolic congestive heart failure (CMS/HCC)  furosemide (LASIX) 40 MG tablet   3. RBBB  ECG 12 Lead   4. DOMINGO on CPAP           Plan:     1. Paroxysmal atrial fibrillation/flutter: On metoprolol, diltiazem, and Eliquis.  He is asymptomatic when in atrial fibrillation.  He denies abnormal bleeding or falls.  He is in sinus rhythm in the office today.  He is going to check with Eliquis company to see if he is eligible for reduced pricing.  He was given a month's supply of samples today will call the office within the next couple of weeks with an update prior to running out.  We will continue with current plan for now.    Atrial Fibrillation and Atrial Flutter  Assessment  • The patient has paroxysmal atrial fibrillation  • This is non-valvular in etiology  • The patient's CHADS2-VASc score is 5  • A [id number] score of 2 or more is considered a high risk for a thromboembolic event    Plan  • Attempt to maintain sinus rhythm  • Continue apixaban for antithrombotic therapy, bleeding issues discussed  • Continue beta blocker and diltiazem for rhythm control    2. Chronic diastolic heart failure: On Lasix.  Appears euvolemic in the office today.  Patient to continue to avoid salt.  Patient reports he has been taking 40 mg of Lasix for a a while now.  He had recent BMP that showed normal kidney function and potassium level.  We  will continue current dose at this time.    Heart Failure  Subjective/Objective  • The patient reports dyspnea    • Physical exam findings negative for rales and elevated JVP.      3. Right bundle branch block  4. Chronic hypoxic respiratory failure and COPD: On home oxygen.  Followed by pulmonology.  5. Obstructive sleep apnea: On CPAP therapy  6. Thoracic aortic aneurysm: Status post TEAVR  7. History of left carotid subclavian bypass    Patient to keep 8/26/2019 follow-up with Dr. Rdz as scheduled or follow-up sooner if needed for any new, recurrent, or worsening symptoms or other problems/concerns.           Your medication list           Accurate as of 7/16/19  3:17 PM. If you have any questions, ask your nurse or doctor.               CHANGE how you take these medications      Instructions Last Dose Given Next Dose Due   furosemide 40 MG tablet  Commonly known as:  LASIX  What changed:  Another medication with the same name was removed. Continue taking this medication, and follow the directions you see here.  Changed by:  Cheri Méndez, DNP, APRN      Take 1 tablet by mouth Daily.          CONTINUE taking these medications      Instructions Last Dose Given Next Dose Due   albuterol (2.5 MG/3ML) 0.083% nebulizer solution  Commonly known as:  PROVENTIL      Take 2.5 mg by nebulization 4 (Four) Times a Day.       albuterol sulfate  (90 Base) MCG/ACT inhaler  Commonly known as:  PROVENTIL HFA;VENTOLIN HFA;PROAIR HFA      Inhale 2 puffs Every 4 (Four) Hours As Needed for Wheezing.       ALPRAZolam 1 MG tablet  Commonly known as:  XANAX      Take 1 tablet by mouth At Night As Needed for Anxiety.       apixaban 5 MG tablet tablet  Commonly known as:  ELIQUIS      Take 1 tablet by mouth Every 12 (Twelve) Hours.       atorvastatin 10 MG tablet  Commonly known as:  LIPITOR      TAKE 1 TABLET BY MOUTH DAILY. FOR CHOLESTEROL       budesonide 0.5 MG/2ML nebulizer solution  Commonly known as:  PULMICORT      Take  2 mL by nebulization 2 (Two) Times a Day.       diltiaZEM 360 MG 24 hr capsule  Commonly known as:  TIAZAC      Take 1 capsule by mouth Daily.       folic acid 1 MG tablet  Commonly known as:  FOLVITE      Take 1 mg by mouth Daily.       metFORMIN 1000 MG tablet  Commonly known as:  GLUCOPHAGE      Take 1,000 mg by mouth Daily With Breakfast.       metoprolol tartrate 100 MG tablet  Commonly known as:  LOPRESSOR      Take 1 tablet by mouth Every 12 (Twelve) Hours.       polyethylene glycol packet  Commonly known as:  MIRALAX      Take 17 g by mouth Daily.       YUPELRI 175 MCG/3ML solution  Generic drug:  Revefenacin      Inhale 3 mL (1 vial) by nebulization Daily.             Where to Get Your Medications      These medications were sent to [facility]/pharmacy #[id number] - [facility], [place] - [address] AT [facility] [phone] [facility] [phone]   [address] [facility] [address]    Phone:  [phone]   · furosemide 40 MG tablet         I did not stop or change the above medications.  Patient's medication list was updated to reflect medications they are currently taking including medication changes made by other providers.          Thanks,    Cheri Méndez, MARCIAL, APRN  07/16/2019         Dictated utilizing Dragon dictation

## 2019-07-19 ENCOUNTER — READMISSION MANAGEMENT (OUTPATIENT)
Dept: CALL CENTER | Facility: HOSPITAL | Age: 79
End: 2019-07-19

## 2019-07-19 NOTE — OUTREACH NOTE
COPD/PN Week 3 Survey      Responses   Facility patient discharged from?  [facility]   Does the patient have one of the following disease processes/diagnoses(primary or secondary)?  COPD/Pneumonia   Was the primary reason for admission:  Pneumonia   Week 3 attempt successful?  No   Unsuccessful attempts  Attempt 1          Rosa Diaz RN

## 2019-07-20 ENCOUNTER — APPOINTMENT (OUTPATIENT)
Dept: GENERAL RADIOLOGY | Facility: HOSPITAL | Age: 79
End: 2019-07-20

## 2019-07-20 ENCOUNTER — READMISSION MANAGEMENT (OUTPATIENT)
Dept: CALL CENTER | Facility: HOSPITAL | Age: 79
End: 2019-07-20

## 2019-07-20 PROCEDURE — 73630 X-RAY EXAM OF FOOT: CPT | Performed by: FAMILY MEDICINE

## 2019-07-20 NOTE — OUTREACH NOTE
COPD/PN Week 3 Survey      Responses   Facility patient discharged from?  [facility]   Does the patient have one of the following disease processes/diagnoses(primary or secondary)?  COPD/Pneumonia   Was the primary reason for admission:  Pneumonia   Week 3 attempt successful?  No   Unsuccessful attempts  Attempt 2          Sarah Acosta RN

## 2019-07-22 ENCOUNTER — EPISODE CHANGES (OUTPATIENT)
Dept: CASE MANAGEMENT | Facility: OTHER | Age: 79
End: 2019-07-22

## 2019-07-23 ENCOUNTER — LAB (OUTPATIENT)
Dept: LAB | Facility: HOSPITAL | Age: 79
End: 2019-07-23

## 2019-07-23 ENCOUNTER — CLINICAL SUPPORT (OUTPATIENT)
Dept: ONCOLOGY | Facility: HOSPITAL | Age: 79
End: 2019-07-23

## 2019-07-23 VITALS
TEMPERATURE: 98.3 F | SYSTOLIC BLOOD PRESSURE: 148 MMHG | DIASTOLIC BLOOD PRESSURE: 71 MMHG | HEART RATE: 66 BPM | OXYGEN SATURATION: 95 %

## 2019-07-23 DIAGNOSIS — D46.9 MYELODYSPLASIA (MYELODYSPLASTIC SYNDROME) (HCC): ICD-10-CM

## 2019-07-23 DIAGNOSIS — D61.818 PANCYTOPENIA (HCC): ICD-10-CM

## 2019-07-23 DIAGNOSIS — D80.1 HYPOGAMMAGLOBULINEMIA (HCC): ICD-10-CM

## 2019-07-23 DIAGNOSIS — D53.9 MACROCYTIC ANEMIA: ICD-10-CM

## 2019-07-23 LAB
BASOPHILS # BLD AUTO: 0.03 10*3/MM3 (ref 0–0.2)
BASOPHILS NFR BLD AUTO: 0.9 % (ref 0–1.5)
DEPRECATED RDW RBC AUTO: 58 FL (ref 37–54)
EOSINOPHIL # BLD AUTO: 0.24 10*3/MM3 (ref 0–0.4)
EOSINOPHIL NFR BLD AUTO: 6.9 % (ref 0.3–6.2)
ERYTHROCYTE [DISTWIDTH] IN BLOOD BY AUTOMATED COUNT: 14.4 % (ref 12.3–15.4)
FERRITIN SERPL-MCNC: 319.7 NG/ML (ref 30–400)
HCT VFR BLD AUTO: 30 % (ref 37.5–51)
HGB BLD-MCNC: 9.7 G/DL (ref 13–17.7)
IGA1 MFR SER: 201 MG/DL (ref 70–400)
IGG1 SER-MCNC: 606 MG/DL (ref 700–1600)
IGM SERPL-MCNC: 45 MG/DL (ref 40–230)
IMM GRANULOCYTES # BLD AUTO: 0.11 10*3/MM3 (ref 0–0.05)
IMM GRANULOCYTES NFR BLD AUTO: 3.2 % (ref 0–0.5)
IRON 24H UR-MRATE: 187 MCG/DL (ref 59–158)
IRON SATN MFR SERPL: 67 % (ref 14–48)
LYMPHOCYTES # BLD AUTO: 0.79 10*3/MM3 (ref 0.7–3.1)
LYMPHOCYTES NFR BLD AUTO: 22.7 % (ref 19.6–45.3)
MCH RBC QN AUTO: 36.1 PG (ref 26.6–33)
MCHC RBC AUTO-ENTMCNC: 32.3 G/DL (ref 31.5–35.7)
MCV RBC AUTO: 111.5 FL (ref 79–97)
MONOCYTES # BLD AUTO: 0.85 10*3/MM3 (ref 0.1–0.9)
MONOCYTES NFR BLD AUTO: 24.4 % (ref 5–12)
NEUTROPHILS # BLD AUTO: 1.46 10*3/MM3 (ref 1.7–7)
NEUTROPHILS NFR BLD AUTO: 41.9 % (ref 42.7–76)
NRBC BLD AUTO-RTO: 0 /100 WBC (ref 0–0.2)
PLATELET # BLD AUTO: 159 10*3/MM3 (ref 140–450)
PMV BLD AUTO: 10.8 FL (ref 6–12)
RBC # BLD AUTO: 2.69 10*6/MM3 (ref 4.14–5.8)
TIBC SERPL-MCNC: 280 MCG/DL (ref 249–505)
TRANSFERRIN SERPL-MCNC: 200 MG/DL (ref 200–360)
WBC NRBC COR # BLD: 3.48 10*3/MM3 (ref 3.4–10.8)

## 2019-07-23 PROCEDURE — 83540 ASSAY OF IRON: CPT | Performed by: INTERNAL MEDICINE

## 2019-07-23 PROCEDURE — 84466 ASSAY OF TRANSFERRIN: CPT | Performed by: INTERNAL MEDICINE

## 2019-07-23 PROCEDURE — 85025 COMPLETE CBC W/AUTO DIFF WBC: CPT | Performed by: INTERNAL MEDICINE

## 2019-07-23 PROCEDURE — 82784 ASSAY IGA/IGD/IGG/IGM EACH: CPT | Performed by: INTERNAL MEDICINE

## 2019-07-23 PROCEDURE — 82728 ASSAY OF FERRITIN: CPT | Performed by: INTERNAL MEDICINE

## 2019-07-23 PROCEDURE — 36415 COLL VENOUS BLD VENIPUNCTURE: CPT | Performed by: INTERNAL MEDICINE

## 2019-07-23 NOTE — PROGRESS NOTES
Pt is here for lab with RN review.  CBC reviewed with pt, counts are stable for this pt at this time. Pt has no complaints and VSS.  Copy of labs given to pt and f/u appt reviewed. Pt is instructed to call the office with any concerns or new symptoms prior to next visit. Pt vu.   Lab Results   Component Value Date    WBC 3.48 07/23/2019    HGB 9.7 (L) 07/23/2019    HCT 30.0 (L) 07/23/2019    .5 (H) 07/23/2019     07/23/2019

## 2019-08-05 ENCOUNTER — TELEPHONE (OUTPATIENT)
Dept: GENERAL RADIOLOGY | Facility: HOSPITAL | Age: 79
End: 2019-08-05

## 2019-08-05 ENCOUNTER — HOSPITAL ENCOUNTER (OUTPATIENT)
Dept: CT IMAGING | Facility: HOSPITAL | Age: 79
Discharge: HOME | End: 2019-08-05
Admitting: THORACIC SURGERY (CARDIOTHORACIC VASCULAR SURGERY)

## 2019-08-05 ENCOUNTER — PATIENT OUTREACH (OUTPATIENT)
Dept: CASE MANAGEMENT | Facility: OTHER | Age: 79
End: 2019-08-05

## 2019-08-05 DIAGNOSIS — R91.8 PULMONARY INFILTRATE: ICD-10-CM

## 2019-08-05 PROCEDURE — 71250 CT THORAX DX C-: CPT

## 2019-08-05 NOTE — TELEPHONE ENCOUNTER
----- Message from Rita Montes De Oca sent at 8/5/2019  3:20 PM EDT -----  Regarding: FW: appts mixed up for 8/9  Might want to double check locations on  This pt per inbox message below..  ep others at [facility]?   Thanks    ----- Message -----  From: Miri Meneses  Sent: 8/5/2019   3:16 PM  To: [facility] Desk Pool  Subject: appts mixed up for 8/9

## 2019-08-05 NOTE — OUTREACH NOTE
Patient Outreach Note    Patient reports he is doing well. He was able to review all upcoming appointments with current MD's. Pt. Had a CT scan today in preparation to see Dr. Mendoza. Health Maintenance gaps are all UTD and ACP on file. MyChart is active. Explained role of Care Advisor and contact information given to patient.Nurse provided patient education.No other questions or concerns voiced at this time. No needs identified at this call.    Demetria Licona RN    8/5/2019, 4:30 PM

## 2019-08-06 ENCOUNTER — EPISODE CHANGES (OUTPATIENT)
Dept: CASE MANAGEMENT | Facility: OTHER | Age: 79
End: 2019-08-06

## 2019-08-09 ENCOUNTER — APPOINTMENT (OUTPATIENT)
Dept: LAB | Facility: HOSPITAL | Age: 79
End: 2019-08-09

## 2019-08-09 ENCOUNTER — LAB (OUTPATIENT)
Dept: OTHER | Facility: HOSPITAL | Age: 79
End: 2019-08-09

## 2019-08-09 ENCOUNTER — APPOINTMENT (OUTPATIENT)
Dept: ONCOLOGY | Facility: HOSPITAL | Age: 79
End: 2019-08-09

## 2019-08-09 ENCOUNTER — INFUSION (OUTPATIENT)
Dept: ONCOLOGY | Facility: HOSPITAL | Age: 79
End: 2019-08-09

## 2019-08-09 ENCOUNTER — OFFICE VISIT (OUTPATIENT)
Dept: ONCOLOGY | Facility: CLINIC | Age: 79
End: 2019-08-09

## 2019-08-09 VITALS — SYSTOLIC BLOOD PRESSURE: 128 MMHG | DIASTOLIC BLOOD PRESSURE: 58 MMHG | HEART RATE: 54 BPM

## 2019-08-09 VITALS
DIASTOLIC BLOOD PRESSURE: 74 MMHG | HEIGHT: 68 IN | OXYGEN SATURATION: 95 % | TEMPERATURE: 98.1 F | HEART RATE: 65 BPM | BODY MASS INDEX: 34.71 KG/M2 | RESPIRATION RATE: 20 BRPM | SYSTOLIC BLOOD PRESSURE: 146 MMHG | WEIGHT: 229 LBS

## 2019-08-09 DIAGNOSIS — D46.9 MYELODYSPLASIA (MYELODYSPLASTIC SYNDROME) (HCC): Primary | ICD-10-CM

## 2019-08-09 DIAGNOSIS — D80.1 HYPOGAMMAGLOBULINEMIA (HCC): ICD-10-CM

## 2019-08-09 DIAGNOSIS — D46.9 MYELODYSPLASIA (MYELODYSPLASTIC SYNDROME) (HCC): ICD-10-CM

## 2019-08-09 DIAGNOSIS — D53.9 MACROCYTIC ANEMIA: ICD-10-CM

## 2019-08-09 DIAGNOSIS — D61.818 PANCYTOPENIA (HCC): ICD-10-CM

## 2019-08-09 DIAGNOSIS — C34.32 MALIGNANT NEOPLASM OF LOWER LOBE OF LEFT LUNG (HCC): ICD-10-CM

## 2019-08-09 LAB
BASOPHILS # BLD AUTO: 0.02 10*3/MM3 (ref 0–0.2)
BASOPHILS NFR BLD AUTO: 0.9 % (ref 0–1.5)
DEPRECATED RDW RBC AUTO: 53.3 FL (ref 37–54)
EOSINOPHIL # BLD AUTO: 0.14 10*3/MM3 (ref 0–0.4)
EOSINOPHIL NFR BLD AUTO: 6.2 % (ref 0.3–6.2)
ERYTHROCYTE [DISTWIDTH] IN BLOOD BY AUTOMATED COUNT: 14.1 % (ref 12.3–15.4)
HCT VFR BLD AUTO: 26.4 % (ref 37.5–51)
HGB BLD-MCNC: 8.9 G/DL (ref 13–17.7)
IGA1 MFR SER: 186 MG/DL (ref 70–400)
IGG1 SER-MCNC: 511 MG/DL (ref 700–1600)
IGM SERPL-MCNC: 42 MG/DL (ref 40–230)
IMM GRANULOCYTES # BLD AUTO: 0.1 10*3/MM3 (ref 0–0.05)
IMM GRANULOCYTES NFR BLD AUTO: 4.4 % (ref 0–0.5)
LYMPHOCYTES # BLD AUTO: 0.79 10*3/MM3 (ref 0.7–3.1)
LYMPHOCYTES NFR BLD AUTO: 35.1 % (ref 19.6–45.3)
MCH RBC QN AUTO: 34.9 PG (ref 26.6–33)
MCHC RBC AUTO-ENTMCNC: 33.7 G/DL (ref 31.5–35.7)
MCV RBC AUTO: 103.5 FL (ref 79–97)
MONOCYTES # BLD AUTO: 0.52 10*3/MM3 (ref 0.1–0.9)
MONOCYTES NFR BLD AUTO: 23.1 % (ref 5–12)
NEUTROPHILS # BLD AUTO: 0.68 10*3/MM3 (ref 1.7–7)
NEUTROPHILS NFR BLD AUTO: 30.3 % (ref 42.7–76)
NRBC BLD AUTO-RTO: 0 /100 WBC (ref 0–0.2)
PLAT MORPH BLD: NORMAL
PLATELET # BLD AUTO: 152 10*3/MM3 (ref 140–450)
PMV BLD AUTO: 11.6 FL (ref 6–12)
RBC # BLD AUTO: 2.55 10*6/MM3 (ref 4.14–5.8)
SPHEROCYTES BLD QL SMEAR: NORMAL
WBC MORPH BLD: NORMAL
WBC NRBC COR # BLD: 2.25 10*3/MM3 (ref 3.4–10.8)

## 2019-08-09 PROCEDURE — 85007 BL SMEAR W/DIFF WBC COUNT: CPT | Performed by: INTERNAL MEDICINE

## 2019-08-09 PROCEDURE — 96366 THER/PROPH/DIAG IV INF ADDON: CPT

## 2019-08-09 PROCEDURE — 25010000002 EPOETIN ALFA PER 1000 UNITS: Performed by: INTERNAL MEDICINE

## 2019-08-09 PROCEDURE — 96372 THER/PROPH/DIAG INJ SC/IM: CPT

## 2019-08-09 PROCEDURE — 96365 THER/PROPH/DIAG IV INF INIT: CPT

## 2019-08-09 PROCEDURE — 36415 COLL VENOUS BLD VENIPUNCTURE: CPT

## 2019-08-09 PROCEDURE — 99215 OFFICE O/P EST HI 40 MIN: CPT | Performed by: INTERNAL MEDICINE

## 2019-08-09 PROCEDURE — 82784 ASSAY IGA/IGD/IGG/IGM EACH: CPT | Performed by: INTERNAL MEDICINE

## 2019-08-09 PROCEDURE — 25010000002 IMMUNE GLOBULIN (HUMAN) 30 GM/300ML SOLUTION: Performed by: INTERNAL MEDICINE

## 2019-08-09 PROCEDURE — 85025 COMPLETE CBC W/AUTO DIFF WBC: CPT | Performed by: INTERNAL MEDICINE

## 2019-08-09 PROCEDURE — 63710000001 DIPHENHYDRAMINE PER 50 MG: Performed by: INTERNAL MEDICINE

## 2019-08-09 RX ORDER — ACETAMINOPHEN 325 MG/1
650 TABLET ORAL ONCE
Status: COMPLETED | OUTPATIENT
Start: 2019-08-09 | End: 2019-08-09

## 2019-08-09 RX ORDER — FAMOTIDINE 10 MG/ML
20 INJECTION, SOLUTION INTRAVENOUS AS NEEDED
Status: CANCELLED | OUTPATIENT
Start: 2019-08-09

## 2019-08-09 RX ORDER — FAMOTIDINE 10 MG/ML
20 INJECTION, SOLUTION INTRAVENOUS ONCE
Status: CANCELLED | OUTPATIENT
Start: 2019-08-09

## 2019-08-09 RX ORDER — METHYLPREDNISOLONE SODIUM SUCCINATE 125 MG/2ML
125 INJECTION, POWDER, LYOPHILIZED, FOR SOLUTION INTRAMUSCULAR; INTRAVENOUS ONCE
Status: CANCELLED | OUTPATIENT
Start: 2019-08-09

## 2019-08-09 RX ORDER — CETIRIZINE HYDROCHLORIDE 10 MG/1
10 TABLET ORAL ONCE
Status: CANCELLED | OUTPATIENT
Start: 2019-08-09

## 2019-08-09 RX ORDER — ACETAMINOPHEN 325 MG/1
650 TABLET ORAL ONCE
Status: CANCELLED | OUTPATIENT
Start: 2019-08-09

## 2019-08-09 RX ORDER — SODIUM CHLORIDE 9 MG/ML
250 INJECTION, SOLUTION INTRAVENOUS ONCE
Status: CANCELLED | OUTPATIENT
Start: 2019-08-09

## 2019-08-09 RX ORDER — DIPHENHYDRAMINE HCL 25 MG
25 CAPSULE ORAL ONCE
Status: CANCELLED | OUTPATIENT
Start: 2019-08-09

## 2019-08-09 RX ORDER — DIPHENHYDRAMINE HYDROCHLORIDE 50 MG/ML
25 INJECTION INTRAMUSCULAR; INTRAVENOUS AS NEEDED
Status: CANCELLED | OUTPATIENT
Start: 2019-08-09

## 2019-08-09 RX ORDER — MEPERIDINE HYDROCHLORIDE 50 MG/ML
25 INJECTION INTRAMUSCULAR; INTRAVENOUS; SUBCUTANEOUS
Status: CANCELLED | OUTPATIENT
Start: 2019-08-09

## 2019-08-09 RX ORDER — DEXTROSE MONOHYDRATE 50 MG/ML
250 INJECTION, SOLUTION INTRAVENOUS ONCE
Status: CANCELLED | OUTPATIENT
Start: 2019-08-09

## 2019-08-09 RX ORDER — DIPHENHYDRAMINE HCL 25 MG
25 CAPSULE ORAL ONCE
Status: COMPLETED | OUTPATIENT
Start: 2019-08-09 | End: 2019-08-09

## 2019-08-09 RX ORDER — SODIUM CHLORIDE 9 MG/ML
250 INJECTION, SOLUTION INTRAVENOUS ONCE
Status: COMPLETED | OUTPATIENT
Start: 2019-08-09 | End: 2019-08-09

## 2019-08-09 RX ADMIN — IMMUNE GLOBULIN INFUSION (HUMAN) 30 G: 100 INJECTION, SOLUTION INTRAVENOUS; SUBCUTANEOUS at 11:34

## 2019-08-09 RX ADMIN — ACETAMINOPHEN 650 MG: 325 TABLET, FILM COATED ORAL at 11:09

## 2019-08-09 RX ADMIN — SODIUM CHLORIDE 250 ML: 900 INJECTION, SOLUTION INTRAVENOUS at 11:12

## 2019-08-09 RX ADMIN — DIPHENHYDRAMINE HYDROCHLORIDE 25 MG: 25 CAPSULE ORAL at 11:09

## 2019-08-09 RX ADMIN — ERYTHROPOIETIN 20000 UNITS: 20000 INJECTION, SOLUTION INTRAVENOUS; SUBCUTANEOUS at 11:09

## 2019-08-09 NOTE — PROGRESS NOTES
Subjective     REASON FOR CONSULTATION:    1.  Myelodysplasia (refractory anemia with ring sideroblasts) diagnosed on bone marrow biopsy 6/26/2019  2. Lung mass on CT chest 11/17/2018  3.  Hypogammaglobulinemia.  First dose of IV immunoglobulin 40 g delivered 6/25/2019    HISTORY OF PRESENT ILLNESS:  The patient is a 79 y.o. year old male who was referred to us by his primary care office for evaluation of macrocytic anemia.  His MCV has been markedly elevated around 107-108..  He was hospitalized from 5/25/2019 through 5/31/2019 with COPD exacerbation and CHF.  During the hospitalization his hemoglobin was around 9.5 g/dL with an MCV as high as 113.    With his initial office consult on 6/18/2019 we ordered additional lab studies to try to further define the etiology of his anemia.  He was due to return to our office for follow-up to review those results but ended up being admitted to the hospital from 6/20/2019 to 7/1/2019 for treatment of pneumonia and COPD exacerbation.  He was seen for hematology consultation as an inpatient by Dr. Gupta on 6/25/2019.  Bone marrow biopsy was ordered which was performed on 6/26/2019 and showed dysplastic changes and ring sideroblasts.  Blasts were less than 5%.  Chromosome karyotype is pending.    Also during the hospitalization he was found to be profoundly hypogammaglobulinemic with an IgG level of 365.  He had a history of recurring pulmonary infections and during the admission received 1 dose of IV immunoglobulin at 40 g total dose on 6/25/2019.    He returns today for follow-up and possibly further IVIG infusion.  Since his last visit here, he underwent a follow-up CT scan of the chest with Dr. Xiong on 8/5/2019 that showed resolution of his previous infiltrates.    History of Present Illness     Past medical history, family history, and social history reviewed and unchanged.    ALLERGIES:    Allergies   Allergen Reactions   • Erythromycin Itching        Review of Systems  "  Constitutional: Positive for fatigue. Negative for activity change, chills and fever.   HENT: Negative for mouth sores, trouble swallowing and voice change.    Eyes: Negative for pain and visual disturbance.   Respiratory: Positive for shortness of breath. Negative for cough and wheezing.    Cardiovascular: Positive for leg swelling. Negative for chest pain and palpitations.   Gastrointestinal: Positive for abdominal pain and nausea. Negative for constipation, diarrhea and vomiting.   Genitourinary: Negative for difficulty urinating, frequency and urgency.   Musculoskeletal: Positive for arthralgias and gait problem. Negative for joint swelling.   Skin: Negative for rash.   Neurological: Positive for weakness. Negative for dizziness, seizures and headaches.   Hematological: Negative for adenopathy. Bruises/bleeds easily.   Psychiatric/Behavioral: Negative for behavioral problems and confusion. The patient is not nervous/anxious.         Objective     Vitals:    08/09/19 0949   BP: 146/74   Pulse: 65   Resp: 20   Temp: 98.1 °F (36.7 °C)   SpO2: 95%  Comment: OX ON 3Liters   Weight: 104 kg (229 lb)   Height: 172 cm (67.72\")   PainSc: 0-No pain     Current Status 8/9/2019   ECOG score 1       Physical Exam   Constitutional: He is oriented to person, place, and time. He appears well-developed and well-nourished. No distress.   Chronically ill appearing obese gentleman wearing nasal oxygen   HENT:   Head: Normocephalic.   Eyes: Conjunctivae and EOM are normal. Pupils are equal, round, and reactive to light. No scleral icterus.   Neck: Normal range of motion. Neck supple. No JVD present. No thyromegaly present.   Cardiovascular: Normal rate. An irregular rhythm present. Exam reveals distant heart sounds. Exam reveals no gallop and no friction rub.   No murmur heard.  Pulmonary/Chest: Effort normal. He has decreased breath sounds in the right lower field and the left lower field. He has rales.   Abdominal: Soft. He " exhibits distension. He exhibits no mass. There is no tenderness.   Musculoskeletal: Normal range of motion. He exhibits edema. He exhibits no deformity.   Lymphadenopathy:     He has no cervical adenopathy.   Neurological: He is alert and oriented to person, place, and time. He has normal reflexes. No cranial nerve deficit.   Skin: Skin is warm and dry. Purpura and rash noted. No erythema.   Psychiatric: He has a normal mood and affect. His behavior is normal. Judgment normal.         RECENT LABS:  Hematology WBC   Date Value Ref Range Status   07/23/2019 3.48 3.40 - 10.80 10*3/mm3 Final   05/15/2019 3.79 3.40 - 10.80 10*3/mm3 Final   02/08/2018 4.11 (L) 4.5 - 11.0 10*3/uL Final     RBC   Date Value Ref Range Status   07/23/2019 2.69 (L) 4.14 - 5.80 10*6/mm3 Final   05/15/2019 3.05 (L) 4.14 - 5.80 10*6/mm3 Final   02/08/2018 3.65 (L) 4.5 - 5.9 10*6/uL Final     Hemoglobin   Date Value Ref Range Status   07/23/2019 9.7 (L) 13.0 - 17.7 g/dL Final   02/08/2018 13.0 (L) 13.5 - 17.5 g/dL Final     Hematocrit   Date Value Ref Range Status   07/23/2019 30.0 (L) 37.5 - 51.0 % Final   02/08/2018 37.5 (L) 41.0 - 53.0 % Final     Platelets   Date Value Ref Range Status   07/23/2019 159 140 - 450 10*3/mm3 Final   02/08/2018 164 140 - 440 10*3/uL Final        Lab Results   Component Value Date    GLUCOSE 111 (H) 07/01/2019    CALCIUM 8.6 07/01/2019     07/01/2019    K 4.2 07/01/2019    CO2 28.5 07/01/2019     07/01/2019    BUN 23 07/01/2019    CREATININE 0.93 07/01/2019    EGFRIFAFRI 70 05/15/2019    EGFRIFNONA 78 07/01/2019    BCR 24.7 07/01/2019    ANIONGAP 8.5 07/01/2019     Lab Results   Component Value Date    IRON 187 (H) 07/23/2019    TIBC 280 07/23/2019    FERRITIN 319.70 07/23/2019     Lab Results   Component Value Date    PGDOFETF81 1,596 (H) 06/18/2019     Lab Results   Component Value Date    FOLATE >20.00 06/18/2019      Ref Range & Units 2wk ago   IgG 700-1,600 mg/dL 606 Abnormally low     IgM 40 - 230  mg/dL 45    IgA 70 - 400 mg/dL 201            Erythropoietin 2.6 - 18.5 mIU/mL 34.9 Abnormally high         Bone marrow biopsy 6/26/2019  Final Diagnosis   CONSULTANT DIAGNOSIS:  Hypercellular Bone Marrow (60% total marrow cellularity) demonstrating significant dysplasia present in all three hematopoietic cell lines with 2-3+ stainable hemosiderin and 14% ringed sideroblasts.       No metastatic carcinoma, granulomas, vasculitis, viral inclusions, increase in myeloblasts, plasma cells or malignant lymphoma.       CONSULTANT COMMENT:  These changes would be most supportive of a low grade myelodysplasia; however, this must be correlated with appropriate B12 and RBC folate levels as well as pending karyotype for final disease classification.     CT CHEST WITHOUT CONTRAST- 8/5/2019     Radiation dose reduction techniques were utilized, including automated  exposure control and exposure modulation based on body size.     CLINICAL: Pulmonary infiltrate.     COMPARISON: 06/23/2019.     FINDINGS: Resolution of the right pleural effusion. Trace amount of  pleural fluid along the left costophrenic sulcus resolved as well.  Multifocal areas of consolidation/atelectasis at both lung bases have  either resolved or significantly improved within the interim. Few small  nodular areas of airspace disease remain and should be followed to  resolution. No new area of infiltrate or nodularity has developed. There  is extensive bullae formation compatible with emphysema. Mediastinal  lymph nodes decreasing in size. Reference pretracheal node is currently  2.2 cm compared to 2.6 cm previously. Stable cardiac enlargement. No  pericardial abnormality. The esophagus is satisfactory in course and  caliber. There is atherosclerotic calcification of the aorta with a  graft demonstrated again in position. There has been no interval change  in size or appearance of the 2 cm right adrenal nodule.     CONCLUSION:  1. Resolved pleural effusions.  2.  Multifocal areas of consolidation/atelectasis have either resolved or  significantly improved within the interim with few remaining areas of  nodularity, 4-6 month follow-up CT recommended.  3. Mediastinal lymph nodes decreasing in size.  4. Cardiomegaly.        Assessment/Plan   1.  New diagnosis of myelodysplasia with chronic anemia and leukopenia.  Bone marrow biopsy seem to be consistent with low-grade myelodysplasia with refractory anemia with ringed sideroblasts.  Blasts in the marrow were less than 5%.  Marrow karyotype shows a 7 q. deletion.  2.  Severe emphysema/COPD.  Patient requires oxygen around-the-clock  3.  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  Patient received 1 dose of IV immune globulin 40 g during his hospitalization on 6/25/2019 and likely will require monthly IV immunoglobulin infusions once the supply of IVIG is restored.  4.  Pneumonia which is improved clinically following antibiotics and IV immunoglobulin.  5.  History of non-small cell lung cancer from the left lower lobe resected by Dr. Hart Linker 2013.  No definite evidence of recurrent malignancy noted on the most recent CT scan of the chest from 8/5/2019 as noted above.    Plan  1.  I reviewed the recent lab results with patient and his family and recommended continuing to observe his blood counts for now.  I do not think he will ever be a candidate for Vidaza or Dacogen therapy based on his marginal performance status and comorbid conditions.  We also discussed the CT scan of the chest results which look better.  He still is hypogammaglobulinemic and for this reason I think he should continue monthly IVIG infusions.  We also will plan to initiate a trial of Procrit therapy 20,000 units subcu every 2 weeks.  2.  He will proceed with IV immunoglobulin infusions today at a dose of 30 g IV  3.  We will schedule lab and RN review and IVIG infusion in 4 weeks and 8 weeks.  4.  MD follow-up in 12 weeks with  lab and IVIG infusion.

## 2019-08-09 NOTE — PROGRESS NOTES
Therapy plan adjusted for premedications to be Tylenol 650 mg PO and Benadryl 25 mg PO  with each IVIG infusion  V.O. Dr. Hayes

## 2019-08-13 RX ORDER — FUROSEMIDE 40 MG/1
TABLET ORAL
Qty: 30 TABLET | Refills: 0 | Status: SHIPPED | OUTPATIENT
Start: 2019-08-13 | End: 2019-08-26 | Stop reason: SDUPTHER

## 2019-08-14 ENCOUNTER — OFFICE VISIT (OUTPATIENT)
Dept: SURGERY | Facility: CLINIC | Age: 79
End: 2019-08-14

## 2019-08-14 ENCOUNTER — TELEPHONE (OUTPATIENT)
Dept: ONCOLOGY | Facility: HOSPITAL | Age: 79
End: 2019-08-14

## 2019-08-14 VITALS
BODY MASS INDEX: 34.1 KG/M2 | DIASTOLIC BLOOD PRESSURE: 72 MMHG | HEIGHT: 68 IN | HEART RATE: 64 BPM | WEIGHT: 225 LBS | SYSTOLIC BLOOD PRESSURE: 124 MMHG | OXYGEN SATURATION: 92 %

## 2019-08-14 DIAGNOSIS — D49.2 SOLITARY FIBROUS TUMOR: Primary | ICD-10-CM

## 2019-08-14 DIAGNOSIS — R91.1 LUNG NODULE: ICD-10-CM

## 2019-08-14 DIAGNOSIS — J15.9 BACTERIAL PNEUMONIA: ICD-10-CM

## 2019-08-14 PROCEDURE — 99213 OFFICE O/P EST LOW 20 MIN: CPT | Performed by: THORACIC SURGERY (CARDIOTHORACIC VASCULAR SURGERY)

## 2019-08-14 NOTE — TELEPHONE ENCOUNTER
----- Message from Essence Carey sent at 8/14/2019  2:48 PM EDT -----  Contact: [phone]  [facility] office    Asking if the IVIG can cause headaches?

## 2019-08-14 NOTE — PROGRESS NOTES
Subjective   Patient ID: Ankit Mack Sr. is a 79 y.o. male is here today for follow-up.    History of Present Illness  Dear Colleague,  Ankit Mack Sr. was seen in our office today for continued follow up and surveillance after his recent admission for pneumonia and lung nodules.  Mr. 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  He has been following with Dr. Mendoza.  He was recently hospitalized for a COPD exacerbation/pneumonia and was found to have a abnormality on his CT chest in the hospital.  He has since recovered and presents here today with a CT of the chest to follow the new abnormality.  He has no new complaints.  He continues to have significant shortness of breath.    The following portions of the patient's history were reviewed and updated as appropriate: allergies, current medications, past family history, past medical history, past social history, past surgical history and problem list.  Review of Systems   Constitution: Negative.   HENT: Negative.    Eyes: Negative.    Cardiovascular: Negative.    Respiratory: Positive for shortness of breath.    Endocrine: Negative.    Hematologic/Lymphatic: Negative.    Skin: Negative.    Musculoskeletal: Negative.    Gastrointestinal: Negative.    Genitourinary: Negative.    Neurological: Negative.    Psychiatric/Behavioral: Negative.    Allergic/Immunologic: Negative.      Patient Active Problem List   Diagnosis   • Anxiety   • Hyperlipidemia   • Benign essential hypertension   • Osteoarthritis, knee   • Abrasion   • Aneurysm of thoracic aorta (CMS/HCC)   • Chronic obstructive pulmonary disease with acute exacerbation (CMS/HCC)   • 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HCC)   • History of malignant neoplasm of thoracic cavity structure   • Hypertension   • Cancer of lower lobe of lung (CMS/HCC)   • Adiposity   • DOMINGO on  CPAP   • COPD (chronic obstructive pulmonary disease) with acute bronchitis (CMS/[facility])   • Non-seasonal allergic rhinitis   • RSV (acute bronchiolitis due to respiratory syncytial virus)   • Pancytopenia (CMS/[facility])   • Aneurysm of thoracic aorta (CMS/[facility])   • Acute on chronic respiratory failure with hypoxemia (CMS/[facility])   • Paroxysmal atrial fibrillation (CMS/[facility])   • Pneumonia due to infectious organism   • Thrombocytopenia (CMS/[facility])   • Hyperglycemia   • Type 2 diabetes mellitus without complication, without long-term current use of insulin (CMS/[facility])   • Chronic diastolic congestive heart failure (CMS/[facility])   • History of COPD   • Respiratory failure (CMS/[facility])   • Elevated troponin   • Macrocytic anemia   • Hypogammaglobulinemia (CMS/[facility])   • Myelodysplasia (myelodysplastic syndrome) (CMS/[facility])   • RBBB     Past Medical History:   Diagnosis Date   • Acute congestive heart failure (CMS/[facility])    • Anemia    • Anxiety    • Atrial flutter with rapid ventricular response (CMS/[facility])    • Benign essential hypertension    • COPD (chronic obstructive pulmonary disease) (CMS/[facility])    • Elevated cholesterol    • Essential hypertension    • GERD (gastroesophageal reflux disease)    • H/O complete eye exam 06/2018   • Heart attack (CMS/[facility])    • History of chest x-ray 12/08/2013    RLL infiltrate   • History of pulmonary function tests 06/10/2014   • Hyperglycemia    • Hyperlipidemia    • Leukocytosis    • Lung cancer (CMS/[facility]) 2012    Left   • Obesity    • Osteoarthritis, knee    • Persistent atrial fibrillation (CMS/[facility])    • Prostate cancer (CMS/[facility]) 2000   • Seizures (CMS/[facility])    • Sleep apnea    • Thoracic aortic aneurysm without rupture (CMS/[facility])    • Thrombocytopenia (CMS/[facility])    • Type 2 diabetes mellitus without complication, without long-term current use of insulin (CMS/[facility])      Past Surgical History:   Procedure Laterality Date   • ABDOMINAL AORTIC ANEURYSM REPAIR  2010    Dr. Adarsh Dennis   • CARDIAC CATHETERIZATION Left  2004   • CATARACT EXTRACTION  10/2014    also    • COLONOSCOPY     • ENDOVASCULAR THORACIC ANEURYSM REPAIR     • JOINT REPLACEMENT Left 10/2010    knee; Dr. Wolf   • JOINT REPLACEMENT Right 2011    knee; Dr. Wolf   • LUNG LOBECTOMY Left 2012    [facility]; Dr. Mendoza   • LUNG LOBECTOMY Left    • PROSTATE SURGERY      Dr. Naranjo   • VASCULAR SURGERY  2009    TEVAR of thoracic aortic aneurysm   • VASCULAR SURGERY  12/15/2003    Left carotid subclavian bypass graft, ligation of proximal left subclavian following carotid subclavian bypass. Right femoral sheath. Arch angiography with descending thoracic and abdominal aortography    • VASCULAR SURGERY  2003    Selective catheterization right vertebral artery, right subclavian artery, left subclavian artery, left vertebral artery.     • VASCULAR SURGERY  2002    Tracheostomy, bronchoscopy   • VASCULAR SURGERY  2002    Thoracic arteriogram and abdominal aortogram     Family History   Problem Relation Age of Onset   • Cancer Mother    • COPD Mother    • Cancer Father         lung   • Colon cancer Father    • Cancer Other         colon   • Diabetes Other    • Emphysema Other    • Hypertension Other    • Kidney disease Other    • Lung cancer Other      Social History     Socioeconomic History   • Marital status: Single     Spouse name: Not on file   • Number of children: Not on file   • Years of education: Not on file   • Highest education level: Not on file   Occupational History     Employer: RETIRED   Tobacco Use   • Smoking status: Former Smoker     Packs/day: 1.00     Types: Cigarettes     Last attempt to quit: 4/15/2010     Years since quittin.3   • Smokeless tobacco: Never Used   Substance and Sexual Activity   • Alcohol use: Yes     Frequency: Never     Comment: rare   • Drug use: No   • Sexual activity: Defer       Current Outpatient Medications:   •  albuterol (PROVENTIL HFA;VENTOLIN HFA) 108 (90 Base) MCG/ACT  inhaler, Inhale 2 puffs Every 4 (Four) Hours As Needed for Wheezing., Disp: , Rfl:   •  albuterol (PROVENTIL) (2.5 MG/3ML) 0.083% nebulizer solution, Take 2.5 mg by nebulization 4 (Four) Times a Day., Disp: , Rfl:   •  ALPRAZolam (XANAX) 1 MG tablet, Take 1 tablet by mouth At Night As Needed for Anxiety., Disp: 90 tablet, Rfl: 0  •  apixaban (ELIQUIS) 5 MG tablet tablet, Take 1 tablet by mouth Every 12 (Twelve) Hours., Disp: 60 tablet, Rfl: 2  •  atorvastatin (LIPITOR) 10 MG tablet, TAKE 1 TABLET BY MOUTH DAILY. FOR CHOLESTEROL, Disp: 90 tablet, Rfl: 1  •  budesonide (PULMICORT) 0.5 MG/2ML nebulizer solution, Take 2 mL by nebulization 2 (Two) Times a Day., Disp: 120 each, Rfl: 1  •  diltiaZEM (TIAZAC) 360 MG 24 hr capsule, Take 1 capsule by mouth Daily., Disp: 30 capsule, Rfl: 2  •  folic acid (FOLVITE) 1 MG tablet, Take 1 mg by mouth Daily., Disp: , Rfl:   •  furosemide (LASIX) 40 MG tablet, TAKE 1 TABLET BY MOUTH EVERY DAY, Disp: 30 tablet, Rfl: 0  •  metFORMIN (GLUCOPHAGE) 1000 MG tablet, Take 1,000 mg by mouth Daily With Breakfast., Disp: , Rfl:   •  metoprolol tartrate (LOPRESSOR) 100 MG tablet, Take 1 tablet by mouth Every 12 (Twelve) Hours., Disp: 60 tablet, Rfl: 2  •  Revefenacin (YUPELRI) 175 MCG/3ML solution, Inhale 3 mL (1 vial) by nebulization Daily., Disp: 90 mL, Rfl: 1  •  furosemide (LASIX) 40 MG tablet, Take 1 tablet by mouth Daily., Disp: 90 tablet, Rfl: 0  •  polyethylene glycol (MIRALAX) packet, Take 17 g by mouth Daily., Disp: , Rfl:   Allergies   Allergen Reactions   • Erythromycin Itching        Objective   Vitals:    08/14/19 1427   BP: 124/72   Pulse: 64   SpO2: 92%     Physical Exam   Constitutional: He is oriented to person, place, and time. He appears well-developed and well-nourished.   HENT:   Head: Normocephalic and atraumatic.   Nose: Nose normal.   Mouth/Throat: Oropharynx is clear and moist.   Eyes: Conjunctivae and EOM are normal. Pupils are equal, round, and reactive to light.    Neck: Normal range of motion. Neck supple.   Cardiovascular: Normal rate, regular rhythm, normal heart sounds and intact distal pulses.   Pulmonary/Chest: Effort normal and breath sounds normal.   Abdominal: Soft. Bowel sounds are normal.   Musculoskeletal: Normal range of motion.   Neurological: He is alert and oriented to person, place, and time.   Skin: Skin is warm and dry. Capillary refill takes less than 2 seconds.   Psychiatric: He has a normal mood and affect. His behavior is normal. Judgment and thought content normal.   Nursing note and vitals reviewed.    Independent Review of Radiographic Studies:    I have independently reviewed his CT chest which was performed on 8/5/2019.  This demonstrates a resolution of the right pleural effusion, consolidation and atelectasis have almost resolved.  There is extensive emphysema.  His mediastinal lymphadenopathy is improving.  Assessment/Plan   Mr. Mack is a very pleasant 79-year-old gentleman with severe COPD who is status post left VATS wedge resection for a solitary fibrous tumor of the thorax in 2012.  He presents today in follow-up for new bilateral lung nodules as well as pleural effusion that was found in the hospital in June.  On his most recent CT of the chest the abnormalities in the lung fields are improving or resolved, his mediastinal lymphadenopathy is improving and the pleural effusion has resolved.  We will plan a CT chest in 6 months to make sure all of these abnormal areas have completely resolved.    Diagnoses and all orders for this visit:    Solitary fibrous tumor    Lung nodule    Bacterial pneumonia

## 2019-08-15 ENCOUNTER — TELEPHONE (OUTPATIENT)
Dept: FAMILY MEDICINE CLINIC | Facility: CLINIC | Age: 79
End: 2019-08-15

## 2019-08-15 DIAGNOSIS — R53.81 PHYSICAL DECONDITIONING: Primary | ICD-10-CM

## 2019-08-16 DIAGNOSIS — R91.8 PULMONARY INFILTRATE: Primary | ICD-10-CM

## 2019-08-23 ENCOUNTER — LAB (OUTPATIENT)
Dept: OTHER | Facility: HOSPITAL | Age: 79
End: 2019-08-23

## 2019-08-23 ENCOUNTER — INFUSION (OUTPATIENT)
Dept: ONCOLOGY | Facility: HOSPITAL | Age: 79
End: 2019-08-23

## 2019-08-23 VITALS
WEIGHT: 228 LBS | HEART RATE: 68 BPM | OXYGEN SATURATION: 98 % | TEMPERATURE: 97.7 F | SYSTOLIC BLOOD PRESSURE: 126 MMHG | RESPIRATION RATE: 16 BRPM | BODY MASS INDEX: 34.95 KG/M2 | DIASTOLIC BLOOD PRESSURE: 78 MMHG

## 2019-08-23 DIAGNOSIS — C34.32 MALIGNANT NEOPLASM OF LOWER LOBE OF LEFT LUNG (HCC): ICD-10-CM

## 2019-08-23 DIAGNOSIS — D46.9 MYELODYSPLASIA (MYELODYSPLASTIC SYNDROME) (HCC): Primary | ICD-10-CM

## 2019-08-23 DIAGNOSIS — D46.9 MYELODYSPLASIA (MYELODYSPLASTIC SYNDROME) (HCC): ICD-10-CM

## 2019-08-23 DIAGNOSIS — D53.9 MACROCYTIC ANEMIA: ICD-10-CM

## 2019-08-23 DIAGNOSIS — D80.1 HYPOGAMMAGLOBULINEMIA (HCC): ICD-10-CM

## 2019-08-23 LAB
BASOPHILS # BLD AUTO: 0.02 10*3/MM3 (ref 0–0.2)
BASOPHILS NFR BLD AUTO: 0.6 % (ref 0–1.5)
DEPRECATED RDW RBC AUTO: 53.6 FL (ref 37–54)
EOSINOPHIL # BLD AUTO: 0.19 10*3/MM3 (ref 0–0.4)
EOSINOPHIL NFR BLD AUTO: 6 % (ref 0.3–6.2)
ERYTHROCYTE [DISTWIDTH] IN BLOOD BY AUTOMATED COUNT: 14.1 % (ref 12.3–15.4)
HCT VFR BLD AUTO: 27.7 % (ref 37.5–51)
HGB BLD-MCNC: 9.3 G/DL (ref 13–17.7)
IMM GRANULOCYTES # BLD AUTO: 0.09 10*3/MM3 (ref 0–0.05)
IMM GRANULOCYTES NFR BLD AUTO: 2.9 % (ref 0–0.5)
LYMPHOCYTES # BLD AUTO: 0.9 10*3/MM3 (ref 0.7–3.1)
LYMPHOCYTES NFR BLD AUTO: 28.6 % (ref 19.6–45.3)
MCH RBC QN AUTO: 35.1 PG (ref 26.6–33)
MCHC RBC AUTO-ENTMCNC: 33.6 G/DL (ref 31.5–35.7)
MCV RBC AUTO: 104.5 FL (ref 79–97)
MONOCYTES # BLD AUTO: 0.58 10*3/MM3 (ref 0.1–0.9)
MONOCYTES NFR BLD AUTO: 18.4 % (ref 5–12)
NEUTROPHILS # BLD AUTO: 1.37 10*3/MM3 (ref 1.7–7)
NEUTROPHILS NFR BLD AUTO: 43.5 % (ref 42.7–76)
NRBC BLD AUTO-RTO: 0 /100 WBC (ref 0–0.2)
PLATELET # BLD AUTO: 158 10*3/MM3 (ref 140–450)
PMV BLD AUTO: 12.3 FL (ref 6–12)
RBC # BLD AUTO: 2.65 10*6/MM3 (ref 4.14–5.8)
WBC NRBC COR # BLD: 3.15 10*3/MM3 (ref 3.4–10.8)

## 2019-08-23 PROCEDURE — 85025 COMPLETE CBC W/AUTO DIFF WBC: CPT | Performed by: INTERNAL MEDICINE

## 2019-08-23 PROCEDURE — 25010000002 EPOETIN ALFA PER 1000 UNITS: Performed by: NURSE PRACTITIONER

## 2019-08-23 PROCEDURE — 36415 COLL VENOUS BLD VENIPUNCTURE: CPT

## 2019-08-23 PROCEDURE — 96372 THER/PROPH/DIAG INJ SC/IM: CPT

## 2019-08-23 RX ADMIN — ERYTHROPOIETIN 20000 UNITS: 20000 INJECTION, SOLUTION INTRAVENOUS; SUBCUTANEOUS at 13:50

## 2019-08-26 ENCOUNTER — TELEPHONE (OUTPATIENT)
Dept: ONCOLOGY | Facility: HOSPITAL | Age: 79
End: 2019-08-26

## 2019-08-26 ENCOUNTER — OFFICE VISIT (OUTPATIENT)
Dept: CARDIOLOGY | Facility: CLINIC | Age: 79
End: 2019-08-26

## 2019-08-26 ENCOUNTER — TELEPHONE (OUTPATIENT)
Dept: GENERAL RADIOLOGY | Facility: HOSPITAL | Age: 79
End: 2019-08-26

## 2019-08-26 VITALS
DIASTOLIC BLOOD PRESSURE: 80 MMHG | BODY MASS INDEX: 34.4 KG/M2 | SYSTOLIC BLOOD PRESSURE: 120 MMHG | HEART RATE: 64 BPM | WEIGHT: 227 LBS | HEIGHT: 68 IN

## 2019-08-26 DIAGNOSIS — I35.0 NONRHEUMATIC AORTIC VALVE STENOSIS: ICD-10-CM

## 2019-08-26 DIAGNOSIS — I50.32 CHRONIC DIASTOLIC CONGESTIVE HEART FAILURE (HCC): Primary | ICD-10-CM

## 2019-08-26 DIAGNOSIS — I48.92 ATRIAL FLUTTER, UNSPECIFIED TYPE (HCC): ICD-10-CM

## 2019-08-26 DIAGNOSIS — I48.0 PAROXYSMAL ATRIAL FIBRILLATION (HCC): ICD-10-CM

## 2019-08-26 DIAGNOSIS — I10 ESSENTIAL HYPERTENSION: ICD-10-CM

## 2019-08-26 DIAGNOSIS — I71.20 THORACIC AORTIC ANEURYSM WITHOUT RUPTURE (HCC): ICD-10-CM

## 2019-08-26 DIAGNOSIS — Z87.09 HISTORY OF COPD: ICD-10-CM

## 2019-08-26 DIAGNOSIS — D46.9 MYELODYSPLASIA (MYELODYSPLASTIC SYNDROME) (HCC): ICD-10-CM

## 2019-08-26 PROBLEM — R73.9 HYPERGLYCEMIA: Status: RESOLVED | Noted: 2018-11-17 | Resolved: 2019-08-26

## 2019-08-26 PROBLEM — J44.0 COPD (CHRONIC OBSTRUCTIVE PULMONARY DISEASE) WITH ACUTE BRONCHITIS (HCC): Status: RESOLVED | Noted: 2018-05-27 | Resolved: 2019-08-26

## 2019-08-26 PROBLEM — J96.90 RESPIRATORY FAILURE (HCC): Status: RESOLVED | Noted: 2019-05-25 | Resolved: 2019-08-26

## 2019-08-26 PROBLEM — R77.8 ELEVATED TROPONIN: Status: RESOLVED | Noted: 2019-05-25 | Resolved: 2019-08-26

## 2019-08-26 PROBLEM — E66.9 CLASS 1 OBESITY IN ADULT: Status: ACTIVE | Noted: 2019-08-26

## 2019-08-26 PROBLEM — T14.8XXA ABRASION: Status: RESOLVED | Noted: 2017-03-04 | Resolved: 2019-08-26

## 2019-08-26 PROBLEM — R79.89 ELEVATED TROPONIN: Status: RESOLVED | Noted: 2019-05-25 | Resolved: 2019-08-26

## 2019-08-26 PROBLEM — J21.0 RSV (ACUTE BRONCHIOLITIS DUE TO RESPIRATORY SYNCYTIAL VIRUS): Status: RESOLVED | Noted: 2018-11-06 | Resolved: 2019-08-26

## 2019-08-26 PROBLEM — J18.9 PNEUMONIA DUE TO INFECTIOUS ORGANISM: Status: RESOLVED | Noted: 2018-11-17 | Resolved: 2019-08-26

## 2019-08-26 PROBLEM — J20.9 COPD (CHRONIC OBSTRUCTIVE PULMONARY DISEASE) WITH ACUTE BRONCHITIS (HCC): Status: RESOLVED | Noted: 2018-05-27 | Resolved: 2019-08-26

## 2019-08-26 PROBLEM — J96.21 ACUTE ON CHRONIC RESPIRATORY FAILURE WITH HYPOXEMIA (HCC): Status: RESOLVED | Noted: 2018-11-17 | Resolved: 2019-08-26

## 2019-08-26 PROCEDURE — 93000 ELECTROCARDIOGRAM COMPLETE: CPT | Performed by: INTERNAL MEDICINE

## 2019-08-26 PROCEDURE — 99214 OFFICE O/P EST MOD 30 MIN: CPT | Performed by: INTERNAL MEDICINE

## 2019-08-26 NOTE — PROGRESS NOTES
Date of Office Visit: 2019  Encounter Provider: Catrachito Rdz MD  Place of Service: [facility] CARDIOLOGY  Patient Name: Ankit Mack Sr.  :1940    Chief Complaint   Patient presents with   • Atrial Fibrillation   :     HPI: Ankit Mack Sr. is a 79 y.o. male who presents today to establish care with me. He was previously seen by one of my partners.    He has a history of a descending thoracic aortic aneurysm which was stented in [place].  He has ectasia of the ascending aorta; it most recently measured 3.7cm.  He has paroxysmal atrial fibrillation/flutter.  He has chronic diastolic CHF.  He has mild AS by an echo in May 2019.  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  He has myelodysplastic syndrome.    He is chronically fatigued, but it's improved some with epo injections.  He has chronic stable dyspnea.  He denies chest pain, orthopnea, or edema.  He previously had a cough which has resolved.    He feels that his heart is doing fairly well, all things considered.  Apixaban and diltiazem are prohibitively expensive, though.     Past Medical History:   Diagnosis Date   • Anxiety    • Atrial flutter (CMS/HCC)    • Atrial flutter with rapid ventricular response (CMS/HCC)    • Chronic diastolic congestive heart failure (CMS/HCC) 2018   • COPD (chronic obstructive pulmonary disease) (CMS/HCC)    • Essential hypertension    • GERD (gastroesophageal reflux disease)    • H/O complete eye exam 2018   • History of pulmonary function tests 06/10/2014   • Hyperglycemia    • Hyperlipidemia    • Lung cancer (CMS/HCC)     Left   • MDS (myelodysplastic syndrome) (CMS/HCC)    • Nonrheumatic aortic valve stenosis     mild 2019   • Obesity    • Osteoarthritis, knee    • Paroxysmal atrial fibrillation (CMS/HCC)    • Prostate cancer (CMS/HCC)    • Seizures (CMS/HCC)    • Sleep apnea    • Thoracic aortic  aneurysm without rupture (CMS/[facility])     s/p stenting of descending thoracic aneurysm; the ascending aorta measured 3.7cm in 2019   • Type 2 diabetes mellitus without complication, without long-term current use of insulin (CMS/[facility])        Past Surgical History:   Procedure Laterality Date   • ABDOMINAL AORTIC ANEURYSM REPAIR      Dr. Adarsh Dennis   • CARDIAC CATHETERIZATION Left 2004   • CATARACT EXTRACTION  10/2014    also    • COLONOSCOPY     • ENDOVASCULAR THORACIC ANEURYSM REPAIR     • JOINT REPLACEMENT Left 10/2010    knee; Dr. Wolf   • JOINT REPLACEMENT Right 2011    knee; Dr. Wolf   • LUNG LOBECTOMY Left 2012    LLL; Dr. Mendoza   • LUNG LOBECTOMY Left    • PROSTATE SURGERY      Dr. Naranjo   • VASCULAR SURGERY  2009    TEVAR of thoracic aortic aneurysm   • VASCULAR SURGERY  12/15/2003    Left carotid subclavian bypass graft, ligation of proximal left subclavian following carotid subclavian bypass. Right femoral sheath. Arch angiography with descending thoracic and abdominal aortography    • VASCULAR SURGERY  2003    Selective catheterization right vertebral artery, right subclavian artery, left subclavian artery, left vertebral artery.     • VASCULAR SURGERY  2002    Tracheostomy, bronchoscopy   • VASCULAR SURGERY  2002    Thoracic arteriogram and abdominal aortogram       Social History     Socioeconomic History   • Marital status: Single     Spouse name: Not on file   • Number of children: Not on file   • Years of education: Not on file   • Highest education level: Not on file   Occupational History     Employer: RETIRED   Tobacco Use   • Smoking status: Former Smoker     Packs/day: 1.00     Types: Cigarettes     Last attempt to quit: 4/15/2010     Years since quittin.3   • Smokeless tobacco: Never Used   Substance and Sexual Activity   • Alcohol use: Yes     Frequency: Never     Comment: rare/  Daily caffeine use   • Drug use: No   • Sexual  activity: Defer       Family History   Problem Relation Age of Onset   • Cancer Mother    • COPD Mother    • Cancer Father         lung   • Colon cancer Father    • Cancer Other         colon   • Diabetes Other    • Emphysema Other    • Hypertension Other    • Kidney disease Other    • Lung cancer Other        Review of Systems   Constitution: Positive for malaise/fatigue.   Cardiovascular: Positive for dyspnea on exertion.   Respiratory: Positive for shortness of breath and wheezing.    Genitourinary: Positive for frequency.   Neurological: Positive for excessive daytime sleepiness and headaches.       Allergies   Allergen Reactions   • Erythromycin Itching         Current Outpatient Medications:   •  albuterol (PROVENTIL HFA;VENTOLIN HFA) 108 (90 Base) MCG/ACT inhaler, Inhale 2 puffs Every 4 (Four) Hours As Needed for Wheezing., Disp: , Rfl:   •  albuterol (PROVENTIL) (2.5 MG/3ML) 0.083% nebulizer solution, Take 2.5 mg by nebulization 4 (Four) Times a Day., Disp: , Rfl:   •  ALPRAZolam (XANAX) 1 MG tablet, Take 1 tablet by mouth At Night As Needed for Anxiety., Disp: 90 tablet, Rfl: 0  •  apixaban (ELIQUIS) 5 MG tablet tablet, Take 1 tablet by mouth Every 12 (Twelve) Hours., Disp: 60 tablet, Rfl: 2  •  atorvastatin (LIPITOR) 10 MG tablet, TAKE 1 TABLET BY MOUTH DAILY. FOR CHOLESTEROL, Disp: 90 tablet, Rfl: 1  •  budesonide (PULMICORT) 0.5 MG/2ML nebulizer solution, Take 2 mL by nebulization 2 (Two) Times a Day., Disp: 120 each, Rfl: 1  •  diltiaZEM (TIAZAC) 360 MG 24 hr capsule, Take 1 capsule by mouth Daily., Disp: 30 capsule, Rfl: 2  •  folic acid (FOLVITE) 1 MG tablet, Take 1 mg by mouth Daily., Disp: , Rfl:   •  furosemide (LASIX) 40 MG tablet, Take 1 tablet by mouth Daily., Disp: 90 tablet, Rfl: 0  •  metFORMIN (GLUCOPHAGE) 1000 MG tablet, Take 1,000 mg by mouth Daily With Breakfast., Disp: , Rfl:   •  metoprolol tartrate (LOPRESSOR) 100 MG tablet, Take 1 tablet by mouth Every 12 (Twelve) Hours., Disp: 60  "tablet, Rfl: 2  •  Revefenacin (YUPELRI) 175 MCG/3ML solution, Inhale 3 mL (1 vial) by nebulization Daily., Disp: 90 mL, Rfl: 1      Objective:     Vitals:    08/26/19 1316   BP: 120/80   Pulse: 64   Weight: 103 kg (227 lb)   Height: 172.7 cm (68\")     Body mass index is 34.52 kg/m².    Physical Exam   Constitutional: He is oriented to person, place, and time.   Obese, on oxygen   HENT:   Head: Normocephalic.   Nose: Nose normal.   Mouth/Throat: Oropharynx is clear and moist.   Eyes: Conjunctivae and EOM are normal. Pupils are equal, round, and reactive to light.   Neck: Normal range of motion.   Cannot assess for JVD due to habitus   Cardiovascular: Normal rate, regular rhythm and intact distal pulses. Exam reveals distant heart sounds.   Murmur heard.   Systolic murmur is present with a grade of 1/6.  Pulmonary/Chest: Effort normal.   Abdominal: Soft. There is no tenderness.   Obesity limits abdominal exam   Musculoskeletal: Normal range of motion. He exhibits no edema.   Neurological: He is alert and oriented to person, place, and time. No cranial nerve deficit.   Skin: Skin is warm and dry. No rash noted.   Psychiatric: He has a normal mood and affect. His behavior is normal.   Vitals reviewed.        ECG 12 Lead  Date/Time: 8/26/2019 3:10 PM  Performed by: Catrachito Rdz MD  Authorized by: Catrachito Rdz MD   Comparison: compared with previous ECG   Similar to previous ECG  Rhythm: sinus rhythm  Conduction: right bundle branch block, left anterior fascicular block and 1st degree AV block  Other: no other findings    Clinical impression: abnormal EKG              Assessment:       Diagnosis Plan   1. Chronic diastolic congestive heart failure (CMS/HCC)     2. Paroxysmal atrial fibrillation (CMS/HCC)     3. Atrial flutter, unspecified type (CMS/HCC)     4. Thoracic aortic aneurysm without rupture (CMS/HCC)     5. Nonrheumatic aortic valve stenosis     6. Essential hypertension     7. Myelodysplasia " (myelodysplastic syndrome) (CMS/HCC)     8. History of COPD            Plan:       1.  This appears stable at this point in time.  He will continue with furosemide.      2/3.  Atrial Fibrillation and Atrial Flutter  Assessment  • The patient has paroxysmal atrial fibrillation and paroxysmal atrial flutter  • This is non-valvular in etiology  • The patient's CHADS2-VASc score is 5  • A [id number] score of 2 or more is considered a high risk for a thromboembolic event  • Apixaban prescribed    Plan  • Attempt to maintain sinus rhythm  • Continue apixaban for antithrombotic therapy, bleeding issues discussed  • Continue beta blocker for rhythm control  • I have stopped diltiazem due to cost, and have increased his metoprolol to 150mg BID.  His family is going to see if the apixaban would be covered by the Eliquis 360 program.  If not, we will need to switch to warfarin.    4.  He follows closely with his cardiothoracic surgeon in [place].    5.  This was mild in May 2019; we'll recheck it in May 2020.    6.  His BP is within goal, especially given his other comorbidities.    Sincerely,       Catrachito Rdz MD

## 2019-08-26 NOTE — TELEPHONE ENCOUNTER
----- Message from Kirstie Paul sent at 8/26/2019 10:31 AM EDT -----  [phone]  Received a shot for his hemoglobin on Friday and isn't feeling well. Just sleeps all the time and is not like this normally.        Pt calling because he has felt very worn out and exhausted. He has been receiving EPO every other week at 20,000 units. He is asking if he could take it more frequently. D/W Juhi in precerts and pt is OK from insurance standpoint to get EPO every week. D/W Dr. Hayes and he OK'd this also. Informed pt and he v/u. Message sent to scheduling.

## 2019-08-26 NOTE — TELEPHONE ENCOUNTER
----- Message from Ivanna Cobb RN sent at 8/26/2019 12:10 PM EDT -----  Please schedule pt for CBC and weekly procrit injections.

## 2019-08-29 ENCOUNTER — TELEPHONE (OUTPATIENT)
Dept: CARDIOLOGY | Facility: CLINIC | Age: 79
End: 2019-08-29

## 2019-08-29 NOTE — TELEPHONE ENCOUNTER
8/29/19  There was a vmsg left in [facility]ri[facility]  - from [facility] with [place] [address] support programs for patient  - asked for Cheri's npi # but patient is seeing Dr. Rdz, not Dr. Gomez.  Have you started a request to help with patient's eliquis?  She left a reference # of [id number].   [phone]/eden

## 2019-08-30 ENCOUNTER — INFUSION (OUTPATIENT)
Dept: ONCOLOGY | Facility: HOSPITAL | Age: 79
End: 2019-08-30

## 2019-08-30 ENCOUNTER — LAB (OUTPATIENT)
Dept: OTHER | Facility: HOSPITAL | Age: 79
End: 2019-08-30

## 2019-08-30 VITALS
HEART RATE: 70 BPM | SYSTOLIC BLOOD PRESSURE: 151 MMHG | TEMPERATURE: 97.5 F | WEIGHT: 226.6 LBS | BODY MASS INDEX: 34.34 KG/M2 | OXYGEN SATURATION: 87 % | DIASTOLIC BLOOD PRESSURE: 72 MMHG | HEIGHT: 68 IN | RESPIRATION RATE: 20 BRPM

## 2019-08-30 DIAGNOSIS — D46.9 MYELODYSPLASIA (MYELODYSPLASTIC SYNDROME) (HCC): Primary | ICD-10-CM

## 2019-08-30 DIAGNOSIS — D46.9 MYELODYSPLASIA (MYELODYSPLASTIC SYNDROME) (HCC): ICD-10-CM

## 2019-08-30 DIAGNOSIS — C34.32 MALIGNANT NEOPLASM OF LOWER LOBE OF LEFT LUNG (HCC): ICD-10-CM

## 2019-08-30 DIAGNOSIS — D53.9 MACROCYTIC ANEMIA: ICD-10-CM

## 2019-08-30 DIAGNOSIS — D80.1 HYPOGAMMAGLOBULINEMIA (HCC): ICD-10-CM

## 2019-08-30 LAB
DACRYOCYTES BLD QL SMEAR: ABNORMAL
DEPRECATED RDW RBC AUTO: 54.3 FL (ref 37–54)
EOSINOPHIL # BLD MANUAL: 0.33 10*3/MM3 (ref 0–0.4)
EOSINOPHIL NFR BLD MANUAL: 9 % (ref 0.3–6.2)
ERYTHROCYTE [DISTWIDTH] IN BLOOD BY AUTOMATED COUNT: 14.3 % (ref 12.3–15.4)
HCT VFR BLD AUTO: 27.8 % (ref 37.5–51)
HGB BLD-MCNC: 9.5 G/DL (ref 13–17.7)
LYMPHOCYTES # BLD MANUAL: 0.99 10*3/MM3 (ref 0.7–3.1)
LYMPHOCYTES NFR BLD MANUAL: 20 % (ref 5–12)
LYMPHOCYTES NFR BLD MANUAL: 27 % (ref 19.6–45.3)
MCH RBC QN AUTO: 35.6 PG (ref 26.6–33)
MCHC RBC AUTO-ENTMCNC: 34.2 G/DL (ref 31.5–35.7)
MCV RBC AUTO: 104.1 FL (ref 79–97)
METAMYELOCYTES NFR BLD MANUAL: 2 % (ref 0–0)
MONOCYTES # BLD AUTO: 0.74 10*3/MM3 (ref 0.1–0.9)
NEUTROPHILS # BLD AUTO: 1.55 10*3/MM3 (ref 1.7–7)
NEUTROPHILS NFR BLD MANUAL: 42 % (ref 42.7–76)
OVALOCYTES BLD QL SMEAR: ABNORMAL
PLAT MORPH BLD: NORMAL
PLATELET # BLD AUTO: 158 10*3/MM3 (ref 140–450)
PMV BLD AUTO: 12 FL (ref 6–12)
RBC # BLD AUTO: 2.67 10*6/MM3 (ref 4.14–5.8)
SCAN SLIDE: NORMAL
SPHEROCYTES BLD QL SMEAR: ABNORMAL
STOMATOCYTES BLD QL SMEAR: ABNORMAL
WBC MORPH BLD: NORMAL
WBC NRBC COR # BLD: 3.68 10*3/MM3 (ref 3.4–10.8)

## 2019-08-30 PROCEDURE — 85007 BL SMEAR W/DIFF WBC COUNT: CPT | Performed by: INTERNAL MEDICINE

## 2019-08-30 PROCEDURE — 36415 COLL VENOUS BLD VENIPUNCTURE: CPT

## 2019-08-30 PROCEDURE — 96372 THER/PROPH/DIAG INJ SC/IM: CPT

## 2019-08-30 PROCEDURE — 25010000002 EPOETIN ALFA PER 1000 UNITS: Performed by: INTERNAL MEDICINE

## 2019-08-30 PROCEDURE — 85025 COMPLETE CBC W/AUTO DIFF WBC: CPT | Performed by: INTERNAL MEDICINE

## 2019-08-30 RX ORDER — METOPROLOL TARTRATE 100 MG/1
TABLET ORAL
Qty: 270 TABLET | Refills: 1 | Status: SHIPPED | OUTPATIENT
Start: 2019-08-30 | End: 2020-01-01

## 2019-08-30 RX ADMIN — ERYTHROPOIETIN 30000 UNITS: 20000 INJECTION, SOLUTION INTRAVENOUS; SUBCUTANEOUS at 13:51

## 2019-08-30 NOTE — TELEPHONE ENCOUNTER
Called and s/w Eliquis support review has been processed.  A 30 day supply is $41.00 and 90 day is $33.00.  I called and informed pt's daughter, she instructed me to send in the 90 day to his pharmacy.      Completed.

## 2019-09-03 DIAGNOSIS — F41.9 ANXIETY: ICD-10-CM

## 2019-09-04 DIAGNOSIS — F41.9 ANXIETY: ICD-10-CM

## 2019-09-04 RX ORDER — ALPRAZOLAM 1 MG/1
1 TABLET ORAL NIGHTLY PRN
Qty: 30 TABLET | Refills: 0 | Status: SHIPPED | OUTPATIENT
Start: 2019-09-04 | End: 2019-10-14 | Stop reason: SDUPTHER

## 2019-09-04 RX ORDER — ALPRAZOLAM 1 MG/1
1 TABLET ORAL NIGHTLY PRN
Qty: 90 TABLET | Refills: 0 | OUTPATIENT
Start: 2019-09-04

## 2019-09-06 ENCOUNTER — APPOINTMENT (OUTPATIENT)
Dept: OTHER | Facility: HOSPITAL | Age: 79
End: 2019-09-06

## 2019-09-06 ENCOUNTER — INFUSION (OUTPATIENT)
Dept: ONCOLOGY | Facility: HOSPITAL | Age: 79
End: 2019-09-06

## 2019-09-06 VITALS
DIASTOLIC BLOOD PRESSURE: 67 MMHG | WEIGHT: 229.5 LBS | HEART RATE: 68 BPM | OXYGEN SATURATION: 96 % | TEMPERATURE: 97.4 F | BODY MASS INDEX: 34.9 KG/M2 | SYSTOLIC BLOOD PRESSURE: 166 MMHG

## 2019-09-06 DIAGNOSIS — D80.1 HYPOGAMMAGLOBULINEMIA (HCC): ICD-10-CM

## 2019-09-06 DIAGNOSIS — C34.32 MALIGNANT NEOPLASM OF LOWER LOBE OF LEFT LUNG (HCC): ICD-10-CM

## 2019-09-06 DIAGNOSIS — D46.9 MYELODYSPLASIA (MYELODYSPLASTIC SYNDROME) (HCC): Primary | ICD-10-CM

## 2019-09-06 LAB
BASOPHILS # BLD AUTO: 0.03 10*3/MM3 (ref 0–0.2)
BASOPHILS NFR BLD AUTO: 0.9 % (ref 0–1.5)
DACRYOCYTES BLD QL SMEAR: NORMAL
DEPRECATED RDW RBC AUTO: 56.1 FL (ref 37–54)
EOSINOPHIL # BLD AUTO: 0.2 10*3/MM3 (ref 0–0.4)
EOSINOPHIL NFR BLD AUTO: 6 % (ref 0.3–6.2)
ERYTHROCYTE [DISTWIDTH] IN BLOOD BY AUTOMATED COUNT: 14.3 % (ref 12.3–15.4)
HCT VFR BLD AUTO: 30.9 % (ref 37.5–51)
HGB BLD-MCNC: 10.1 G/DL (ref 13–17.7)
IGA1 MFR SER: 200 MG/DL (ref 70–400)
IGG1 SER-MCNC: 618 MG/DL (ref 700–1600)
IGM SERPL-MCNC: 49 MG/DL (ref 40–230)
IMM GRANULOCYTES # BLD AUTO: 0.16 10*3/MM3 (ref 0–0.05)
IMM GRANULOCYTES NFR BLD AUTO: 4.8 % (ref 0–0.5)
LYMPHOCYTES # BLD AUTO: 0.77 10*3/MM3 (ref 0.7–3.1)
LYMPHOCYTES NFR BLD AUTO: 23.1 % (ref 19.6–45.3)
MCH RBC QN AUTO: 35.1 PG (ref 26.6–33)
MCHC RBC AUTO-ENTMCNC: 32.7 G/DL (ref 31.5–35.7)
MCV RBC AUTO: 107.3 FL (ref 79–97)
MONOCYTES # BLD AUTO: 0.56 10*3/MM3 (ref 0.1–0.9)
MONOCYTES NFR BLD AUTO: 16.8 % (ref 5–12)
NEUTROPHILS # BLD AUTO: 1.61 10*3/MM3 (ref 1.7–7)
NEUTROPHILS NFR BLD AUTO: 48.4 % (ref 42.7–76)
NRBC BLD AUTO-RTO: 0.9 /100 WBC (ref 0–0.2)
PLAT MORPH BLD: NORMAL
PLATELET # BLD AUTO: 157 10*3/MM3 (ref 140–450)
PMV BLD AUTO: 11.5 FL (ref 6–12)
RBC # BLD AUTO: 2.88 10*6/MM3 (ref 4.14–5.8)
SPHEROCYTES BLD QL SMEAR: NORMAL
WBC MORPH BLD: NORMAL
WBC NRBC COR # BLD: 3.33 10*3/MM3 (ref 3.4–10.8)

## 2019-09-06 PROCEDURE — 96365 THER/PROPH/DIAG IV INF INIT: CPT | Performed by: INTERNAL MEDICINE

## 2019-09-06 PROCEDURE — 63710000001 DIPHENHYDRAMINE PER 50 MG: Performed by: INTERNAL MEDICINE

## 2019-09-06 PROCEDURE — 82784 ASSAY IGA/IGD/IGG/IGM EACH: CPT | Performed by: INTERNAL MEDICINE

## 2019-09-06 PROCEDURE — 96366 THER/PROPH/DIAG IV INF ADDON: CPT | Performed by: INTERNAL MEDICINE

## 2019-09-06 PROCEDURE — 25010000002 IMMUNE GLOBULIN (HUMAN) 30 GM/300ML SOLUTION: Performed by: INTERNAL MEDICINE

## 2019-09-06 PROCEDURE — 63710000001 ACETAMINOPHEN 325 MG TABLET: Performed by: INTERNAL MEDICINE

## 2019-09-06 PROCEDURE — A9270 NON-COVERED ITEM OR SERVICE: HCPCS | Performed by: INTERNAL MEDICINE

## 2019-09-06 PROCEDURE — 85025 COMPLETE CBC W/AUTO DIFF WBC: CPT | Performed by: INTERNAL MEDICINE

## 2019-09-06 PROCEDURE — 85007 BL SMEAR W/DIFF WBC COUNT: CPT | Performed by: INTERNAL MEDICINE

## 2019-09-06 RX ORDER — ACETAMINOPHEN 325 MG/1
650 TABLET ORAL ONCE
Status: COMPLETED | OUTPATIENT
Start: 2019-09-06 | End: 2019-09-06

## 2019-09-06 RX ORDER — SODIUM CHLORIDE 9 MG/ML
250 INJECTION, SOLUTION INTRAVENOUS ONCE
Status: CANCELLED | OUTPATIENT
Start: 2019-09-06

## 2019-09-06 RX ORDER — FAMOTIDINE 10 MG/ML
20 INJECTION, SOLUTION INTRAVENOUS AS NEEDED
Status: CANCELLED | OUTPATIENT
Start: 2019-09-06

## 2019-09-06 RX ORDER — DIPHENHYDRAMINE HYDROCHLORIDE 50 MG/ML
25 INJECTION INTRAMUSCULAR; INTRAVENOUS AS NEEDED
Status: CANCELLED | OUTPATIENT
Start: 2019-09-06

## 2019-09-06 RX ORDER — DIPHENHYDRAMINE HCL 25 MG
25 CAPSULE ORAL ONCE
Status: COMPLETED | OUTPATIENT
Start: 2019-09-06 | End: 2019-09-06

## 2019-09-06 RX ORDER — DIPHENHYDRAMINE HCL 25 MG
25 CAPSULE ORAL ONCE
Status: CANCELLED | OUTPATIENT
Start: 2019-09-06

## 2019-09-06 RX ORDER — DEXTROSE MONOHYDRATE 50 MG/ML
250 INJECTION, SOLUTION INTRAVENOUS ONCE
Status: CANCELLED | OUTPATIENT
Start: 2019-09-06

## 2019-09-06 RX ORDER — SODIUM CHLORIDE 9 MG/ML
250 INJECTION, SOLUTION INTRAVENOUS ONCE
Status: COMPLETED | OUTPATIENT
Start: 2019-09-06 | End: 2019-09-06

## 2019-09-06 RX ORDER — ACETAMINOPHEN 325 MG/1
650 TABLET ORAL ONCE
Status: CANCELLED | OUTPATIENT
Start: 2019-09-06

## 2019-09-06 RX ORDER — MEPERIDINE HYDROCHLORIDE 50 MG/ML
25 INJECTION INTRAMUSCULAR; INTRAVENOUS; SUBCUTANEOUS
Status: CANCELLED | OUTPATIENT
Start: 2019-09-06

## 2019-09-06 RX ADMIN — SODIUM CHLORIDE 250 ML: 900 INJECTION, SOLUTION INTRAVENOUS at 10:48

## 2019-09-06 RX ADMIN — ACETAMINOPHEN 650 MG: 325 TABLET, FILM COATED ORAL at 10:47

## 2019-09-06 RX ADMIN — DIPHENHYDRAMINE HYDROCHLORIDE 25 MG: 25 CAPSULE ORAL at 10:47

## 2019-09-06 RX ADMIN — IMMUNE GLOBULIN INFUSION (HUMAN) 30 G: 100 INJECTION, SOLUTION INTRAVENOUS; SUBCUTANEOUS at 11:08

## 2019-09-09 DIAGNOSIS — D53.9 MACROCYTIC ANEMIA: ICD-10-CM

## 2019-09-09 DIAGNOSIS — D46.9 MYELODYSPLASIA (MYELODYSPLASTIC SYNDROME) (HCC): Primary | ICD-10-CM

## 2019-09-13 ENCOUNTER — LAB (OUTPATIENT)
Dept: OTHER | Facility: HOSPITAL | Age: 79
End: 2019-09-13

## 2019-09-13 ENCOUNTER — INFUSION (OUTPATIENT)
Dept: ONCOLOGY | Facility: HOSPITAL | Age: 79
End: 2019-09-13

## 2019-09-13 VITALS
SYSTOLIC BLOOD PRESSURE: 137 MMHG | OXYGEN SATURATION: 88 % | HEART RATE: 65 BPM | TEMPERATURE: 97.6 F | DIASTOLIC BLOOD PRESSURE: 68 MMHG

## 2019-09-13 DIAGNOSIS — D46.9 MYELODYSPLASIA (MYELODYSPLASTIC SYNDROME) (HCC): Primary | ICD-10-CM

## 2019-09-13 DIAGNOSIS — D53.9 MACROCYTIC ANEMIA: ICD-10-CM

## 2019-09-13 DIAGNOSIS — D46.9 MYELODYSPLASIA (MYELODYSPLASTIC SYNDROME) (HCC): ICD-10-CM

## 2019-09-13 LAB
BASOPHILS # BLD AUTO: 0.04 10*3/MM3 (ref 0–0.2)
BASOPHILS NFR BLD AUTO: 1 % (ref 0–1.5)
DEPRECATED RDW RBC AUTO: 54.2 FL (ref 37–54)
EOSINOPHIL # BLD AUTO: 0.18 10*3/MM3 (ref 0–0.4)
EOSINOPHIL NFR BLD AUTO: 4.3 % (ref 0.3–6.2)
ERYTHROCYTE [DISTWIDTH] IN BLOOD BY AUTOMATED COUNT: 14.2 % (ref 12.3–15.4)
HCT VFR BLD AUTO: 29.6 % (ref 37.5–51)
HGB BLD-MCNC: 10 G/DL (ref 13–17.7)
IMM GRANULOCYTES # BLD AUTO: 0.14 10*3/MM3 (ref 0–0.05)
IMM GRANULOCYTES NFR BLD AUTO: 3.4 % (ref 0–0.5)
LYMPHOCYTES # BLD AUTO: 0.88 10*3/MM3 (ref 0.7–3.1)
LYMPHOCYTES NFR BLD AUTO: 21.1 % (ref 19.6–45.3)
MCH RBC QN AUTO: 35.5 PG (ref 26.6–33)
MCHC RBC AUTO-ENTMCNC: 33.8 G/DL (ref 31.5–35.7)
MCV RBC AUTO: 105 FL (ref 79–97)
MONOCYTES # BLD AUTO: 0.77 10*3/MM3 (ref 0.1–0.9)
MONOCYTES NFR BLD AUTO: 18.5 % (ref 5–12)
NEUTROPHILS # BLD AUTO: 2.16 10*3/MM3 (ref 1.7–7)
NEUTROPHILS NFR BLD AUTO: 51.7 % (ref 42.7–76)
NRBC BLD AUTO-RTO: 0 /100 WBC (ref 0–0.2)
PLATELET # BLD AUTO: 156 10*3/MM3 (ref 140–450)
PMV BLD AUTO: 11.9 FL (ref 6–12)
RBC # BLD AUTO: 2.82 10*6/MM3 (ref 4.14–5.8)
WBC NRBC COR # BLD: 4.17 10*3/MM3 (ref 3.4–10.8)

## 2019-09-13 PROCEDURE — 36415 COLL VENOUS BLD VENIPUNCTURE: CPT

## 2019-09-13 PROCEDURE — 85025 COMPLETE CBC W/AUTO DIFF WBC: CPT | Performed by: INTERNAL MEDICINE

## 2019-09-13 NOTE — PROGRESS NOTES
Lab Results   Component Value Date    WBC 4.17 09/13/2019    HGB 10.0 (L) 09/13/2019    HCT 29.6 (L) 09/13/2019    .0 (H) 09/13/2019     09/13/2019     Procrit not indicated for Hgb 10.  Pt voices feeling fatigue and having headaches since last infusion.  Headaches possibly related to IVIG.  Message sent to Dr. Hayes to advise if changes should be made before his next infusion.

## 2019-09-20 ENCOUNTER — LAB (OUTPATIENT)
Dept: OTHER | Facility: HOSPITAL | Age: 79
End: 2019-09-20

## 2019-09-20 ENCOUNTER — INFUSION (OUTPATIENT)
Dept: ONCOLOGY | Facility: HOSPITAL | Age: 79
End: 2019-09-20

## 2019-09-20 VITALS
TEMPERATURE: 97.7 F | SYSTOLIC BLOOD PRESSURE: 143 MMHG | BODY MASS INDEX: 34.83 KG/M2 | WEIGHT: 229 LBS | OXYGEN SATURATION: 93 % | DIASTOLIC BLOOD PRESSURE: 75 MMHG | HEART RATE: 62 BPM

## 2019-09-20 DIAGNOSIS — D53.9 MACROCYTIC ANEMIA: ICD-10-CM

## 2019-09-20 DIAGNOSIS — D46.9 MYELODYSPLASIA (MYELODYSPLASTIC SYNDROME) (HCC): Primary | ICD-10-CM

## 2019-09-20 DIAGNOSIS — D46.9 MYELODYSPLASIA (MYELODYSPLASTIC SYNDROME) (HCC): ICD-10-CM

## 2019-09-20 LAB
DEPRECATED RDW RBC AUTO: 52.6 FL (ref 37–54)
EOSINOPHIL # BLD MANUAL: 0.22 10*3/MM3 (ref 0–0.4)
EOSINOPHIL NFR BLD MANUAL: 7 % (ref 0.3–6.2)
ERYTHROCYTE [DISTWIDTH] IN BLOOD BY AUTOMATED COUNT: 14 % (ref 12.3–15.4)
HCT VFR BLD AUTO: 28.8 % (ref 37.5–51)
HGB BLD-MCNC: 9.8 G/DL (ref 13–17.7)
LYMPHOCYTES # BLD MANUAL: 0.65 10*3/MM3 (ref 0.7–3.1)
LYMPHOCYTES NFR BLD MANUAL: 21 % (ref 19.6–45.3)
LYMPHOCYTES NFR BLD MANUAL: 28 % (ref 5–12)
MCH RBC QN AUTO: 35.5 PG (ref 26.6–33)
MCHC RBC AUTO-ENTMCNC: 34 G/DL (ref 31.5–35.7)
MCV RBC AUTO: 104.3 FL (ref 79–97)
MONOCYTES # BLD AUTO: 0.86 10*3/MM3 (ref 0.1–0.9)
NEUTROPHILS # BLD AUTO: 1.36 10*3/MM3 (ref 1.7–7)
NEUTROPHILS NFR BLD MANUAL: 44 % (ref 42.7–76)
PLAT MORPH BLD: NORMAL
PLATELET # BLD AUTO: 146 10*3/MM3 (ref 140–450)
PMV BLD AUTO: 12.4 FL (ref 6–12)
RBC # BLD AUTO: 2.76 10*6/MM3 (ref 4.14–5.8)
RBC MORPH BLD: NORMAL
SCAN SLIDE: NORMAL
WBC MORPH BLD: NORMAL
WBC NRBC COR # BLD: 3.08 10*3/MM3 (ref 3.4–10.8)

## 2019-09-20 PROCEDURE — 36415 COLL VENOUS BLD VENIPUNCTURE: CPT

## 2019-09-20 PROCEDURE — 96372 THER/PROPH/DIAG INJ SC/IM: CPT

## 2019-09-20 PROCEDURE — 25010000002 EPOETIN ALFA PER 1000 UNITS: Performed by: NURSE PRACTITIONER

## 2019-09-20 PROCEDURE — 85025 COMPLETE CBC W/AUTO DIFF WBC: CPT | Performed by: INTERNAL MEDICINE

## 2019-09-20 PROCEDURE — 85007 BL SMEAR W/DIFF WBC COUNT: CPT | Performed by: INTERNAL MEDICINE

## 2019-09-20 RX ADMIN — ERYTHROPOIETIN 22000 UNITS: 20000 INJECTION, SOLUTION INTRAVENOUS; SUBCUTANEOUS at 13:46

## 2019-09-20 NOTE — PROGRESS NOTES
Lab Results   Component Value Date    WBC 3.08 (L) 09/20/2019    HGB 9.8 (L) 09/20/2019    HCT 28.8 (L) 09/20/2019    .3 (H) 09/20/2019     09/20/2019     Procrit dose reduced per protocol for Hgb 9.8.  Pt voices not feeling well this week due to fatigue.  His headaches also continue.  Per Dr. Hayes's response, no changes to his IVIG.  Pt was concerned his headaches may be related. Pt to return next week for poss procrit and knows to call if symptoms worsen.

## 2019-09-24 DIAGNOSIS — E11.9 TYPE 2 DIABETES MELLITUS WITHOUT COMPLICATION, WITHOUT LONG-TERM CURRENT USE OF INSULIN (HCC): ICD-10-CM

## 2019-09-26 DIAGNOSIS — D53.9 MACROCYTIC ANEMIA: ICD-10-CM

## 2019-09-26 DIAGNOSIS — D46.9 MYELODYSPLASIA (MYELODYSPLASTIC SYNDROME) (HCC): ICD-10-CM

## 2019-09-27 ENCOUNTER — LAB (OUTPATIENT)
Dept: OTHER | Facility: HOSPITAL | Age: 79
End: 2019-09-27

## 2019-09-27 ENCOUNTER — INFUSION (OUTPATIENT)
Dept: ONCOLOGY | Facility: HOSPITAL | Age: 79
End: 2019-09-27

## 2019-09-27 VITALS
SYSTOLIC BLOOD PRESSURE: 147 MMHG | RESPIRATION RATE: 16 BRPM | WEIGHT: 228 LBS | DIASTOLIC BLOOD PRESSURE: 75 MMHG | TEMPERATURE: 97.7 F | OXYGEN SATURATION: 94 % | BODY MASS INDEX: 34.68 KG/M2 | HEART RATE: 76 BPM

## 2019-09-27 DIAGNOSIS — D53.9 MACROCYTIC ANEMIA: ICD-10-CM

## 2019-09-27 DIAGNOSIS — D46.9 MYELODYSPLASIA (MYELODYSPLASTIC SYNDROME) (HCC): ICD-10-CM

## 2019-09-27 DIAGNOSIS — D46.9 MYELODYSPLASIA (MYELODYSPLASTIC SYNDROME) (HCC): Primary | ICD-10-CM

## 2019-09-27 LAB
DEPRECATED RDW RBC AUTO: 53.7 FL (ref 37–54)
EOSINOPHIL # BLD MANUAL: 0.33 10*3/MM3 (ref 0–0.4)
EOSINOPHIL NFR BLD MANUAL: 7 % (ref 0.3–6.2)
ERYTHROCYTE [DISTWIDTH] IN BLOOD BY AUTOMATED COUNT: 14.3 % (ref 12.3–15.4)
HCT VFR BLD AUTO: 28.3 % (ref 37.5–51)
HGB BLD-MCNC: 9.8 G/DL (ref 13–17.7)
LYMPHOCYTES # BLD MANUAL: 0.98 10*3/MM3 (ref 0.7–3.1)
LYMPHOCYTES NFR BLD MANUAL: 12 % (ref 5–12)
LYMPHOCYTES NFR BLD MANUAL: 21 % (ref 19.6–45.3)
MCH RBC QN AUTO: 35.9 PG (ref 26.6–33)
MCHC RBC AUTO-ENTMCNC: 34.6 G/DL (ref 31.5–35.7)
MCV RBC AUTO: 103.7 FL (ref 79–97)
METAMYELOCYTES NFR BLD MANUAL: 2 % (ref 0–0)
MONOCYTES # BLD AUTO: 0.56 10*3/MM3 (ref 0.1–0.9)
MYELOCYTES NFR BLD MANUAL: 1 % (ref 0–0)
NEUTROPHILS # BLD AUTO: 2.67 10*3/MM3 (ref 1.7–7)
NEUTROPHILS NFR BLD MANUAL: 57 % (ref 42.7–76)
OVALOCYTES BLD QL SMEAR: ABNORMAL
PLAT MORPH BLD: NORMAL
PLATELET # BLD AUTO: 151 10*3/MM3 (ref 140–450)
PMV BLD AUTO: 12.6 FL (ref 6–12)
RBC # BLD AUTO: 2.73 10*6/MM3 (ref 4.14–5.8)
SCAN SLIDE: NORMAL
WBC MORPH BLD: NORMAL
WBC NRBC COR # BLD: 4.69 10*3/MM3 (ref 3.4–10.8)

## 2019-09-27 PROCEDURE — 25010000002 EPOETIN ALFA PER 1000 UNITS: Performed by: NURSE PRACTITIONER

## 2019-09-27 PROCEDURE — 85007 BL SMEAR W/DIFF WBC COUNT: CPT | Performed by: INTERNAL MEDICINE

## 2019-09-27 PROCEDURE — 36415 COLL VENOUS BLD VENIPUNCTURE: CPT

## 2019-09-27 PROCEDURE — 85025 COMPLETE CBC W/AUTO DIFF WBC: CPT | Performed by: INTERNAL MEDICINE

## 2019-09-27 PROCEDURE — 96372 THER/PROPH/DIAG INJ SC/IM: CPT

## 2019-09-27 RX ADMIN — ERYTHROPOIETIN 22000 UNITS: 20000 INJECTION, SOLUTION INTRAVENOUS; SUBCUTANEOUS at 13:29

## 2019-10-03 DIAGNOSIS — D46.9 MYELODYSPLASIA (MYELODYSPLASTIC SYNDROME) (HCC): ICD-10-CM

## 2019-10-03 DIAGNOSIS — D53.9 MACROCYTIC ANEMIA: ICD-10-CM

## 2019-10-04 ENCOUNTER — APPOINTMENT (OUTPATIENT)
Dept: OTHER | Facility: HOSPITAL | Age: 79
End: 2019-10-04

## 2019-10-04 ENCOUNTER — INFUSION (OUTPATIENT)
Dept: ONCOLOGY | Facility: HOSPITAL | Age: 79
End: 2019-10-04

## 2019-10-04 VITALS
DIASTOLIC BLOOD PRESSURE: 80 MMHG | WEIGHT: 228.2 LBS | OXYGEN SATURATION: 91 % | RESPIRATION RATE: 16 BRPM | SYSTOLIC BLOOD PRESSURE: 169 MMHG | TEMPERATURE: 97.9 F | BODY MASS INDEX: 34.71 KG/M2 | HEART RATE: 65 BPM

## 2019-10-04 DIAGNOSIS — D46.9 MYELODYSPLASIA (MYELODYSPLASTIC SYNDROME) (HCC): ICD-10-CM

## 2019-10-04 DIAGNOSIS — D53.9 MACROCYTIC ANEMIA: ICD-10-CM

## 2019-10-04 DIAGNOSIS — D80.1 HYPOGAMMAGLOBULINEMIA (HCC): Primary | ICD-10-CM

## 2019-10-04 DIAGNOSIS — D80.1 HYPOGAMMAGLOBULINEMIA (HCC): ICD-10-CM

## 2019-10-04 DIAGNOSIS — C34.32 MALIGNANT NEOPLASM OF LOWER LOBE OF LEFT LUNG (HCC): ICD-10-CM

## 2019-10-04 LAB
BASOPHILS # BLD AUTO: 0.02 10*3/MM3 (ref 0–0.2)
BASOPHILS NFR BLD AUTO: 0.7 % (ref 0–1.5)
DEPRECATED RDW RBC AUTO: 55 FL (ref 37–54)
EOSINOPHIL # BLD AUTO: 0.19 10*3/MM3 (ref 0–0.4)
EOSINOPHIL NFR BLD AUTO: 6.6 % (ref 0.3–6.2)
ERYTHROCYTE [DISTWIDTH] IN BLOOD BY AUTOMATED COUNT: 14.4 % (ref 12.3–15.4)
FERRITIN SERPL-MCNC: 268 NG/ML (ref 30–400)
HCT VFR BLD AUTO: 29.1 % (ref 37.5–51)
HGB BLD-MCNC: 9.6 G/DL (ref 13–17.7)
IGA1 MFR SER: 187 MG/DL (ref 70–400)
IGG1 SER-MCNC: 561 MG/DL (ref 700–1600)
IGM SERPL-MCNC: 46 MG/DL (ref 40–230)
IMM GRANULOCYTES # BLD AUTO: 0.1 10*3/MM3 (ref 0–0.05)
IMM GRANULOCYTES NFR BLD AUTO: 3.4 % (ref 0–0.5)
IRON 24H UR-MRATE: 118 MCG/DL (ref 59–158)
IRON SATN MFR SERPL: 43 % (ref 20–50)
LYMPHOCYTES # BLD AUTO: 0.67 10*3/MM3 (ref 0.7–3.1)
LYMPHOCYTES NFR BLD AUTO: 23.1 % (ref 19.6–45.3)
MCH RBC QN AUTO: 35.3 PG (ref 26.6–33)
MCHC RBC AUTO-ENTMCNC: 33 G/DL (ref 31.5–35.7)
MCV RBC AUTO: 107 FL (ref 79–97)
MONOCYTES # BLD AUTO: 0.56 10*3/MM3 (ref 0.1–0.9)
MONOCYTES NFR BLD AUTO: 19.3 % (ref 5–12)
NEUTROPHILS # BLD AUTO: 1.36 10*3/MM3 (ref 1.7–7)
NEUTROPHILS NFR BLD AUTO: 46.9 % (ref 42.7–76)
NRBC BLD AUTO-RTO: 0 /100 WBC (ref 0–0.2)
PLAT MORPH BLD: NORMAL
PLATELET # BLD AUTO: 139 10*3/MM3 (ref 140–450)
PMV BLD AUTO: 11.9 FL (ref 6–12)
RBC # BLD AUTO: 2.72 10*6/MM3 (ref 4.14–5.8)
SPHEROCYTES BLD QL SMEAR: NORMAL
TARGETS BLD QL SMEAR: NORMAL
TIBC SERPL-MCNC: 277 MCG/DL (ref 298–536)
TRANSFERRIN SERPL-MCNC: 186 MG/DL (ref 200–360)
WBC MORPH BLD: NORMAL
WBC NRBC COR # BLD: 2.9 10*3/MM3 (ref 3.4–10.8)

## 2019-10-04 PROCEDURE — 63710000001 ACETAMINOPHEN 325 MG TABLET: Performed by: INTERNAL MEDICINE

## 2019-10-04 PROCEDURE — 83540 ASSAY OF IRON: CPT | Performed by: INTERNAL MEDICINE

## 2019-10-04 PROCEDURE — 63710000001 DIPHENHYDRAMINE PER 50 MG: Performed by: INTERNAL MEDICINE

## 2019-10-04 PROCEDURE — 96365 THER/PROPH/DIAG IV INF INIT: CPT

## 2019-10-04 PROCEDURE — 82784 ASSAY IGA/IGD/IGG/IGM EACH: CPT | Performed by: INTERNAL MEDICINE

## 2019-10-04 PROCEDURE — A9270 NON-COVERED ITEM OR SERVICE: HCPCS | Performed by: INTERNAL MEDICINE

## 2019-10-04 PROCEDURE — 96372 THER/PROPH/DIAG INJ SC/IM: CPT

## 2019-10-04 PROCEDURE — 25010000002 IMMUNE GLOBULIN (HUMAN) 30 GM/300ML SOLUTION: Performed by: INTERNAL MEDICINE

## 2019-10-04 PROCEDURE — 85007 BL SMEAR W/DIFF WBC COUNT: CPT | Performed by: INTERNAL MEDICINE

## 2019-10-04 PROCEDURE — 85025 COMPLETE CBC W/AUTO DIFF WBC: CPT | Performed by: INTERNAL MEDICINE

## 2019-10-04 PROCEDURE — 84466 ASSAY OF TRANSFERRIN: CPT | Performed by: INTERNAL MEDICINE

## 2019-10-04 PROCEDURE — 82728 ASSAY OF FERRITIN: CPT | Performed by: INTERNAL MEDICINE

## 2019-10-04 PROCEDURE — 25010000002 EPOETIN ALFA PER 1000 UNITS: Performed by: NURSE PRACTITIONER

## 2019-10-04 PROCEDURE — 96366 THER/PROPH/DIAG IV INF ADDON: CPT

## 2019-10-04 RX ORDER — DIPHENHYDRAMINE HCL 25 MG
25 CAPSULE ORAL ONCE
Status: CANCELLED | OUTPATIENT
Start: 2019-10-04

## 2019-10-04 RX ORDER — DIPHENHYDRAMINE HYDROCHLORIDE 50 MG/ML
25 INJECTION INTRAMUSCULAR; INTRAVENOUS AS NEEDED
Status: CANCELLED | OUTPATIENT
Start: 2019-10-04

## 2019-10-04 RX ORDER — ACETAMINOPHEN 325 MG/1
650 TABLET ORAL ONCE
Status: COMPLETED | OUTPATIENT
Start: 2019-10-04 | End: 2019-10-04

## 2019-10-04 RX ORDER — DIPHENHYDRAMINE HCL 25 MG
25 CAPSULE ORAL ONCE
Status: COMPLETED | OUTPATIENT
Start: 2019-10-04 | End: 2019-10-04

## 2019-10-04 RX ORDER — FAMOTIDINE 10 MG/ML
20 INJECTION, SOLUTION INTRAVENOUS AS NEEDED
Status: CANCELLED | OUTPATIENT
Start: 2019-10-04

## 2019-10-04 RX ORDER — SODIUM CHLORIDE 9 MG/ML
250 INJECTION, SOLUTION INTRAVENOUS ONCE
Status: CANCELLED | OUTPATIENT
Start: 2019-10-04

## 2019-10-04 RX ORDER — SODIUM CHLORIDE 9 MG/ML
250 INJECTION, SOLUTION INTRAVENOUS ONCE
Status: COMPLETED | OUTPATIENT
Start: 2019-10-04 | End: 2019-10-04

## 2019-10-04 RX ORDER — MEPERIDINE HYDROCHLORIDE 50 MG/ML
25 INJECTION INTRAMUSCULAR; INTRAVENOUS; SUBCUTANEOUS
Status: CANCELLED | OUTPATIENT
Start: 2019-10-04

## 2019-10-04 RX ORDER — DEXTROSE MONOHYDRATE 50 MG/ML
250 INJECTION, SOLUTION INTRAVENOUS ONCE
Status: CANCELLED | OUTPATIENT
Start: 2019-10-04

## 2019-10-04 RX ORDER — ACETAMINOPHEN 325 MG/1
650 TABLET ORAL ONCE
Status: CANCELLED | OUTPATIENT
Start: 2019-10-04

## 2019-10-04 RX ORDER — DEXTROSE MONOHYDRATE 50 MG/ML
250 INJECTION, SOLUTION INTRAVENOUS ONCE
Status: DISCONTINUED | OUTPATIENT
Start: 2019-10-04 | End: 2019-10-04 | Stop reason: HOSPADM

## 2019-10-04 RX ADMIN — SODIUM CHLORIDE 250 ML: 900 INJECTION, SOLUTION INTRAVENOUS at 11:20

## 2019-10-04 RX ADMIN — ACETAMINOPHEN 650 MG: 325 TABLET, FILM COATED ORAL at 11:42

## 2019-10-04 RX ADMIN — IMMUNE GLOBULIN INFUSION (HUMAN) 30 G: 100 INJECTION, SOLUTION INTRAVENOUS; SUBCUTANEOUS at 11:49

## 2019-10-04 RX ADMIN — ERYTHROPOIETIN 22000 UNITS: 20000 INJECTION, SOLUTION INTRAVENOUS; SUBCUTANEOUS at 12:51

## 2019-10-04 RX ADMIN — DIPHENHYDRAMINE HYDROCHLORIDE 25 MG: 25 CAPSULE ORAL at 11:42

## 2019-10-07 DIAGNOSIS — F41.9 ANXIETY: ICD-10-CM

## 2019-10-08 DIAGNOSIS — F41.9 ANXIETY: ICD-10-CM

## 2019-10-08 DIAGNOSIS — D46.9 MYELODYSPLASIA (MYELODYSPLASTIC SYNDROME) (HCC): ICD-10-CM

## 2019-10-08 DIAGNOSIS — D53.9 MACROCYTIC ANEMIA: ICD-10-CM

## 2019-10-08 RX ORDER — ALPRAZOLAM 1 MG/1
1 TABLET ORAL NIGHTLY PRN
Qty: 30 TABLET | Refills: 0 | OUTPATIENT
Start: 2019-10-08

## 2019-10-10 DIAGNOSIS — F41.9 ANXIETY: ICD-10-CM

## 2019-10-11 ENCOUNTER — INFUSION (OUTPATIENT)
Dept: ONCOLOGY | Facility: HOSPITAL | Age: 79
End: 2019-10-11

## 2019-10-11 ENCOUNTER — LAB (OUTPATIENT)
Dept: OTHER | Facility: HOSPITAL | Age: 79
End: 2019-10-11

## 2019-10-11 VITALS
BODY MASS INDEX: 34.4 KG/M2 | DIASTOLIC BLOOD PRESSURE: 81 MMHG | WEIGHT: 226.2 LBS | RESPIRATION RATE: 18 BRPM | HEART RATE: 65 BPM | SYSTOLIC BLOOD PRESSURE: 135 MMHG | TEMPERATURE: 97.4 F | OXYGEN SATURATION: 90 %

## 2019-10-11 DIAGNOSIS — D46.9 MYELODYSPLASIA (MYELODYSPLASTIC SYNDROME) (HCC): Primary | ICD-10-CM

## 2019-10-11 DIAGNOSIS — D46.9 MYELODYSPLASIA (MYELODYSPLASTIC SYNDROME) (HCC): ICD-10-CM

## 2019-10-11 DIAGNOSIS — D53.9 MACROCYTIC ANEMIA: ICD-10-CM

## 2019-10-11 LAB
BASOPHILS # BLD AUTO: 0.03 10*3/MM3 (ref 0–0.2)
BASOPHILS NFR BLD AUTO: 0.9 % (ref 0–1.5)
DEPRECATED RDW RBC AUTO: 53.6 FL (ref 37–54)
EOSINOPHIL # BLD AUTO: 0.21 10*3/MM3 (ref 0–0.4)
EOSINOPHIL NFR BLD AUTO: 6.6 % (ref 0.3–6.2)
ERYTHROCYTE [DISTWIDTH] IN BLOOD BY AUTOMATED COUNT: 14.4 % (ref 12.3–15.4)
HCT VFR BLD AUTO: 30.5 % (ref 37.5–51)
HGB BLD-MCNC: 10.4 G/DL (ref 13–17.7)
IMM GRANULOCYTES # BLD AUTO: 0.09 10*3/MM3 (ref 0–0.05)
IMM GRANULOCYTES NFR BLD AUTO: 2.8 % (ref 0–0.5)
LYMPHOCYTES # BLD AUTO: 1.12 10*3/MM3 (ref 0.7–3.1)
LYMPHOCYTES NFR BLD AUTO: 35 % (ref 19.6–45.3)
MCH RBC QN AUTO: 35.3 PG (ref 26.6–33)
MCHC RBC AUTO-ENTMCNC: 34.1 G/DL (ref 31.5–35.7)
MCV RBC AUTO: 103.4 FL (ref 79–97)
MONOCYTES # BLD AUTO: 0.68 10*3/MM3 (ref 0.1–0.9)
MONOCYTES NFR BLD AUTO: 21.3 % (ref 5–12)
NEUTROPHILS # BLD AUTO: 1.07 10*3/MM3 (ref 1.7–7)
NEUTROPHILS NFR BLD AUTO: 33.4 % (ref 42.7–76)
NRBC BLD AUTO-RTO: 0 /100 WBC (ref 0–0.2)
OVALOCYTES BLD QL SMEAR: NORMAL
PLAT MORPH BLD: NORMAL
PLATELET # BLD AUTO: 159 10*3/MM3 (ref 140–450)
PMV BLD AUTO: 12.5 FL (ref 6–12)
RBC # BLD AUTO: 2.95 10*6/MM3 (ref 4.14–5.8)
STOMATOCYTES BLD QL SMEAR: NORMAL
WBC MORPH BLD: NORMAL
WBC NRBC COR # BLD: 3.2 10*3/MM3 (ref 3.4–10.8)

## 2019-10-11 PROCEDURE — 36415 COLL VENOUS BLD VENIPUNCTURE: CPT

## 2019-10-11 PROCEDURE — 85025 COMPLETE CBC W/AUTO DIFF WBC: CPT | Performed by: INTERNAL MEDICINE

## 2019-10-11 PROCEDURE — 85007 BL SMEAR W/DIFF WBC COUNT: CPT | Performed by: INTERNAL MEDICINE

## 2019-10-11 NOTE — PROGRESS NOTES
Lab Results   Component Value Date    WBC 3.20 (L) 10/11/2019    HGB 10.4 (L) 10/11/2019    HCT 30.5 (L) 10/11/2019    .4 (H) 10/11/2019     10/11/2019   procrit held today per doctor protocol. Verbalized understaning

## 2019-10-14 ENCOUNTER — OFFICE VISIT (OUTPATIENT)
Dept: FAMILY MEDICINE CLINIC | Facility: CLINIC | Age: 79
End: 2019-10-14

## 2019-10-14 VITALS
BODY MASS INDEX: 32.35 KG/M2 | DIASTOLIC BLOOD PRESSURE: 82 MMHG | HEART RATE: 70 BPM | SYSTOLIC BLOOD PRESSURE: 159 MMHG | TEMPERATURE: 98 F | RESPIRATION RATE: 16 BRPM | WEIGHT: 226 LBS | HEIGHT: 70 IN

## 2019-10-14 DIAGNOSIS — F41.9 ANXIETY: ICD-10-CM

## 2019-10-14 DIAGNOSIS — E11.9 TYPE 2 DIABETES MELLITUS WITHOUT COMPLICATION, WITHOUT LONG-TERM CURRENT USE OF INSULIN (HCC): Primary | ICD-10-CM

## 2019-10-14 DIAGNOSIS — Z23 IMMUNIZATION DUE: ICD-10-CM

## 2019-10-14 PROCEDURE — G0008 ADMIN INFLUENZA VIRUS VAC: HCPCS | Performed by: FAMILY MEDICINE

## 2019-10-14 PROCEDURE — 90653 IIV ADJUVANT VACCINE IM: CPT | Performed by: FAMILY MEDICINE

## 2019-10-14 PROCEDURE — 99213 OFFICE O/P EST LOW 20 MIN: CPT | Performed by: FAMILY MEDICINE

## 2019-10-14 RX ORDER — BENAZEPRIL HYDROCHLORIDE 20 MG/1
20 TABLET ORAL DAILY
COMMUNITY
Start: 2013-05-06 | End: 2020-01-01

## 2019-10-14 RX ORDER — ALPRAZOLAM 1 MG/1
1 TABLET ORAL NIGHTLY PRN
Qty: 30 TABLET | Refills: 2 | Status: SHIPPED | OUTPATIENT
Start: 2019-10-14 | End: 2020-01-14 | Stop reason: SDUPTHER

## 2019-10-14 RX ORDER — ALPRAZOLAM 1 MG/1
1 TABLET ORAL NIGHTLY PRN
Qty: 30 TABLET | Refills: 0 | Status: CANCELLED | OUTPATIENT
Start: 2019-10-14

## 2019-10-14 RX ORDER — ALPRAZOLAM 1 MG/1
1 TABLET ORAL NIGHTLY PRN
Qty: 30 TABLET | Refills: 0 | OUTPATIENT
Start: 2019-10-14

## 2019-10-14 NOTE — PROGRESS NOTES
Subjective   Ankit Mack Sr. is a 79 y.o. male.     History of Present Illness     Chief Complaint:   Chief Complaint   Patient presents with   • Diabetes     med refill - joseline   - pt unsure of meds    • Anxiety   • Immunizations     HD FLU VACCINE GIVEN TODAY  LEFT DELTOID        Ankit Mack Sr. 79 y.o. male who presents today for Medical Management of the below listed issues and medication refills.  he has a problem list of   Patient Active Problem List   Diagnosis   • Anxiety   • Hyperlipidemia   • Osteoarthritis, knee   • Essential hypertension   • Cancer of lower lobe of lung (CMS/HCC)   • DOMINGO on CPAP   • Non-seasonal allergic rhinitis   • Pancytopenia (CMS/HCC)   • Aneurysm of thoracic aorta (CMS/HCC)   • Paroxysmal atrial fibrillation (CMS/HCC)   • Thrombocytopenia (CMS/HCC)   • Type 2 diabetes mellitus without complication, without long-term current use of insulin (CMS/HCC)   • Chronic diastolic congestive heart failure (CMS/HCC)   • History of COPD   • Macrocytic anemia   • Hypogammaglobulinemia (CMS/HCC)   • Myelodysplasia (myelodysplastic syndrome) (CMS/HCC)   • Class 1 obesity in adult   • Atrial flutter (CMS/HCC)   • Nonrheumatic aortic valve stenosis   .  Since the last visit, he has overall felt well.  he has been compliant with   Current Outpatient Medications:   •  benazepril (LOTENSIN) 20 MG tablet, Take 20 mg by mouth Daily., Disp: , Rfl:   •  albuterol (PROVENTIL HFA;VENTOLIN HFA) 108 (90 Base) MCG/ACT inhaler, Inhale 2 puffs Every 4 (Four) Hours As Needed for Wheezing., Disp: , Rfl:   •  albuterol (PROVENTIL) (2.5 MG/3ML) 0.083% nebulizer solution, Take 2.5 mg by nebulization 4 (Four) Times a Day., Disp: , Rfl:   •  ALPRAZolam (XANAX) 1 MG tablet, Take 1 tablet by mouth At Night As Needed for Anxiety., Disp: 30 tablet, Rfl: 2  •  apixaban (ELIQUIS) 5 MG tablet tablet, Take 1 tablet by mouth Every 12 (Twelve) Hours., Disp: 180 tablet, Rfl: 2  •  atorvastatin (LIPITOR) 10 MG tablet, TAKE  "1 TABLET BY MOUTH DAILY. FOR CHOLESTEROL, Disp: 90 tablet, Rfl: 1  •  budesonide (PULMICORT) 0.5 MG/2ML nebulizer solution, Take 2 mL by nebulization 2 (Two) Times a Day., Disp: 120 each, Rfl: 1  •  diltiaZEM (TIAZAC) 360 MG 24 hr capsule, Take 1 capsule by mouth Daily., Disp: 30 capsule, Rfl: 2  •  folic acid (FOLVITE) 1 MG tablet, Take 1 mg by mouth Daily., Disp: , Rfl:   •  furosemide (LASIX) 40 MG tablet, Take 1 tablet by mouth Daily., Disp: 90 tablet, Rfl: 0  •  metFORMIN (GLUCOPHAGE) 1000 MG tablet, Take 1 tablet by mouth Daily With Breakfast., Disp: 90 tablet, Rfl: 1  •  metoprolol tartrate (LOPRESSOR) 100 MG tablet, TAKE ONE AND ONE - HALF TABLETS BY MOUTH TWICE DAILY, Disp: 270 tablet, Rfl: 1  •  Revefenacin (YUPELRI) 175 MCG/3ML solution, Inhale 3 mL (1 vial) by nebulization Daily., Disp: 90 mL, Rfl: 1.  he denies medication side effects.    All of the other chronic condition(s) listed above are stable w/o issues.    /82   Pulse 70   Temp 98 °F (36.7 °C) (Oral)   Resp 16   Ht 177.8 cm (70\")   Wt 103 kg (226 lb)   BMI 32.43 kg/m²     Results for orders placed or performed in visit on 10/11/19   CBC Auto Differential   Result Value Ref Range    WBC 3.20 (L) 3.40 - 10.80 10*3/mm3    RBC 2.95 (L) 4.14 - 5.80 10*6/mm3    Hemoglobin 10.4 (L) 13.0 - 17.7 g/dL    Hematocrit 30.5 (L) 37.5 - 51.0 %    .4 (H) 79.0 - 97.0 fL    MCH 35.3 (H) 26.6 - 33.0 pg    MCHC 34.1 31.5 - 35.7 g/dL    RDW 14.4 12.3 - 15.4 %    RDW-SD 53.6 37.0 - 54.0 fl    MPV 12.5 (H) 6.0 - 12.0 fL    Platelets 159 140 - 450 10*3/mm3    Neutrophil % 33.4 (L) 42.7 - 76.0 %    Lymphocyte % 35.0 19.6 - 45.3 %    Monocyte % 21.3 (H) 5.0 - 12.0 %    Eosinophil % 6.6 (H) 0.3 - 6.2 %    Basophil % 0.9 0.0 - 1.5 %    Immature Grans % 2.8 (H) 0.0 - 0.5 %    Neutrophils, Absolute 1.07 (L) 1.70 - 7.00 10*3/mm3    Lymphocytes, Absolute 1.12 0.70 - 3.10 10*3/mm3    Monocytes, Absolute 0.68 0.10 - 0.90 10*3/mm3    Eosinophils, Absolute 0.21 " 0.00 - 0.40 10*3/mm3    Basophils, Absolute 0.03 0.00 - 0.20 10*3/mm3    Immature Grans, Absolute 0.09 (H) 0.00 - 0.05 10*3/mm3    nRBC 0.0 0.0 - 0.2 /100 WBC   Scan Slide   Result Value Ref Range    Ovalocytes Slight/1+ None Seen    Stomatocytes Slight/1+ None Seen    WBC Morphology Normal Normal    Platelet Morphology Normal Normal           The following portions of the patient's history were reviewed and updated as appropriate: allergies, current medications, past family history, past medical history, past social history, past surgical history and problem list.    Review of Systems   Constitutional: Negative for activity change, chills, fatigue and fever.   Respiratory: Negative for cough and shortness of breath.    Cardiovascular: Negative for chest pain and palpitations.   Gastrointestinal: Negative for abdominal pain.   Endocrine: Negative for cold intolerance.   Psychiatric/Behavioral: Negative for behavioral problems and dysphoric mood. The patient is not nervous/anxious.        Objective   Physical Exam   Constitutional: He appears well-developed and well-nourished.   Neck: Neck supple. No thyromegaly present.   Cardiovascular: Normal rate. An irregularly irregular rhythm present.   No murmur heard.  Pulmonary/Chest: Effort normal and breath sounds normal.   Abdominal: Bowel sounds are normal. There is no tenderness.   Psychiatric: He has a normal mood and affect. His behavior is normal.   Nursing note and vitals reviewed.    The patient has read and signed the [facility] Controlled Substance Contract.  I will continue to see patient for regular follow up appointments.  They are well controlled on their medication.  MARY has been reviewed by me and is updated every 3 months. The patient is aware of the potential for addiction and dependence.    Assessment/Plan   Ankit was seen today for diabetes, anxiety and immunizations.    Diagnoses and all orders for this visit:    Type 2 diabetes mellitus  without complication, without long-term current use of insulin (CMS/[facility])  -     metFORMIN (GLUCOPHAGE) 1000 MG tablet; Take 1 tablet by mouth Daily With Breakfast.  -     Hemoglobin A1c    Anxiety  -     ALPRAZolam (XANAX) 1 MG tablet; Take 1 tablet by mouth At Night As Needed for Anxiety.    Immunization due  -     Fluad Tri 65yr+

## 2019-10-15 LAB — HBA1C MFR BLD: 5.4 % (ref 4.8–5.6)

## 2019-10-16 ENCOUNTER — HOSPITAL ENCOUNTER (EMERGENCY)
Facility: HOSPITAL | Age: 79
Discharge: HOME | End: 2019-10-16
Attending: EMERGENCY MEDICINE | Admitting: EMERGENCY MEDICINE

## 2019-10-16 ENCOUNTER — APPOINTMENT (OUTPATIENT)
Dept: GENERAL RADIOLOGY | Facility: HOSPITAL | Age: 79
End: 2019-10-16

## 2019-10-16 ENCOUNTER — TELEPHONE (OUTPATIENT)
Dept: CARDIOLOGY | Facility: CLINIC | Age: 79
End: 2019-10-16

## 2019-10-16 ENCOUNTER — TELEPHONE (OUTPATIENT)
Dept: ONCOLOGY | Facility: CLINIC | Age: 79
End: 2019-10-16

## 2019-10-16 VITALS
HEIGHT: 70 IN | BODY MASS INDEX: 32.35 KG/M2 | WEIGHT: 226 LBS | OXYGEN SATURATION: 93 % | RESPIRATION RATE: 18 BRPM | DIASTOLIC BLOOD PRESSURE: 88 MMHG | HEART RATE: 68 BPM | SYSTOLIC BLOOD PRESSURE: 166 MMHG | TEMPERATURE: 97.1 F

## 2019-10-16 DIAGNOSIS — J96.11 CHRONIC RESPIRATORY FAILURE WITH HYPOXIA (HCC): ICD-10-CM

## 2019-10-16 DIAGNOSIS — R04.2 SPITTING UP BLOOD: Primary | ICD-10-CM

## 2019-10-16 LAB
ANION GAP SERPL CALCULATED.3IONS-SCNC: 11.9 MMOL/L (ref 5–15)
BASOPHILS # BLD MANUAL: 0.05 10*3/MM3 (ref 0–0.2)
BASOPHILS NFR BLD AUTO: 2.1 % (ref 0–1.5)
BUN BLD-MCNC: 25 MG/DL (ref 8–23)
BUN/CREAT SERPL: 17.6 (ref 7–25)
CALCIUM SPEC-SCNC: 8.8 MG/DL (ref 8.6–10.5)
CHLORIDE SERPL-SCNC: 103 MMOL/L (ref 98–107)
CO2 SERPL-SCNC: 29.1 MMOL/L (ref 22–29)
CREAT BLD-MCNC: 1.42 MG/DL (ref 0.76–1.27)
DACRYOCYTES BLD QL SMEAR: ABNORMAL
DEPRECATED RDW RBC AUTO: 53.3 FL (ref 37–54)
EOSINOPHIL # BLD MANUAL: 0.18 10*3/MM3 (ref 0–0.4)
EOSINOPHIL NFR BLD MANUAL: 7.3 % (ref 0.3–6.2)
ERYTHROCYTE [DISTWIDTH] IN BLOOD BY AUTOMATED COUNT: 13.9 % (ref 12.3–15.4)
GFR SERPL CREATININE-BSD FRML MDRD: 48 ML/MIN/1.73
GLUCOSE BLD-MCNC: 112 MG/DL (ref 65–99)
HCT VFR BLD AUTO: 30.3 % (ref 37.5–51)
HGB BLD-MCNC: 10.3 G/DL (ref 13–17.7)
HOLD SPECIMEN: NORMAL
HOLD SPECIMEN: NORMAL
HYPOCHROMIA BLD QL: ABNORMAL
LYMPHOCYTES # BLD MANUAL: 0.64 10*3/MM3 (ref 0.7–3.1)
LYMPHOCYTES NFR BLD MANUAL: 15.6 % (ref 5–12)
LYMPHOCYTES NFR BLD MANUAL: 26 % (ref 19.6–45.3)
MCH RBC QN AUTO: 35.5 PG (ref 26.6–33)
MCHC RBC AUTO-ENTMCNC: 34 G/DL (ref 31.5–35.7)
MCV RBC AUTO: 104.5 FL (ref 79–97)
MONOCYTES # BLD AUTO: 0.38 10*3/MM3 (ref 0.1–0.9)
NEUTROPHILS # BLD AUTO: 1.2 10*3/MM3 (ref 1.7–7)
NEUTROPHILS NFR BLD MANUAL: 49 % (ref 42.7–76)
OVALOCYTES BLD QL SMEAR: ABNORMAL
PLAT MORPH BLD: NORMAL
PLATELET # BLD AUTO: 137 10*3/MM3 (ref 140–450)
PMV BLD AUTO: 13.4 FL (ref 6–12)
POIKILOCYTOSIS BLD QL SMEAR: ABNORMAL
POLYCHROMASIA BLD QL SMEAR: ABNORMAL
POTASSIUM BLD-SCNC: 4.6 MMOL/L (ref 3.5–5.2)
RBC # BLD AUTO: 2.9 10*6/MM3 (ref 4.14–5.8)
SODIUM BLD-SCNC: 144 MMOL/L (ref 136–145)
WBC MORPH BLD: NORMAL
WBC NRBC COR # BLD: 2.45 10*3/MM3 (ref 3.4–10.8)
WHOLE BLOOD HOLD SPECIMEN: NORMAL
WHOLE BLOOD HOLD SPECIMEN: NORMAL

## 2019-10-16 PROCEDURE — 93005 ELECTROCARDIOGRAM TRACING: CPT | Performed by: NURSE PRACTITIONER

## 2019-10-16 PROCEDURE — 85007 BL SMEAR W/DIFF WBC COUNT: CPT | Performed by: EMERGENCY MEDICINE

## 2019-10-16 PROCEDURE — 85025 COMPLETE CBC W/AUTO DIFF WBC: CPT | Performed by: EMERGENCY MEDICINE

## 2019-10-16 PROCEDURE — 99283 EMERGENCY DEPT VISIT LOW MDM: CPT

## 2019-10-16 PROCEDURE — 80048 BASIC METABOLIC PNL TOTAL CA: CPT | Performed by: EMERGENCY MEDICINE

## 2019-10-16 PROCEDURE — 93010 ELECTROCARDIOGRAM REPORT: CPT | Performed by: INTERNAL MEDICINE

## 2019-10-16 PROCEDURE — 71045 X-RAY EXAM CHEST 1 VIEW: CPT

## 2019-10-16 RX ORDER — AMOXICILLIN AND CLAVULANATE POTASSIUM 875; 125 MG/1; MG/1
1 TABLET, FILM COATED ORAL EVERY 12 HOURS
Qty: 14 TABLET | Refills: 0 | Status: SHIPPED | OUTPATIENT
Start: 2019-10-16 | End: 2019-11-01

## 2019-10-16 NOTE — ED PROVIDER NOTES
" EMERGENCY DEPARTMENT ENCOUNTER    CHIEF COMPLAINT  Chief Complaint: spitting up blood   History given by: patient   History limited by: nothing   Room Number: 04/04  PMD: Gopi Lagos MD      HPI:  Pt is a 79 y.o. male who presents complaining of spitting up blood that began this morning. Pt states he got up around 0600 this morning and took a breathing treatment and went back to sleep around 0900 and then around 1100 he spit up \"about 3 big red blood clots\".  This occurred after he sniffed forcefully.  Pt also c/o sore throat. Pt denies coughing, sneezing, epistaxis, N/V/D, CP, abd pain, or any sinus congestion. Pt is on Eliquis for A-fib. Pt reports he is chronically on 3 liters of O2. Hx of COPD. There are no other complaints at this time.     Duration: hours   Onset: gradual   Timing: constant   Quality: \"spitting up blood\"   Intensity/Severity: moderate   Progression: unchanged   Associated Symptoms: sore throat   Aggravating Factors: none mentioned   Alleviating Factors: none mentioned   Previous Episodes: hx of COPD   Treatment before arrival: none     PAST MEDICAL HISTORY  Active Ambulatory Problems     Diagnosis Date Noted   • Anxiety    • Hyperlipidemia    • Osteoarthritis, knee    • Essential hypertension 12/13/2012   • Cancer of lower lobe of lung (CMS/[facility]) 05/16/2017   • DOMINGO on CPAP 05/22/2017   • Non-seasonal allergic rhinitis 11/04/2018   • Pancytopenia (CMS/[facility]) 02/06/2018   • Aneurysm of thoracic aorta (CMS/[facility]) 12/13/2012   • Paroxysmal atrial fibrillation (CMS/[facility]) 11/17/2018   • Thrombocytopenia (CMS/[facility]) 11/17/2018   • Type 2 diabetes mellitus without complication, without long-term current use of insulin (CMS/[facility]) 12/04/2018   • Chronic diastolic congestive heart failure (CMS/[facility]) 12/19/2018   • History of COPD 03/13/2019   • Macrocytic anemia 06/18/2019   • Hypogammaglobulinemia (CMS/[facility]) 06/25/2019   • Myelodysplasia (myelodysplastic syndrome) (CMS/[facility]) 07/09/2019   • Class 1 obesity in " adult 08/26/2019   • Atrial flutter (CMS/[facility]) 08/26/2019   • Nonrheumatic aortic valve stenosis 08/26/2019     Resolved Ambulatory Problems     Diagnosis Date Noted   • Abrasion 03/04/2017   • Chronic obstructive pulmonary disease with acute exacerbation (CMS/[facility]) 12/17/2013   • 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[facility]) 10/04/2016   • COPD (chronic obstructive pulmonary disease) with acute bronchitis (CMS/[facility]) 05/27/2018   • Acute congestive heart failure (CMS/[facility]) 11/04/2018   • RSV (acute bronchiolitis due to respiratory syncytial virus) 11/06/2018   • Hemoptysis 11/17/2018   • Acute on chronic respiratory failure with hypoxemia (CMS/[facility]) 11/17/2018   • Pneumonia due to infectious organism 11/17/2018   • Hyperglycemia 11/17/2018   • Respiratory failure (CMS/[facility]) 05/25/2019   • Elevated troponin 05/25/2019     Past Medical History:   Diagnosis Date   • Anxiety    • Atrial flutter (CMS/[facility])    • Atrial flutter with rapid ventricular response (CMS/[facility])    • Chronic diastolic congestive heart failure (CMS/[facility]) 12/19/2018   • COPD (chronic obstructive pulmonary disease) (CMS/[facility])    • Essential hypertension    • GERD (gastroesophageal reflux disease)    • H/O complete eye exam 06/2018   • History of pulmonary function tests 06/10/2014   • Hyperglycemia    • Hyperlipidemia    • Lung cancer (CMS/[facility]) 2012   • MDS (myelodysplastic syndrome) (CMS/[facility])    • Nonrheumatic aortic valve stenosis    • Obesity    • Osteoarthritis, knee    • Paroxysmal atrial fibrillation (CMS/[facility])    • Prostate cancer (CMS/[facility]) 2000   • Seizures (CMS/[facility])    • Sleep apnea    • Thoracic aortic aneurysm without rupture (CMS/[facility])    • Type 2 diabetes mellitus without complication, without long-term current use of insulin (CMS/[facility])        PAST SURGICAL HISTORY  Past Surgical History:   Procedure Laterality Date   • ABDOMINAL AORTIC ANEURYSM REPAIR  2010    Dr. Adarsh Dennis   • CARDIAC CATHETERIZATION Left 02/06/2004   • CATARACT EXTRACTION   10/2014    also    • COLONOSCOPY     • ENDOVASCULAR THORACIC ANEURYSM REPAIR     • JOINT REPLACEMENT Left 10/2010    knee; Dr. Wolf   • JOINT REPLACEMENT Right 2011    knee; Dr. Wolf   • LUNG LOBECTOMY Left 2012    [facility]; Dr. Mendoza   • LUNG LOBECTOMY Left    • PROSTATE SURGERY      Dr. Naranjo   • VASCULAR SURGERY  2009    TEVAR of thoracic aortic aneurysm   • VASCULAR SURGERY  12/15/2003    Left carotid subclavian bypass graft, ligation of proximal left subclavian following carotid subclavian bypass. Right femoral sheath. Arch angiography with descending thoracic and abdominal aortography    • VASCULAR SURGERY  2003    Selective catheterization right vertebral artery, right subclavian artery, left subclavian artery, left vertebral artery.     • VASCULAR SURGERY  2002    Tracheostomy, bronchoscopy   • VASCULAR SURGERY  2002    Thoracic arteriogram and abdominal aortogram       FAMILY HISTORY  Family History   Problem Relation Age of Onset   • Cancer Mother    • COPD Mother    • Cancer Father         lung   • Colon cancer Father    • Cancer Other         colon   • Diabetes Other    • Emphysema Other    • Hypertension Other    • Kidney disease Other    • Lung cancer Other        SOCIAL HISTORY  Social History     Socioeconomic History   • Marital status: Single     Spouse name: Not on file   • Number of children: Not on file   • Years of education: Not on file   • Highest education level: Not on file   Occupational History     Employer: RETIRED   Tobacco Use   • Smoking status: Former Smoker     Packs/day: 1.00     Types: Cigarettes     Last attempt to quit: 4/15/2010     Years since quittin.5   • Smokeless tobacco: Never Used   Substance and Sexual Activity   • Alcohol use: Yes     Frequency: Never     Comment: rare/  Daily caffeine use   • Drug use: No   • Sexual activity: Defer       ALLERGIES  Erythromycin    REVIEW OF SYSTEMS  Review of Systems   Constitutional:  Negative for activity change, appetite change and fever.   HENT: Positive for sore throat. Negative for congestion, nosebleeds, sinus pressure, sinus pain and sneezing.    Eyes: Negative.    Respiratory: Negative for cough and shortness of breath.         Spitting up blood   Cardiovascular: Negative for chest pain and leg swelling.   Gastrointestinal: Negative for abdominal pain, diarrhea, nausea and vomiting.   Endocrine: Negative.    Genitourinary: Negative for decreased urine volume and dysuria.   Musculoskeletal: Negative for neck pain.   Skin: Negative for rash and wound.   Allergic/Immunologic: Negative.    Neurological: Negative for weakness, numbness and headaches.   Hematological: Negative.    Psychiatric/Behavioral: Negative.    All other systems reviewed and are negative.      PHYSICAL EXAM  ED Triage Vitals [10/16/19 1521]   Temp Heart Rate Resp BP SpO2   97.1 °F (36.2 °C) 79 22 179/96 (!) 80 %      Temp src Heart Rate Source Patient Position BP Location FiO2 (%)   Tympanic Monitor Sitting Left arm --       Physical Exam   Constitutional: He is oriented to person, place, and time. No distress.   HENT:   Head: Normocephalic and atraumatic.   Nose: Nose normal. No sinus tenderness.   Mouth/Throat: Oropharynx is clear and moist. No oropharyngeal exudate.   No blood seen in nose.   Eyes: EOM are normal. Pupils are equal, round, and reactive to light.   Neck: Normal range of motion. Neck supple.   Cardiovascular: Normal rate, regular rhythm, normal heart sounds and intact distal pulses.   Pulmonary/Chest: Effort normal and breath sounds normal. No respiratory distress.   Abdominal: Soft. Bowel sounds are normal. He exhibits no distension. There is no tenderness. There is no rebound and no guarding.   Musculoskeletal: Normal range of motion. He exhibits no edema.   No pedal edema.   Neurological: He is alert and oriented to person, place, and time. He has normal sensation and normal strength.   Skin: Skin is  warm and dry.   Psychiatric: Mood and affect normal.   Nursing note and vitals reviewed.      LAB RESULTS  Lab Results (last 24 hours)     Procedure Component Value Units Date/Time    CBC & Differential [[id number]] Collected:  10/16/19 1542    Specimen:  Blood Updated:  10/16/19 1714    Narrative:       The following orders were created for panel order CBC & Differential.  Procedure                               Abnormality         Status                     ---------                               -----------         ------                     CBC Auto Differential[[id number]]        Abnormal            Final result                 Please view results for these tests on the individual orders.    Basic Metabolic Panel [[id number]]  (Abnormal) Collected:  10/16/19 1542    Specimen:  Blood Updated:  10/16/19 1744     Glucose 112 mg/dL      BUN 25 mg/dL      Creatinine 1.42 mg/dL      Sodium 144 mmol/L      Potassium 4.6 mmol/L      Comment: Specimen hemolyzed.  Results may be affected. Okay to released per Maikel Martin MD         Chloride 103 mmol/L      CO2 29.1 mmol/L      Calcium 8.8 mg/dL      eGFR Non African Amer 48 mL/min/1.73      BUN/Creatinine Ratio 17.6     Anion Gap 11.9 mmol/L     Narrative:       GFR Normal >60  Chronic Kidney Disease <60  Kidney Failure <15    CBC Auto Differential [[id number]]  (Abnormal) Collected:  10/16/19 1542    Specimen:  Blood Updated:  10/16/19 1714     WBC 2.45 10*3/mm3      RBC 2.90 10*6/mm3      Hemoglobin 10.3 g/dL      Hematocrit 30.3 %      .5 fL      MCH 35.5 pg      MCHC 34.0 g/dL      RDW 13.9 %      RDW-SD 53.3 fl      MPV 13.4 fL      Platelets 137 10*3/mm3      Comment: NO PLATELET CLUMPS SEEN ON SLIDE       Manual Differential [[id number]]  (Abnormal) Collected:  10/16/19 1542    Specimen:  Blood Updated:  10/16/19 1714     Neutrophil % 49.0 %      Lymphocyte % 26.0 %      Monocyte % 15.6 %      Eosinophil % 7.3 %      Basophil % 2.1 %      Neutrophils  "Absolute 1.20 10*3/mm3      Lymphocytes Absolute 0.64 10*3/mm3      Monocytes Absolute 0.38 10*3/mm3      Eosinophils Absolute 0.18 10*3/mm3      Basophils Absolute 0.05 10*3/mm3      Dacrocytes Slight/1+     Hypochromia Slight/1+     Ovalocytes Slight/1+     Poikilocytes Slight/1+     Polychromasia Mod/2+     WBC Morphology Normal     Platelet Morphology Normal          I ordered the above labs and reviewed the results    RADIOLOGY  XR Chest 1 View   Final Result           I ordered the above noted radiological studies. Interpreted by radiologist. Reviewed by me in PACS.       PROCEDURES  Procedures      PROGRESS AND CONSULTS  ED Course as of Oct 16 2257   Wed Oct 16, 2019   1534 Pt has had 3 episodes of spitting up of blood since this am.  He denies coughing.  States \"whatever Dr. Hayes did has cured my cough\".  He denies any worsening of his chronic SOA and denies any CP.  States he had bad headache a few days ago and took several doses of Benadryl, then realized he didn't have his oxygen on.  Oxygen saturations while I was in room are 96% on 4 L NC.  He is not tachycardic.    Labs and CXR ordered.   [EP]   1611 Old records reviewed.  Patient was last admitted here June 2019 for acute on chronic hypoxic respiratory failure, leukopenia, community-acquired pneumonia, decompensated heart failure, and bilateral pleural effusions.  [WH]   1611 EKG          EKG time: 1552  Rhythm/Rate: Sinus rhythm rate 63  P waves and AR: Normal  QRS, axis: RBBB, LAD  ST and T waves: Normal    Interpreted Contemporaneously by me, independently viewed  EKG is not significantly changed compared to prior EKG done 6/21/2019    [WH]   1724 stable Hemoglobin: (!) 10.3 [WH]   1730 This was taken right after the patient walked in to triage from the parking lot.  He states his oxygen saturations are always low with exertion SpO2: (!) 80 % [WH]   1745 3.2 five days ago WBC: (!) 2.45 [WH]   1746 0.93 on 7/1/19 Creatinine: (!) 1.42 [WH]      ED " Course User Index  [EP] Risa Ulloa, APRN  [WH] Maikel Martin MD     1618  Ordered BMP for further evaluation.    1621  Ordered CBC for further evaluation.     1748  Rechecked pt. Pt is resting comfortably. Notified pt of lab results: creatinine= 1.42, WBC= 2.45, and HGB= 10.3. CXR revealed nothing acute. Advised pt follow up with PCP as needed and RTER if any new/worsening symptoms develop. Discussed the plan to discharge with Augmentin. Pt understands and agrees with the plan, all questions answered.      MEDICAL DECISION MAKING  Results were reviewed/discussed with the patient and they were also made aware of online access. Pt also made aware that some labs, such as cultures, will not be resulted during ER visit and follow up with PMD is necessary.     MDM  Number of Diagnoses or Management Options  Chronic respiratory failure with hypoxia (CMS/HCC):   Spitting up blood:   Diagnosis management comments: Patient did not have hemoptysis.  He denied having any cough.  He spit up blood only after sniffing.  So it seems the blood was coming from his nose or sinuses.  His work-up was unremarkable.  Given his past medical history, I will go ahead and treat the patient with antibiotics to cover him for possible early sinusitis.  Strict return precautions were discussed with the patient.       Amount and/or Complexity of Data Reviewed  Clinical lab tests: reviewed and ordered (Glucose= 112  BUN= 25  Creatinine= 1.42  WBC= 2.45  HGB= 10.3)  Tests in the radiology section of CPT®: reviewed  Tests in the medicine section of CPT®: reviewed and ordered (See EKG procedure note.)  Decide to obtain previous medical records or to obtain history from someone other than the patient: yes  Review and summarize past medical records: yes  Independent visualization of images, tracings, or specimens: yes           DIAGNOSIS  Final diagnoses:   Spitting up blood   Chronic respiratory failure with hypoxia (CMS/HCC)        DISPOSITION  DISCHARGE    Patient discharged in stable condition.    Reviewed implications of results, diagnosis, meds, responsibility to follow up, warning signs and symptoms of possible worsening, potential complications and reasons to return to ER.    Patient/Family voiced understanding of above instructions.    Discussed plan for discharge, as there is no emergent indication for admission. Patient referred to primary care provider for BP management due to today's BP. Pt/family is agreeable and understands need for follow up and repeat testing.  Pt is aware that discharge does not mean that nothing is wrong but it indicates no emergency is present that requires admission and they must continue care with follow-up as given below or physician of their choice.     FOLLOW-UP  Gopi Lagos MD  [address] [address]  [phone]    Call in 2 days  If symptoms persist         Medication List      New Prescriptions    amoxicillin-clavulanate 875-125 MG per tablet  Commonly known as:  AUGMENTIN  Take 1 tablet by mouth Every 12 (Twelve) Hours.            Latest Documented Vital Signs:  As of 10:57 PM  BP- 166/88 HR- 68 Temp- 97.1 °F (36.2 °C) (Tympanic) O2 sat- 93%    --  Documentation assistance provided by ramon Pabon for Dr. Martin. Information recorded by the scribe was done at my direction and has been verified and validated by me.       Tamara Pabon  10/16/19 0064       Maikel Martin MD  10/16/19 [id number]

## 2019-10-16 NOTE — TELEPHONE ENCOUNTER
Called back with a bp 153/84    reports bleeding has stopped and is now down to a rust color.  He is using ns spray and is also c/o hoarseness in his throat  Thanks  Thania Ureña RN  Triage nurse

## 2019-10-16 NOTE — TELEPHONE ENCOUNTER
"10/16/19  2:02 PM  Ankit Mack Sr.  1940  Home Phone [phone]       Ankit Mack Sr. is a patient of Dr campo who was last seen in the office on 8/26/19. Patient called the office today to report tht about 2 hours ago patient \"huma started gagging and spit up 4 'globs' of nickel sized blood clot type, bright red\". Now he is having \"blood tinged saliva\"Patient denies any coughing spells, patient used nasal spray today, states there is not blood in his nose. Denies lightheadedness/dizzziness/diaphorecticPatient is on Oxygen per NC continuous and said that he does have it humidified.  Patient see's a Dr Hayes for a 'blood disorder\" and he called him and he instructed him to call Dr Campo since she is provider that prescribes the Eliquis 5mg every 12 hours.    Cardiac meds  Metoprolol 100mg 1.5 tabs 2x a day  Lasix 40mg daily  lipitor 10mg daily  diltiazem 360mg daily    Please advise on how to proceed    Thank you  Lynsey Hair , RN  [facility] Triage Nurse        "

## 2019-10-16 NOTE — DISCHARGE INSTRUCTIONS
Take antibiotic as prescribed.  Return to emergency department for worsening symptoms, fever, difficulty breathing, or other concern.  Follow-up with your primary care doctor the next several days if symptoms persist.

## 2019-10-16 NOTE — TELEPHONE ENCOUNTER
He needs to be seen in the emergency room for this.  Is unclear to me if this is hemoptysis versus throat bleeding versus a nosebleed.  He is a very frail and medically complex gentleman and this needs further evaluation.

## 2019-10-18 ENCOUNTER — INFUSION (OUTPATIENT)
Dept: ONCOLOGY | Facility: HOSPITAL | Age: 79
End: 2019-10-18

## 2019-10-18 ENCOUNTER — LAB (OUTPATIENT)
Dept: OTHER | Facility: HOSPITAL | Age: 79
End: 2019-10-18

## 2019-10-18 VITALS
DIASTOLIC BLOOD PRESSURE: 79 MMHG | HEART RATE: 75 BPM | OXYGEN SATURATION: 91 % | TEMPERATURE: 98.1 F | SYSTOLIC BLOOD PRESSURE: 143 MMHG

## 2019-10-18 DIAGNOSIS — D46.9 MYELODYSPLASIA (MYELODYSPLASTIC SYNDROME) (HCC): ICD-10-CM

## 2019-10-18 DIAGNOSIS — D46.9 MYELODYSPLASIA (MYELODYSPLASTIC SYNDROME) (HCC): Primary | ICD-10-CM

## 2019-10-18 DIAGNOSIS — D53.9 MACROCYTIC ANEMIA: ICD-10-CM

## 2019-10-18 LAB
BASOPHILS # BLD AUTO: 0.02 10*3/MM3 (ref 0–0.2)
BASOPHILS NFR BLD AUTO: 0.4 % (ref 0–1.5)
DEPRECATED RDW RBC AUTO: 54.9 FL (ref 37–54)
EOSINOPHIL # BLD AUTO: 0.12 10*3/MM3 (ref 0–0.4)
EOSINOPHIL NFR BLD AUTO: 2.7 % (ref 0.3–6.2)
ERYTHROCYTE [DISTWIDTH] IN BLOOD BY AUTOMATED COUNT: 14.2 % (ref 12.3–15.4)
HCT VFR BLD AUTO: 27.9 % (ref 37.5–51)
HGB BLD-MCNC: 9.3 G/DL (ref 13–17.7)
IMM GRANULOCYTES # BLD AUTO: 0.15 10*3/MM3 (ref 0–0.05)
IMM GRANULOCYTES NFR BLD AUTO: 3.3 % (ref 0–0.5)
LYMPHOCYTES # BLD AUTO: 0.63 10*3/MM3 (ref 0.7–3.1)
LYMPHOCYTES NFR BLD AUTO: 14.1 % (ref 19.6–45.3)
MCH RBC QN AUTO: 35.4 PG (ref 26.6–33)
MCHC RBC AUTO-ENTMCNC: 33.3 G/DL (ref 31.5–35.7)
MCV RBC AUTO: 106.1 FL (ref 79–97)
MONOCYTES # BLD AUTO: 1.02 10*3/MM3 (ref 0.1–0.9)
MONOCYTES NFR BLD AUTO: 22.8 % (ref 5–12)
NEUTROPHILS # BLD AUTO: 2.54 10*3/MM3 (ref 1.7–7)
NEUTROPHILS NFR BLD AUTO: 56.7 % (ref 42.7–76)
NRBC BLD AUTO-RTO: 1.3 /100 WBC (ref 0–0.2)
PLAT MORPH BLD: NORMAL
PLATELET # BLD AUTO: 129 10*3/MM3 (ref 140–450)
PMV BLD AUTO: 12.9 FL (ref 6–12)
RBC # BLD AUTO: 2.63 10*6/MM3 (ref 4.14–5.8)
SPHEROCYTES BLD QL SMEAR: NORMAL
STOMATOCYTES BLD QL SMEAR: NORMAL
WBC MORPH BLD: NORMAL
WBC NRBC COR # BLD: 4.48 10*3/MM3 (ref 3.4–10.8)

## 2019-10-18 PROCEDURE — 25010000002 EPOETIN ALFA PER 1000 UNITS: Performed by: NURSE PRACTITIONER

## 2019-10-18 PROCEDURE — 96372 THER/PROPH/DIAG INJ SC/IM: CPT

## 2019-10-18 PROCEDURE — 36415 COLL VENOUS BLD VENIPUNCTURE: CPT

## 2019-10-18 PROCEDURE — 85007 BL SMEAR W/DIFF WBC COUNT: CPT | Performed by: INTERNAL MEDICINE

## 2019-10-18 PROCEDURE — 85025 COMPLETE CBC W/AUTO DIFF WBC: CPT | Performed by: INTERNAL MEDICINE

## 2019-10-18 RX ADMIN — ERYTHROPOIETIN 16000 UNITS: 20000 INJECTION, SOLUTION INTRAVENOUS; SUBCUTANEOUS at 14:33

## 2019-10-18 NOTE — NURSING NOTE
Hgb 9.3 today. Procrit reduced by 25% per protocol. 16,000 units given SC. Pt has no complaints and instructed to call the office for any new or worsening symptoms prior to next visit        Lab Results   Component Value Date    WBC 4.48 10/18/2019    HGB 9.3 (L) 10/18/2019    HCT 27.9 (L) 10/18/2019    .1 (H) 10/18/2019     (L) 10/18/2019

## 2019-10-21 DIAGNOSIS — D46.9 MYELODYSPLASIA (MYELODYSPLASTIC SYNDROME) (HCC): ICD-10-CM

## 2019-10-21 DIAGNOSIS — D53.9 MACROCYTIC ANEMIA: ICD-10-CM

## 2019-10-24 ENCOUNTER — OFFICE VISIT (OUTPATIENT)
Dept: CARDIOLOGY | Facility: CLINIC | Age: 79
End: 2019-10-24

## 2019-10-24 DIAGNOSIS — E78.2 MIXED HYPERLIPIDEMIA: ICD-10-CM

## 2019-10-24 DIAGNOSIS — I50.32 CHRONIC DIASTOLIC CONGESTIVE HEART FAILURE (HCC): ICD-10-CM

## 2019-10-24 DIAGNOSIS — I48.92 ATRIAL FLUTTER, UNSPECIFIED TYPE (HCC): ICD-10-CM

## 2019-10-24 DIAGNOSIS — E66.09 CLASS 1 OBESITY DUE TO EXCESS CALORIES WITH SERIOUS COMORBIDITY AND BODY MASS INDEX (BMI) OF 33.0 TO 33.9 IN ADULT: ICD-10-CM

## 2019-10-24 DIAGNOSIS — I48.0 PAROXYSMAL ATRIAL FIBRILLATION (HCC): Primary | ICD-10-CM

## 2019-10-24 DIAGNOSIS — I49.8 WANDERING ATRIAL PACEMAKER: ICD-10-CM

## 2019-10-24 DIAGNOSIS — I35.0 NONRHEUMATIC AORTIC VALVE STENOSIS: ICD-10-CM

## 2019-10-24 DIAGNOSIS — I49.1 APC (ATRIAL PREMATURE CONTRACTIONS): ICD-10-CM

## 2019-10-24 DIAGNOSIS — J44.9 CHRONIC OBSTRUCTIVE PULMONARY DISEASE, UNSPECIFIED COPD TYPE (HCC): ICD-10-CM

## 2019-10-24 DIAGNOSIS — I71.20 THORACIC AORTIC ANEURYSM WITHOUT RUPTURE (HCC): ICD-10-CM

## 2019-10-24 DIAGNOSIS — I10 ESSENTIAL HYPERTENSION: ICD-10-CM

## 2019-10-24 PROCEDURE — 99214 OFFICE O/P EST MOD 30 MIN: CPT | Performed by: NURSE PRACTITIONER

## 2019-10-24 RX ORDER — DILTIAZEM HYDROCHLORIDE 60 MG/1
60 TABLET, FILM COATED ORAL 2 TIMES DAILY
Qty: 60 TABLET | Refills: 1 | Status: SHIPPED | OUTPATIENT
Start: 2019-10-24 | End: 2019-11-15 | Stop reason: SDUPTHER

## 2019-10-25 ENCOUNTER — INFUSION (OUTPATIENT)
Dept: ONCOLOGY | Facility: HOSPITAL | Age: 79
End: 2019-10-25

## 2019-10-25 ENCOUNTER — LAB (OUTPATIENT)
Dept: OTHER | Facility: HOSPITAL | Age: 79
End: 2019-10-25

## 2019-10-25 VITALS
BODY MASS INDEX: 32.93 KG/M2 | SYSTOLIC BLOOD PRESSURE: 146 MMHG | OXYGEN SATURATION: 94 % | HEART RATE: 96 BPM | DIASTOLIC BLOOD PRESSURE: 70 MMHG | WEIGHT: 230 LBS | HEIGHT: 70 IN

## 2019-10-25 VITALS
OXYGEN SATURATION: 92 % | DIASTOLIC BLOOD PRESSURE: 79 MMHG | HEART RATE: 77 BPM | TEMPERATURE: 98 F | SYSTOLIC BLOOD PRESSURE: 125 MMHG

## 2019-10-25 DIAGNOSIS — D46.9 MYELODYSPLASIA (MYELODYSPLASTIC SYNDROME) (HCC): ICD-10-CM

## 2019-10-25 DIAGNOSIS — D46.9 MYELODYSPLASIA (MYELODYSPLASTIC SYNDROME) (HCC): Primary | ICD-10-CM

## 2019-10-25 DIAGNOSIS — D53.9 MACROCYTIC ANEMIA: ICD-10-CM

## 2019-10-25 PROBLEM — E66.09 CLASS 1 OBESITY DUE TO EXCESS CALORIES WITH SERIOUS COMORBIDITY AND BODY MASS INDEX (BMI) OF 33.0 TO 33.9 IN ADULT: Status: ACTIVE | Noted: 2019-08-26

## 2019-10-25 LAB
BASOPHILS # BLD MANUAL: 0.04 10*3/MM3 (ref 0–0.2)
BASOPHILS NFR BLD AUTO: 1 % (ref 0–1.5)
DEPRECATED RDW RBC AUTO: 53.8 FL (ref 37–54)
EOSINOPHIL # BLD MANUAL: 0.11 10*3/MM3 (ref 0–0.4)
EOSINOPHIL NFR BLD MANUAL: 3 % (ref 0.3–6.2)
ERYTHROCYTE [DISTWIDTH] IN BLOOD BY AUTOMATED COUNT: 14.1 % (ref 12.3–15.4)
HCT VFR BLD AUTO: 28.9 % (ref 37.5–51)
HGB BLD-MCNC: 9.6 G/DL (ref 13–17.7)
HYPOCHROMIA BLD QL: ABNORMAL
LYMPHOCYTES # BLD MANUAL: 1.14 10*3/MM3 (ref 0.7–3.1)
LYMPHOCYTES NFR BLD MANUAL: 15 % (ref 5–12)
LYMPHOCYTES NFR BLD MANUAL: 30 % (ref 19.6–45.3)
MCH RBC QN AUTO: 34.7 PG (ref 26.6–33)
MCHC RBC AUTO-ENTMCNC: 33.2 G/DL (ref 31.5–35.7)
MCV RBC AUTO: 104.3 FL (ref 79–97)
MONOCYTES # BLD AUTO: 0.57 10*3/MM3 (ref 0.1–0.9)
NEUTROPHILS # BLD AUTO: 1.93 10*3/MM3 (ref 1.7–7)
NEUTROPHILS NFR BLD MANUAL: 51 % (ref 42.7–76)
OVALOCYTES BLD QL SMEAR: ABNORMAL
PLAT MORPH BLD: NORMAL
PLATELET # BLD AUTO: 195 10*3/MM3 (ref 140–450)
PMV BLD AUTO: 11.9 FL (ref 6–12)
RBC # BLD AUTO: 2.77 10*6/MM3 (ref 4.14–5.8)
SCAN SLIDE: NORMAL
WBC MORPH BLD: NORMAL
WBC NRBC COR # BLD: 3.79 10*3/MM3 (ref 3.4–10.8)

## 2019-10-25 PROCEDURE — 85007 BL SMEAR W/DIFF WBC COUNT: CPT | Performed by: INTERNAL MEDICINE

## 2019-10-25 PROCEDURE — 93000 ELECTROCARDIOGRAM COMPLETE: CPT | Performed by: NURSE PRACTITIONER

## 2019-10-25 PROCEDURE — 36415 COLL VENOUS BLD VENIPUNCTURE: CPT

## 2019-10-25 PROCEDURE — 25010000002 EPOETIN ALFA PER 1000 UNITS: Performed by: INTERNAL MEDICINE

## 2019-10-25 PROCEDURE — 96372 THER/PROPH/DIAG INJ SC/IM: CPT

## 2019-10-25 PROCEDURE — 85025 COMPLETE CBC W/AUTO DIFF WBC: CPT | Performed by: INTERNAL MEDICINE

## 2019-10-25 RX ADMIN — ERYTHROPOIETIN 16000 UNITS: 20000 INJECTION, SOLUTION INTRAVENOUS; SUBCUTANEOUS at 13:48

## 2019-10-25 NOTE — PROGRESS NOTES
Date of Office Visit: 10/24/2019  Encounter Provider: SCOTTY Higgins  Place of Service: [facility] CARDIOLOGY  Patient Name: Ankit Mack Sr.  :1940  Primary Cardiologist: Dr. Catrachito Rdz    Chief Complaint   Patient presents with   • Atrial Fibrillation   :     Dear Dr. Alejandro,    HPI: Ankit Mack Sr. is a pleasant 79 y.o. male who presents today evaluation of tachycardia and atrial fibrillation at the request of Dr. Alejandro. Mr. Mack yesterday and he was noted to have an irregular heartbeat and with minimal exertion he was tachycardic.     He has a history of descending thoracic aortic aneurysm in which he underwent stent placement in [place], [place].  He has been diagnosed with paroxysmal atrial fibrillation/flutter, chronic diastolic heart failure, mild aortic stenosis per echocardiogram 2019, oxygen dependent COPD, and myelodysplastic syndrome.  He has a history of chronic fatigue and dyspnea.    In 2019, he established care with Catrachito Rdz.  He said the diltiazem CD was too expensive so the medication was discontinued and he was changed to metoprolol 150 mg twice per day.  He was enrolled in the apixaban 360 program which has made the medication more affordable.    On 10/16/2019 he presented to the [facility] emergency department after spitting up 3 big red blood clots and he complained of a sore throat.  After discussion it was determined that he did not have hemoptysis.  It seems that the blood was coming from his nasal passages or sinuses and came down through the mouth in which he spit it up.  He said he was diagnosed with an upper respiratory infection and treated.    He presents today with his daughter accompanying him.  He is concerned that with exertion that his heart rate increases pressure is elevated today and at home he says he averages 110-120/70s.  He reports chronic dyspnea, cough, fatigue, and occasional dizziness.  He denies chest pain,  PND, orthopnea, edema, palpitations, or syncope      Past Medical History:   Diagnosis Date   • Anxiety    • Atrial flutter (CMS/Formerly [facility])    • Atrial flutter with rapid ventricular response (CMS/Formerly [facility])    • Chronic diastolic congestive heart failure (CMS/Formerly [facility]) 12/19/2018   • Chronic respiratory failure with hypoxia (CMS/Formerly [facility])    • COPD (chronic obstructive pulmonary disease) (CMS/Formerly [facility])    • Essential hypertension    • GERD (gastroesophageal reflux disease)    • H/O complete eye exam 06/2018   • History of pulmonary function tests 06/10/2014   • Hyperglycemia    • Hyperlipidemia    • Lung cancer (CMS/Formerly [facility]) 2012    Left   • MDS (myelodysplastic syndrome) (CMS/Formerly [facility])    • Nonrheumatic aortic valve stenosis     mild 5/2019   • Obesity    • Osteoarthritis, knee    • Paroxysmal atrial fibrillation (CMS/Formerly [facility])    • Prostate cancer (CMS/Formerly [facility]) 2000   • Seizures (CMS/Formerly [facility])    • Sleep apnea    • Thoracic aortic aneurysm without rupture (CMS/Formerly [facility])     s/p stenting of descending thoracic aneurysm; the ascending aorta measured 3.7cm in 2019   • Type 2 diabetes mellitus without complication, without long-term current use of insulin (CMS/Formerly [facility])        Past Surgical History:   Procedure Laterality Date   • ABDOMINAL AORTIC ANEURYSM REPAIR  2010    Dr. Adarsh Dennis   • CARDIAC CATHETERIZATION Left 02/06/2004   • CATARACT EXTRACTION  10/2014    also 11/14   • COLONOSCOPY     • ENDOVASCULAR THORACIC ANEURYSM REPAIR     • JOINT REPLACEMENT Left 10/2010    anette; Dr. Wolf   • JOINT REPLACEMENT Right 09/2011    knee; Dr. Wolf   • LUNG LOBECTOMY Left 01/16/2012    LLL; Dr. Mendoza   • LUNG LOBECTOMY Left 2012   • PROSTATE SURGERY  2000    Dr. Naranjo   • VASCULAR SURGERY  05/2009    TEVAR of thoracic aortic aneurysm   • VASCULAR SURGERY  12/15/2003    Left carotid subclavian bypass graft, ligation of proximal left subclavian following carotid subclavian bypass. Right femoral sheath. Arch angiography with descending thoracic and abdominal aortography    •  VASCULAR SURGERY  2003    Selective catheterization right vertebral artery, right subclavian artery, left subclavian artery, left vertebral artery.     • VASCULAR SURGERY  2002    Tracheostomy, bronchoscopy   • VASCULAR SURGERY  2002    Thoracic arteriogram and abdominal aortogram       Social History     Socioeconomic History   • Marital status: Single     Spouse name: Not on file   • Number of children: Not on file   • Years of education: Not on file   • Highest education level: Not on file   Occupational History     Employer: RETIRED   Tobacco Use   • Smoking status: Former Smoker     Packs/day: 1.00     Types: Cigarettes     Last attempt to quit: 4/15/2010     Years since quittin.5   • Smokeless tobacco: Never Used   Substance and Sexual Activity   • Alcohol use: No     Frequency: Never     Comment: No caffeine use   • Drug use: No   • Sexual activity: Defer       Family History   Problem Relation Age of Onset   • Cancer Mother    • COPD Mother    • Cancer Father         lung   • Colon cancer Father    • Cancer Other         colon   • Diabetes Other    • Emphysema Other    • Hypertension Other    • Kidney disease Other    • Lung cancer Other        The following portion of the patient's history were reviewed and updated as appropriate: past medical history, past surgical history, past social history, past family history, allergies, current medications, and problem list.    Review of Systems   Constitution: Positive for malaise/fatigue. Negative for chills, diaphoresis, fever, night sweats, weight gain and weight loss.   HENT: Positive for sore throat. Negative for hearing loss, nosebleeds and tinnitus.    Eyes: Negative for blurred vision, double vision, pain and visual disturbance.   Cardiovascular: Negative for chest pain, claudication, cyanosis, dyspnea on exertion, irregular heartbeat, leg swelling, near-syncope, orthopnea, palpitations, paroxysmal nocturnal dyspnea and syncope.    Respiratory: Positive for cough and shortness of breath. Negative for hemoptysis, snoring and wheezing.    Endocrine: Negative for cold intolerance, heat intolerance and polyuria.   Hematologic/Lymphatic: Negative for bleeding problem. Does not bruise/bleed easily.   Skin: Negative for color change, dry skin, flushing and itching.   Musculoskeletal: Negative for falls, joint pain, joint swelling, muscle cramps, muscle weakness and myalgias.   Gastrointestinal: Positive for abdominal pain. Negative for constipation, heartburn, melena, nausea and vomiting.   Genitourinary: Negative for dysuria and hematuria.   Neurological: Positive for dizziness and headaches. Negative for excessive daytime sleepiness, light-headedness, loss of balance, numbness, paresthesias, seizures and vertigo.   Psychiatric/Behavioral: Negative for altered mental status, depression, memory loss and substance abuse. The patient does not have insomnia and is not nervous/anxious.    Allergic/Immunologic: Negative for environmental allergies.       Allergies   Allergen Reactions   • Erythromycin Itching         Current Outpatient Medications:   •  albuterol (PROVENTIL HFA;VENTOLIN HFA) 108 (90 Base) MCG/ACT inhaler, Inhale 2 puffs Every 4 (Four) Hours As Needed for Wheezing., Disp: , Rfl:   •  albuterol (PROVENTIL) (2.5 MG/3ML) 0.083% nebulizer solution, Take 2.5 mg by nebulization 4 (Four) Times a Day., Disp: , Rfl:   •  ALPRAZolam (XANAX) 1 MG tablet, Take 1 tablet by mouth At Night As Needed for Anxiety., Disp: 30 tablet, Rfl: 2  •  amoxicillin-clavulanate (AUGMENTIN) 875-125 MG per tablet, Take 1 tablet by mouth Every 12 (Twelve) Hours., Disp: 14 tablet, Rfl: 0  •  apixaban (ELIQUIS) 5 MG tablet tablet, Take 1 tablet by mouth Every 12 (Twelve) Hours., Disp: 180 tablet, Rfl: 2  •  atorvastatin (LIPITOR) 10 MG tablet, TAKE 1 TABLET BY MOUTH DAILY. FOR CHOLESTEROL, Disp: 90 tablet, Rfl: 1  •  benazepril (LOTENSIN) 20 MG tablet, Take 20 mg by  "mouth Daily., Disp: , Rfl:   •  budesonide (PULMICORT) 0.5 MG/2ML nebulizer solution, Take 2 mL by nebulization 2 (Two) Times a Day., Disp: 120 each, Rfl: 1  •  folic acid (FOLVITE) 1 MG tablet, Take 1 mg by mouth Daily., Disp: , Rfl:   •  furosemide (LASIX) 40 MG tablet, Take 1 tablet by mouth Daily., Disp: 90 tablet, Rfl: 0  •  metFORMIN (GLUCOPHAGE) 1000 MG tablet, Take 1 tablet by mouth Daily With Breakfast., Disp: 90 tablet, Rfl: 1  •  metoprolol tartrate (LOPRESSOR) 100 MG tablet, TAKE ONE AND ONE - HALF TABLETS BY MOUTH TWICE DAILY, Disp: 270 tablet, Rfl: 1  •  Revefenacin (YUPELRI) 175 MCG/3ML solution, Inhale 3 mL (1 vial) by nebulization Daily., Disp: 90 mL, Rfl: 1  •  dilTIAZem (CARDIZEM) 60 MG tablet, Take 1 tablet by mouth 2 (Two) Times a Day., Disp: 60 tablet, Rfl: 1        Objective:     Vitals:    10/24/19 1126 10/24/19 1145   BP: 146/70    BP Location: Left arm    Pulse: 96    SpO2: (!) 79% 94%   Weight: 104 kg (230 lb)    Height: 177.8 cm (70\")      Body mass index is 33 kg/m².    PHYSICAL EXAM:    Vitals Reviewed.   General Appearance: No acute distress, well developed and well nourished. Obese. Pale.   Eyes: Conjunctiva and lids: No erythema, swelling, or discharge. Sclera non-icteric.   HENT: Atraumatic, normocephalic. External eyes, ears, and nose normal. Hearing loss noted. Mucous membranes normal. Lips not cyanotic. Neck supple with no tenderness.  Respiratory: No signs of respiratory distress. Respiration rhythm and depth normal. Diminished. 3L oxygen NC.   Cardiovascular:  Jugular Venous Pressure: Normal  Heart Rate and Rhythm: Normal, Heart Sounds: Normal S1 and S2. No S3 or S4 noted.  Murmurs: No murmurs noted. No rubs, thrills, or gallops.   Arterial Pulses: Carotid pulses normal. No carotid bruit noted. Posterior tibialis and dorsalis pedis pulses normal.   Lower Extremities: No edema noted.  Gastrointestinal:  Abdomen soft, non-distended, non-tender. Normal bowel sounds. No " hepatomegaly.   Musculoskeletal: Normal movement of extremities  Skin and Nails: General appearance normal. Pale. No cyanosis, diaphoresis. Skin temperature normal. No clubbing of fingernails.   Psychiatric: Patient alert and oriented to person, place, and time. Speech and behavior appropriate. Normal mood and affect.       ECG 12 Lead  Date/Time: 10/25/2019 11:28 AM  Performed by: Sunshine Del Cid APRN  Authorized by: Sunshine Del Cid APRN   Comparison: compared with previous ECG from 10/16/2019  Similar to previous ECG  Comparison to previous ECG: Normal sinus rhythm, right bundle branch block  Rhythm comments: Wandering pacemaker  Ectopy: atrial premature contractions  Rate: normal  BPM: 96  Conduction: right bundle branch block and left anterior fascicular block  QRS axis: normal  Other findings: non-specific ST-T wave changes    Clinical impression: abnormal EKG              Assessment:       Diagnosis Plan   1. Paroxysmal atrial fibrillation (CMS/HCC)  ECG 12 Lead   2. Atrial flutter, unspecified type (CMS/HCC)  ECG 12 Lead   3. Wandering atrial pacemaker     4. APC (atrial premature contractions)     5. Chronic diastolic congestive heart failure (CMS/HCC)     6. Nonrheumatic aortic valve stenosis     7. Thoracic aortic aneurysm without rupture (CMS/HCC)     8. Essential hypertension     9. Mixed hyperlipidemia     10. Class 1 obesity due to excess calories with serious comorbidity and body mass index (BMI) of 33.0 to 33.9 in adult     11. Chronic obstructive pulmonary disease, unspecified COPD type (CMS/HCC)            Plan:       1/2.  Paroxysmal Atrial Fibrillation/Flutter: He is currently in a sinus rhythm with wandering pacemaker and APCs noted.  He is not in atrial fibrillation.  I discussed his plan of care with Catrachito Rdz.  We recommend starting him on diltiazem HCl 60 mg twice per day and this should be more affordable.  He will continue with apixaban for stroke prevention.  The [facility] has more  affordable since he has been enrolled in the 360 program.    Atrial Fibrillation and Atrial Flutter  Assessment  • The patient has paroxysmal atrial fibrillation and paroxysmal atrial flutter  • This is non-valvular in etiology  • The patient's CHADS2-VASc score is 6  • A [id number] score of 2 or more is considered a high risk for a thromboembolic event  • Apixaban prescribed    Plan  • Attempt to maintain sinus rhythm  • Continue apixaban for antithrombotic therapy, bleeding issues discussed  • Continue beta blocker for rhythm control  • Continue beta blocker for rate control  • Add diltiazem for rate control    3/4.  Wandering Pacemaker/APCs: Dr. Rdz said this verifies that his irregular heartbeat is coming from his advanced lung disease.    5. Chronic Diastolic Heart Failure: Appears euvolemic today.     6. Aortic Stenosis: Mild per echocardiogram in May 2019.  Dr. Rdz recommended a repeat echocardiogram in May 2020    7. Thoracic Aortic Aneurysm: Status post stent in the past.  Cardiothoracic surgeon in [place].    8. Hypertension: Blood pressure is elevated today in the office.  Pressure averages 110-120 over 70s at home and he will continue to monitor.    9.  Hyperlipidemia: He remains on atorvastatin.    10.  Obesity: BMI is 33.  He would benefit from weight loss.     11. COPD: Severe and is on 4L oxygen NC at home. Chronic dyspnea. Follows with Dr. Alejandro. I explained that he is going to have periods of tachycardia with exertion and with his advanced lung disease whether he is in normal sinus rhythm or atrial fibrillation.    12.  Myelodysplastic Syndrome: Follows with Dr. Beto Hayes.     13. Follow-up with me in 3 months.    As always, it has been a pleasure to participate in your patient's care. Thank you.       Sincerely,         SCOTTY Llamas        Dictated utilizing Dragon dictation

## 2019-10-31 DIAGNOSIS — D46.9 MYELODYSPLASIA (MYELODYSPLASTIC SYNDROME) (HCC): ICD-10-CM

## 2019-10-31 DIAGNOSIS — D80.1 HYPOGAMMAGLOBULINEMIA (HCC): Primary | ICD-10-CM

## 2019-10-31 DIAGNOSIS — D53.9 MACROCYTIC ANEMIA: ICD-10-CM

## 2019-11-01 ENCOUNTER — OFFICE VISIT (OUTPATIENT)
Dept: ONCOLOGY | Facility: CLINIC | Age: 79
End: 2019-11-01

## 2019-11-01 ENCOUNTER — INFUSION (OUTPATIENT)
Dept: ONCOLOGY | Facility: HOSPITAL | Age: 79
End: 2019-11-01

## 2019-11-01 VITALS
RESPIRATION RATE: 18 BRPM | BODY MASS INDEX: 32.35 KG/M2 | SYSTOLIC BLOOD PRESSURE: 144 MMHG | WEIGHT: 226 LBS | HEIGHT: 70 IN | OXYGEN SATURATION: 90 % | DIASTOLIC BLOOD PRESSURE: 73 MMHG | TEMPERATURE: 98 F | HEART RATE: 64 BPM

## 2019-11-01 VITALS — WEIGHT: 226.4 LBS | BODY MASS INDEX: 32.49 KG/M2

## 2019-11-01 VITALS — DIASTOLIC BLOOD PRESSURE: 84 MMHG | SYSTOLIC BLOOD PRESSURE: 154 MMHG | HEART RATE: 67 BPM

## 2019-11-01 DIAGNOSIS — I50.32 CHRONIC DIASTOLIC CONGESTIVE HEART FAILURE (HCC): ICD-10-CM

## 2019-11-01 DIAGNOSIS — D46.9 MYELODYSPLASIA (MYELODYSPLASTIC SYNDROME) (HCC): ICD-10-CM

## 2019-11-01 DIAGNOSIS — D61.818 PANCYTOPENIA (HCC): ICD-10-CM

## 2019-11-01 DIAGNOSIS — D53.9 MACROCYTIC ANEMIA: ICD-10-CM

## 2019-11-01 DIAGNOSIS — D80.1 HYPOGAMMAGLOBULINEMIA (HCC): Primary | ICD-10-CM

## 2019-11-01 DIAGNOSIS — D80.1 HYPOGAMMAGLOBULINEMIA (HCC): ICD-10-CM

## 2019-11-01 DIAGNOSIS — C34.32 MALIGNANT NEOPLASM OF LOWER LOBE OF LEFT LUNG (HCC): ICD-10-CM

## 2019-11-01 DIAGNOSIS — C34.32 MALIGNANT NEOPLASM OF LOWER LOBE OF LEFT LUNG (HCC): Primary | ICD-10-CM

## 2019-11-01 LAB
ALBUMIN SERPL-MCNC: 4.3 G/DL (ref 3.5–5.2)
ALBUMIN/GLOB SERPL: 2.3 G/DL
ALP SERPL-CCNC: 78 U/L (ref 39–117)
ALT SERPL W P-5'-P-CCNC: 10 U/L (ref 1–41)
ANION GAP SERPL CALCULATED.3IONS-SCNC: 7.2 MMOL/L (ref 5–15)
AST SERPL-CCNC: 15 U/L (ref 1–40)
BASOPHILS # BLD MANUAL: 0.03 10*3/MM3 (ref 0–0.2)
BASOPHILS NFR BLD AUTO: 1 % (ref 0–1.5)
BILIRUB SERPL-MCNC: 0.7 MG/DL (ref 0.1–1.2)
BUN BLD-MCNC: 24 MG/DL (ref 8–23)
BUN/CREAT SERPL: 16.2 (ref 7–25)
CALCIUM SPEC-SCNC: 9.4 MG/DL (ref 8.6–10.5)
CHLORIDE SERPL-SCNC: 102 MMOL/L (ref 98–107)
CO2 SERPL-SCNC: 35.8 MMOL/L (ref 22–29)
CREAT BLD-MCNC: 1.48 MG/DL (ref 0.76–1.27)
DACRYOCYTES BLD QL SMEAR: ABNORMAL
DEPRECATED RDW RBC AUTO: 55.8 FL (ref 37–54)
EOSINOPHIL # BLD MANUAL: 0.09 10*3/MM3 (ref 0–0.4)
EOSINOPHIL NFR BLD MANUAL: 3 % (ref 0.3–6.2)
ERYTHROCYTE [DISTWIDTH] IN BLOOD BY AUTOMATED COUNT: 14.4 % (ref 12.3–15.4)
GFR SERPL CREATININE-BSD FRML MDRD: 46 ML/MIN/1.73
GLOBULIN UR ELPH-MCNC: 1.9 GM/DL
GLUCOSE BLD-MCNC: 157 MG/DL (ref 65–99)
HCT VFR BLD AUTO: 30 % (ref 37.5–51)
HGB BLD-MCNC: 9.8 G/DL (ref 13–17.7)
IGA1 MFR SER: 238 MG/DL (ref 70–400)
IGG1 SER-MCNC: 626 MG/DL (ref 700–1600)
IGM SERPL-MCNC: 57 MG/DL (ref 40–230)
LYMPHOCYTES # BLD MANUAL: 0.62 10*3/MM3 (ref 0.7–3.1)
LYMPHOCYTES NFR BLD MANUAL: 21 % (ref 19.6–45.3)
LYMPHOCYTES NFR BLD MANUAL: 27 % (ref 5–12)
MCH RBC QN AUTO: 35 PG (ref 26.6–33)
MCHC RBC AUTO-ENTMCNC: 32.7 G/DL (ref 31.5–35.7)
MCV RBC AUTO: 107.1 FL (ref 79–97)
MONOCYTES # BLD AUTO: 0.8 10*3/MM3 (ref 0.1–0.9)
NEUTROPHILS # BLD AUTO: 1.42 10*3/MM3 (ref 1.7–7)
NEUTROPHILS NFR BLD MANUAL: 48 % (ref 42.7–76)
PLAT MORPH BLD: NORMAL
PLATELET # BLD AUTO: 175 10*3/MM3 (ref 140–450)
PMV BLD AUTO: 11.5 FL (ref 6–12)
POTASSIUM BLD-SCNC: 4.3 MMOL/L (ref 3.5–5.2)
PROT SERPL-MCNC: 6.2 G/DL (ref 6–8.5)
RBC # BLD AUTO: 2.8 10*6/MM3 (ref 4.14–5.8)
SCAN SLIDE: NORMAL
SODIUM BLD-SCNC: 145 MMOL/L (ref 136–145)
SPHEROCYTES BLD QL SMEAR: ABNORMAL
STOMATOCYTES BLD QL SMEAR: ABNORMAL
WBC MORPH BLD: NORMAL
WBC NRBC COR # BLD: 2.96 10*3/MM3 (ref 3.4–10.8)

## 2019-11-01 PROCEDURE — 96372 THER/PROPH/DIAG INJ SC/IM: CPT

## 2019-11-01 PROCEDURE — 63710000001 DIPHENHYDRAMINE PER 50 MG: Performed by: INTERNAL MEDICINE

## 2019-11-01 PROCEDURE — 85025 COMPLETE CBC W/AUTO DIFF WBC: CPT | Performed by: INTERNAL MEDICINE

## 2019-11-01 PROCEDURE — 96366 THER/PROPH/DIAG IV INF ADDON: CPT

## 2019-11-01 PROCEDURE — 80053 COMPREHEN METABOLIC PANEL: CPT | Performed by: INTERNAL MEDICINE

## 2019-11-01 PROCEDURE — A9270 NON-COVERED ITEM OR SERVICE: HCPCS | Performed by: INTERNAL MEDICINE

## 2019-11-01 PROCEDURE — 85007 BL SMEAR W/DIFF WBC COUNT: CPT | Performed by: INTERNAL MEDICINE

## 2019-11-01 PROCEDURE — 82784 ASSAY IGA/IGD/IGG/IGM EACH: CPT | Performed by: INTERNAL MEDICINE

## 2019-11-01 PROCEDURE — 96365 THER/PROPH/DIAG IV INF INIT: CPT

## 2019-11-01 PROCEDURE — 63710000001 ACETAMINOPHEN 325 MG TABLET: Performed by: INTERNAL MEDICINE

## 2019-11-01 PROCEDURE — 99214 OFFICE O/P EST MOD 30 MIN: CPT | Performed by: INTERNAL MEDICINE

## 2019-11-01 PROCEDURE — 25010000002 EPOETIN ALFA PER 1000 UNITS: Performed by: INTERNAL MEDICINE

## 2019-11-01 PROCEDURE — 25010000002 IMMUNE GLOBULIN (HUMAN) 30 GM/300ML SOLUTION: Performed by: INTERNAL MEDICINE

## 2019-11-01 RX ORDER — SODIUM CHLORIDE 9 MG/ML
250 INJECTION, SOLUTION INTRAVENOUS ONCE
Status: CANCELLED | OUTPATIENT
Start: 2019-11-01

## 2019-11-01 RX ORDER — DIPHENHYDRAMINE HYDROCHLORIDE 50 MG/ML
25 INJECTION INTRAMUSCULAR; INTRAVENOUS AS NEEDED
Status: CANCELLED | OUTPATIENT
Start: 2019-11-01

## 2019-11-01 RX ORDER — SODIUM CHLORIDE 9 MG/ML
250 INJECTION, SOLUTION INTRAVENOUS ONCE
Status: COMPLETED | OUTPATIENT
Start: 2019-11-01 | End: 2019-11-01

## 2019-11-01 RX ORDER — MEPERIDINE HYDROCHLORIDE 50 MG/ML
25 INJECTION INTRAMUSCULAR; INTRAVENOUS; SUBCUTANEOUS
Status: CANCELLED | OUTPATIENT
Start: 2019-11-01

## 2019-11-01 RX ORDER — FAMOTIDINE 10 MG/ML
20 INJECTION, SOLUTION INTRAVENOUS AS NEEDED
Status: CANCELLED | OUTPATIENT
Start: 2019-11-01

## 2019-11-01 RX ORDER — ACETAMINOPHEN 325 MG/1
650 TABLET ORAL ONCE
Status: COMPLETED | OUTPATIENT
Start: 2019-11-01 | End: 2019-11-01

## 2019-11-01 RX ORDER — DIPHENHYDRAMINE HCL 25 MG
25 CAPSULE ORAL ONCE
Status: CANCELLED | OUTPATIENT
Start: 2019-11-01

## 2019-11-01 RX ORDER — DEXTROSE MONOHYDRATE 50 MG/ML
250 INJECTION, SOLUTION INTRAVENOUS ONCE
Status: CANCELLED | OUTPATIENT
Start: 2019-11-01

## 2019-11-01 RX ORDER — ACETAMINOPHEN 325 MG/1
650 TABLET ORAL ONCE
Status: CANCELLED | OUTPATIENT
Start: 2019-11-01

## 2019-11-01 RX ORDER — DIPHENHYDRAMINE HCL 25 MG
25 CAPSULE ORAL ONCE
Status: COMPLETED | OUTPATIENT
Start: 2019-11-01 | End: 2019-11-01

## 2019-11-01 RX ADMIN — ACETAMINOPHEN 650 MG: 325 TABLET, FILM COATED ORAL at 11:03

## 2019-11-01 RX ADMIN — DIPHENHYDRAMINE HYDROCHLORIDE 25 MG: 25 CAPSULE ORAL at 11:03

## 2019-11-01 RX ADMIN — SODIUM CHLORIDE 250 ML: 900 INJECTION, SOLUTION INTRAVENOUS at 11:00

## 2019-11-01 RX ADMIN — IMMUNE GLOBULIN INFUSION (HUMAN) 30 G: 100 INJECTION, SOLUTION INTRAVENOUS; SUBCUTANEOUS at 11:20

## 2019-11-01 RX ADMIN — ERYTHROPOIETIN 16000 UNITS: 20000 INJECTION, SOLUTION INTRAVENOUS; SUBCUTANEOUS at 11:56

## 2019-11-01 NOTE — NURSING NOTE
"When daughter returned to take him home he states that he feels as if he is having \"a hard time breathing\" and has hand on chest. O2 sat 88% on 4 L. Increased O2 to 6 l/min and sats 93%. HR 78. It is worthy to note that he informed this nurse that he was recently diagnosed with Afib.   Daughter returned, he took a nebulizer treatment, states \" I feel much better and I'm ready to go\". Discharged per stable per w/c with daughter attending.  "

## 2019-11-01 NOTE — PROGRESS NOTES
Subjective     REASON FOR CONSULTATION:    1.  Myelodysplasia (refractory anemia with ring sideroblasts) diagnosed on bone marrow biopsy 6/26/2019  2. Lung mass on CT chest 11/17/2018  3.  Hypogammaglobulinemia.  First dose of IV immunoglobulin 40 g delivered 6/25/2019    HISTORY OF PRESENT ILLNESS:  The patient is a 79 y.o. year old male who was referred to us by his primary care office for evaluation of macrocytic anemia.  His MCV has been markedly elevated around 107-108..  He was hospitalized from 5/25/2019 through 5/31/2019 with COPD exacerbation and CHF.  During the hospitalization his hemoglobin was around 9.5 g/dL with an MCV as high as 113.    With his initial office consult on 6/18/2019 we ordered additional lab studies to try to further define the etiology of his anemia.  He was due to return to our office for follow-up to review those results but ended up being admitted to the hospital from 6/20/2019 to 7/1/2019 for treatment of pneumonia and COPD exacerbation.  He was seen for hematology consultation as an inpatient by Dr. Gupta on 6/25/2019.  Bone marrow biopsy was ordered which was performed on 6/26/2019 and showed dysplastic changes and ring sideroblasts.  Blasts were less than 5%.  Chromosome karyotype is pending.    Also during the hospitalization he was found to be profoundly hypogammaglobulinemic with an IgG level of 365.  He had a history of recurring pulmonary infections and during the admission received 1 dose of IV immunoglobulin at 40 g total dose on 6/25/2019.    He returns today for follow-up and possibly further IVIG infusion.  Since his last visit here, he underwent a follow-up CT scan of the chest with Dr. Xiong on 8/5/2019 that showed resolution of his previous infiltrates.    History of Present Illness     Past medical history, family history, and social history reviewed and unchanged.    ALLERGIES:    Allergies   Allergen Reactions   • Erythromycin Itching        Review of Systems  "  Constitutional: Positive for fatigue. Negative for activity change, chills and fever.   HENT: Negative for mouth sores, trouble swallowing and voice change.    Eyes: Negative for pain and visual disturbance.   Respiratory: Positive for shortness of breath. Negative for cough and wheezing.    Cardiovascular: Positive for leg swelling. Negative for chest pain and palpitations.   Gastrointestinal: Positive for abdominal pain and nausea. Negative for constipation, diarrhea and vomiting.   Genitourinary: Negative for difficulty urinating, frequency and urgency.   Musculoskeletal: Positive for arthralgias and gait problem. Negative for joint swelling.   Skin: Negative for rash.   Neurological: Positive for weakness. Negative for dizziness, seizures and headaches.   Hematological: Negative for adenopathy. Bruises/bleeds easily.   Psychiatric/Behavioral: Negative for behavioral problems and confusion. The patient is not nervous/anxious.         Objective     Vitals:    11/01/19 0953   BP: 144/73   Pulse: 64   Resp: 18   Temp: 98 °F (36.7 °C)   TempSrc: Oral   SpO2: 90%   Weight: 103 kg (226 lb)  Comment: Per patient he weighed at home this morning   Height: 177.8 cm (70\")   PainSc: 0-No pain     Current Status 11/1/2019   ECOG score 1       Physical Exam   Constitutional: He is oriented to person, place, and time. He appears well-developed and well-nourished. No distress.   Chronically ill appearing obese gentleman wearing nasal oxygen   HENT:   Head: Normocephalic.   Eyes: Conjunctivae and EOM are normal. Pupils are equal, round, and reactive to light. No scleral icterus.   Neck: Normal range of motion. Neck supple. No JVD present. No thyromegaly present.   Cardiovascular: Normal rate. An irregular rhythm present. Exam reveals distant heart sounds. Exam reveals no gallop and no friction rub.   No murmur heard.  Pulmonary/Chest: Effort normal. He has decreased breath sounds in the right lower field and the left lower " field. He has rales.   Abdominal: Soft. He exhibits distension. He exhibits no mass. There is no tenderness.   Musculoskeletal: Normal range of motion. He exhibits edema. He exhibits no deformity.   Lymphadenopathy:     He has no cervical adenopathy.   Neurological: He is alert and oriented to person, place, and time. He has normal reflexes. No cranial nerve deficit.   Skin: Skin is warm and dry. Purpura and rash noted. No erythema.   Psychiatric: He has a normal mood and affect. His behavior is normal. Judgment normal.         RECENT LABS:  Hematology WBC   Date Value Ref Range Status   11/01/2019 2.96 (L) 3.40 - 10.80 10*3/mm3 Final   05/15/2019 3.79 3.40 - 10.80 10*3/mm3 Final   02/08/2018 4.11 (L) 4.5 - 11.0 10*3/uL Final     RBC   Date Value Ref Range Status   11/01/2019 2.80 (L) 4.14 - 5.80 10*6/mm3 Final   05/15/2019 3.05 (L) 4.14 - 5.80 10*6/mm3 Final   02/08/2018 3.65 (L) 4.5 - 5.9 10*6/uL Final     Hemoglobin   Date Value Ref Range Status   11/01/2019 9.8 (L) 13.0 - 17.7 g/dL Final   02/08/2018 13.0 (L) 13.5 - 17.5 g/dL Final     Hematocrit   Date Value Ref Range Status   11/01/2019 30.0 (L) 37.5 - 51.0 % Final   02/08/2018 37.5 (L) 41.0 - 53.0 % Final     Platelets   Date Value Ref Range Status   11/01/2019 175 140 - 450 10*3/mm3 Final   02/08/2018 164 140 - 440 10*3/uL Final        Lab Results   Component Value Date    GLUCOSE 157 (H) 11/01/2019    CALCIUM 9.4 11/01/2019     11/01/2019    K 4.3 11/01/2019    CO2 35.8 (H) 11/01/2019     11/01/2019    BUN 24 (H) 11/01/2019    CREATININE 1.48 (H) 11/01/2019    EGFRIFAFRI 70 05/15/2019    EGFRIFNONA 46 (L) 11/01/2019    BCR 16.2 11/01/2019    ANIONGAP 7.2 11/01/2019     Lab Results   Component Value Date    IRON 118 10/04/2019    TIBC 277 (L) 10/04/2019    FERRITIN 268.00 10/04/2019     Lab Results   Component Value Date    EGNGWLAV18 1,596 (H) 06/18/2019     Lab Results   Component Value Date    FOLATE >20.00 06/18/2019      Ref Range & Units  2wk ago   IgG 700-1,600 mg/dL 606 Abnormally low     IgM 40 - 230 mg/dL 45    IgA 70 - 400 mg/dL 201            Erythropoietin 2.6 - 18.5 mIU/mL 34.9 Abnormally high         Bone marrow biopsy 6/26/2019  Final Diagnosis   CONSULTANT DIAGNOSIS:  Hypercellular Bone Marrow (60% total marrow cellularity) demonstrating significant dysplasia present in all three hematopoietic cell lines with 2-3+ stainable hemosiderin and 14% ringed sideroblasts.       No metastatic carcinoma, granulomas, vasculitis, viral inclusions, increase in myeloblasts, plasma cells or malignant lymphoma.       CONSULTANT COMMENT:  These changes would be most supportive of a low grade myelodysplasia; however, this must be correlated with appropriate B12 and RBC folate levels as well as pending karyotype for final disease classification.     CT CHEST WITHOUT CONTRAST- 8/5/2019     Radiation dose reduction techniques were utilized, including automated  exposure control and exposure modulation based on body size.     CLINICAL: Pulmonary infiltrate.     COMPARISON: 06/23/2019.     FINDINGS: Resolution of the right pleural effusion. Trace amount of  pleural fluid along the left costophrenic sulcus resolved as well.  Multifocal areas of consolidation/atelectasis at both lung bases have  either resolved or significantly improved within the interim. Few small  nodular areas of airspace disease remain and should be followed to  resolution. No new area of infiltrate or nodularity has developed. There  is extensive bullae formation compatible with emphysema. Mediastinal  lymph nodes decreasing in size. Reference pretracheal node is currently  2.2 cm compared to 2.6 cm previously. Stable cardiac enlargement. No  pericardial abnormality. The esophagus is satisfactory in course and  caliber. There is atherosclerotic calcification of the aorta with a  graft demonstrated again in position. There has been no interval change  in size or appearance of the 2 cm right  adrenal nodule.     CONCLUSION:  1. Resolved pleural effusions.  2. Multifocal areas of consolidation/atelectasis have either resolved or  significantly improved within the interim with few remaining areas of  nodularity, 4-6 month follow-up CT recommended.  3. Mediastinal lymph nodes decreasing in size.  4. Cardiomegaly.        Assessment/Plan   1.  New diagnosis of myelodysplasia with chronic anemia and leukopenia.  Bone marrow biopsy seem to be consistent with low-grade myelodysplasia with refractory anemia with ringed sideroblasts.  Blasts in the marrow were less than 5%.  Marrow karyotype shows a 7 q. deletion.  2.  Severe emphysema/COPD.  Patient requires oxygen around-the-clock  3.  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  Patient received 1 dose of IV immune globulin 40 g during his hospitalization on 6/25/2019 and has been receiving monthly IV immunoglobulin infusions as an outpatient at a dose of 30 g monthly.  4.  Pneumonia which has resolved clinically following antibiotics and IV immunoglobulin.  5.  History of non-small cell lung cancer from the left lower lobe resected by Dr. Hart Linker 2013.  No definite evidence of recurrent malignancy noted on the most recent CT scan of the chest from 8/5/2019 as noted above.  6.  Atrial fibrillation which further impacts his symptomatology.  He now is taking Cardizem twice daily and Lopressor    Plan  1.  Patient will proceed with his monthly IVIG infusion at 30 g today in the office.  2.  He will be scheduled to return weekly for blood count and receive Procrit if his hemoglobin is less than 10 g/dL.  3.  He will return for lab and further IV immunoglobulin infusions in 1 month and 2 months.  4.  MD follow-up in 3 months.

## 2019-11-01 NOTE — NURSING NOTE
Seen per Dr. Hayes and back for procrit for hgb 9.8 and Ivig. Labs printed, reviewed and copy given.

## 2019-11-05 RX ORDER — FUROSEMIDE 40 MG/1
TABLET ORAL
Qty: 90 TABLET | Refills: 1 | Status: ON HOLD | OUTPATIENT
Start: 2019-11-05 | End: 2019-12-05 | Stop reason: SDUPTHER

## 2019-11-08 ENCOUNTER — INFUSION (OUTPATIENT)
Dept: ONCOLOGY | Facility: HOSPITAL | Age: 79
End: 2019-11-08

## 2019-11-08 ENCOUNTER — LAB (OUTPATIENT)
Dept: OTHER | Facility: HOSPITAL | Age: 79
End: 2019-11-08

## 2019-11-08 ENCOUNTER — RESULTS ENCOUNTER (OUTPATIENT)
Dept: ONCOLOGY | Facility: CLINIC | Age: 79
End: 2019-11-08

## 2019-11-08 VITALS
OXYGEN SATURATION: 96 % | HEART RATE: 66 BPM | SYSTOLIC BLOOD PRESSURE: 135 MMHG | BODY MASS INDEX: 32.86 KG/M2 | TEMPERATURE: 97.8 F | DIASTOLIC BLOOD PRESSURE: 71 MMHG | WEIGHT: 229 LBS | RESPIRATION RATE: 16 BRPM

## 2019-11-08 DIAGNOSIS — D46.9 MYELODYSPLASIA (MYELODYSPLASTIC SYNDROME) (HCC): ICD-10-CM

## 2019-11-08 DIAGNOSIS — D53.9 MACROCYTIC ANEMIA: ICD-10-CM

## 2019-11-08 DIAGNOSIS — D61.818 PANCYTOPENIA (HCC): ICD-10-CM

## 2019-11-08 DIAGNOSIS — D80.1 HYPOGAMMAGLOBULINEMIA (HCC): ICD-10-CM

## 2019-11-08 DIAGNOSIS — D46.9 MYELODYSPLASIA (MYELODYSPLASTIC SYNDROME) (HCC): Primary | ICD-10-CM

## 2019-11-08 DIAGNOSIS — C34.32 MALIGNANT NEOPLASM OF LOWER LOBE OF LEFT LUNG (HCC): ICD-10-CM

## 2019-11-08 LAB
DEPRECATED RDW RBC AUTO: 53.3 FL (ref 37–54)
EOSINOPHIL # BLD MANUAL: 0.1 10*3/MM3 (ref 0–0.4)
EOSINOPHIL NFR BLD MANUAL: 4 % (ref 0.3–6.2)
ERYTHROCYTE [DISTWIDTH] IN BLOOD BY AUTOMATED COUNT: 14.4 % (ref 12.3–15.4)
HCT VFR BLD AUTO: 26.6 % (ref 37.5–51)
HGB BLD-MCNC: 9 G/DL (ref 13–17.7)
HYPOCHROMIA BLD QL: ABNORMAL
LYMPHOCYTES # BLD MANUAL: 0.85 10*3/MM3 (ref 0.7–3.1)
LYMPHOCYTES NFR BLD MANUAL: 20 % (ref 5–12)
LYMPHOCYTES NFR BLD MANUAL: 33 % (ref 19.6–45.3)
MCH RBC QN AUTO: 34.9 PG (ref 26.6–33)
MCHC RBC AUTO-ENTMCNC: 33.8 G/DL (ref 31.5–35.7)
MCV RBC AUTO: 103.1 FL (ref 79–97)
METAMYELOCYTES NFR BLD MANUAL: 1 % (ref 0–0)
MONOCYTES # BLD AUTO: 0.51 10*3/MM3 (ref 0.1–0.9)
MYELOCYTES NFR BLD MANUAL: 1 % (ref 0–0)
NEUTROPHILS # BLD AUTO: 1.05 10*3/MM3 (ref 1.7–7)
NEUTROPHILS NFR BLD MANUAL: 41 % (ref 42.7–76)
OVALOCYTES BLD QL SMEAR: ABNORMAL
PLAT MORPH BLD: NORMAL
PLATELET # BLD AUTO: 146 10*3/MM3 (ref 140–450)
PMV BLD AUTO: 12.8 FL (ref 6–12)
RBC # BLD AUTO: 2.58 10*6/MM3 (ref 4.14–5.8)
SCAN SLIDE: NORMAL
STOMATOCYTES BLD QL SMEAR: ABNORMAL
WBC MORPH BLD: NORMAL
WBC NRBC COR # BLD: 2.57 10*3/MM3 (ref 3.4–10.8)

## 2019-11-08 PROCEDURE — 36415 COLL VENOUS BLD VENIPUNCTURE: CPT

## 2019-11-08 PROCEDURE — 85007 BL SMEAR W/DIFF WBC COUNT: CPT | Performed by: INTERNAL MEDICINE

## 2019-11-08 PROCEDURE — 85025 COMPLETE CBC W/AUTO DIFF WBC: CPT | Performed by: INTERNAL MEDICINE

## 2019-11-08 PROCEDURE — 25010000002 EPOETIN ALFA PER 1000 UNITS: Performed by: INTERNAL MEDICINE

## 2019-11-08 PROCEDURE — 96372 THER/PROPH/DIAG INJ SC/IM: CPT

## 2019-11-08 RX ADMIN — ERYTHROPOIETIN 16000 UNITS: 20000 INJECTION, SOLUTION INTRAVENOUS; SUBCUTANEOUS at 10:57

## 2019-11-10 NOTE — H&P
Name: Ankit Mack . ADMIT: 2018   : 1940  PCP: Gopi Lagos MD    MRN: [id number] LOS: 0 days   AGE/SEX: 78 y.o. male  ROOM: N4/     Chief Complaint   Patient presents with   • Shortness of Breath         Patient is a 78 y.o. man who is unknown to our service.  He has O2 dependent COPD.  He started having shortness of breath, cough and sore throat on 10/29/18.  He was seen by the nurse practitioner in his PCP office and started on a prednisone taper and Levaquin.  He started feeling worse 2 days ago.  He had abdominal discomfort and some back pain.  He was afraid he was getting pneumonia.  His cough remains nonproductive.  No fever or chills.  Sore throat is better.  He has sinus pressure and drainage.  This is a little worse than usual.  He's had white nasal drainage.  He did get a flu shot for this season.  He is using his medications as prescribed.  On 18, he was playing cards at a club.  He played for 3 hours.  When he came home he noticed his feet and ankles were very swollen.  That went away on its own.   No other associated symptoms or exacerbating or alleviating factors      Past medical history  COPD, O2 dependent  Sleep apnea, on CPAP  Hypertension  Allergic rhinitis  Anxiety  Lung cancer with resection   Prostate cancer with surgery   Echocardiogram 2018 ejection fraction 58% and grade 1 diastolic dysfunction      Past Surgical History:   Procedure Laterality Date   • ABDOMINAL AORTIC ANEURYSM REPAIR      Dr. Adarsh Dennis   • CATARACT EXTRACTION  10/2014    also    • COLONOSCOPY     • JOINT REPLACEMENT Left 10/2010    knee; Dr. Wolf   • JOINT REPLACEMENT Right 2011    knee; Dr. Wolf   • LUNG LOBECTOMY Left 2012    [facility]; Dr. Mendoza   • PROSTATE SURGERY      Dr. Naranjo       Prescriptions Prior to Admission   Medication Sig Dispense Refill Last Dose   • albuterol (PROVENTIL HFA;VENTOLIN HFA) 108 (90 Base) MCG/ACT inhaler Inhale 2 puffs  Every 4 (Four) Hours As Needed for Wheezing.   Taking   • albuterol (PROVENTIL) (2.5 MG/3ML) 0.083% nebulizer solution Take 2.5 mg by nebulization 4 (Four) Times a Day.   Taking   • ALPRAZolam (XANAX) 1 MG tablet Take 1 tablet by mouth At Night As Needed for Anxiety. 90 tablet 0 Taking   • amLODIPine (NORVASC) 5 MG tablet Take 1 tablet by mouth Daily. For BP with Lisinopril 90 tablet 1 Taking   • atorvastatin (LIPITOR) 10 MG tablet Take 1 tablet by mouth Daily. For cholesterol 90 tablet 1 Taking   • budesonide (PULMICORT) 0.5 MG/2ML nebulizer solution Inhale 0.5 mg.   Taking   • folic acid (FOLVITE) 1 MG tablet Take 1 mg by mouth Daily.   Taking   • ipratropium (ATROVENT) 0.02 % nebulizer solution Take 500 mcg by nebulization 2 (Two) Times a Day.   Taking   • levoFLOXacin (LEVAQUIN) 500 MG tablet Take 1 tablet by mouth Daily for 10 days. 10 tablet 1    • lisinopril (PRINIVIL,ZESTRIL) 20 MG tablet Take 1 tablet by mouth Daily for 90 days. For BP with Amlodipine 90 tablet 1    • predniSONE (DELTASONE) 20 MG tablet Take 3 tabs QDPC x 3 days then 2 tabs QDPC x 4 days then 1 tab QDPC x 3 days 20 tablet 0    • traZODone (DESYREL) 50 MG tablet TAKE 1-2 TABLETS BY MOUTH EVERY DAY AT BEDTIME *DO NOT TAKE WITH AMBIEN 45 tablet 0 Taking     Allergies:  Erythromycin    Social History   Substance Use Topics   • Smoking status: Former Smoker     Packs/day: 1.00   • Smokeless tobacco: Never Used   • Alcohol use Yes      Comment: rare   Quit smoking at least 6 years ago.  Has alcohol less than 1 drink a month.  Lives with daughter and granddaughter.  Retired from [employer].  Single    Family History   Problem Relation Age of Onset   • Cancer Mother    • Cancer Father         lung   • Cancer Other         colon   • Diabetes Other    • Emphysema Other    • Hypertension Other    • Kidney disease Other    • Lung cancer Other        Review of Systems   Constitutional: Negative for activity change, appetite change, chills, fatigue  and fever.   HENT: Positive for congestion, postnasal drip, rhinorrhea, sinus pain, sinus pressure and sore throat. Negative for ear pain.    Eyes: Negative for visual disturbance.   Respiratory: Positive for cough and shortness of breath. Negative for chest tightness and wheezing.    Cardiovascular: Positive for chest pain and leg swelling. Negative for palpitations.        Sleeps on 2 pillows which is unchanged.  Yesterday he had anterior chest pain that lasted all day.  It's gone now.   Gastrointestinal: Negative for abdominal pain, constipation, diarrhea, nausea and vomiting.   Endocrine: Negative for polydipsia and polyphagia.   Genitourinary: Negative for difficulty urinating and dysuria.   Musculoskeletal: Negative for arthralgias and myalgias.   Skin: Positive for color change. Negative for rash.        Noticed bruise on right foot after he had the leg swelling from sitting for 3 hours playing cards   Allergic/Immunologic: Positive for environmental allergies.   Neurological: Negative for dizziness, weakness, light-headedness and numbness.   Hematological: Bruises/bleeds easily.   Psychiatric/Behavioral: Negative for agitation and behavioral problems.         Vital Signs  Temp:  [97.5 °F (36.4 °C)-98.2 °F (36.8 °C)] 97.5 °F (36.4 °C)  Heart Rate:  [] 96  Resp:  [16-22] 18  BP: (135-179)/() 135/94  SpO2:  [89 %-96 %] 93 %  on  Flow (L/min):  [3-3.5] 3.5;   Device (Oxygen Therapy): nasal cannula  Body mass index is 32.46 kg/m².    Physical Exam   Constitutional: He appears well-developed and well-nourished. No distress.   HENT:   Head: Normocephalic and atraumatic.   Right Ear: External ear normal.   Left Ear: External ear normal.   Nose: Nose normal.   Mouth/Throat: Oropharyngeal exudate present.   Upper dentures.  Thrush noted on the tongue and posterior pharynx   Eyes: Pupils are equal, round, and reactive to light. Conjunctivae and EOM are normal. Right eye exhibits no discharge. Left eye  exhibits no discharge. No scleral icterus.   Neck: Normal range of motion. Neck supple. No JVD present. No tracheal deviation present. No thyromegaly present.   Cardiovascular: Normal rate, regular rhythm and normal heart sounds.  Exam reveals no friction rub.    No murmur heard.  Occasional ectopy.  Pedal pulses palpable but decreased   Pulmonary/Chest: No stridor. No respiratory distress. He has no rales. He exhibits no tenderness.   Scattered end expiratory wheezes.  No crackles currently.  Breath sounds are decreased but equal   Abdominal: Soft. Bowel sounds are normal. He exhibits no distension. There is no tenderness. There is no guarding.   No hepatosplenomegaly.  Obese.  Large ventral hernia, nontender   Musculoskeletal: He exhibits edema. He exhibits no tenderness or deformity.   Trace edema at the ankles   Lymphadenopathy:     He has no cervical adenopathy.   Neurological: He is alert. A cranial nerve deficit and sensory deficit is present.   Slightly hard of hearing.  Cranial nerves otherwise intact.  Decreased sensation with the plastic filament at distal lower extremities   Skin: Skin is warm. No rash noted. He is not diaphoretic. No erythema.   Ecchymoses at the upper extremities.  Ecchymoses medial right foot and plantar aspect of the right forefoot   Psychiatric: He has a normal mood and affect. His behavior is normal.   Nursing note and vitals reviewed.      Results Review:   I reviewed the patient's new clinical results.    Lab Results   Component Value Date    GLUCOSE 217 (H) 11/04/2018    BUN 26 (H) 11/04/2018    CREATININE 1.13 11/04/2018    EGFRIFNONA 63 11/04/2018    EGFRIFAFRI 75 01/24/2018    BCR 23.0 11/04/2018    K 4.3 11/04/2018    CO2 29.3 (H) 11/04/2018    CALCIUM 8.8 11/04/2018    PROTENTOTREF 6.3 01/24/2018    ALBUMIN 3.80 11/04/2018    LABIL2 2.5 01/24/2018    AST 14 11/04/2018    ALT 15 11/04/2018       Lab Results   Component Value Date    WBC 5.47 11/04/2018    HGB 11.9 (L)  11/04/2018    HCT 35.3 (L) 11/04/2018    .4 (H) 11/04/2018     11/04/2018             Imaging Results (last 24 hours)     Procedure Component Value Units Date/Time    XR Chest 1 View [[id number]] Collected:  11/04/18 0955     Updated:  11/04/18 1105    Narrative:       ONE VIEW PORTABLE CHEST AT 2:52 AM     HISTORY: Shortness of breath. Prostate cancer.     FINDINGS: The examination has now been submitted for interpretation and  was unavailable earlier. The lungs are moderately expanded with some  minimal atelectasis at the right base. There is cardiomegaly with a  stent in the descending thoracic aorta and this shows no change from  05/27/2018.     This report was finalized on 11/4/2018 11:02 AM by Dr. Lior Peñaloza M.D.               Assessment/Plan   1.  Acute exacerbation of COPD, possibly viral versus bacterial.  IV steroids for a brief course.  Check sputum Gram stain and culture.  Flutter valve.  Mini nebs.  Ask his pulmonologist to see.  2.  Vascular congestion on chest x-ray.  This could've been secondary to the steroid course he just completed or possibly some decompensation secondary to the COPD exacerbation.  He had an echocardiogram July 2018 that showed ejection fraction 58% with grade 1 diastolic dysfunction.  He got Lasix in the ER.  He has had good urine output with that.  We'll wait on any additional doses.  Recheck exam and labs in a.m.  TSH ordered.  3.  Thrush.  Nystatin swish and swallow ordered  4.  Sleep apnea.  Continue CPAP as at home  5.  Allergic rhinitis.  Continue medications as at home.      I discussed the patients findings and my recommendations with patient and daughters at bedside.  Discussed with nurse.  Medical record reviewed.  CODE STATUS confirmed    Tiny Méndez MD  [facility]  11/04/18  11:06 AM         No complaints

## 2019-11-15 ENCOUNTER — INFUSION (OUTPATIENT)
Dept: ONCOLOGY | Facility: HOSPITAL | Age: 79
End: 2019-11-15

## 2019-11-15 ENCOUNTER — LAB (OUTPATIENT)
Dept: OTHER | Facility: HOSPITAL | Age: 79
End: 2019-11-15

## 2019-11-15 ENCOUNTER — RESULTS ENCOUNTER (OUTPATIENT)
Dept: ONCOLOGY | Facility: CLINIC | Age: 79
End: 2019-11-15

## 2019-11-15 VITALS
WEIGHT: 229.8 LBS | TEMPERATURE: 97.7 F | DIASTOLIC BLOOD PRESSURE: 64 MMHG | HEART RATE: 63 BPM | HEIGHT: 70 IN | BODY MASS INDEX: 32.9 KG/M2 | OXYGEN SATURATION: 90 % | RESPIRATION RATE: 18 BRPM | SYSTOLIC BLOOD PRESSURE: 121 MMHG

## 2019-11-15 DIAGNOSIS — D46.9 MYELODYSPLASIA (MYELODYSPLASTIC SYNDROME) (HCC): ICD-10-CM

## 2019-11-15 DIAGNOSIS — C34.32 MALIGNANT NEOPLASM OF LOWER LOBE OF LEFT LUNG (HCC): ICD-10-CM

## 2019-11-15 DIAGNOSIS — D53.9 MACROCYTIC ANEMIA: ICD-10-CM

## 2019-11-15 DIAGNOSIS — D46.9 MYELODYSPLASIA (MYELODYSPLASTIC SYNDROME) (HCC): Primary | ICD-10-CM

## 2019-11-15 DIAGNOSIS — D61.818 PANCYTOPENIA (HCC): ICD-10-CM

## 2019-11-15 DIAGNOSIS — D80.1 HYPOGAMMAGLOBULINEMIA (HCC): ICD-10-CM

## 2019-11-15 LAB
BASOPHILS # BLD AUTO: 0.02 10*3/MM3 (ref 0–0.2)
BASOPHILS NFR BLD AUTO: 0.8 % (ref 0–1.5)
DACRYOCYTES BLD QL SMEAR: NORMAL
DEPRECATED RDW RBC AUTO: 55 FL (ref 37–54)
EOSINOPHIL # BLD AUTO: 0.12 10*3/MM3 (ref 0–0.4)
EOSINOPHIL NFR BLD AUTO: 5.1 % (ref 0.3–6.2)
ERYTHROCYTE [DISTWIDTH] IN BLOOD BY AUTOMATED COUNT: 14.6 % (ref 12.3–15.4)
HCT VFR BLD AUTO: 25.1 % (ref 37.5–51)
HGB BLD-MCNC: 8.6 G/DL (ref 13–17.7)
HYPOCHROMIA BLD QL: NORMAL
IMM GRANULOCYTES # BLD AUTO: 0.11 10*3/MM3 (ref 0–0.05)
IMM GRANULOCYTES NFR BLD AUTO: 4.7 % (ref 0–0.5)
LYMPHOCYTES # BLD AUTO: 0.78 10*3/MM3 (ref 0.7–3.1)
LYMPHOCYTES NFR BLD AUTO: 33.1 % (ref 19.6–45.3)
MCH RBC QN AUTO: 35.5 PG (ref 26.6–33)
MCHC RBC AUTO-ENTMCNC: 34.3 G/DL (ref 31.5–35.7)
MCV RBC AUTO: 103.7 FL (ref 79–97)
MONOCYTES # BLD AUTO: 0.57 10*3/MM3 (ref 0.1–0.9)
MONOCYTES NFR BLD AUTO: 24.2 % (ref 5–12)
NEUTROPHILS # BLD AUTO: 0.76 10*3/MM3 (ref 1.7–7)
NEUTROPHILS NFR BLD AUTO: 32.1 % (ref 42.7–76)
NRBC BLD AUTO-RTO: 0.8 /100 WBC (ref 0–0.2)
OVALOCYTES BLD QL SMEAR: NORMAL
PLAT MORPH BLD: NORMAL
PLATELET # BLD AUTO: 136 10*3/MM3 (ref 140–450)
PMV BLD AUTO: 12 FL (ref 6–12)
RBC # BLD AUTO: 2.42 10*6/MM3 (ref 4.14–5.8)
SPHEROCYTES BLD QL SMEAR: NORMAL
STOMATOCYTES BLD QL SMEAR: NORMAL
WBC MORPH BLD: NORMAL
WBC NRBC COR # BLD: 2.36 10*3/MM3 (ref 3.4–10.8)

## 2019-11-15 PROCEDURE — 36415 COLL VENOUS BLD VENIPUNCTURE: CPT

## 2019-11-15 PROCEDURE — 85025 COMPLETE CBC W/AUTO DIFF WBC: CPT | Performed by: INTERNAL MEDICINE

## 2019-11-15 PROCEDURE — 85007 BL SMEAR W/DIFF WBC COUNT: CPT | Performed by: INTERNAL MEDICINE

## 2019-11-15 PROCEDURE — 96372 THER/PROPH/DIAG INJ SC/IM: CPT

## 2019-11-15 PROCEDURE — 25010000002 EPOETIN ALFA PER 1000 UNITS: Performed by: NURSE PRACTITIONER

## 2019-11-15 RX ADMIN — ERYTHROPOIETIN 20000 UNITS: 20000 INJECTION, SOLUTION INTRAVENOUS; SUBCUTANEOUS at 10:11

## 2019-11-18 RX ORDER — DILTIAZEM HYDROCHLORIDE 60 MG/1
TABLET, FILM COATED ORAL
Qty: 60 TABLET | Refills: 2 | Status: SHIPPED | OUTPATIENT
Start: 2019-11-18 | End: 2020-01-01

## 2019-11-22 ENCOUNTER — INFUSION (OUTPATIENT)
Dept: ONCOLOGY | Facility: HOSPITAL | Age: 79
End: 2019-11-22

## 2019-11-22 ENCOUNTER — RESULTS ENCOUNTER (OUTPATIENT)
Dept: ONCOLOGY | Facility: CLINIC | Age: 79
End: 2019-11-22

## 2019-11-22 ENCOUNTER — LAB (OUTPATIENT)
Dept: OTHER | Facility: HOSPITAL | Age: 79
End: 2019-11-22

## 2019-11-22 VITALS
TEMPERATURE: 97.5 F | HEART RATE: 60 BPM | OXYGEN SATURATION: 91 % | SYSTOLIC BLOOD PRESSURE: 116 MMHG | DIASTOLIC BLOOD PRESSURE: 68 MMHG | WEIGHT: 229 LBS | BODY MASS INDEX: 32.86 KG/M2

## 2019-11-22 DIAGNOSIS — D46.9 MYELODYSPLASIA (MYELODYSPLASTIC SYNDROME) (HCC): ICD-10-CM

## 2019-11-22 DIAGNOSIS — D46.9 MYELODYSPLASIA (MYELODYSPLASTIC SYNDROME) (HCC): Primary | ICD-10-CM

## 2019-11-22 DIAGNOSIS — D53.9 MACROCYTIC ANEMIA: ICD-10-CM

## 2019-11-22 DIAGNOSIS — C34.32 MALIGNANT NEOPLASM OF LOWER LOBE OF LEFT LUNG (HCC): ICD-10-CM

## 2019-11-22 DIAGNOSIS — D80.1 HYPOGAMMAGLOBULINEMIA (HCC): ICD-10-CM

## 2019-11-22 DIAGNOSIS — D61.818 PANCYTOPENIA (HCC): ICD-10-CM

## 2019-11-22 LAB
BASOPHILS # BLD AUTO: 0.02 10*3/MM3 (ref 0–0.2)
BASOPHILS NFR BLD AUTO: 0.8 % (ref 0–1.5)
DEPRECATED RDW RBC AUTO: 57.5 FL (ref 37–54)
EOSINOPHIL # BLD AUTO: 0.14 10*3/MM3 (ref 0–0.4)
EOSINOPHIL NFR BLD AUTO: 5.9 % (ref 0.3–6.2)
ERYTHROCYTE [DISTWIDTH] IN BLOOD BY AUTOMATED COUNT: 15.1 % (ref 12.3–15.4)
HCT VFR BLD AUTO: 25.6 % (ref 37.5–51)
HGB BLD-MCNC: 8.5 G/DL (ref 13–17.7)
IMM GRANULOCYTES # BLD AUTO: 0.04 10*3/MM3 (ref 0–0.05)
IMM GRANULOCYTES NFR BLD AUTO: 1.7 % (ref 0–0.5)
LYMPHOCYTES # BLD AUTO: 0.85 10*3/MM3 (ref 0.7–3.1)
LYMPHOCYTES NFR BLD AUTO: 35.9 % (ref 19.6–45.3)
MCH RBC QN AUTO: 34.8 PG (ref 26.6–33)
MCHC RBC AUTO-ENTMCNC: 33.2 G/DL (ref 31.5–35.7)
MCV RBC AUTO: 104.9 FL (ref 79–97)
MONOCYTES # BLD AUTO: 0.44 10*3/MM3 (ref 0.1–0.9)
MONOCYTES NFR BLD AUTO: 18.6 % (ref 5–12)
NEUTROPHILS # BLD AUTO: 0.88 10*3/MM3 (ref 1.7–7)
NEUTROPHILS NFR BLD AUTO: 37.1 % (ref 42.7–76)
NRBC BLD AUTO-RTO: 0.8 /100 WBC (ref 0–0.2)
PLATELET # BLD AUTO: 151 10*3/MM3 (ref 140–450)
PMV BLD AUTO: 12.5 FL (ref 6–12)
RBC # BLD AUTO: 2.44 10*6/MM3 (ref 4.14–5.8)
WBC NRBC COR # BLD: 2.37 10*3/MM3 (ref 3.4–10.8)

## 2019-11-22 PROCEDURE — 25010000002 EPOETIN ALFA PER 1000 UNITS: Performed by: NURSE PRACTITIONER

## 2019-11-22 PROCEDURE — 85025 COMPLETE CBC W/AUTO DIFF WBC: CPT | Performed by: INTERNAL MEDICINE

## 2019-11-22 PROCEDURE — 36415 COLL VENOUS BLD VENIPUNCTURE: CPT

## 2019-11-22 PROCEDURE — 96372 THER/PROPH/DIAG INJ SC/IM: CPT

## 2019-11-22 RX ADMIN — ERYTHROPOIETIN 20000 UNITS: 20000 INJECTION, SOLUTION INTRAVENOUS; SUBCUTANEOUS at 10:38

## 2019-11-22 NOTE — NURSING NOTE
Lab Results   Component Value Date    WBC 2.37 (L) 11/22/2019    HGB 8.5 (L) 11/22/2019    HCT 25.6 (L) 11/22/2019    .9 (H) 11/22/2019     11/22/2019     Procrit given per protocol. Pt denies new complaints.  Next appt reviewed and pt knows to call sooner with concerns.

## 2019-11-29 ENCOUNTER — RESULTS ENCOUNTER (OUTPATIENT)
Dept: ONCOLOGY | Facility: CLINIC | Age: 79
End: 2019-11-29

## 2019-11-29 DIAGNOSIS — D53.9 MACROCYTIC ANEMIA: ICD-10-CM

## 2019-11-29 DIAGNOSIS — C34.32 MALIGNANT NEOPLASM OF LOWER LOBE OF LEFT LUNG (HCC): ICD-10-CM

## 2019-11-29 DIAGNOSIS — D46.9 MYELODYSPLASIA (MYELODYSPLASTIC SYNDROME) (HCC): ICD-10-CM

## 2019-11-29 DIAGNOSIS — D80.1 HYPOGAMMAGLOBULINEMIA (HCC): ICD-10-CM

## 2019-11-29 DIAGNOSIS — D61.818 PANCYTOPENIA (HCC): ICD-10-CM

## 2019-12-02 ENCOUNTER — INFUSION (OUTPATIENT)
Dept: ONCOLOGY | Facility: HOSPITAL | Age: 79
End: 2019-12-02

## 2019-12-02 VITALS
WEIGHT: 231.2 LBS | OXYGEN SATURATION: 90 % | DIASTOLIC BLOOD PRESSURE: 72 MMHG | TEMPERATURE: 97.8 F | SYSTOLIC BLOOD PRESSURE: 147 MMHG | BODY MASS INDEX: 33.17 KG/M2 | HEART RATE: 66 BPM

## 2019-12-02 DIAGNOSIS — D61.818 PANCYTOPENIA (HCC): ICD-10-CM

## 2019-12-02 DIAGNOSIS — C34.32 MALIGNANT NEOPLASM OF LOWER LOBE OF LEFT LUNG (HCC): Primary | ICD-10-CM

## 2019-12-02 DIAGNOSIS — D80.1 HYPOGAMMAGLOBULINEMIA (HCC): ICD-10-CM

## 2019-12-02 DIAGNOSIS — D53.9 MACROCYTIC ANEMIA: ICD-10-CM

## 2019-12-02 DIAGNOSIS — D46.9 MYELODYSPLASIA (MYELODYSPLASTIC SYNDROME) (HCC): ICD-10-CM

## 2019-12-02 LAB
ALBUMIN SERPL-MCNC: 4.3 G/DL (ref 3.5–5.2)
ALBUMIN/GLOB SERPL: 1.6 G/DL
ALP SERPL-CCNC: 67 U/L (ref 39–117)
ALT SERPL W P-5'-P-CCNC: 8 U/L (ref 1–41)
ANION GAP SERPL CALCULATED.3IONS-SCNC: 11.5 MMOL/L (ref 5–15)
AST SERPL-CCNC: 14 U/L (ref 1–40)
BILIRUB SERPL-MCNC: 0.7 MG/DL (ref 0.1–1.2)
BUN BLD-MCNC: 29 MG/DL (ref 8–23)
BUN/CREAT SERPL: 21.6 (ref 7–25)
CALCIUM SPEC-SCNC: 9.4 MG/DL (ref 8.6–10.5)
CHLORIDE SERPL-SCNC: 103 MMOL/L (ref 98–107)
CO2 SERPL-SCNC: 31.5 MMOL/L (ref 22–29)
CREAT BLD-MCNC: 1.34 MG/DL (ref 0.76–1.27)
DEPRECATED RDW RBC AUTO: 61.6 FL (ref 37–54)
EOSINOPHIL # BLD MANUAL: 0.19 10*3/MM3 (ref 0–0.4)
EOSINOPHIL NFR BLD MANUAL: 5 % (ref 0.3–6.2)
ERYTHROCYTE [DISTWIDTH] IN BLOOD BY AUTOMATED COUNT: 15.6 % (ref 12.3–15.4)
GFR SERPL CREATININE-BSD FRML MDRD: 51 ML/MIN/1.73
GLOBULIN UR ELPH-MCNC: 2.7 GM/DL
GLUCOSE BLD-MCNC: 189 MG/DL (ref 65–99)
HCT VFR BLD AUTO: 27.8 % (ref 37.5–51)
HGB BLD-MCNC: 8.7 G/DL (ref 13–17.7)
IGA1 MFR SER: 228 MG/DL (ref 70–400)
IGG1 SER-MCNC: 586 MG/DL (ref 700–1600)
IGM SERPL-MCNC: 51 MG/DL (ref 40–230)
LYMPHOCYTES # BLD MANUAL: 0.3 10*3/MM3 (ref 0.7–3.1)
LYMPHOCYTES NFR BLD MANUAL: 11 % (ref 5–12)
LYMPHOCYTES NFR BLD MANUAL: 8 % (ref 19.6–45.3)
MCH RBC QN AUTO: 34.1 PG (ref 26.6–33)
MCHC RBC AUTO-ENTMCNC: 31.3 G/DL (ref 31.5–35.7)
MCV RBC AUTO: 109 FL (ref 79–97)
MONOCYTES # BLD AUTO: 0.41 10*3/MM3 (ref 0.1–0.9)
NEUTROPHILS # BLD AUTO: 2.78 10*3/MM3 (ref 1.7–7)
NEUTROPHILS NFR BLD MANUAL: 75 % (ref 42.7–76)
NRBC SPEC MANUAL: 2 /100 WBC (ref 0–0.2)
OVALOCYTES BLD QL SMEAR: ABNORMAL
PLAT MORPH BLD: NORMAL
PLATELET # BLD AUTO: 173 10*3/MM3 (ref 140–450)
PMV BLD AUTO: 12.1 FL (ref 6–12)
POTASSIUM BLD-SCNC: 3.9 MMOL/L (ref 3.5–5.2)
PROT SERPL-MCNC: 7 G/DL (ref 6–8.5)
RBC # BLD AUTO: 2.55 10*6/MM3 (ref 4.14–5.8)
SCAN SLIDE: NORMAL
SODIUM BLD-SCNC: 146 MMOL/L (ref 136–145)
SPHEROCYTES BLD QL SMEAR: ABNORMAL
TARGETS BLD QL SMEAR: ABNORMAL
VARIANT LYMPHS NFR BLD MANUAL: 1 % (ref 0–5)
WBC MORPH BLD: NORMAL
WBC NRBC COR # BLD: 3.7 10*3/MM3 (ref 3.4–10.8)

## 2019-12-02 PROCEDURE — 80053 COMPREHEN METABOLIC PANEL: CPT | Performed by: INTERNAL MEDICINE

## 2019-12-02 PROCEDURE — 63710000001 DIPHENHYDRAMINE PER 50 MG: Performed by: INTERNAL MEDICINE

## 2019-12-02 PROCEDURE — 96365 THER/PROPH/DIAG IV INF INIT: CPT | Performed by: INTERNAL MEDICINE

## 2019-12-02 PROCEDURE — 96366 THER/PROPH/DIAG IV INF ADDON: CPT | Performed by: INTERNAL MEDICINE

## 2019-12-02 PROCEDURE — 85025 COMPLETE CBC W/AUTO DIFF WBC: CPT | Performed by: INTERNAL MEDICINE

## 2019-12-02 PROCEDURE — A9270 NON-COVERED ITEM OR SERVICE: HCPCS | Performed by: INTERNAL MEDICINE

## 2019-12-02 PROCEDURE — 82784 ASSAY IGA/IGD/IGG/IGM EACH: CPT | Performed by: INTERNAL MEDICINE

## 2019-12-02 PROCEDURE — 63710000001 ACETAMINOPHEN 325 MG TABLET: Performed by: INTERNAL MEDICINE

## 2019-12-02 PROCEDURE — 25010000002 IMMUNE GLOBULIN (HUMAN) 30 GM/300ML SOLUTION: Performed by: INTERNAL MEDICINE

## 2019-12-02 PROCEDURE — 25010000002 EPOETIN ALFA PER 1000 UNITS: Performed by: INTERNAL MEDICINE

## 2019-12-02 PROCEDURE — 85007 BL SMEAR W/DIFF WBC COUNT: CPT | Performed by: INTERNAL MEDICINE

## 2019-12-02 PROCEDURE — 96372 THER/PROPH/DIAG INJ SC/IM: CPT | Performed by: INTERNAL MEDICINE

## 2019-12-02 RX ORDER — DIPHENHYDRAMINE HCL 25 MG
25 CAPSULE ORAL ONCE
Status: COMPLETED | OUTPATIENT
Start: 2019-12-02 | End: 2019-12-02

## 2019-12-02 RX ORDER — ACETAMINOPHEN 325 MG/1
650 TABLET ORAL ONCE
Status: COMPLETED | OUTPATIENT
Start: 2019-12-02 | End: 2019-12-02

## 2019-12-02 RX ORDER — FAMOTIDINE 10 MG/ML
20 INJECTION, SOLUTION INTRAVENOUS AS NEEDED
Status: CANCELLED | OUTPATIENT
Start: 2019-12-22

## 2019-12-02 RX ORDER — MEPERIDINE HYDROCHLORIDE 50 MG/ML
25 INJECTION INTRAMUSCULAR; INTRAVENOUS; SUBCUTANEOUS
Status: CANCELLED | OUTPATIENT
Start: 2019-12-22

## 2019-12-02 RX ORDER — SODIUM CHLORIDE 9 MG/ML
250 INJECTION, SOLUTION INTRAVENOUS ONCE
Status: COMPLETED | OUTPATIENT
Start: 2019-12-02 | End: 2019-12-02

## 2019-12-02 RX ORDER — DIPHENHYDRAMINE HYDROCHLORIDE 50 MG/ML
25 INJECTION INTRAMUSCULAR; INTRAVENOUS AS NEEDED
Status: CANCELLED | OUTPATIENT
Start: 2019-12-22

## 2019-12-02 RX ORDER — SODIUM CHLORIDE 9 MG/ML
250 INJECTION, SOLUTION INTRAVENOUS ONCE
Status: CANCELLED | OUTPATIENT
Start: 2019-12-22

## 2019-12-02 RX ORDER — DIPHENHYDRAMINE HCL 25 MG
25 CAPSULE ORAL ONCE
Status: CANCELLED | OUTPATIENT
Start: 2019-12-22

## 2019-12-02 RX ORDER — DEXTROSE MONOHYDRATE 50 MG/ML
250 INJECTION, SOLUTION INTRAVENOUS ONCE
Status: CANCELLED | OUTPATIENT
Start: 2019-12-22

## 2019-12-02 RX ORDER — ACETAMINOPHEN 325 MG/1
650 TABLET ORAL ONCE
Status: CANCELLED | OUTPATIENT
Start: 2019-12-22

## 2019-12-02 RX ADMIN — IMMUNE GLOBULIN INFUSION (HUMAN) 30 G: 100 INJECTION, SOLUTION INTRAVENOUS; SUBCUTANEOUS at 11:14

## 2019-12-02 RX ADMIN — DIPHENHYDRAMINE HYDROCHLORIDE 25 MG: 25 CAPSULE ORAL at 11:01

## 2019-12-02 RX ADMIN — ACETAMINOPHEN 650 MG: 325 TABLET, FILM COATED ORAL at 11:01

## 2019-12-02 RX ADMIN — SODIUM CHLORIDE 250 ML: 900 INJECTION, SOLUTION INTRAVENOUS at 11:01

## 2019-12-02 RX ADMIN — ERYTHROPOIETIN 20000 UNITS: 20000 INJECTION, SOLUTION INTRAVENOUS; SUBCUTANEOUS at 12:43

## 2019-12-03 ENCOUNTER — HOSPITAL ENCOUNTER (INPATIENT)
Facility: HOSPITAL | Age: 79
LOS: 2 days | Discharge: HOME | End: 2019-12-05
Attending: EMERGENCY MEDICINE | Admitting: HOSPITALIST

## 2019-12-03 ENCOUNTER — APPOINTMENT (OUTPATIENT)
Dept: GENERAL RADIOLOGY | Facility: HOSPITAL | Age: 79
End: 2019-12-03

## 2019-12-03 DIAGNOSIS — J96.11 CHRONIC RESPIRATORY FAILURE WITH HYPOXIA (HCC): ICD-10-CM

## 2019-12-03 DIAGNOSIS — I50.32 CHRONIC DIASTOLIC CONGESTIVE HEART FAILURE (HCC): ICD-10-CM

## 2019-12-03 DIAGNOSIS — I50.9 ACUTE ON CHRONIC CONGESTIVE HEART FAILURE, UNSPECIFIED HEART FAILURE TYPE (HCC): Primary | ICD-10-CM

## 2019-12-03 PROBLEM — I50.33 ACUTE ON CHRONIC DIASTOLIC (CONGESTIVE) HEART FAILURE (HCC): Status: ACTIVE | Noted: 2019-12-03

## 2019-12-03 LAB
ALBUMIN SERPL-MCNC: 4.4 G/DL (ref 3.5–5.2)
ALBUMIN/GLOB SERPL: 1.5 G/DL
ALP SERPL-CCNC: 68 U/L (ref 39–117)
ALT SERPL W P-5'-P-CCNC: 8 U/L (ref 1–41)
ANION GAP SERPL CALCULATED.3IONS-SCNC: 11.7 MMOL/L (ref 5–15)
AST SERPL-CCNC: 15 U/L (ref 1–40)
BASOPHILS # BLD MANUAL: 0.08 10*3/MM3 (ref 0–0.2)
BASOPHILS NFR BLD AUTO: 2 % (ref 0–1.5)
BILIRUB SERPL-MCNC: 0.7 MG/DL (ref 0.2–1.2)
BUN BLD-MCNC: 26 MG/DL (ref 8–23)
BUN/CREAT SERPL: 20.3 (ref 7–25)
CALCIUM SPEC-SCNC: 9 MG/DL (ref 8.6–10.5)
CHLORIDE SERPL-SCNC: 102 MMOL/L (ref 98–107)
CO2 SERPL-SCNC: 31.3 MMOL/L (ref 22–29)
CREAT BLD-MCNC: 1.28 MG/DL (ref 0.76–1.27)
DEPRECATED RDW RBC AUTO: 56.2 FL (ref 37–54)
EOSINOPHIL # BLD MANUAL: 0.17 10*3/MM3 (ref 0–0.4)
EOSINOPHIL NFR BLD MANUAL: 4.1 % (ref 0.3–6.2)
ERYTHROCYTE [DISTWIDTH] IN BLOOD BY AUTOMATED COUNT: 14.6 % (ref 12.3–15.4)
GFR SERPL CREATININE-BSD FRML MDRD: 54 ML/MIN/1.73
GLOBULIN UR ELPH-MCNC: 3 GM/DL
GLUCOSE BLD-MCNC: 235 MG/DL (ref 65–99)
GLUCOSE BLDC GLUCOMTR-MCNC: 316 MG/DL (ref 70–130)
HCT VFR BLD AUTO: 27.3 % (ref 37.5–51)
HGB BLD-MCNC: 8.7 G/DL (ref 13–17.7)
LYMPHOCYTES # BLD MANUAL: 0.39 10*3/MM3 (ref 0.7–3.1)
LYMPHOCYTES NFR BLD MANUAL: 6.1 % (ref 5–12)
LYMPHOCYTES NFR BLD MANUAL: 9.2 % (ref 19.6–45.3)
MACROCYTES BLD QL SMEAR: ABNORMAL
MCH RBC QN AUTO: 34.3 PG (ref 26.6–33)
MCHC RBC AUTO-ENTMCNC: 31.9 G/DL (ref 31.5–35.7)
MCV RBC AUTO: 107.5 FL (ref 79–97)
MONOCYTES # BLD AUTO: 0.26 10*3/MM3 (ref 0.1–0.9)
NEUTROPHILS # BLD AUTO: 3.32 10*3/MM3 (ref 1.7–7)
NEUTROPHILS NFR BLD MANUAL: 78.6 % (ref 42.7–76)
NT-PROBNP SERPL-MCNC: ABNORMAL PG/ML (ref 5–1800)
PLAT MORPH BLD: NORMAL
PLATELET # BLD AUTO: 180 10*3/MM3 (ref 140–450)
PMV BLD AUTO: 12.1 FL (ref 6–12)
POLYCHROMASIA BLD QL SMEAR: ABNORMAL
POTASSIUM BLD-SCNC: 4.2 MMOL/L (ref 3.5–5.2)
PROT SERPL-MCNC: 7.4 G/DL (ref 6–8.5)
RBC # BLD AUTO: 2.54 10*6/MM3 (ref 4.14–5.8)
SODIUM BLD-SCNC: 145 MMOL/L (ref 136–145)
TROPONIN T SERPL-MCNC: 0.06 NG/ML (ref 0–0.03)
WBC MORPH BLD: NORMAL
WBC NRBC COR # BLD: 4.23 10*3/MM3 (ref 3.4–10.8)

## 2019-12-03 PROCEDURE — 93005 ELECTROCARDIOGRAM TRACING: CPT | Performed by: EMERGENCY MEDICINE

## 2019-12-03 PROCEDURE — 94640 AIRWAY INHALATION TREATMENT: CPT

## 2019-12-03 PROCEDURE — G0378 HOSPITAL OBSERVATION PER HR: HCPCS

## 2019-12-03 PROCEDURE — 25010000002 FUROSEMIDE PER 20 MG: Performed by: EMERGENCY MEDICINE

## 2019-12-03 PROCEDURE — 85025 COMPLETE CBC W/AUTO DIFF WBC: CPT | Performed by: EMERGENCY MEDICINE

## 2019-12-03 PROCEDURE — 25010000002 METHYLPREDNISOLONE PER 125 MG: Performed by: EMERGENCY MEDICINE

## 2019-12-03 PROCEDURE — 80053 COMPREHEN METABOLIC PANEL: CPT | Performed by: EMERGENCY MEDICINE

## 2019-12-03 PROCEDURE — 94799 UNLISTED PULMONARY SVC/PX: CPT

## 2019-12-03 PROCEDURE — 85007 BL SMEAR W/DIFF WBC COUNT: CPT | Performed by: EMERGENCY MEDICINE

## 2019-12-03 PROCEDURE — 83880 ASSAY OF NATRIURETIC PEPTIDE: CPT | Performed by: EMERGENCY MEDICINE

## 2019-12-03 PROCEDURE — 25010000002 FUROSEMIDE PER 20 MG: Performed by: INTERNAL MEDICINE

## 2019-12-03 PROCEDURE — 63710000001 INSULIN LISPRO (HUMAN) PER 5 UNITS: Performed by: INTERNAL MEDICINE

## 2019-12-03 PROCEDURE — 99285 EMERGENCY DEPT VISIT HI MDM: CPT

## 2019-12-03 PROCEDURE — 93010 ELECTROCARDIOGRAM REPORT: CPT | Performed by: INTERNAL MEDICINE

## 2019-12-03 PROCEDURE — 94660 CPAP INITIATION&MGMT: CPT

## 2019-12-03 PROCEDURE — 82962 GLUCOSE BLOOD TEST: CPT

## 2019-12-03 PROCEDURE — 84484 ASSAY OF TROPONIN QUANT: CPT | Performed by: EMERGENCY MEDICINE

## 2019-12-03 PROCEDURE — 71045 X-RAY EXAM CHEST 1 VIEW: CPT

## 2019-12-03 RX ORDER — LISINOPRIL 20 MG/1
20 TABLET ORAL
Status: DISCONTINUED | OUTPATIENT
Start: 2019-12-03 | End: 2019-12-05 | Stop reason: HOSPADM

## 2019-12-03 RX ORDER — DEXTROSE MONOHYDRATE 25 G/50ML
25 INJECTION, SOLUTION INTRAVENOUS
Status: DISCONTINUED | OUTPATIENT
Start: 2019-12-03 | End: 2019-12-05 | Stop reason: HOSPADM

## 2019-12-03 RX ORDER — IPRATROPIUM BROMIDE AND ALBUTEROL SULFATE 2.5; .5 MG/3ML; MG/3ML
3 SOLUTION RESPIRATORY (INHALATION) ONCE
Status: COMPLETED | OUTPATIENT
Start: 2019-12-03 | End: 2019-12-03

## 2019-12-03 RX ORDER — NICOTINE POLACRILEX 4 MG
15 LOZENGE BUCCAL
Status: DISCONTINUED | OUTPATIENT
Start: 2019-12-03 | End: 2019-12-05 | Stop reason: HOSPADM

## 2019-12-03 RX ORDER — DILTIAZEM HYDROCHLORIDE 60 MG/1
60 TABLET, FILM COATED ORAL 2 TIMES DAILY
Status: DISCONTINUED | OUTPATIENT
Start: 2019-12-03 | End: 2019-12-05 | Stop reason: HOSPADM

## 2019-12-03 RX ORDER — ATORVASTATIN CALCIUM 10 MG/1
10 TABLET, FILM COATED ORAL DAILY
Status: DISCONTINUED | OUTPATIENT
Start: 2019-12-03 | End: 2019-12-05 | Stop reason: HOSPADM

## 2019-12-03 RX ORDER — FUROSEMIDE 10 MG/ML
40 INJECTION INTRAMUSCULAR; INTRAVENOUS 3 TIMES DAILY
Status: DISCONTINUED | OUTPATIENT
Start: 2019-12-03 | End: 2019-12-04

## 2019-12-03 RX ORDER — ALBUTEROL SULFATE 2.5 MG/3ML
2.5 SOLUTION RESPIRATORY (INHALATION) ONCE
Status: COMPLETED | OUTPATIENT
Start: 2019-12-03 | End: 2019-12-03

## 2019-12-03 RX ORDER — IPRATROPIUM BROMIDE AND ALBUTEROL SULFATE 2.5; .5 MG/3ML; MG/3ML
3 SOLUTION RESPIRATORY (INHALATION)
Status: DISCONTINUED | OUTPATIENT
Start: 2019-12-03 | End: 2019-12-05 | Stop reason: HOSPADM

## 2019-12-03 RX ORDER — POTASSIUM CHLORIDE 7.45 MG/ML
10 INJECTION INTRAVENOUS
Status: DISCONTINUED | OUTPATIENT
Start: 2019-12-03 | End: 2019-12-05 | Stop reason: HOSPADM

## 2019-12-03 RX ORDER — POTASSIUM CHLORIDE 750 MG/1
40 CAPSULE, EXTENDED RELEASE ORAL AS NEEDED
Status: DISCONTINUED | OUTPATIENT
Start: 2019-12-03 | End: 2019-12-05 | Stop reason: HOSPADM

## 2019-12-03 RX ORDER — ALPRAZOLAM 0.5 MG/1
1 TABLET ORAL NIGHTLY PRN
Status: DISCONTINUED | OUTPATIENT
Start: 2019-12-03 | End: 2019-12-05 | Stop reason: HOSPADM

## 2019-12-03 RX ORDER — METHYLPREDNISOLONE SODIUM SUCCINATE 125 MG/2ML
125 INJECTION, POWDER, LYOPHILIZED, FOR SOLUTION INTRAMUSCULAR; INTRAVENOUS ONCE
Status: COMPLETED | OUTPATIENT
Start: 2019-12-03 | End: 2019-12-03

## 2019-12-03 RX ORDER — FUROSEMIDE 10 MG/ML
40 INJECTION INTRAMUSCULAR; INTRAVENOUS ONCE
Status: COMPLETED | OUTPATIENT
Start: 2019-12-03 | End: 2019-12-03

## 2019-12-03 RX ORDER — ASPIRIN 325 MG
325 TABLET ORAL ONCE
Status: COMPLETED | OUTPATIENT
Start: 2019-12-03 | End: 2019-12-03

## 2019-12-03 RX ORDER — POTASSIUM CHLORIDE 1.5 G/1.77G
40 POWDER, FOR SOLUTION ORAL AS NEEDED
Status: DISCONTINUED | OUTPATIENT
Start: 2019-12-03 | End: 2019-12-05 | Stop reason: HOSPADM

## 2019-12-03 RX ORDER — BUDESONIDE 0.5 MG/2ML
0.5 INHALANT ORAL
Status: DISCONTINUED | OUTPATIENT
Start: 2019-12-03 | End: 2019-12-05 | Stop reason: HOSPADM

## 2019-12-03 RX ORDER — ALBUTEROL SULFATE 2.5 MG/3ML
2.5 SOLUTION RESPIRATORY (INHALATION)
Status: DISCONTINUED | OUTPATIENT
Start: 2019-12-03 | End: 2019-12-03 | Stop reason: CLARIF

## 2019-12-03 RX ORDER — SODIUM CHLORIDE 0.9 % (FLUSH) 0.9 %
10 SYRINGE (ML) INJECTION AS NEEDED
Status: DISCONTINUED | OUTPATIENT
Start: 2019-12-03 | End: 2019-12-05 | Stop reason: HOSPADM

## 2019-12-03 RX ORDER — DOCUSATE SODIUM 100 MG/1
100 CAPSULE, LIQUID FILLED ORAL 2 TIMES DAILY PRN
Status: DISCONTINUED | OUTPATIENT
Start: 2019-12-03 | End: 2019-12-05 | Stop reason: HOSPADM

## 2019-12-03 RX ORDER — FOLIC ACID 1 MG/1
1 TABLET ORAL DAILY
Status: DISCONTINUED | OUTPATIENT
Start: 2019-12-03 | End: 2019-12-05 | Stop reason: HOSPADM

## 2019-12-03 RX ADMIN — APIXABAN 5 MG: 5 TABLET, FILM COATED ORAL at 20:55

## 2019-12-03 RX ADMIN — SODIUM CHLORIDE, PRESERVATIVE FREE 10 ML: 5 INJECTION INTRAVENOUS at 20:58

## 2019-12-03 RX ADMIN — METOPROLOL TARTRATE 150 MG: 100 TABLET, FILM COATED ORAL at 20:57

## 2019-12-03 RX ADMIN — FUROSEMIDE 40 MG: 20 INJECTION, SOLUTION INTRAMUSCULAR; INTRAVENOUS at 14:08

## 2019-12-03 RX ADMIN — DOCUSATE SODIUM 100 MG: 100 CAPSULE, LIQUID FILLED ORAL at 22:51

## 2019-12-03 RX ADMIN — ALBUTEROL SULFATE 2.5 MG: 2.5 SOLUTION RESPIRATORY (INHALATION) at 13:14

## 2019-12-03 RX ADMIN — LISINOPRIL 20 MG: 20 TABLET ORAL at 20:55

## 2019-12-03 RX ADMIN — METHYLPREDNISOLONE SODIUM SUCCINATE 125 MG: 125 INJECTION, POWDER, FOR SOLUTION INTRAMUSCULAR; INTRAVENOUS at 12:52

## 2019-12-03 RX ADMIN — ASPIRIN 325 MG: 325 TABLET ORAL at 14:08

## 2019-12-03 RX ADMIN — INSULIN LISPRO 5 UNITS: 100 INJECTION, SOLUTION INTRAVENOUS; SUBCUTANEOUS at 22:18

## 2019-12-03 RX ADMIN — DILTIAZEM HYDROCHLORIDE 60 MG: 60 TABLET, FILM COATED ORAL at 20:55

## 2019-12-03 RX ADMIN — IPRATROPIUM BROMIDE AND ALBUTEROL SULFATE 3 ML: 2.5; .5 SOLUTION RESPIRATORY (INHALATION) at 13:08

## 2019-12-03 RX ADMIN — BUDESONIDE 0.5 MG: 0.5 SUSPENSION RESPIRATORY (INHALATION) at 20:28

## 2019-12-03 RX ADMIN — FUROSEMIDE 40 MG: 40 INJECTION, SOLUTION INTRAMUSCULAR; INTRAVENOUS at 20:04

## 2019-12-03 RX ADMIN — FOLIC ACID 1 MG: 1 TABLET ORAL at 20:56

## 2019-12-03 RX ADMIN — IPRATROPIUM BROMIDE AND ALBUTEROL SULFATE 3 ML: 2.5; .5 SOLUTION RESPIRATORY (INHALATION) at 20:27

## 2019-12-03 RX ADMIN — IPRATROPIUM BROMIDE AND ALBUTEROL SULFATE 3 ML: 2.5; .5 SOLUTION RESPIRATORY (INHALATION) at 22:39

## 2019-12-03 RX ADMIN — ATORVASTATIN CALCIUM 10 MG: 10 TABLET, FILM COATED ORAL at 20:57

## 2019-12-03 NOTE — ED NOTES
Pt reports SOA since yesterday. Denies cough and fever. Hx COPD always on 4L oxygen     Swallows, Kim Ledezma, RN  12/03/19 2538

## 2019-12-03 NOTE — H&P
HISTORY AND PHYSICAL   [facility]        Patient Identification:  Name: Ankit Mack Sr.  Age: 79 y.o.  Sex: male  :  1940  MRN: [id number]                     Primary Care Physician: Gopi Lagos MD    Chief Complaint:  79 year old gentleman who presented to the emergency room with shortness of breath and hypoxia for several days; he is on home oxygen and was monitoring his sats at home which were in the 70s; he walked from the parking lot to the ED and they dropped to the 60s; his oxygen was increased to 5 liters and he is now more comfortable; denies chest pain; workup revealed pulmonary edema; he is followed by dr silva    History of Present Illness:   As above      Past Medical History:  Past Medical History:   Diagnosis Date   • Anxiety    • Atrial flutter (CMS/HCC)    • Atrial flutter with rapid ventricular response (CMS/HCC)    • Chronic diastolic congestive heart failure (CMS/HCC) 2018   • Chronic respiratory failure with hypoxia (CMS/HCC)    • COPD (chronic obstructive pulmonary disease) (CMS/HCC)    • Essential hypertension    • GERD (gastroesophageal reflux disease)    • H/O complete eye exam 2018   • History of pulmonary function tests 06/10/2014   • Hyperglycemia    • Hyperlipidemia    • Lung cancer (CMS/HCC) 2012    Left   • MDS (myelodysplastic syndrome) (CMS/HCC)    • Nonrheumatic aortic valve stenosis     mild 2019   • Obesity    • Osteoarthritis, knee    • Paroxysmal atrial fibrillation (CMS/HCC)    • Prostate cancer (CMS/HCC)    • Seizures (CMS/HCC)    • Sleep apnea    • Thoracic aortic aneurysm without rupture (CMS/HCC)     s/p stenting of descending thoracic aneurysm; the ascending aorta measured 3.7cm in 2019   • Type 2 diabetes mellitus without complication, without long-term current use of insulin (CMS/HCC)      Past Surgical History:  Past Surgical History:   Procedure Laterality Date   • ABDOMINAL AORTIC ANEURYSM REPAIR      Dr. Adarsh Dennis    • CARDIAC CATHETERIZATION Left 2004   • CATARACT EXTRACTION  10/2014    also    • COLONOSCOPY     • ENDOVASCULAR THORACIC ANEURYSM REPAIR     • JOINT REPLACEMENT Left 10/2010    knee; Dr. Wolf   • JOINT REPLACEMENT Right 2011    knee; Dr. Wolf   • LUNG LOBECTOMY Left 2012    [facility]; Dr. Mendoza   • LUNG LOBECTOMY Left    • PROSTATE SURGERY      Dr. Naranjo   • VASCULAR SURGERY  2009    TEVAR of thoracic aortic aneurysm   • VASCULAR SURGERY  12/15/2003    Left carotid subclavian bypass graft, ligation of proximal left subclavian following carotid subclavian bypass. Right femoral sheath. Arch angiography with descending thoracic and abdominal aortography    • VASCULAR SURGERY  2003    Selective catheterization right vertebral artery, right subclavian artery, left subclavian artery, left vertebral artery.     • VASCULAR SURGERY  2002    Tracheostomy, bronchoscopy   • VASCULAR SURGERY  2002    Thoracic arteriogram and abdominal aortogram      Home Meds:    (Not in a hospital admission)    Allergies:  Allergies   Allergen Reactions   • Erythromycin Itching     Immunizations:  Immunization History   Administered Date(s) Administered   • FLUAD TRI 65YR+ 10/14/2019   • Flu Vaccine Quad PF >36MO 10/06/2017   • Fluzone High Dose =>65 Years ([employer] ONLY) 10/26/2016     Social History:   Social History     Social History Narrative   • Not on file     Social History     Socioeconomic History   • Marital status: Single     Spouse name: Not on file   • Number of children: Not on file   • Years of education: Not on file   • Highest education level: Not on file   Occupational History     Employer: RETIRED   Tobacco Use   • Smoking status: Former Smoker     Packs/day: 1.00     Types: Cigarettes     Last attempt to quit: 4/15/2010     Years since quittin.6   • Smokeless tobacco: Never Used   Substance and Sexual Activity   • Alcohol use: No     Frequency: Never     Comment: No  "caffeine use   • Drug use: No   • Sexual activity: Defer       Family History:  Family History   Problem Relation Age of Onset   • Cancer Mother    • COPD Mother    • Cancer Father         lung   • Colon cancer Father    • Cancer Other         colon   • Diabetes Other    • Emphysema Other    • Hypertension Other    • Kidney disease Other    • Lung cancer Other         Review of Systems  See history of present illness and past medical history.  Patient denies headache, dizziness, syncope, falls, trauma, change in vision, change in hearing, change in taste, changes in weight, changes in appetite, focal weakness, numbness, or paresthesia.  Patient denies chest pain,   sinus pressure, rhinorrhea, epistaxis, hemoptysis, nausea, vomiting,hematemesis, diarrhea, constipation or hematchezia.  Denies cold or heat intolerance, polydipsia, polyuria, polyphagia. Denies hematuria, pyuria, dysuria, hesitancy, frequency or urgency. Denies consumption of raw and under cooked meats foods or change in water source.  Denies fever, chills, sweats, night sweats.  Denies missing any routine medications. Remainder of ROS is negative.    Objective:  tMax 24 hrs: Temp (24hrs), Av.3 °F (36.8 °C), Min:98.3 °F (36.8 °C), Max:98.3 °F (36.8 °C)    Vitals Ranges:   Temp:  [98.3 °F (36.8 °C)] 98.3 °F (36.8 °C)  Heart Rate:  [65-78] 68  Resp:  [18-30] 18  BP: (149-169)/(76-92) 164/88      Exam:  /88   Pulse 68   Temp 98.3 °F (36.8 °C) (Tympanic)   Resp 18   Ht 177.8 cm (70\")   Wt 104 kg (229 lb 4.5 oz)   SpO2 97%   BMI 32.90 kg/m²     General Appearance:    Alert, cooperative, no distress, appears stated age   Head:    Normocephalic, without obvious abnormality, atraumatic   Eyes:    PERRL, conjunctiva/corneas clear, EOM's intact, both eyes   Ears:    Normal external ear canals, both ears   Nose:   Nares normal, septum midline, mucosa normal, no drainage    or sinus tenderness   Throat:   Lips, mucosa, and tongue normal   Neck:   " Supple, symmetrical, trachea midline, no adenopathy;     thyroid:  no enlargement/tenderness/nodules; no carotid    bruit or JVD   Back:     Symmetric, no curvature, ROM normal, no CVA tenderness   Lungs:     Decreased breath sounds bilaterally, respirations unlabored   Chest Wall:    No tenderness or deformity    Heart:    Regular rate and rhythm, S1 and S2 normal, no murmur, rub   or gallop   Abdomen:     Soft, non-tender, bowel sounds active all four quadrants,     no masses, no hepatomegaly, no splenomegaly   Extremities:   Extremities normal, atraumatic, no cyanosis or edema   Pulses:   2+ and symmetric all extremities   Skin:   Skin color, texture, turgor normal, no rashes or lesions   Lymph nodes:   Cervical, supraclavicular, and axillary nodes normal   Neurologic:   CNII-XII intact, normal strength, sensation intact throughout      .    Data Review:  Labs in chart were reviewed.  Lab Results   Component Value Date    WBC 4.23 12/03/2019    HGB 8.7 (L) 12/03/2019    HCT 27.3 (L) 12/03/2019     12/03/2019     Lab Results   Component Value Date     12/03/2019    K 4.2 12/03/2019     12/03/2019    CO2 31.3 (H) 12/03/2019    BUN 26 (H) 12/03/2019    CREATININE 1.28 (H) 12/03/2019    GLUCOSE 235 (H) 12/03/2019     Lab Results   Component Value Date    CALCIUM 9.0 12/03/2019     Lab Results   Component Value Date    AST 15 12/03/2019    ALT 8 12/03/2019    ALKPHOS 68 12/03/2019     No results found for: APTT, INR             Imaging Results (All)     Procedure Component Value Units Date/Time    XR Chest 1 View [[id number]] Collected:  12/03/19 1326     Updated:  12/03/19 1334    Narrative:       XR CHEST 1 VW-     HISTORY: Male who is 79 years-old,  short of breath     TECHNIQUE: Frontal view of the chest     COMPARISON: 10/16/2019     FINDINGS: Heart is enlarged. Pulmonary vasculature is congested. Aorta  is tortuous, with stent in place. Some patchy densities in both lungs  may reflect edema  and/or infection, appearance represents interval  worsening. No large pleural effusions. No pneumothorax. No acute osseous  process.       Impression:       Patchy densities in both lungs that may reflect edema and/or  infection, clinical correlation and follow-up recommended. Cardiomegaly  with pulmonary vascular congestion.     This report was finalized on 12/3/2019 1:30 PM by Dr. Jakob Siu M.D.           Patient Active Problem List   Diagnosis Code   • Anxiety F41.9   • Hyperlipidemia E78.5   • Osteoarthritis, knee M17.10   • Essential hypertension I10   • Cancer of lower lobe of lung (CMS/HCC) C34.30   • DOMINGO on CPAP G47.33, Z99.89   • Non-seasonal allergic rhinitis J30.89   • Pancytopenia (CMS/HCC) D61.818   • Aneurysm of thoracic aorta (CMS/[facility]) I71.2   • Paroxysmal atrial fibrillation (CMS/[facility]) I48.0   • Thrombocytopenia (CMS/[facility]) D69.6   • Type 2 diabetes mellitus without complication, without long-term current use of insulin (CMS/[facility]) E11.9   • Chronic diastolic congestive heart failure (CMS/[facility]) I50.32   • History of COPD Z87.09   • Macrocytic anemia D53.9   • 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   • Myelodysplasia (myelodysplastic syndrome) (CMS/[facility]) D46.9   • Class 1 obesity due to excess calories with serious comorbidity and body mass index (BMI) of 33.0 to 33.9 in adult E66.09, Z68.33   • Atrial flutter (CMS/[facility]) I48.92   • Nonrheumatic aortic valve stenosis I35.0   • Acute on chronic congestive heart failure (CMS/HCC) I50.9       Assessment:    Acute on chronic diastolic congestive heart failure (CMS/[facility])    Essential hypertension    DOMINGO on CPAP    Pancytopenia (CMS/[facility])    Type 2 diabetes mellitus without complication, without long-term current use of insulin (CMS/[facility])  Acute on chronic hypoxic respiratory failure  Paroxysmal atrial fibrillation  Anemia  Diabetes  Elevated troponin  myelodysplasia  Plan:  Repeat troponin  Diurese  Wean oxygen back to his usual flow rate as tolerated  Ask  cardiology to see him  Ask dr silva to see him  Monitor on telemetry  High risk  Has     Veronika Rosen MD  12/3/2019  5:26 PM

## 2019-12-03 NOTE — ED PROVIDER NOTES
EMERGENCY DEPARTMENT ENCOUNTER    Room Number:  09/09  Date of encounter:  12/3/2019  PCP: Gopi Lagos MD  Historian: Pt      HPI:  Chief Complaint: SOA/chest tightness  A complete HPI/ROS/PMH/PSH/SH/FH are unobtainable due to: None  Context: Ankit Mack Sr. is a 79 y.o. male hx of COPD and Afib who presents to the ED c/o gradual onset, intermittent SOA and chest tightness that began around 0400 this AM while pt was sleeping. His O2 sats after he woke up were 73%. He then took a breathing treatment and went back to sleep. He woke up again around 0930 with same sx and O2 sats were 73%. He contacted Dr. Alejandro, pulmonologist, who recommended pt to ED for further evaluation. He denies any exacerbating or alleviating factors. Pt is chronically on 4L of home O2. He denies CP, fevers, chills, sore throat or rhinorrhea. No other complaints at this time.    MEDICAL HISTORY REVIEW  Previous records reviewed. See ED course.     PAST MEDICAL HISTORY  Active Ambulatory Problems     Diagnosis Date Noted   • Anxiety    • Hyperlipidemia    • Osteoarthritis, knee    • Essential hypertension 12/13/2012   • Cancer of lower lobe of lung (CMS/[facility]) 05/16/2017   • DOMINGO on CPAP 05/22/2017   • Non-seasonal allergic rhinitis 11/04/2018   • Pancytopenia (CMS/[facility]) 02/06/2018   • Aneurysm of thoracic aorta (CMS/[facility]) 12/13/2012   • Paroxysmal atrial fibrillation (CMS/[facility]) 11/17/2018   • Thrombocytopenia (CMS/[facility]) 11/17/2018   • Type 2 diabetes mellitus without complication, without long-term current use of insulin (CMS/[facility]) 12/04/2018   • Chronic diastolic congestive heart failure (CMS/[facility]) 12/19/2018   • History of COPD 03/13/2019   • Macrocytic anemia 06/18/2019   • Hypogammaglobulinemia (CMS/[facility]) 06/25/2019   • Myelodysplasia (myelodysplastic syndrome) (CMS/[facility]) 07/09/2019   • Class 1 obesity due to excess calories with serious comorbidity and body mass index (BMI) of 33.0 to 33.9 in adult 08/26/2019   • Atrial flutter (CMS/[facility])  08/26/2019   • Nonrheumatic aortic valve stenosis 08/26/2019     Resolved Ambulatory Problems     Diagnosis Date Noted   • Abrasion 03/04/2017   • Chronic obstructive pulmonary disease with acute exacerbation (CMS/[facility]) 12/17/2013   • 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[facility]) 10/04/2016   • COPD (chronic obstructive pulmonary disease) with acute bronchitis (CMS/[facility]) 05/27/2018   • Acute congestive heart failure (CMS/[facility]) 11/04/2018   • RSV (acute bronchiolitis due to respiratory syncytial virus) 11/06/2018   • Hemoptysis 11/17/2018   • Acute on chronic respiratory failure with hypoxemia (CMS/[facility]) 11/17/2018   • Pneumonia due to infectious organism 11/17/2018   • Hyperglycemia 11/17/2018   • Respiratory failure (CMS/[facility]) 05/25/2019   • Elevated troponin 05/25/2019     Past Medical History:   Diagnosis Date   • Anxiety    • Atrial flutter (CMS/[facility])    • Atrial flutter with rapid ventricular response (CMS/HCC)    • Chronic diastolic congestive heart failure (CMS/H[facility]) 12/19/2018   • Chronic respiratory failure with hypoxia (CMS/HCC)    • COPD (chronic obstructive pulmonary disease) (CMS/[facility])    • Essential hypertension    • GERD (gastroesophageal reflux disease)    • H/O complete eye exam 06/2018   • History of pulmonary function tests 06/10/2014   • Hyperglycemia    • Hyperlipidemia    • Lung cancer (CMS/[facility]) 2012   • MDS (myelodysplastic syndrome) (CMS/[facility])    • Nonrheumatic aortic valve stenosis    • Obesity    • Osteoarthritis, knee    • Paroxysmal atrial fibrillation (CMS/[facility])    • Prostate cancer (CMS/[facility]) 2000   • Seizures (CMS/HCC)    • Sleep apnea    • Thoracic aortic aneurysm without rupture (CMS/HCC)    • Type 2 diabetes mellitus without complication, without long-term current use of insulin (CMS/[facility])          PAST SURGICAL HISTORY  Past Surgical History:   Procedure Laterality Date   • ABDOMINAL AORTIC ANEURYSM REPAIR  2010    Dr. Adarsh Dennis   • CARDIAC CATHETERIZATION Left 02/06/2004   • CATARACT  EXTRACTION  10/2014    also    • COLONOSCOPY     • ENDOVASCULAR THORACIC ANEURYSM REPAIR     • JOINT REPLACEMENT Left 10/2010    knee; Dr. Wolf   • JOINT REPLACEMENT Right 2011    knee; Dr. Wolf   • LUNG LOBECTOMY Left 2012    LLL; Dr. Mendoza   • LUNG LOBECTOMY Left    • PROSTATE SURGERY      Dr. Naranjo   • VASCULAR SURGERY  2009    TEVAR of thoracic aortic aneurysm   • VASCULAR SURGERY  12/15/2003    Left carotid subclavian bypass graft, ligation of proximal left subclavian following carotid subclavian bypass. Right femoral sheath. Arch angiography with descending thoracic and abdominal aortography    • VASCULAR SURGERY  2003    Selective catheterization right vertebral artery, right subclavian artery, left subclavian artery, left vertebral artery.     • VASCULAR SURGERY  2002    Tracheostomy, bronchoscopy   • VASCULAR SURGERY  2002    Thoracic arteriogram and abdominal aortogram         FAMILY HISTORY  Family History   Problem Relation Age of Onset   • Cancer Mother    • COPD Mother    • Cancer Father         lung   • Colon cancer Father    • Cancer Other         colon   • Diabetes Other    • Emphysema Other    • Hypertension Other    • Kidney disease Other    • Lung cancer Other          SOCIAL HISTORY  Social History     Socioeconomic History   • Marital status: Single     Spouse name: Not on file   • Number of children: Not on file   • Years of education: Not on file   • Highest education level: Not on file   Occupational History     Employer: RETIRED   Tobacco Use   • Smoking status: Former Smoker     Packs/day: 1.00     Types: Cigarettes     Last attempt to quit: 4/15/2010     Years since quittin.6   • Smokeless tobacco: Never Used   Substance and Sexual Activity   • Alcohol use: No     Frequency: Never     Comment: No caffeine use   • Drug use: No   • Sexual activity: Defer         ALLERGIES  Erythromycin        REVIEW OF SYSTEMS  Review of Systems    Constitutional: Negative for activity change, appetite change, chills and fever.   HENT: Negative for congestion and sore throat.    Eyes: Negative.    Respiratory: Positive for chest tightness and shortness of breath. Negative for cough.    Cardiovascular: Negative for chest pain and leg swelling.   Gastrointestinal: Negative for abdominal pain, diarrhea and vomiting.   Endocrine: Negative.    Genitourinary: Negative for decreased urine volume and dysuria.   Musculoskeletal: Negative for neck pain.   Skin: Negative for rash and wound.   Allergic/Immunologic: Negative.    Neurological: Negative for weakness, numbness and headaches.   Hematological: Negative.    Psychiatric/Behavioral: Negative.    All other systems reviewed and are negative.       All systems reviewed and negative except for those discussed in HPI.       PHYSICAL EXAM    I have reviewed the triage vital signs and nursing notes.    ED Triage Vitals   Temp Heart Rate Resp BP SpO2   12/03/19 1227 12/03/19 1226 12/03/19 1226 -- 12/03/19 1226   98.3 °F (36.8 °C) 78 (!) 30  (!) 60 %      Temp src Heart Rate Source Patient Position BP Location FiO2 (%)   12/03/19 1227 -- -- -- --   Tympanic           GENERAL: awake and alert  HENT: nares patent  EYES: no scleral icterus  CV: regular rhythm, regular rate  RESPIRATORY: normal effort, decreased breath sounds bilaterally  ABDOMEN: soft, non-tender  MUSCULOSKELETAL: no deformity, no calf tenderness, no pedal edema  NEURO: alert, moves all extremities, follows commands  SKIN: warm, dry        LAB RESULTS  Recent Results (from the past 24 hour(s))   Comprehensive Metabolic Panel    Collection Time: 12/03/19 12:46 PM   Result Value Ref Range    Glucose 235 (H) 65 - 99 mg/dL    BUN 26 (H) 8 - 23 mg/dL    Creatinine 1.28 (H) 0.76 - 1.27 mg/dL    Sodium 145 136 - 145 mmol/L    Potassium 4.2 3.5 - 5.2 mmol/L    Chloride 102 98 - 107 mmol/L    CO2 31.3 (H) 22.0 - 29.0 mmol/L    Calcium 9.0 8.6 - 10.5 mg/dL    Total  Protein 7.4 6.0 - 8.5 g/dL    Albumin 4.40 3.50 - 5.20 g/dL    ALT (SGPT) 8 1 - 41 U/L    AST (SGOT) 15 1 - 40 U/L    Alkaline Phosphatase 68 39 - 117 U/L    Total Bilirubin 0.7 0.2 - 1.2 mg/dL    eGFR Non African Amer 54 (L) >60 mL/min/1.73    Globulin 3.0 gm/dL    A/G Ratio 1.5 g/dL    BUN/Creatinine Ratio 20.3 7.0 - 25.0    Anion Gap 11.7 5.0 - 15.0 mmol/L   BNP    Collection Time: 12/03/19 12:46 PM   Result Value Ref Range    proBNP 18,625.0 (H) 5.0-1,800.0 pg/mL   Troponin    Collection Time: 12/03/19 12:46 PM   Result Value Ref Range    Troponin T 0.058 (C) 0.000 - 0.030 ng/mL   CBC Auto Differential    Collection Time: 12/03/19 12:46 PM   Result Value Ref Range    WBC 4.23 3.40 - 10.80 10*3/mm3    RBC 2.54 (L) 4.14 - 5.80 10*6/mm3    Hemoglobin 8.7 (L) 13.0 - 17.7 g/dL    Hematocrit 27.3 (L) 37.5 - 51.0 %    .5 (H) 79.0 - 97.0 fL    MCH 34.3 (H) 26.6 - 33.0 pg    MCHC 31.9 31.5 - 35.7 g/dL    RDW 14.6 12.3 - 15.4 %    RDW-SD 56.2 (H) 37.0 - 54.0 fl    MPV 12.1 (H) 6.0 - 12.0 fL    Platelets 180 140 - 450 10*3/mm3   Manual Differential    Collection Time: 12/03/19 12:46 PM   Result Value Ref Range    Neutrophil % 78.6 (H) 42.7 - 76.0 %    Lymphocyte % 9.2 (L) 19.6 - 45.3 %    Monocyte % 6.1 5.0 - 12.0 %    Eosinophil % 4.1 0.3 - 6.2 %    Basophil % 2.0 (H) 0.0 - 1.5 %    Neutrophils Absolute 3.32 1.70 - 7.00 10*3/mm3    Lymphocytes Absolute 0.39 (L) 0.70 - 3.10 10*3/mm3    Monocytes Absolute 0.26 0.10 - 0.90 10*3/mm3    Eosinophils Absolute 0.17 0.00 - 0.40 10*3/mm3    Basophils Absolute 0.08 0.00 - 0.20 10*3/mm3    Macrocytes Slight/1+ None Seen    Polychromasia Slight/1+ None Seen    WBC Morphology Normal Normal    Platelet Morphology Normal Normal       Ordered the above labs and independently reviewed the results.        RADIOLOGY  Xr Chest 1 View    Result Date: 12/3/2019  XR CHEST 1 VW-  HISTORY: Male who is 79 years-old,  short of breath  TECHNIQUE: Frontal view of the chest  COMPARISON:  10/16/2019  FINDINGS: Heart is enlarged. Pulmonary vasculature is congested. Aorta is tortuous, with stent in place. Some patchy densities in both lungs may reflect edema and/or infection, appearance represents interval worsening. No large pleural effusions. No pneumothorax. No acute osseous process.      Patchy densities in both lungs that may reflect edema and/or infection, clinical correlation and follow-up recommended. Cardiomegaly with pulmonary vascular congestion.  This report was finalized on 12/3/2019 1:30 PM by Dr. Jakob Siu M.D.        I ordered the above noted radiological studies. Reviewed by me and discussed with radiologist.  See dictation for official radiology interpretation.      PROCEDURES    Procedures      MEDICATIONS GIVEN IN ER    Medications   sodium chloride 0.9 % flush 10 mL (not administered)   methylPREDNISolone sodium succinate (SOLU-Medrol) injection 125 mg (125 mg Intravenous Given 12/3/19 1252)   ipratropium-albuterol (DUO-NEB) nebulizer solution 3 mL (3 mL Nebulization Given 12/3/19 1308)   albuterol (PROVENTIL) nebulizer solution 0.083% 2.5 mg/3mL (2.5 mg Nebulization Given 12/3/19 1314)   furosemide (LASIX) injection 40 mg (40 mg Intravenous Given 12/3/19 1408)   aspirin tablet 325 mg (325 mg Oral Given 12/3/19 1408)         PROGRESS, DATA ANALYSIS, CONSULTS, AND MEDICAL DECISION MAKING    All labs have been independently reviewed by me.  All radiology studies have been reviewed by me and discussed with radiologist dictating the report.   EKG's independently viewed and interpreted by me.  Discussion below represents my analysis of pertinent findings related to patient's condition, differential diagnosis, treatment plan and final disposition.    MDM  Patient presented the ER complaining of worsening shortness of breath and low O2 sats.  He is chronically on oxygen at home.    His oxygen saturation was 60% at triage after he walked in from the parking lot.  He denied chest  pain.  Patient was found to be in congestive failure.  His renal function was stable.  Troponin was mildly elevated.  EKG   Did not have any new ischemic changes.  Patient was given aspirin, Solu-Medrol,   2 nebulizer treatments, and IV Lasix.  Case was discussed with Dr. Rosen and she agreed to admit the patient.      1242: Ordered XR chest and labs for further evaluation.     ED Course as of Dec 03 1524   Tue Dec 03, 2019   1242 EKG          EKG time: 1239  Rhythm/Rate: Sinus rhythm rate 68  P waves and HI: Normal, First-degree block  QRS, axis: RBBB, LAD  ST and T waves: Nonspecific T wave changes    Interpreted Contemporaneously by me, independently viewed  EKG is not significantly changed compared to prior EKG done 10/16/2019.  HI interval was normal at that time.  No new ischemic changes    []   1246 Old records reviewed.  Patient was last admitted here June 2019 for acute on chronic hypoxic respiratory failure and leukopenia.  Patient also had decompensated diastolic heart failure.  Patient saw Dr. Hayes approximately 1 month ago for a follow-up for myelodysplasia.  He is treated with monthly IVIG infusions.  []   1341 0.051 five months ago Troponin T: (!!) 0.058 []   1342 stable Creatinine: (!) 1.28 []   1355 stable Hemoglobin: (!) 8.7 []   1355 2400 five months ago proBNP: (!) 18,625.0 []   1419 Case discussed with Dr. Rosen and she agreed to admit the patient to Dr. Smalls.  []      ED Course User Index  [] Maikel Martin MD       AS OF 3:24 PM VITALS:    BP - 158/76  HR - 71  TEMP - 98.3 °F (36.8 °C) (Tympanic)  O2 SATS - 94%        DISPOSITION  Admission        DIAGNOSIS  Final diagnoses:   Acute on chronic congestive heart failure, unspecified heart failure type (CMS/HCC)   Chronic respiratory failure with hypoxia (CMS/HCC)       Documentation assistance provided by ramon Martin MD for Dr. Martin.  Information recorded by the ramon was done at my direction and has  been verified and validated by me.       Clara Escobar  12/03/19 1342       Maikel Martin MD  12/03/19 0813       Maikel Martin MD  12/03/19 1207

## 2019-12-03 NOTE — ED TRIAGE NOTES
Pt uses trilogy machine and 4L oxygen at home.  Pt woke up at 0400 feeling poorly, checked his pulse ox and it read 73, he bumped up his oxygen, went back to sleep and woke up at 0900 with his pulse ox 73 again.  Hx of COPD.

## 2019-12-04 LAB
GLUCOSE BLDC GLUCOMTR-MCNC: 133 MG/DL (ref 70–130)
GLUCOSE BLDC GLUCOMTR-MCNC: 179 MG/DL (ref 70–130)
GLUCOSE BLDC GLUCOMTR-MCNC: 185 MG/DL (ref 70–130)
GLUCOSE BLDC GLUCOMTR-MCNC: 304 MG/DL (ref 70–130)

## 2019-12-04 PROCEDURE — 99222 1ST HOSP IP/OBS MODERATE 55: CPT | Performed by: INTERNAL MEDICINE

## 2019-12-04 PROCEDURE — 25010000002 FUROSEMIDE PER 20 MG: Performed by: INTERNAL MEDICINE

## 2019-12-04 PROCEDURE — 82962 GLUCOSE BLOOD TEST: CPT

## 2019-12-04 PROCEDURE — 94799 UNLISTED PULMONARY SVC/PX: CPT

## 2019-12-04 PROCEDURE — 63710000001 INSULIN LISPRO (HUMAN) PER 5 UNITS: Performed by: INTERNAL MEDICINE

## 2019-12-04 RX ORDER — FUROSEMIDE 10 MG/ML
40 INJECTION INTRAMUSCULAR; INTRAVENOUS
Status: DISCONTINUED | OUTPATIENT
Start: 2019-12-04 | End: 2019-12-05 | Stop reason: HOSPADM

## 2019-12-04 RX ADMIN — SODIUM CHLORIDE, PRESERVATIVE FREE 10 ML: 5 INJECTION INTRAVENOUS at 08:27

## 2019-12-04 RX ADMIN — METOPROLOL TARTRATE 150 MG: 100 TABLET, FILM COATED ORAL at 08:27

## 2019-12-04 RX ADMIN — FUROSEMIDE 40 MG: 40 INJECTION, SOLUTION INTRAMUSCULAR; INTRAVENOUS at 04:20

## 2019-12-04 RX ADMIN — METOPROLOL TARTRATE 150 MG: 100 TABLET, FILM COATED ORAL at 20:51

## 2019-12-04 RX ADMIN — IPRATROPIUM BROMIDE AND ALBUTEROL SULFATE 3 ML: 2.5; .5 SOLUTION RESPIRATORY (INHALATION) at 07:19

## 2019-12-04 RX ADMIN — DILTIAZEM HYDROCHLORIDE 60 MG: 60 TABLET, FILM COATED ORAL at 20:51

## 2019-12-04 RX ADMIN — SODIUM CHLORIDE, PRESERVATIVE FREE 10 ML: 5 INJECTION INTRAVENOUS at 20:51

## 2019-12-04 RX ADMIN — ALPRAZOLAM 1 MG: 0.5 TABLET ORAL at 01:02

## 2019-12-04 RX ADMIN — APIXABAN 5 MG: 5 TABLET, FILM COATED ORAL at 08:27

## 2019-12-04 RX ADMIN — IPRATROPIUM BROMIDE AND ALBUTEROL SULFATE 3 ML: 2.5; .5 SOLUTION RESPIRATORY (INHALATION) at 20:54

## 2019-12-04 RX ADMIN — IPRATROPIUM BROMIDE AND ALBUTEROL SULFATE 3 ML: 2.5; .5 SOLUTION RESPIRATORY (INHALATION) at 23:52

## 2019-12-04 RX ADMIN — IPRATROPIUM BROMIDE AND ALBUTEROL SULFATE 3 ML: 2.5; .5 SOLUTION RESPIRATORY (INHALATION) at 12:37

## 2019-12-04 RX ADMIN — LISINOPRIL 20 MG: 20 TABLET ORAL at 08:27

## 2019-12-04 RX ADMIN — IPRATROPIUM BROMIDE AND ALBUTEROL SULFATE 3 ML: 2.5; .5 SOLUTION RESPIRATORY (INHALATION) at 15:23

## 2019-12-04 RX ADMIN — FOLIC ACID 1 MG: 1 TABLET ORAL at 08:27

## 2019-12-04 RX ADMIN — ATORVASTATIN CALCIUM 10 MG: 10 TABLET, FILM COATED ORAL at 08:27

## 2019-12-04 RX ADMIN — BUDESONIDE 0.5 MG: 0.5 SUSPENSION RESPIRATORY (INHALATION) at 20:54

## 2019-12-04 RX ADMIN — INSULIN LISPRO 2 UNITS: 100 INJECTION, SOLUTION INTRAVENOUS; SUBCUTANEOUS at 21:21

## 2019-12-04 RX ADMIN — INSULIN LISPRO 2 UNITS: 100 INJECTION, SOLUTION INTRAVENOUS; SUBCUTANEOUS at 08:27

## 2019-12-04 RX ADMIN — BUDESONIDE 0.5 MG: 0.5 SUSPENSION RESPIRATORY (INHALATION) at 07:20

## 2019-12-04 RX ADMIN — FUROSEMIDE 40 MG: 40 INJECTION, SOLUTION INTRAMUSCULAR; INTRAVENOUS at 19:22

## 2019-12-04 RX ADMIN — INSULIN LISPRO 5 UNITS: 100 INJECTION, SOLUTION INTRAVENOUS; SUBCUTANEOUS at 13:16

## 2019-12-04 RX ADMIN — APIXABAN 5 MG: 5 TABLET, FILM COATED ORAL at 20:51

## 2019-12-04 RX ADMIN — DILTIAZEM HYDROCHLORIDE 60 MG: 60 TABLET, FILM COATED ORAL at 08:27

## 2019-12-04 NOTE — PLAN OF CARE
Problem: Cardiac: Heart Failure (Adult)  Goal: Signs and Symptoms of Listed Potential Problems Will be Absent, Minimized or Managed (Cardiac: Heart Failure)  Outcome: Ongoing (interventions implemented as appropriate)      Problem: Fall Risk (Adult)  Goal: Identify Related Risk Factors and Signs and Symptoms  Outcome: Ongoing (interventions implemented as appropriate)    Goal: Absence of Fall  Outcome: Ongoing (interventions implemented as appropriate)      Problem: Patient Care Overview  Goal: Plan of Care Review  Outcome: Ongoing (interventions implemented as appropriate)   12/04/19 0549   Coping/Psychosocial   Plan of Care Reviewed With patient   OTHER   Outcome Summary Pt on 6L O2 nasal cannula (4L baseline) but says he turns it up at night with his trilogy. Need to wean him back down during the day if possbile. Colace ordered bc pt states he hasn't had a BM in the past couple of days which isn't his normal. Continue IV lasix.Will continue to monitor.      Goal: Individualization and Mutuality  Outcome: Ongoing (interventions implemented as appropriate)    Goal: Discharge Needs Assessment  Outcome: Ongoing (interventions implemented as appropriate)    Goal: Interprofessional Rounds/Family Conf  Outcome: Ongoing (interventions implemented as appropriate)      Problem: Skin Injury Risk (Adult)  Goal: Identify Related Risk Factors and Signs and Symptoms  Outcome: Ongoing (interventions implemented as appropriate)    Goal: Skin Health and Integrity  Outcome: Ongoing (interventions implemented as appropriate)

## 2019-12-04 NOTE — PROGRESS NOTES
Discharge Planning Assessment  [facility]     Patient Name: Ankit Mack Sr.  MRN: [id number]  Today's Date: 12/4/2019    Admit Date: 12/3/2019    Discharge Needs Assessment     Row Name 12/04/19 1121       Living Environment    Lives With  child(reece), adult    Current Living Arrangements  home/apartment/condo    Primary Care Provided by  self    Provides Primary Care For  no one    Family Caregiver if Needed  child(reece), adult    Family Caregiver Names  Emily Colin [phone]    Quality of Family Relationships  helpful    Able to Return to Prior Arrangements  yes       Resource/Environmental Concerns    Resource/Environmental Concerns  none       Transition Planning    Patient/Family Anticipates Transition to  home with family    Patient/Family Anticipated Services at Transition  none    Transportation Anticipated  family or friend will provide       Discharge Needs Assessment    Readmission Within the Last 30 Days  no previous admission in last 30 days    Concerns to be Addressed  denies needs/concerns at this time    Equipment Currently Used at Home  bath bench;grab bar;oxygen;other (see comments);nebulizer Trilogy    Anticipated Changes Related to Illness  none    Equipment Needed After Discharge  none        Discharge Plan     Row Name 12/04/19 1120       Plan    Plan  Return home with family    Patient/Family in Agreement with Plan  yes    Plan Comments  Spoke with patient at bedside.  Patient lives with daughter Amanda Colin [phone].  Say Colin is [facility] [phone].  Patient is IADL, he still drives some but daughter assists him if needed.  Patient uses O2 from [employer], has a Trilogy, nebulizer, grab bars in the BR and a shower chair.  He has used [facility] in the past and would use them again if needed - no referral called at present.  Patient plans to [place] home at [place].  CCP will follow.  BHumeniuk RN               Demographic Summary     Row Name 12/04/19 1128       General Information     Admission Type  inpatient    Arrived From  home    Referral Source  admission list    Reason for Consult  discharge planning    Preferred Language  English     Used During This Interaction  no        Functional Status     Row Name 12/04/19 1120       Functional Status    Usual Activity Tolerance  moderate    Current Activity Tolerance  fair       Functional Status, IADL    Medications  independent    Meal Preparation  assistive person Lives with his daughter    Housekeeping  assistive person    Laundry  assistive person    Shopping  assistive person       Mental Status    General Appearance WDL  WDL       Mental Status Summary    Recent Changes in Mental Status/Cognitive Functioning  no changes                Becky S. Humeniuk, RN

## 2019-12-04 NOTE — CONSULTS
consult per nurse referral.     Pt welcomed me into the room. Pt expressed his gratitude for the [facility]  who came to share communion with pt. Pt and I talked about how his health is changing his lifestyle (I.e, not working as much, not being able to do all the things he once was able to do). Pt was appreciative of visit. I offered support and educated pt on  services.

## 2019-12-04 NOTE — CONSULTS
Date of Hospital Visit: 19  Encounter Provider: Catrachito Rdz MD  Place of Service: [facility] CARDIOLOGY  Patient Name: Ankit Mack Sr.  :1940  Referral Provider: Veronika Rosen, *    Chief complaint: SOA    History of Present Illness      Mr Ankit Mack is a 79 year old man with oxygen dependent COPD (4L), paroxysmal atrial fibrillation, descending TAA, chronic dCHF, mild AS, obesity, and myelodysplastic syndrom who presents with two days of increasing dyspnea and hypoxia.    He established care with me in August of this year.  At the time, he was on diltiazem CD for his PAF, but it became too expensive, so I switched increased his metoprolol.  In October, he was seen in our office for an irregular heart rhythm; he was noted to be in SR with PACs.  We added short acting diltiazem and he states that this helped immensely!    He had Thanksgiving dinner five days ago.  He did pretty well until two days ago; he was receiving his regular infusion at [facility] for his MDS and became SOA after that.  He received two doses of IV furosemide overnight with good diuresis and he feels so much better!  He's still a little hypoxic (POx 89-92% on 4L).      He had a little chest tightness with his dyspnea but hasn't really had chest pain. Prior to two days ago, he was doing pretty well from a cardiac standpoint.     ECHO 19    · The left ventricular cavity is mildly dilated.  · Estimated EF appears to be in the range of 56 - 60%  · Left ventricular wall thickness is consistent with mild-to-moderate concentric hypertrophy.  · Normal right ventricular systolic function noted. Right ventricular cavity is mildly dilated.  · Left atrial cavity size is moderately dilated.  · Mild aortic valve stenosis is present.  · Aortic valve maximum pressure gradient is 15.0 mmHg. Aortic valve mean pressure gradient is 10.0 mmHg  · Mild mitral valve regurgitation is present.  · The ascending  aorta is mildly dilated at 3.7 cm.  · There is no evidence of pericardial effusion.    Past Medical History:   Diagnosis Date   • Anxiety    • Atrial flutter (CMS/[facility])    • Atrial flutter with rapid ventricular response (CMS/[facility])    • Chronic diastolic congestive heart failure (CMS/[facility]) 12/19/2018   • Chronic respiratory failure with hypoxia (CMS/[facility])    • COPD (chronic obstructive pulmonary disease) (CMS/[facility])    • Essential hypertension    • GERD (gastroesophageal reflux disease)    • H/O complete eye exam 06/2018   • History of pulmonary function tests 06/10/2014   • Hyperglycemia    • Hyperlipidemia    • Lung cancer (CMS/[facility]) 2012    Left   • MDS (myelodysplastic syndrome) (CMS/[facility])    • Nonrheumatic aortic valve stenosis     mild 5/2019   • Obesity    • Osteoarthritis, knee    • Paroxysmal atrial fibrillation (CMS/[facility])    • Prostate cancer (CMS/[facility]) 2000   • Seizures (CMS/[facility])    • Sleep apnea    • Thoracic aortic aneurysm without rupture (CMS/[facility])     s/p stenting of descending thoracic aneurysm; the ascending aorta measured 3.7cm in 2019   • Type 2 diabetes mellitus without complication, without long-term current use of insulin (CMS/[facility])        Past Surgical History:   Procedure Laterality Date   • ABDOMINAL AORTIC ANEURYSM REPAIR  2010    Dr. Adarsh Dennis   • CARDIAC CATHETERIZATION Left 02/06/2004   • CATARACT EXTRACTION  10/2014    also 11/14   • COLONOSCOPY     • ENDOVASCULAR THORACIC ANEURYSM REPAIR     • JOINT REPLACEMENT Left 10/2010    anette; Dr. Wolf   • JOINT REPLACEMENT Right 09/2011    knee; Dr. Wolf   • LUNG LOBECTOMY Left 01/16/2012    LLL; Dr. Mendoza   • LUNG LOBECTOMY Left 2012   • PROSTATE SURGERY  2000    Dr. Naranjo   • VASCULAR SURGERY  05/2009    TEVAR of thoracic aortic aneurysm   • VASCULAR SURGERY  12/15/2003    Left carotid subclavian bypass graft, ligation of proximal left subclavian following carotid subclavian bypass. Right femoral sheath. Arch angiography with descending  thoracic and abdominal aortography    • VASCULAR SURGERY  11/17/2003    Selective catheterization right vertebral artery, right subclavian artery, left subclavian artery, left vertebral artery.     • VASCULAR SURGERY  06/05/2002    Tracheostomy, bronchoscopy   • VASCULAR SURGERY  05/21/2002    Thoracic arteriogram and abdominal aortogram       Prior to Admission medications    Medication Sig Start Date End Date Taking? Authorizing Provider   albuterol (PROVENTIL) (2.5 MG/3ML) 0.083% nebulizer solution Take 2.5 mg by nebulization 4 (Four) Times a Day.   Yes Risa Antunez MD   ALPRAZolam (XANAX) 1 MG tablet Take 1 tablet by mouth At Night As Needed for Anxiety. 10/14/19  Yes Gopi Lagos MD   apixaban (ELIQUIS) 5 MG tablet tablet Take 1 tablet by mouth Every 12 (Twelve) Hours. 9/23/19  Yes Catrachito Rdz MD   atorvastatin (LIPITOR) 10 MG tablet TAKE 1 TABLET BY MOUTH DAILY. FOR CHOLESTEROL 7/15/19  Yes Gopi Lagos MD   benazepril (LOTENSIN) 20 MG tablet Take 20 mg by mouth Daily. 5/6/13  Yes Risa Antunez MD   budesonide (PULMICORT) 0.5 MG/2ML nebulizer solution Take 2 mL by nebulization 2 (Two) Times a Day. 11/21/18  Yes Fred Alejandro MD   dilTIAZem (CARDIZEM) 60 MG tablet TAKE 1 TABLET BY MOUTH TWICE A DAY 11/18/19  Yes Sunshine Del Cid, SCOTTY   folic acid (FOLVITE) 1 MG tablet Take 1 mg by mouth Daily.   Yes Risa Antunez MD   furosemide (LASIX) 40 MG tablet TAKE 1 TABLET BY MOUTH EVERY DAY 11/5/19  Yes Cheri Méndez, MARCIAL, APRN   ipratropium (ATROVENT) 0.02 % nebulizer solution Take 500 mcg by nebulization Every 4 (Four) Hours As Needed for Wheezing or Shortness of Air.   Yes Risa Antunez MD   metFORMIN (GLUCOPHAGE) 1000 MG tablet Take 1 tablet by mouth Daily With Breakfast. 10/14/19  Yes Gopi Lagos MD   metoprolol tartrate (LOPRESSOR) 100 MG tablet TAKE ONE AND ONE - HALF TABLETS BY MOUTH TWICE DAILY 8/30/19  Yes Catrachito Rdz MD   Revefenacin (YUPELRI) 175  "MCG/3ML solution Inhale 3 mL (1 vial) by nebulization Daily. 19  Yes Fred Alejandro MD   albuterol (PROVENTIL HFA;VENTOLIN HFA) 108 (90 Base) MCG/ACT inhaler Inhale 2 puffs Every 4 (Four) Hours As Needed for Wheezing.    Provider, MD Risa       Social History     Socioeconomic History   • Marital status: Single     Spouse name: Not on file   • Number of children: Not on file   • Years of education: Not on file   • Highest education level: Not on file   Occupational History     Employer: RETIRED   Tobacco Use   • Smoking status: Former Smoker     Packs/day: 1.00     Types: Cigarettes     Last attempt to quit: 4/15/2010     Years since quittin.6   • Smokeless tobacco: Never Used   Substance and Sexual Activity   • Alcohol use: No     Frequency: Never     Comment: No caffeine use   • Drug use: No   • Sexual activity: Defer       Family History   Problem Relation Age of Onset   • Cancer Mother    • COPD Mother    • Cancer Father         lung   • Colon cancer Father    • Cancer Other         colon   • Diabetes Other    • Emphysema Other    • Hypertension Other    • Kidney disease Other    • Lung cancer Other        Review of Systems   Constitutional: Positive for fatigue.   Respiratory: Positive for shortness of breath.    Gastrointestinal: Positive for abdominal distention.   Genitourinary: Positive for frequency.   Musculoskeletal: Positive for arthralgias and back pain.   All other systems reviewed and are negative.       Objective:     Vitals:    19 0719 19 0722 19 0904 19 1104   BP:  129/68     BP Location:  Left arm     Patient Position:  Sitting     Pulse: 94 83 99 79   Resp: 18 18     Temp:  97.8 °F (36.6 °C)     TempSrc:  Oral     SpO2: (!) 89%  (!) 88% (!) 89%   Weight:       Height:         Body mass index is 32.14 kg/m².  Flowsheet Rows      First Filed Value   Admission Height  177.8 cm (70\") Documented at 2019 1227   Admission Weight  104 kg (229 lb 4.5 oz) " Documented at 12/03/2019 1227          Physical Exam   Constitutional: He is oriented to person, place, and time. He has a sickly appearance.   obese   HENT:   Head: Normocephalic.   Nose: Nose normal.   Mouth/Throat: Oropharynx is clear and moist.   Eyes: Conjunctivae and EOM are normal. Pupils are equal, round, and reactive to light.   Neck: Normal range of motion.   Cannot assess JVD due to body habitus   Cardiovascular: Normal rate and regular rhythm.  Occasional extrasystoles are present. Exam reveals distant heart sounds.   Murmur heard.   Systolic murmur is present with a grade of 1/6.  Pulmonary/Chest: Effort normal. He has decreased breath sounds in the right lower field and the left lower field.   Abdominal: Soft. There is no tenderness.   Cannot feel organs or aorta   Musculoskeletal: Normal range of motion. He exhibits no edema.   Neurological: He is alert and oriented to person, place, and time. No cranial nerve deficit.   Skin: Skin is warm and dry. No erythema.   Psychiatric: He has a normal mood and affect. His behavior is normal. Judgment and thought content normal.   Vitals reviewed.              Lab Review:                Results from last 7 days   Lab Units 12/03/19  1246   SODIUM mmol/L 145   POTASSIUM mmol/L 4.2   CHLORIDE mmol/L 102   CO2 mmol/L 31.3*   BUN mg/dL 26*   CREATININE mg/dL 1.28*   GLUCOSE mg/dL 235*   CALCIUM mg/dL 9.0     Results from last 7 days   Lab Units 12/03/19  1246   TROPONIN T ng/mL 0.058*     Results from last 7 days   Lab Units 12/03/19  1246   WBC 10*3/mm3 4.23   HEMOGLOBIN g/dL 8.7*   HEMATOCRIT % 27.3*   PLATELETS 10*3/mm3 180                                 I personally viewed and interpreted the patient's EKG/Telemetry data -- SR, PACs, 1st deg AVB, RBBB    Assessment/Plan:     1.  Acute on chronic dCHF  2.  Oxygen dependent COPD/chronic hypoxemic respiratory failure  3.  Myelodysplastic syndrome  4.  DOMINGO  5.  Morbid obesity  6.  HTN  7.  Mild AS  8.   PAF/SR/PACs    As has happened with so many of our patients over the last few days, I highly suspect that this was brought on by the high dietary sodium intake of last week's Thanksgiving dinner.  He has responded very nicely to intravenous diuretics.  I do believe we can de-escalate the IV furosemide to twice daily dosing at this point and then change him to oral furosemide at twice daily dosing for a few days with a plan to eventual transition him back to his daily dosing.    He is in sinus rhythm with frequent premature atrial contractions, which is his baseline.  He will be remained on metoprolol and diltiazem.    I am hopeful that he will be able to be discharged home tomorrow.

## 2019-12-05 VITALS
DIASTOLIC BLOOD PRESSURE: 71 MMHG | OXYGEN SATURATION: 92 % | RESPIRATION RATE: 20 BRPM | HEIGHT: 70 IN | TEMPERATURE: 97.5 F | SYSTOLIC BLOOD PRESSURE: 125 MMHG | HEART RATE: 76 BPM | WEIGHT: 225.2 LBS | BODY MASS INDEX: 32.24 KG/M2

## 2019-12-05 LAB
ANION GAP SERPL CALCULATED.3IONS-SCNC: 12 MMOL/L (ref 5–15)
BUN BLD-MCNC: 44 MG/DL (ref 8–23)
BUN/CREAT SERPL: 32.4 (ref 7–25)
CALCIUM SPEC-SCNC: 8.5 MG/DL (ref 8.6–10.5)
CHLORIDE SERPL-SCNC: 100 MMOL/L (ref 98–107)
CO2 SERPL-SCNC: 30 MMOL/L (ref 22–29)
CREAT BLD-MCNC: 1.36 MG/DL (ref 0.76–1.27)
DEPRECATED RDW RBC AUTO: 54.6 FL (ref 37–54)
ERYTHROCYTE [DISTWIDTH] IN BLOOD BY AUTOMATED COUNT: 14.6 % (ref 12.3–15.4)
GFR SERPL CREATININE-BSD FRML MDRD: 51 ML/MIN/1.73
GLUCOSE BLD-MCNC: 128 MG/DL (ref 65–99)
GLUCOSE BLDC GLUCOMTR-MCNC: 142 MG/DL (ref 70–130)
GLUCOSE BLDC GLUCOMTR-MCNC: 208 MG/DL (ref 70–130)
HCT VFR BLD AUTO: 24.5 % (ref 37.5–51)
HGB BLD-MCNC: 8.5 G/DL (ref 13–17.7)
MAGNESIUM SERPL-MCNC: 2.2 MG/DL (ref 1.6–2.4)
MCH RBC QN AUTO: 36 PG (ref 26.6–33)
MCHC RBC AUTO-ENTMCNC: 34.7 G/DL (ref 31.5–35.7)
MCV RBC AUTO: 103.8 FL (ref 79–97)
PLATELET # BLD AUTO: 161 10*3/MM3 (ref 140–450)
PMV BLD AUTO: 11.8 FL (ref 6–12)
POTASSIUM BLD-SCNC: 3.9 MMOL/L (ref 3.5–5.2)
RBC # BLD AUTO: 2.36 10*6/MM3 (ref 4.14–5.8)
SODIUM BLD-SCNC: 142 MMOL/L (ref 136–145)
WBC NRBC COR # BLD: 3.86 10*3/MM3 (ref 3.4–10.8)

## 2019-12-05 PROCEDURE — 94799 UNLISTED PULMONARY SVC/PX: CPT

## 2019-12-05 PROCEDURE — 82962 GLUCOSE BLOOD TEST: CPT

## 2019-12-05 PROCEDURE — 25010000002 FUROSEMIDE PER 20 MG: Performed by: INTERNAL MEDICINE

## 2019-12-05 PROCEDURE — 83735 ASSAY OF MAGNESIUM: CPT | Performed by: INTERNAL MEDICINE

## 2019-12-05 PROCEDURE — 99232 SBSQ HOSP IP/OBS MODERATE 35: CPT | Performed by: INTERNAL MEDICINE

## 2019-12-05 PROCEDURE — 85027 COMPLETE CBC AUTOMATED: CPT | Performed by: INTERNAL MEDICINE

## 2019-12-05 PROCEDURE — 80048 BASIC METABOLIC PNL TOTAL CA: CPT | Performed by: INTERNAL MEDICINE

## 2019-12-05 RX ORDER — FUROSEMIDE 40 MG/1
40 TABLET ORAL 2 TIMES DAILY
Qty: 90 TABLET | Refills: 1 | Status: SHIPPED | OUTPATIENT
Start: 2019-12-05 | End: 2019-12-05 | Stop reason: SDUPTHER

## 2019-12-05 RX ORDER — FUROSEMIDE 40 MG/1
40 TABLET ORAL 2 TIMES DAILY
Qty: 90 TABLET | Refills: 1 | Status: SHIPPED | OUTPATIENT
Start: 2019-12-05 | End: 2020-01-01 | Stop reason: SDUPTHER

## 2019-12-05 RX ORDER — AMOXICILLIN AND CLAVULANATE POTASSIUM 875; 125 MG/1; MG/1
1 TABLET, FILM COATED ORAL 2 TIMES DAILY
Qty: 14 TABLET | Refills: 0 | Status: SHIPPED | OUTPATIENT
Start: 2019-12-05 | End: 2019-12-18

## 2019-12-05 RX ORDER — AMOXICILLIN AND CLAVULANATE POTASSIUM 875; 125 MG/1; MG/1
1 TABLET, FILM COATED ORAL 2 TIMES DAILY
Qty: 14 TABLET | Refills: 0 | Status: SHIPPED | OUTPATIENT
Start: 2019-12-05 | End: 2019-12-05 | Stop reason: SDUPTHER

## 2019-12-05 RX ADMIN — DILTIAZEM HYDROCHLORIDE 60 MG: 60 TABLET, FILM COATED ORAL at 10:15

## 2019-12-05 RX ADMIN — IPRATROPIUM BROMIDE AND ALBUTEROL SULFATE 3 ML: 2.5; .5 SOLUTION RESPIRATORY (INHALATION) at 08:51

## 2019-12-05 RX ADMIN — APIXABAN 5 MG: 5 TABLET, FILM COATED ORAL at 10:15

## 2019-12-05 RX ADMIN — METOPROLOL TARTRATE 150 MG: 100 TABLET, FILM COATED ORAL at 10:15

## 2019-12-05 RX ADMIN — FUROSEMIDE 40 MG: 40 INJECTION, SOLUTION INTRAMUSCULAR; INTRAVENOUS at 06:47

## 2019-12-05 RX ADMIN — ALPRAZOLAM 1 MG: 0.5 TABLET ORAL at 00:34

## 2019-12-05 RX ADMIN — FOLIC ACID 1 MG: 1 TABLET ORAL at 10:14

## 2019-12-05 RX ADMIN — LISINOPRIL 20 MG: 20 TABLET ORAL at 10:15

## 2019-12-05 RX ADMIN — IPRATROPIUM BROMIDE AND ALBUTEROL SULFATE 3 ML: 2.5; .5 SOLUTION RESPIRATORY (INHALATION) at 11:59

## 2019-12-05 RX ADMIN — IPRATROPIUM BROMIDE AND ALBUTEROL SULFATE 3 ML: 2.5; .5 SOLUTION RESPIRATORY (INHALATION) at 16:08

## 2019-12-05 RX ADMIN — BUDESONIDE 0.5 MG: 0.5 SUSPENSION RESPIRATORY (INHALATION) at 08:51

## 2019-12-05 RX ADMIN — ATORVASTATIN CALCIUM 10 MG: 10 TABLET, FILM COATED ORAL at 10:15

## 2019-12-05 NOTE — PLAN OF CARE
Problem: Patient Care Overview  Goal: Plan of Care Review   12/05/19 1442   Coping/Psychosocial   Plan of Care Reviewed With patient   OTHER   Outcome Summary patient back on home dose of 02 4 liters nasal cannula. patient reports his breathing is much better. DC home today with increase in lasix frequency.,

## 2019-12-05 NOTE — PROGRESS NOTES
Clinical Pharmacy Services: Congestive Heart Failure Education    Ankit Mack Sr. was counseled over congestive heart failure prior to discharge.     The patient was also counseled on all heart failure medications prior to discharge including name of medication, indication, and possible side effects.    Additional counseling points included but were not limited to:  1. Weigh yourself every morning after emptying your bladder and compare your weight to the day before  2. Keep the total amount of fluids you drink to only 6-8 glasses each day (48-64 ounces) or as otherwise directed by your physician  3. Take your medicine exactly as prescribed  4. Check for swelling in your feet, ankles, legs, and stomach  5. Eat foods that are low in salt or salt-free. Limit your sodium intake to <2000mg/day  6. Balance activity and rest periods  7. Do not smoke, and limit alcohol intake (No more than 2 drinks a day for men and 1 drink a day for women)    He was also instructed to contact his heart doctor immediately if he notices any of the following signs:  1. Weight gain of >2 pounds in 1 day or 5 pounds in 1 week  2. Vomiting and/or diarrhea that lasts more than 2 days  3. Feeling more shortness of breath than usual  4. Increased swelling in your feet, ankles, legs, or stomach  5. Development of a dry, hacking cough OR a productive cough with frothy sputum  6. Feeling more tired and having less energy to complete daily tasks  7. Feeling light-headed or dizzy  8. Having difficulty breathing when lying down flat     The patient was provided with educational materials for the above counseling points we reviewed to keep as a reference.     All other questions from the patient were answered at this time.   ?  Vitaliy Aldana [facility]

## 2019-12-05 NOTE — DISCHARGE SUMMARY
PHYSICIAN DISCHARGE SUMMARY                                                                        [facility]    Patient Identification:  Name: Ankit Mack Sr.  Age: 79 y.o.  Sex: male  :  1940  MRN: [id number]  Primary Care Physician: Gopi Lagos MD    Admit date: 12/3/2019  Discharge date and time:19    Discharged Condition: good    Discharge Diagnoses:  Essential hypertension    DOMINGO on CPAP    Pancytopenia (CMS/HCC)    Type 2 diabetes mellitus without complication, without long-term current use of insulin (CMS/HCC)    Myelodysplasia (myelodysplastic syndrome) (CMS/HCC)    Nonrheumatic aortic valve stenosis    Acute on chronic diastolic (congestive) heart failure (CMS/HCC)   acute on chronic hypoxic respiratory failure  Acute bronchitis  Patient Active Problem List   Diagnosis Code   • Anxiety F41.9   • Hyperlipidemia E78.5   • Osteoarthritis, knee M17.10   • Essential hypertension I10   • Cancer of lower lobe of lung (CMS/HCC) C34.30   • DOMINGO on CPAP G47.33, Z99.89   • Non-seasonal allergic rhinitis J30.89   • Pancytopenia (CMS/HCC) D61.818   • Aneurysm of thoracic aorta (CMS/HCC) I71.2   • Paroxysmal atrial fibrillation (CMS/HCC) I48.0   • Thrombocytopenia (CMS/HCC) D69.6   • Type 2 diabetes mellitus without complication, without long-term current use of insulin (CMS/HCC) E11.9   • Chronic diastolic congestive heart failure (CMS/HCC) I50.32   • History of COPD Z87.09   • Macrocytic anemia D53.9   • Hypogammaglobulinemia (CMS/HCC) D80.1   • Myelodysplasia (myelodysplastic syndrome) (CMS/HCC) D46.9   • Class 1 obesity due to excess calories with serious comorbidity and body mass index (BMI) of 33.0 to 33.9 in adult E66.09, Z68.33   • Atrial flutter (CMS/HCC) I48.92   • Nonrheumatic aortic valve stenosis I35.0   • Acute on chronic diastolic (congestive) heart failure (CMS/HCC) I50.33       PMHX:   Past Medical  History:   Diagnosis Date   • Anxiety    • Atrial flutter (CMS/[facility])    • Chronic diastolic congestive heart failure (CMS/[facility]) 12/19/2018   • Chronic respiratory failure with hypoxia (CMS/[facility])    • COPD (chronic obstructive pulmonary disease) (CMS/[facility])    • Essential hypertension    • GERD (gastroesophageal reflux disease)    • H/O complete eye exam 06/2018   • History of pulmonary function tests 06/10/2014   • Hyperglycemia    • Hyperlipidemia    • Lung cancer (CMS/[facility]) 2012    Left   • MDS (myelodysplastic syndrome) (CMS/[facility])    • Nonrheumatic aortic valve stenosis     mild 5/2019   • Obesity    • Osteoarthritis, knee    • Paroxysmal atrial fibrillation (CMS/[facility])    • Prostate cancer (CMS/[facility]) 2000   • Seizures (CMS/[facility])    • Sleep apnea    • Thoracic aortic aneurysm without rupture (CMS/[facility])     s/p stenting of descending thoracic aneurysm; the ascending aorta measured 3.7cm in 2019   • Type 2 diabetes mellitus without complication, without long-term current use of insulin (CMS/[facility])      PSHX:   Past Surgical History:   Procedure Laterality Date   • ABDOMINAL AORTIC ANEURYSM REPAIR  2010    Dr. Adarsh Dennis   • CARDIAC CATHETERIZATION Left 02/06/2004   • CATARACT EXTRACTION  10/2014    also 11/14   • COLONOSCOPY     • ENDOVASCULAR THORACIC ANEURYSM REPAIR     • JOINT REPLACEMENT Left 10/2010    knee; Dr. Wolf   • JOINT REPLACEMENT Right 09/2011    knee; Dr. Wolf   • LUNG LOBECTOMY Left 01/16/2012    LLL; Dr. Mendoza   • LUNG LOBECTOMY Left 2012   • PROSTATE SURGERY  2000    Dr. Naranjo   • VASCULAR SURGERY  05/2009    TEVAR of thoracic aortic aneurysm   • VASCULAR SURGERY  12/15/2003    Left carotid subclavian bypass graft, ligation of proximal left subclavian following carotid subclavian bypass. Right femoral sheath. Arch angiography with descending thoracic and abdominal aortography    • VASCULAR SURGERY  11/17/2003    Selective catheterization right vertebral artery, right subclavian artery, left subclavian  artery, left vertebral artery.     • VASCULAR SURGERY  06/05/2002    Tracheostomy, bronchoscopy   • VASCULAR SURGERY  05/21/2002    Thoracic arteriogram and abdominal aortogram       Hospital Course: Ankit Mack .  Was admitted with worsening shortness of breath and leg swelling; he has a history of copd and is on 4 liters oxygen; he checked his oxygen sats at home and was in the 70s so he came to the ED: he walked from the parking lot and sats were in the 60s initially; oxygen had to be increased and he was admitted; he was diagnosed with acute on chronic diastolic chf; he was seen by cardiology and dr campo felt that he likely ingested too much salt over the holiday weekend; he was diuresed and is now feeling closer to normal; he started coughing up yellow sputum this am so he will go home with some augmentin as well; he was seen by pulmonary who cleared him for discharge as well; he is back on his usual 4 liters of oxygen    Consults:     Consults     Date and Time Order Name Status Description    12/4/2019 2159 Inpatient Pulmonology Consult Completed     12/3/2019 1721 Inpatient Cardiology Consult Completed     12/3/2019 1720 Inpatient Pulmonology Consult Completed     12/3/2019 1357 [facility] (on-call MD unless specified) Details Completed         Results from last 7 days   Lab Units 12/05/19  0451   WBC 10*3/mm3 3.86   HEMOGLOBIN g/dL 8.5*   HEMATOCRIT % 24.5*   PLATELETS 10*3/mm3 161     Results from last 7 days   Lab Units 12/05/19  0450   SODIUM mmol/L 142   POTASSIUM mmol/L 3.9   CHLORIDE mmol/L 100   CO2 mmol/L 30.0*   BUN mg/dL 44*   CREATININE mg/dL 1.36*   GLUCOSE mg/dL 128*   CALCIUM mg/dL 8.5*     Significant Diagnostic Studies:   Labs in chart were reviewed.  Lab Results   Component Value Date    WBC 3.86 12/05/2019    HGB 8.5 (L) 12/05/2019    HCT 24.5 (L) 12/05/2019     12/05/2019     Lab Results   Component Value Date     12/05/2019    K 3.9 12/05/2019     12/05/2019    CO2 30.0  "(H) 12/05/2019    BUN 44 (H) 12/05/2019    CREATININE 1.36 (H) 12/05/2019    GLUCOSE 128 (H) 12/05/2019     Lab Results   Component Value Date    CALCIUM 8.5 (L) 12/05/2019    MG 2.2 12/05/2019     Lab Results   Component Value Date    AST 15 12/03/2019    ALT 8 12/03/2019    ALKPHOS 68 12/03/2019     Imaging Results (All)     Procedure Component Value Units Date/Time    XR Chest 1 View [[id number]] Collected:  12/03/19 1326     Updated:  12/03/19 1334    Narrative:       XR CHEST 1 VW-     HISTORY: Male who is 79 years-old,  short of breath     TECHNIQUE: Frontal view of the chest     COMPARISON: 10/16/2019     FINDINGS: Heart is enlarged. Pulmonary vasculature is congested. Aorta  is tortuous, with stent in place. Some patchy densities in both lungs  may reflect edema and/or infection, appearance represents interval  worsening. No large pleural effusions. No pneumothorax. No acute osseous  process.       Impression:       Patchy densities in both lungs that may reflect edema and/or  infection, clinical correlation and follow-up recommended. Cardiomegaly  with pulmonary vascular congestion.     This report was finalized on 12/3/2019 1:30 PM by Dr. Jakob Siu M.D.           /71 (BP Location: Left arm, Patient Position: Sitting)   Pulse 66   Temp 97.5 °F (36.4 °C) (Oral)   Resp 18   Ht 177.8 cm (70\")   Wt 102 kg (225 lb 3.2 oz)   SpO2 92%   BMI 32.31 kg/m²     Discharge Exam:  General Appearance:    Alert, cooperative, no distress, AAOx3                          Head:    Normocephalic, without obvious abnormality, atraumatic                          Eyes:    PERRL, conjunctiva/corneas clear, EOM's intact, both eyes                        Throat:   Lips, tongue, gums normal; oral mucosa pink and moist                          Neck:   Supple, symmetrical, trachea midline, no JVD                        Lungs:     Decreased breath sounds bilaterally, respirations unlabored                Chest Wall: "    No tenderness or deformity                        Heart:    Regular rate and rhythm, S1 and S2 normal, no murmur,no  Rub                                       or gallop                  Abdomen:     Soft, non-tender, bowel sounds active, no masses, no                                                        organomegaly                  Extremities:   Extremities normal, atraumatic, no cyanosis or edema, pulses                                           palpable in all extremities                             Skin:   Skin is warm and dry,  no rashes or palpable lesions                  Neurologic:   CNII-XII intact, motor strength grossly intact, sensation grossly                                           intact to light touch, no focal deficits noted     Disposition:  Home    Patient Instructions:      Discharge Medications      New Medications      Instructions Start Date   amoxicillin-clavulanate 875-125 MG per tablet  Commonly known as:  AUGMENTIN   1 tablet, Oral, 2 Times Daily         Changes to Medications      Instructions Start Date   furosemide 40 MG tablet  Commonly known as:  LASIX  What changed:    · when to take this  · additional instructions   40 mg, Oral, 2 Times Daily, Take twice daily for one week and then daily         Continue These Medications      Instructions Start Date   albuterol (2.5 MG/3ML) 0.083% nebulizer solution  Commonly known as:  PROVENTIL   2.5 mg, Nebulization, 4 Times Daily - RT      albuterol sulfate  (90 Base) MCG/ACT inhaler  Commonly known as:  PROVENTIL HFA;VENTOLIN HFA;PROAIR HFA   2 puffs, Inhalation, Every 4 Hours PRN      ALPRAZolam 1 MG tablet  Commonly known as:  XANAX   1 mg, Oral, Nightly PRN      apixaban 5 MG tablet tablet  Commonly known as:  ELIQUIS   5 mg, Oral, Every 12 Hours Scheduled      atorvastatin 10 MG tablet  Commonly known as:  LIPITOR   10 mg, Oral, Daily, For cholesterol      benazepril 20 MG tablet  Commonly known as:  LOTENSIN   20 mg, Oral,  Daily      budesonide 0.5 MG/2ML nebulizer solution  Commonly known as:  PULMICORT   0.5 mg, Nebulization, 2 Times Daily - RT      dilTIAZem 60 MG tablet  Commonly known as:  CARDIZEM   TAKE 1 TABLET BY MOUTH TWICE A DAY      folic acid 1 MG tablet  Commonly known as:  FOLVITE   1 mg, Oral, Daily      ipratropium 0.02 % nebulizer solution  Commonly known as:  ATROVENT   500 mcg, Nebulization, Every 4 Hours PRN      metFORMIN 1000 MG tablet  Commonly known as:  GLUCOPHAGE   1,000 mg, Oral, Daily With Breakfast      metoprolol tartrate 100 MG tablet  Commonly known as:  LOPRESSOR   TAKE ONE AND ONE - HALF TABLETS BY MOUTH TWICE DAILY      YUPELRI 175 MCG/3ML solution  Generic drug:  Revefenacin   175 mcg, Inhalation, Daily             Future Appointments   Date Time Provider Department Center   12/13/2019 10:20 AM LAB CHAIR [facility] None   12/13/2019 10:30 AM INJECTION CHAIR [facility]   12/20/2019 10:10 AM LAB CHAIR [facility] None   12/20/2019 10:30 AM INJECTION CHAIR [facility]   12/27/2019 10:10 AM LAB CHAIR [facility] None   12/27/2019 10:30 AM INJECTION CHAIR [facility]   1/3/2020 10:00 AM LAB CHAIR [facility] None   1/3/2020 10:30 AM CHAIR 01 [facility]   1/10/2020 10:10 AM LAB CHAIR [facility] None   1/10/2020 10:30 AM INJECTION CHAIR [facility]   1/17/2020 10:10 AM LAB CHAIR [facility] None   1/17/2020 10:45 AM INJECTION CHAIR [facility]   1/24/2020 10:00 AM LAB CHAIR [facility] None   1/24/2020 10:30 AM INJECTION CHAIR [facility]   1/27/2020  9:30 AM Sunshine Del Cid APRN [facility] None   1/31/2020  9:40 AM LAB CHAIR [facility] None   1/31/2020 10:00 AM Beto Hayes MD [facility]   1/31/2020 10:30 AM  CHAIR 03 [facility]   2/19/2020 11:00 AM [facility] CT 2 [facility]   2/24/2020 10:00 AM Moraima Malone MD [facility] None     Follow-up Information     Gopi Lagos MD Follow up.    Specialty:  Family Medicine  Contact information:  [address] [facility] [address]  [facility] [address]  [phone]                 Discharge Order (From admission, onward)    Start     Ordered    12/05/19 1324  Discharge patient  Once     Expected Discharge Date:  12/05/19    Discharge Disposition:  Home or Self Care    Physician of Record for Attribution - Please select from Treatment Team:  TYE MARSHALL [[id number]]    Review needed by CMO to determine Physician of Record:  No       Question Answer Comment   Physician of Record for Attribution - Please select from Treatment Team TYE MARSHALL    Review needed by CMO to determine Physician of Record No        12/05/19 1326          Total time spent discharging patient including evaluation,post hospitalization follow up,  medication and post hospitalization instructions and education total time exceeds 30 minutes.    Signed:  Veronika Rosen MD  12/5/2019  1:36 PM

## 2019-12-05 NOTE — PROGRESS NOTES
"   LOS: 2 days   Patient Care Team:  Gopi Lagos MD as PCP - General (Family Medicine)  Gopi Lagos MD as PCP - Claims Attributed  Tanner Mendoza III, MD as Surgeon (Thoracic Surgery)  Trey Salcedo MD as Consulting Physician (Urology)  Fred Alejandro MD as Consulting Physician (Pulmonary Disease)  Amna Gomez MD as Consulting Physician (Cardiology)  Gopi Pemberton MD as Consulting Physician (Dermatology)  Gopi Lagos MD as Referring Physician (Family Medicine)  Gopi Mills II, MD as Consulting Physician (Hematology and Oncology)  Tanner Mendoza III, MD as Surgeon (Thoracic Surgery)  Beto Hayes MD as Consulting Physician (Hematology and Oncology)    Chief Complaint: SOA     Interval History: Feels much better.  Wants to go home.  On 4L O2 with POx 99-92%.        Objective   Vital Signs  Temp:  [98.2 °F (36.8 °C)-98.5 °F (36.9 °C)] 98.2 °F (36.8 °C)  Heart Rate:  [64-94] 90  Resp:  [16-24] 24  BP: (141-155)/(74-96) 146/74    Intake/Output Summary (Last 24 hours) at 12/5/2019 0947  Last data filed at 12/5/2019 0832  Gross per 24 hour   Intake 240 ml   Output 1650 ml   Net -1410 ml       Last Weight and Admission Weight        12/05/19  0500   Weight: 102 kg (225 lb 3.2 oz)     Flowsheet Rows      First Filed Value   Admission Height  177.8 cm (70\") Documented at 12/03/2019 1227   Admission Weight  104 kg (229 lb 4.5 oz) Documented at 12/03/2019 1227                  Physical Exam   Constitutional: He is oriented to person, place, and time.   obese   HENT:   Head: Normocephalic.   Nose: Nose normal.   Mouth/Throat: Oropharynx is clear and moist.   Eyes: Conjunctivae and EOM are normal. Pupils are equal, round, and reactive to light.   Neck: Normal range of motion.   Cannot assess JVD due to body habitus   Cardiovascular: Normal rate, regular rhythm and intact distal pulses.  Occasional extrasystoles are present. Exam reveals distant heart sounds.   Murmur heard.   " Systolic murmur is present with a grade of 1/6.  Pulmonary/Chest: Effort normal and breath sounds normal.   Abdominal: Soft. There is no tenderness.   Cannot feel organs or aorta   Musculoskeletal: Normal range of motion. He exhibits no edema.   Wrinkling of toes consistent with prior edema s/p diuresis     Neurological: He is alert and oriented to person, place, and time. No cranial nerve deficit.   Skin: Skin is warm and dry. No erythema.   Psychiatric: He has a normal mood and affect. His behavior is normal. Judgment and thought content normal.   Vitals reviewed.      Results Review:      Results from last 7 days   Lab Units 12/05/19  0450 12/03/19  1246 12/02/19  1018   SODIUM mmol/L 142 145 146*   POTASSIUM mmol/L 3.9 4.2 3.9   CHLORIDE mmol/L 100 102 103   CO2 mmol/L 30.0* 31.3* 31.5*   BUN mg/dL 44* 26* 29*   CREATININE mg/dL 1.36* 1.28* 1.34*   GLUCOSE mg/dL 128* 235* 189*   CALCIUM mg/dL 8.5* 9.0 9.4     Results from last 7 days   Lab Units 12/03/19  1246   TROPONIN T ng/mL 0.058*     Results from last 7 days   Lab Units 12/05/19  0451 12/03/19  1246 12/02/19  1057   WBC 10*3/mm3 3.86 4.23 3.70   HEMOGLOBIN g/dL 8.5* 8.7* 8.7*   HEMATOCRIT % 24.5* 27.3* 27.8*   PLATELETS 10*3/mm3 161 180 173             Results from last 7 days   Lab Units 12/05/19  0450   MAGNESIUM mg/dL 2.2           I reviewed the patient's new clinical results.  I personally viewed and interpreted the patient's EKG/Telemetry data        Medication Review:     apixaban 5 mg Oral Q12H   atorvastatin 10 mg Oral Daily   budesonide 0.5 mg Nebulization BID - RT   dilTIAZem 60 mg Oral BID   folic acid 1 mg Oral Daily   furosemide 40 mg Intravenous BID AC   insulin lispro 0-7 Units Subcutaneous 4x Daily With Meals & Nightly   ipratropium-albuterol 3 mL Nebulization 4x Daily - RT   lisinopril 20 mg Oral Q24H   metoprolol tartrate 150 mg Oral Q12H            Assessment/Plan     1.  Acute on chronic diastolic (congestive) heart failure  (CMS/HCC)  2.  Mild AS  3.  HTN  4.  Morbid obesity  5.  COPD on O2  6.  Myelodysplastic syndrome/pancytopenia    This was surely brought on by dietary sodium indiscretion from Thanksgiving.  He's doing better s/p IV diuresis.     Okay for home today; discharge on furosemide 40mg po BID for one week and then back to 40mg daily.    Catrachito Rdz MD  12/05/19  9:47 AM

## 2019-12-05 NOTE — PROGRESS NOTES
"DAILY PROGRESS NOTE  [facility]    Patient Identification:  Name: Ankit Mack Sr.  Age: 79 y.o.  Sex: male  :  1940  MRN: [id number]         Primary Care Physician: Gopi Lagos MD    Subjective: patient is sitting up; less short of air; has been diuresing  Interval History: follow up for acute on chronic diastolic chf, acute on chronic hypoxic respiratory failure, copd, mds, obesity, sleep apnea, hypertension, paf, ckd3, hyperglycemia    Objective:    Scheduled Meds:  apixaban 5 mg Oral Q12H   atorvastatin 10 mg Oral Daily   budesonide 0.5 mg Nebulization BID - RT   dilTIAZem 60 mg Oral BID   folic acid 1 mg Oral Daily   furosemide 40 mg Intravenous BID AC   insulin lispro 0-7 Units Subcutaneous 4x Daily With Meals & Nightly   ipratropium-albuterol 3 mL Nebulization 4x Daily - RT   lisinopril 20 mg Oral Q24H   metoprolol tartrate 150 mg Oral Q12H     Continuous Infusions:     Vital signs in last 24 hours:  Temp:  [97.8 °F (36.6 °C)-98.6 °F (37 °C)] 98.5 °F (36.9 °C)  Heart Rate:  [64-99] 83  Resp:  [16-20] 16  BP: (129-162)/(68-96) 155/96    Intake/Output:    Intake/Output Summary (Last 24 hours) at 2019  Last data filed at 20196  Gross per 24 hour   Intake 240 ml   Output 550 ml   Net -310 ml       Exam:  /96   Pulse 83   Temp 98.5 °F (36.9 °C) (Oral)   Resp 16   Ht 177.8 cm (70\")   Wt 102 kg (224 lb 0 oz)   SpO2 93%   BMI 32.14 kg/m²     General Appearance:    Alert, cooperative, no distress, AAOx3; chronically ill-appearing                          Head:    Normocephalic, without obvious abnormality, atraumatic                           Eyes:    PERRL, conjunctiva/corneas clear, EOM's intact, both eyes                         Throat:   Lips, tongue, gums normal; oral mucosa pink and moist                           Neck:   Supple, symmetrical, trachea midline, no JVD                         Lungs:    Decreased breath sounds bilaterally, respirations " unlabored                 Chest Wall:    No tenderness or deformity                          Heart:    Regular rate and rhythm, S1 and S2 normal, no murmur,no  Rub                                      or gallop                  Abdomen:     Soft, non-tender, bowel sounds active, no masses, no                                                        organomegaly                  Extremities:   Extremities normal, atraumatic,  1 plus edema                        Pulses:   Pulses palpable in all extremities                            Skin:   Skin is warm and dry,  no rashes or palpable lesions                  Neurologic:   CNII-XII intact, motor strength grossly intact, sensation grossly                                         intact to light touch, no focal deficits noted       Data Review:  Labs in chart were reviewed.  Lab Results   Component Value Date    WBC 4.23 12/03/2019    HGB 8.7 (L) 12/03/2019    HCT 27.3 (L) 12/03/2019     12/03/2019     Lab Results   Component Value Date     12/03/2019    K 4.2 12/03/2019     12/03/2019    CO2 31.3 (H) 12/03/2019    BUN 26 (H) 12/03/2019    CREATININE 1.28 (H) 12/03/2019    GLUCOSE 235 (H) 12/03/2019     Lab Results   Component Value Date    CALCIUM 9.0 12/03/2019     Lab Results   Component Value Date    AST 15 12/03/2019    ALT 8 12/03/2019    ALKPHOS 68 12/03/2019     No results found for: APTT, INR  Patient Active Problem List   Diagnosis Code   • Anxiety F41.9   • Hyperlipidemia E78.5   • Osteoarthritis, knee M17.10   • Essential hypertension I10   • Cancer of lower lobe of lung (CMS/HCC) C34.30   • DOMINGO on CPAP G47.33, Z99.89   • Non-seasonal allergic rhinitis J30.89   • Pancytopenia (CMS/HCC) D61.818   • Aneurysm of thoracic aorta (CMS/HCC) I71.2   • Paroxysmal atrial fibrillation (CMS/HCC) I48.0   • Thrombocytopenia (CMS/HCC) D69.6   • Type 2 diabetes mellitus without complication, without long-term current use of insulin (CMS/HCC) E11.9   •  Chronic diastolic congestive heart failure (CMS/HCC) I50.32   • History of COPD Z87.09   • Macrocytic anemia D53.9   • Hypogammaglobulinemia (CMS/HCC) D80.1   • Myelodysplasia (myelodysplastic syndrome) (CMS/HCC) D46.9   • Class 1 obesity due to excess calories with serious comorbidity and body mass index (BMI) of 33.0 to 33.9 in adult E66.09, Z68.33   • Atrial flutter (CMS/HCC) I48.92   • Nonrheumatic aortic valve stenosis I35.0   • Acute on chronic diastolic (congestive) heart failure (CMS/HCC) I50.33       Assessment:    Essential hypertension    DOMINGO on CPAP    Pancytopenia (CMS/HCC)    Type 2 diabetes mellitus without complication, without long-term current use of insulin (CMS/HCC)    Myelodysplasia (myelodysplastic syndrome) (CMS/HCC)    Nonrheumatic aortic valve stenosis    Acute on chronic diastolic (congestive) heart failure (CMS/HCC)  acute on chronic hypoxic respiratory failure  Copd without exacerbation  Paroxysmal atrial fibrillation  Plan:  Will continue diuresis  Appreciate input from cardiology  Dr silva to see  Wean oxygen as tolerated  He is on 4 liters at home and still on 5 presently  Monitor counts due to myelodysplasia  D.w patient and nurse  High risk due to acute chf, poor respiratory status at baseline, mds  Veronika Rosen MD  12/4/2019  9:58 PM

## 2019-12-05 NOTE — PROGRESS NOTES
Patient well-known to me from outpatient care  Had progressive worsening shortness of breath with significant hypoxemia felt to be heart failure  Admitted and started on diuretic, was seen by my partner earlier today, cardiology note reviewed  Discussed with the patient, will continue to follow as an outpatient   [employer]

## 2019-12-05 NOTE — CONSULTS
Referring Provider: Dr. Rosen  Reason for Consultation: Resp failure, COPD and DOMINGO    Patient Care Team:  Gopi Lagos MD as PCP - General (Family Medicine)  Gopi Lagos MD as PCP - Claims Attributed  Tanner Mendoza III, MD as Surgeon (Thoracic Surgery)  Trey Salcedo MD as Consulting Physician (Urology)  Fred Alejandro MD as Consulting Physician (Pulmonary Disease)  Amna Gomez MD as Consulting Physician (Cardiology)  Gopi Pemberton MD as Consulting Physician (Dermatology)  Gopi Lagos MD as Referring Physician (Family Medicine)  Gopi Mills II, MD as Consulting Physician (Hematology and Oncology)  Tanner Mendoza III, MD as Surgeon (Thoracic Surgery)  Beto Hayes MD as Consulting Physician (Hematology and Oncology)    Chief complaint:   Shortness of breath    History of present illness:  Subjective   This is a 79-year-old male patient former smoker with history of COPD, chronic respiratory failure on oxygen 5 L/min and congestive heart failure and sleep apnea.    He presented to the hospital on the third for shortness of breath.  He stated that he had IVIG infusion on Monday the second and then woke up on Tuesday morning with significant shortness of breath.  His SPO2 was down to 75% despite 5 L oxygen.  He used the nebulizer therapy and felt better and then went back to bed and then woke up later around 9 AM with a similar issue.  Therefore, he decided to come to the hospital.  He denies cough or sputum production.  Work-up with chest x-ray revealed pulmonary edema.  Patient has been receiving diuretics.  He was seen by cardiology yesterday.  He currently feels better and almost back to his baseline.  Remains on oxygen 4 L/min and his SPO2 is in normal range.  He is using his trilogy ventilator at night.    Concerning his COPD, he uses his nebulizers regularly.  When asked about his CHF management, he stated that he does not weigh himself but  usually looks at his legs he has noted that his legs are getting more swollen recently.    Patient follows in our practice with Dr. Alejandro.  I reviewed the prior records.  On his last visit in October, he had a walking oximetry and was noted to have significant hypoxemia and reduced exercise capacity with tachycardia on mild ambulation.      Labs reviewed:  Hemoglobin 8.5; bicarb 30; creatinine 1.36.    Data:  Echo on 5/28/2019: EF 55%; LA moderately dilated    Review of Systems  Constitutional: No fever or chills.  No change in appetite  ENMT: No sinus congestion or postnasal drip  Cardiovascular: No chest pain, palpitation but legs swelling.    Respiratory: Shortness of breath on mild activities.  No cough or wheezing.  Gastrointestinal: No constipation, diarrhea or abdominal pain   Neurology: No headache, weakness, numbness or dizziness.   Musculoskeletal: No joints pain, stiffness or swelling.   Psychiatry: No depression.  Genitourinary: No dysuria or frequent urination  Endo: No weight changes. No cold or warm intolerance.  Lymphatic: No swollen glands.  Integumentary: No rash.    History  Past Medical History:   Diagnosis Date   • Anxiety    • Atrial flutter (CMS/HCC)    • Chronic diastolic congestive heart failure (CMS/HCC) 12/19/2018   • Chronic respiratory failure with hypoxia (CMS/HCC)    • COPD (chronic obstructive pulmonary disease) (CMS/HCC)    • Essential hypertension    • GERD (gastroesophageal reflux disease)    • H/O complete eye exam 06/2018   • History of pulmonary function tests 06/10/2014   • Hyperglycemia    • Hyperlipidemia    • Lung cancer (CMS/HCC) 2012    Left   • MDS (myelodysplastic syndrome) (CMS/HCC)    • Nonrheumatic aortic valve stenosis     mild 5/2019   • Obesity    • Osteoarthritis, knee    • Paroxysmal atrial fibrillation (CMS/HCC)    • Prostate cancer (CMS/HCC) 2000   • Seizures (CMS/HCC)    • Sleep apnea    • Thoracic aortic aneurysm without rupture (CMS/HCC)     s/p stenting of  descending thoracic aneurysm; the ascending aorta measured 3.7cm in 2019   • Type 2 diabetes mellitus without complication, without long-term current use of insulin (CMS/[facility])    ,   Past Surgical History:   Procedure Laterality Date   • ABDOMINAL AORTIC ANEURYSM REPAIR      Dr. Adarsh Dennis   • CARDIAC CATHETERIZATION Left 2004   • CATARACT EXTRACTION  10/2014    also    • COLONOSCOPY     • ENDOVASCULAR THORACIC ANEURYSM REPAIR     • JOINT REPLACEMENT Left 10/2010    knee; Dr. Wolf   • JOINT REPLACEMENT Right 2011    knee; Dr. Wolf   • LUNG LOBECTOMY Left 2012    LLL; Dr. Mendoza   • LUNG LOBECTOMY Left    • PROSTATE SURGERY      Dr. Naranjo   • VASCULAR SURGERY  2009    TEVAR of thoracic aortic aneurysm   • VASCULAR SURGERY  12/15/2003    Left carotid subclavian bypass graft, ligation of proximal left subclavian following carotid subclavian bypass. Right femoral sheath. Arch angiography with descending thoracic and abdominal aortography    • VASCULAR SURGERY  2003    Selective catheterization right vertebral artery, right subclavian artery, left subclavian artery, left vertebral artery.     • VASCULAR SURGERY  2002    Tracheostomy, bronchoscopy   • VASCULAR SURGERY  2002    Thoracic arteriogram and abdominal aortogram   ,   Family History   Problem Relation Age of Onset   • Cancer Mother    • COPD Mother    • Cancer Father         lung   • Colon cancer Father    • Cancer Other         colon   • Diabetes Other    • Emphysema Other    • Hypertension Other    • Kidney disease Other    • Lung cancer Other    ,   Social History     Tobacco Use   • Smoking status: Former Smoker     Packs/day: 1.00     Types: Cigarettes     Last attempt to quit: 4/15/2010     Years since quittin.6   • Smokeless tobacco: Never Used   Substance Use Topics   • Alcohol use: No     Frequency: Never     Comment: No caffeine use   • Drug use: No   ,   Medications Prior to Admission    Medication Sig Dispense Refill Last Dose   • albuterol (PROVENTIL) (2.5 MG/3ML) 0.083% nebulizer solution Take 2.5 mg by nebulization 4 (Four) Times a Day.   12/3/2019 at Unknown time   • ALPRAZolam (XANAX) 1 MG tablet Take 1 tablet by mouth At Night As Needed for Anxiety. 30 tablet 2 12/2/2019 at Unknown time   • apixaban (ELIQUIS) 5 MG tablet tablet Take 1 tablet by mouth Every 12 (Twelve) Hours. 180 tablet 2 12/3/2019 at Unknown time   • atorvastatin (LIPITOR) 10 MG tablet TAKE 1 TABLET BY MOUTH DAILY. FOR CHOLESTEROL 90 tablet 1 Taking   • benazepril (LOTENSIN) 20 MG tablet Take 20 mg by mouth Daily.   Taking   • budesonide (PULMICORT) 0.5 MG/2ML nebulizer solution Take 2 mL by nebulization 2 (Two) Times a Day. 120 each 1 Taking   • dilTIAZem (CARDIZEM) 60 MG tablet TAKE 1 TABLET BY MOUTH TWICE A DAY 60 tablet 2    • folic acid (FOLVITE) 1 MG tablet Take 1 mg by mouth Daily.   Taking   • furosemide (LASIX) 40 MG tablet TAKE 1 TABLET BY MOUTH EVERY DAY 90 tablet 1    • ipratropium (ATROVENT) 0.02 % nebulizer solution Take 500 mcg by nebulization Every 4 (Four) Hours As Needed for Wheezing or Shortness of Air.      • metFORMIN (GLUCOPHAGE) 1000 MG tablet Take 1 tablet by mouth Daily With Breakfast. 90 tablet 1 Taking   • metoprolol tartrate (LOPRESSOR) 100 MG tablet TAKE ONE AND ONE - HALF TABLETS BY MOUTH TWICE DAILY 270 tablet 1 Taking   • Revefenacin (YUPELRI) 175 MCG/3ML solution Inhale 3 mL (1 vial) by nebulization Daily. 90 mL 1 Taking   • albuterol (PROVENTIL HFA;VENTOLIN HFA) 108 (90 Base) MCG/ACT inhaler Inhale 2 puffs Every 4 (Four) Hours As Needed for Wheezing.   Taking   , Scheduled Meds:    apixaban 5 mg Oral Q12H   atorvastatin 10 mg Oral Daily   budesonide 0.5 mg Nebulization BID - RT   dilTIAZem 60 mg Oral BID   folic acid 1 mg Oral Daily   furosemide 40 mg Intravenous BID AC   insulin lispro 0-7 Units Subcutaneous 4x Daily With Meals & Nightly   ipratropium-albuterol 3 mL Nebulization 4x Daily -  RT   lisinopril 20 mg Oral Q24H   metoprolol tartrate 150 mg Oral Q12H   , Continuous Infusions:    and Allergies:  Erythromycin    Objective     Vital Signs   Temp:  [98.2 °F (36.8 °C)-98.5 °F (36.9 °C)] 98.2 °F (36.8 °C)  Heart Rate:  [64-99] 68  Resp:  [16-20] 16  BP: (141-155)/(74-96) 146/74    Physical Exam:  Constitutional: Not in acute distress.  Eyes: Injected conjunctivae, EOMI. pupils equal reactive to light.  ENMT: [place] 3. No oral thrush. Tonsils grade  Neck: Trachea midline. No thyromegaly  Heart: RRR, systolic murmur over the left sternal border.  Loud P2  Lungs: Diminished but equal air entry bilaterally.  Bilateral crackles predominantly at the bases.  No wheezing Non labored breathing.   Abdomen: Obese. Soft. No tenderness or dullness. No HSM.  Extremities: No cyanosis, clubbing but +3 pitting edema.  Warm extremities and well-perfused.  Neuro: Conscious, alert, oriented x3.  Strength 5/5 in arms.  Psych: Appropriate mood and affect.    Integumentary: No rash.  Normal skin turgor  Lymphatic: No palpable cervical or supraclavicular lymph nodes.      Diagnostic imaging:  I personally and independently reviewed the following images:   CXR 12/3/2019:  Pulmonary edema.          Assessment   1. Acute on chronic diastolic CHF  2. Acute pulmonary edema  3. COPD, no current exacerbation  4. Chronic hypoxic respiratory failure, on oxygen 4 L/min  5. Chronic hypercapnic respiratory failure, on trilogy ventilator  6. Obstructive sleep apnea, being treated with trilogy ventilator    Recommendations:  · Diuresis with Lasix, currently being managed by cardiology.  · Oxygen 4 L/min.  · Trilogy ventilator at night.  · Continue budesonide nebulizer and DuoNeb while being hospitalized.  For outpatient, he can resume his regimen which is consistent with budesonide, Yuperli and albuterol.  · I did  the patient about the importance of daily weight for CHF management.    No objection to discharge from pulmonary  perspective.  If patient stays in the hospital, Dr. Alejandro will see tomorrow.          New Tao MD  12/05/19  7:54 AM

## 2019-12-06 ENCOUNTER — RESULTS ENCOUNTER (OUTPATIENT)
Dept: ONCOLOGY | Facility: CLINIC | Age: 79
End: 2019-12-06

## 2019-12-06 ENCOUNTER — TRANSITIONAL CARE MANAGEMENT TELEPHONE ENCOUNTER (OUTPATIENT)
Dept: FAMILY MEDICINE CLINIC | Facility: CLINIC | Age: 79
End: 2019-12-06

## 2019-12-06 ENCOUNTER — READMISSION MANAGEMENT (OUTPATIENT)
Dept: CALL CENTER | Facility: HOSPITAL | Age: 79
End: 2019-12-06

## 2019-12-06 DIAGNOSIS — D80.1 HYPOGAMMAGLOBULINEMIA (HCC): ICD-10-CM

## 2019-12-06 DIAGNOSIS — C34.32 MALIGNANT NEOPLASM OF LOWER LOBE OF LEFT LUNG (HCC): ICD-10-CM

## 2019-12-06 DIAGNOSIS — D46.9 MYELODYSPLASIA (MYELODYSPLASTIC SYNDROME) (HCC): ICD-10-CM

## 2019-12-06 DIAGNOSIS — D53.9 MACROCYTIC ANEMIA: ICD-10-CM

## 2019-12-06 DIAGNOSIS — D61.818 PANCYTOPENIA (HCC): ICD-10-CM

## 2019-12-06 NOTE — OUTREACH NOTE
Prep Survey      Responses   Facility patient discharged from?  [facility]   Is patient eligible?  Yes   Discharge diagnosis  Acute on Chronic Diastolic CHF   Does the patient have one of the following disease processes/diagnoses(primary or secondary)?  CHF   Does the patient have Home health ordered?  No   Is there a DME ordered?  No   Prep survey completed?  Yes          Audrey Zhong RN

## 2019-12-06 NOTE — OUTREACH NOTE
Spoke with pt, feeling much better. Breathing is much better, appetite is fine. No fever, chills, pain. LE edema is improved. Confirmed receipt and understanding of d/c orders and medications. He is picking up Augmentin today from pharmacy as this had to be ordered. Confirmed TCM [facility] with Dr Lagos on 12/12/19.

## 2019-12-06 NOTE — PROGRESS NOTES
Case Management Discharge Note      Final Note: Home         Destination      No service has been selected for the patient.      Durable Medical Equipment      No service has been selected for the patient.      Dialysis/Infusion      No service has been selected for the patient.      Home Medical Care      No service has been selected for the patient.      Therapy      No service has been selected for the patient.      Community Resources      No service has been selected for the patient.        Transportation Services  Other: Other    Final Discharge Disposition Code: 01 - home or self-care

## 2019-12-09 ENCOUNTER — EPISODE CHANGES (OUTPATIENT)
Dept: CASE MANAGEMENT | Facility: OTHER | Age: 79
End: 2019-12-09

## 2019-12-09 ENCOUNTER — READMISSION MANAGEMENT (OUTPATIENT)
Dept: CALL CENTER | Facility: HOSPITAL | Age: 79
End: 2019-12-09

## 2019-12-09 NOTE — OUTREACH NOTE
CHF Week 1 Survey      Responses   Facility patient discharged from?  [facility]   Does the patient have one of the following disease processes/diagnoses(primary or secondary)?  CHF   Is there a successful TCM telephone encounter documented?  Yes          Vianca Pfieffer RN

## 2019-12-13 ENCOUNTER — INFUSION (OUTPATIENT)
Dept: ONCOLOGY | Facility: HOSPITAL | Age: 79
End: 2019-12-13

## 2019-12-13 ENCOUNTER — READMISSION MANAGEMENT (OUTPATIENT)
Dept: CALL CENTER | Facility: HOSPITAL | Age: 79
End: 2019-12-13

## 2019-12-13 ENCOUNTER — RESULTS ENCOUNTER (OUTPATIENT)
Dept: ONCOLOGY | Facility: CLINIC | Age: 79
End: 2019-12-13

## 2019-12-13 ENCOUNTER — LAB (OUTPATIENT)
Dept: OTHER | Facility: HOSPITAL | Age: 79
End: 2019-12-13

## 2019-12-13 VITALS
SYSTOLIC BLOOD PRESSURE: 144 MMHG | WEIGHT: 218.6 LBS | BODY MASS INDEX: 31.37 KG/M2 | OXYGEN SATURATION: 87 % | TEMPERATURE: 97.7 F | DIASTOLIC BLOOD PRESSURE: 70 MMHG | HEART RATE: 65 BPM

## 2019-12-13 DIAGNOSIS — D80.1 HYPOGAMMAGLOBULINEMIA (HCC): ICD-10-CM

## 2019-12-13 DIAGNOSIS — C34.32 MALIGNANT NEOPLASM OF LOWER LOBE OF LEFT LUNG (HCC): ICD-10-CM

## 2019-12-13 DIAGNOSIS — D46.9 MYELODYSPLASIA (MYELODYSPLASTIC SYNDROME) (HCC): ICD-10-CM

## 2019-12-13 DIAGNOSIS — D61.818 PANCYTOPENIA (HCC): ICD-10-CM

## 2019-12-13 DIAGNOSIS — D53.9 MACROCYTIC ANEMIA: ICD-10-CM

## 2019-12-13 DIAGNOSIS — D46.9 MYELODYSPLASIA (MYELODYSPLASTIC SYNDROME) (HCC): Primary | ICD-10-CM

## 2019-12-13 LAB
BASOPHILS # BLD AUTO: 0.02 10*3/MM3 (ref 0–0.2)
BASOPHILS NFR BLD AUTO: 0.6 % (ref 0–1.5)
C3 FRG RBC-MCNC: NORMAL
DEPRECATED RDW RBC AUTO: 58.5 FL (ref 37–54)
EOSINOPHIL # BLD AUTO: 0.16 10*3/MM3 (ref 0–0.4)
EOSINOPHIL NFR BLD AUTO: 5.1 % (ref 0.3–6.2)
ERYTHROCYTE [DISTWIDTH] IN BLOOD BY AUTOMATED COUNT: 15.4 % (ref 12.3–15.4)
HCT VFR BLD AUTO: 27.1 % (ref 37.5–51)
HGB BLD-MCNC: 8.9 G/DL (ref 13–17.7)
IMM GRANULOCYTES # BLD AUTO: 0.13 10*3/MM3 (ref 0–0.05)
IMM GRANULOCYTES NFR BLD AUTO: 4.2 % (ref 0–0.5)
LYMPHOCYTES # BLD AUTO: 0.79 10*3/MM3 (ref 0.7–3.1)
LYMPHOCYTES NFR BLD AUTO: 25.3 % (ref 19.6–45.3)
MCH RBC QN AUTO: 34.6 PG (ref 26.6–33)
MCHC RBC AUTO-ENTMCNC: 32.8 G/DL (ref 31.5–35.7)
MCV RBC AUTO: 105.4 FL (ref 79–97)
MONOCYTES # BLD AUTO: 0.67 10*3/MM3 (ref 0.1–0.9)
MONOCYTES NFR BLD AUTO: 21.5 % (ref 5–12)
NEUTROPHILS # BLD AUTO: 1.35 10*3/MM3 (ref 1.7–7)
NEUTROPHILS NFR BLD AUTO: 43.3 % (ref 42.7–76)
NRBC BLD AUTO-RTO: 0.6 /100 WBC (ref 0–0.2)
OVALOCYTES BLD QL SMEAR: NORMAL
PLAT MORPH BLD: NORMAL
PLATELET # BLD AUTO: 155 10*3/MM3 (ref 140–450)
PMV BLD AUTO: 12 FL (ref 6–12)
RBC # BLD AUTO: 2.57 10*6/MM3 (ref 4.14–5.8)
STOMATOCYTES BLD QL SMEAR: NORMAL
TARGETS BLD QL SMEAR: NORMAL
WBC MORPH BLD: NORMAL
WBC NRBC COR # BLD: 3.12 10*3/MM3 (ref 3.4–10.8)

## 2019-12-13 PROCEDURE — 85007 BL SMEAR W/DIFF WBC COUNT: CPT | Performed by: INTERNAL MEDICINE

## 2019-12-13 PROCEDURE — 25010000002 EPOETIN ALFA PER 1000 UNITS: Performed by: NURSE PRACTITIONER

## 2019-12-13 PROCEDURE — 36415 COLL VENOUS BLD VENIPUNCTURE: CPT

## 2019-12-13 PROCEDURE — 96372 THER/PROPH/DIAG INJ SC/IM: CPT

## 2019-12-13 PROCEDURE — 85025 COMPLETE CBC W/AUTO DIFF WBC: CPT | Performed by: INTERNAL MEDICINE

## 2019-12-13 RX ADMIN — ERYTHROPOIETIN 20000 UNITS: 20000 INJECTION, SOLUTION INTRAVENOUS; SUBCUTANEOUS at 11:54

## 2019-12-13 NOTE — OUTREACH NOTE
CHF Week 2 Survey      Responses   Facility patient discharged from?  [facility]   Does the patient have one of the following disease processes/diagnoses(primary or secondary)?  CHF   Week 2 attempt successful?  Yes   Call start time  1536   Call end time  1538   Discharge diagnosis  Acute on Chronic Diastolic CHF, cancer of lower lobe of lung.    Is patient permission given to speak with other caregiver?  No   Meds reviewed with patient/caregiver?  Yes   Is the patient having any side effects they believe may be caused by any medication additions or changes?  No   Does the patient have all medications ordered at discharge?  Yes   Is the patient taking all medications as directed (includes completed medication regime)?  Yes   Does the patient have a primary care provider?   Yes   Comments regarding PCP  Dr. Lagos   Has the patient kept scheduled appointments due by today?  Yes   Comments  Patient reports that he saw Dr Hayes this week.    Has home health visited the patient within 72 hours of discharge?  N/A   Psychosocial issues?  No   Did the patient receive a copy of their discharge instructions?  Yes   Nursing interventions  Reviewed instructions with patient   What is the patient's perception of their health status since discharge?  Improving   Nursing interventions  Nurse provided patient education   Is the patient weighing daily?  Yes [Patient states that he has lost about 8#. ]   Does the patient have scales?  Yes   Daily weight interventions  Education provided on importance of daily weight   Is the patient able to teach back Heart Failure diet management?  Yes   Is the patient able to teach back signs and symptoms of worsening condition? (i.e. weight gain, shortness of air, etc.)  Yes   Is the patient/caregiver able to teach back the hierarchy of who to call/visit for symptoms/problems? PCP, Specialist, Home health nurse, Urgent Care, ED, 911  Yes   CHF Week 2 call completed?  Yes          Yara Snell,  RN

## 2019-12-18 ENCOUNTER — OFFICE VISIT (OUTPATIENT)
Dept: FAMILY MEDICINE CLINIC | Facility: CLINIC | Age: 79
End: 2019-12-18

## 2019-12-18 VITALS
RESPIRATION RATE: 18 BRPM | OXYGEN SATURATION: 95 % | TEMPERATURE: 97.8 F | WEIGHT: 223 LBS | HEIGHT: 70 IN | BODY MASS INDEX: 31.92 KG/M2 | HEART RATE: 68 BPM | SYSTOLIC BLOOD PRESSURE: 152 MMHG | DIASTOLIC BLOOD PRESSURE: 67 MMHG

## 2019-12-18 DIAGNOSIS — J40 BRONCHITIS: ICD-10-CM

## 2019-12-18 DIAGNOSIS — Z09 HOSPITAL DISCHARGE FOLLOW-UP: ICD-10-CM

## 2019-12-18 DIAGNOSIS — I50.33 ACUTE ON CHRONIC DIASTOLIC (CONGESTIVE) HEART FAILURE (HCC): Primary | ICD-10-CM

## 2019-12-18 PROCEDURE — 99495 TRANSJ CARE MGMT MOD F2F 14D: CPT | Performed by: FAMILY MEDICINE

## 2019-12-18 NOTE — PROGRESS NOTES
Transitional Care Follow Up Visit  Subjective     Ankit Mack Sr. is a 79 y.o. male who presents for a transitional care management visit.    Within 48 business hours after discharge our office contacted him via telephone to coordinate his care and needs.      I reviewed and discussed the details of that call along with the discharge summary, hospital problems, inpatient lab results, inpatient diagnostic studies, and consultation reports with Ankit.     Current outpatient and discharge medications have been reconciled for the patient.  Reviewed by: Gopi Lagos MD    Pt is feeling much better today w/o any edema at this time. Pt IS weighing each AM.    Date of TCM Phone Call 12/6/2019   [facility]   Date of Admission 12/3/2019   Date of Discharge 12/5/2019   Discharge Disposition Home or Self Care     Risk for Readmission (LACE) Score: 13 (12/5/2019  6:00 AM)      History of Present Illness   Course During Hospital Stay:      Admit date: 12/3/2019  Discharge date and time:12/05/19     Discharged Condition: good     Discharge Diagnoses:  Essential hypertension    DOMINGO on CPAP    Pancytopenia (CMS/HCC)    Type 2 diabetes mellitus without complication, without long-term current use of insulin (CMS/HCC)    Myelodysplasia (myelodysplastic syndrome) (CMS/HCC)    Nonrheumatic aortic valve stenosis    Acute on chronic diastolic (congestive) heart failure (CMS/HCC)   acute on chronic hypoxic respiratory failure  Acute bronchitis    Hospital Course: Ankit Mack Sr.  Was admitted with worsening shortness of breath and leg swelling; he has a history of copd and is on 4 liters oxygen; he checked his oxygen sats at home and was in the 70s so he came to the ED: he walked from the parking lot and sats were in the 60s initially; oxygen had to be increased and he was admitted; he was diagnosed with acute on chronic diastolic chf; he was seen by cardiology and dr campo felt that he likely ingested too much salt  over the holiday weekend; he was diuresed and is now feeling closer to normal; he started coughing up yellow sputum this am so he will go home with some augmentin as well; he was seen by pulmonary who cleared him for discharge as well; he is back on his usual 4 liters of oxygen    XR CHEST 1 VW-     HISTORY: Male who is 79 years-old,  short of breath     TECHNIQUE: Frontal view of the chest     COMPARISON: 10/16/2019     FINDINGS: Heart is enlarged. Pulmonary vasculature is congested. Aorta  is tortuous, with stent in place. Some patchy densities in both lungs  may reflect edema and/or infection, appearance represents interval  worsening. No large pleural effusions. No pneumothorax. No acute osseous  process.         Impression:        Patchy densities in both lungs that may reflect edema and/or  infection, clinical correlation and follow-up recommended. Cardiomegaly  with pulmonary vascular congestion.        Discharge Medications            New Medications      Instructions Start Date   amoxicillin-clavulanate 875-125 MG per tablet  Commonly known as:  AUGMENTIN    1 tablet, Oral, 2 Times Daily                      Changes to Medications      Instructions Start Date   furosemide 40 MG tablet  Commonly known as:  LASIX  What changed:    · when to take this  · additional instructions    40 mg, Oral, 2 Times Daily, Take twice daily for one week and then daily            Current outpatient and discharge medications have been reconciled for the patient.  Reviewed by: Gopi Lagos MD    Pt is UTD with pulmonary and hematology and is seeing cardiology in January.      The following portions of the patient's history were reviewed and updated as appropriate: allergies, current medications, past family history, past medical history, past social history, past surgical history and problem list.    Review of Systems   Constitutional: Negative for activity change, chills, fatigue and fever.   Respiratory: Negative for cough  "and shortness of breath.    Cardiovascular: Negative for chest pain and palpitations.   Gastrointestinal: Negative for abdominal pain.   Endocrine: Negative for cold intolerance.   Psychiatric/Behavioral: Negative for behavioral problems and dysphoric mood. The patient is not nervous/anxious.        /67   Pulse 68   Temp 97.8 °F (36.6 °C) (Oral)   Resp 18   Ht 177.8 cm (70\")   Wt 101 kg (223 lb)   SpO2 95%   BMI 32.00 kg/m²     Objective   Physical Exam   Constitutional: He appears well-developed and well-nourished.   Neck: Neck supple. No thyromegaly present.   Cardiovascular: Normal rate. An irregularly irregular rhythm present.   No murmur heard.  Pulmonary/Chest: Effort normal and breath sounds normal.   Abdominal: Bowel sounds are normal. There is no tenderness.   Psychiatric: He has a normal mood and affect. His behavior is normal.   Nursing note and vitals reviewed.  Hospital records reviewed with pt confirming HPI.      Assessment/Plan   Ankit was seen today for transitional care management, congestive heart failure and shortness of breath.    Diagnoses and all orders for this visit:    Acute on chronic diastolic (congestive) heart failure (CMS/HCC)    Bronchitis    Hospital discharge follow-up    Stressed low/no salt diet and fluid balance restrictions.             "

## 2019-12-20 ENCOUNTER — READMISSION MANAGEMENT (OUTPATIENT)
Dept: CALL CENTER | Facility: HOSPITAL | Age: 79
End: 2019-12-20

## 2019-12-20 ENCOUNTER — LAB (OUTPATIENT)
Dept: OTHER | Facility: HOSPITAL | Age: 79
End: 2019-12-20

## 2019-12-20 ENCOUNTER — RESULTS ENCOUNTER (OUTPATIENT)
Dept: ONCOLOGY | Facility: CLINIC | Age: 79
End: 2019-12-20

## 2019-12-20 ENCOUNTER — INFUSION (OUTPATIENT)
Dept: ONCOLOGY | Facility: HOSPITAL | Age: 79
End: 2019-12-20

## 2019-12-20 VITALS
DIASTOLIC BLOOD PRESSURE: 67 MMHG | WEIGHT: 223.6 LBS | SYSTOLIC BLOOD PRESSURE: 135 MMHG | OXYGEN SATURATION: 90 % | BODY MASS INDEX: 32.08 KG/M2 | TEMPERATURE: 97.9 F | HEART RATE: 69 BPM

## 2019-12-20 DIAGNOSIS — D61.818 PANCYTOPENIA (HCC): ICD-10-CM

## 2019-12-20 DIAGNOSIS — C34.32 MALIGNANT NEOPLASM OF LOWER LOBE OF LEFT LUNG (HCC): ICD-10-CM

## 2019-12-20 DIAGNOSIS — D53.9 MACROCYTIC ANEMIA: ICD-10-CM

## 2019-12-20 DIAGNOSIS — D80.1 HYPOGAMMAGLOBULINEMIA (HCC): ICD-10-CM

## 2019-12-20 DIAGNOSIS — D46.9 MYELODYSPLASIA (MYELODYSPLASTIC SYNDROME) (HCC): Primary | ICD-10-CM

## 2019-12-20 DIAGNOSIS — D46.9 MYELODYSPLASIA (MYELODYSPLASTIC SYNDROME) (HCC): ICD-10-CM

## 2019-12-20 LAB
DEPRECATED RDW RBC AUTO: 56.5 FL (ref 37–54)
EOSINOPHIL # BLD MANUAL: 0.1 10*3/MM3 (ref 0–0.4)
EOSINOPHIL NFR BLD MANUAL: 4 % (ref 0.3–6.2)
ERYTHROCYTE [DISTWIDTH] IN BLOOD BY AUTOMATED COUNT: 14.9 % (ref 12.3–15.4)
HCT VFR BLD AUTO: 25.6 % (ref 37.5–51)
HGB BLD-MCNC: 8.5 G/DL (ref 13–17.7)
HYPOCHROMIA BLD QL: ABNORMAL
LYMPHOCYTES # BLD MANUAL: 0.76 10*3/MM3 (ref 0.7–3.1)
LYMPHOCYTES NFR BLD MANUAL: 16 % (ref 5–12)
LYMPHOCYTES NFR BLD MANUAL: 31 % (ref 19.6–45.3)
MACROCYTES BLD QL SMEAR: ABNORMAL
MCH RBC QN AUTO: 34.8 PG (ref 26.6–33)
MCHC RBC AUTO-ENTMCNC: 33.2 G/DL (ref 31.5–35.7)
MCV RBC AUTO: 104.9 FL (ref 79–97)
METAMYELOCYTES NFR BLD MANUAL: 2 % (ref 0–0)
MONOCYTES # BLD AUTO: 0.39 10*3/MM3 (ref 0.1–0.9)
NEUTROPHILS # BLD AUTO: 1.16 10*3/MM3 (ref 1.7–7)
NEUTROPHILS NFR BLD MANUAL: 47 % (ref 42.7–76)
OVALOCYTES BLD QL SMEAR: ABNORMAL
PLAT MORPH BLD: NORMAL
PLATELET # BLD AUTO: 157 10*3/MM3 (ref 140–450)
PMV BLD AUTO: 12.5 FL (ref 6–12)
RBC # BLD AUTO: 2.44 10*6/MM3 (ref 4.14–5.8)
SCAN SLIDE: NORMAL
SMUDGE CELLS BLD QL SMEAR: ABNORMAL
STOMATOCYTES BLD QL SMEAR: ABNORMAL
WBC NRBC COR # BLD: 2.46 10*3/MM3 (ref 3.4–10.8)

## 2019-12-20 PROCEDURE — 85007 BL SMEAR W/DIFF WBC COUNT: CPT | Performed by: INTERNAL MEDICINE

## 2019-12-20 PROCEDURE — 36415 COLL VENOUS BLD VENIPUNCTURE: CPT

## 2019-12-20 PROCEDURE — 96372 THER/PROPH/DIAG INJ SC/IM: CPT

## 2019-12-20 PROCEDURE — 85025 COMPLETE CBC W/AUTO DIFF WBC: CPT | Performed by: INTERNAL MEDICINE

## 2019-12-20 PROCEDURE — 25010000002 EPOETIN ALFA PER 1000 UNITS: Performed by: NURSE PRACTITIONER

## 2019-12-20 RX ADMIN — ERYTHROPOIETIN 25000 UNITS: 20000 INJECTION, SOLUTION INTRAVENOUS; SUBCUTANEOUS at 11:01

## 2019-12-20 NOTE — OUTREACH NOTE
CHF Week 3 Survey      Responses   Facility patient discharged from?  [facility]   Does the patient have one of the following disease processes/diagnoses(primary or secondary)?  CHF   Week 3 attempt successful?  No   Unsuccessful attempts  Attempt 1          Michelle Levy RN

## 2019-12-23 ENCOUNTER — READMISSION MANAGEMENT (OUTPATIENT)
Dept: CALL CENTER | Facility: HOSPITAL | Age: 79
End: 2019-12-23

## 2019-12-23 NOTE — OUTREACH NOTE
CHF Week 3 Survey      Responses   Facility patient discharged from?  [facility]   Does the patient have one of the following disease processes/diagnoses(primary or secondary)?  CHF   Week 3 attempt successful?  Yes   Call start time  1510   Call end time  1512   Meds reviewed with patient/caregiver?  Yes   Is the patient having any side effects they believe may be caused by any medication additions or changes?  No   Does the patient have all medications ordered at discharge?  Yes   Is the patient taking all medications as directed (includes completed medication regime)?  Yes   Does the patient have a primary care provider?   Yes   Does the patient have an appointment with their PCP within 7 days of discharge?  Yes   Has the patient kept scheduled appointments due by today?  Yes   Has home health visited the patient within 72 hours of discharge?  N/A   Psychosocial issues?  No   Did the patient receive a copy of their discharge instructions?  Yes   Nursing interventions  Reviewed instructions with patient   What is the patient's perception of their health status since discharge?  Improving   Nursing interventions  Nurse provided patient education   Is the patient weighing daily?  Yes   Does the patient have scales?  Yes   Daily weight interventions  Education provided on importance of daily weight   Is the patient able to teach back Heart Failure diet management?  Yes   Is the patient able to teach back Heart Failure Zones?  Yes   Is the patient able to teach back signs and symptoms of worsening condition? (i.e. weight gain, shortness of air, etc.)  Yes   Is the patient/caregiver able to teach back the hierarchy of who to call/visit for symptoms/problems? PCP, Specialist, Home health nurse, Urgent Care, ED, 911  Yes   Additional teach back comments  Encouraged to weigh daily, he has seen his MD.     CHF Week 3 call completed?  Yes          Renetta Patton RN

## 2019-12-27 ENCOUNTER — LAB (OUTPATIENT)
Dept: OTHER | Facility: HOSPITAL | Age: 79
End: 2019-12-27

## 2019-12-27 ENCOUNTER — INFUSION (OUTPATIENT)
Dept: ONCOLOGY | Facility: HOSPITAL | Age: 79
End: 2019-12-27

## 2019-12-27 ENCOUNTER — RESULTS ENCOUNTER (OUTPATIENT)
Dept: ONCOLOGY | Facility: CLINIC | Age: 79
End: 2019-12-27

## 2019-12-27 VITALS
TEMPERATURE: 97.2 F | SYSTOLIC BLOOD PRESSURE: 100 MMHG | HEART RATE: 64 BPM | WEIGHT: 221.4 LBS | RESPIRATION RATE: 16 BRPM | OXYGEN SATURATION: 93 % | DIASTOLIC BLOOD PRESSURE: 62 MMHG | BODY MASS INDEX: 31.77 KG/M2

## 2019-12-27 DIAGNOSIS — D46.9 MYELODYSPLASIA (MYELODYSPLASTIC SYNDROME) (HCC): ICD-10-CM

## 2019-12-27 DIAGNOSIS — D61.818 PANCYTOPENIA (HCC): ICD-10-CM

## 2019-12-27 DIAGNOSIS — D53.9 MACROCYTIC ANEMIA: ICD-10-CM

## 2019-12-27 DIAGNOSIS — C34.32 MALIGNANT NEOPLASM OF LOWER LOBE OF LEFT LUNG (HCC): ICD-10-CM

## 2019-12-27 DIAGNOSIS — D46.9 MYELODYSPLASIA (MYELODYSPLASTIC SYNDROME) (HCC): Primary | ICD-10-CM

## 2019-12-27 DIAGNOSIS — D80.1 HYPOGAMMAGLOBULINEMIA (HCC): ICD-10-CM

## 2019-12-27 LAB
ACANTHOCYTES BLD QL SMEAR: ABNORMAL
DACRYOCYTES BLD QL SMEAR: ABNORMAL
DEPRECATED RDW RBC AUTO: 58.6 FL (ref 37–54)
EOSINOPHIL # BLD MANUAL: 0.1 10*3/MM3 (ref 0–0.4)
EOSINOPHIL NFR BLD MANUAL: 4 % (ref 0.3–6.2)
ERYTHROCYTE [DISTWIDTH] IN BLOOD BY AUTOMATED COUNT: 15.4 % (ref 12.3–15.4)
HCT VFR BLD AUTO: 26.3 % (ref 37.5–51)
HGB BLD-MCNC: 8.7 G/DL (ref 13–17.7)
LYMPHOCYTES # BLD MANUAL: 0.71 10*3/MM3 (ref 0.7–3.1)
LYMPHOCYTES NFR BLD MANUAL: 24 % (ref 5–12)
LYMPHOCYTES NFR BLD MANUAL: 29 % (ref 19.6–45.3)
MCH RBC QN AUTO: 34.9 PG (ref 26.6–33)
MCHC RBC AUTO-ENTMCNC: 33.1 G/DL (ref 31.5–35.7)
MCV RBC AUTO: 105.6 FL (ref 79–97)
METAMYELOCYTES NFR BLD MANUAL: ABNORMAL %
MONOCYTES # BLD AUTO: 0.59 10*3/MM3 (ref 0.1–0.9)
NEUTROPHILS # BLD AUTO: 1.03 10*3/MM3 (ref 1.7–7)
NEUTROPHILS NFR BLD MANUAL: 42 % (ref 42.7–76)
NRBC SPEC MANUAL: 1 /100 WBC (ref 0–0.2)
OVALOCYTES BLD QL SMEAR: ABNORMAL
PLAT MORPH BLD: NORMAL
PLATELET # BLD AUTO: 166 10*3/MM3 (ref 140–450)
PMV BLD AUTO: 12.5 FL (ref 6–12)
RBC # BLD AUTO: 2.49 10*6/MM3 (ref 4.14–5.8)
SCAN SLIDE: NORMAL
SPHEROCYTES BLD QL SMEAR: ABNORMAL
TARGETS BLD QL SMEAR: ABNORMAL
VARIANT LYMPHS NFR BLD MANUAL: 1 % (ref 0–5)
WBC MORPH BLD: NORMAL
WBC NRBC COR # BLD: 2.45 10*3/MM3 (ref 3.4–10.8)

## 2019-12-27 PROCEDURE — 85007 BL SMEAR W/DIFF WBC COUNT: CPT | Performed by: INTERNAL MEDICINE

## 2019-12-27 PROCEDURE — 36415 COLL VENOUS BLD VENIPUNCTURE: CPT

## 2019-12-27 PROCEDURE — 85025 COMPLETE CBC W/AUTO DIFF WBC: CPT | Performed by: INTERNAL MEDICINE

## 2019-12-27 PROCEDURE — 25010000002 EPOETIN ALFA PER 1000 UNITS: Performed by: NURSE PRACTITIONER

## 2019-12-27 PROCEDURE — 96372 THER/PROPH/DIAG INJ SC/IM: CPT

## 2019-12-27 RX ADMIN — ERYTHROPOIETIN 25000 UNITS: 20000 INJECTION, SOLUTION INTRAVENOUS; SUBCUTANEOUS at 11:01

## 2019-12-30 DIAGNOSIS — E78.2 MIXED HYPERLIPIDEMIA: ICD-10-CM

## 2019-12-30 RX ORDER — ATORVASTATIN CALCIUM 10 MG/1
10 TABLET, FILM COATED ORAL DAILY
Qty: 90 TABLET | Refills: 1 | Status: SHIPPED | OUTPATIENT
Start: 2019-12-30 | End: 2020-01-01 | Stop reason: SDUPTHER

## 2019-12-31 DIAGNOSIS — D80.1 HYPOGAMMAGLOBULINEMIA (HCC): ICD-10-CM

## 2019-12-31 DIAGNOSIS — C34.32 MALIGNANT NEOPLASM OF LOWER LOBE OF LEFT LUNG (HCC): Primary | ICD-10-CM

## 2019-12-31 DIAGNOSIS — D46.9 MYELODYSPLASIA (MYELODYSPLASTIC SYNDROME) (HCC): ICD-10-CM

## 2020-01-01 ENCOUNTER — CLINICAL SUPPORT (OUTPATIENT)
Dept: ONCOLOGY | Facility: HOSPITAL | Age: 80
End: 2020-01-01

## 2020-01-01 ENCOUNTER — LAB (OUTPATIENT)
Dept: OTHER | Facility: HOSPITAL | Age: 80
End: 2020-01-01

## 2020-01-01 ENCOUNTER — OFFICE VISIT (OUTPATIENT)
Dept: ONCOLOGY | Facility: CLINIC | Age: 80
End: 2020-01-01

## 2020-01-01 ENCOUNTER — RESULTS ENCOUNTER (OUTPATIENT)
Dept: ONCOLOGY | Facility: CLINIC | Age: 80
End: 2020-01-01

## 2020-01-01 ENCOUNTER — HOSPITAL ENCOUNTER (OUTPATIENT)
Dept: ULTRASOUND IMAGING | Facility: HOSPITAL | Age: 80
Discharge: HOME | End: 2020-06-04
Admitting: INTERNAL MEDICINE

## 2020-01-01 ENCOUNTER — TELEPHONE (OUTPATIENT)
Dept: ONCOLOGY | Facility: CLINIC | Age: 80
End: 2020-01-01

## 2020-01-01 ENCOUNTER — APPOINTMENT (OUTPATIENT)
Dept: OTHER | Facility: HOSPITAL | Age: 80
End: 2020-01-01

## 2020-01-01 ENCOUNTER — APPOINTMENT (OUTPATIENT)
Dept: ONCOLOGY | Facility: HOSPITAL | Age: 80
End: 2020-01-01

## 2020-01-01 ENCOUNTER — INFUSION (OUTPATIENT)
Dept: ONCOLOGY | Facility: HOSPITAL | Age: 80
End: 2020-01-01

## 2020-01-01 ENCOUNTER — TELEPHONE (OUTPATIENT)
Dept: CARDIOLOGY | Facility: CLINIC | Age: 80
End: 2020-01-01

## 2020-01-01 ENCOUNTER — DOCUMENTATION (OUTPATIENT)
Dept: ONCOLOGY | Facility: CLINIC | Age: 80
End: 2020-01-01

## 2020-01-01 ENCOUNTER — TRANSCRIBE ORDERS (OUTPATIENT)
Dept: ADMINISTRATIVE | Facility: HOSPITAL | Age: 80
End: 2020-01-01

## 2020-01-01 ENCOUNTER — TELEPHONE (OUTPATIENT)
Dept: FAMILY MEDICINE CLINIC | Facility: CLINIC | Age: 80
End: 2020-01-01

## 2020-01-01 ENCOUNTER — TELEMEDICINE (OUTPATIENT)
Dept: FAMILY MEDICINE CLINIC | Facility: CLINIC | Age: 80
End: 2020-01-01

## 2020-01-01 ENCOUNTER — HOSPITAL ENCOUNTER (OUTPATIENT)
Dept: CT IMAGING | Facility: HOSPITAL | Age: 80
Discharge: HOME | End: 2020-02-19
Admitting: THORACIC SURGERY (CARDIOTHORACIC VASCULAR SURGERY)

## 2020-01-01 ENCOUNTER — READMISSION MANAGEMENT (OUTPATIENT)
Dept: CALL CENTER | Facility: HOSPITAL | Age: 80
End: 2020-01-01

## 2020-01-01 ENCOUNTER — DOCUMENTATION (OUTPATIENT)
Dept: ONCOLOGY | Facility: HOSPITAL | Age: 80
End: 2020-01-01

## 2020-01-01 ENCOUNTER — HOSPITAL ENCOUNTER (OUTPATIENT)
Dept: INFUSION THERAPY | Facility: HOSPITAL | Age: 80
Setting detail: INFUSION SERIES
Discharge: HOME | End: 2020-03-10

## 2020-01-01 ENCOUNTER — APPOINTMENT (OUTPATIENT)
Dept: GENERAL RADIOLOGY | Facility: HOSPITAL | Age: 80
End: 2020-01-01

## 2020-01-01 ENCOUNTER — HOSPITAL ENCOUNTER (OUTPATIENT)
Dept: INFUSION THERAPY | Facility: HOSPITAL | Age: 80
Setting detail: INFUSION SERIES
Discharge: HOME | End: 2020-07-23

## 2020-01-01 ENCOUNTER — DOCUMENTATION (OUTPATIENT)
Dept: CARDIOLOGY | Facility: CLINIC | Age: 80
End: 2020-01-01

## 2020-01-01 ENCOUNTER — APPOINTMENT (OUTPATIENT)
Dept: INFUSION THERAPY | Facility: HOSPITAL | Age: 80
End: 2020-01-01

## 2020-01-01 ENCOUNTER — PATIENT OUTREACH (OUTPATIENT)
Dept: CASE MANAGEMENT | Facility: OTHER | Age: 80
End: 2020-01-01

## 2020-01-01 ENCOUNTER — HOSPITAL ENCOUNTER (OUTPATIENT)
Dept: GENERAL RADIOLOGY | Facility: HOSPITAL | Age: 80
Discharge: HOME | End: 2020-03-08
Admitting: NURSE PRACTITIONER

## 2020-01-01 ENCOUNTER — HOSPITAL ENCOUNTER (OUTPATIENT)
Dept: INFUSION THERAPY | Facility: HOSPITAL | Age: 80
Setting detail: INFUSION SERIES
Discharge: HOME | End: 2020-07-07

## 2020-01-01 ENCOUNTER — OFFICE VISIT (OUTPATIENT)
Dept: FAMILY MEDICINE CLINIC | Facility: CLINIC | Age: 80
End: 2020-01-01

## 2020-01-01 ENCOUNTER — EPISODE CHANGES (OUTPATIENT)
Dept: CASE MANAGEMENT | Facility: OTHER | Age: 80
End: 2020-01-01

## 2020-01-01 ENCOUNTER — TRANSITIONAL CARE MANAGEMENT TELEPHONE ENCOUNTER (OUTPATIENT)
Dept: CALL CENTER | Facility: HOSPITAL | Age: 80
End: 2020-01-01

## 2020-01-01 ENCOUNTER — HOSPITAL ENCOUNTER (OUTPATIENT)
Dept: ULTRASOUND IMAGING | Facility: HOSPITAL | Age: 80
End: 2020-01-01

## 2020-01-01 ENCOUNTER — NURSE TRIAGE (OUTPATIENT)
Dept: CALL CENTER | Facility: HOSPITAL | Age: 80
End: 2020-01-01

## 2020-01-01 ENCOUNTER — TELEPHONE (OUTPATIENT)
Dept: ONCOLOGY | Facility: HOSPITAL | Age: 80
End: 2020-01-01

## 2020-01-01 ENCOUNTER — OFFICE VISIT (OUTPATIENT)
Dept: SURGERY | Facility: CLINIC | Age: 80
End: 2020-01-01

## 2020-01-01 ENCOUNTER — HOSPITAL ENCOUNTER (OUTPATIENT)
Dept: INFUSION THERAPY | Facility: HOSPITAL | Age: 80
Setting detail: INFUSION SERIES
Discharge: HOME | End: 2020-08-06

## 2020-01-01 ENCOUNTER — APPOINTMENT (OUTPATIENT)
Dept: LAB | Facility: HOSPITAL | Age: 80
End: 2020-01-01

## 2020-01-01 ENCOUNTER — OFFICE VISIT (OUTPATIENT)
Dept: CARDIOLOGY | Facility: CLINIC | Age: 80
End: 2020-01-01

## 2020-01-01 ENCOUNTER — HOSPITAL ENCOUNTER (OUTPATIENT)
Dept: CARDIOLOGY | Facility: HOSPITAL | Age: 80
Discharge: HOME | End: 2020-08-19
Admitting: NURSE PRACTITIONER

## 2020-01-01 ENCOUNTER — TELEPHONE (OUTPATIENT)
Dept: ONCOLOGY | Facility: OTHER | Age: 80
End: 2020-01-01

## 2020-01-01 ENCOUNTER — HOSPITAL ENCOUNTER (INPATIENT)
Facility: HOSPITAL | Age: 80
LOS: 2 days | Discharge: HOME HEALTH | End: 2020-01-04
Attending: EMERGENCY MEDICINE | Admitting: HOSPITALIST

## 2020-01-01 ENCOUNTER — TELEMEDICINE (OUTPATIENT)
Dept: CARDIOLOGY | Facility: CLINIC | Age: 80
End: 2020-01-01

## 2020-01-01 ENCOUNTER — HOSPITAL ENCOUNTER (INPATIENT)
Facility: HOSPITAL | Age: 80
LOS: 5 days | Discharge: HOME | End: 2020-06-28
Attending: EMERGENCY MEDICINE | Admitting: INTERNAL MEDICINE

## 2020-01-01 VITALS
RESPIRATION RATE: 20 BRPM | TEMPERATURE: 97.7 F | BODY MASS INDEX: 29.56 KG/M2 | OXYGEN SATURATION: 98 % | SYSTOLIC BLOOD PRESSURE: 145 MMHG | DIASTOLIC BLOOD PRESSURE: 72 MMHG | HEART RATE: 61 BPM | HEIGHT: 70 IN

## 2020-01-01 VITALS
SYSTOLIC BLOOD PRESSURE: 124 MMHG | DIASTOLIC BLOOD PRESSURE: 75 MMHG | TEMPERATURE: 98.9 F | WEIGHT: 206 LBS | HEART RATE: 65 BPM | HEIGHT: 70 IN | RESPIRATION RATE: 18 BRPM | OXYGEN SATURATION: 100 % | BODY MASS INDEX: 29.49 KG/M2

## 2020-01-01 VITALS
HEART RATE: 102 BPM | SYSTOLIC BLOOD PRESSURE: 127 MMHG | HEART RATE: 79 BPM | TEMPERATURE: 98.4 F | WEIGHT: 224 LBS | TEMPERATURE: 98.1 F | OXYGEN SATURATION: 81 % | SYSTOLIC BLOOD PRESSURE: 155 MMHG | BODY MASS INDEX: 32.14 KG/M2 | RESPIRATION RATE: 16 BRPM | DIASTOLIC BLOOD PRESSURE: 74 MMHG | DIASTOLIC BLOOD PRESSURE: 94 MMHG

## 2020-01-01 VITALS
BODY MASS INDEX: 30.71 KG/M2 | RESPIRATION RATE: 10 BRPM | RESPIRATION RATE: 16 BRPM | WEIGHT: 206 LBS | TEMPERATURE: 98.2 F | HEART RATE: 64 BPM | TEMPERATURE: 98.4 F | OXYGEN SATURATION: 97 % | BODY MASS INDEX: 29.49 KG/M2 | HEIGHT: 70 IN | DIASTOLIC BLOOD PRESSURE: 74 MMHG | DIASTOLIC BLOOD PRESSURE: 76 MMHG | SYSTOLIC BLOOD PRESSURE: 117 MMHG | HEIGHT: 70 IN | HEART RATE: 58 BPM | OXYGEN SATURATION: 98 % | SYSTOLIC BLOOD PRESSURE: 150 MMHG

## 2020-01-01 VITALS
DIASTOLIC BLOOD PRESSURE: 73 MMHG | TEMPERATURE: 97.1 F | HEART RATE: 66 BPM | DIASTOLIC BLOOD PRESSURE: 74 MMHG | HEIGHT: 70 IN | RESPIRATION RATE: 18 BRPM | SYSTOLIC BLOOD PRESSURE: 137 MMHG | OXYGEN SATURATION: 100 % | WEIGHT: 205 LBS | SYSTOLIC BLOOD PRESSURE: 143 MMHG | BODY MASS INDEX: 29.35 KG/M2 | HEART RATE: 60 BPM

## 2020-01-01 VITALS
OXYGEN SATURATION: 92 % | DIASTOLIC BLOOD PRESSURE: 58 MMHG | WEIGHT: 220.8 LBS | HEART RATE: 65 BPM | TEMPERATURE: 97.6 F | SYSTOLIC BLOOD PRESSURE: 118 MMHG | BODY MASS INDEX: 31.68 KG/M2

## 2020-01-01 VITALS
RESPIRATION RATE: 18 BRPM | DIASTOLIC BLOOD PRESSURE: 67 MMHG | TEMPERATURE: 96.9 F | SYSTOLIC BLOOD PRESSURE: 155 MMHG | OXYGEN SATURATION: 88 % | HEART RATE: 79 BPM

## 2020-01-01 VITALS
HEIGHT: 70 IN | DIASTOLIC BLOOD PRESSURE: 68 MMHG | SYSTOLIC BLOOD PRESSURE: 132 MMHG | HEART RATE: 68 BPM | TEMPERATURE: 98.6 F | WEIGHT: 222 LBS | OXYGEN SATURATION: 83 % | BODY MASS INDEX: 31.78 KG/M2 | RESPIRATION RATE: 16 BRPM

## 2020-01-01 VITALS
SYSTOLIC BLOOD PRESSURE: 150 MMHG | OXYGEN SATURATION: 93 % | HEIGHT: 70 IN | SYSTOLIC BLOOD PRESSURE: 143 MMHG | HEART RATE: 66 BPM | HEART RATE: 66 BPM | WEIGHT: 219 LBS | DIASTOLIC BLOOD PRESSURE: 76 MMHG | TEMPERATURE: 97.4 F | BODY MASS INDEX: 31.35 KG/M2 | OXYGEN SATURATION: 95 % | DIASTOLIC BLOOD PRESSURE: 78 MMHG | TEMPERATURE: 98 F

## 2020-01-01 VITALS
HEIGHT: 69 IN | RESPIRATION RATE: 18 BRPM | WEIGHT: 204 LBS | OXYGEN SATURATION: 100 % | BODY MASS INDEX: 30.21 KG/M2 | DIASTOLIC BLOOD PRESSURE: 84 MMHG | TEMPERATURE: 97.3 F | SYSTOLIC BLOOD PRESSURE: 131 MMHG | HEART RATE: 63 BPM

## 2020-01-01 VITALS
WEIGHT: 203.5 LBS | RESPIRATION RATE: 16 BRPM | TEMPERATURE: 97.5 F | HEIGHT: 70 IN | DIASTOLIC BLOOD PRESSURE: 57 MMHG | SYSTOLIC BLOOD PRESSURE: 115 MMHG | BODY MASS INDEX: 29.13 KG/M2 | HEART RATE: 81 BPM | OXYGEN SATURATION: 90 %

## 2020-01-01 VITALS
BODY MASS INDEX: 32.2 KG/M2 | WEIGHT: 224.4 LBS | OXYGEN SATURATION: 92 % | TEMPERATURE: 98.3 F | RESPIRATION RATE: 16 BRPM | DIASTOLIC BLOOD PRESSURE: 77 MMHG | SYSTOLIC BLOOD PRESSURE: 153 MMHG | HEART RATE: 66 BPM

## 2020-01-01 VITALS
SYSTOLIC BLOOD PRESSURE: 167 MMHG | OXYGEN SATURATION: 99 % | DIASTOLIC BLOOD PRESSURE: 87 MMHG | RESPIRATION RATE: 18 BRPM | TEMPERATURE: 97.1 F | HEART RATE: 70 BPM

## 2020-01-01 VITALS
HEIGHT: 70 IN | BODY MASS INDEX: 29.56 KG/M2 | DIASTOLIC BLOOD PRESSURE: 57 MMHG | SYSTOLIC BLOOD PRESSURE: 117 MMHG | HEART RATE: 69 BPM | TEMPERATURE: 98.4 F | OXYGEN SATURATION: 97 % | RESPIRATION RATE: 18 BRPM

## 2020-01-01 VITALS
OXYGEN SATURATION: 89 % | TEMPERATURE: 98 F | WEIGHT: 225 LBS | TEMPERATURE: 98.1 F | SYSTOLIC BLOOD PRESSURE: 152 MMHG | BODY MASS INDEX: 32.28 KG/M2 | DIASTOLIC BLOOD PRESSURE: 71 MMHG | HEART RATE: 62 BPM

## 2020-01-01 VITALS
TEMPERATURE: 98 F | WEIGHT: 216 LBS | OXYGEN SATURATION: 94 % | BODY MASS INDEX: 30.92 KG/M2 | HEIGHT: 70 IN | RESPIRATION RATE: 18 BRPM | DIASTOLIC BLOOD PRESSURE: 73 MMHG | HEART RATE: 92 BPM | SYSTOLIC BLOOD PRESSURE: 137 MMHG

## 2020-01-01 VITALS
DIASTOLIC BLOOD PRESSURE: 77 MMHG | TEMPERATURE: 98.8 F | HEIGHT: 70 IN | BODY MASS INDEX: 32.35 KG/M2 | SYSTOLIC BLOOD PRESSURE: 136 MMHG | SYSTOLIC BLOOD PRESSURE: 114 MMHG | RESPIRATION RATE: 18 BRPM | HEART RATE: 51 BPM | SYSTOLIC BLOOD PRESSURE: 104 MMHG | WEIGHT: 226 LBS | DIASTOLIC BLOOD PRESSURE: 55 MMHG | HEART RATE: 66 BPM | RESPIRATION RATE: 18 BRPM | BODY MASS INDEX: 30.64 KG/M2 | HEIGHT: 70 IN | OXYGEN SATURATION: 96 % | TEMPERATURE: 97.8 F | TEMPERATURE: 98.8 F | HEIGHT: 70 IN | DIASTOLIC BLOOD PRESSURE: 81 MMHG | OXYGEN SATURATION: 97 % | OXYGEN SATURATION: 92 % | WEIGHT: 214 LBS | RESPIRATION RATE: 18 BRPM | HEART RATE: 69 BPM | BODY MASS INDEX: 29.27 KG/M2

## 2020-01-01 VITALS — HEART RATE: 65 BPM | DIASTOLIC BLOOD PRESSURE: 72 MMHG | SYSTOLIC BLOOD PRESSURE: 122 MMHG

## 2020-01-01 VITALS
SYSTOLIC BLOOD PRESSURE: 123 MMHG | OXYGEN SATURATION: 95 % | HEART RATE: 68 BPM | RESPIRATION RATE: 18 BRPM | TEMPERATURE: 97.1 F | DIASTOLIC BLOOD PRESSURE: 72 MMHG

## 2020-01-01 VITALS — SYSTOLIC BLOOD PRESSURE: 114 MMHG | DIASTOLIC BLOOD PRESSURE: 72 MMHG | HEART RATE: 64 BPM

## 2020-01-01 VITALS
RESPIRATION RATE: 18 BRPM | HEART RATE: 67 BPM | BODY MASS INDEX: 30.21 KG/M2 | DIASTOLIC BLOOD PRESSURE: 69 MMHG | HEIGHT: 70 IN | TEMPERATURE: 99.2 F | SYSTOLIC BLOOD PRESSURE: 105 MMHG | OXYGEN SATURATION: 100 % | WEIGHT: 211 LBS

## 2020-01-01 VITALS
SYSTOLIC BLOOD PRESSURE: 148 MMHG | OXYGEN SATURATION: 96 % | RESPIRATION RATE: 16 BRPM | TEMPERATURE: 97 F | DIASTOLIC BLOOD PRESSURE: 77 MMHG | HEART RATE: 75 BPM

## 2020-01-01 VITALS
HEIGHT: 70 IN | RESPIRATION RATE: 18 BRPM | HEART RATE: 64 BPM | DIASTOLIC BLOOD PRESSURE: 64 MMHG | TEMPERATURE: 98.2 F | OXYGEN SATURATION: 98 % | SYSTOLIC BLOOD PRESSURE: 132 MMHG | BODY MASS INDEX: 30.21 KG/M2 | WEIGHT: 211 LBS

## 2020-01-01 VITALS
BODY MASS INDEX: 32.35 KG/M2 | OXYGEN SATURATION: 95 % | SYSTOLIC BLOOD PRESSURE: 153 MMHG | HEIGHT: 70 IN | DIASTOLIC BLOOD PRESSURE: 84 MMHG | TEMPERATURE: 98.6 F | WEIGHT: 226 LBS | RESPIRATION RATE: 18 BRPM | HEART RATE: 69 BPM

## 2020-01-01 VITALS
SYSTOLIC BLOOD PRESSURE: 128 MMHG | WEIGHT: 204 LBS | SYSTOLIC BLOOD PRESSURE: 161 MMHG | TEMPERATURE: 97.9 F | RESPIRATION RATE: 18 BRPM | TEMPERATURE: 97.3 F | TEMPERATURE: 98.2 F | HEART RATE: 58 BPM | WEIGHT: 206.5 LBS | DIASTOLIC BLOOD PRESSURE: 69 MMHG | OXYGEN SATURATION: 100 % | BODY MASS INDEX: 29.56 KG/M2 | DIASTOLIC BLOOD PRESSURE: 82 MMHG | HEART RATE: 67 BPM | HEIGHT: 70 IN | HEIGHT: 70 IN | OXYGEN SATURATION: 93 % | BODY MASS INDEX: 29.2 KG/M2

## 2020-01-01 VITALS — DIASTOLIC BLOOD PRESSURE: 71 MMHG | SYSTOLIC BLOOD PRESSURE: 143 MMHG | HEART RATE: 73 BPM

## 2020-01-01 VITALS — DIASTOLIC BLOOD PRESSURE: 85 MMHG | HEART RATE: 62 BPM | SYSTOLIC BLOOD PRESSURE: 160 MMHG

## 2020-01-01 VITALS
TEMPERATURE: 97.1 F | RESPIRATION RATE: 18 BRPM | DIASTOLIC BLOOD PRESSURE: 65 MMHG | OXYGEN SATURATION: 96 % | HEART RATE: 55 BPM | SYSTOLIC BLOOD PRESSURE: 126 MMHG

## 2020-01-01 VITALS
WEIGHT: 204 LBS | SYSTOLIC BLOOD PRESSURE: 101 MMHG | RESPIRATION RATE: 18 BRPM | OXYGEN SATURATION: 100 % | TEMPERATURE: 97.8 F | RESPIRATION RATE: 20 BRPM | BODY MASS INDEX: 29.7 KG/M2 | OXYGEN SATURATION: 90 % | HEIGHT: 70 IN | HEART RATE: 61 BPM | DIASTOLIC BLOOD PRESSURE: 79 MMHG | HEART RATE: 55 BPM | BODY MASS INDEX: 29.2 KG/M2 | DIASTOLIC BLOOD PRESSURE: 63 MMHG | SYSTOLIC BLOOD PRESSURE: 152 MMHG | WEIGHT: 207 LBS | TEMPERATURE: 97.5 F

## 2020-01-01 VITALS
TEMPERATURE: 97.1 F | HEART RATE: 74 BPM | RESPIRATION RATE: 18 BRPM | OXYGEN SATURATION: 94 % | SYSTOLIC BLOOD PRESSURE: 124 MMHG | DIASTOLIC BLOOD PRESSURE: 53 MMHG

## 2020-01-01 VITALS
RESPIRATION RATE: 16 BRPM | OXYGEN SATURATION: 93 % | SYSTOLIC BLOOD PRESSURE: 134 MMHG | BODY MASS INDEX: 32.43 KG/M2 | HEART RATE: 72 BPM | TEMPERATURE: 97.8 F | BODY MASS INDEX: 32.14 KG/M2 | WEIGHT: 226 LBS | TEMPERATURE: 97.6 F | DIASTOLIC BLOOD PRESSURE: 76 MMHG | WEIGHT: 224 LBS | DIASTOLIC BLOOD PRESSURE: 74 MMHG | HEART RATE: 98 BPM | RESPIRATION RATE: 16 BRPM | OXYGEN SATURATION: 90 % | SYSTOLIC BLOOD PRESSURE: 132 MMHG

## 2020-01-01 VITALS
DIASTOLIC BLOOD PRESSURE: 70 MMHG | HEART RATE: 69 BPM | HEIGHT: 70 IN | SYSTOLIC BLOOD PRESSURE: 140 MMHG | WEIGHT: 222.4 LBS | BODY MASS INDEX: 31.84 KG/M2

## 2020-01-01 VITALS — TEMPERATURE: 97.7 F

## 2020-01-01 VITALS
OXYGEN SATURATION: 77 % | WEIGHT: 222.8 LBS | HEART RATE: 63 BPM | RESPIRATION RATE: 16 BRPM | TEMPERATURE: 98.1 F | DIASTOLIC BLOOD PRESSURE: 62 MMHG | SYSTOLIC BLOOD PRESSURE: 132 MMHG | BODY MASS INDEX: 31.9 KG/M2 | HEIGHT: 70 IN

## 2020-01-01 VITALS
SYSTOLIC BLOOD PRESSURE: 117 MMHG | TEMPERATURE: 97.9 F | WEIGHT: 217 LBS | OXYGEN SATURATION: 92 % | DIASTOLIC BLOOD PRESSURE: 75 MMHG | BODY MASS INDEX: 31.14 KG/M2 | HEART RATE: 58 BPM

## 2020-01-01 VITALS
TEMPERATURE: 97.4 F | DIASTOLIC BLOOD PRESSURE: 76 MMHG | RESPIRATION RATE: 18 BRPM | HEART RATE: 75 BPM | OXYGEN SATURATION: 99 % | SYSTOLIC BLOOD PRESSURE: 141 MMHG

## 2020-01-01 VITALS
HEIGHT: 70 IN | SYSTOLIC BLOOD PRESSURE: 149 MMHG | HEART RATE: 70 BPM | TEMPERATURE: 98.1 F | TEMPERATURE: 98.4 F | OXYGEN SATURATION: 95 % | RESPIRATION RATE: 16 BRPM | OXYGEN SATURATION: 99 % | SYSTOLIC BLOOD PRESSURE: 123 MMHG | WEIGHT: 206 LBS | WEIGHT: 217 LBS | HEART RATE: 65 BPM | DIASTOLIC BLOOD PRESSURE: 77 MMHG | RESPIRATION RATE: 18 BRPM | BODY MASS INDEX: 29.49 KG/M2 | DIASTOLIC BLOOD PRESSURE: 78 MMHG | BODY MASS INDEX: 31.14 KG/M2

## 2020-01-01 VITALS
BODY MASS INDEX: 29.56 KG/M2 | DIASTOLIC BLOOD PRESSURE: 74 MMHG | WEIGHT: 206 LBS | TEMPERATURE: 97.7 F | OXYGEN SATURATION: 95 % | SYSTOLIC BLOOD PRESSURE: 122 MMHG | HEART RATE: 69 BPM

## 2020-01-01 VITALS
OXYGEN SATURATION: 96 % | HEART RATE: 93 BPM | SYSTOLIC BLOOD PRESSURE: 132 MMHG | DIASTOLIC BLOOD PRESSURE: 67 MMHG | TEMPERATURE: 97.6 F | RESPIRATION RATE: 22 BRPM

## 2020-01-01 VITALS
SYSTOLIC BLOOD PRESSURE: 141 MMHG | RESPIRATION RATE: 18 BRPM | HEART RATE: 68 BPM | BODY MASS INDEX: 30.49 KG/M2 | TEMPERATURE: 98.5 F | WEIGHT: 213 LBS | OXYGEN SATURATION: 98 % | HEIGHT: 70 IN | DIASTOLIC BLOOD PRESSURE: 76 MMHG

## 2020-01-01 VITALS
OXYGEN SATURATION: 97 % | DIASTOLIC BLOOD PRESSURE: 83 MMHG | RESPIRATION RATE: 18 BRPM | BODY MASS INDEX: 30.45 KG/M2 | WEIGHT: 205.6 LBS | HEIGHT: 69 IN | HEART RATE: 63 BPM | TEMPERATURE: 98.4 F | SYSTOLIC BLOOD PRESSURE: 167 MMHG

## 2020-01-01 VITALS
DIASTOLIC BLOOD PRESSURE: 72 MMHG | HEART RATE: 98 BPM | DIASTOLIC BLOOD PRESSURE: 77 MMHG | TEMPERATURE: 98.3 F | OXYGEN SATURATION: 98 % | RESPIRATION RATE: 20 BRPM | HEART RATE: 61 BPM | SYSTOLIC BLOOD PRESSURE: 128 MMHG | SYSTOLIC BLOOD PRESSURE: 151 MMHG

## 2020-01-01 VITALS
OXYGEN SATURATION: 97 % | RESPIRATION RATE: 18 BRPM | HEART RATE: 69 BPM | DIASTOLIC BLOOD PRESSURE: 65 MMHG | TEMPERATURE: 98 F | SYSTOLIC BLOOD PRESSURE: 144 MMHG

## 2020-01-01 VITALS
DIASTOLIC BLOOD PRESSURE: 64 MMHG | SYSTOLIC BLOOD PRESSURE: 118 MMHG | TEMPERATURE: 98 F | RESPIRATION RATE: 18 BRPM | OXYGEN SATURATION: 96 % | HEART RATE: 58 BPM

## 2020-01-01 VITALS
HEART RATE: 65 BPM | WEIGHT: 226 LBS | SYSTOLIC BLOOD PRESSURE: 132 MMHG | TEMPERATURE: 98.8 F | RESPIRATION RATE: 18 BRPM | DIASTOLIC BLOOD PRESSURE: 73 MMHG | BODY MASS INDEX: 32.72 KG/M2 | OXYGEN SATURATION: 99 %

## 2020-01-01 VITALS
HEIGHT: 70 IN | DIASTOLIC BLOOD PRESSURE: 77 MMHG | TEMPERATURE: 98.6 F | BODY MASS INDEX: 29.7 KG/M2 | SYSTOLIC BLOOD PRESSURE: 123 MMHG | OXYGEN SATURATION: 95 % | HEART RATE: 78 BPM | RESPIRATION RATE: 18 BRPM

## 2020-01-01 VITALS
SYSTOLIC BLOOD PRESSURE: 154 MMHG | HEART RATE: 64 BPM | TEMPERATURE: 97.7 F | RESPIRATION RATE: 20 BRPM | OXYGEN SATURATION: 96 % | DIASTOLIC BLOOD PRESSURE: 74 MMHG

## 2020-01-01 VITALS
HEART RATE: 61 BPM | WEIGHT: 207 LBS | HEIGHT: 70 IN | BODY MASS INDEX: 29.63 KG/M2 | TEMPERATURE: 97.7 F | OXYGEN SATURATION: 93 % | RESPIRATION RATE: 18 BRPM | SYSTOLIC BLOOD PRESSURE: 105 MMHG | DIASTOLIC BLOOD PRESSURE: 64 MMHG | TEMPERATURE: 98.2 F

## 2020-01-01 VITALS
HEART RATE: 57 BPM | SYSTOLIC BLOOD PRESSURE: 118 MMHG | RESPIRATION RATE: 18 BRPM | TEMPERATURE: 98.4 F | DIASTOLIC BLOOD PRESSURE: 69 MMHG | OXYGEN SATURATION: 85 %

## 2020-01-01 VITALS
BODY MASS INDEX: 29.49 KG/M2 | HEART RATE: 75 BPM | SYSTOLIC BLOOD PRESSURE: 155 MMHG | HEIGHT: 70 IN | RESPIRATION RATE: 18 BRPM | DIASTOLIC BLOOD PRESSURE: 74 MMHG | OXYGEN SATURATION: 95 % | TEMPERATURE: 97.7 F | WEIGHT: 206 LBS

## 2020-01-01 VITALS
WEIGHT: 204 LBS | DIASTOLIC BLOOD PRESSURE: 76 MMHG | HEIGHT: 70 IN | OXYGEN SATURATION: 100 % | HEART RATE: 81 BPM | TEMPERATURE: 97.5 F | BODY MASS INDEX: 29.2 KG/M2 | SYSTOLIC BLOOD PRESSURE: 137 MMHG | RESPIRATION RATE: 18 BRPM

## 2020-01-01 VITALS
SYSTOLIC BLOOD PRESSURE: 132 MMHG | HEIGHT: 70 IN | HEART RATE: 67 BPM | BODY MASS INDEX: 31.5 KG/M2 | OXYGEN SATURATION: 90 % | WEIGHT: 220 LBS | DIASTOLIC BLOOD PRESSURE: 62 MMHG

## 2020-01-01 VITALS — TEMPERATURE: 96.9 F | HEIGHT: 70 IN | TEMPERATURE: 97.5 F | BODY MASS INDEX: 29.56 KG/M2

## 2020-01-01 VITALS
OXYGEN SATURATION: 96 % | DIASTOLIC BLOOD PRESSURE: 75 MMHG | HEART RATE: 67 BPM | SYSTOLIC BLOOD PRESSURE: 126 MMHG | RESPIRATION RATE: 16 BRPM | TEMPERATURE: 97.4 F

## 2020-01-01 VITALS — HEART RATE: 55 BPM | DIASTOLIC BLOOD PRESSURE: 70 MMHG | SYSTOLIC BLOOD PRESSURE: 124 MMHG

## 2020-01-01 VITALS
WEIGHT: 206 LBS | SYSTOLIC BLOOD PRESSURE: 116 MMHG | HEART RATE: 67 BPM | HEIGHT: 70 IN | DIASTOLIC BLOOD PRESSURE: 50 MMHG | BODY MASS INDEX: 29.49 KG/M2

## 2020-01-01 VITALS
RESPIRATION RATE: 18 BRPM | OXYGEN SATURATION: 98 % | HEART RATE: 63 BPM | TEMPERATURE: 97.4 F | DIASTOLIC BLOOD PRESSURE: 69 MMHG | SYSTOLIC BLOOD PRESSURE: 149 MMHG

## 2020-01-01 VITALS
DIASTOLIC BLOOD PRESSURE: 69 MMHG | RESPIRATION RATE: 16 BRPM | HEART RATE: 85 BPM | WEIGHT: 217.3 LBS | TEMPERATURE: 97.5 F | HEIGHT: 70 IN | OXYGEN SATURATION: 93 % | SYSTOLIC BLOOD PRESSURE: 137 MMHG | BODY MASS INDEX: 31.11 KG/M2

## 2020-01-01 VITALS
HEART RATE: 67 BPM | SYSTOLIC BLOOD PRESSURE: 142 MMHG | DIASTOLIC BLOOD PRESSURE: 84 MMHG | BODY MASS INDEX: 29.48 KG/M2 | WEIGHT: 205.91 LBS | HEIGHT: 70 IN

## 2020-01-01 VITALS
HEART RATE: 52 BPM | SYSTOLIC BLOOD PRESSURE: 127 MMHG | TEMPERATURE: 97 F | OXYGEN SATURATION: 100 % | RESPIRATION RATE: 18 BRPM | DIASTOLIC BLOOD PRESSURE: 63 MMHG

## 2020-01-01 VITALS
DIASTOLIC BLOOD PRESSURE: 85 MMHG | BODY MASS INDEX: 31.94 KG/M2 | TEMPERATURE: 98 F | OXYGEN SATURATION: 86 % | WEIGHT: 222.6 LBS | HEART RATE: 60 BPM | SYSTOLIC BLOOD PRESSURE: 147 MMHG

## 2020-01-01 VITALS
TEMPERATURE: 97.2 F | RESPIRATION RATE: 16 BRPM | HEART RATE: 61 BPM | OXYGEN SATURATION: 98 % | DIASTOLIC BLOOD PRESSURE: 75 MMHG | SYSTOLIC BLOOD PRESSURE: 162 MMHG

## 2020-01-01 VITALS
WEIGHT: 222 LBS | DIASTOLIC BLOOD PRESSURE: 76 MMHG | BODY MASS INDEX: 31.85 KG/M2 | HEART RATE: 83 BPM | SYSTOLIC BLOOD PRESSURE: 147 MMHG

## 2020-01-01 DIAGNOSIS — D46.9 MYELODYSPLASIA (MYELODYSPLASTIC SYNDROME) (HCC): ICD-10-CM

## 2020-01-01 DIAGNOSIS — D80.1 HYPOGAMMAGLOBULINEMIA (HCC): ICD-10-CM

## 2020-01-01 DIAGNOSIS — I50.33 ACUTE ON CHRONIC DIASTOLIC (CONGESTIVE) HEART FAILURE (HCC): ICD-10-CM

## 2020-01-01 DIAGNOSIS — D46.9 MYELODYSPLASIA (MYELODYSPLASTIC SYNDROME) (HCC): Primary | ICD-10-CM

## 2020-01-01 DIAGNOSIS — D69.6 THROMBOCYTOPENIA (HCC): ICD-10-CM

## 2020-01-01 DIAGNOSIS — C34.32 MALIGNANT NEOPLASM OF LOWER LOBE OF LEFT LUNG (HCC): Primary | ICD-10-CM

## 2020-01-01 DIAGNOSIS — Z87.09 HISTORY OF COPD: ICD-10-CM

## 2020-01-01 DIAGNOSIS — D61.818 PANCYTOPENIA (HCC): ICD-10-CM

## 2020-01-01 DIAGNOSIS — R09.02 HYPOXEMIA: ICD-10-CM

## 2020-01-01 DIAGNOSIS — J18.9 MULTIFOCAL PNEUMONIA: Primary | ICD-10-CM

## 2020-01-01 DIAGNOSIS — Z99.89 OSA ON CPAP: ICD-10-CM

## 2020-01-01 DIAGNOSIS — C34.32 MALIGNANT NEOPLASM OF LOWER LOBE OF LEFT LUNG (HCC): ICD-10-CM

## 2020-01-01 DIAGNOSIS — F41.9 ANXIETY: ICD-10-CM

## 2020-01-01 DIAGNOSIS — I50.32 CHRONIC DIASTOLIC CONGESTIVE HEART FAILURE (HCC): ICD-10-CM

## 2020-01-01 DIAGNOSIS — J18.9 PNEUMONIA OF RIGHT LOWER LOBE DUE TO INFECTIOUS ORGANISM: ICD-10-CM

## 2020-01-01 DIAGNOSIS — J96.11 CHRONIC RESPIRATORY FAILURE WITH HYPOXIA (HCC): ICD-10-CM

## 2020-01-01 DIAGNOSIS — E11.9 TYPE 2 DIABETES MELLITUS WITHOUT COMPLICATION, WITHOUT LONG-TERM CURRENT USE OF INSULIN (HCC): ICD-10-CM

## 2020-01-01 DIAGNOSIS — I50.32 CHRONIC DIASTOLIC CHF (CONGESTIVE HEART FAILURE) (HCC): Primary | ICD-10-CM

## 2020-01-01 DIAGNOSIS — I48.0 PAROXYSMAL ATRIAL FIBRILLATION (HCC): ICD-10-CM

## 2020-01-01 DIAGNOSIS — B37.0 THRUSH, ORAL: Primary | ICD-10-CM

## 2020-01-01 DIAGNOSIS — D53.9 MACROCYTIC ANEMIA: ICD-10-CM

## 2020-01-01 DIAGNOSIS — H66.90 ACUTE OTITIS MEDIA, UNSPECIFIED OTITIS MEDIA TYPE: Primary | ICD-10-CM

## 2020-01-01 DIAGNOSIS — I71.20 THORACIC AORTIC ANEURYSM WITHOUT RUPTURE (HCC): ICD-10-CM

## 2020-01-01 DIAGNOSIS — I10 ESSENTIAL HYPERTENSION: ICD-10-CM

## 2020-01-01 DIAGNOSIS — D80.1 HYPOGAMMAGLOBULINEMIA (HCC): Primary | ICD-10-CM

## 2020-01-01 DIAGNOSIS — I35.0 NONRHEUMATIC AORTIC VALVE STENOSIS: ICD-10-CM

## 2020-01-01 DIAGNOSIS — N18.30 CHRONIC KIDNEY DISEASE, STAGE III (MODERATE) (HCC): Primary | ICD-10-CM

## 2020-01-01 DIAGNOSIS — N28.9 ABNORMAL RENAL FUNCTION: Primary | ICD-10-CM

## 2020-01-01 DIAGNOSIS — D49.2 SOLITARY FIBROUS TUMOR: ICD-10-CM

## 2020-01-01 DIAGNOSIS — I50.33 ACUTE ON CHRONIC DIASTOLIC CHF (CONGESTIVE HEART FAILURE) (HCC): ICD-10-CM

## 2020-01-01 DIAGNOSIS — E78.2 MIXED HYPERLIPIDEMIA: ICD-10-CM

## 2020-01-01 DIAGNOSIS — J44.1 COPD WITH ACUTE EXACERBATION (HCC): ICD-10-CM

## 2020-01-01 DIAGNOSIS — I50.32 CHRONIC DIASTOLIC CONGESTIVE HEART FAILURE (HCC): Primary | ICD-10-CM

## 2020-01-01 DIAGNOSIS — R07.81 RIB PAIN ON RIGHT SIDE: Primary | ICD-10-CM

## 2020-01-01 DIAGNOSIS — Z09 HOSPITAL DISCHARGE FOLLOW-UP: ICD-10-CM

## 2020-01-01 DIAGNOSIS — N18.9 CHRONIC KIDNEY DISEASE, UNSPECIFIED CKD STAGE: Primary | ICD-10-CM

## 2020-01-01 DIAGNOSIS — D69.6 THROMBOCYTOPENIA (HCC): Primary | ICD-10-CM

## 2020-01-01 DIAGNOSIS — B37.0 THRUSH, ORAL: ICD-10-CM

## 2020-01-01 DIAGNOSIS — I50.33 ACUTE ON CHRONIC DIASTOLIC CHF (CONGESTIVE HEART FAILURE) (HCC): Primary | ICD-10-CM

## 2020-01-01 DIAGNOSIS — R91.8 PULMONARY INFILTRATE: Primary | ICD-10-CM

## 2020-01-01 DIAGNOSIS — G47.33 OSA ON CPAP: ICD-10-CM

## 2020-01-01 DIAGNOSIS — J18.9 PNEUMONIA OF RIGHT LOWER LOBE DUE TO INFECTIOUS ORGANISM: Primary | ICD-10-CM

## 2020-01-01 DIAGNOSIS — N18.30 CHRONIC KIDNEY DISEASE, STAGE III (MODERATE) (HCC): ICD-10-CM

## 2020-01-01 DIAGNOSIS — R07.81 RIB PAIN ON RIGHT SIDE: ICD-10-CM

## 2020-01-01 DIAGNOSIS — E78.2 MIXED HYPERLIPIDEMIA: Primary | ICD-10-CM

## 2020-01-01 DIAGNOSIS — R91.8 PULMONARY INFILTRATE: ICD-10-CM

## 2020-01-01 DIAGNOSIS — J44.9 CHRONIC OBSTRUCTIVE PULMONARY DISEASE, UNSPECIFIED COPD TYPE (HCC): ICD-10-CM

## 2020-01-01 DIAGNOSIS — E66.09 CLASS 1 OBESITY DUE TO EXCESS CALORIES WITH SERIOUS COMORBIDITY AND BODY MASS INDEX (BMI) OF 33.0 TO 33.9 IN ADULT: ICD-10-CM

## 2020-01-01 LAB
ABO + RH BLD: NORMAL
ABO + RH BLD: NORMAL
ABO GROUP BLD: NORMAL
ALBUMIN SERPL-MCNC: 3.8 G/DL (ref 3.5–5.2)
ALBUMIN SERPL-MCNC: 3.9 G/DL (ref 3.5–5.2)
ALBUMIN SERPL-MCNC: 4 G/DL (ref 3.5–5.2)
ALBUMIN SERPL-MCNC: 4 G/DL (ref 3.5–5.2)
ALBUMIN SERPL-MCNC: 4.1 G/DL (ref 3.5–5.2)
ALBUMIN SERPL-MCNC: 4.1 G/DL (ref 3.5–5.2)
ALBUMIN SERPL-MCNC: 4.3 G/DL (ref 3.5–5.2)
ALBUMIN SERPL-MCNC: 4.4 G/DL (ref 3.5–5.2)
ALBUMIN SERPL-MCNC: 4.4 G/DL (ref 3.5–5.2)
ALBUMIN SERPL-MCNC: 4.7 G/DL (ref 3.5–5.2)
ALBUMIN/GLOB SERPL: 1.3 G/DL
ALBUMIN/GLOB SERPL: 1.4 G/DL
ALBUMIN/GLOB SERPL: 1.4 G/DL
ALBUMIN/GLOB SERPL: 1.5 G/DL
ALBUMIN/GLOB SERPL: 1.5 G/DL
ALBUMIN/GLOB SERPL: 1.6 G/DL
ALBUMIN/GLOB SERPL: 2 G/DL
ALBUMIN/GLOB SERPL: 2.3 G/DL
ALBUMIN/GLOB SERPL: 2.6 G/DL
ALP SERPL-CCNC: 67 U/L (ref 39–117)
ALP SERPL-CCNC: 67 U/L (ref 39–117)
ALP SERPL-CCNC: 68 U/L (ref 39–117)
ALP SERPL-CCNC: 68 U/L (ref 39–117)
ALP SERPL-CCNC: 74 U/L (ref 39–117)
ALP SERPL-CCNC: 77 U/L (ref 39–117)
ALP SERPL-CCNC: 78 U/L (ref 39–117)
ALP SERPL-CCNC: 79 U/L (ref 39–117)
ALP SERPL-CCNC: 80 U/L (ref 39–117)
ALP SERPL-CCNC: 80 U/L (ref 39–117)
ALP SERPL-CCNC: 88 U/L (ref 39–117)
ALP SERPL-CCNC: 96 U/L (ref 39–117)
ALT SERPL W P-5'-P-CCNC: 10 U/L (ref 1–41)
ALT SERPL W P-5'-P-CCNC: 11 U/L (ref 1–41)
ALT SERPL W P-5'-P-CCNC: 13 U/L (ref 1–41)
ALT SERPL W P-5'-P-CCNC: 7 U/L (ref 1–41)
ALT SERPL W P-5'-P-CCNC: 7 U/L (ref 1–41)
ALT SERPL W P-5'-P-CCNC: 8 U/L (ref 1–41)
ALT SERPL W P-5'-P-CCNC: 9 U/L (ref 1–41)
ANION GAP SERPL CALCULATED.3IONS-SCNC: 10.3 MMOL/L (ref 5–15)
ANION GAP SERPL CALCULATED.3IONS-SCNC: 10.3 MMOL/L (ref 5–15)
ANION GAP SERPL CALCULATED.3IONS-SCNC: 11 MMOL/L (ref 5–15)
ANION GAP SERPL CALCULATED.3IONS-SCNC: 11.3 MMOL/L (ref 5–15)
ANION GAP SERPL CALCULATED.3IONS-SCNC: 11.5 MMOL/L (ref 5–15)
ANION GAP SERPL CALCULATED.3IONS-SCNC: 11.6 MMOL/L (ref 5–15)
ANION GAP SERPL CALCULATED.3IONS-SCNC: 13.3 MMOL/L (ref 5–15)
ANION GAP SERPL CALCULATED.3IONS-SCNC: 14.8 MMOL/L (ref 5–15)
ANION GAP SERPL CALCULATED.3IONS-SCNC: 6.5 MMOL/L (ref 5–15)
ANION GAP SERPL CALCULATED.3IONS-SCNC: 7.5 MMOL/L (ref 5–15)
ANION GAP SERPL CALCULATED.3IONS-SCNC: 7.8 MMOL/L (ref 5–15)
ANION GAP SERPL CALCULATED.3IONS-SCNC: 8.2 MMOL/L (ref 5–15)
ANION GAP SERPL CALCULATED.3IONS-SCNC: 8.3 MMOL/L (ref 5–15)
ANION GAP SERPL CALCULATED.3IONS-SCNC: 8.4 MMOL/L (ref 5–15)
ANION GAP SERPL CALCULATED.3IONS-SCNC: 8.7 MMOL/L (ref 5–15)
ANION GAP SERPL CALCULATED.3IONS-SCNC: 8.9 MMOL/L (ref 5–15)
ANION GAP SERPL CALCULATED.3IONS-SCNC: 8.9 MMOL/L (ref 5–15)
ANION GAP SERPL CALCULATED.3IONS-SCNC: 9.9 MMOL/L (ref 5–15)
ANISOCYTOSIS BLD QL: ABNORMAL
ARTERIAL PATENCY WRIST A: POSITIVE
ASCENDING AORTA: 3.2 CM
AST SERPL-CCNC: 11 U/L (ref 1–40)
AST SERPL-CCNC: 14 U/L (ref 1–40)
AST SERPL-CCNC: 14 U/L (ref 1–40)
AST SERPL-CCNC: 15 U/L (ref 1–40)
AST SERPL-CCNC: 16 U/L (ref 1–40)
AST SERPL-CCNC: 18 U/L (ref 1–40)
AST SERPL-CCNC: 18 U/L (ref 1–40)
AST SERPL-CCNC: 27 U/L (ref 1–40)
AST SERPL-CCNC: 51 U/L (ref 1–40)
ATMOSPHERIC PRESS: 744.6 MMHG
B PARAPERT DNA SPEC QL NAA+PROBE: NOT DETECTED
B PERT DNA SPEC QL NAA+PROBE: NOT DETECTED
BACTERIA SPEC AEROBE CULT: NORMAL
BACTERIA SPEC AEROBE CULT: NORMAL
BACTERIA SPEC RESP CULT: NORMAL
BACTERIA UR QL AUTO: ABNORMAL /HPF
BASE EXCESS BLDA CALC-SCNC: 4.4 MMOL/L (ref 0–2)
BASO STIPL COARSE BLD QL SMEAR: ABNORMAL
BASOPHILS # BLD AUTO: 0.01 10*3/MM3 (ref 0–0.2)
BASOPHILS # BLD AUTO: 0.02 10*3/MM3 (ref 0–0.2)
BASOPHILS # BLD AUTO: 0.03 10*3/MM3 (ref 0–0.2)
BASOPHILS # BLD AUTO: 0.04 10*3/MM3 (ref 0–0.2)
BASOPHILS # BLD MANUAL: 0.02 10*3/MM3 (ref 0–0.2)
BASOPHILS # BLD MANUAL: 0.14 10*3/MM3 (ref 0–0.2)
BASOPHILS NFR BLD AUTO: 0.3 % (ref 0–1.5)
BASOPHILS NFR BLD AUTO: 0.4 % (ref 0–1.5)
BASOPHILS NFR BLD AUTO: 0.4 % (ref 0–1.5)
BASOPHILS NFR BLD AUTO: 0.5 % (ref 0–1.5)
BASOPHILS NFR BLD AUTO: 0.5 % (ref 0–1.5)
BASOPHILS NFR BLD AUTO: 0.6 % (ref 0–1.5)
BASOPHILS NFR BLD AUTO: 0.8 % (ref 0–1.5)
BASOPHILS NFR BLD AUTO: 1 % (ref 0–1.5)
BASOPHILS NFR BLD AUTO: 1.1 % (ref 0–1.5)
BASOPHILS NFR BLD AUTO: 1.4 % (ref 0–1.5)
BASOPHILS NFR BLD AUTO: 2 % (ref 0–1.5)
BDY SITE: ABNORMAL
BH BB BLOOD EXPIRATION DATE: NORMAL
BH BB BLOOD TYPE BARCODE: 5100
BH BB BLOOD TYPE BARCODE: 9500
BH BB DISPENSE STATUS: NORMAL
BH BB PRODUCT CODE: NORMAL
BH BB UNIT NUMBER: NORMAL
BH CV ECHO MEAS - ACS: 2 CM
BH CV ECHO MEAS - AI DEC SLOPE: 325 CM/SEC^2
BH CV ECHO MEAS - AI MAX PG: 105.3 MMHG
BH CV ECHO MEAS - AI MAX VEL: 513 CM/SEC
BH CV ECHO MEAS - AI P1/2T: 462.3 MSEC
BH CV ECHO MEAS - AO MAX PG (FULL): 15.4 MMHG
BH CV ECHO MEAS - AO MAX PG: 21.2 MMHG
BH CV ECHO MEAS - AO MEAN PG (FULL): 7 MMHG
BH CV ECHO MEAS - AO MEAN PG: 10 MMHG
BH CV ECHO MEAS - AO ROOT AREA (BSA CORRECTED): 1.9
BH CV ECHO MEAS - AO ROOT AREA: 12.6 CM^2
BH CV ECHO MEAS - AO ROOT DIAM: 4 CM
BH CV ECHO MEAS - AO V2 MAX: 230 CM/SEC
BH CV ECHO MEAS - AO V2 MEAN: 147 CM/SEC
BH CV ECHO MEAS - AO V2 VTI: 44 CM
BH CV ECHO MEAS - ASC AORTA: 3.2 CM
BH CV ECHO MEAS - AVA(I,A): 1.8 CM^2
BH CV ECHO MEAS - AVA(I,D): 1.8 CM^2
BH CV ECHO MEAS - AVA(V,A): 1.6 CM^2
BH CV ECHO MEAS - AVA(V,D): 1.6 CM^2
BH CV ECHO MEAS - BSA(HAYCOCK): 2.2 M^2
BH CV ECHO MEAS - BSA: 2.1 M^2
BH CV ECHO MEAS - BZI_BMI: 29.6 KILOGRAMS/M^2
BH CV ECHO MEAS - BZI_METRIC_HEIGHT: 177.8 CM
BH CV ECHO MEAS - BZI_METRIC_WEIGHT: 93.4 KG
BH CV ECHO MEAS - EDV(MOD-SP2): 116 ML
BH CV ECHO MEAS - EDV(MOD-SP4): 140 ML
BH CV ECHO MEAS - EDV(TEICH): 153.7 ML
BH CV ECHO MEAS - EF(CUBED): 40.9 %
BH CV ECHO MEAS - EF(MOD-BP): 51.5 %
BH CV ECHO MEAS - EF(MOD-SP2): 54.3 %
BH CV ECHO MEAS - EF(MOD-SP4): 50.7 %
BH CV ECHO MEAS - EF(TEICH): 33.4 %
BH CV ECHO MEAS - ESV(MOD-SP2): 53 ML
BH CV ECHO MEAS - ESV(MOD-SP4): 69 ML
BH CV ECHO MEAS - ESV(TEICH): 102.4 ML
BH CV ECHO MEAS - FS: 16.1 %
BH CV ECHO MEAS - IVS/LVPW: 1.1
BH CV ECHO MEAS - IVSD: 1.4 CM
BH CV ECHO MEAS - LAT PEAK E' VEL: 8.7 CM/SEC
BH CV ECHO MEAS - LV DIASTOLIC VOL/BSA (35-75): 66.2 ML/M^2
BH CV ECHO MEAS - LV MASS(C)D: 330.2 GRAMS
BH CV ECHO MEAS - LV MASS(C)DI: 156.2 GRAMS/M^2
BH CV ECHO MEAS - LV MAX PG: 5.8 MMHG
BH CV ECHO MEAS - LV MEAN PG: 3 MMHG
BH CV ECHO MEAS - LV SYSTOLIC VOL/BSA (12-30): 32.6 ML/M^2
BH CV ECHO MEAS - LV V1 MAX: 120 CM/SEC
BH CV ECHO MEAS - LV V1 MEAN: 88.3 CM/SEC
BH CV ECHO MEAS - LV V1 VTI: 24.6 CM
BH CV ECHO MEAS - LVIDD: 5.6 CM
BH CV ECHO MEAS - LVIDS: 4.7 CM
BH CV ECHO MEAS - LVLD AP2: 8.1 CM
BH CV ECHO MEAS - LVLD AP4: 8.3 CM
BH CV ECHO MEAS - LVLS AP2: 6.8 CM
BH CV ECHO MEAS - LVLS AP4: 7.4 CM
BH CV ECHO MEAS - LVOT AREA (M): 3.1 CM^2
BH CV ECHO MEAS - LVOT AREA: 3.1 CM^2
BH CV ECHO MEAS - LVOT DIAM: 2 CM
BH CV ECHO MEAS - LVPWD: 1.3 CM
BH CV ECHO MEAS - MED PEAK E' VEL: 5.3 CM/SEC
BH CV ECHO MEAS - MV A DUR: 0.1 SEC
BH CV ECHO MEAS - MV A MAX VEL: 118 CM/SEC
BH CV ECHO MEAS - MV DEC SLOPE: 502 CM/SEC^2
BH CV ECHO MEAS - MV DEC TIME: 0.18 SEC
BH CV ECHO MEAS - MV E MAX VEL: 63.7 CM/SEC
BH CV ECHO MEAS - MV E/A: 0.54
BH CV ECHO MEAS - MV MAX PG: 8.1 MMHG
BH CV ECHO MEAS - MV MEAN PG: 3 MMHG
BH CV ECHO MEAS - MV P1/2T MAX VEL: 106 CM/SEC
BH CV ECHO MEAS - MV P1/2T: 61.8 MSEC
BH CV ECHO MEAS - MV V2 MAX: 142 CM/SEC
BH CV ECHO MEAS - MV V2 MEAN: 72.8 CM/SEC
BH CV ECHO MEAS - MV V2 VTI: 37.6 CM
BH CV ECHO MEAS - MVA P1/2T LCG: 2.1 CM^2
BH CV ECHO MEAS - MVA(P1/2T): 3.6 CM^2
BH CV ECHO MEAS - MVA(VTI): 2.1 CM^2
BH CV ECHO MEAS - PA MAX PG (FULL): 3.1 MMHG
BH CV ECHO MEAS - PA MAX PG: 6.5 MMHG
BH CV ECHO MEAS - PA V2 MAX: 127 CM/SEC
BH CV ECHO MEAS - PULM A REVS DUR: 0.07 SEC
BH CV ECHO MEAS - PULM A REVS VEL: 31.7 CM/SEC
BH CV ECHO MEAS - PULM DIAS VEL: 47.7 CM/SEC
BH CV ECHO MEAS - PULM S/D: 1.3
BH CV ECHO MEAS - PULM SYS VEL: 60.1 CM/SEC
BH CV ECHO MEAS - PVA(V,A): 4.8 CM^2
BH CV ECHO MEAS - PVA(V,D): 4.8 CM^2
BH CV ECHO MEAS - QP/QS: 1.2
BH CV ECHO MEAS - RAP SYSTOLE: 3 MMHG
BH CV ECHO MEAS - RV MAX PG: 3.4 MMHG
BH CV ECHO MEAS - RV MEAN PG: 2 MMHG
BH CV ECHO MEAS - RV V1 MAX: 92 CM/SEC
BH CV ECHO MEAS - RV V1 MEAN: 61.9 CM/SEC
BH CV ECHO MEAS - RV V1 VTI: 14.4 CM
BH CV ECHO MEAS - RVOT AREA: 6.6 CM^2
BH CV ECHO MEAS - RVOT DIAM: 2.9 CM
BH CV ECHO MEAS - RVSP: 42.7 MMHG
BH CV ECHO MEAS - SI(AO): 261.6 ML/M^2
BH CV ECHO MEAS - SI(CUBED): 34 ML/M^2
BH CV ECHO MEAS - SI(LVOT): 36.6 ML/M^2
BH CV ECHO MEAS - SI(MOD-SP2): 29.8 ML/M^2
BH CV ECHO MEAS - SI(MOD-SP4): 33.6 ML/M^2
BH CV ECHO MEAS - SI(TEICH): 24.3 ML/M^2
BH CV ECHO MEAS - SUP REN AO DIAM: 4.1 CM
BH CV ECHO MEAS - SV(AO): 552.9 ML
BH CV ECHO MEAS - SV(CUBED): 71.8 ML
BH CV ECHO MEAS - SV(LVOT): 77.3 ML
BH CV ECHO MEAS - SV(MOD-SP2): 63 ML
BH CV ECHO MEAS - SV(MOD-SP4): 71 ML
BH CV ECHO MEAS - SV(RVOT): 95.1 ML
BH CV ECHO MEAS - SV(TEICH): 51.3 ML
BH CV ECHO MEAS - TAPSE (>1.6): 1.9 CM
BH CV ECHO MEAS - TR MAX VEL: 315 CM/SEC
BH CV ECHO MEASUREMENTS AVERAGE E/E' RATIO: 9.1
BH CV XLRA - RV BASE: 4.4 CM
BH CV XLRA - RV LENGTH: 8 CM
BH CV XLRA - RV MID: 3.9 CM
BH CV XLRA - TDI S': 15.9 CM/SEC
BILIRUB SERPL-MCNC: 0.4 MG/DL (ref 0.1–1.2)
BILIRUB SERPL-MCNC: 0.5 MG/DL (ref 0.1–1.2)
BILIRUB SERPL-MCNC: 0.6 MG/DL (ref 0.1–1.2)
BILIRUB SERPL-MCNC: 0.7 MG/DL (ref 0.1–1.2)
BILIRUB SERPL-MCNC: 0.7 MG/DL (ref 0.2–1.2)
BILIRUB SERPL-MCNC: 0.9 MG/DL (ref 0–1.2)
BILIRUB SERPL-MCNC: 1 MG/DL (ref 0.2–1.2)
BILIRUB SERPL-MCNC: 1 MG/DL (ref 0–1.2)
BILIRUB SERPL-MCNC: 1.1 MG/DL (ref 0–1.2)
BILIRUB SERPL-MCNC: 1.5 MG/DL (ref 0–1.2)
BILIRUB UR QL STRIP: NEGATIVE
BLD GP AB SCN SERPL QL: NEGATIVE
BUN BLD-MCNC: 21 MG/DL (ref 8–23)
BUN BLD-MCNC: 22 MG/DL (ref 8–23)
BUN BLD-MCNC: 22 MG/DL (ref 8–23)
BUN BLD-MCNC: 23 MG/DL (ref 8–23)
BUN BLD-MCNC: 23 MG/DL (ref 8–23)
BUN BLD-MCNC: 24 MG/DL (ref 8–23)
BUN BLD-MCNC: 28 MG/DL (ref 8–23)
BUN BLD-MCNC: 28 MG/DL (ref 8–23)
BUN BLD-MCNC: 30 MG/DL (ref 8–23)
BUN BLD-MCNC: 32 MG/DL (ref 8–23)
BUN BLD-MCNC: 32 MG/DL (ref 8–23)
BUN BLD-MCNC: 33 MG/DL (ref 8–23)
BUN BLD-MCNC: 33 MG/DL (ref 8–23)
BUN BLD-MCNC: 42 MG/DL (ref 8–23)
BUN SERPL-MCNC: 31 MG/DL (ref 8–23)
BUN SERPL-MCNC: 32 MG/DL (ref 8–23)
BUN SERPL-MCNC: 33 MG/DL (ref 8–23)
BUN SERPL-MCNC: 40 MG/DL (ref 8–23)
BUN/CREAT SERPL: 16 (ref 7–25)
BUN/CREAT SERPL: 16.2 (ref 7–25)
BUN/CREAT SERPL: 18 (ref 7–25)
BUN/CREAT SERPL: 18.5 (ref 7–25)
BUN/CREAT SERPL: 18.9 (ref 7–25)
BUN/CREAT SERPL: 19.2 (ref 7–25)
BUN/CREAT SERPL: 19.7 (ref 7–25)
BUN/CREAT SERPL: 20 (ref 7–25)
BUN/CREAT SERPL: 20.1 (ref 7–25)
BUN/CREAT SERPL: 20.4 (ref 7–25)
BUN/CREAT SERPL: 21.5 (ref 7–25)
BUN/CREAT SERPL: 21.9 (ref 7–25)
BUN/CREAT SERPL: 22.4 (ref 7–25)
BUN/CREAT SERPL: 22.8 (ref 7–25)
BUN/CREAT SERPL: 23.5 (ref 7–25)
BUN/CREAT SERPL: 24.8 (ref 7–25)
BUN/CREAT SERPL: 26 (ref 7–25)
BUN/CREAT SERPL: 32.6 (ref 7–25)
C PNEUM DNA NPH QL NAA+NON-PROBE: NOT DETECTED
C3 FRG RBC-MCNC: ABNORMAL
CALCIUM SPEC-SCNC: 7.8 MG/DL (ref 8.6–10.5)
CALCIUM SPEC-SCNC: 8.2 MG/DL (ref 8.6–10.5)
CALCIUM SPEC-SCNC: 8.3 MG/DL (ref 8.6–10.5)
CALCIUM SPEC-SCNC: 8.4 MG/DL (ref 8.6–10.5)
CALCIUM SPEC-SCNC: 8.4 MG/DL (ref 8.6–10.5)
CALCIUM SPEC-SCNC: 8.8 MG/DL (ref 8.6–10.5)
CALCIUM SPEC-SCNC: 8.9 MG/DL (ref 8.6–10.5)
CALCIUM SPEC-SCNC: 9 MG/DL (ref 8.6–10.5)
CALCIUM SPEC-SCNC: 9.1 MG/DL (ref 8.6–10.5)
CALCIUM SPEC-SCNC: 9.3 MG/DL (ref 8.6–10.5)
CALCIUM SPEC-SCNC: 9.3 MG/DL (ref 8.6–10.5)
CHLORIDE SERPL-SCNC: 101 MMOL/L (ref 98–107)
CHLORIDE SERPL-SCNC: 102 MMOL/L (ref 98–107)
CHLORIDE SERPL-SCNC: 103 MMOL/L (ref 98–107)
CHLORIDE SERPL-SCNC: 104 MMOL/L (ref 98–107)
CHLORIDE SERPL-SCNC: 105 MMOL/L (ref 98–107)
CHLORIDE SERPL-SCNC: 106 MMOL/L (ref 98–107)
CHLORIDE SERPL-SCNC: 107 MMOL/L (ref 98–107)
CHLORIDE SERPL-SCNC: 107 MMOL/L (ref 98–107)
CHOLEST SERPL-MCNC: 75 MG/DL (ref 100–199)
CLARITY UR: CLEAR
CO2 SERPL-SCNC: 22.7 MMOL/L (ref 22–29)
CO2 SERPL-SCNC: 24.6 MMOL/L (ref 22–29)
CO2 SERPL-SCNC: 26.1 MMOL/L (ref 22–29)
CO2 SERPL-SCNC: 26.5 MMOL/L (ref 22–29)
CO2 SERPL-SCNC: 26.7 MMOL/L (ref 22–29)
CO2 SERPL-SCNC: 26.8 MMOL/L (ref 22–29)
CO2 SERPL-SCNC: 27.5 MMOL/L (ref 22–29)
CO2 SERPL-SCNC: 27.7 MMOL/L (ref 22–29)
CO2 SERPL-SCNC: 28.2 MMOL/L (ref 22–29)
CO2 SERPL-SCNC: 28.2 MMOL/L (ref 22–29)
CO2 SERPL-SCNC: 29 MMOL/L (ref 22–29)
CO2 SERPL-SCNC: 29.1 MMOL/L (ref 22–29)
CO2 SERPL-SCNC: 29.1 MMOL/L (ref 22–29)
CO2 SERPL-SCNC: 29.3 MMOL/L (ref 22–29)
CO2 SERPL-SCNC: 30.4 MMOL/L (ref 22–29)
CO2 SERPL-SCNC: 30.5 MMOL/L (ref 22–29)
CO2 SERPL-SCNC: 31.7 MMOL/L (ref 22–29)
CO2 SERPL-SCNC: 31.7 MMOL/L (ref 22–29)
COLLECT DATE SP2 STL: NORMAL
COLLECT DATE SP3 STL: NORMAL
COLLECT DATE STL: NORMAL
COLOR UR: YELLOW
CREAT BLD-MCNC: 1.15 MG/DL (ref 0.76–1.27)
CREAT BLD-MCNC: 1.19 MG/DL (ref 0.76–1.27)
CREAT BLD-MCNC: 1.22 MG/DL (ref 0.76–1.27)
CREAT BLD-MCNC: 1.27 MG/DL (ref 0.76–1.27)
CREAT BLD-MCNC: 1.28 MG/DL (ref 0.76–1.27)
CREAT BLD-MCNC: 1.29 MG/DL (ref 0.76–1.27)
CREAT BLD-MCNC: 1.29 MG/DL (ref 0.76–1.27)
CREAT BLD-MCNC: 1.31 MG/DL (ref 0.76–1.27)
CREAT BLD-MCNC: 1.36 MG/DL (ref 0.76–1.27)
CREAT BLD-MCNC: 1.37 MG/DL (ref 0.76–1.27)
CREAT BLD-MCNC: 1.39 MG/DL (ref 0.76–1.27)
CREAT BLD-MCNC: 1.47 MG/DL (ref 0.76–1.27)
CREAT BLD-MCNC: 1.48 MG/DL (ref 0.76–1.27)
CREAT BLD-MCNC: 1.49 MG/DL (ref 0.76–1.27)
CREAT SERPL-MCNC: 1.36 MG/DL (ref 0.76–1.27)
CREAT SERPL-MCNC: 1.62 MG/DL (ref 0.76–1.27)
CREAT SERPL-MCNC: 1.67 MG/DL (ref 0.76–1.27)
CREAT SERPL-MCNC: 1.7 MG/DL (ref 0.76–1.27)
CROSSMATCH INTERPRETATION: NORMAL
D-LACTATE SERPL-SCNC: 1.3 MMOL/L (ref 0.5–2)
D-LACTATE SERPL-SCNC: 2.3 MMOL/L (ref 0.5–2)
DACRYOCYTES BLD QL SMEAR: ABNORMAL
DACRYOCYTES BLD QL SMEAR: ABNORMAL
DEPRECATED RDW RBC AUTO: 49.5 FL (ref 37–54)
DEPRECATED RDW RBC AUTO: 49.7 FL (ref 37–54)
DEPRECATED RDW RBC AUTO: 49.9 FL (ref 37–54)
DEPRECATED RDW RBC AUTO: 50 FL (ref 37–54)
DEPRECATED RDW RBC AUTO: 50.3 FL (ref 37–54)
DEPRECATED RDW RBC AUTO: 50.4 FL (ref 37–54)
DEPRECATED RDW RBC AUTO: 51.1 FL (ref 37–54)
DEPRECATED RDW RBC AUTO: 51.8 FL (ref 37–54)
DEPRECATED RDW RBC AUTO: 52.4 FL (ref 37–54)
DEPRECATED RDW RBC AUTO: 52.5 FL (ref 37–54)
DEPRECATED RDW RBC AUTO: 53.5 FL (ref 37–54)
DEPRECATED RDW RBC AUTO: 53.6 FL (ref 37–54)
DEPRECATED RDW RBC AUTO: 53.8 FL (ref 37–54)
DEPRECATED RDW RBC AUTO: 53.9 FL (ref 37–54)
DEPRECATED RDW RBC AUTO: 54.4 FL (ref 37–54)
DEPRECATED RDW RBC AUTO: 54.9 FL (ref 37–54)
DEPRECATED RDW RBC AUTO: 54.9 FL (ref 37–54)
DEPRECATED RDW RBC AUTO: 55.2 FL (ref 37–54)
DEPRECATED RDW RBC AUTO: 55.4 FL (ref 37–54)
DEPRECATED RDW RBC AUTO: 55.9 FL (ref 37–54)
DEPRECATED RDW RBC AUTO: 56.4 FL (ref 37–54)
DEPRECATED RDW RBC AUTO: 56.4 FL (ref 37–54)
DEPRECATED RDW RBC AUTO: 56.6 FL (ref 37–54)
DEPRECATED RDW RBC AUTO: 56.6 FL (ref 37–54)
DEPRECATED RDW RBC AUTO: 56.7 FL (ref 37–54)
DEPRECATED RDW RBC AUTO: 56.9 FL (ref 37–54)
DEPRECATED RDW RBC AUTO: 56.9 FL (ref 37–54)
DEPRECATED RDW RBC AUTO: 57 FL (ref 37–54)
DEPRECATED RDW RBC AUTO: 57.1 FL (ref 37–54)
DEPRECATED RDW RBC AUTO: 57.8 FL (ref 37–54)
DEPRECATED RDW RBC AUTO: 58.3 FL (ref 37–54)
DEPRECATED RDW RBC AUTO: 58.6 FL (ref 37–54)
DEPRECATED RDW RBC AUTO: 58.7 FL (ref 37–54)
DEPRECATED RDW RBC AUTO: 59.6 FL (ref 37–54)
DEPRECATED RDW RBC AUTO: 59.6 FL (ref 37–54)
DEPRECATED RDW RBC AUTO: 59.7 FL (ref 37–54)
DEPRECATED RDW RBC AUTO: 59.7 FL (ref 37–54)
DEPRECATED RDW RBC AUTO: 60.2 FL (ref 37–54)
DEPRECATED RDW RBC AUTO: 60.4 FL (ref 37–54)
DEPRECATED RDW RBC AUTO: 61 FL (ref 37–54)
DEPRECATED RDW RBC AUTO: 61.1 FL (ref 37–54)
DEPRECATED RDW RBC AUTO: 61.1 FL (ref 37–54)
DEPRECATED RDW RBC AUTO: 61.3 FL (ref 37–54)
DEPRECATED RDW RBC AUTO: 62.2 FL (ref 37–54)
DEPRECATED RDW RBC AUTO: 62.4 FL (ref 37–54)
DEPRECATED RDW RBC AUTO: 62.7 FL (ref 37–54)
DEPRECATED RDW RBC AUTO: 63.1 FL (ref 37–54)
DEPRECATED RDW RBC AUTO: 63.2 FL (ref 37–54)
DEPRECATED RDW RBC AUTO: 63.3 FL (ref 37–54)
DEPRECATED RDW RBC AUTO: 65.1 FL (ref 37–54)
DEPRECATED RDW RBC AUTO: 65.1 FL (ref 37–54)
DEPRECATED RDW RBC AUTO: 65.4 FL (ref 37–54)
DEPRECATED RDW RBC AUTO: 66 FL (ref 37–54)
DEPRECATED RDW RBC AUTO: 68.5 FL (ref 37–54)
DIFFERENTIAL METHOD BLD: ABNORMAL
ELLIPTOCYTES BLD QL SMEAR: ABNORMAL
EOSINOPHIL # BLD AUTO: 0.01 10*3/MM3 (ref 0–0.4)
EOSINOPHIL # BLD AUTO: 0.08 10*3/MM3 (ref 0–0.4)
EOSINOPHIL # BLD AUTO: 0.12 10*3/MM3 (ref 0–0.4)
EOSINOPHIL # BLD AUTO: 0.12 10*3/MM3 (ref 0–0.4)
EOSINOPHIL # BLD AUTO: 0.15 10*3/MM3 (ref 0–0.4)
EOSINOPHIL # BLD AUTO: 0.18 10*3/MM3 (ref 0–0.4)
EOSINOPHIL # BLD AUTO: 0.19 10*3/MM3 (ref 0–0.4)
EOSINOPHIL # BLD AUTO: 0.21 10*3/MM3 (ref 0–0.4)
EOSINOPHIL # BLD AUTO: 0.21 10*3/MM3 (ref 0–0.4)
EOSINOPHIL # BLD AUTO: 0.23 10*3/MM3 (ref 0–0.4)
EOSINOPHIL # BLD AUTO: 0.3 10*3/MM3 (ref 0–0.4)
EOSINOPHIL # BLD AUTO: 0.3 10*3/MM3 (ref 0–0.4)
EOSINOPHIL # BLD AUTO: 0.38 10*3/MM3 (ref 0–0.4)
EOSINOPHIL # BLD AUTO: 0.4 10*3/MM3 (ref 0–0.4)
EOSINOPHIL # BLD AUTO: 0.4 10*3/MM3 (ref 0–0.4)
EOSINOPHIL # BLD AUTO: 0.49 10*3/MM3 (ref 0–0.4)
EOSINOPHIL # BLD AUTO: 0.51 10*3/MM3 (ref 0–0.4)
EOSINOPHIL # BLD AUTO: 0.77 10*3/MM3 (ref 0–0.4)
EOSINOPHIL # BLD AUTO: 0.9 10*3/MM3 (ref 0–0.4)
EOSINOPHIL # BLD MANUAL: 0.02 10*3/MM3 (ref 0–0.4)
EOSINOPHIL # BLD MANUAL: 0.02 10*3/MM3 (ref 0–0.4)
EOSINOPHIL # BLD MANUAL: 0.03 10*3/MM3 (ref 0–0.4)
EOSINOPHIL # BLD MANUAL: 0.04 10*3/MM3 (ref 0–0.4)
EOSINOPHIL # BLD MANUAL: 0.06 10*3/MM3 (ref 0–0.4)
EOSINOPHIL # BLD MANUAL: 0.08 10*3/MM3 (ref 0–0.4)
EOSINOPHIL # BLD MANUAL: 0.09 10*3/MM3 (ref 0–0.4)
EOSINOPHIL # BLD MANUAL: 0.1 10*3/MM3 (ref 0–0.4)
EOSINOPHIL # BLD MANUAL: 0.1 10*3/MM3 (ref 0–0.4)
EOSINOPHIL # BLD MANUAL: 0.11 10*3/MM3 (ref 0–0.4)
EOSINOPHIL # BLD MANUAL: 0.12 10*3/MM3 (ref 0–0.4)
EOSINOPHIL # BLD MANUAL: 0.12 10*3/MM3 (ref 0–0.4)
EOSINOPHIL # BLD MANUAL: 0.13 10*3/MM3 (ref 0–0.4)
EOSINOPHIL # BLD MANUAL: 0.15 10*3/MM3 (ref 0–0.4)
EOSINOPHIL # BLD MANUAL: 0.16 10*3/MM3 (ref 0–0.4)
EOSINOPHIL # BLD MANUAL: 0.17 10*3/MM3 (ref 0–0.4)
EOSINOPHIL # BLD MANUAL: 0.19 10*3/MM3 (ref 0–0.4)
EOSINOPHIL # BLD MANUAL: 0.24 10*3/MM3 (ref 0–0.4)
EOSINOPHIL # BLD MANUAL: 0.24 10*3/MM3 (ref 0–0.4)
EOSINOPHIL # BLD MANUAL: 0.26 10*3/MM3 (ref 0–0.4)
EOSINOPHIL # BLD MANUAL: 0.27 10*3/MM3 (ref 0–0.4)
EOSINOPHIL # BLD MANUAL: 0.28 10*3/MM3 (ref 0–0.4)
EOSINOPHIL # BLD MANUAL: 0.29 10*3/MM3 (ref 0–0.4)
EOSINOPHIL # BLD MANUAL: 0.34 10*3/MM3 (ref 0–0.4)
EOSINOPHIL # BLD MANUAL: 0.35 10*3/MM3 (ref 0–0.4)
EOSINOPHIL # BLD MANUAL: 0.35 10*3/MM3 (ref 0–0.4)
EOSINOPHIL # BLD MANUAL: 0.36 10*3/MM3 (ref 0–0.4)
EOSINOPHIL # BLD MANUAL: 0.37 10*3/MM3 (ref 0–0.4)
EOSINOPHIL # BLD MANUAL: 0.37 10*3/MM3 (ref 0–0.4)
EOSINOPHIL # BLD MANUAL: 0.39 10*3/MM3 (ref 0–0.4)
EOSINOPHIL # BLD MANUAL: 0.4 10*3/MM3 (ref 0–0.4)
EOSINOPHIL # BLD MANUAL: 0.48 10*3/MM3 (ref 0–0.4)
EOSINOPHIL # BLD MANUAL: 0.5 10*3/MM3 (ref 0–0.4)
EOSINOPHIL # BLD MANUAL: 0.55 10*3/MM3 (ref 0–0.4)
EOSINOPHIL # BLD MANUAL: 0.81 10*3/MM3 (ref 0–0.4)
EOSINOPHIL # BLD MANUAL: 1.15 10*3/MM3 (ref 0–0.4)
EOSINOPHIL NFR BLD AUTO: 0.4 % (ref 0.3–6.2)
EOSINOPHIL NFR BLD AUTO: 10.4 % (ref 0.3–6.2)
EOSINOPHIL NFR BLD AUTO: 14.5 % (ref 0.3–6.2)
EOSINOPHIL NFR BLD AUTO: 17.1 % (ref 0.3–6.2)
EOSINOPHIL NFR BLD AUTO: 18.1 % (ref 0.3–6.2)
EOSINOPHIL NFR BLD AUTO: 19.6 % (ref 0.3–6.2)
EOSINOPHIL NFR BLD AUTO: 21.3 % (ref 0.3–6.2)
EOSINOPHIL NFR BLD AUTO: 24.7 % (ref 0.3–6.2)
EOSINOPHIL NFR BLD AUTO: 32.4 % (ref 0.3–6.2)
EOSINOPHIL NFR BLD AUTO: 34.4 % (ref 0.3–6.2)
EOSINOPHIL NFR BLD AUTO: 4 % (ref 0.3–6.2)
EOSINOPHIL NFR BLD AUTO: 4.2 % (ref 0.3–6.2)
EOSINOPHIL NFR BLD AUTO: 4.9 % (ref 0.3–6.2)
EOSINOPHIL NFR BLD AUTO: 6.3 % (ref 0.3–6.2)
EOSINOPHIL NFR BLD AUTO: 7.3 % (ref 0.3–6.2)
EOSINOPHIL NFR BLD AUTO: 7.5 % (ref 0.3–6.2)
EOSINOPHIL NFR BLD AUTO: 9.7 % (ref 0.3–6.2)
EOSINOPHIL NFR BLD AUTO: 9.9 % (ref 0.3–6.2)
EOSINOPHIL NFR BLD AUTO: 9.9 % (ref 0.3–6.2)
EOSINOPHIL NFR BLD MANUAL: 1 % (ref 0.3–6.2)
EOSINOPHIL NFR BLD MANUAL: 1 % (ref 0.3–6.2)
EOSINOPHIL NFR BLD MANUAL: 11 % (ref 0.3–6.2)
EOSINOPHIL NFR BLD MANUAL: 12 % (ref 0.3–6.2)
EOSINOPHIL NFR BLD MANUAL: 12 % (ref 0.3–6.2)
EOSINOPHIL NFR BLD MANUAL: 13 % (ref 0.3–6.2)
EOSINOPHIL NFR BLD MANUAL: 14 % (ref 0.3–6.2)
EOSINOPHIL NFR BLD MANUAL: 14.1 % (ref 0.3–6.2)
EOSINOPHIL NFR BLD MANUAL: 15 % (ref 0.3–6.2)
EOSINOPHIL NFR BLD MANUAL: 15 % (ref 0.3–6.2)
EOSINOPHIL NFR BLD MANUAL: 16.2 % (ref 0.3–6.2)
EOSINOPHIL NFR BLD MANUAL: 19 % (ref 0.3–6.2)
EOSINOPHIL NFR BLD MANUAL: 2 % (ref 0.3–6.2)
EOSINOPHIL NFR BLD MANUAL: 20 % (ref 0.3–6.2)
EOSINOPHIL NFR BLD MANUAL: 20.6 % (ref 0.3–6.2)
EOSINOPHIL NFR BLD MANUAL: 22 % (ref 0.3–6.2)
EOSINOPHIL NFR BLD MANUAL: 3 % (ref 0.3–6.2)
EOSINOPHIL NFR BLD MANUAL: 34 % (ref 0.3–6.2)
EOSINOPHIL NFR BLD MANUAL: 4 % (ref 0.3–6.2)
EOSINOPHIL NFR BLD MANUAL: 4 % (ref 0.3–6.2)
EOSINOPHIL NFR BLD MANUAL: 44 % (ref 0.3–6.2)
EOSINOPHIL NFR BLD MANUAL: 5 % (ref 0.3–6.2)
EOSINOPHIL NFR BLD MANUAL: 6 % (ref 0.3–6.2)
EOSINOPHIL NFR BLD MANUAL: 6.1 % (ref 0.3–6.2)
EOSINOPHIL NFR BLD MANUAL: 7 % (ref 0.3–6.2)
ERYTHROCYTE [DISTWIDTH] IN BLOOD BY AUTOMATED COUNT: 14.5 % (ref 12.3–15.4)
ERYTHROCYTE [DISTWIDTH] IN BLOOD BY AUTOMATED COUNT: 15.2 % (ref 12.3–15.4)
ERYTHROCYTE [DISTWIDTH] IN BLOOD BY AUTOMATED COUNT: 15.2 % (ref 12.3–15.4)
ERYTHROCYTE [DISTWIDTH] IN BLOOD BY AUTOMATED COUNT: 15.4 % (ref 12.3–15.4)
ERYTHROCYTE [DISTWIDTH] IN BLOOD BY AUTOMATED COUNT: 15.5 % (ref 12.3–15.4)
ERYTHROCYTE [DISTWIDTH] IN BLOOD BY AUTOMATED COUNT: 15.6 % (ref 12.3–15.4)
ERYTHROCYTE [DISTWIDTH] IN BLOOD BY AUTOMATED COUNT: 15.7 % (ref 12.3–15.4)
ERYTHROCYTE [DISTWIDTH] IN BLOOD BY AUTOMATED COUNT: 15.7 % (ref 12.3–15.4)
ERYTHROCYTE [DISTWIDTH] IN BLOOD BY AUTOMATED COUNT: 15.9 % (ref 12.3–15.4)
ERYTHROCYTE [DISTWIDTH] IN BLOOD BY AUTOMATED COUNT: 16 % (ref 12.3–15.4)
ERYTHROCYTE [DISTWIDTH] IN BLOOD BY AUTOMATED COUNT: 16.2 % (ref 12.3–15.4)
ERYTHROCYTE [DISTWIDTH] IN BLOOD BY AUTOMATED COUNT: 16.2 % (ref 12.3–15.4)
ERYTHROCYTE [DISTWIDTH] IN BLOOD BY AUTOMATED COUNT: 16.3 % (ref 12.3–15.4)
ERYTHROCYTE [DISTWIDTH] IN BLOOD BY AUTOMATED COUNT: 16.4 % (ref 12.3–15.4)
ERYTHROCYTE [DISTWIDTH] IN BLOOD BY AUTOMATED COUNT: 16.5 % (ref 12.3–15.4)
ERYTHROCYTE [DISTWIDTH] IN BLOOD BY AUTOMATED COUNT: 16.6 % (ref 12.3–15.4)
ERYTHROCYTE [DISTWIDTH] IN BLOOD BY AUTOMATED COUNT: 16.7 % (ref 12.3–15.4)
ERYTHROCYTE [DISTWIDTH] IN BLOOD BY AUTOMATED COUNT: 16.8 % (ref 12.3–15.4)
ERYTHROCYTE [DISTWIDTH] IN BLOOD BY AUTOMATED COUNT: 16.9 % (ref 12.3–15.4)
ERYTHROCYTE [DISTWIDTH] IN BLOOD BY AUTOMATED COUNT: 17 % (ref 12.3–15.4)
ERYTHROCYTE [DISTWIDTH] IN BLOOD BY AUTOMATED COUNT: 17 % (ref 12.3–15.4)
ERYTHROCYTE [DISTWIDTH] IN BLOOD BY AUTOMATED COUNT: 17.1 % (ref 12.3–15.4)
ERYTHROCYTE [DISTWIDTH] IN BLOOD BY AUTOMATED COUNT: 17.2 % (ref 12.3–15.4)
ERYTHROCYTE [DISTWIDTH] IN BLOOD BY AUTOMATED COUNT: 17.3 % (ref 12.3–15.4)
ERYTHROCYTE [DISTWIDTH] IN BLOOD BY AUTOMATED COUNT: 17.4 % (ref 12.3–15.4)
ERYTHROCYTE [DISTWIDTH] IN BLOOD BY AUTOMATED COUNT: 17.5 % (ref 12.3–15.4)
ERYTHROCYTE [DISTWIDTH] IN BLOOD BY AUTOMATED COUNT: 17.7 % (ref 12.3–15.4)
ERYTHROCYTE [DISTWIDTH] IN BLOOD BY AUTOMATED COUNT: 17.7 % (ref 12.3–15.4)
ERYTHROCYTE [DISTWIDTH] IN BLOOD BY AUTOMATED COUNT: 17.8 % (ref 12.3–15.4)
ERYTHROCYTE [DISTWIDTH] IN BLOOD BY AUTOMATED COUNT: 17.8 % (ref 12.3–15.4)
ERYTHROCYTE [DISTWIDTH] IN BLOOD BY AUTOMATED COUNT: 18.1 % (ref 12.3–15.4)
ERYTHROCYTE [DISTWIDTH] IN BLOOD BY AUTOMATED COUNT: 18.1 % (ref 12.3–15.4)
ERYTHROCYTE [DISTWIDTH] IN BLOOD BY AUTOMATED COUNT: 18.2 % (ref 12.3–15.4)
ERYTHROCYTE [DISTWIDTH] IN BLOOD BY AUTOMATED COUNT: 18.5 % (ref 12.3–15.4)
ERYTHROCYTE [DISTWIDTH] IN BLOOD BY AUTOMATED COUNT: 18.5 % (ref 12.3–15.4)
ERYTHROCYTE [DISTWIDTH] IN BLOOD BY AUTOMATED COUNT: 19.8 % (ref 12.3–15.4)
ERYTHROCYTE [DISTWIDTH] IN BLOOD BY AUTOMATED COUNT: 19.9 % (ref 12.3–15.4)
ERYTHROCYTE [DISTWIDTH] IN BLOOD BY AUTOMATED COUNT: 21.3 % (ref 12.3–15.4)
FERRITIN SERPL-MCNC: 1189.5 NG/ML (ref 30–400)
FERRITIN SERPL-MCNC: 376.9 NG/ML (ref 30–400)
FERRITIN SERPL-MCNC: 715.4 NG/ML (ref 30–400)
FLUAV H1 2009 PAND RNA NPH QL NAA+PROBE: NOT DETECTED
FLUAV H1 HA GENE NPH QL NAA+PROBE: NOT DETECTED
FLUAV H3 RNA NPH QL NAA+PROBE: NOT DETECTED
FLUAV SUBTYP SPEC NAA+PROBE: NOT DETECTED
FLUBV RNA ISLT QL NAA+PROBE: NOT DETECTED
GAS FLOW AIRWAY: 6 LPM
GFR SERPL CREATININE-BSD FRML MDRD: 39 ML/MIN/1.73
GFR SERPL CREATININE-BSD FRML MDRD: 40 ML/MIN/1.73
GFR SERPL CREATININE-BSD FRML MDRD: 41 ML/MIN/1.73
GFR SERPL CREATININE-BSD FRML MDRD: 45 ML/MIN/1.73
GFR SERPL CREATININE-BSD FRML MDRD: 46 ML/MIN/1.73
GFR SERPL CREATININE-BSD FRML MDRD: 46 ML/MIN/1.73
GFR SERPL CREATININE-BSD FRML MDRD: 49 ML/MIN/1.73
GFR SERPL CREATININE-BSD FRML MDRD: 50 ML/MIN/1.73
GFR SERPL CREATININE-BSD FRML MDRD: 53 ML/MIN/1.73
GFR SERPL CREATININE-BSD FRML MDRD: 54 ML/MIN/1.73
GFR SERPL CREATININE-BSD FRML MDRD: 55 ML/MIN/1.73
GFR SERPL CREATININE-BSD FRML MDRD: 57 ML/MIN/1.73
GFR SERPL CREATININE-BSD FRML MDRD: 59 ML/MIN/1.73
GFR SERPL CREATININE-BSD FRML MDRD: 61 ML/MIN/1.73
GLOBULIN UR ELPH-MCNC: 1.7 GM/DL
GLOBULIN UR ELPH-MCNC: 1.9 GM/DL
GLOBULIN UR ELPH-MCNC: 2.1 GM/DL
GLOBULIN UR ELPH-MCNC: 2.5 GM/DL
GLOBULIN UR ELPH-MCNC: 2.6 GM/DL
GLOBULIN UR ELPH-MCNC: 2.8 GM/DL
GLOBULIN UR ELPH-MCNC: 2.8 GM/DL
GLOBULIN UR ELPH-MCNC: 2.9 GM/DL
GLUCOSE BLD-MCNC: 100 MG/DL (ref 65–99)
GLUCOSE BLD-MCNC: 102 MG/DL (ref 65–99)
GLUCOSE BLD-MCNC: 103 MG/DL (ref 65–99)
GLUCOSE BLD-MCNC: 109 MG/DL (ref 65–99)
GLUCOSE BLD-MCNC: 122 MG/DL (ref 65–99)
GLUCOSE BLD-MCNC: 124 MG/DL (ref 65–99)
GLUCOSE BLD-MCNC: 125 MG/DL (ref 65–99)
GLUCOSE BLD-MCNC: 125 MG/DL (ref 65–99)
GLUCOSE BLD-MCNC: 135 MG/DL (ref 65–99)
GLUCOSE BLD-MCNC: 137 MG/DL (ref 65–99)
GLUCOSE BLD-MCNC: 137 MG/DL (ref 65–99)
GLUCOSE BLD-MCNC: 170 MG/DL (ref 65–99)
GLUCOSE BLD-MCNC: 180 MG/DL (ref 65–99)
GLUCOSE BLD-MCNC: 97 MG/DL (ref 65–99)
GLUCOSE SERPL-MCNC: 134 MG/DL (ref 65–99)
GLUCOSE SERPL-MCNC: 147 MG/DL (ref 65–99)
GLUCOSE SERPL-MCNC: 157 MG/DL (ref 65–99)
GLUCOSE SERPL-MCNC: 167 MG/DL (ref 65–99)
GLUCOSE UR STRIP-MCNC: NEGATIVE MG/DL
GRAM STN SPEC: NORMAL
HADV DNA SPEC NAA+PROBE: NOT DETECTED
HBA1C MFR BLD: 6.3 % (ref 4.8–5.6)
HCO3 BLDA-SCNC: 30 MMOL/L (ref 22–28)
HCOV 229E RNA SPEC QL NAA+PROBE: NOT DETECTED
HCOV HKU1 RNA SPEC QL NAA+PROBE: NOT DETECTED
HCOV NL63 RNA SPEC QL NAA+PROBE: NOT DETECTED
HCOV OC43 RNA SPEC QL NAA+PROBE: NOT DETECTED
HCT VFR BLD AUTO: 22.1 % (ref 37.5–51)
HCT VFR BLD AUTO: 22.7 % (ref 37.5–51)
HCT VFR BLD AUTO: 22.7 % (ref 37.5–51)
HCT VFR BLD AUTO: 22.8 % (ref 37.5–51)
HCT VFR BLD AUTO: 22.9 % (ref 37.5–51)
HCT VFR BLD AUTO: 23.3 % (ref 37.5–51)
HCT VFR BLD AUTO: 23.3 % (ref 37.5–51)
HCT VFR BLD AUTO: 23.4 % (ref 37.5–51)
HCT VFR BLD AUTO: 23.5 % (ref 37.5–51)
HCT VFR BLD AUTO: 23.6 % (ref 37.5–51)
HCT VFR BLD AUTO: 23.9 % (ref 37.5–51)
HCT VFR BLD AUTO: 24 % (ref 37.5–51)
HCT VFR BLD AUTO: 24.1 % (ref 37.5–51)
HCT VFR BLD AUTO: 24.1 % (ref 37.5–51)
HCT VFR BLD AUTO: 24.2 % (ref 37.5–51)
HCT VFR BLD AUTO: 24.4 % (ref 37.5–51)
HCT VFR BLD AUTO: 24.5 % (ref 37.5–51)
HCT VFR BLD AUTO: 24.6 % (ref 37.5–51)
HCT VFR BLD AUTO: 24.6 % (ref 37.5–51)
HCT VFR BLD AUTO: 24.8 % (ref 37.5–51)
HCT VFR BLD AUTO: 25 % (ref 37.5–51)
HCT VFR BLD AUTO: 25.1 % (ref 37.5–51)
HCT VFR BLD AUTO: 25.2 % (ref 37.5–51)
HCT VFR BLD AUTO: 25.5 % (ref 37.5–51)
HCT VFR BLD AUTO: 25.5 % (ref 37.5–51)
HCT VFR BLD AUTO: 25.6 % (ref 37.5–51)
HCT VFR BLD AUTO: 25.6 % (ref 37.5–51)
HCT VFR BLD AUTO: 25.7 % (ref 37.5–51)
HCT VFR BLD AUTO: 25.7 % (ref 37.5–51)
HCT VFR BLD AUTO: 25.8 % (ref 37.5–51)
HCT VFR BLD AUTO: 25.9 % (ref 37.5–51)
HCT VFR BLD AUTO: 25.9 % (ref 37.5–51)
HCT VFR BLD AUTO: 26 % (ref 37.5–51)
HCT VFR BLD AUTO: 26.1 % (ref 37.5–51)
HCT VFR BLD AUTO: 26.3 % (ref 37.5–51)
HCT VFR BLD AUTO: 26.4 % (ref 37.5–51)
HCT VFR BLD AUTO: 26.6 % (ref 37.5–51)
HCT VFR BLD AUTO: 26.8 % (ref 37.5–51)
HCT VFR BLD AUTO: 26.8 % (ref 37.5–51)
HCT VFR BLD AUTO: 26.9 % (ref 37.5–51)
HCT VFR BLD AUTO: 27.2 % (ref 37.5–51)
HCT VFR BLD AUTO: 27.3 % (ref 37.5–51)
HCT VFR BLD AUTO: 27.4 % (ref 37.5–51)
HCT VFR BLD AUTO: 27.7 % (ref 37.5–51)
HCT VFR BLD AUTO: 27.8 % (ref 37.5–51)
HCT VFR BLD AUTO: 27.9 % (ref 37.5–51)
HCT VFR BLD AUTO: 29.6 % (ref 37.5–51)
HDLC SERPL-MCNC: 31 MG/DL
HEMOCCULT STL QL: NEGATIVE
HGB BLD-MCNC: 6.9 G/DL (ref 13–17.7)
HGB BLD-MCNC: 7.1 G/DL (ref 13–17.7)
HGB BLD-MCNC: 7.6 G/DL (ref 13–17.7)
HGB BLD-MCNC: 7.7 G/DL (ref 13–17.7)
HGB BLD-MCNC: 7.8 G/DL (ref 13–17.7)
HGB BLD-MCNC: 7.9 G/DL (ref 13–17.7)
HGB BLD-MCNC: 8 G/DL (ref 13–17.7)
HGB BLD-MCNC: 8.1 G/DL (ref 13–17.7)
HGB BLD-MCNC: 8.1 G/DL (ref 13–17.7)
HGB BLD-MCNC: 8.2 G/DL (ref 13–17.7)
HGB BLD-MCNC: 8.3 G/DL (ref 13–17.7)
HGB BLD-MCNC: 8.4 G/DL (ref 13–17.7)
HGB BLD-MCNC: 8.5 G/DL (ref 13–17.7)
HGB BLD-MCNC: 8.7 G/DL (ref 13–17.7)
HGB BLD-MCNC: 8.8 G/DL (ref 13–17.7)
HGB BLD-MCNC: 8.9 G/DL (ref 13–17.7)
HGB BLD-MCNC: 9 G/DL (ref 13–17.7)
HGB BLD-MCNC: 9.3 G/DL (ref 13–17.7)
HGB BLD-MCNC: 9.4 G/DL (ref 13–17.7)
HGB BLD-MCNC: 9.5 G/DL (ref 13–17.7)
HGB UR QL STRIP.AUTO: ABNORMAL
HMPV RNA NPH QL NAA+NON-PROBE: NOT DETECTED
HPIV1 RNA SPEC QL NAA+PROBE: NOT DETECTED
HPIV2 RNA SPEC QL NAA+PROBE: NOT DETECTED
HPIV3 RNA NPH QL NAA+PROBE: NOT DETECTED
HPIV4 P GENE NPH QL NAA+PROBE: NOT DETECTED
HYALINE CASTS UR QL AUTO: ABNORMAL /LPF
HYPOCHROMIA BLD QL: ABNORMAL
IGA1 MFR SER: 185 MG/DL (ref 70–400)
IGA1 MFR SER: 195 MG/DL (ref 70–400)
IGA1 MFR SER: 212 MG/DL (ref 70–400)
IGA1 MFR SER: 274 MG/DL (ref 70–400)
IGA1 MFR SER: 329 MG/DL (ref 70–400)
IGA1 MFR SER: 380 MG/DL (ref 70–400)
IGA1 MFR SER: 416 MG/DL (ref 70–400)
IGA1 MFR SER: 421 MG/DL (ref 70–400)
IGA1 MFR SER: 444 MG/DL (ref 70–400)
IGA1 MFR SER: 450 MG/DL (ref 70–400)
IGA1 MFR SER: 474 MG/DL (ref 70–400)
IGG1 SER-MCNC: 1008 MG/DL (ref 700–1600)
IGG1 SER-MCNC: 570 MG/DL (ref 700–1600)
IGG1 SER-MCNC: 607 MG/DL (ref 700–1600)
IGG1 SER-MCNC: 658 MG/DL (ref 700–1600)
IGG1 SER-MCNC: 670 MG/DL (ref 700–1600)
IGG1 SER-MCNC: 780 MG/DL (ref 700–1600)
IGG1 SER-MCNC: 784 MG/DL (ref 700–1600)
IGG1 SER-MCNC: 832 MG/DL (ref 700–1600)
IGG1 SER-MCNC: 889 MG/DL (ref 700–1600)
IGG1 SER-MCNC: 941 MG/DL (ref 700–1600)
IGG1 SER-MCNC: 995 MG/DL (ref 700–1600)
IGM SERPL-MCNC: 46 MG/DL (ref 40–230)
IGM SERPL-MCNC: 46 MG/DL (ref 40–230)
IGM SERPL-MCNC: 48 MG/DL (ref 40–230)
IGM SERPL-MCNC: 52 MG/DL (ref 40–230)
IGM SERPL-MCNC: 54 MG/DL (ref 40–230)
IGM SERPL-MCNC: 54 MG/DL (ref 40–230)
IGM SERPL-MCNC: 56 MG/DL (ref 40–230)
IGM SERPL-MCNC: 56 MG/DL (ref 40–230)
IGM SERPL-MCNC: 58 MG/DL (ref 40–230)
IGM SERPL-MCNC: 59 MG/DL (ref 40–230)
IGM SERPL-MCNC: 62 MG/DL (ref 40–230)
IMM GRANULOCYTES # BLD AUTO: 0.02 10*3/MM3 (ref 0–0.05)
IMM GRANULOCYTES # BLD AUTO: 0.04 10*3/MM3 (ref 0–0.05)
IMM GRANULOCYTES # BLD AUTO: 0.05 10*3/MM3 (ref 0–0.05)
IMM GRANULOCYTES # BLD AUTO: 0.06 10*3/MM3 (ref 0–0.05)
IMM GRANULOCYTES # BLD AUTO: 0.08 10*3/MM3 (ref 0–0.05)
IMM GRANULOCYTES # BLD AUTO: 0.09 10*3/MM3 (ref 0–0.05)
IMM GRANULOCYTES # BLD AUTO: 0.1 10*3/MM3 (ref 0–0.05)
IMM GRANULOCYTES # BLD AUTO: 0.12 10*3/MM3 (ref 0–0.05)
IMM GRANULOCYTES # BLD AUTO: 0.12 10*3/MM3 (ref 0–0.05)
IMM GRANULOCYTES # BLD AUTO: 0.14 10*3/MM3 (ref 0–0.05)
IMM GRANULOCYTES # BLD AUTO: 0.15 10*3/MM3 (ref 0–0.05)
IMM GRANULOCYTES # BLD AUTO: 0.15 10*3/MM3 (ref 0–0.05)
IMM GRANULOCYTES # BLD AUTO: 0.17 10*3/MM3 (ref 0–0.05)
IMM GRANULOCYTES # BLD AUTO: 0.23 10*3/MM3 (ref 0–0.05)
IMM GRANULOCYTES NFR BLD AUTO: 1.2 % (ref 0–0.5)
IMM GRANULOCYTES NFR BLD AUTO: 1.7 % (ref 0–0.5)
IMM GRANULOCYTES NFR BLD AUTO: 10.2 % (ref 0–0.5)
IMM GRANULOCYTES NFR BLD AUTO: 2.8 % (ref 0–0.5)
IMM GRANULOCYTES NFR BLD AUTO: 3 % (ref 0–0.5)
IMM GRANULOCYTES NFR BLD AUTO: 3 % (ref 0–0.5)
IMM GRANULOCYTES NFR BLD AUTO: 3.7 % (ref 0–0.5)
IMM GRANULOCYTES NFR BLD AUTO: 4.3 % (ref 0–0.5)
IMM GRANULOCYTES NFR BLD AUTO: 4.4 % (ref 0–0.5)
IMM GRANULOCYTES NFR BLD AUTO: 4.6 % (ref 0–0.5)
IMM GRANULOCYTES NFR BLD AUTO: 4.7 % (ref 0–0.5)
IMM GRANULOCYTES NFR BLD AUTO: 4.8 % (ref 0–0.5)
IMM GRANULOCYTES NFR BLD AUTO: 4.8 % (ref 0–0.5)
IMM GRANULOCYTES NFR BLD AUTO: 5.5 % (ref 0–0.5)
IMM GRANULOCYTES NFR BLD AUTO: 6 % (ref 0–0.5)
IMM GRANULOCYTES NFR BLD AUTO: 6.3 % (ref 0–0.5)
IMM GRANULOCYTES NFR BLD AUTO: 6.6 % (ref 0–0.5)
IMM GRANULOCYTES NFR BLD AUTO: 8.2 % (ref 0–0.5)
INR PPP: 1.3 (ref 0.9–1.1)
IRON 24H UR-MRATE: 117 MCG/DL (ref 59–158)
IRON 24H UR-MRATE: 147 MCG/DL (ref 59–158)
IRON 24H UR-MRATE: 87 MCG/DL (ref 59–158)
IRON SATN MFR SERPL: 41 % (ref 20–50)
IRON SATN MFR SERPL: 56 % (ref 20–50)
IRON SATN MFR SERPL: 57 % (ref 20–50)
KETONES UR QL STRIP: NEGATIVE
L PNEUMO1 AG UR QL IA: NEGATIVE
LDLC SERPL CALC-MCNC: 28 MG/DL (ref 0–99)
LEFT ATRIUM VOLUME INDEX: 28 ML/M2
LEUKOCYTE ESTERASE UR QL STRIP.AUTO: NEGATIVE
LYMPHOCYTES # BLD AUTO: 0.4 10*3/MM3 (ref 0.7–3.1)
LYMPHOCYTES # BLD AUTO: 0.51 10*3/MM3 (ref 0.7–3.1)
LYMPHOCYTES # BLD AUTO: 0.52 10*3/MM3 (ref 0.7–3.1)
LYMPHOCYTES # BLD AUTO: 0.52 10*3/MM3 (ref 0.7–3.1)
LYMPHOCYTES # BLD AUTO: 0.53 10*3/MM3 (ref 0.7–3.1)
LYMPHOCYTES # BLD AUTO: 0.56 10*3/MM3 (ref 0.7–3.1)
LYMPHOCYTES # BLD AUTO: 0.58 10*3/MM3 (ref 0.7–3.1)
LYMPHOCYTES # BLD AUTO: 0.58 10*3/MM3 (ref 0.7–3.1)
LYMPHOCYTES # BLD AUTO: 0.59 10*3/MM3 (ref 0.7–3.1)
LYMPHOCYTES # BLD AUTO: 0.59 10*3/MM3 (ref 0.7–3.1)
LYMPHOCYTES # BLD AUTO: 0.62 10*3/MM3 (ref 0.7–3.1)
LYMPHOCYTES # BLD AUTO: 0.64 10*3/MM3 (ref 0.7–3.1)
LYMPHOCYTES # BLD AUTO: 0.65 10*3/MM3 (ref 0.7–3.1)
LYMPHOCYTES # BLD AUTO: 0.68 10*3/MM3 (ref 0.7–3.1)
LYMPHOCYTES # BLD AUTO: 0.73 10*3/MM3 (ref 0.7–3.1)
LYMPHOCYTES # BLD AUTO: 0.76 10*3/MM3 (ref 0.7–3.1)
LYMPHOCYTES # BLD AUTO: 0.91 10*3/MM3 (ref 0.7–3.1)
LYMPHOCYTES # BLD MANUAL: 0.07 10*3/MM3 (ref 0.7–3.1)
LYMPHOCYTES # BLD MANUAL: 0.31 10*3/MM3 (ref 0.7–3.1)
LYMPHOCYTES # BLD MANUAL: 0.37 10*3/MM3 (ref 0.7–3.1)
LYMPHOCYTES # BLD MANUAL: 0.38 10*3/MM3 (ref 0.7–3.1)
LYMPHOCYTES # BLD MANUAL: 0.41 10*3/MM3 (ref 0.7–3.1)
LYMPHOCYTES # BLD MANUAL: 0.42 10*3/MM3 (ref 0.7–3.1)
LYMPHOCYTES # BLD MANUAL: 0.43 10*3/MM3 (ref 0.7–3.1)
LYMPHOCYTES # BLD MANUAL: 0.44 10*3/MM3 (ref 0.7–3.1)
LYMPHOCYTES # BLD MANUAL: 0.5 10*3/MM3 (ref 0.7–3.1)
LYMPHOCYTES # BLD MANUAL: 0.5 10*3/MM3 (ref 0.7–3.1)
LYMPHOCYTES # BLD MANUAL: 0.52 10*3/MM3 (ref 0.7–3.1)
LYMPHOCYTES # BLD MANUAL: 0.56 10*3/MM3 (ref 0.7–3.1)
LYMPHOCYTES # BLD MANUAL: 0.57 10*3/MM3 (ref 0.7–3.1)
LYMPHOCYTES # BLD MANUAL: 0.6 10*3/MM3 (ref 0.7–3.1)
LYMPHOCYTES # BLD MANUAL: 0.6 10*3/MM3 (ref 0.7–3.1)
LYMPHOCYTES # BLD MANUAL: 0.62 10*3/MM3 (ref 0.7–3.1)
LYMPHOCYTES # BLD MANUAL: 0.63 10*3/MM3 (ref 0.7–3.1)
LYMPHOCYTES # BLD MANUAL: 0.65 10*3/MM3 (ref 0.7–3.1)
LYMPHOCYTES # BLD MANUAL: 0.66 10*3/MM3 (ref 0.7–3.1)
LYMPHOCYTES # BLD MANUAL: 0.67 10*3/MM3 (ref 0.7–3.1)
LYMPHOCYTES # BLD MANUAL: 0.67 10*3/MM3 (ref 0.7–3.1)
LYMPHOCYTES # BLD MANUAL: 0.71 10*3/MM3 (ref 0.7–3.1)
LYMPHOCYTES # BLD MANUAL: 0.72 10*3/MM3 (ref 0.7–3.1)
LYMPHOCYTES # BLD MANUAL: 0.74 10*3/MM3 (ref 0.7–3.1)
LYMPHOCYTES # BLD MANUAL: 0.77 10*3/MM3 (ref 0.7–3.1)
LYMPHOCYTES # BLD MANUAL: 0.82 10*3/MM3 (ref 0.7–3.1)
LYMPHOCYTES # BLD MANUAL: 0.83 10*3/MM3 (ref 0.7–3.1)
LYMPHOCYTES # BLD MANUAL: 0.84 10*3/MM3 (ref 0.7–3.1)
LYMPHOCYTES # BLD MANUAL: 0.85 10*3/MM3 (ref 0.7–3.1)
LYMPHOCYTES # BLD MANUAL: 0.89 10*3/MM3 (ref 0.7–3.1)
LYMPHOCYTES # BLD MANUAL: 0.91 10*3/MM3 (ref 0.7–3.1)
LYMPHOCYTES # BLD MANUAL: 0.94 10*3/MM3 (ref 0.7–3.1)
LYMPHOCYTES # BLD MANUAL: 0.96 10*3/MM3 (ref 0.7–3.1)
LYMPHOCYTES # BLD MANUAL: 1 10*3/MM3 (ref 0.7–3.1)
LYMPHOCYTES # BLD MANUAL: 1.1 10*3/MM3 (ref 0.7–3.1)
LYMPHOCYTES # BLD MANUAL: 1.15 10*3/MM3 (ref 0.7–3.1)
LYMPHOCYTES # BLD MANUAL: 1.23 10*3/MM3 (ref 0.7–3.1)
LYMPHOCYTES # BLD MANUAL: 1.24 10*3/MM3 (ref 0.7–3.1)
LYMPHOCYTES # BLD MANUAL: 1.5 10*3/MM3 (ref 0.7–3.1)
LYMPHOCYTES NFR BLD AUTO: 12.7 % (ref 19.6–45.3)
LYMPHOCYTES NFR BLD AUTO: 17.8 % (ref 19.6–45.3)
LYMPHOCYTES NFR BLD AUTO: 18.3 % (ref 19.6–45.3)
LYMPHOCYTES NFR BLD AUTO: 19.8 % (ref 19.6–45.3)
LYMPHOCYTES NFR BLD AUTO: 21.4 % (ref 19.6–45.3)
LYMPHOCYTES NFR BLD AUTO: 25.3 % (ref 19.6–45.3)
LYMPHOCYTES NFR BLD AUTO: 25.5 % (ref 19.6–45.3)
LYMPHOCYTES NFR BLD AUTO: 26.5 % (ref 19.6–45.3)
LYMPHOCYTES NFR BLD AUTO: 26.7 % (ref 19.6–45.3)
LYMPHOCYTES NFR BLD AUTO: 26.7 % (ref 19.6–45.3)
LYMPHOCYTES NFR BLD AUTO: 26.8 % (ref 19.6–45.3)
LYMPHOCYTES NFR BLD AUTO: 27.7 % (ref 19.6–45.3)
LYMPHOCYTES NFR BLD AUTO: 28.5 % (ref 19.6–45.3)
LYMPHOCYTES NFR BLD AUTO: 29.6 % (ref 19.6–45.3)
LYMPHOCYTES NFR BLD AUTO: 30.1 % (ref 19.6–45.3)
LYMPHOCYTES NFR BLD AUTO: 32 % (ref 19.6–45.3)
LYMPHOCYTES NFR BLD AUTO: 34 % (ref 19.6–45.3)
LYMPHOCYTES NFR BLD AUTO: 34.4 % (ref 19.6–45.3)
LYMPHOCYTES NFR BLD AUTO: 40.1 % (ref 19.6–45.3)
LYMPHOCYTES NFR BLD MANUAL: 1 % (ref 19.6–45.3)
LYMPHOCYTES NFR BLD MANUAL: 1 % (ref 5–12)
LYMPHOCYTES NFR BLD MANUAL: 1 % (ref 5–12)
LYMPHOCYTES NFR BLD MANUAL: 10 % (ref 5–12)
LYMPHOCYTES NFR BLD MANUAL: 10.1 % (ref 5–12)
LYMPHOCYTES NFR BLD MANUAL: 11 % (ref 5–12)
LYMPHOCYTES NFR BLD MANUAL: 12 % (ref 5–12)
LYMPHOCYTES NFR BLD MANUAL: 15 % (ref 5–12)
LYMPHOCYTES NFR BLD MANUAL: 16 % (ref 5–12)
LYMPHOCYTES NFR BLD MANUAL: 17 % (ref 5–12)
LYMPHOCYTES NFR BLD MANUAL: 18 % (ref 19.6–45.3)
LYMPHOCYTES NFR BLD MANUAL: 18 % (ref 5–12)
LYMPHOCYTES NFR BLD MANUAL: 19 % (ref 19.6–45.3)
LYMPHOCYTES NFR BLD MANUAL: 19.6 % (ref 19.6–45.3)
LYMPHOCYTES NFR BLD MANUAL: 2 % (ref 5–12)
LYMPHOCYTES NFR BLD MANUAL: 22 % (ref 19.6–45.3)
LYMPHOCYTES NFR BLD MANUAL: 23 % (ref 19.6–45.3)
LYMPHOCYTES NFR BLD MANUAL: 23.2 % (ref 19.6–45.3)
LYMPHOCYTES NFR BLD MANUAL: 24 % (ref 19.6–45.3)
LYMPHOCYTES NFR BLD MANUAL: 24 % (ref 5–12)
LYMPHOCYTES NFR BLD MANUAL: 25 % (ref 19.6–45.3)
LYMPHOCYTES NFR BLD MANUAL: 26 % (ref 19.6–45.3)
LYMPHOCYTES NFR BLD MANUAL: 26 % (ref 5–12)
LYMPHOCYTES NFR BLD MANUAL: 26.3 % (ref 19.6–45.3)
LYMPHOCYTES NFR BLD MANUAL: 27 % (ref 19.6–45.3)
LYMPHOCYTES NFR BLD MANUAL: 28 % (ref 19.6–45.3)
LYMPHOCYTES NFR BLD MANUAL: 29 % (ref 19.6–45.3)
LYMPHOCYTES NFR BLD MANUAL: 3 % (ref 5–12)
LYMPHOCYTES NFR BLD MANUAL: 3 % (ref 5–12)
LYMPHOCYTES NFR BLD MANUAL: 30 % (ref 19.6–45.3)
LYMPHOCYTES NFR BLD MANUAL: 30 % (ref 5–12)
LYMPHOCYTES NFR BLD MANUAL: 31 % (ref 19.6–45.3)
LYMPHOCYTES NFR BLD MANUAL: 33 % (ref 19.6–45.3)
LYMPHOCYTES NFR BLD MANUAL: 35 % (ref 19.6–45.3)
LYMPHOCYTES NFR BLD MANUAL: 36 % (ref 19.6–45.3)
LYMPHOCYTES NFR BLD MANUAL: 37 % (ref 19.6–45.3)
LYMPHOCYTES NFR BLD MANUAL: 39 % (ref 19.6–45.3)
LYMPHOCYTES NFR BLD MANUAL: 4 % (ref 5–12)
LYMPHOCYTES NFR BLD MANUAL: 4.1 % (ref 5–12)
LYMPHOCYTES NFR BLD MANUAL: 41 % (ref 19.6–45.3)
LYMPHOCYTES NFR BLD MANUAL: 41 % (ref 19.6–45.3)
LYMPHOCYTES NFR BLD MANUAL: 44 % (ref 19.6–45.3)
LYMPHOCYTES NFR BLD MANUAL: 44 % (ref 19.6–45.3)
LYMPHOCYTES NFR BLD MANUAL: 45.5 % (ref 19.6–45.3)
LYMPHOCYTES NFR BLD MANUAL: 48 % (ref 19.6–45.3)
LYMPHOCYTES NFR BLD MANUAL: 49 % (ref 19.6–45.3)
LYMPHOCYTES NFR BLD MANUAL: 5 % (ref 5–12)
LYMPHOCYTES NFR BLD MANUAL: 5.1 % (ref 5–12)
LYMPHOCYTES NFR BLD MANUAL: 50 % (ref 19.6–45.3)
LYMPHOCYTES NFR BLD MANUAL: 50 % (ref 19.6–45.3)
LYMPHOCYTES NFR BLD MANUAL: 51 % (ref 19.6–45.3)
LYMPHOCYTES NFR BLD MANUAL: 52 % (ref 19.6–45.3)
LYMPHOCYTES NFR BLD MANUAL: 53 % (ref 19.6–45.3)
LYMPHOCYTES NFR BLD MANUAL: 53 % (ref 19.6–45.3)
LYMPHOCYTES NFR BLD MANUAL: 54 % (ref 19.6–45.3)
LYMPHOCYTES NFR BLD MANUAL: 59 % (ref 19.6–45.3)
LYMPHOCYTES NFR BLD MANUAL: 6 % (ref 5–12)
LYMPHOCYTES NFR BLD MANUAL: 7 % (ref 5–12)
LYMPHOCYTES NFR BLD MANUAL: 8 % (ref 5–12)
LYMPHOCYTES NFR BLD MANUAL: 9 % (ref 19.6–45.3)
LYMPHOCYTES NFR BLD MANUAL: 9 % (ref 5–12)
Lab: NORMAL
M PNEUMO IGG SER IA-ACNC: NOT DETECTED
MACROCYTES BLD QL SMEAR: ABNORMAL
MACROCYTES BLD QL SMEAR: NORMAL
MACROCYTES BLD QL SMEAR: NORMAL
MAXIMAL PREDICTED HEART RATE: 140 BPM
MCH RBC QN AUTO: 28.5 PG (ref 26.6–33)
MCH RBC QN AUTO: 28.6 PG (ref 26.6–33)
MCH RBC QN AUTO: 29 PG (ref 26.6–33)
MCH RBC QN AUTO: 29.1 PG (ref 26.6–33)
MCH RBC QN AUTO: 29.2 PG (ref 26.6–33)
MCH RBC QN AUTO: 29.2 PG (ref 26.6–33)
MCH RBC QN AUTO: 29.3 PG (ref 26.6–33)
MCH RBC QN AUTO: 29.5 PG (ref 26.6–33)
MCH RBC QN AUTO: 29.6 PG (ref 26.6–33)
MCH RBC QN AUTO: 29.7 PG (ref 26.6–33)
MCH RBC QN AUTO: 29.8 PG (ref 26.6–33)
MCH RBC QN AUTO: 29.8 PG (ref 26.6–33)
MCH RBC QN AUTO: 29.9 PG (ref 26.6–33)
MCH RBC QN AUTO: 30 PG (ref 26.6–33)
MCH RBC QN AUTO: 30 PG (ref 26.6–33)
MCH RBC QN AUTO: 30.2 PG (ref 26.6–33)
MCH RBC QN AUTO: 30.4 PG (ref 26.6–33)
MCH RBC QN AUTO: 30.4 PG (ref 26.6–33)
MCH RBC QN AUTO: 30.5 PG (ref 26.6–33)
MCH RBC QN AUTO: 31.3 PG (ref 26.6–33)
MCH RBC QN AUTO: 31.4 PG (ref 26.6–33)
MCH RBC QN AUTO: 31.5 PG (ref 26.6–33)
MCH RBC QN AUTO: 31.6 PG (ref 26.6–33)
MCH RBC QN AUTO: 31.7 PG (ref 26.6–33)
MCH RBC QN AUTO: 31.8 PG (ref 26.6–33)
MCH RBC QN AUTO: 31.9 PG (ref 26.6–33)
MCH RBC QN AUTO: 32 PG (ref 26.6–33)
MCH RBC QN AUTO: 32.1 PG (ref 26.6–33)
MCH RBC QN AUTO: 32.2 PG (ref 26.6–33)
MCH RBC QN AUTO: 32.3 PG (ref 26.6–33)
MCH RBC QN AUTO: 32.3 PG (ref 26.6–33)
MCH RBC QN AUTO: 32.5 PG (ref 26.6–33)
MCH RBC QN AUTO: 32.8 PG (ref 26.6–33)
MCH RBC QN AUTO: 32.9 PG (ref 26.6–33)
MCH RBC QN AUTO: 33.8 PG (ref 26.6–33)
MCH RBC QN AUTO: 33.9 PG (ref 26.6–33)
MCH RBC QN AUTO: 34.6 PG (ref 26.6–33)
MCH RBC QN AUTO: 34.6 PG (ref 26.6–33)
MCH RBC QN AUTO: 34.7 PG (ref 26.6–33)
MCH RBC QN AUTO: 34.9 PG (ref 26.6–33)
MCH RBC QN AUTO: 35 PG (ref 26.6–33)
MCH RBC QN AUTO: 35.2 PG (ref 26.6–33)
MCH RBC QN AUTO: 35.2 PG (ref 26.6–33)
MCH RBC QN AUTO: 35.5 PG (ref 26.6–33)
MCH RBC QN AUTO: 35.5 PG (ref 26.6–33)
MCH RBC QN AUTO: 35.7 PG (ref 26.6–33)
MCH RBC QN AUTO: 35.7 PG (ref 26.6–33)
MCH RBC QN AUTO: 36.1 PG (ref 26.6–33)
MCHC RBC AUTO-ENTMCNC: 30.1 G/DL (ref 31.5–35.7)
MCHC RBC AUTO-ENTMCNC: 30.8 G/DL (ref 31.5–35.7)
MCHC RBC AUTO-ENTMCNC: 30.9 G/DL (ref 31.5–35.7)
MCHC RBC AUTO-ENTMCNC: 31 G/DL (ref 31.5–35.7)
MCHC RBC AUTO-ENTMCNC: 31.2 G/DL (ref 31.5–35.7)
MCHC RBC AUTO-ENTMCNC: 31.3 G/DL (ref 31.5–35.7)
MCHC RBC AUTO-ENTMCNC: 31.4 G/DL (ref 31.5–35.7)
MCHC RBC AUTO-ENTMCNC: 31.5 G/DL (ref 31.5–35.7)
MCHC RBC AUTO-ENTMCNC: 31.6 G/DL (ref 31.5–35.7)
MCHC RBC AUTO-ENTMCNC: 31.7 G/DL (ref 31.5–35.7)
MCHC RBC AUTO-ENTMCNC: 31.8 G/DL (ref 31.5–35.7)
MCHC RBC AUTO-ENTMCNC: 31.9 G/DL (ref 31.5–35.7)
MCHC RBC AUTO-ENTMCNC: 32 G/DL (ref 31.5–35.7)
MCHC RBC AUTO-ENTMCNC: 32.1 G/DL (ref 31.5–35.7)
MCHC RBC AUTO-ENTMCNC: 32.2 G/DL (ref 31.5–35.7)
MCHC RBC AUTO-ENTMCNC: 32.7 G/DL (ref 31.5–35.7)
MCHC RBC AUTO-ENTMCNC: 32.8 G/DL (ref 31.5–35.7)
MCHC RBC AUTO-ENTMCNC: 32.8 G/DL (ref 31.5–35.7)
MCHC RBC AUTO-ENTMCNC: 33.1 G/DL (ref 31.5–35.7)
MCHC RBC AUTO-ENTMCNC: 33.2 G/DL (ref 31.5–35.7)
MCHC RBC AUTO-ENTMCNC: 33.3 G/DL (ref 31.5–35.7)
MCHC RBC AUTO-ENTMCNC: 33.3 G/DL (ref 31.5–35.7)
MCHC RBC AUTO-ENTMCNC: 33.5 G/DL (ref 31.5–35.7)
MCHC RBC AUTO-ENTMCNC: 33.6 G/DL (ref 31.5–35.7)
MCHC RBC AUTO-ENTMCNC: 33.8 G/DL (ref 31.5–35.7)
MCHC RBC AUTO-ENTMCNC: 33.9 G/DL (ref 31.5–35.7)
MCHC RBC AUTO-ENTMCNC: 34 G/DL (ref 31.5–35.7)
MCHC RBC AUTO-ENTMCNC: 34.1 G/DL (ref 31.5–35.7)
MCHC RBC AUTO-ENTMCNC: 34.2 G/DL (ref 31.5–35.7)
MCHC RBC AUTO-ENTMCNC: 34.2 G/DL (ref 31.5–35.7)
MCHC RBC AUTO-ENTMCNC: 34.3 G/DL (ref 31.5–35.7)
MCHC RBC AUTO-ENTMCNC: 34.4 G/DL (ref 31.5–35.7)
MCHC RBC AUTO-ENTMCNC: 34.5 G/DL (ref 31.5–35.7)
MCV RBC AUTO: 101.1 FL (ref 79–97)
MCV RBC AUTO: 101.5 FL (ref 79–97)
MCV RBC AUTO: 102.2 FL (ref 79–97)
MCV RBC AUTO: 102.3 FL (ref 79–97)
MCV RBC AUTO: 103.2 FL (ref 79–97)
MCV RBC AUTO: 103.3 FL (ref 79–97)
MCV RBC AUTO: 104.2 FL (ref 79–97)
MCV RBC AUTO: 104.3 FL (ref 79–97)
MCV RBC AUTO: 104.9 FL (ref 79–97)
MCV RBC AUTO: 104.9 FL (ref 79–97)
MCV RBC AUTO: 105.1 FL (ref 79–97)
MCV RBC AUTO: 105.2 FL (ref 79–97)
MCV RBC AUTO: 105.3 FL (ref 79–97)
MCV RBC AUTO: 105.3 FL (ref 79–97)
MCV RBC AUTO: 106.1 FL (ref 79–97)
MCV RBC AUTO: 108.7 FL (ref 79–97)
MCV RBC AUTO: 90.3 FL (ref 79–97)
MCV RBC AUTO: 90.8 FL (ref 79–97)
MCV RBC AUTO: 90.8 FL (ref 79–97)
MCV RBC AUTO: 91 FL (ref 79–97)
MCV RBC AUTO: 91.1 FL (ref 79–97)
MCV RBC AUTO: 91.2 FL (ref 79–97)
MCV RBC AUTO: 91.3 FL (ref 79–97)
MCV RBC AUTO: 91.6 FL (ref 79–97)
MCV RBC AUTO: 92.2 FL (ref 79–97)
MCV RBC AUTO: 92.2 FL (ref 79–97)
MCV RBC AUTO: 92.7 FL (ref 79–97)
MCV RBC AUTO: 92.8 FL (ref 79–97)
MCV RBC AUTO: 93.2 FL (ref 79–97)
MCV RBC AUTO: 93.4 FL (ref 79–97)
MCV RBC AUTO: 93.5 FL (ref 79–97)
MCV RBC AUTO: 93.6 FL (ref 79–97)
MCV RBC AUTO: 93.8 FL (ref 79–97)
MCV RBC AUTO: 94 FL (ref 79–97)
MCV RBC AUTO: 94.2 FL (ref 79–97)
MCV RBC AUTO: 94.4 FL (ref 79–97)
MCV RBC AUTO: 94.6 FL (ref 79–97)
MCV RBC AUTO: 94.9 FL (ref 79–97)
MCV RBC AUTO: 95 FL (ref 79–97)
MCV RBC AUTO: 95.1 FL (ref 79–97)
MCV RBC AUTO: 95.7 FL (ref 79–97)
MCV RBC AUTO: 95.7 FL (ref 79–97)
MCV RBC AUTO: 96.5 FL (ref 79–97)
MCV RBC AUTO: 98.3 FL (ref 79–97)
MCV RBC AUTO: 98.6 FL (ref 79–97)
MCV RBC AUTO: 99.3 FL (ref 79–97)
MCV RBC AUTO: 99.6 FL (ref 79–97)
METAMYELOCYTES NFR BLD MANUAL: 2 % (ref 0–0)
METAMYELOCYTES NFR BLD MANUAL: 3 % (ref 0–0)
METAMYELOCYTES NFR BLD MANUAL: 3 % (ref 0–0)
METAMYELOCYTES NFR BLD MANUAL: 5 % (ref 0–0)
MICROALBUMIN UR-MCNC: 1384.3 UG/ML
MICROCYTES BLD QL: ABNORMAL
MODALITY: ABNORMAL
MONOCYTES # BLD AUTO: 0.02 10*3/MM3 (ref 0.1–0.9)
MONOCYTES # BLD AUTO: 0.03 10*3/MM3 (ref 0.1–0.9)
MONOCYTES # BLD AUTO: 0.03 10*3/MM3 (ref 0.1–0.9)
MONOCYTES # BLD AUTO: 0.04 10*3/MM3 (ref 0.1–0.9)
MONOCYTES # BLD AUTO: 0.05 10*3/MM3 (ref 0.1–0.9)
MONOCYTES # BLD AUTO: 0.05 10*3/MM3 (ref 0.1–0.9)
MONOCYTES # BLD AUTO: 0.06 10*3/MM3 (ref 0.1–0.9)
MONOCYTES # BLD AUTO: 0.07 10*3/MM3 (ref 0.1–0.9)
MONOCYTES # BLD AUTO: 0.08 10*3/MM3 (ref 0.1–0.9)
MONOCYTES # BLD AUTO: 0.09 10*3/MM3 (ref 0.1–0.9)
MONOCYTES # BLD AUTO: 0.1 10*3/MM3 (ref 0.1–0.9)
MONOCYTES # BLD AUTO: 0.11 10*3/MM3 (ref 0.1–0.9)
MONOCYTES # BLD AUTO: 0.11 10*3/MM3 (ref 0.1–0.9)
MONOCYTES # BLD AUTO: 0.12 10*3/MM3 (ref 0.1–0.9)
MONOCYTES # BLD AUTO: 0.13 10*3/MM3 (ref 0.1–0.9)
MONOCYTES # BLD AUTO: 0.14 10*3/MM3 (ref 0.1–0.9)
MONOCYTES # BLD AUTO: 0.15 10*3/MM3 (ref 0.1–0.9)
MONOCYTES # BLD AUTO: 0.17 10*3/MM3 (ref 0.1–0.9)
MONOCYTES # BLD AUTO: 0.17 10*3/MM3 (ref 0.1–0.9)
MONOCYTES # BLD AUTO: 0.18 10*3/MM3 (ref 0.1–0.9)
MONOCYTES # BLD AUTO: 0.19 10*3/MM3 (ref 0.1–0.9)
MONOCYTES # BLD AUTO: 0.19 10*3/MM3 (ref 0.1–0.9)
MONOCYTES # BLD AUTO: 0.2 10*3/MM3 (ref 0.1–0.9)
MONOCYTES # BLD AUTO: 0.21 10*3/MM3 (ref 0.1–0.9)
MONOCYTES # BLD AUTO: 0.21 10*3/MM3 (ref 0.1–0.9)
MONOCYTES # BLD AUTO: 0.22 10*3/MM3 (ref 0.1–0.9)
MONOCYTES # BLD AUTO: 0.22 10*3/MM3 (ref 0.1–0.9)
MONOCYTES # BLD AUTO: 0.25 10*3/MM3 (ref 0.1–0.9)
MONOCYTES # BLD AUTO: 0.27 10*3/MM3 (ref 0.1–0.9)
MONOCYTES # BLD AUTO: 0.29 10*3/MM3 (ref 0.1–0.9)
MONOCYTES # BLD AUTO: 0.3 10*3/MM3 (ref 0.1–0.9)
MONOCYTES # BLD AUTO: 0.36 10*3/MM3 (ref 0.1–0.9)
MONOCYTES # BLD AUTO: 0.37 10*3/MM3 (ref 0.1–0.9)
MONOCYTES # BLD AUTO: 0.37 10*3/MM3 (ref 0.1–0.9)
MONOCYTES # BLD AUTO: 0.38 10*3/MM3 (ref 0.1–0.9)
MONOCYTES # BLD AUTO: 0.56 10*3/MM3 (ref 0.1–0.9)
MONOCYTES # BLD AUTO: 0.57 10*3/MM3 (ref 0.1–0.9)
MONOCYTES # BLD AUTO: 0.57 10*3/MM3 (ref 0.1–0.9)
MONOCYTES # BLD AUTO: 0.58 10*3/MM3 (ref 0.1–0.9)
MONOCYTES # BLD AUTO: 0.62 10*3/MM3 (ref 0.1–0.9)
MONOCYTES # BLD AUTO: 0.83 10*3/MM3 (ref 0.1–0.9)
MONOCYTES NFR BLD AUTO: 10 % (ref 5–12)
MONOCYTES NFR BLD AUTO: 11.6 % (ref 5–12)
MONOCYTES NFR BLD AUTO: 11.6 % (ref 5–12)
MONOCYTES NFR BLD AUTO: 13.7 % (ref 5–12)
MONOCYTES NFR BLD AUTO: 15.3 % (ref 5–12)
MONOCYTES NFR BLD AUTO: 15.3 % (ref 5–12)
MONOCYTES NFR BLD AUTO: 18.5 % (ref 5–12)
MONOCYTES NFR BLD AUTO: 20.7 % (ref 5–12)
MONOCYTES NFR BLD AUTO: 7.7 % (ref 5–12)
MONOCYTES NFR BLD AUTO: 7.7 % (ref 5–12)
MONOCYTES NFR BLD AUTO: 7.9 % (ref 5–12)
MONOCYTES NFR BLD AUTO: 7.9 % (ref 5–12)
MONOCYTES NFR BLD AUTO: 8 % (ref 5–12)
MONOCYTES NFR BLD AUTO: 8.4 % (ref 5–12)
MONOCYTES NFR BLD AUTO: 9.1 % (ref 5–12)
MONOCYTES NFR BLD AUTO: 9.7 % (ref 5–12)
MONOCYTES NFR BLD AUTO: 9.8 % (ref 5–12)
MRSA DNA SPEC QL NAA+PROBE: NORMAL
MYELOCYTES NFR BLD MANUAL: 1 % (ref 0–0)
MYELOCYTES NFR BLD MANUAL: 2 % (ref 0–0)
MYELOCYTES NFR BLD MANUAL: 2 % (ref 0–0)
MYELOCYTES NFR BLD MANUAL: 3 % (ref 0–0)
MYELOCYTES NFR BLD MANUAL: 4 % (ref 0–0)
MYELOCYTES NFR BLD MANUAL: 4 % (ref 0–0)
MYELOCYTES NFR BLD MANUAL: 9.1 % (ref 0–0)
NEUTROPHILS # BLD AUTO: 0.26 10*3/MM3 (ref 1.7–7)
NEUTROPHILS # BLD AUTO: 0.47 10*3/MM3 (ref 1.7–7)
NEUTROPHILS # BLD AUTO: 0.49 10*3/MM3 (ref 1.7–7)
NEUTROPHILS # BLD AUTO: 0.53 10*3/MM3 (ref 1.7–7)
NEUTROPHILS # BLD AUTO: 0.53 10*3/MM3 (ref 1.7–7)
NEUTROPHILS # BLD AUTO: 0.54 10*3/MM3 (ref 1.7–7)
NEUTROPHILS # BLD AUTO: 0.55 10*3/MM3 (ref 1.7–7)
NEUTROPHILS # BLD AUTO: 0.58 10*3/MM3 (ref 1.7–7)
NEUTROPHILS # BLD AUTO: 0.63 10*3/MM3 (ref 1.7–7)
NEUTROPHILS # BLD AUTO: 0.66 10*3/MM3 (ref 1.7–7)
NEUTROPHILS # BLD AUTO: 0.69 10*3/MM3 (ref 1.7–7)
NEUTROPHILS # BLD AUTO: 0.7 10*3/MM3 (ref 1.7–7)
NEUTROPHILS # BLD AUTO: 0.71 10*3/MM3 (ref 1.7–7)
NEUTROPHILS # BLD AUTO: 0.73 10*3/MM3 (ref 1.7–7)
NEUTROPHILS # BLD AUTO: 0.75 10*3/MM3 (ref 1.7–7)
NEUTROPHILS # BLD AUTO: 0.76 10*3/MM3 (ref 1.7–7)
NEUTROPHILS # BLD AUTO: 0.76 10*3/MM3 (ref 1.7–7)
NEUTROPHILS # BLD AUTO: 0.8 10*3/MM3 (ref 1.7–7)
NEUTROPHILS # BLD AUTO: 0.8 10*3/MM3 (ref 1.7–7)
NEUTROPHILS # BLD AUTO: 0.81 10*3/MM3 (ref 1.7–7)
NEUTROPHILS # BLD AUTO: 0.81 10*3/MM3 (ref 1.7–7)
NEUTROPHILS # BLD AUTO: 0.83 10*3/MM3 (ref 1.7–7)
NEUTROPHILS # BLD AUTO: 0.83 10*3/MM3 (ref 1.7–7)
NEUTROPHILS # BLD AUTO: 0.84 10*3/MM3 (ref 1.7–7)
NEUTROPHILS # BLD AUTO: 0.84 10*3/MM3 (ref 1.7–7)
NEUTROPHILS # BLD AUTO: 0.86 10*3/MM3 (ref 1.7–7)
NEUTROPHILS # BLD AUTO: 0.89 10*3/MM3 (ref 1.7–7)
NEUTROPHILS # BLD AUTO: 0.92 10*3/MM3 (ref 1.7–7)
NEUTROPHILS # BLD AUTO: 0.92 10*3/MM3 (ref 1.7–7)
NEUTROPHILS # BLD AUTO: 0.94 10*3/MM3 (ref 1.7–7)
NEUTROPHILS # BLD AUTO: 0.95 10*3/MM3 (ref 1.7–7)
NEUTROPHILS # BLD AUTO: 0.99 10*3/MM3 (ref 1.7–7)
NEUTROPHILS # BLD AUTO: 1.01 10*3/MM3 (ref 1.7–7)
NEUTROPHILS # BLD AUTO: 1.03 10*3/MM3 (ref 1.7–7)
NEUTROPHILS # BLD AUTO: 1.03 10*3/MM3 (ref 1.7–7)
NEUTROPHILS # BLD AUTO: 1.04 10*3/MM3 (ref 1.7–7)
NEUTROPHILS # BLD AUTO: 1.07 10*3/MM3 (ref 1.7–7)
NEUTROPHILS # BLD AUTO: 1.09 10*3/MM3 (ref 1.7–7)
NEUTROPHILS # BLD AUTO: 1.13 10*3/MM3 (ref 1.7–7)
NEUTROPHILS # BLD AUTO: 1.18 10*3/MM3 (ref 1.7–7)
NEUTROPHILS # BLD AUTO: 1.2 10*3/MM3 (ref 1.7–7)
NEUTROPHILS # BLD AUTO: 1.21 10*3/MM3 (ref 1.7–7)
NEUTROPHILS # BLD AUTO: 1.22 10*3/MM3 (ref 1.7–7)
NEUTROPHILS # BLD AUTO: 1.23 10*3/MM3 (ref 1.7–7)
NEUTROPHILS # BLD AUTO: 1.27 10*3/MM3 (ref 1.7–7)
NEUTROPHILS # BLD AUTO: 1.32 10*3/MM3 (ref 1.7–7)
NEUTROPHILS # BLD AUTO: 1.33 10*3/MM3 (ref 1.7–7)
NEUTROPHILS # BLD AUTO: 1.36 10*3/MM3 (ref 1.7–7)
NEUTROPHILS # BLD AUTO: 1.4 10*3/MM3 (ref 1.7–7)
NEUTROPHILS # BLD AUTO: 1.41 10*3/MM3 (ref 1.7–7)
NEUTROPHILS # BLD AUTO: 1.41 10*3/MM3 (ref 1.7–7)
NEUTROPHILS # BLD AUTO: 1.53 10*3/MM3 (ref 1.7–7)
NEUTROPHILS # BLD AUTO: 1.68 10*3/MM3 (ref 1.7–7)
NEUTROPHILS # BLD AUTO: 1.94 10*3/MM3 (ref 1.7–7)
NEUTROPHILS # BLD AUTO: 2.35 10*3/MM3 (ref 1.7–7)
NEUTROPHILS # BLD AUTO: 4.88 10*3/MM3 (ref 1.7–7)
NEUTROPHILS # BLD AUTO: 6.35 10*3/MM3 (ref 1.7–7)
NEUTROPHILS NFR BLD AUTO: 0.84 10*3/MM3 (ref 1.7–7)
NEUTROPHILS NFR BLD AUTO: 29.2 % (ref 42.7–76)
NEUTROPHILS NFR BLD AUTO: 32 % (ref 42.7–76)
NEUTROPHILS NFR BLD AUTO: 33 % (ref 42.7–76)
NEUTROPHILS NFR BLD AUTO: 35.3 % (ref 42.7–76)
NEUTROPHILS NFR BLD AUTO: 35.4 % (ref 42.7–76)
NEUTROPHILS NFR BLD AUTO: 37 % (ref 42.7–76)
NEUTROPHILS NFR BLD AUTO: 38.6 % (ref 42.7–76)
NEUTROPHILS NFR BLD AUTO: 39.4 % (ref 42.7–76)
NEUTROPHILS NFR BLD AUTO: 39.7 % (ref 42.7–76)
NEUTROPHILS NFR BLD AUTO: 42.9 % (ref 42.7–76)
NEUTROPHILS NFR BLD AUTO: 43.5 % (ref 42.7–76)
NEUTROPHILS NFR BLD AUTO: 43.6 % (ref 42.7–76)
NEUTROPHILS NFR BLD AUTO: 44 % (ref 42.7–76)
NEUTROPHILS NFR BLD AUTO: 46.5 % (ref 42.7–76)
NEUTROPHILS NFR BLD AUTO: 46.7 % (ref 42.7–76)
NEUTROPHILS NFR BLD AUTO: 47.3 % (ref 42.7–76)
NEUTROPHILS NFR BLD AUTO: 61.2 % (ref 42.7–76)
NEUTROPHILS NFR BLD AUTO: 62.8 % (ref 42.7–76)
NEUTROPHILS NFR BLD AUTO: 68.2 % (ref 42.7–76)
NEUTROPHILS NFR BLD MANUAL: 21 % (ref 42.7–76)
NEUTROPHILS NFR BLD MANUAL: 29 % (ref 42.7–76)
NEUTROPHILS NFR BLD MANUAL: 29.3 % (ref 42.7–76)
NEUTROPHILS NFR BLD MANUAL: 30 % (ref 42.7–76)
NEUTROPHILS NFR BLD MANUAL: 31 % (ref 42.7–76)
NEUTROPHILS NFR BLD MANUAL: 32 % (ref 42.7–76)
NEUTROPHILS NFR BLD MANUAL: 32 % (ref 42.7–76)
NEUTROPHILS NFR BLD MANUAL: 33 % (ref 42.7–76)
NEUTROPHILS NFR BLD MANUAL: 36 % (ref 42.7–76)
NEUTROPHILS NFR BLD MANUAL: 37 % (ref 42.7–76)
NEUTROPHILS NFR BLD MANUAL: 39 % (ref 42.7–76)
NEUTROPHILS NFR BLD MANUAL: 39 % (ref 42.7–76)
NEUTROPHILS NFR BLD MANUAL: 42 % (ref 42.7–76)
NEUTROPHILS NFR BLD MANUAL: 43 % (ref 42.7–76)
NEUTROPHILS NFR BLD MANUAL: 44 % (ref 42.7–76)
NEUTROPHILS NFR BLD MANUAL: 45 % (ref 42.7–76)
NEUTROPHILS NFR BLD MANUAL: 46 % (ref 42.7–76)
NEUTROPHILS NFR BLD MANUAL: 48 % (ref 42.7–76)
NEUTROPHILS NFR BLD MANUAL: 50 % (ref 42.7–76)
NEUTROPHILS NFR BLD MANUAL: 50 % (ref 42.7–76)
NEUTROPHILS NFR BLD MANUAL: 51 % (ref 42.7–76)
NEUTROPHILS NFR BLD MANUAL: 53 % (ref 42.7–76)
NEUTROPHILS NFR BLD MANUAL: 53 % (ref 42.7–76)
NEUTROPHILS NFR BLD MANUAL: 54 % (ref 42.7–76)
NEUTROPHILS NFR BLD MANUAL: 54 % (ref 42.7–76)
NEUTROPHILS NFR BLD MANUAL: 54.5 % (ref 42.7–76)
NEUTROPHILS NFR BLD MANUAL: 55 % (ref 42.7–76)
NEUTROPHILS NFR BLD MANUAL: 55.7 % (ref 42.7–76)
NEUTROPHILS NFR BLD MANUAL: 59 % (ref 42.7–76)
NEUTROPHILS NFR BLD MANUAL: 59 % (ref 42.7–76)
NEUTROPHILS NFR BLD MANUAL: 59.6 % (ref 42.7–76)
NEUTROPHILS NFR BLD MANUAL: 61 % (ref 42.7–76)
NEUTROPHILS NFR BLD MANUAL: 68 % (ref 42.7–76)
NEUTROPHILS NFR BLD MANUAL: 69 % (ref 42.7–76)
NEUTROPHILS NFR BLD MANUAL: 92 % (ref 42.7–76)
NITRITE UR QL STRIP: NEGATIVE
NRBC BLD AUTO-RTO: 0 /100 WBC (ref 0–0.2)
NRBC BLD AUTO-RTO: 0.7 /100 WBC (ref 0–0.2)
NRBC BLD AUTO-RTO: 1 /100 WBC (ref 0–0.2)
NRBC BLD AUTO-RTO: 1 /100 WBC (ref 0–0.2)
NRBC BLD AUTO-RTO: 1.1 /100 WBC (ref 0–0.2)
NRBC BLD AUTO-RTO: 1.1 /100 WBC (ref 0–0.2)
NRBC BLD AUTO-RTO: 1.3 /100 WBC (ref 0–0.2)
NRBC BLD AUTO-RTO: 1.5 /100 WBC (ref 0–0.2)
NRBC BLD AUTO-RTO: 1.5 /100 WBC (ref 0–0.2)
NRBC BLD AUTO-RTO: 1.8 /100 WBC (ref 0–0.2)
NRBC BLD AUTO-RTO: 2.1 /100 WBC (ref 0–0.2)
NRBC BLD AUTO-RTO: 2.3 /100 WBC (ref 0–0.2)
NRBC BLD AUTO-RTO: 2.4 /100 WBC (ref 0–0.2)
NRBC BLD AUTO-RTO: 3 /100 WBC (ref 0–0.2)
NRBC SPEC MANUAL: 1 /100 WBC (ref 0–0.2)
NRBC SPEC MANUAL: 17 /100 WBC (ref 0–0.2)
NRBC SPEC MANUAL: 18 /100 WBC (ref 0–0.2)
NRBC SPEC MANUAL: 19 /100 WBC (ref 0–0.2)
NRBC SPEC MANUAL: 2 /100 WBC (ref 0–0.2)
NRBC SPEC MANUAL: 2.1 /100 WBC (ref 0–0.2)
NRBC SPEC MANUAL: 20 /100 WBC (ref 0–0.2)
NRBC SPEC MANUAL: 22 /100 WBC (ref 0–0.2)
NRBC SPEC MANUAL: 23 /100 WBC (ref 0–0.2)
NRBC SPEC MANUAL: 24.2 /100 WBC (ref 0–0.2)
NRBC SPEC MANUAL: 25 /100 WBC (ref 0–0.2)
NRBC SPEC MANUAL: 25 /100 WBC (ref 0–0.2)
NRBC SPEC MANUAL: 3 /100 WBC (ref 0–0.2)
NRBC SPEC MANUAL: 30 /100 WBC (ref 0–0.2)
NRBC SPEC MANUAL: 36 /100 WBC (ref 0–0.2)
NRBC SPEC MANUAL: 38 /100 WBC (ref 0–0.2)
NRBC SPEC MANUAL: 4 /100 WBC (ref 0–0.2)
NRBC SPEC MANUAL: 4 /100 WBC (ref 0–0.2)
NRBC SPEC MANUAL: 40 /100 WBC (ref 0–0.2)
NRBC SPEC MANUAL: 42 /100 WBC (ref 0–0.2)
NRBC SPEC MANUAL: 43 /100 WBC (ref 0–0.2)
NRBC SPEC MANUAL: 5 /100 WBC (ref 0–0.2)
NRBC SPEC MANUAL: 53 /100 WBC (ref 0–0.2)
NRBC SPEC MANUAL: 6 /100 WBC (ref 0–0.2)
NRBC SPEC MANUAL: 6.1 /100 WBC (ref 0–0.2)
NRBC SPEC MANUAL: 63 /100 WBC (ref 0–0.2)
NRBC SPEC MANUAL: 67 /100 WBC (ref 0–0.2)
NRBC SPEC MANUAL: 7 /100 WBC (ref 0–0.2)
NRBC SPEC MANUAL: 76 /100 WBC (ref 0–0.2)
NT-PROBNP SERPL-MCNC: 7026 PG/ML (ref 5–1800)
NT-PROBNP SERPL-MCNC: ABNORMAL PG/ML (ref 5–1800)
OVALOCYTES BLD QL SMEAR: ABNORMAL
PCO2 BLDA: 49.7 MM HG (ref 35–45)
PH BLDA: 7.39 PH UNITS (ref 7.35–7.45)
PH UR STRIP.AUTO: <=5 [PH] (ref 5–8)
PLAT MORPH BLD: NORMAL
PLATELET # BLD AUTO: 100 10*3/MM3 (ref 140–450)
PLATELET # BLD AUTO: 105 10*3/MM3 (ref 140–450)
PLATELET # BLD AUTO: 106 10*3/MM3 (ref 140–450)
PLATELET # BLD AUTO: 111 10*3/MM3 (ref 140–450)
PLATELET # BLD AUTO: 114 10*3/MM3 (ref 140–450)
PLATELET # BLD AUTO: 121 10*3/MM3 (ref 140–450)
PLATELET # BLD AUTO: 123 10*3/MM3 (ref 140–450)
PLATELET # BLD AUTO: 124 10*3/MM3 (ref 140–450)
PLATELET # BLD AUTO: 134 10*3/MM3 (ref 140–450)
PLATELET # BLD AUTO: 136 10*3/MM3 (ref 140–450)
PLATELET # BLD AUTO: 138 10*3/MM3 (ref 140–450)
PLATELET # BLD AUTO: 140 10*3/MM3 (ref 140–450)
PLATELET # BLD AUTO: 141 10*3/MM3 (ref 140–450)
PLATELET # BLD AUTO: 146 10*3/MM3 (ref 140–450)
PLATELET # BLD AUTO: 148 10*3/MM3 (ref 140–450)
PLATELET # BLD AUTO: 152 10*3/MM3 (ref 140–450)
PLATELET # BLD AUTO: 154 10*3/MM3 (ref 140–450)
PLATELET # BLD AUTO: 155 10*3/MM3 (ref 140–450)
PLATELET # BLD AUTO: 158 10*3/MM3 (ref 140–450)
PLATELET # BLD AUTO: 160 10*3/MM3 (ref 140–450)
PLATELET # BLD AUTO: 160 10*3/MM3 (ref 140–450)
PLATELET # BLD AUTO: 162 10*3/MM3 (ref 140–450)
PLATELET # BLD AUTO: 169 10*3/MM3 (ref 140–450)
PLATELET # BLD AUTO: 170 10*3/MM3 (ref 140–450)
PLATELET # BLD AUTO: 170 10*3/MM3 (ref 140–450)
PLATELET # BLD AUTO: 173 10*3/MM3 (ref 140–450)
PLATELET # BLD AUTO: 175 10*3/MM3 (ref 140–450)
PLATELET # BLD AUTO: 186 10*3/MM3 (ref 140–450)
PLATELET # BLD AUTO: 19 10*3/MM3 (ref 140–450)
PLATELET # BLD AUTO: 22 10*3/MM3 (ref 140–450)
PLATELET # BLD AUTO: 23 10*3/MM3 (ref 140–450)
PLATELET # BLD AUTO: 24 10*3/MM3 (ref 140–450)
PLATELET # BLD AUTO: 24 10*3/MM3 (ref 140–450)
PLATELET # BLD AUTO: 25 10*3/MM3 (ref 140–450)
PLATELET # BLD AUTO: 25 10*3/MM3 (ref 140–450)
PLATELET # BLD AUTO: 26 10*3/MM3 (ref 140–450)
PLATELET # BLD AUTO: 27 10*3/MM3 (ref 140–450)
PLATELET # BLD AUTO: 31 10*3/MM3 (ref 140–450)
PLATELET # BLD AUTO: 39 10*3/MM3 (ref 140–450)
PLATELET # BLD AUTO: 52 10*3/MM3 (ref 140–450)
PLATELET # BLD AUTO: 54 10*3/MM3 (ref 140–450)
PLATELET # BLD AUTO: 58 10*3/MM3 (ref 140–450)
PLATELET # BLD AUTO: 59 10*3/MM3 (ref 140–450)
PLATELET # BLD AUTO: 68 10*3/MM3 (ref 140–450)
PLATELET # BLD AUTO: 68 10*3/MM3 (ref 140–450)
PLATELET # BLD AUTO: 74 10*3/MM3 (ref 140–450)
PLATELET # BLD AUTO: 77 10*3/MM3 (ref 140–450)
PLATELET # BLD AUTO: 89 10*3/MM3 (ref 140–450)
PLATELET # BLD AUTO: 91 10*3/MM3 (ref 140–450)
PLATELET # BLD AUTO: 96 10*3/MM3 (ref 140–450)
PMV BLD AUTO: 10.7 FL (ref 6–12)
PMV BLD AUTO: 10.9 FL (ref 6–12)
PMV BLD AUTO: 11 FL (ref 6–12)
PMV BLD AUTO: 11.1 FL (ref 6–12)
PMV BLD AUTO: 11.2 FL (ref 6–12)
PMV BLD AUTO: 11.2 FL (ref 6–12)
PMV BLD AUTO: 11.3 FL (ref 6–12)
PMV BLD AUTO: 11.4 FL (ref 6–12)
PMV BLD AUTO: 11.4 FL (ref 6–12)
PMV BLD AUTO: 11.5 FL (ref 6–12)
PMV BLD AUTO: 11.6 FL (ref 6–12)
PMV BLD AUTO: 11.6 FL (ref 6–12)
PMV BLD AUTO: 11.7 FL (ref 6–12)
PMV BLD AUTO: 11.7 FL (ref 6–12)
PMV BLD AUTO: 11.8 FL (ref 6–12)
PMV BLD AUTO: 11.8 FL (ref 6–12)
PMV BLD AUTO: 11.9 FL (ref 6–12)
PMV BLD AUTO: 12 FL (ref 6–12)
PMV BLD AUTO: 12 FL (ref 6–12)
PMV BLD AUTO: 12.1 FL (ref 6–12)
PMV BLD AUTO: 12.2 FL (ref 6–12)
PMV BLD AUTO: 12.3 FL (ref 6–12)
PMV BLD AUTO: 12.4 FL (ref 6–12)
PMV BLD AUTO: 12.4 FL (ref 6–12)
PMV BLD AUTO: 12.5 FL (ref 6–12)
PMV BLD AUTO: 12.6 FL (ref 6–12)
PMV BLD AUTO: 12.6 FL (ref 6–12)
PMV BLD AUTO: 12.7 FL (ref 6–12)
PMV BLD AUTO: 12.7 FL (ref 6–12)
PMV BLD AUTO: 12.8 FL (ref 6–12)
PO2 BLDA: 52.8 MM HG (ref 80–100)
POIKILOCYTOSIS BLD QL SMEAR: ABNORMAL
POLYCHROMASIA BLD QL SMEAR: ABNORMAL
POTASSIUM BLD-SCNC: 3.6 MMOL/L (ref 3.5–5.2)
POTASSIUM BLD-SCNC: 3.8 MMOL/L (ref 3.5–5.2)
POTASSIUM BLD-SCNC: 4 MMOL/L (ref 3.5–5.2)
POTASSIUM BLD-SCNC: 4 MMOL/L (ref 3.5–5.2)
POTASSIUM BLD-SCNC: 4.1 MMOL/L (ref 3.5–5.2)
POTASSIUM BLD-SCNC: 4.2 MMOL/L (ref 3.5–5.2)
POTASSIUM BLD-SCNC: 4.3 MMOL/L (ref 3.5–5.2)
POTASSIUM BLD-SCNC: 4.4 MMOL/L (ref 3.5–5.2)
POTASSIUM BLD-SCNC: 4.8 MMOL/L (ref 3.5–5.2)
POTASSIUM BLD-SCNC: 4.9 MMOL/L (ref 3.5–5.2)
POTASSIUM SERPL-SCNC: 4.1 MMOL/L (ref 3.5–5.2)
POTASSIUM SERPL-SCNC: 4.1 MMOL/L (ref 3.5–5.2)
POTASSIUM SERPL-SCNC: 4.6 MMOL/L (ref 3.5–5.2)
POTASSIUM SERPL-SCNC: 4.8 MMOL/L (ref 3.5–5.2)
PROCALCITONIN SERPL-MCNC: 0.18 NG/ML (ref 0.1–0.25)
PROCALCITONIN SERPL-MCNC: 0.51 NG/ML (ref 0.1–0.25)
PROCALCITONIN SERPL-MCNC: 0.78 NG/ML (ref 0.1–0.25)
PROMYELOCYTES NFR BLD MANUAL: 1 % (ref 0–0)
PROMYELOCYTES NFR BLD MANUAL: 1 % (ref 0–0)
PROMYELOCYTES NFR BLD MANUAL: 4 % (ref 0–0)
PROT SERPL-MCNC: 6.1 G/DL (ref 6–8.5)
PROT SERPL-MCNC: 6.3 G/DL (ref 6–8.5)
PROT SERPL-MCNC: 6.4 G/DL (ref 6–8.5)
PROT SERPL-MCNC: 6.6 G/DL (ref 6–8.5)
PROT SERPL-MCNC: 6.8 G/DL (ref 6–8.5)
PROT SERPL-MCNC: 6.9 G/DL (ref 6–8.5)
PROT SERPL-MCNC: 6.9 G/DL (ref 6–8.5)
PROT SERPL-MCNC: 7.6 G/DL (ref 6–8.5)
PROT UR QL STRIP: ABNORMAL
PROTHROMBIN TIME: 15.8 SECONDS (ref 11.7–14.2)
RBC # BLD AUTO: 2.14 10*6/MM3 (ref 4.14–5.8)
RBC # BLD AUTO: 2.17 10*6/MM3 (ref 4.14–5.8)
RBC # BLD AUTO: 2.24 10*6/MM3 (ref 4.14–5.8)
RBC # BLD AUTO: 2.27 10*6/MM3 (ref 4.14–5.8)
RBC # BLD AUTO: 2.3 10*6/MM3 (ref 4.14–5.8)
RBC # BLD AUTO: 2.31 10*6/MM3 (ref 4.14–5.8)
RBC # BLD AUTO: 2.38 10*6/MM3 (ref 4.14–5.8)
RBC # BLD AUTO: 2.4 10*6/MM3 (ref 4.14–5.8)
RBC # BLD AUTO: 2.41 10*6/MM3 (ref 4.14–5.8)
RBC # BLD AUTO: 2.42 10*6/MM3 (ref 4.14–5.8)
RBC # BLD AUTO: 2.43 10*6/MM3 (ref 4.14–5.8)
RBC # BLD AUTO: 2.43 10*6/MM3 (ref 4.14–5.8)
RBC # BLD AUTO: 2.45 10*6/MM3 (ref 4.14–5.8)
RBC # BLD AUTO: 2.47 10*6/MM3 (ref 4.14–5.8)
RBC # BLD AUTO: 2.48 10*6/MM3 (ref 4.14–5.8)
RBC # BLD AUTO: 2.48 10*6/MM3 (ref 4.14–5.8)
RBC # BLD AUTO: 2.49 10*6/MM3 (ref 4.14–5.8)
RBC # BLD AUTO: 2.51 10*6/MM3 (ref 4.14–5.8)
RBC # BLD AUTO: 2.55 10*6/MM3 (ref 4.14–5.8)
RBC # BLD AUTO: 2.56 10*6/MM3 (ref 4.14–5.8)
RBC # BLD AUTO: 2.58 10*6/MM3 (ref 4.14–5.8)
RBC # BLD AUTO: 2.59 10*6/MM3 (ref 4.14–5.8)
RBC # BLD AUTO: 2.6 10*6/MM3 (ref 4.14–5.8)
RBC # BLD AUTO: 2.6 10*6/MM3 (ref 4.14–5.8)
RBC # BLD AUTO: 2.63 10*6/MM3 (ref 4.14–5.8)
RBC # BLD AUTO: 2.63 10*6/MM3 (ref 4.14–5.8)
RBC # BLD AUTO: 2.65 10*6/MM3 (ref 4.14–5.8)
RBC # BLD AUTO: 2.66 10*6/MM3 (ref 4.14–5.8)
RBC # BLD AUTO: 2.67 10*6/MM3 (ref 4.14–5.8)
RBC # BLD AUTO: 2.68 10*6/MM3 (ref 4.14–5.8)
RBC # BLD AUTO: 2.69 10*6/MM3 (ref 4.14–5.8)
RBC # BLD AUTO: 2.7 10*6/MM3 (ref 4.14–5.8)
RBC # BLD AUTO: 2.73 10*6/MM3 (ref 4.14–5.8)
RBC # BLD AUTO: 2.73 10*6/MM3 (ref 4.14–5.8)
RBC # BLD AUTO: 2.76 10*6/MM3 (ref 4.14–5.8)
RBC # BLD AUTO: 2.76 10*6/MM3 (ref 4.14–5.8)
RBC # BLD AUTO: 2.78 10*6/MM3 (ref 4.14–5.8)
RBC # BLD AUTO: 2.79 10*6/MM3 (ref 4.14–5.8)
RBC # BLD AUTO: 2.8 10*6/MM3 (ref 4.14–5.8)
RBC # BLD AUTO: 2.82 10*6/MM3 (ref 4.14–5.8)
RBC # BLD AUTO: 2.84 10*6/MM3 (ref 4.14–5.8)
RBC # BLD AUTO: 2.91 10*6/MM3 (ref 4.14–5.8)
RBC # BLD AUTO: 2.95 10*6/MM3 (ref 4.14–5.8)
RBC # BLD AUTO: 2.96 10*6/MM3 (ref 4.14–5.8)
RBC # BLD AUTO: 2.98 10*6/MM3 (ref 4.14–5.8)
RBC # BLD AUTO: 2.99 10*6/MM3 (ref 4.14–5.8)
RBC # BLD AUTO: 3.01 10*6/MM3 (ref 4.14–5.8)
RBC # BLD AUTO: 3.26 10*6/MM3 (ref 4.14–5.8)
RBC # UR: ABNORMAL /HPF
RBC MORPH BLD: NORMAL
REF LAB TEST METHOD: ABNORMAL
RH BLD: POSITIVE
RHINOVIRUS RNA SPEC NAA+PROBE: NOT DETECTED
RSV RNA NPH QL NAA+NON-PROBE: NOT DETECTED
S PNEUM AG SPEC QL LA: NEGATIVE
SAO2 % BLDCOA: 86 % (ref 92–99)
SARS-COV-2 N GENE NPH QL NAA+PROBE: NOT DETECTED
SCAN SLIDE: NORMAL
SCHISTOCYTES BLD QL SMEAR: ABNORMAL
SINUS: 3.7 CM
SMUDGE CELLS BLD QL SMEAR: ABNORMAL
SODIUM BLD-SCNC: 135 MMOL/L (ref 136–145)
SODIUM BLD-SCNC: 138 MMOL/L (ref 136–145)
SODIUM BLD-SCNC: 140 MMOL/L (ref 136–145)
SODIUM BLD-SCNC: 141 MMOL/L (ref 136–145)
SODIUM BLD-SCNC: 141 MMOL/L (ref 136–145)
SODIUM BLD-SCNC: 143 MMOL/L (ref 136–145)
SODIUM BLD-SCNC: 144 MMOL/L (ref 136–145)
SODIUM BLD-SCNC: 145 MMOL/L (ref 136–145)
SODIUM BLD-SCNC: 146 MMOL/L (ref 136–145)
SODIUM BLD-SCNC: 147 MMOL/L (ref 136–145)
SODIUM BLD-SCNC: 147 MMOL/L (ref 136–145)
SODIUM BLD-SCNC: 148 MMOL/L (ref 136–145)
SODIUM SERPL-SCNC: 140 MMOL/L (ref 136–145)
SODIUM SERPL-SCNC: 141 MMOL/L (ref 136–145)
SODIUM SERPL-SCNC: 143 MMOL/L (ref 136–145)
SODIUM SERPL-SCNC: 145 MMOL/L (ref 136–145)
SP GR UR STRIP: 1.02 (ref 1–1.03)
SPHEROCYTES BLD QL SMEAR: ABNORMAL
SQUAMOUS #/AREA URNS HPF: ABNORMAL /HPF
STJ: 3.5 CM
STOMATOCYTES BLD QL SMEAR: ABNORMAL
STRESS TARGET HR: 119 BPM
T&S EXPIRATION DATE: NORMAL
TARGETS BLD QL SMEAR: ABNORMAL
TIBC SERPL-MCNC: 209 MCG/DL (ref 298–536)
TIBC SERPL-MCNC: 213 MCG/DL (ref 298–536)
TIBC SERPL-MCNC: 258 MCG/DL (ref 298–536)
TOTAL RATE: 24 BREATHS/MINUTE
TRANSFERRIN SERPL-MCNC: 140 MG/DL (ref 200–360)
TRANSFERRIN SERPL-MCNC: 143 MG/DL (ref 200–360)
TRANSFERRIN SERPL-MCNC: 173 MG/DL (ref 200–360)
TRIGL SERPL-MCNC: 71 MG/DL (ref 0–149)
TROPONIN T SERPL-MCNC: 0.03 NG/ML (ref 0–0.03)
UNIT  ABO: NORMAL
UNIT  RH: NORMAL
UROBILINOGEN UR QL STRIP: ABNORMAL
VANCOMYCIN TROUGH SERPL-MCNC: 20.4 MCG/ML (ref 5–20)
VANCOMYCIN TROUGH SERPL-MCNC: 25.7 MCG/ML (ref 5–20)
VARIANT LYMPHS NFR BLD MANUAL: 10 % (ref 0–5)
VARIANT LYMPHS NFR BLD MANUAL: 2 % (ref 0–5)
VARIANT LYMPHS NFR BLD MANUAL: 24 % (ref 0–5)
VARIANT LYMPHS NFR BLD MANUAL: 3 % (ref 0–5)
VARIANT LYMPHS NFR BLD MANUAL: 4 % (ref 0–5)
VARIANT LYMPHS NFR BLD MANUAL: 6 % (ref 0–5)
VARIANT LYMPHS NFR BLD MANUAL: 8 % (ref 0–5)
VLDLC SERPL CALC-MCNC: 16 MG/DL (ref 5–40)
WBC # BLD AUTO: 1.24 10*3/MM3 (ref 3.4–10.8)
WBC # BLD AUTO: 1.26 10*3/MM3 (ref 3.4–10.8)
WBC # BLD AUTO: 1.5 10*3/MM3 (ref 3.4–10.8)
WBC # BLD AUTO: 1.54 10*3/MM3 (ref 3.4–10.8)
WBC # BLD AUTO: 1.62 10*3/MM3 (ref 3.4–10.8)
WBC # BLD AUTO: 1.75 10*3/MM3 (ref 3.4–10.8)
WBC # BLD AUTO: 1.77 10*3/MM3 (ref 3.4–10.8)
WBC # BLD AUTO: 1.81 10*3/MM3 (ref 3.4–10.8)
WBC # BLD AUTO: 1.82 10*3/MM3 (ref 3.4–10.8)
WBC # BLD AUTO: 1.92 10*3/MM3 (ref 3.4–10.8)
WBC # BLD AUTO: 2.04 10*3/MM3 (ref 3.4–10.8)
WBC # BLD AUTO: 2.09 10*3/MM3 (ref 3.4–10.8)
WBC # BLD AUTO: 2.17 10*3/MM3 (ref 3.4–10.8)
WBC # BLD AUTO: 2.22 10*3/MM3 (ref 3.4–10.8)
WBC # BLD AUTO: 2.26 10*3/MM3 (ref 3.4–10.8)
WBC # BLD AUTO: 2.28 10*3/MM3 (ref 3.4–10.8)
WBC # BLD AUTO: 2.3 10*3/MM3 (ref 3.4–10.8)
WBC # BLD AUTO: 2.36 10*3/MM3 (ref 3.4–10.8)
WBC # BLD AUTO: 2.39 10*3/MM3 (ref 3.4–10.8)
WBC # BLD AUTO: 2.54 10*3/MM3 (ref 3.4–10.8)
WBC # BLD AUTO: 2.55 10*3/MM3 (ref 3.4–10.8)
WBC # BLD AUTO: 2.75 10*3/MM3 (ref 3.4–10.8)
WBC # BLD AUTO: 2.76 10*3/MM3 (ref 3.4–10.8)
WBC # BLD AUTO: 2.81 10*3/MM3 (ref 3.4–10.8)
WBC # BLD AUTO: 2.81 10*3/MM3 (ref 3.4–10.8)
WBC # BLD AUTO: 3.03 10*3/MM3 (ref 3.4–10.8)
WBC # BLD AUTO: 7.15 10*3/MM3 (ref 3.4–10.8)
WBC MORPH BLD: NORMAL
WBC NRBC COR # BLD: 1.59 10*3/MM3 (ref 3.4–10.8)
WBC NRBC COR # BLD: 1.6 10*3/MM3 (ref 3.4–10.8)
WBC NRBC COR # BLD: 1.63 10*3/MM3 (ref 3.4–10.8)
WBC NRBC COR # BLD: 1.69 10*3/MM3 (ref 3.4–10.8)
WBC NRBC COR # BLD: 1.82 10*3/MM3 (ref 3.4–10.8)
WBC NRBC COR # BLD: 1.89 10*3/MM3 (ref 3.4–10.8)
WBC NRBC COR # BLD: 1.92 10*3/MM3 (ref 3.4–10.8)
WBC NRBC COR # BLD: 1.93 10*3/MM3 (ref 3.4–10.8)
WBC NRBC COR # BLD: 1.98 10*3/MM3 (ref 3.4–10.8)
WBC NRBC COR # BLD: 2 10*3/MM3 (ref 3.4–10.8)
WBC NRBC COR # BLD: 2.04 10*3/MM3 (ref 3.4–10.8)
WBC NRBC COR # BLD: 2.07 10*3/MM3 (ref 3.4–10.8)
WBC NRBC COR # BLD: 2.1 10*3/MM3 (ref 3.4–10.8)
WBC NRBC COR # BLD: 2.13 10*3/MM3 (ref 3.4–10.8)
WBC NRBC COR # BLD: 2.13 10*3/MM3 (ref 3.4–10.8)
WBC NRBC COR # BLD: 2.16 10*3/MM3 (ref 3.4–10.8)
WBC NRBC COR # BLD: 2.23 10*3/MM3 (ref 3.4–10.8)
WBC NRBC COR # BLD: 2.23 10*3/MM3 (ref 3.4–10.8)
WBC NRBC COR # BLD: 2.25 10*3/MM3 (ref 3.4–10.8)
WBC NRBC COR # BLD: 2.34 10*3/MM3 (ref 3.4–10.8)
WBC NRBC COR # BLD: 2.38 10*3/MM3 (ref 3.4–10.8)
WBC NRBC COR # BLD: 2.4 10*3/MM3 (ref 3.4–10.8)
WBC NRBC COR # BLD: 2.47 10*3/MM3 (ref 3.4–10.8)
WBC NRBC COR # BLD: 2.62 10*3/MM3 (ref 3.4–10.8)
WBC NRBC COR # BLD: 3 10*3/MM3 (ref 3.4–10.8)
WBC NRBC COR # BLD: 3.17 10*3/MM3 (ref 3.4–10.8)
WBC NRBC COR # BLD: 3.19 10*3/MM3 (ref 3.4–10.8)
WBC NRBC COR # BLD: 3.4 10*3/MM3 (ref 3.4–10.8)
WBC NRBC COR # BLD: 6.9 10*3/MM3 (ref 3.4–10.8)
WBC NRBC COR # BLD: 7.15 10*3/MM3 (ref 3.4–10.8)
WBC UR QL AUTO: ABNORMAL /HPF

## 2020-01-01 PROCEDURE — 86900 BLOOD TYPING SEROLOGIC ABO: CPT | Performed by: INTERNAL MEDICINE

## 2020-01-01 PROCEDURE — 86850 RBC ANTIBODY SCREEN: CPT

## 2020-01-01 PROCEDURE — 25010000002 VANCOMYCIN 10 G RECONSTITUTED SOLUTION: Performed by: INTERNAL MEDICINE

## 2020-01-01 PROCEDURE — 80053 COMPREHEN METABOLIC PANEL: CPT | Performed by: FAMILY MEDICINE

## 2020-01-01 PROCEDURE — 94799 UNLISTED PULMONARY SVC/PX: CPT

## 2020-01-01 PROCEDURE — G0463 HOSPITAL OUTPT CLINIC VISIT: HCPCS

## 2020-01-01 PROCEDURE — 36415 COLL VENOUS BLD VENIPUNCTURE: CPT

## 2020-01-01 PROCEDURE — 85025 COMPLETE CBC W/AUTO DIFF WBC: CPT | Performed by: INTERNAL MEDICINE

## 2020-01-01 PROCEDURE — 86900 BLOOD TYPING SEROLOGIC ABO: CPT

## 2020-01-01 PROCEDURE — 86901 BLOOD TYPING SEROLOGIC RH(D): CPT | Performed by: INTERNAL MEDICINE

## 2020-01-01 PROCEDURE — 85007 BL SMEAR W/DIFF WBC COUNT: CPT | Performed by: INTERNAL MEDICINE

## 2020-01-01 PROCEDURE — 84145 PROCALCITONIN (PCT): CPT | Performed by: EMERGENCY MEDICINE

## 2020-01-01 PROCEDURE — 25010000002 EPOETIN ALFA PER 1000 UNITS: Performed by: NURSE PRACTITIONER

## 2020-01-01 PROCEDURE — 25010000002 IMMUNE GLOBULIN (HUMAN) 10 GM/100ML SOLUTION: Performed by: INTERNAL MEDICINE

## 2020-01-01 PROCEDURE — P9016 RBC LEUKOCYTES REDUCED: HCPCS

## 2020-01-01 PROCEDURE — 86923 COMPATIBILITY TEST ELECTRIC: CPT

## 2020-01-01 PROCEDURE — 96372 THER/PROPH/DIAG INJ SC/IM: CPT

## 2020-01-01 PROCEDURE — 96365 THER/PROPH/DIAG IV INF INIT: CPT

## 2020-01-01 PROCEDURE — 96366 THER/PROPH/DIAG IV INF ADDON: CPT

## 2020-01-01 PROCEDURE — 63710000001 DIPHENHYDRAMINE PER 50 MG: Performed by: INTERNAL MEDICINE

## 2020-01-01 PROCEDURE — 63710000001 PREDNISONE PER 5 MG: Performed by: INTERNAL MEDICINE

## 2020-01-01 PROCEDURE — 63710000001 DIPHENHYDRAMINE PER 50 MG: Performed by: NURSE PRACTITIONER

## 2020-01-01 PROCEDURE — 36430 TRANSFUSION BLD/BLD COMPNT: CPT

## 2020-01-01 PROCEDURE — 25010000002 VANCOMYCIN PER 500 MG: Performed by: INTERNAL MEDICINE

## 2020-01-01 PROCEDURE — 84466 ASSAY OF TRANSFERRIN: CPT | Performed by: INTERNAL MEDICINE

## 2020-01-01 PROCEDURE — 86900 BLOOD TYPING SEROLOGIC ABO: CPT | Performed by: NURSE PRACTITIONER

## 2020-01-01 PROCEDURE — 25010000002 LUSPATERCEPT-AAMT 25 MG RECONSTITUTED SOLUTION: Performed by: INTERNAL MEDICINE

## 2020-01-01 PROCEDURE — 94660 CPAP INITIATION&MGMT: CPT

## 2020-01-01 PROCEDURE — 94640 AIRWAY INHALATION TREATMENT: CPT

## 2020-01-01 PROCEDURE — 80053 COMPREHEN METABOLIC PANEL: CPT | Performed by: INTERNAL MEDICINE

## 2020-01-01 PROCEDURE — 63710000001 ACETAMINOPHEN 325 MG TABLET: Performed by: INTERNAL MEDICINE

## 2020-01-01 PROCEDURE — 25010000002 VANCOMYCIN: Performed by: INTERNAL MEDICINE

## 2020-01-01 PROCEDURE — 99214 OFFICE O/P EST MOD 30 MIN: CPT | Performed by: INTERNAL MEDICINE

## 2020-01-01 PROCEDURE — 25010000002 CEFEPIME PER 500 MG: Performed by: INTERNAL MEDICINE

## 2020-01-01 PROCEDURE — A9270 NON-COVERED ITEM OR SERVICE: HCPCS | Performed by: INTERNAL MEDICINE

## 2020-01-01 PROCEDURE — 86901 BLOOD TYPING SEROLOGIC RH(D): CPT

## 2020-01-01 PROCEDURE — 87641 MR-STAPH DNA AMP PROBE: CPT | Performed by: INTERNAL MEDICINE

## 2020-01-01 PROCEDURE — 25010000002 IMMUNE GLOBULIN (HUMAN) 30 GM/300ML SOLUTION: Performed by: INTERNAL MEDICINE

## 2020-01-01 PROCEDURE — 80048 BASIC METABOLIC PNL TOTAL CA: CPT | Performed by: HOSPITALIST

## 2020-01-01 PROCEDURE — 83540 ASSAY OF IRON: CPT | Performed by: INTERNAL MEDICINE

## 2020-01-01 PROCEDURE — 85025 COMPLETE CBC W/AUTO DIFF WBC: CPT | Performed by: EMERGENCY MEDICINE

## 2020-01-01 PROCEDURE — 25010000002 EPOETIN ALFA-EPBX 40000 UNIT/ML SOLUTION: Performed by: INTERNAL MEDICINE

## 2020-01-01 PROCEDURE — 71045 X-RAY EXAM CHEST 1 VIEW: CPT

## 2020-01-01 PROCEDURE — 99222 1ST HOSP IP/OBS MODERATE 55: CPT | Performed by: INTERNAL MEDICINE

## 2020-01-01 PROCEDURE — 85007 BL SMEAR W/DIFF WBC COUNT: CPT | Performed by: NURSE PRACTITIONER

## 2020-01-01 PROCEDURE — 0100U HC BIOFIRE FILMARRAY RESP PANEL 2: CPT | Performed by: INTERNAL MEDICINE

## 2020-01-01 PROCEDURE — 82728 ASSAY OF FERRITIN: CPT | Performed by: INTERNAL MEDICINE

## 2020-01-01 PROCEDURE — 86850 RBC ANTIBODY SCREEN: CPT | Performed by: INTERNAL MEDICINE

## 2020-01-01 PROCEDURE — 80048 BASIC METABOLIC PNL TOTAL CA: CPT | Performed by: INTERNAL MEDICINE

## 2020-01-01 PROCEDURE — 82784 ASSAY IGA/IGD/IGG/IGM EACH: CPT | Performed by: INTERNAL MEDICINE

## 2020-01-01 PROCEDURE — 99285 EMERGENCY DEPT VISIT HI MDM: CPT

## 2020-01-01 PROCEDURE — 93306 TTE W/DOPPLER COMPLETE: CPT | Performed by: INTERNAL MEDICINE

## 2020-01-01 PROCEDURE — 25010000002 HYDROCORTISONE SODIUM SUCCINATE 100 MG RECONSTITUTED SOLUTION: Performed by: NURSE PRACTITIONER

## 2020-01-01 PROCEDURE — 99223 1ST HOSP IP/OBS HIGH 75: CPT | Performed by: INTERNAL MEDICINE

## 2020-01-01 PROCEDURE — 25010000002 FUROSEMIDE PER 20 MG: Performed by: INTERNAL MEDICINE

## 2020-01-01 PROCEDURE — 25010000002 IMMUNE GLOBULIN (HUMAN) 20 GM/200ML SOLUTION: Performed by: INTERNAL MEDICINE

## 2020-01-01 PROCEDURE — 25010000002 LUSPATERCEPT-AAMT 75 MG RECONSTITUTED SOLUTION: Performed by: NURSE PRACTITIONER

## 2020-01-01 PROCEDURE — 83880 ASSAY OF NATRIURETIC PEPTIDE: CPT | Performed by: NURSE PRACTITIONER

## 2020-01-01 PROCEDURE — 86850 RBC ANTIBODY SCREEN: CPT | Performed by: NURSE PRACTITIONER

## 2020-01-01 PROCEDURE — 93005 ELECTROCARDIOGRAM TRACING: CPT | Performed by: HOSPITALIST

## 2020-01-01 PROCEDURE — 83036 HEMOGLOBIN GLYCOSYLATED A1C: CPT | Performed by: HOSPITALIST

## 2020-01-01 PROCEDURE — 25010000002 EPOETIN ALFA PER 1000 UNITS: Performed by: INTERNAL MEDICINE

## 2020-01-01 PROCEDURE — 87899 AGENT NOS ASSAY W/OPTIC: CPT | Performed by: HOSPITALIST

## 2020-01-01 PROCEDURE — 0100U HC BIOFIRE FILMARRAY RESP PANEL 2: CPT | Performed by: EMERGENCY MEDICINE

## 2020-01-01 PROCEDURE — 25010000002 LUSPATERCEPT-AAMT 75 MG RECONSTITUTED SOLUTION: Performed by: INTERNAL MEDICINE

## 2020-01-01 PROCEDURE — 99212-NC PR NO CHARGE CBC OFFICE OUTPATIENT VISIT 10 MINUTES: Performed by: NURSE PRACTITIONER

## 2020-01-01 PROCEDURE — 81001 URINALYSIS AUTO W/SCOPE: CPT | Performed by: EMERGENCY MEDICINE

## 2020-01-01 PROCEDURE — 87205 SMEAR GRAM STAIN: CPT | Performed by: INTERNAL MEDICINE

## 2020-01-01 PROCEDURE — 87635 SARS-COV-2 COVID-19 AMP PRB: CPT | Performed by: EMERGENCY MEDICINE

## 2020-01-01 PROCEDURE — 25010000002 LUSPATERCEPT-AAMT 25 MG RECONSTITUTED SOLUTION: Performed by: NURSE PRACTITIONER

## 2020-01-01 PROCEDURE — 99442 PR PHYS/QHP TELEPHONE EVALUATION 11-20 MIN: CPT | Performed by: FAMILY MEDICINE

## 2020-01-01 PROCEDURE — 83880 ASSAY OF NATRIURETIC PEPTIDE: CPT | Performed by: HOSPITALIST

## 2020-01-01 PROCEDURE — 86901 BLOOD TYPING SEROLOGIC RH(D): CPT | Performed by: NURSE PRACTITIONER

## 2020-01-01 PROCEDURE — 87899 AGENT NOS ASSAY W/OPTIC: CPT | Performed by: INTERNAL MEDICINE

## 2020-01-01 PROCEDURE — 80202 ASSAY OF VANCOMYCIN: CPT | Performed by: INTERNAL MEDICINE

## 2020-01-01 PROCEDURE — 99232 SBSQ HOSP IP/OBS MODERATE 35: CPT | Performed by: INTERNAL MEDICINE

## 2020-01-01 PROCEDURE — 99214 OFFICE O/P EST MOD 30 MIN: CPT | Performed by: NURSE PRACTITIONER

## 2020-01-01 PROCEDURE — 87070 CULTURE OTHR SPECIMN AEROBIC: CPT | Performed by: INTERNAL MEDICINE

## 2020-01-01 PROCEDURE — 87040 BLOOD CULTURE FOR BACTERIA: CPT | Performed by: EMERGENCY MEDICINE

## 2020-01-01 PROCEDURE — 93000 ELECTROCARDIOGRAM COMPLETE: CPT | Performed by: INTERNAL MEDICINE

## 2020-01-01 PROCEDURE — 96375 TX/PRO/DX INJ NEW DRUG ADDON: CPT

## 2020-01-01 PROCEDURE — 71250 CT THORAX DX C-: CPT

## 2020-01-01 PROCEDURE — 86920 COMPATIBILITY TEST SPIN: CPT

## 2020-01-01 PROCEDURE — 25010000002 VANCOMYCIN 10 G RECONSTITUTED SOLUTION: Performed by: EMERGENCY MEDICINE

## 2020-01-01 PROCEDURE — 96372 THER/PROPH/DIAG INJ SC/IM: CPT | Performed by: INTERNAL MEDICINE

## 2020-01-01 PROCEDURE — 83880 ASSAY OF NATRIURETIC PEPTIDE: CPT | Performed by: EMERGENCY MEDICINE

## 2020-01-01 PROCEDURE — 99213 OFFICE O/P EST LOW 20 MIN: CPT | Performed by: INTERNAL MEDICINE

## 2020-01-01 PROCEDURE — 80061 LIPID PANEL: CPT | Performed by: FAMILY MEDICINE

## 2020-01-01 PROCEDURE — 85610 PROTHROMBIN TIME: CPT | Performed by: EMERGENCY MEDICINE

## 2020-01-01 PROCEDURE — 25010000002 IMMUNE GLOBULIN (HUMAN) 30 GM/300ML SOLUTION: Performed by: NURSE PRACTITIONER

## 2020-01-01 PROCEDURE — 36600 WITHDRAWAL OF ARTERIAL BLOOD: CPT

## 2020-01-01 PROCEDURE — 96374 THER/PROPH/DIAG INJ IV PUSH: CPT

## 2020-01-01 PROCEDURE — 84145 PROCALCITONIN (PCT): CPT | Performed by: INTERNAL MEDICINE

## 2020-01-01 PROCEDURE — 99215 OFFICE O/P EST HI 40 MIN: CPT | Performed by: INTERNAL MEDICINE

## 2020-01-01 PROCEDURE — 96365 THER/PROPH/DIAG IV INF INIT: CPT | Performed by: INTERNAL MEDICINE

## 2020-01-01 PROCEDURE — 83605 ASSAY OF LACTIC ACID: CPT | Performed by: EMERGENCY MEDICINE

## 2020-01-01 PROCEDURE — 99213 OFFICE O/P EST LOW 20 MIN: CPT | Performed by: FAMILY MEDICINE

## 2020-01-01 PROCEDURE — 71101 X-RAY EXAM UNILAT RIBS/CHEST: CPT

## 2020-01-01 PROCEDURE — 76775 US EXAM ABDO BACK WALL LIM: CPT

## 2020-01-01 PROCEDURE — 85007 BL SMEAR W/DIFF WBC COUNT: CPT | Performed by: EMERGENCY MEDICINE

## 2020-01-01 PROCEDURE — 25010000002 CEFEPIME PER 500 MG: Performed by: EMERGENCY MEDICINE

## 2020-01-01 PROCEDURE — 83036 HEMOGLOBIN GLYCOSYLATED A1C: CPT | Performed by: FAMILY MEDICINE

## 2020-01-01 PROCEDURE — 93005 ELECTROCARDIOGRAM TRACING: CPT | Performed by: EMERGENCY MEDICINE

## 2020-01-01 PROCEDURE — 93306 TTE W/DOPPLER COMPLETE: CPT

## 2020-01-01 PROCEDURE — 99284 EMERGENCY DEPT VISIT MOD MDM: CPT

## 2020-01-01 PROCEDURE — 85025 COMPLETE CBC W/AUTO DIFF WBC: CPT | Performed by: NURSE PRACTITIONER

## 2020-01-01 PROCEDURE — 93010 ELECTROCARDIOGRAM REPORT: CPT | Performed by: INTERNAL MEDICINE

## 2020-01-01 PROCEDURE — 25010000002 DIPHENHYDRAMINE PER 50 MG: Performed by: NURSE PRACTITIONER

## 2020-01-01 PROCEDURE — 83880 ASSAY OF NATRIURETIC PEPTIDE: CPT | Performed by: INTERNAL MEDICINE

## 2020-01-01 PROCEDURE — 80053 COMPREHEN METABOLIC PANEL: CPT | Performed by: EMERGENCY MEDICINE

## 2020-01-01 PROCEDURE — 82803 BLOOD GASES ANY COMBINATION: CPT

## 2020-01-01 PROCEDURE — 82270 OCCULT BLOOD FECES: CPT

## 2020-01-01 PROCEDURE — 96366 THER/PROPH/DIAG IV INF ADDON: CPT | Performed by: INTERNAL MEDICINE

## 2020-01-01 PROCEDURE — 99214 OFFICE O/P EST MOD 30 MIN: CPT | Performed by: FAMILY MEDICINE

## 2020-01-01 PROCEDURE — 82043 UR ALBUMIN QUANTITATIVE: CPT | Performed by: FAMILY MEDICINE

## 2020-01-01 PROCEDURE — 36415 COLL VENOUS BLD VENIPUNCTURE: CPT | Performed by: INTERNAL MEDICINE

## 2020-01-01 PROCEDURE — 25010000002 METHYLPREDNISOLONE PER 125 MG: Performed by: EMERGENCY MEDICINE

## 2020-01-01 PROCEDURE — 93000 ELECTROCARDIOGRAM COMPLETE: CPT | Performed by: NURSE PRACTITIONER

## 2020-01-01 PROCEDURE — 84484 ASSAY OF TROPONIN QUANT: CPT | Performed by: EMERGENCY MEDICINE

## 2020-01-01 PROCEDURE — 99213 OFFICE O/P EST LOW 20 MIN: CPT | Performed by: THORACIC SURGERY (CARDIOTHORACIC VASCULAR SURGERY)

## 2020-01-01 RX ORDER — ACETAMINOPHEN 325 MG/1
650 TABLET ORAL ONCE
Status: CANCELLED | OUTPATIENT
Start: 2020-01-01 | End: 2020-01-01

## 2020-01-01 RX ORDER — CEFUROXIME AXETIL 500 MG/1
500 TABLET ORAL 2 TIMES DAILY
Qty: 20 TABLET | Refills: 0 | Status: SHIPPED | OUTPATIENT
Start: 2020-01-01 | End: 2020-01-01

## 2020-01-01 RX ORDER — FAMOTIDINE 10 MG/ML
20 INJECTION, SOLUTION INTRAVENOUS AS NEEDED
Status: CANCELLED | OUTPATIENT
Start: 2020-01-01

## 2020-01-01 RX ORDER — ACETAMINOPHEN 325 MG/1
650 TABLET ORAL ONCE
Status: COMPLETED | OUTPATIENT
Start: 2020-01-01 | End: 2020-01-01

## 2020-01-01 RX ORDER — SODIUM CHLORIDE 9 MG/ML
250 INJECTION, SOLUTION INTRAVENOUS AS NEEDED
Status: CANCELLED | OUTPATIENT
Start: 2020-01-01

## 2020-01-01 RX ORDER — DIPHENHYDRAMINE HCL 25 MG
25 CAPSULE ORAL ONCE
Status: CANCELLED | OUTPATIENT
Start: 2020-01-01 | End: 2020-01-01

## 2020-01-01 RX ORDER — DIPHENHYDRAMINE HYDROCHLORIDE 50 MG/ML
50 INJECTION INTRAMUSCULAR; INTRAVENOUS ONCE AS NEEDED
Status: DISCONTINUED | OUTPATIENT
Start: 2020-01-01 | End: 2020-01-01 | Stop reason: HOSPADM

## 2020-01-01 RX ORDER — ACETAMINOPHEN 325 MG/1
650 TABLET ORAL ONCE
Status: CANCELLED | OUTPATIENT
Start: 2020-01-01

## 2020-01-01 RX ORDER — SODIUM CHLORIDE 9 MG/ML
250 INJECTION, SOLUTION INTRAVENOUS ONCE
Status: CANCELLED | OUTPATIENT
Start: 2020-01-01

## 2020-01-01 RX ORDER — SODIUM CHLORIDE 9 MG/ML
250 INJECTION, SOLUTION INTRAVENOUS AS NEEDED
Status: DISCONTINUED | OUTPATIENT
Start: 2020-01-01 | End: 2020-01-01 | Stop reason: HOSPADM

## 2020-01-01 RX ORDER — DIPHENHYDRAMINE HCL 25 MG
25 CAPSULE ORAL ONCE
Status: COMPLETED | OUTPATIENT
Start: 2020-01-01 | End: 2020-01-01

## 2020-01-01 RX ORDER — ALBUTEROL SULFATE 2.5 MG/3ML
SOLUTION RESPIRATORY (INHALATION)
Status: COMPLETED
Start: 2020-01-01 | End: 2020-01-01

## 2020-01-01 RX ORDER — DIPHENHYDRAMINE HCL 25 MG
25 CAPSULE ORAL ONCE
Status: DISCONTINUED | OUTPATIENT
Start: 2020-01-01 | End: 2020-01-01 | Stop reason: HOSPADM

## 2020-01-01 RX ORDER — DIPHENHYDRAMINE HCL 25 MG
25 CAPSULE ORAL ONCE
Status: CANCELLED | OUTPATIENT
Start: 2020-01-01

## 2020-01-01 RX ORDER — SODIUM CHLORIDE 9 MG/ML
250 INJECTION, SOLUTION INTRAVENOUS ONCE
Status: COMPLETED | OUTPATIENT
Start: 2020-01-01 | End: 2020-01-01

## 2020-01-01 RX ORDER — AMOXICILLIN 500 MG/1
500 CAPSULE ORAL 3 TIMES DAILY
Qty: 30 CAPSULE | Refills: 0 | Status: SHIPPED | OUTPATIENT
Start: 2020-01-01 | End: 2020-01-01

## 2020-01-01 RX ORDER — ALBUTEROL SULFATE 2.5 MG/3ML
2.5 SOLUTION RESPIRATORY (INHALATION) ONCE
Status: COMPLETED | OUTPATIENT
Start: 2020-01-01 | End: 2020-01-01

## 2020-01-01 RX ORDER — FUROSEMIDE 40 MG/1
TABLET ORAL
Qty: 90 TABLET | Refills: 1 | Status: SHIPPED | OUTPATIENT
Start: 2020-01-01 | End: 2021-01-01

## 2020-01-01 RX ORDER — PREDNISONE 10 MG/1
10 TABLET ORAL
Status: DISCONTINUED | OUTPATIENT
Start: 2020-01-01 | End: 2020-01-01 | Stop reason: HOSPADM

## 2020-01-01 RX ORDER — IPRATROPIUM BROMIDE AND ALBUTEROL SULFATE 2.5; .5 MG/3ML; MG/3ML
3 SOLUTION RESPIRATORY (INHALATION) ONCE
Status: COMPLETED | OUTPATIENT
Start: 2020-01-01 | End: 2020-01-01

## 2020-01-01 RX ORDER — SODIUM CHLORIDE 9 MG/ML
250 INJECTION, SOLUTION INTRAVENOUS AS NEEDED
Status: CANCELLED | OUTPATIENT
Start: 2021-01-01

## 2020-01-01 RX ORDER — MEPERIDINE HYDROCHLORIDE 50 MG/ML
25 INJECTION INTRAMUSCULAR; INTRAVENOUS; SUBCUTANEOUS
Status: CANCELLED | OUTPATIENT
Start: 2020-01-01

## 2020-01-01 RX ORDER — FUROSEMIDE 40 MG/1
40 TABLET ORAL DAILY
Status: DISCONTINUED | OUTPATIENT
Start: 2020-01-01 | End: 2020-01-01 | Stop reason: HOSPADM

## 2020-01-01 RX ORDER — DIPHENHYDRAMINE HYDROCHLORIDE 50 MG/ML
25 INJECTION INTRAMUSCULAR; INTRAVENOUS AS NEEDED
Status: CANCELLED | OUTPATIENT
Start: 2020-01-01

## 2020-01-01 RX ORDER — METOPROLOL TARTRATE 100 MG/1
TABLET ORAL
Qty: 270 TABLET | Refills: 1 | Status: SHIPPED | OUTPATIENT
Start: 2020-01-01 | End: 2020-01-01

## 2020-01-01 RX ORDER — ALPRAZOLAM 1 MG/1
1 TABLET ORAL NIGHTLY PRN
Qty: 90 TABLET | Refills: 0 | Status: SHIPPED | OUTPATIENT
Start: 2020-01-01 | End: 2020-01-01 | Stop reason: SDUPTHER

## 2020-01-01 RX ORDER — FAMOTIDINE 10 MG/ML
20 INJECTION, SOLUTION INTRAVENOUS AS NEEDED
Status: CANCELLED | OUTPATIENT
Start: 2021-01-01

## 2020-01-01 RX ORDER — ACETAMINOPHEN 325 MG/1
650 TABLET ORAL ONCE
Status: DISCONTINUED | OUTPATIENT
Start: 2020-01-01 | End: 2020-01-01 | Stop reason: HOSPADM

## 2020-01-01 RX ORDER — POTASSIUM CHLORIDE 750 MG/1
10 TABLET, FILM COATED, EXTENDED RELEASE ORAL DAILY
Qty: 30 TABLET | Refills: 3 | Status: SHIPPED | OUTPATIENT
Start: 2020-01-01 | End: 2020-01-01

## 2020-01-01 RX ORDER — ALBUTEROL SULFATE 2.5 MG/3ML
2.5 SOLUTION RESPIRATORY (INHALATION) ONCE
Status: DISCONTINUED | OUTPATIENT
Start: 2020-01-01 | End: 2020-01-01 | Stop reason: HOSPADM

## 2020-01-01 RX ORDER — ACETAMINOPHEN 325 MG/1
650 TABLET ORAL ONCE
Status: CANCELLED | OUTPATIENT
Start: 2021-01-01

## 2020-01-01 RX ORDER — DIPHENHYDRAMINE HYDROCHLORIDE 50 MG/ML
50 INJECTION INTRAMUSCULAR; INTRAVENOUS AS NEEDED
Status: CANCELLED | OUTPATIENT
Start: 2020-01-01

## 2020-01-01 RX ORDER — ACETAMINOPHEN 325 MG/1
325 TABLET ORAL ONCE
Status: CANCELLED | OUTPATIENT
Start: 2020-01-01 | End: 2020-01-01

## 2020-01-01 RX ORDER — ALPRAZOLAM 1 MG/1
1 TABLET ORAL NIGHTLY PRN
Qty: 90 TABLET | Refills: 0 | OUTPATIENT
Start: 2020-01-01

## 2020-01-01 RX ORDER — ATORVASTATIN CALCIUM 20 MG/1
10 TABLET, FILM COATED ORAL DAILY
Status: DISCONTINUED | OUTPATIENT
Start: 2020-01-01 | End: 2020-01-01 | Stop reason: HOSPADM

## 2020-01-01 RX ORDER — LEVOFLOXACIN 500 MG/1
500 TABLET, FILM COATED ORAL DAILY
Qty: 7 TABLET | Refills: 0 | Status: SHIPPED | OUTPATIENT
Start: 2020-01-01 | End: 2020-01-01

## 2020-01-01 RX ORDER — ATORVASTATIN CALCIUM 10 MG/1
10 TABLET, FILM COATED ORAL DAILY
Qty: 90 TABLET | Refills: 1 | Status: SHIPPED | OUTPATIENT
Start: 2020-01-01 | End: 2020-01-01 | Stop reason: SDUPTHER

## 2020-01-01 RX ORDER — SODIUM CHLORIDE 9 MG/ML
250 INJECTION, SOLUTION INTRAVENOUS ONCE
Status: CANCELLED | OUTPATIENT
Start: 2021-01-01

## 2020-01-01 RX ORDER — DIPHENHYDRAMINE HYDROCHLORIDE 50 MG/ML
25 INJECTION INTRAMUSCULAR; INTRAVENOUS ONCE
Status: COMPLETED | OUTPATIENT
Start: 2020-01-01 | End: 2020-01-01

## 2020-01-01 RX ORDER — ACETAMINOPHEN 325 MG/1
325 TABLET ORAL ONCE
Status: COMPLETED | OUTPATIENT
Start: 2020-01-01 | End: 2020-01-01

## 2020-01-01 RX ORDER — ACETAMINOPHEN 500 MG
1000 TABLET ORAL ONCE
Status: COMPLETED | OUTPATIENT
Start: 2020-01-01 | End: 2020-01-01

## 2020-01-01 RX ORDER — DILTIAZEM HYDROCHLORIDE 60 MG/1
TABLET, FILM COATED ORAL
Qty: 180 TABLET | Refills: 1 | Status: SHIPPED | OUTPATIENT
Start: 2020-01-01 | End: 2020-01-01

## 2020-01-01 RX ORDER — METOPROLOL TARTRATE 100 MG/1
TABLET ORAL
Qty: 270 TABLET | Refills: 1 | Status: SHIPPED | OUTPATIENT
Start: 2020-01-01

## 2020-01-01 RX ORDER — SODIUM CHLORIDE 9 MG/ML
250 INJECTION, SOLUTION INTRAVENOUS AS NEEDED
Status: DISCONTINUED | OUTPATIENT
Start: 2020-01-01 | End: 2020-01-01

## 2020-01-01 RX ORDER — ALPRAZOLAM 1 MG/1
1 TABLET ORAL NIGHTLY PRN
Qty: 3 TABLET | Refills: 0 | Status: SHIPPED | OUTPATIENT
Start: 2020-01-01 | End: 2020-01-01 | Stop reason: SDUPTHER

## 2020-01-01 RX ORDER — GUAIFENESIN 600 MG/1
600 TABLET, EXTENDED RELEASE ORAL EVERY 12 HOURS SCHEDULED
Status: DISCONTINUED | OUTPATIENT
Start: 2020-01-01 | End: 2020-01-01 | Stop reason: HOSPADM

## 2020-01-01 RX ORDER — ACETAMINOPHEN 160 MG/5ML
650 SOLUTION ORAL EVERY 4 HOURS PRN
Status: DISCONTINUED | OUTPATIENT
Start: 2020-01-01 | End: 2020-01-01 | Stop reason: HOSPADM

## 2020-01-01 RX ORDER — IPRATROPIUM BROMIDE AND ALBUTEROL SULFATE 2.5; .5 MG/3ML; MG/3ML
3 SOLUTION RESPIRATORY (INHALATION) 2 TIMES DAILY
Status: DISCONTINUED | OUTPATIENT
Start: 2020-01-01 | End: 2020-01-01 | Stop reason: HOSPADM

## 2020-01-01 RX ORDER — SODIUM CHLORIDE 0.9 % (FLUSH) 0.9 %
10 SYRINGE (ML) INJECTION EVERY 12 HOURS SCHEDULED
Status: DISCONTINUED | OUTPATIENT
Start: 2020-01-01 | End: 2020-01-01 | Stop reason: HOSPADM

## 2020-01-01 RX ORDER — DEXTROSE MONOHYDRATE 50 MG/ML
250 INJECTION, SOLUTION INTRAVENOUS ONCE
Status: CANCELLED | OUTPATIENT
Start: 2020-01-01

## 2020-01-01 RX ORDER — AMOXICILLIN 500 MG/1
CAPSULE ORAL
COMMUNITY
Start: 2020-01-01 | End: 2020-01-01 | Stop reason: SDDI

## 2020-01-01 RX ORDER — IPRATROPIUM BROMIDE AND ALBUTEROL SULFATE 2.5; .5 MG/3ML; MG/3ML
3 SOLUTION RESPIRATORY (INHALATION) EVERY 4 HOURS PRN
Status: DISCONTINUED | OUTPATIENT
Start: 2020-01-01 | End: 2020-01-01 | Stop reason: HOSPADM

## 2020-01-01 RX ORDER — ACETAMINOPHEN 325 MG/1
325 TABLET ORAL ONCE
Status: DISCONTINUED | OUTPATIENT
Start: 2020-01-01 | End: 2021-01-01 | Stop reason: HOSPADM

## 2020-01-01 RX ORDER — ACETAMINOPHEN 325 MG/1
650 TABLET ORAL EVERY 4 HOURS PRN
Status: DISCONTINUED | OUTPATIENT
Start: 2020-01-01 | End: 2020-01-01 | Stop reason: HOSPADM

## 2020-01-01 RX ORDER — ALBUTEROL SULFATE 90 UG/1
2 AEROSOL, METERED RESPIRATORY (INHALATION) 4 TIMES DAILY PRN
Status: DISCONTINUED | OUTPATIENT
Start: 2020-01-01 | End: 2020-01-01 | Stop reason: HOSPADM

## 2020-01-01 RX ORDER — METOPROLOL TARTRATE 50 MG/1
100 TABLET, FILM COATED ORAL EVERY 12 HOURS SCHEDULED
Status: DISCONTINUED | OUTPATIENT
Start: 2020-01-01 | End: 2020-01-01 | Stop reason: HOSPADM

## 2020-01-01 RX ORDER — ONDANSETRON 2 MG/ML
4 INJECTION INTRAMUSCULAR; INTRAVENOUS EVERY 6 HOURS PRN
Status: DISCONTINUED | OUTPATIENT
Start: 2020-01-01 | End: 2020-01-01 | Stop reason: HOSPADM

## 2020-01-01 RX ORDER — FAMOTIDINE 10 MG/ML
20 INJECTION, SOLUTION INTRAVENOUS ONCE AS NEEDED
Status: DISCONTINUED | OUTPATIENT
Start: 2020-01-01 | End: 2020-01-01 | Stop reason: HOSPADM

## 2020-01-01 RX ORDER — DIPHENHYDRAMINE HCL 25 MG
25 CAPSULE ORAL ONCE
Status: CANCELLED | OUTPATIENT
Start: 2021-01-01 | End: 2020-01-01

## 2020-01-01 RX ORDER — LEVOFLOXACIN 500 MG/1
500 TABLET, FILM COATED ORAL DAILY
Qty: 5 TABLET | Refills: 0 | Status: SHIPPED | OUTPATIENT
Start: 2020-01-01 | End: 2020-01-01

## 2020-01-01 RX ORDER — FAMOTIDINE 10 MG/ML
20 INJECTION, SOLUTION INTRAVENOUS ONCE
Status: COMPLETED | OUTPATIENT
Start: 2020-01-01 | End: 2020-01-01

## 2020-01-01 RX ORDER — DIPHENHYDRAMINE HCL 25 MG
25 CAPSULE ORAL ONCE
Status: DISCONTINUED | OUTPATIENT
Start: 2020-01-01 | End: 2020-01-01

## 2020-01-01 RX ORDER — IPRATROPIUM BROMIDE AND ALBUTEROL SULFATE 2.5; .5 MG/3ML; MG/3ML
3 SOLUTION RESPIRATORY (INHALATION)
Status: DISCONTINUED | OUTPATIENT
Start: 2020-01-01 | End: 2020-01-01 | Stop reason: HOSPADM

## 2020-01-01 RX ORDER — LINEZOLID 600 MG/1
600 TABLET, FILM COATED ORAL 2 TIMES DAILY
Qty: 10 TABLET | Refills: 0 | Status: SHIPPED | OUTPATIENT
Start: 2020-01-01 | End: 2020-01-01

## 2020-01-01 RX ORDER — FUROSEMIDE 10 MG/ML
20 INJECTION INTRAMUSCULAR; INTRAVENOUS ONCE
Status: COMPLETED | OUTPATIENT
Start: 2020-01-01 | End: 2020-01-01

## 2020-01-01 RX ORDER — SODIUM CHLORIDE 9 MG/ML
250 INJECTION, SOLUTION INTRAVENOUS ONCE
Status: DISCONTINUED | OUTPATIENT
Start: 2020-01-01 | End: 2020-01-01 | Stop reason: HOSPADM

## 2020-01-01 RX ORDER — POTASSIUM CHLORIDE 750 MG/1
10 CAPSULE, EXTENDED RELEASE ORAL DAILY
Status: DISCONTINUED | OUTPATIENT
Start: 2020-01-01 | End: 2020-01-01 | Stop reason: HOSPADM

## 2020-01-01 RX ORDER — ONDANSETRON 4 MG/1
4 TABLET, FILM COATED ORAL EVERY 6 HOURS PRN
Status: DISCONTINUED | OUTPATIENT
Start: 2020-01-01 | End: 2020-01-01 | Stop reason: HOSPADM

## 2020-01-01 RX ORDER — ATORVASTATIN CALCIUM 10 MG/1
10 TABLET, FILM COATED ORAL DAILY
Qty: 90 TABLET | Refills: 1 | Status: SHIPPED | OUTPATIENT
Start: 2020-01-01

## 2020-01-01 RX ORDER — FUROSEMIDE 40 MG/1
40 TABLET ORAL 2 TIMES DAILY
Qty: 60 TABLET | Refills: 11 | Status: SHIPPED | OUTPATIENT
Start: 2020-01-01 | End: 2020-01-01

## 2020-01-01 RX ORDER — CLONIDINE HYDROCHLORIDE 0.1 MG/1
0.1 TABLET ORAL ONCE
Status: COMPLETED | OUTPATIENT
Start: 2020-01-01 | End: 2020-01-01

## 2020-01-01 RX ORDER — LEVOFLOXACIN 250 MG/1
TABLET ORAL
Qty: 8 TABLET | Refills: 0 | Status: SHIPPED | OUTPATIENT
Start: 2020-01-01 | End: 2021-01-01 | Stop reason: HOSPADM

## 2020-01-01 RX ORDER — FUROSEMIDE 10 MG/ML
20 INJECTION INTRAMUSCULAR; INTRAVENOUS ONCE
Status: CANCELLED | OUTPATIENT
Start: 2020-01-01 | End: 2020-01-01

## 2020-01-01 RX ORDER — POTASSIUM CHLORIDE 750 MG/1
TABLET, FILM COATED, EXTENDED RELEASE ORAL
Qty: 90 TABLET | Refills: 1 | Status: SHIPPED | OUTPATIENT
Start: 2020-01-01

## 2020-01-01 RX ORDER — IPRATROPIUM BROMIDE AND ALBUTEROL SULFATE 2.5; .5 MG/3ML; MG/3ML
3 SOLUTION RESPIRATORY (INHALATION) AS NEEDED
Status: DISCONTINUED | OUTPATIENT
Start: 2020-01-01 | End: 2020-01-01 | Stop reason: HOSPADM

## 2020-01-01 RX ORDER — ACETAMINOPHEN 650 MG/1
650 SUPPOSITORY RECTAL EVERY 4 HOURS PRN
Status: DISCONTINUED | OUTPATIENT
Start: 2020-01-01 | End: 2020-01-01 | Stop reason: HOSPADM

## 2020-01-01 RX ORDER — DILTIAZEM HYDROCHLORIDE 60 MG/1
60 TABLET, FILM COATED ORAL 2 TIMES DAILY
Status: DISCONTINUED | OUTPATIENT
Start: 2020-01-01 | End: 2020-01-01 | Stop reason: HOSPADM

## 2020-01-01 RX ORDER — SODIUM CHLORIDE 9 MG/ML
125 INJECTION, SOLUTION INTRAVENOUS CONTINUOUS
Status: DISCONTINUED | OUTPATIENT
Start: 2020-01-01 | End: 2020-01-01

## 2020-01-01 RX ORDER — SODIUM CHLORIDE 0.9 % (FLUSH) 0.9 %
10 SYRINGE (ML) INJECTION AS NEEDED
Status: DISCONTINUED | OUTPATIENT
Start: 2020-01-01 | End: 2020-01-01 | Stop reason: HOSPADM

## 2020-01-01 RX ORDER — ALBUTEROL SULFATE 2.5 MG/3ML
2.5 SOLUTION RESPIRATORY (INHALATION) EVERY 4 HOURS PRN
Status: CANCELLED
Start: 2020-01-01

## 2020-01-01 RX ORDER — DILTIAZEM HYDROCHLORIDE 60 MG/1
TABLET, FILM COATED ORAL
Qty: 180 TABLET | Refills: 1 | Status: SHIPPED | OUTPATIENT
Start: 2020-01-01

## 2020-01-01 RX ORDER — DIPHENHYDRAMINE HCL 25 MG
25 CAPSULE ORAL ONCE
Status: CANCELLED | OUTPATIENT
Start: 2021-01-01

## 2020-01-01 RX ORDER — DIPHENHYDRAMINE HYDROCHLORIDE 50 MG/ML
50 INJECTION INTRAMUSCULAR; INTRAVENOUS AS NEEDED
Status: CANCELLED | OUTPATIENT
Start: 2021-01-01

## 2020-01-01 RX ORDER — IPRATROPIUM BROMIDE AND ALBUTEROL SULFATE 2.5; .5 MG/3ML; MG/3ML
3 SOLUTION RESPIRATORY (INHALATION)
Status: DISPENSED | OUTPATIENT
Start: 2020-01-01

## 2020-01-01 RX ORDER — ALPRAZOLAM 1 MG/1
1 TABLET ORAL NIGHTLY PRN
Qty: 90 TABLET | Refills: 0 | Status: SHIPPED | OUTPATIENT
Start: 2020-01-01

## 2020-01-01 RX ORDER — VANCOMYCIN HYDROCHLORIDE 1 G/200ML
1000 INJECTION, SOLUTION INTRAVENOUS EVERY 12 HOURS
Status: DISCONTINUED | OUTPATIENT
Start: 2020-01-01 | End: 2020-01-01

## 2020-01-01 RX ORDER — NITROGLYCERIN 0.4 MG/1
0.4 TABLET SUBLINGUAL
Status: DISCONTINUED | OUTPATIENT
Start: 2020-01-01 | End: 2020-01-01 | Stop reason: HOSPADM

## 2020-01-01 RX ORDER — ALPRAZOLAM 1 MG/1
1 TABLET ORAL NIGHTLY PRN
Status: DISCONTINUED | OUTPATIENT
Start: 2020-01-01 | End: 2020-01-01 | Stop reason: HOSPADM

## 2020-01-01 RX ORDER — BISACODYL 5 MG/1
5 TABLET, DELAYED RELEASE ORAL DAILY PRN
Status: DISCONTINUED | OUTPATIENT
Start: 2020-01-01 | End: 2020-01-01 | Stop reason: HOSPADM

## 2020-01-01 RX ORDER — FOLIC ACID 1 MG/1
1 TABLET ORAL DAILY
Status: DISCONTINUED | OUTPATIENT
Start: 2020-01-01 | End: 2020-01-01 | Stop reason: HOSPADM

## 2020-01-01 RX ORDER — METHYLPREDNISOLONE SODIUM SUCCINATE 125 MG/2ML
125 INJECTION, POWDER, LYOPHILIZED, FOR SOLUTION INTRAMUSCULAR; INTRAVENOUS ONCE
Status: COMPLETED | OUTPATIENT
Start: 2020-01-01 | End: 2020-01-01

## 2020-01-01 RX ADMIN — IPRATROPIUM BROMIDE AND ALBUTEROL SULFATE 3 ML: 2.5; .5 SOLUTION RESPIRATORY (INHALATION) at 12:38

## 2020-01-01 RX ADMIN — FUROSEMIDE 20 MG: 10 INJECTION, SOLUTION INTRAMUSCULAR; INTRAVENOUS at 11:26

## 2020-01-01 RX ADMIN — LUSPATERCEPT 82.25 MG: 75 INJECTION, POWDER, LYOPHILIZED, FOR SOLUTION SUBCUTANEOUS at 12:47

## 2020-01-01 RX ADMIN — ATORVASTATIN CALCIUM 10 MG: 20 TABLET, FILM COATED ORAL at 10:02

## 2020-01-01 RX ADMIN — DILTIAZEM HYDROCHLORIDE 60 MG: 60 TABLET, FILM COATED ORAL at 20:54

## 2020-01-01 RX ADMIN — IMMUNE GLOBULIN INFUSION (HUMAN) 30 G: 100 INJECTION, SOLUTION INTRAVENOUS; SUBCUTANEOUS at 10:42

## 2020-01-01 RX ADMIN — IPRATROPIUM BROMIDE AND ALBUTEROL SULFATE 3 ML: 2.5; .5 SOLUTION RESPIRATORY (INHALATION) at 15:29

## 2020-01-01 RX ADMIN — METOPROLOL TARTRATE 100 MG: 50 TABLET, FILM COATED ORAL at 08:33

## 2020-01-01 RX ADMIN — IMMUNE GLOBULIN (HUMAN) 20 G: 10 INJECTION INTRAVENOUS; SUBCUTANEOUS at 16:08

## 2020-01-01 RX ADMIN — ACETAMINOPHEN 650 MG: 325 TABLET, FILM COATED ORAL at 09:41

## 2020-01-01 RX ADMIN — IMMUNE GLOBULIN INFUSION (HUMAN) 30 G: 100 INJECTION, SOLUTION INTRAVENOUS; SUBCUTANEOUS at 11:25

## 2020-01-01 RX ADMIN — DIPHENHYDRAMINE HYDROCHLORIDE 25 MG: 25 CAPSULE ORAL at 08:58

## 2020-01-01 RX ADMIN — ERYTHROPOIETIN 31000 UNITS: 20000 INJECTION, SOLUTION INTRAVENOUS; SUBCUTANEOUS at 11:02

## 2020-01-01 RX ADMIN — ALPRAZOLAM 1 MG: 0.25 TABLET ORAL at 23:15

## 2020-01-01 RX ADMIN — DIPHENHYDRAMINE HYDROCHLORIDE 25 MG: 25 CAPSULE ORAL at 09:50

## 2020-01-01 RX ADMIN — CEFEPIME HYDROCHLORIDE 2 G: 2 INJECTION, POWDER, FOR SOLUTION INTRAVENOUS at 23:09

## 2020-01-01 RX ADMIN — ACETAMINOPHEN 650 MG: 325 TABLET, FILM COATED ORAL at 10:08

## 2020-01-01 RX ADMIN — ERYTHROPOIETIN 50000 UNITS: 40000 INJECTION, SOLUTION INTRAVENOUS; SUBCUTANEOUS at 10:55

## 2020-01-01 RX ADMIN — ERYTHROPOIETIN 38000 UNITS: 20000 INJECTION, SOLUTION INTRAVENOUS; SUBCUTANEOUS at 11:21

## 2020-01-01 RX ADMIN — ALBUTEROL SULFATE 2.5 MG: 2.5 SOLUTION RESPIRATORY (INHALATION) at 10:35

## 2020-01-01 RX ADMIN — METHYLPREDNISOLONE SODIUM SUCCINATE 125 MG: 125 INJECTION, POWDER, FOR SOLUTION INTRAMUSCULAR; INTRAVENOUS at 22:50

## 2020-01-01 RX ADMIN — ACETAMINOPHEN 650 MG: 325 TABLET, FILM COATED ORAL at 10:34

## 2020-01-01 RX ADMIN — EPOETIN ALFA-EPBX 60000 UNITS: 40000 INJECTION, SOLUTION INTRAVENOUS; SUBCUTANEOUS at 16:47

## 2020-01-01 RX ADMIN — SODIUM CHLORIDE, PRESERVATIVE FREE 10 ML: 5 INJECTION INTRAVENOUS at 08:26

## 2020-01-01 RX ADMIN — SODIUM CHLORIDE 250 ML: 900 INJECTION, SOLUTION INTRAVENOUS at 09:46

## 2020-01-01 RX ADMIN — IPRATROPIUM BROMIDE AND ALBUTEROL SULFATE 3 ML: 2.5; .5 SOLUTION RESPIRATORY (INHALATION) at 06:50

## 2020-01-01 RX ADMIN — PREDNISONE 10 MG: 10 TABLET ORAL at 08:06

## 2020-01-01 RX ADMIN — IPRATROPIUM BROMIDE AND ALBUTEROL SULFATE 3 ML: 2.5; .5 SOLUTION RESPIRATORY (INHALATION) at 07:50

## 2020-01-01 RX ADMIN — DIPHENHYDRAMINE HYDROCHLORIDE 25 MG: 25 CAPSULE ORAL at 09:11

## 2020-01-01 RX ADMIN — FAMOTIDINE 20 MG: 10 INJECTION INTRAVENOUS at 13:54

## 2020-01-01 RX ADMIN — SODIUM CHLORIDE 250 ML: 9 INJECTION, SOLUTION INTRAVENOUS at 11:22

## 2020-01-01 RX ADMIN — VANCOMYCIN HYDROCHLORIDE 1000 MG: 1 INJECTION, SOLUTION INTRAVENOUS at 23:18

## 2020-01-01 RX ADMIN — FAMOTIDINE 20 MG: 10 INJECTION, SOLUTION INTRAVENOUS at 12:21

## 2020-01-01 RX ADMIN — ERYTHROPOIETIN 60000 UNITS: 40000 INJECTION, SOLUTION INTRAVENOUS; SUBCUTANEOUS at 10:52

## 2020-01-01 RX ADMIN — ACETAMINOPHEN 650 MG: 325 TABLET, FILM COATED ORAL at 09:18

## 2020-01-01 RX ADMIN — VANCOMYCIN HYDROCHLORIDE 1500 MG: 10 INJECTION, POWDER, LYOPHILIZED, FOR SOLUTION INTRAVENOUS at 18:59

## 2020-01-01 RX ADMIN — DILTIAZEM HYDROCHLORIDE 60 MG: 60 TABLET, FILM COATED ORAL at 21:10

## 2020-01-01 RX ADMIN — IPRATROPIUM BROMIDE AND ALBUTEROL SULFATE 3 ML: 2.5; .5 SOLUTION RESPIRATORY (INHALATION) at 10:49

## 2020-01-01 RX ADMIN — DILTIAZEM HYDROCHLORIDE 60 MG: 60 TABLET, FILM COATED ORAL at 10:02

## 2020-01-01 RX ADMIN — ACETAMINOPHEN 650 MG: 325 TABLET, FILM COATED ORAL at 08:46

## 2020-01-01 RX ADMIN — DIPHENHYDRAMINE HYDROCHLORIDE 25 MG: 25 CAPSULE ORAL at 07:55

## 2020-01-01 RX ADMIN — IPRATROPIUM BROMIDE AND ALBUTEROL SULFATE 3 ML: 2.5; .5 SOLUTION RESPIRATORY (INHALATION) at 22:32

## 2020-01-01 RX ADMIN — IPRATROPIUM BROMIDE AND ALBUTEROL SULFATE 3 ML: 2.5; .5 SOLUTION RESPIRATORY (INHALATION) at 14:46

## 2020-01-01 RX ADMIN — FOLIC ACID 1 MG: 1 TABLET ORAL at 08:06

## 2020-01-01 RX ADMIN — IPRATROPIUM BROMIDE AND ALBUTEROL SULFATE 3 ML: .5; 3 SOLUTION RESPIRATORY (INHALATION) at 10:49

## 2020-01-01 RX ADMIN — POTASSIUM CHLORIDE 10 MEQ: 10 CAPSULE, COATED, EXTENDED RELEASE ORAL at 08:06

## 2020-01-01 RX ADMIN — ATORVASTATIN CALCIUM 10 MG: 20 TABLET, FILM COATED ORAL at 08:49

## 2020-01-01 RX ADMIN — SODIUM CHLORIDE 125 ML/HR: 9 INJECTION, SOLUTION INTRAVENOUS at 18:42

## 2020-01-01 RX ADMIN — DILTIAZEM HYDROCHLORIDE 60 MG: 60 TABLET, FILM COATED ORAL at 08:06

## 2020-01-01 RX ADMIN — FUROSEMIDE 40 MG: 40 TABLET ORAL at 10:02

## 2020-01-01 RX ADMIN — FOLIC ACID 1 MG: 1 TABLET ORAL at 08:33

## 2020-01-01 RX ADMIN — ACETAMINOPHEN 650 MG: 325 TABLET ORAL at 09:42

## 2020-01-01 RX ADMIN — ERYTHROPOIETIN 31000 UNITS: 20000 INJECTION, SOLUTION INTRAVENOUS; SUBCUTANEOUS at 11:20

## 2020-01-01 RX ADMIN — METOPROLOL TARTRATE 100 MG: 50 TABLET, FILM COATED ORAL at 08:06

## 2020-01-01 RX ADMIN — GUAIFENESIN 600 MG: 600 TABLET, EXTENDED RELEASE ORAL at 14:57

## 2020-01-01 RX ADMIN — DIPHENHYDRAMINE HYDROCHLORIDE 25 MG: 25 CAPSULE ORAL at 13:54

## 2020-01-01 RX ADMIN — SODIUM CHLORIDE 250 ML: 900 INJECTION, SOLUTION INTRAVENOUS at 10:44

## 2020-01-01 RX ADMIN — DIPHENHYDRAMINE HYDROCHLORIDE 25 MG: 25 CAPSULE ORAL at 09:42

## 2020-01-01 RX ADMIN — DIPHENHYDRAMINE HYDROCHLORIDE 25 MG: 25 CAPSULE ORAL at 08:16

## 2020-01-01 RX ADMIN — DIPHENHYDRAMINE HYDROCHLORIDE 25 MG: 25 CAPSULE ORAL at 09:41

## 2020-01-01 RX ADMIN — ERYTHROPOIETIN 60000 UNITS: 40000 INJECTION, SOLUTION INTRAVENOUS; SUBCUTANEOUS at 11:30

## 2020-01-01 RX ADMIN — APIXABAN 5 MG: 5 TABLET, FILM COATED ORAL at 08:06

## 2020-01-01 RX ADMIN — ERYTHROPOIETIN 60000 UNITS: 40000 INJECTION, SOLUTION INTRAVENOUS; SUBCUTANEOUS at 11:33

## 2020-01-01 RX ADMIN — ERYTHROPOIETIN 60000 UNITS: 40000 INJECTION, SOLUTION INTRAVENOUS; SUBCUTANEOUS at 11:28

## 2020-01-01 RX ADMIN — ERYTHROPOIETIN 60000 UNITS: 40000 INJECTION, SOLUTION INTRAVENOUS; SUBCUTANEOUS at 11:37

## 2020-01-01 RX ADMIN — DILTIAZEM HYDROCHLORIDE 60 MG: 60 TABLET, FILM COATED ORAL at 20:23

## 2020-01-01 RX ADMIN — IPRATROPIUM BROMIDE AND ALBUTEROL SULFATE 3 ML: 2.5; .5 SOLUTION RESPIRATORY (INHALATION) at 07:18

## 2020-01-01 RX ADMIN — DIPHENHYDRAMINE HYDROCHLORIDE 25 MG: 25 CAPSULE ORAL at 08:32

## 2020-01-01 RX ADMIN — ERYTHROPOIETIN 31000 UNITS: 20000 INJECTION, SOLUTION INTRAVENOUS; SUBCUTANEOUS at 12:44

## 2020-01-01 RX ADMIN — ACETAMINOPHEN 650 MG: 325 TABLET, FILM COATED ORAL at 09:49

## 2020-01-01 RX ADMIN — ALBUTEROL SULFATE 2.5 MG: 2.5 SOLUTION RESPIRATORY (INHALATION) at 11:39

## 2020-01-01 RX ADMIN — ACETAMINOPHEN 650 MG: 325 TABLET, FILM COATED ORAL at 11:22

## 2020-01-01 RX ADMIN — PREDNISONE 10 MG: 10 TABLET ORAL at 10:03

## 2020-01-01 RX ADMIN — ACETAMINOPHEN 650 MG: 325 TABLET, FILM COATED ORAL at 08:14

## 2020-01-01 RX ADMIN — CEFEPIME HYDROCHLORIDE 2 G: 2 INJECTION, POWDER, FOR SOLUTION INTRAVENOUS at 17:11

## 2020-01-01 RX ADMIN — SODIUM CHLORIDE, PRESERVATIVE FREE 10 ML: 5 INJECTION INTRAVENOUS at 21:10

## 2020-01-01 RX ADMIN — METOPROLOL TARTRATE 100 MG: 50 TABLET, FILM COATED ORAL at 02:51

## 2020-01-01 RX ADMIN — ACETAMINOPHEN 650 MG: 325 TABLET, FILM COATED ORAL at 08:58

## 2020-01-01 RX ADMIN — DILTIAZEM HYDROCHLORIDE 60 MG: 60 TABLET, FILM COATED ORAL at 19:53

## 2020-01-01 RX ADMIN — METOPROLOL TARTRATE 100 MG: 50 TABLET, FILM COATED ORAL at 19:54

## 2020-01-01 RX ADMIN — ERYTHROPOIETIN 60000 UNITS: 40000 INJECTION, SOLUTION INTRAVENOUS; SUBCUTANEOUS at 11:36

## 2020-01-01 RX ADMIN — ALBUTEROL SULFATE 2.5 MG: 2.5 SOLUTION RESPIRATORY (INHALATION) at 12:39

## 2020-01-01 RX ADMIN — CLONIDINE HYDROCHLORIDE 0.1 MG: 0.1 TABLET ORAL at 10:48

## 2020-01-01 RX ADMIN — IPRATROPIUM BROMIDE AND ALBUTEROL SULFATE 3 ML: 2.5; .5 SOLUTION RESPIRATORY (INHALATION) at 12:25

## 2020-01-01 RX ADMIN — ALBUTEROL SULFATE 2.5 MG: 2.5 SOLUTION RESPIRATORY (INHALATION) at 10:29

## 2020-01-01 RX ADMIN — ERYTHROPOIETIN 40000 UNITS: 40000 INJECTION, SOLUTION INTRAVENOUS; SUBCUTANEOUS at 11:44

## 2020-01-01 RX ADMIN — ACETAMINOPHEN 650 MG: 325 TABLET, FILM COATED ORAL at 07:54

## 2020-01-01 RX ADMIN — IPRATROPIUM BROMIDE 0.5 MG: 0.5 SOLUTION RESPIRATORY (INHALATION) at 10:39

## 2020-01-01 RX ADMIN — IPRATROPIUM BROMIDE AND ALBUTEROL SULFATE 3 ML: 2.5; .5 SOLUTION RESPIRATORY (INHALATION) at 19:22

## 2020-01-01 RX ADMIN — ALPRAZOLAM 1 MG: 0.25 TABLET ORAL at 02:51

## 2020-01-01 RX ADMIN — METOPROLOL TARTRATE 100 MG: 50 TABLET, FILM COATED ORAL at 10:02

## 2020-01-01 RX ADMIN — IPRATROPIUM BROMIDE AND ALBUTEROL SULFATE 3 ML: 2.5; .5 SOLUTION RESPIRATORY (INHALATION) at 20:36

## 2020-01-01 RX ADMIN — IPRATROPIUM BROMIDE AND ALBUTEROL SULFATE 3 ML: 2.5; .5 SOLUTION RESPIRATORY (INHALATION) at 16:30

## 2020-01-01 RX ADMIN — ATORVASTATIN CALCIUM 10 MG: 20 TABLET, FILM COATED ORAL at 08:06

## 2020-01-01 RX ADMIN — IMMUNE GLOBULIN INFUSION (HUMAN) 30 G: 100 INJECTION, SOLUTION INTRAVENOUS; SUBCUTANEOUS at 09:54

## 2020-01-01 RX ADMIN — VANCOMYCIN HYDROCHLORIDE 1000 MG: 1 INJECTION, SOLUTION INTRAVENOUS at 08:44

## 2020-01-01 RX ADMIN — ACETAMINOPHEN 650 MG: 325 TABLET, FILM COATED ORAL at 12:38

## 2020-01-01 RX ADMIN — APIXABAN 5 MG: 5 TABLET, FILM COATED ORAL at 10:02

## 2020-01-01 RX ADMIN — APIXABAN 5 MG: 5 TABLET, FILM COATED ORAL at 21:11

## 2020-01-01 RX ADMIN — SODIUM CHLORIDE 100 ML: 9 INJECTION, SOLUTION INTRAVENOUS at 10:02

## 2020-01-01 RX ADMIN — ERYTHROPOIETIN 50000 UNITS: 40000 INJECTION, SOLUTION INTRAVENOUS; SUBCUTANEOUS at 11:04

## 2020-01-01 RX ADMIN — IPRATROPIUM BROMIDE AND ALBUTEROL SULFATE 3 ML: .5; 3 SOLUTION RESPIRATORY (INHALATION) at 01:45

## 2020-01-01 RX ADMIN — ACETAMINOPHEN 650 MG: 325 TABLET, FILM COATED ORAL at 13:54

## 2020-01-01 RX ADMIN — ERYTHROPOIETIN 40000 UNITS: 40000 INJECTION, SOLUTION INTRAVENOUS; SUBCUTANEOUS at 10:26

## 2020-01-01 RX ADMIN — ERYTHROPOIETIN 40000 UNITS: 40000 INJECTION, SOLUTION INTRAVENOUS; SUBCUTANEOUS at 11:03

## 2020-01-01 RX ADMIN — ATORVASTATIN CALCIUM 10 MG: 20 TABLET, FILM COATED ORAL at 08:33

## 2020-01-01 RX ADMIN — ACETAMINOPHEN 650 MG: 325 TABLET, FILM COATED ORAL at 10:02

## 2020-01-01 RX ADMIN — SODIUM CHLORIDE 250 ML: 9 INJECTION, SOLUTION INTRAVENOUS at 12:08

## 2020-01-01 RX ADMIN — LUSPATERCEPT 62.5 MG: 75 INJECTION, POWDER, LYOPHILIZED, FOR SOLUTION SUBCUTANEOUS at 12:18

## 2020-01-01 RX ADMIN — HYDROCORTISONE SODIUM SUCCINATE 100 MG: 100 INJECTION, POWDER, FOR SOLUTION INTRAMUSCULAR; INTRAVENOUS at 12:09

## 2020-01-01 RX ADMIN — IPRATROPIUM BROMIDE AND ALBUTEROL SULFATE 3 ML: 2.5; .5 SOLUTION RESPIRATORY (INHALATION) at 06:56

## 2020-01-01 RX ADMIN — LUSPATERCEPT 94 MG: 75 INJECTION, POWDER, LYOPHILIZED, FOR SOLUTION SUBCUTANEOUS at 13:44

## 2020-01-01 RX ADMIN — DIPHENHYDRAMINE HYDROCHLORIDE 25 MG: 25 CAPSULE ORAL at 08:22

## 2020-01-01 RX ADMIN — PREDNISONE 10 MG: 10 TABLET ORAL at 08:25

## 2020-01-01 RX ADMIN — IPRATROPIUM BROMIDE AND ALBUTEROL SULFATE 3 ML: 2.5; .5 SOLUTION RESPIRATORY (INHALATION) at 22:51

## 2020-01-01 RX ADMIN — ACETAMINOPHEN 650 MG: 325 TABLET, FILM COATED ORAL at 09:11

## 2020-01-01 RX ADMIN — ERYTHROPOIETIN 50000 UNITS: 40000 INJECTION, SOLUTION INTRAVENOUS; SUBCUTANEOUS at 12:21

## 2020-01-01 RX ADMIN — ACETAMINOPHEN 650 MG: 325 TABLET, FILM COATED ORAL at 08:16

## 2020-01-01 RX ADMIN — ACETAMINOPHEN 650 MG: 325 TABLET, FILM COATED ORAL at 07:55

## 2020-01-01 RX ADMIN — POTASSIUM CHLORIDE 10 MEQ: 10 CAPSULE, COATED, EXTENDED RELEASE ORAL at 08:49

## 2020-01-01 RX ADMIN — FOLIC ACID 1 MG: 1 TABLET ORAL at 08:49

## 2020-01-01 RX ADMIN — DIPHENHYDRAMINE HYDROCHLORIDE 25 MG: 25 CAPSULE ORAL at 10:35

## 2020-01-01 RX ADMIN — CEFEPIME HYDROCHLORIDE 2 G: 2 INJECTION, POWDER, FOR SOLUTION INTRAVENOUS at 19:15

## 2020-01-01 RX ADMIN — IPRATROPIUM BROMIDE AND ALBUTEROL SULFATE 3 ML: 2.5; .5 SOLUTION RESPIRATORY (INHALATION) at 10:30

## 2020-01-01 RX ADMIN — APIXABAN 5 MG: 5 TABLET, FILM COATED ORAL at 19:53

## 2020-01-01 RX ADMIN — SODIUM CHLORIDE 250 ML: 900 INJECTION, SOLUTION INTRAVENOUS at 09:40

## 2020-01-01 RX ADMIN — PREDNISONE 10 MG: 10 TABLET ORAL at 08:48

## 2020-01-01 RX ADMIN — DIPHENHYDRAMINE HYDROCHLORIDE 25 MG: 25 CAPSULE ORAL at 10:57

## 2020-01-01 RX ADMIN — GUAIFENESIN 600 MG: 600 TABLET, EXTENDED RELEASE ORAL at 22:09

## 2020-01-01 RX ADMIN — SODIUM CHLORIDE, PRESERVATIVE FREE 10 ML: 5 INJECTION INTRAVENOUS at 20:23

## 2020-01-01 RX ADMIN — BISACODYL 5 MG: 5 TABLET ORAL at 20:23

## 2020-01-01 RX ADMIN — IPRATROPIUM BROMIDE AND ALBUTEROL SULFATE 3 ML: 2.5; .5 SOLUTION RESPIRATORY (INHALATION) at 14:54

## 2020-01-01 RX ADMIN — LUSPATERCEPT 125 MG: 75 INJECTION, POWDER, LYOPHILIZED, FOR SOLUTION SUBCUTANEOUS at 10:55

## 2020-01-01 RX ADMIN — IPRATROPIUM BROMIDE AND ALBUTEROL SULFATE 3 ML: .5; 3 SOLUTION RESPIRATORY (INHALATION) at 23:33

## 2020-01-01 RX ADMIN — IPRATROPIUM BROMIDE AND ALBUTEROL SULFATE 3 ML: 2.5; .5 SOLUTION RESPIRATORY (INHALATION) at 23:50

## 2020-01-01 RX ADMIN — CEFEPIME HYDROCHLORIDE 2 G: 2 INJECTION, POWDER, FOR SOLUTION INTRAVENOUS at 15:38

## 2020-01-01 RX ADMIN — SODIUM CHLORIDE, PRESERVATIVE FREE 10 ML: 5 INJECTION INTRAVENOUS at 19:56

## 2020-01-01 RX ADMIN — DIPHENHYDRAMINE HYDROCHLORIDE 25 MG: 25 CAPSULE ORAL at 10:02

## 2020-01-01 RX ADMIN — SODIUM CHLORIDE 250 ML: 9 INJECTION, SOLUTION INTRAVENOUS at 10:34

## 2020-01-01 RX ADMIN — DILTIAZEM HYDROCHLORIDE 60 MG: 60 TABLET, FILM COATED ORAL at 08:26

## 2020-01-01 RX ADMIN — DIPHENHYDRAMINE HYDROCHLORIDE 25 MG: 25 CAPSULE ORAL at 12:38

## 2020-01-01 RX ADMIN — ERYTHROPOIETIN 60000 UNITS: 40000 INJECTION, SOLUTION INTRAVENOUS; SUBCUTANEOUS at 11:38

## 2020-01-01 RX ADMIN — VANCOMYCIN HYDROCHLORIDE 1500 MG: 10 INJECTION, POWDER, LYOPHILIZED, FOR SOLUTION INTRAVENOUS at 18:50

## 2020-01-01 RX ADMIN — IPRATROPIUM BROMIDE AND ALBUTEROL SULFATE 3 ML: 2.5; .5 SOLUTION RESPIRATORY (INHALATION) at 20:13

## 2020-01-01 RX ADMIN — FUROSEMIDE 40 MG: 40 TABLET ORAL at 08:26

## 2020-01-01 RX ADMIN — GUAIFENESIN 600 MG: 600 TABLET, EXTENDED RELEASE ORAL at 08:49

## 2020-01-01 RX ADMIN — ERYTHROPOIETIN 40000 UNITS: 40000 INJECTION, SOLUTION INTRAVENOUS; SUBCUTANEOUS at 11:17

## 2020-01-01 RX ADMIN — ERYTHROPOIETIN 60000 UNITS: 40000 INJECTION, SOLUTION INTRAVENOUS; SUBCUTANEOUS at 11:40

## 2020-01-01 RX ADMIN — LUSPATERCEPT 94 MG: 75 INJECTION, POWDER, LYOPHILIZED, FOR SOLUTION SUBCUTANEOUS at 12:25

## 2020-01-01 RX ADMIN — IMMUNE GLOBULIN INFUSION (HUMAN) 30 G: 100 INJECTION, SOLUTION INTRAVENOUS; SUBCUTANEOUS at 13:05

## 2020-01-01 RX ADMIN — DIPHENHYDRAMINE HYDROCHLORIDE 25 MG: 25 CAPSULE ORAL at 11:42

## 2020-01-01 RX ADMIN — IMMUNE GLOBULIN INFUSION (HUMAN) 30 G: 100 INJECTION, SOLUTION INTRAVENOUS; SUBCUTANEOUS at 10:06

## 2020-01-01 RX ADMIN — ALPRAZOLAM 1 MG: 0.25 TABLET ORAL at 23:13

## 2020-01-01 RX ADMIN — CEFEPIME HYDROCHLORIDE 2 G: 2 INJECTION, POWDER, FOR SOLUTION INTRAVENOUS at 06:03

## 2020-01-01 RX ADMIN — SODIUM CHLORIDE, PRESERVATIVE FREE 10 ML: 5 INJECTION INTRAVENOUS at 20:55

## 2020-01-01 RX ADMIN — APIXABAN 5 MG: 5 TABLET, FILM COATED ORAL at 08:25

## 2020-01-01 RX ADMIN — IPRATROPIUM BROMIDE AND ALBUTEROL SULFATE 3 ML: 2.5; .5 SOLUTION RESPIRATORY (INHALATION) at 06:25

## 2020-01-01 RX ADMIN — IMMUNE GLOBULIN (HUMAN) 10 G: 10 INJECTION INTRAVENOUS; SUBCUTANEOUS at 14:20

## 2020-01-01 RX ADMIN — METOPROLOL TARTRATE 100 MG: 50 TABLET, FILM COATED ORAL at 20:54

## 2020-01-01 RX ADMIN — METOPROLOL TARTRATE 100 MG: 50 TABLET, FILM COATED ORAL at 20:22

## 2020-01-01 RX ADMIN — DILTIAZEM HYDROCHLORIDE 60 MG: 60 TABLET, FILM COATED ORAL at 08:33

## 2020-01-01 RX ADMIN — APIXABAN 5 MG: 5 TABLET, FILM COATED ORAL at 20:54

## 2020-01-01 RX ADMIN — SODIUM CHLORIDE, PRESERVATIVE FREE 10 ML: 5 INJECTION INTRAVENOUS at 08:07

## 2020-01-01 RX ADMIN — IPRATROPIUM BROMIDE AND ALBUTEROL SULFATE 3 ML: .5; 3 SOLUTION RESPIRATORY (INHALATION) at 10:48

## 2020-01-01 RX ADMIN — IMMUNE GLOBULIN INFUSION (HUMAN) 30 G: 100 INJECTION, SOLUTION INTRAVENOUS; SUBCUTANEOUS at 12:06

## 2020-01-01 RX ADMIN — VANCOMYCIN HYDROCHLORIDE 1500 MG: 10 INJECTION, POWDER, LYOPHILIZED, FOR SOLUTION INTRAVENOUS at 15:38

## 2020-01-01 RX ADMIN — DIPHENHYDRAMINE HYDROCHLORIDE 25 MG: 25 CAPSULE ORAL at 10:08

## 2020-01-01 RX ADMIN — ERYTHROPOIETIN 60000 UNITS: 40000 INJECTION, SOLUTION INTRAVENOUS; SUBCUTANEOUS at 10:38

## 2020-01-01 RX ADMIN — ACETAMINOPHEN 650 MG: 325 TABLET, FILM COATED ORAL at 08:22

## 2020-01-01 RX ADMIN — METOPROLOL TARTRATE 100 MG: 50 TABLET, FILM COATED ORAL at 21:09

## 2020-01-01 RX ADMIN — ACETAMINOPHEN 650 MG: 325 TABLET, FILM COATED ORAL at 09:46

## 2020-01-01 RX ADMIN — ACETAMINOPHEN 1000 MG: 500 TABLET, FILM COATED ORAL at 18:49

## 2020-01-01 RX ADMIN — IMMUNE GLOBULIN INFUSION (HUMAN) 30 G: 100 INJECTION, SOLUTION INTRAVENOUS; SUBCUTANEOUS at 09:50

## 2020-01-01 RX ADMIN — METOPROLOL TARTRATE 100 MG: 50 TABLET, FILM COATED ORAL at 08:49

## 2020-01-01 RX ADMIN — CEFEPIME HYDROCHLORIDE 2 G: 2 INJECTION, POWDER, FOR SOLUTION INTRAVENOUS at 06:15

## 2020-01-01 RX ADMIN — CEFEPIME HYDROCHLORIDE 2 G: 2 INJECTION, POWDER, FOR SOLUTION INTRAVENOUS at 10:35

## 2020-01-01 RX ADMIN — VANCOMYCIN HYDROCHLORIDE 1500 MG: 10 INJECTION, POWDER, LYOPHILIZED, FOR SOLUTION INTRAVENOUS at 17:11

## 2020-01-01 RX ADMIN — CEFEPIME HYDROCHLORIDE 2 G: 2 INJECTION, POWDER, FOR SOLUTION INTRAVENOUS at 22:48

## 2020-01-01 RX ADMIN — ALPRAZOLAM 1 MG: 0.25 TABLET ORAL at 22:48

## 2020-01-01 RX ADMIN — ERYTHROPOIETIN 25000 UNITS: 20000 INJECTION, SOLUTION INTRAVENOUS; SUBCUTANEOUS at 11:00

## 2020-01-01 RX ADMIN — DIPHENHYDRAMINE HYDROCHLORIDE 25 MG: 25 CAPSULE ORAL at 08:46

## 2020-01-01 RX ADMIN — CEFEPIME HYDROCHLORIDE 2 G: 2 INJECTION, POWDER, FOR SOLUTION INTRAVENOUS at 18:46

## 2020-01-01 RX ADMIN — DIPHENHYDRAMINE HYDROCHLORIDE 25 MG: 50 INJECTION, SOLUTION INTRAMUSCULAR; INTRAVENOUS at 12:18

## 2020-01-01 RX ADMIN — METOPROLOL TARTRATE 100 MG: 50 TABLET, FILM COATED ORAL at 08:25

## 2020-01-01 RX ADMIN — IPRATROPIUM BROMIDE AND ALBUTEROL SULFATE 3 ML: 2.5; .5 SOLUTION RESPIRATORY (INHALATION) at 11:00

## 2020-01-01 RX ADMIN — DILTIAZEM HYDROCHLORIDE 60 MG: 60 TABLET, FILM COATED ORAL at 08:49

## 2020-01-01 RX ADMIN — CEFEPIME HYDROCHLORIDE 2 G: 2 INJECTION, POWDER, FOR SOLUTION INTRAVENOUS at 14:57

## 2020-01-01 RX ADMIN — IPRATROPIUM BROMIDE AND ALBUTEROL SULFATE 3 ML: .5; 3 SOLUTION RESPIRATORY (INHALATION) at 23:37

## 2020-01-01 RX ADMIN — ERYTHROPOIETIN 60000 UNITS: 40000 INJECTION, SOLUTION INTRAVENOUS; SUBCUTANEOUS at 11:02

## 2020-01-01 RX ADMIN — BISACODYL 5 MG: 5 TABLET ORAL at 20:54

## 2020-01-01 RX ADMIN — POTASSIUM CHLORIDE 10 MEQ: 10 CAPSULE, COATED, EXTENDED RELEASE ORAL at 08:33

## 2020-01-01 RX ADMIN — VANCOMYCIN HYDROCHLORIDE 2000 MG: 10 INJECTION, POWDER, LYOPHILIZED, FOR SOLUTION INTRAVENOUS at 19:42

## 2020-01-01 RX ADMIN — IMMUNE GLOBULIN INFUSION (HUMAN) 30 G: 100 INJECTION, SOLUTION INTRAVENOUS; SUBCUTANEOUS at 10:01

## 2020-01-01 RX ADMIN — FUROSEMIDE 40 MG: 40 TABLET ORAL at 08:06

## 2020-01-01 RX ADMIN — POTASSIUM CHLORIDE 10 MEQ: 10 CAPSULE, COATED, EXTENDED RELEASE ORAL at 10:02

## 2020-01-01 RX ADMIN — ALBUTEROL SULFATE 2 PUFF: 90 AEROSOL, METERED RESPIRATORY (INHALATION) at 11:42

## 2020-01-01 RX ADMIN — CEFEPIME HYDROCHLORIDE 2 G: 2 INJECTION, POWDER, FOR SOLUTION INTRAVENOUS at 23:08

## 2020-01-01 RX ADMIN — ERYTHROPOIETIN 50000 UNITS: 40000 INJECTION, SOLUTION INTRAVENOUS; SUBCUTANEOUS at 11:43

## 2020-01-01 RX ADMIN — SODIUM CHLORIDE, PRESERVATIVE FREE 10 ML: 5 INJECTION INTRAVENOUS at 08:33

## 2020-01-01 RX ADMIN — IMMUNE GLOBULIN INFUSION (HUMAN) 30 G: 100 INJECTION, SOLUTION INTRAVENOUS; SUBCUTANEOUS at 11:19

## 2020-01-01 RX ADMIN — APIXABAN 5 MG: 5 TABLET, FILM COATED ORAL at 08:33

## 2020-01-01 RX ADMIN — FUROSEMIDE 40 MG: 40 TABLET ORAL at 08:50

## 2020-01-01 RX ADMIN — DIPHENHYDRAMINE HYDROCHLORIDE 25 MG: 25 CAPSULE ORAL at 08:14

## 2020-01-01 RX ADMIN — POTASSIUM CHLORIDE 10 MEQ: 10 CAPSULE, COATED, EXTENDED RELEASE ORAL at 08:26

## 2020-01-01 RX ADMIN — APIXABAN 5 MG: 5 TABLET, FILM COATED ORAL at 08:49

## 2020-01-01 RX ADMIN — SODIUM CHLORIDE, PRESERVATIVE FREE 10 ML: 5 INJECTION INTRAVENOUS at 01:36

## 2020-01-01 RX ADMIN — PREDNISONE 10 MG: 10 TABLET ORAL at 08:33

## 2020-01-01 RX ADMIN — SODIUM CHLORIDE 250 ML: 9 INJECTION, SOLUTION INTRAVENOUS at 10:06

## 2020-01-01 RX ADMIN — FOLIC ACID 1 MG: 1 TABLET ORAL at 10:03

## 2020-01-01 RX ADMIN — DIPHENHYDRAMINE HYDROCHLORIDE 25 MG: 25 CAPSULE ORAL at 09:46

## 2020-01-01 RX ADMIN — FUROSEMIDE 40 MG: 40 TABLET ORAL at 08:33

## 2020-01-01 RX ADMIN — ACETAMINOPHEN 650 MG: 325 TABLET ORAL at 11:42

## 2020-01-01 RX ADMIN — SODIUM CHLORIDE 250 ML: 9 INJECTION, SOLUTION INTRAVENOUS at 12:30

## 2020-01-01 RX ADMIN — IPRATROPIUM BROMIDE AND ALBUTEROL SULFATE 3 ML: 2.5; .5 SOLUTION RESPIRATORY (INHALATION) at 20:28

## 2020-01-01 RX ADMIN — ATORVASTATIN CALCIUM 10 MG: 20 TABLET, FILM COATED ORAL at 08:25

## 2020-01-01 RX ADMIN — IPRATROPIUM BROMIDE AND ALBUTEROL SULFATE 3 ML: 2.5; .5 SOLUTION RESPIRATORY (INHALATION) at 15:02

## 2020-01-01 RX ADMIN — APIXABAN 5 MG: 5 TABLET, FILM COATED ORAL at 20:23

## 2020-01-01 RX ADMIN — FOLIC ACID 1 MG: 1 TABLET ORAL at 08:26

## 2020-01-01 RX ADMIN — ACETAMINOPHEN 650 MG: 325 TABLET ORAL at 10:57

## 2020-01-01 RX ADMIN — DIPHENHYDRAMINE HYDROCHLORIDE 25 MG: 25 CAPSULE ORAL at 10:34

## 2020-01-01 RX ADMIN — IPRATROPIUM BROMIDE AND ALBUTEROL SULFATE 3 ML: .5; 3 SOLUTION RESPIRATORY (INHALATION) at 00:22

## 2020-01-01 RX ADMIN — ACETAMINOPHEN 325 MG: 325 TABLET, FILM COATED ORAL at 10:35

## 2020-01-01 RX ADMIN — ERYTHROPOIETIN 31000 UNITS: 20000 INJECTION, SOLUTION INTRAVENOUS; SUBCUTANEOUS at 10:59

## 2020-01-01 RX ADMIN — ERYTHROPOIETIN 60000 UNITS: 40000 INJECTION, SOLUTION INTRAVENOUS; SUBCUTANEOUS at 11:06

## 2020-01-02 ENCOUNTER — READMISSION MANAGEMENT (OUTPATIENT)
Dept: CALL CENTER | Facility: HOSPITAL | Age: 80
End: 2020-01-02

## 2020-01-02 PROBLEM — R09.02 HYPOXEMIA: Status: ACTIVE | Noted: 2020-01-02

## 2020-01-02 PROBLEM — J18.9 PNEUMONIA OF RIGHT LOWER LOBE DUE TO INFECTIOUS ORGANISM: Status: ACTIVE | Noted: 2020-01-02

## 2020-01-02 LAB
ANION GAP SERPL CALCULATED.3IONS-SCNC: 13.4 MMOL/L (ref 5–15)
ANISOCYTOSIS BLD QL: ABNORMAL
B PARAPERT DNA SPEC QL NAA+PROBE: NOT DETECTED
B PERT DNA SPEC QL NAA+PROBE: NOT DETECTED
BACTERIA UR QL AUTO: ABNORMAL /HPF
BILIRUB UR QL STRIP: NEGATIVE
BUN BLD-MCNC: 27 MG/DL (ref 8–23)
BUN/CREAT SERPL: 21.3 (ref 7–25)
C PNEUM DNA NPH QL NAA+NON-PROBE: NOT DETECTED
CALCIUM SPEC-SCNC: 8.9 MG/DL (ref 8.6–10.5)
CHLORIDE SERPL-SCNC: 103 MMOL/L (ref 98–107)
CLARITY UR: CLEAR
CO2 SERPL-SCNC: 26.6 MMOL/L (ref 22–29)
COLOR UR: YELLOW
CREAT BLD-MCNC: 1.27 MG/DL (ref 0.76–1.27)
D-LACTATE SERPL-SCNC: 2.1 MMOL/L (ref 0.5–2)
DEPRECATED RDW RBC AUTO: 55.2 FL (ref 37–54)
EOSINOPHIL # BLD MANUAL: 0.07 10*3/MM3 (ref 0–0.4)
EOSINOPHIL NFR BLD MANUAL: 1.1 % (ref 0.3–6.2)
ERYTHROCYTE [DISTWIDTH] IN BLOOD BY AUTOMATED COUNT: 14.4 % (ref 12.3–15.4)
FLUAV H1 2009 PAND RNA NPH QL NAA+PROBE: NOT DETECTED
FLUAV H1 HA GENE NPH QL NAA+PROBE: NOT DETECTED
FLUAV H3 RNA NPH QL NAA+PROBE: NOT DETECTED
FLUAV SUBTYP SPEC NAA+PROBE: NOT DETECTED
FLUBV RNA ISLT QL NAA+PROBE: NOT DETECTED
GFR SERPL CREATININE-BSD FRML MDRD: 55 ML/MIN/1.73
GLUCOSE BLD-MCNC: 205 MG/DL (ref 65–99)
GLUCOSE BLDC GLUCOMTR-MCNC: 218 MG/DL (ref 70–130)
GLUCOSE BLDC GLUCOMTR-MCNC: 250 MG/DL (ref 70–130)
GLUCOSE BLDC GLUCOMTR-MCNC: 255 MG/DL (ref 70–130)
GLUCOSE BLDC GLUCOMTR-MCNC: 360 MG/DL (ref 70–130)
GLUCOSE UR STRIP-MCNC: NEGATIVE MG/DL
HADV DNA SPEC NAA+PROBE: NOT DETECTED
HBA1C MFR BLD: 5.4 % (ref 4.8–5.6)
HCOV 229E RNA SPEC QL NAA+PROBE: NOT DETECTED
HCOV HKU1 RNA SPEC QL NAA+PROBE: NOT DETECTED
HCOV NL63 RNA SPEC QL NAA+PROBE: NOT DETECTED
HCOV OC43 RNA SPEC QL NAA+PROBE: NOT DETECTED
HCT VFR BLD AUTO: 25.4 % (ref 37.5–51)
HGB BLD-MCNC: 8.6 G/DL (ref 13–17.7)
HGB UR QL STRIP.AUTO: ABNORMAL
HMPV RNA NPH QL NAA+NON-PROBE: NOT DETECTED
HOLD SPECIMEN: NORMAL
HPIV1 RNA SPEC QL NAA+PROBE: NOT DETECTED
HPIV2 RNA SPEC QL NAA+PROBE: NOT DETECTED
HPIV3 RNA NPH QL NAA+PROBE: NOT DETECTED
HPIV4 P GENE NPH QL NAA+PROBE: NOT DETECTED
HYALINE CASTS UR QL AUTO: ABNORMAL /LPF
KETONES UR QL STRIP: NEGATIVE
L PNEUMO1 AG UR QL IA: NEGATIVE
LEUKOCYTE ESTERASE UR QL STRIP.AUTO: NEGATIVE
LYMPHOCYTES # BLD MANUAL: 0.41 10*3/MM3 (ref 0.7–3.1)
LYMPHOCYTES NFR BLD MANUAL: 6.4 % (ref 19.6–45.3)
M PNEUMO IGG SER IA-ACNC: NOT DETECTED
MCH RBC QN AUTO: 36 PG (ref 26.6–33)
MCHC RBC AUTO-ENTMCNC: 33.9 G/DL (ref 31.5–35.7)
MCV RBC AUTO: 106.3 FL (ref 79–97)
NEUTROPHILS # BLD AUTO: 5.94 10*3/MM3 (ref 1.7–7)
NEUTROPHILS NFR BLD MANUAL: 92.6 % (ref 42.7–76)
NITRITE UR QL STRIP: NEGATIVE
NRBC SPEC MANUAL: 1.1 /100 WBC (ref 0–0.2)
PH UR STRIP.AUTO: 7 [PH] (ref 5–8)
PLAT MORPH BLD: NORMAL
PLATELET # BLD AUTO: 162 10*3/MM3 (ref 140–450)
PMV BLD AUTO: 11.8 FL (ref 6–12)
POLYCHROMASIA BLD QL SMEAR: ABNORMAL
POTASSIUM BLD-SCNC: 4.3 MMOL/L (ref 3.5–5.2)
PROT UR QL STRIP: ABNORMAL
RBC # BLD AUTO: 2.39 10*6/MM3 (ref 4.14–5.8)
RBC # UR: ABNORMAL /HPF
REF LAB TEST METHOD: ABNORMAL
RHINOVIRUS RNA SPEC NAA+PROBE: NOT DETECTED
RSV RNA NPH QL NAA+NON-PROBE: NOT DETECTED
S PNEUM AG SPEC QL LA: NEGATIVE
SODIUM BLD-SCNC: 143 MMOL/L (ref 136–145)
SP GR UR STRIP: 1.02 (ref 1–1.03)
SQUAMOUS #/AREA URNS HPF: ABNORMAL /HPF
UROBILINOGEN UR QL STRIP: ABNORMAL
WBC MORPH BLD: NORMAL
WBC NRBC COR # BLD: 6.42 10*3/MM3 (ref 3.4–10.8)
WBC UR QL AUTO: ABNORMAL /HPF

## 2020-01-02 PROCEDURE — 80048 BASIC METABOLIC PNL TOTAL CA: CPT | Performed by: HOSPITALIST

## 2020-01-02 PROCEDURE — 87205 SMEAR GRAM STAIN: CPT | Performed by: HOSPITALIST

## 2020-01-02 PROCEDURE — 63710000001 PREDNISONE PER 1 MG: Performed by: INTERNAL MEDICINE

## 2020-01-02 PROCEDURE — 94799 UNLISTED PULMONARY SVC/PX: CPT

## 2020-01-02 PROCEDURE — 63710000001 INSULIN LISPRO (HUMAN) PER 5 UNITS: Performed by: HOSPITALIST

## 2020-01-02 PROCEDURE — 25010000002 PIPERACILLIN SOD-TAZOBACTAM PER 1 G: Performed by: EMERGENCY MEDICINE

## 2020-01-02 PROCEDURE — 25010000002 PIPERACILLIN SOD-TAZOBACTAM PER 1 G: Performed by: HOSPITALIST

## 2020-01-02 PROCEDURE — 87070 CULTURE OTHR SPECIMN AEROBIC: CPT | Performed by: HOSPITALIST

## 2020-01-02 PROCEDURE — 85025 COMPLETE CBC W/AUTO DIFF WBC: CPT | Performed by: HOSPITALIST

## 2020-01-02 PROCEDURE — 36415 COLL VENOUS BLD VENIPUNCTURE: CPT | Performed by: EMERGENCY MEDICINE

## 2020-01-02 PROCEDURE — 25010000002 METHYLPREDNISOLONE PER 125 MG: Performed by: HOSPITALIST

## 2020-01-02 PROCEDURE — 83605 ASSAY OF LACTIC ACID: CPT | Performed by: EMERGENCY MEDICINE

## 2020-01-02 PROCEDURE — 85007 BL SMEAR W/DIFF WBC COUNT: CPT | Performed by: HOSPITALIST

## 2020-01-02 PROCEDURE — 82962 GLUCOSE BLOOD TEST: CPT

## 2020-01-02 PROCEDURE — 99222 1ST HOSP IP/OBS MODERATE 55: CPT | Performed by: INTERNAL MEDICINE

## 2020-01-02 RX ORDER — ACETAMINOPHEN 160 MG/5ML
650 SOLUTION ORAL EVERY 4 HOURS PRN
Status: DISCONTINUED | OUTPATIENT
Start: 2020-01-02 | End: 2020-01-04 | Stop reason: HOSPADM

## 2020-01-02 RX ORDER — SODIUM CHLORIDE 0.9 % (FLUSH) 0.9 %
10 SYRINGE (ML) INJECTION AS NEEDED
Status: DISCONTINUED | OUTPATIENT
Start: 2020-01-02 | End: 2020-01-04 | Stop reason: HOSPADM

## 2020-01-02 RX ORDER — IPRATROPIUM BROMIDE AND ALBUTEROL SULFATE 2.5; .5 MG/3ML; MG/3ML
3 SOLUTION RESPIRATORY (INHALATION) EVERY 4 HOURS PRN
Status: DISCONTINUED | OUTPATIENT
Start: 2020-01-02 | End: 2020-01-04 | Stop reason: HOSPADM

## 2020-01-02 RX ORDER — METOPROLOL TARTRATE 100 MG/1
100 TABLET ORAL EVERY 12 HOURS SCHEDULED
Status: DISCONTINUED | OUTPATIENT
Start: 2020-01-02 | End: 2020-01-04 | Stop reason: HOSPADM

## 2020-01-02 RX ORDER — ONDANSETRON 2 MG/ML
4 INJECTION INTRAMUSCULAR; INTRAVENOUS EVERY 6 HOURS PRN
Status: DISCONTINUED | OUTPATIENT
Start: 2020-01-02 | End: 2020-01-04 | Stop reason: HOSPADM

## 2020-01-02 RX ORDER — ATORVASTATIN CALCIUM 10 MG/1
10 TABLET, FILM COATED ORAL DAILY
Status: DISCONTINUED | OUTPATIENT
Start: 2020-01-02 | End: 2020-01-04 | Stop reason: HOSPADM

## 2020-01-02 RX ORDER — BUDESONIDE 0.5 MG/2ML
0.5 INHALANT ORAL
Status: DISCONTINUED | OUTPATIENT
Start: 2020-01-02 | End: 2020-01-04 | Stop reason: HOSPADM

## 2020-01-02 RX ORDER — PREDNISONE 20 MG/1
20 TABLET ORAL 2 TIMES DAILY WITH MEALS
Status: DISCONTINUED | OUTPATIENT
Start: 2020-01-02 | End: 2020-01-04 | Stop reason: HOSPADM

## 2020-01-02 RX ORDER — NICOTINE POLACRILEX 4 MG
15 LOZENGE BUCCAL
Status: DISCONTINUED | OUTPATIENT
Start: 2020-01-02 | End: 2020-01-04 | Stop reason: HOSPADM

## 2020-01-02 RX ORDER — FOLIC ACID 1 MG/1
1 TABLET ORAL DAILY
Status: DISCONTINUED | OUTPATIENT
Start: 2020-01-02 | End: 2020-01-04 | Stop reason: HOSPADM

## 2020-01-02 RX ORDER — SODIUM CHLORIDE 0.9 % (FLUSH) 0.9 %
10 SYRINGE (ML) INJECTION EVERY 12 HOURS SCHEDULED
Status: DISCONTINUED | OUTPATIENT
Start: 2020-01-02 | End: 2020-01-04 | Stop reason: HOSPADM

## 2020-01-02 RX ORDER — METHYLPREDNISOLONE SODIUM SUCCINATE 125 MG/2ML
60 INJECTION, POWDER, LYOPHILIZED, FOR SOLUTION INTRAMUSCULAR; INTRAVENOUS EVERY 8 HOURS
Status: DISCONTINUED | OUTPATIENT
Start: 2020-01-02 | End: 2020-01-02

## 2020-01-02 RX ORDER — ALPRAZOLAM 1 MG/1
1 TABLET ORAL NIGHTLY PRN
Status: DISCONTINUED | OUTPATIENT
Start: 2020-01-02 | End: 2020-01-04 | Stop reason: HOSPADM

## 2020-01-02 RX ORDER — DEXTROSE MONOHYDRATE 25 G/50ML
25 INJECTION, SOLUTION INTRAVENOUS
Status: DISCONTINUED | OUTPATIENT
Start: 2020-01-02 | End: 2020-01-04 | Stop reason: HOSPADM

## 2020-01-02 RX ORDER — ONDANSETRON 4 MG/1
4 TABLET, FILM COATED ORAL EVERY 6 HOURS PRN
Status: DISCONTINUED | OUTPATIENT
Start: 2020-01-02 | End: 2020-01-04 | Stop reason: HOSPADM

## 2020-01-02 RX ORDER — ACETAMINOPHEN 325 MG/1
650 TABLET ORAL EVERY 4 HOURS PRN
Status: DISCONTINUED | OUTPATIENT
Start: 2020-01-02 | End: 2020-01-04 | Stop reason: HOSPADM

## 2020-01-02 RX ORDER — ALPRAZOLAM 1 MG/1
1 TABLET ORAL NIGHTLY PRN
Status: DISCONTINUED | OUTPATIENT
Start: 2020-01-02 | End: 2020-01-02 | Stop reason: SDUPTHER

## 2020-01-02 RX ORDER — DILTIAZEM HYDROCHLORIDE 60 MG/1
60 TABLET, FILM COATED ORAL 2 TIMES DAILY
Status: DISCONTINUED | OUTPATIENT
Start: 2020-01-02 | End: 2020-01-04 | Stop reason: HOSPADM

## 2020-01-02 RX ORDER — IPRATROPIUM BROMIDE AND ALBUTEROL SULFATE 2.5; .5 MG/3ML; MG/3ML
3 SOLUTION RESPIRATORY (INHALATION)
Status: DISCONTINUED | OUTPATIENT
Start: 2020-01-02 | End: 2020-01-04 | Stop reason: HOSPADM

## 2020-01-02 RX ORDER — ACETAMINOPHEN 650 MG/1
650 SUPPOSITORY RECTAL EVERY 4 HOURS PRN
Status: DISCONTINUED | OUTPATIENT
Start: 2020-01-02 | End: 2020-01-04 | Stop reason: HOSPADM

## 2020-01-02 RX ORDER — FUROSEMIDE 40 MG/1
40 TABLET ORAL 2 TIMES DAILY
Status: DISCONTINUED | OUTPATIENT
Start: 2020-01-02 | End: 2020-01-04 | Stop reason: HOSPADM

## 2020-01-02 RX ADMIN — APIXABAN 5 MG: 5 TABLET, FILM COATED ORAL at 21:36

## 2020-01-02 RX ADMIN — TAZOBACTAM SODIUM AND PIPERACILLIN SODIUM 3.38 G: 375; 3 INJECTION, SOLUTION INTRAVENOUS at 16:16

## 2020-01-02 RX ADMIN — METOPROLOL TARTRATE 100 MG: 100 TABLET, FILM COATED ORAL at 21:36

## 2020-01-02 RX ADMIN — ALPRAZOLAM 1 MG: 1 TABLET ORAL at 21:38

## 2020-01-02 RX ADMIN — FUROSEMIDE 40 MG: 40 TABLET ORAL at 08:59

## 2020-01-02 RX ADMIN — PREDNISONE 20 MG: 20 TABLET ORAL at 19:09

## 2020-01-02 RX ADMIN — METOPROLOL TARTRATE 100 MG: 100 TABLET, FILM COATED ORAL at 08:59

## 2020-01-02 RX ADMIN — IPRATROPIUM BROMIDE AND ALBUTEROL SULFATE 3 ML: 2.5; .5 SOLUTION RESPIRATORY (INHALATION) at 20:38

## 2020-01-02 RX ADMIN — INSULIN LISPRO 4 UNITS: 100 INJECTION, SOLUTION INTRAVENOUS; SUBCUTANEOUS at 19:09

## 2020-01-02 RX ADMIN — METHYLPREDNISOLONE SODIUM SUCCINATE 60 MG: 125 INJECTION, POWDER, FOR SOLUTION INTRAMUSCULAR; INTRAVENOUS at 06:14

## 2020-01-02 RX ADMIN — IPRATROPIUM BROMIDE AND ALBUTEROL SULFATE 3 ML: 2.5; .5 SOLUTION RESPIRATORY (INHALATION) at 16:23

## 2020-01-02 RX ADMIN — PREDNISONE 20 MG: 20 TABLET ORAL at 08:59

## 2020-01-02 RX ADMIN — BUDESONIDE 0.5 MG: 0.5 SUSPENSION RESPIRATORY (INHALATION) at 20:38

## 2020-01-02 RX ADMIN — INSULIN LISPRO 5 UNITS: 100 INJECTION, SOLUTION INTRAVENOUS; SUBCUTANEOUS at 12:17

## 2020-01-02 RX ADMIN — SODIUM CHLORIDE, PRESERVATIVE FREE 10 ML: 5 INJECTION INTRAVENOUS at 21:36

## 2020-01-02 RX ADMIN — FUROSEMIDE 40 MG: 40 TABLET ORAL at 21:36

## 2020-01-02 RX ADMIN — DILTIAZEM HYDROCHLORIDE 60 MG: 60 TABLET, FILM COATED ORAL at 08:59

## 2020-01-02 RX ADMIN — TAZOBACTAM SODIUM AND PIPERACILLIN SODIUM 3.38 G: 375; 3 INJECTION, SOLUTION INTRAVENOUS at 11:36

## 2020-01-02 RX ADMIN — INSULIN LISPRO 3 UNITS: 100 INJECTION, SOLUTION INTRAVENOUS; SUBCUTANEOUS at 09:23

## 2020-01-02 RX ADMIN — IPRATROPIUM BROMIDE AND ALBUTEROL SULFATE 3 ML: 2.5; .5 SOLUTION RESPIRATORY (INHALATION) at 03:14

## 2020-01-02 RX ADMIN — IPRATROPIUM BROMIDE AND ALBUTEROL SULFATE 3 ML: 2.5; .5 SOLUTION RESPIRATORY (INHALATION) at 12:44

## 2020-01-02 RX ADMIN — SODIUM CHLORIDE, PRESERVATIVE FREE 10 ML: 5 INJECTION INTRAVENOUS at 12:18

## 2020-01-02 RX ADMIN — BUDESONIDE 0.5 MG: 0.5 SUSPENSION RESPIRATORY (INHALATION) at 09:26

## 2020-01-02 RX ADMIN — METFORMIN HYDROCHLORIDE 1000 MG: 1000 TABLET ORAL at 08:59

## 2020-01-02 RX ADMIN — ATORVASTATIN CALCIUM 10 MG: 10 TABLET, FILM COATED ORAL at 08:59

## 2020-01-02 RX ADMIN — FOLIC ACID 1 MG: 1 TABLET ORAL at 08:59

## 2020-01-02 RX ADMIN — INSULIN LISPRO 4 UNITS: 100 INJECTION, SOLUTION INTRAVENOUS; SUBCUTANEOUS at 21:36

## 2020-01-02 RX ADMIN — ALPRAZOLAM 1 MG: 1 TABLET ORAL at 03:29

## 2020-01-02 RX ADMIN — APIXABAN 5 MG: 5 TABLET, FILM COATED ORAL at 14:05

## 2020-01-02 RX ADMIN — TAZOBACTAM SODIUM AND PIPERACILLIN SODIUM 3.38 G: 375; 3 INJECTION, SOLUTION INTRAVENOUS at 00:23

## 2020-01-02 RX ADMIN — DILTIAZEM HYDROCHLORIDE 60 MG: 60 TABLET, FILM COATED ORAL at 21:37

## 2020-01-02 NOTE — PROGRESS NOTES
"Pharmacy Consult - Zosyn dosing     Ankit Mack Sr. is a 79 y.o. on whom pharmacy has been consulted dose Zosyn for PNA.  Pharmacy dosing per Dr Cruz's request.         Relevant clinical data and objective history reviewed:  79 y.o. male 177.8 cm (70\")   103 kg (227 lb 15.3 oz)   Ideal body weight: 73 kg (160 lb 15 oz)  Adjusted ideal body weight: 85.2 kg (187 lb 11.9 oz)    Past medical history: has a past medical history of Anemia, Anxiety, Atrial flutter (CMS/[facility]), Chronic diastolic congestive heart failure (CMS/[facility]) (12/19/2018), Chronic respiratory failure with hypoxia (CMS/[facility]), COPD (chronic obstructive pulmonary disease) (CMS/[facility]), Essential hypertension, GERD (gastroesophageal reflux disease), H/O complete eye exam (06/2018), History of pulmonary function tests (06/10/2014), Hyperglycemia, Hyperlipidemia, Lung cancer (CMS/[facility]) (2012), MDS (myelodysplastic syndrome) (CMS/[facility]), Nonrheumatic aortic valve stenosis, Obesity, Osteoarthritis, knee, Paroxysmal atrial fibrillation (CMS/[facility]), Prostate cancer (CMS/[facility]) (2000), Seizures (CMS/[facility]), Sleep apnea, Thoracic aortic aneurysm without rupture (CMS/[facility]), and Type 2 diabetes mellitus without complication, without long-term current use of insulin (CMS/[facility]).  [unfilled]    Other Antibiotics/Anti-infectives on profile: none        Lab Results   Component Value Date    GLUCOSE 180 (H) 01/01/2020    CALCIUM 9.0 01/01/2020     (H) 01/01/2020    K 4.3 01/01/2020    CO2 28.2 01/01/2020     01/01/2020    BUN 30 (H) 01/01/2020    CREATININE 1.37 (H) 01/01/2020    EGFRIFAFRI 70 05/15/2019    EGFRIFNONA 50 (L) 01/01/2020    BCR 21.9 01/01/2020    ANIONGAP 14.8 01/01/2020     Lab Results   Component Value Date    WBC 7.15 01/01/2020    WBC 7.15 01/01/2020    HGB 9.5 (L) 01/01/2020    HCT 27.7 (L) 01/01/2020    .3 (H) 01/01/2020     01/01/2020       Estimated Creatinine Clearance: 52.6 mL/min (A) (by C-G formula based on SCr of 1.37 mg/dL " "(H)).  No intake/output data recorded.  Blood pressure 178/92, pulse 83, temperature 97.4 °F (36.3 °C), temperature source Oral, resp. rate 20, height 177.8 cm (70\"), weight 103 kg (227 lb 15.3 oz), SpO2 96 %.        Assessment/Plan    Will start patient on 3.375gm q8h. Pharmacy will discontinue the Pharmacy to Dose place card kenny at this time.  Renal function will continue to be monitored and dosing adjustments will be made by pharmacy based on renal function if necessary.         Jonathon Gomez, [facility]    "

## 2020-01-02 NOTE — PROGRESS NOTES
Since seen and examined, discussed with the patient, notes reviewed, films reviewed  I really do not think he has pneumonia these are chronic changes on the x-ray, but respective patient is doing much better clinically and should be cleared for discharge home on antibiotic and prednisone.

## 2020-01-02 NOTE — NURSING NOTE
Call placed to on call physician Dr. Cruz to report patient arrival to unit from ED. Additionally, patient requested his home dose of Xanax to sleep, permission to utilize his home Trilogy inpatient, and prn Duonebs. Dr. Cruz verbalized understanding. Orders received per physician for scheduled Duonebs 4 times daily, Duoneb every 4 hours prn SOB, Xanax 1 mg po prn QHS, and ok for patient to utilize his home Trilogy for sleep. Order read back and verified for accuracy.

## 2020-01-02 NOTE — CONSULTS
[facility]    Reason for consult: pneumonia    HPI:  Mr. Mack is a 80yo WM with a history of chronic diastolic chf, obesity, DOMINGO and COPD. He reports she had sudden onset of sore throat about 2-3 days ago and he took some augmentin he had at home and it started to get better, then his shortness of breath got worse he says he is not having cough (but seems like he reported cough to other providers).   His symptoms became moderate, they were worse with exertion and talking.  He says he feels much better now and is asking if he can go home now. He has no chest pain or wheezing or chest tightness now.    Past Medical History:   Diagnosis Date   • Anemia    • Anxiety    • Atrial flutter (CMS/[facility])    • Chronic diastolic congestive heart failure (CMS/[facility]) 12/19/2018   • Chronic respiratory failure with hypoxia (CMS/[facility])    • COPD (chronic obstructive pulmonary disease) (CMS/[facility])    • Essential hypertension    • GERD (gastroesophageal reflux disease)    • H/O complete eye exam 06/2018   • History of pulmonary function tests 06/10/2014   • Hyperglycemia    • Hyperlipidemia    • Lung cancer (CMS/[facility]) 2012    Left   • MDS (myelodysplastic syndrome) (CMS/[facility])    • Nonrheumatic aortic valve stenosis     mild 5/2019   • Obesity    • Osteoarthritis, knee    • Paroxysmal atrial fibrillation (CMS/[facility])    • Prostate cancer (CMS/[facility]) 2000   • Seizures (CMS/[facility])    • Sleep apnea    • Thoracic aortic aneurysm without rupture (CMS/[facility])     s/p stenting of descending thoracic aneurysm; the ascending aorta measured 3.7cm in 2019   • Type 2 diabetes mellitus without complication, without long-term current use of insulin (CMS/[facility])      Social History     Socioeconomic History   • Marital status: Single     Spouse name: Not on file   • Number of children: Not on file   • Years of education: Not on file   • Highest education level: Not on file   Occupational History     Employer: RETIRED   Tobacco Use   • Smoking status:  Former Smoker     Packs/day: 1.00     Types: Cigarettes     Last attempt to quit: 4/15/2010     Years since quittin.7   • Smokeless tobacco: Never Used   Substance and Sexual Activity   • Alcohol use: No     Frequency: Never     Comment: No caffeine use   • Drug use: No   • Sexual activity: Defer     Family History   Problem Relation Age of Onset   • Cancer Mother    • COPD Mother    • Cancer Father         lung   • Colon cancer Father    • Cancer Other         colon   • Diabetes Other    • Emphysema Other    • Hypertension Other    • Kidney disease Other    • Lung cancer Other      MEDS: reviewed as per chart  ALL: erythromycin  ROS:  Constitutional: Positive for fever (subjective). Negative for activity change, appetite change and unexpected weight change.   HENT: Positive for sore throat. Negative for congestion.    Eyes: Negative.    Respiratory: Positive for cough (productive) and shortness of breath.    Cardiovascular: Negative for chest pain and leg swelling.   Gastrointestinal: Negative for abdominal pain, diarrhea and vomiting.   Endocrine: Negative.    Genitourinary: Negative for decreased urine volume and dysuria.   Musculoskeletal: Negative for neck pain.   Skin: Negative for rash and wound.   Allergic/Immunologic: Negative.    Neurological: Negative for weakness, numbness and headaches.   Hematological: Negative.    Psychiatric/Behavioral: Negative.    All other systems reviewed and are negative.    Vital Sign Min/Max for last 24 hours  Temp  Min: 97.4 °F (36.3 °C)  Max: 100.3 °F (37.9 °C)   BP  Min: 129/78  Max: 194/107   Pulse  Min: 83  Max: 132   Resp  Min: 20  Max: 31   SpO2  Min: 69 %  Max: 99 %   Flow (L/min)  Min: 5  Max: 8   Weight  Min: 99.7 kg (219 lb 12.8 oz)  Max: 103 kg (227 lb 15.3 oz)     GEN:   appears ill, obese, AxOx3  HEENT: PERRL, EOMI, no icterus, mmm, no jvd, trachea midline, neck supple, no palpable thyroid nodules  CHEST: decreased ae bilat, no wheezes, no crackles, no use of  accessory muscles  CV: tachy, regular, no m/g/r  ABD: soft, nt, nd +bs, no hepatosplenomegaly  EXT: no c/c/ trace edema  SKIN: no rashes, no xanthomas, nl turgor, warm, dry  LYMPH: no palpable cervical or supraclavicular lymphadenopathy  NEURO: CN 2-12 intact and symmetric bilaterally  PSYCH: nl affect, nl orientation, nl judgement, nl mood  MSK: no kyphoscoliosis, 5/5 strength ue and le bilaterally    Labs: 1/2/20: reviewed:  Wbc 6.42 (92% neutrophils)  hgb 8.6  plts 162  Lactate 2.1  1/1/20:  7.38/49/52  rpp negative  Glucose 180  Bun 30  Cr 1.37  Na 146  Bicarb 28  probnp 7026    CXR: graft/stent present in TAA, there is pulmonary vascular congestion the right hilum is faintly nodular in appearance, which looks fairly chronic    A/P:  1. Acute on chronic respiratory failure -- seems back to baseline now and he is requesting to go home.  2. Pneumonia -- he did have some low grade fever and he is immunocompromised.  He feels much better now and cxr not that impressive. rpp negative for atypicals, I think it would be ok for him to go home on po augmentin for 5 more days.  3. COpd with ae -- his wheezing is currently resolved. I will change him to po steroids, continue bronchodilators.  4. fiorella -- continue home pap  5. Obesity  6. Anemia  7. Myelodysplastic syndrome  8. Chronic diastolic chf    I am not opposed to him gong home on po steroid and antibiotic.  He should follow up with Dr. Alejandro in the office in 2 weeks.

## 2020-01-02 NOTE — OUTREACH NOTE
CHF Week 4 Survey      Responses   Facility patient discharged from?  [place]   Does the patient have one of the following disease processes/diagnoses(primary or secondary)?  CHF   Week 4 attempt successful?  No   Revoke  Readmitted          Lashon Ramirez RN

## 2020-01-02 NOTE — PLAN OF CARE
Problem: Patient Care Overview  Goal: Plan of Care Review  Outcome: Ongoing (interventions implemented as appropriate)  Flowsheets  Taken 1/2/2020 0530  Progress: no change  Taken 1/2/2020 0314  Plan of Care Reviewed With: patient       New admit from ED- admitted with sepsis 2/2 PNA, COPD and CHF exacerbation. Aox4. Tachypneic on arrival. VSS on 6LPM O2 via high flow NC. Patient's home Trilogy now in place per order, w/ 7LPM O2. Controlled Afib per bedside telemetry. Patient denies pain. No s/s distress observed. Plan- IV steroids, Duonebs, ABX. AM labs drawn- to be reviewed. Cardiology and Pulmonary consults called, and now pending this AM. Will cont to monitor.

## 2020-01-02 NOTE — NURSING NOTE
Patient advised of the need for sputum sample. Sterile collection receptacle placed at bedside. Patient is poor historian regarding his home medications, and his daughter was unsure of exact dosages. Call placed to [facility] pharmacy to verify patient's home medications and dosages.

## 2020-01-02 NOTE — ED NOTES
"\"  Nursing report ED to floor  Ankit Mack Sr.  79 y.o.  male    HPI (triage note):   Chief Complaint   Patient presents with   • Shortness of Breath       Admitting doctor:   Bull Cruz MD    Admitting diagnosis:   The primary encounter diagnosis was Pneumonia of right lower lobe due to infectious organism (CMS/HCC). Diagnoses of Hypoxemia and COPD with acute exacerbation (CMS/[facility]) were also pertinent to this visit.    Code status:   Current Code Status     Date Active Code Status Order ID Comments User Context       Prior          Allergies:   Erythromycin    Weight:       01/01/20  2306   Weight: 99.7 kg (219 lb 12.8 oz)       Most recent vitals:   Vitals:    01/01/20 2306 01/01/20 2335 01/02/20 0004 01/02/20 0027   BP:  166/76     Pulse:   91    Resp:       Temp:    99.2 °F (37.3 °C)   TempSrc:    Tympanic   SpO2:  (!) 87% 98%    Weight: 99.7 kg (219 lb 12.8 oz)      Height:           Active LDAs/IV Access:   Lines, Drains & Airways    Active LDAs     Name:   Placement date:   Placement time:   Site:   Days:    Peripheral IV 01/01/20 2242 Right Antecubital   01/01/20 2242    Antecubital   less than 1                Labs (abnormal labs have a star):   Labs Reviewed   COMPREHENSIVE METABOLIC PANEL - Abnormal; Notable for the following components:       Result Value    Glucose 180 (*)     BUN 30 (*)     Creatinine 1.37 (*)     Sodium 146 (*)     eGFR Non  Amer 50 (*)     All other components within normal limits    Narrative:     GFR Normal >60  Chronic Kidney Disease <60  Kidney Failure <15     PROTIME-INR - Abnormal; Notable for the following components:    Protime 15.8 (*)     INR 1.30 (*)     All other components within normal limits   URINALYSIS W/ MICROSCOPIC IF INDICATED (NO CULTURE) - Abnormal; Notable for the following components:    Blood, UA Small (1+) (*)     Protein, UA >=300 mg/dL (3+) (*)     All other components within normal limits   BNP (IN-HOUSE) - Abnormal; Notable for the " following components:    proBNP 7,026.0 (*)     All other components within normal limits    Narrative:     Among patients with dyspnea, NT-proBNP is highly sensitive for the detection of acute congestive heart failure. In addition NT-proBNP of <300 pg/ml effectively rules out acute congestive heart failure with 99% negative predictive value.     TROPONIN (IN-HOUSE) - Abnormal; Notable for the following components:    Troponin T 0.031 (*)     All other components within normal limits    Narrative:     Troponin T Reference Range:  <= 0.03 ng/mL-   Negative for AMI  >0.03 ng/mL-     Abnormal for myocardial necrosis.  Clinicians would have to utilize clinical acumen, EKG, Troponin and serial changes to determine if it is an Acute Myocardial Infarction or myocardial injury due to an underlying chronic condition.    LACTIC ACID, PLASMA - Abnormal; Notable for the following components:    Lactate 2.3 (*)     All other components within normal limits   CBC WITH AUTO DIFFERENTIAL - Abnormal; Notable for the following components:    RBC 2.63 (*)     Hemoglobin 9.5 (*)     Hematocrit 27.7 (*)     .3 (*)     MCH 36.1 (*)     RDW-SD 54.9 (*)     Lymphocyte % 12.7 (*)     All other components within normal limits   BLOOD GAS, ARTERIAL - Abnormal; Notable for the following components:    pCO2, Arterial 49.7 (*)     pO2, Arterial 52.8 (*)     HCO3, Arterial 30.0 (*)     Base Excess, Arterial 4.4 (*)     O2 Saturation Calculated 86.0 (*)     All other components within normal limits   URINALYSIS, MICROSCOPIC ONLY - Abnormal; Notable for the following components:    RBC, UA 13-20 (*)     All other components within normal limits   RESPIRATORY PANEL, PCR - Normal   PROCALCITONIN - Normal    Narrative:     As a Marker for Sepsis (Non-Neonates):   1. <0.5 ng/mL represents a low risk of severe sepsis and/or septic shock.  1. >2 ng/mL represents a high risk of severe sepsis and/or septic shock.    As a Marker for Lower Respiratory  "Tract Infections that require antibiotic therapy:  PCT on Admission     Antibiotic Therapy             6-12 Hrs later  > 0.5                Strongly Recommended            >0.25 - <0.5         Recommended  0.1 - 0.25           Discouraged                   Remeasure/reassess PCT  <0.1                 Strongly Discouraged          Remeasure/reassess PCT      As 28 day mortality risk marker: \"Change in Procalcitonin Result\" (> 80 % or <=80 %) if Day 0 (or Day 1) and Day 4 values are available. Refer to http://www.[email or web address] EdmondMyLifepct-calculator.com/   Change in PCT <=80 %   A decrease of PCT levels below or equal to 80 % defines a positive change in PCT test result representing a higher risk for 28-day all-cause mortality of patients diagnosed with severe sepsis or septic shock.  Change in PCT > 80 %   A decrease of PCT levels of more than 80 % defines a negative change in PCT result representing a lower risk for 28-day all-cause mortality of patients diagnosed with severe sepsis or septic shock.                 BLOOD CULTURE   BLOOD CULTURE   BLOOD GAS, ARTERIAL   LACTIC ACID REFLEX TIMER   CBC AND DIFFERENTIAL    Narrative:     The following orders were created for panel order CBC & Differential.  Procedure                               Abnormality         Status                     ---------                               -----------         ------                     CBC Auto Differential[[id number]]        Abnormal            Final result                 Please view results for these tests on the individual orders.       EKG:   ECG 12 Lead   Preliminary Result   HEART RATE= 97  bpm   RR Interval= 620  ms   WA Interval= 156  ms   P Horizontal Axis= -71  deg   P Front Axis= -54  deg   QRSD Interval= 125  ms   QT Interval= 397  ms   QRS Axis= -48  deg   T Wave Axis= 32  deg   - ABNORMAL ECG -   Ectopic atrial rhythm   RBBB and LAFB   Electronically Signed By:    Date and Time of Study: 2020-01-01 22:37:35          Meds given in " ED:   Medications   ipratropium-albuterol (DUO-NEB) nebulizer solution 3 mL (3 mL Nebulization Given 20)   methylPREDNISolone sodium succinate (SOLU-Medrol) injection 125 mg (125 mg Intravenous Given 20)   piperacillin-tazobactam (ZOSYN) 3.375 g in iso-osmotic dextrose 50 ml (premix) (3.375 g Intravenous New Bag 20)       Imaging results:  Xr Chest 1 View    Result Date: 2020  Vascular congestion. Similar findings were present on the prior exam.  This report was finalized on 2020 11:39 PM by Dr. Yvonne Barrios M.D.        Ambulatory status:   - x1 assist      Social issues:   Social History     Socioeconomic History   • Marital status: Single     Spouse name: Not on file   • Number of children: Not on file   • Years of education: Not on file   • Highest education level: Not on file   Occupational History     Employer: RETIRED   Tobacco Use   • Smoking status: Former Smoker     Packs/day: 1.00     Types: Cigarettes     Last attempt to quit: 4/15/2010     Years since quittin.7   • Smokeless tobacco: Never Used   Substance and Sexual Activity   • Alcohol use: No     Frequency: Never     Comment: No caffeine use   • Drug use: No   • Sexual activity: Polly Carmona RN  20 0037

## 2020-01-02 NOTE — ED NOTES
Pt's O2 sats reading 57% in triage, unable to speak in full sentences, very anxious     Soraya Patterson, RN  01/01/20 8639

## 2020-01-02 NOTE — H&P
HISTORY AND PHYSICAL   [facility]        Patient Identification:  Name: Ankit Mack Sr.  Age: 79 y.o.  Sex: male  :  1940  MRN: [id number]                     Primary Care Physician: Gopi Lagos MD    Chief Complaint: Cough and short of air    History of Present Illness:        The patient is a 79-year-old white male with history of anxiety, a flutter, chronic diastolic heart failure, chronic respiratory failure, COPD, hypertension, hyperlipidemia, myelodysplastic syndrome, paroxysmal atrial fib, prostate cancer, seizures, sleep apnea and type 2 diabetes who was admitted with history of increasing shortness of air with cough congestion and wheezing.  The patient was evaluated in the ER and appeared to have some degree of pneumonia on top of his chronic CHF.  He has had a low-grade fever.  He was started on some broad-spectrum IV antibiotics and cultures were obtained.  He is gotten some bronchodilator treatments and is admitted for further evaluation treatment of pneumonia on top of CHF.    Past Medical History:  Past Medical History:   Diagnosis Date   • Anemia    • Anxiety    • Atrial flutter (CMS/HCC)    • Chronic diastolic congestive heart failure (CMS/HCC) 2018   • Chronic respiratory failure with hypoxia (CMS/HCC)    • COPD (chronic obstructive pulmonary disease) (CMS/HCC)    • Essential hypertension    • GERD (gastroesophageal reflux disease)    • H/O complete eye exam 2018   • History of pulmonary function tests 06/10/2014   • Hyperglycemia    • Hyperlipidemia    • Lung cancer (CMS/HCC)     Left   • MDS (myelodysplastic syndrome) (CMS/HCC)    • Nonrheumatic aortic valve stenosis     mild 2019   • Obesity    • Osteoarthritis, knee    • Paroxysmal atrial fibrillation (CMS/HCC)    • Prostate cancer (CMS/HCC)    • Seizures (CMS/HCC)    • Sleep apnea    • Thoracic aortic aneurysm without rupture (CMS/HCC)     s/p stenting of descending thoracic aneurysm; the  ascending aorta measured 3.7cm in 2019   • Type 2 diabetes mellitus without complication, without long-term current use of insulin (CMS/[facility])      Past Surgical History:  Past Surgical History:   Procedure Laterality Date   • ABDOMINAL AORTIC ANEURYSM REPAIR  2010    Dr. Adarsh Dennis   • CARDIAC CATHETERIZATION Left 02/06/2004   • CATARACT EXTRACTION  10/2014    also 11/14   • COLONOSCOPY     • ENDOVASCULAR THORACIC ANEURYSM REPAIR     • JOINT REPLACEMENT Left 10/2010    knee; Dr. Wolf   • JOINT REPLACEMENT Right 09/2011    knee; Dr. Wolf   • LUNG LOBECTOMY Left 01/16/2012    LLL; Dr. Mendoza   • LUNG LOBECTOMY Left 2012   • PROSTATE SURGERY  2000    Dr. Naranjo   • VASCULAR SURGERY  05/2009    TEVAR of thoracic aortic aneurysm   • VASCULAR SURGERY  12/15/2003    Left carotid subclavian bypass graft, ligation of proximal left subclavian following carotid subclavian bypass. Right femoral sheath. Arch angiography with descending thoracic and abdominal aortography    • VASCULAR SURGERY  11/17/2003    Selective catheterization right vertebral artery, right subclavian artery, left subclavian artery, left vertebral artery.     • VASCULAR SURGERY  06/05/2002    Tracheostomy, bronchoscopy   • VASCULAR SURGERY  05/21/2002    Thoracic arteriogram and abdominal aortogram      Home Meds:  Medications Prior to Admission   Medication Sig Dispense Refill Last Dose   • albuterol (PROVENTIL HFA;VENTOLIN HFA) 108 (90 Base) MCG/ACT inhaler Inhale 2 puffs Every 4 (Four) Hours As Needed for Wheezing.   Taking   • albuterol (PROVENTIL) (2.5 MG/3ML) 0.083% nebulizer solution Take 2.5 mg by nebulization 4 (Four) Times a Day.   12/3/2019 at Unknown time   • ALPRAZolam (XANAX) 1 MG tablet Take 1 tablet by mouth At Night As Needed for Anxiety. 30 tablet 2 12/2/2019 at Unknown time   • apixaban (ELIQUIS) 5 MG tablet tablet Take 1 tablet by mouth Every 12 (Twelve) Hours. 180 tablet 2 12/3/2019 at Unknown time   • budesonide (PULMICORT) 0.5  MG/2ML nebulizer solution Take 2 mL by nebulization 2 (Two) Times a Day. 120 each 1 Taking   • dilTIAZem (CARDIZEM) 60 MG tablet TAKE 1 TABLET BY MOUTH TWICE A DAY 60 tablet 2    • folic acid (FOLVITE) 1 MG tablet Take 1 mg by mouth Daily.   Taking   • furosemide (LASIX) 40 MG tablet Take 1 tablet by mouth 2 (Two) times a day. Take twice daily for one week and then daily. (Patient taking differently: Take 40 mg by mouth Daily.) 90 tablet 1    • metFORMIN (GLUCOPHAGE) 1000 MG tablet Take 1 tablet by mouth Daily With Breakfast. (Patient taking differently: Take 1,000 mg by mouth Every Night.) 90 tablet 1 Taking   • metoprolol tartrate (LOPRESSOR) 100 MG tablet TAKE ONE AND ONE - HALF TABLETS BY MOUTH TWICE DAILY 270 tablet 1 Taking   • atorvastatin (LIPITOR) 10 MG tablet TAKE 1 TABLET BY MOUTH DAILY FOR CHOLESTEROL 90 tablet 1    • benazepril (LOTENSIN) 20 MG tablet Take 20 mg by mouth Daily.   Taking   • ipratropium (ATROVENT) 0.02 % nebulizer solution Take 500 mcg by nebulization Every 4 (Four) Hours As Needed for Wheezing or Shortness of Air.      • Revefenacin (YUPELRI) 175 MCG/3ML solution Inhale 3 mL (1 vial) by nebulization Daily. 90 mL 1 Taking     Current meds    Current Facility-Administered Medications:   •  acetaminophen (TYLENOL) tablet 650 mg, 650 mg, Oral, Q4H PRN **OR** acetaminophen (TYLENOL) 160 MG/5ML solution 650 mg, 650 mg, Oral, Q4H PRN **OR** acetaminophen (TYLENOL) suppository 650 mg, 650 mg, Rectal, Q4H PRN, Bull Cruz MD  •  ALPRAZolam (XANAX) tablet 1 mg, 1 mg, Oral, Nightly PRN, Bull Cruz MD, 1 mg at 01/02/20 0329  •  ALPRAZolam (XANAX) tablet 1 mg, 1 mg, Oral, Nightly PRN, Bull Cruz MD  •  apixaban (ELIQUIS) tablet 5 mg, 5 mg, Oral, Q12H, Bull Cruz MD  •  atorvastatin (LIPITOR) tablet 10 mg, 10 mg, Oral, Daily, Cruz, Bull E, MD  •  budesonide (PULMICORT) nebulizer solution 0.5 mg, 0.5 mg, Nebulization, BID - RT, Bull Cruz MD  •  dextrose (D50W) 25 g/ 50mL  Intravenous Solution 25 g, 25 g, Intravenous, Q15 Min PRN, Bull Cruz MD  •  dextrose (GLUTOSE) oral gel 15 g, 15 g, Oral, Q15 Min PRN, Bull Cruz MD  •  dilTIAZem (CARDIZEM) tablet 60 mg, 60 mg, Oral, BID, Bull Cruz MD  •  folic acid (FOLVITE) tablet 1 mg, 1 mg, Oral, Daily, Bull Cruz MD  •  furosemide (LASIX) tablet 40 mg, 40 mg, Oral, BID, Bull Cruz MD  •  glucagon (human recombinant) (GLUCAGEN DIAGNOSTIC) injection 1 mg, 1 mg, Subcutaneous, Q15 Min PRN, Bull Cruz MD  •  insulin lispro (humaLOG) injection 0-7 Units, 0-7 Units, Subcutaneous, 4x Daily With Meals & Nightly, Bull Cruz MD  •  ipratropium-albuterol (DUO-NEB) nebulizer solution 3 mL, 3 mL, Nebulization, 4x Daily - RT, Bull Cruz MD  •  ipratropium-albuterol (DUO-NEB) nebulizer solution 3 mL, 3 mL, Nebulization, Q4H PRN, Bull Cruz MD, 3 mL at 01/02/20 0314  •  metFORMIN (GLUCOPHAGE) tablet 1,000 mg, 1,000 mg, Oral, Daily With Breakfast, Bull Cruz MD  •  methylPREDNISolone sodium succinate (SOLU-Medrol) injection 60 mg, 60 mg, Intravenous, Q8H, Bull Cruz MD  •  metoprolol tartrate (LOPRESSOR) tablet 100 mg, 100 mg, Oral, Q12H, Bull Cruz MD  •  ondansetron (ZOFRAN) tablet 4 mg, 4 mg, Oral, Q6H PRN **OR** ondansetron (ZOFRAN) injection 4 mg, 4 mg, Intravenous, Q6H PRN, Bull Cruz MD  •  piperacillin-tazobactam (ZOSYN) 3.375 g in iso-osmotic dextrose 50 ml (premix), 3.375 g, Intravenous, Q8H, Bull Cruz MD  •  sodium chloride 0.9 % flush 10 mL, 10 mL, Intravenous, Q12H, Bull Cruz MD  •  sodium chloride 0.9 % flush 10 mL, 10 mL, Intravenous, PRN, Bull Cruz MD  Allergies:  Allergies   Allergen Reactions   • Erythromycin Itching     Immunizations:  Immunization History   Administered Date(s) Administered   • FLUAD TRI 65YR+ 10/14/2019   • Flu Vaccine Quad PF 6-35MO 10/06/2017   • Flu Vaccine Quad PF >36MO 10/06/2017   • Fluzone High Dose =>65 Years ([employer] ONLY) 10/26/2016, 10/19/2018        Social History:   Social History     Social History Narrative   • Not on file     Social History     Socioeconomic History   • Marital status: Single     Spouse name: Not on file   • Number of children: Not on file   • Years of education: Not on file   • Highest education level: Not on file   Occupational History     Employer: RETIRED   Tobacco Use   • Smoking status: Former Smoker     Packs/day: 1.00     Types: Cigarettes     Last attempt to quit: 4/15/2010     Years since quittin.7   • Smokeless tobacco: Never Used   Substance and Sexual Activity   • Alcohol use: No     Frequency: Never     Comment: No caffeine use   • Drug use: No   • Sexual activity: Defer       Family History:  Family History   Problem Relation Age of Onset   • Cancer Mother    • COPD Mother    • Cancer Father         lung   • Colon cancer Father    • Cancer Other         colon   • Diabetes Other    • Emphysema Other    • Hypertension Other    • Kidney disease Other    • Lung cancer Other         Review of Systems  See history of present illness and past medical history.  Patient denies headache, dizziness, syncope, falls, trauma, change in vision, change in hearing, change in taste, changes in weight, changes in appetite, focal weakness, numbness, or paresthesia.  Patient denies chest pain, palpitations,   orthopnea, PND,  sinus pressure, rhinorrhea, epistaxis, hemoptysis, nausea, vomiting, hematemesis, diarrhea, constipation or hematchezia.  Denies cold or heat intolerance, polydipsia, polyuria, polyphagia. Denies hematuria, pyuria, dysuria, hesitancy, frequency or urgency.  Denies fever, chills, sweats, night sweats.  Denies missing any routine medications. Remainder of ROS is negative.    Objective:  tMax 24 hrs: Temp (24hrs), Av °F (37.2 °C), Min:97.4 °F (36.3 °C), Max:100.3 °F (37.9 °C)    Vitals Ranges:   Temp:  [97.4 °F (36.3 °C)-100.3 °F (37.9 °C)] 97.4 °F (36.3 °C)  Heart Rate:  [] 83  Resp:  [20-31] 20  BP:  "(129-194)/() 178/92      Exam:  /92 (BP Location: Right arm, Patient Position: Lying)   Pulse 83   Temp 97.4 °F (36.3 °C) (Oral)   Resp 20   Ht 177.8 cm (70\")   Wt 103 kg (227 lb 15.3 oz)   SpO2 96%   BMI 32.71 kg/m²     General Appearance:    Alert, cooperative, no distress, appears stated age   Head:    Normocephalic, without obvious abnormality, atraumatic   Eyes:    PERRL, conjunctiva/corneas clear, EOM's intact, both eyes   Ears:    Normal external ear canals, both ears   Nose:   Nares normal, septum midline, mucosa normal, no drainage    or sinus tenderness   Throat:   Lips, mucosa, and tongue normal   Neck:   Supple, symmetrical, trachea midline, no adenopathy;     thyroid:  no enlargement/tenderness/nodules; no carotid    bruit or JVD   Back:     Symmetric, no curvature, ROM normal, no CVA tenderness   Lungs:     Clear to auscultation bilaterally, respirations unlabored   Chest Wall:    No tenderness or deformity    Heart:    Regular rate and rhythm, S1 and S2 normal, no murmur, rub   or gallop   Abdomen:     Soft, non-tender, bowel sounds active all four quadrants,     no masses, no hepatomegaly, no splenomegaly   Extremities:   Extremities normal, atraumatic, no cyanosis or edema   Pulses:   2+ and symmetric all extremities   Skin:   Skin color, texture, turgor normal, no rashes or lesions   Lymph nodes:   Cervical, supraclavicular, and axillary nodes normal   Neurologic:   CNII-XII intact, normal strength, sensation intact throughout      .    Data Review:  Lab Results (last 72 hours)     Procedure Component Value Units Date/Time    Hemoglobin A1c [[id number]] Collected:  01/01/20 2245    Specimen:  Blood Updated:  01/02/20 0444    Lactic Acid, Reflex [[id number]]  (Abnormal) Collected:  01/02/20 0311    Specimen:  Blood Updated:  01/02/20 0352     Lactate 2.1 mmol/L     Lactic Acid, Reflex Timer (This will reflex a repeat order 3-3:15 hours after ordered.) [[id number]] Collected:  " 01/01/20 2245    Specimen:  Blood Updated:  01/02/20 0230     Extra Tube Hold for add-ons.     Comment: Auto resulted.       Respiratory Panel, PCR - Swab, Nasopharynx [[id number]]  (Normal) Collected:  01/01/20 2246    Specimen:  Swab from Nasopharynx Updated:  01/02/20 0013     ADENOVIRUS, PCR Not Detected     Coronavirus 229E Not Detected     Coronavirus HKU1 Not Detected     Coronavirus NL63 Not Detected     Coronavirus OC43 Not Detected     Human Metapneumovirus Not Detected     Human Rhinovirus/Enterovirus Not Detected     Influenza B PCR Not Detected     Parainfluenza Virus 1 Not Detected     Parainfluenza Virus 2 Not Detected     Parainfluenza Virus 3 Not Detected     Parainfluenza Virus 4 Not Detected     Bordetella pertussis pcr Not Detected     Influenza A H1 2009 PCR Not Detected     Chlamydophila pneumoniae PCR Not Detected     Mycoplasma pneumo by PCR Not Detected     Influenza A PCR Not Detected     Influenza A H3 Not Detected     Influenza A H1 Not Detected     RSV, PCR Not Detected     Bordetella parapertussis PCR Not Detected    Urinalysis With Microscopic If Indicated (No Culture) - Urine, Clean Catch [[id number]]  (Abnormal) Collected:  01/01/20 2357    Specimen:  Urine, Clean Catch Updated:  01/02/20 0010     Color, UA Yellow     Appearance, UA Clear     pH, UA 7.0     Specific Gravity, UA 1.019     Glucose, UA Negative     Ketones, UA Negative     Bilirubin, UA Negative     Blood, UA Small (1+)     Protein, UA >=300 mg/dL (3+)     Leuk Esterase, UA Negative     Nitrite, UA Negative     Urobilinogen, UA 1.0 E.U./dL    Urinalysis, Microscopic Only - Urine, Clean Catch [[id number]]  (Abnormal) Collected:  01/01/20 2357    Specimen:  Urine, Clean Catch Updated:  01/02/20 0010     RBC, UA 13-20 /HPF      WBC, UA 0-2 /HPF      Bacteria, UA None Seen /HPF      Squamous Epithelial Cells, UA 0-2 /HPF      Hyaline Casts, UA 0-2 /LPF      Methodology Automated Microscopy    Blood Culture - Blood, Arm, Right  "[[id number]] Collected:  01/01/20 2349    Specimen:  Blood from Arm, Right Updated:  01/01/20 2353    Procalcitonin [[id number]]  (Normal) Collected:  01/01/20 2245    Specimen:  Blood Updated:  01/01/20 2338     Procalcitonin 0.18 ng/mL     Narrative:       As a Marker for Sepsis (Non-Neonates):   1. <0.5 ng/mL represents a low risk of severe sepsis and/or septic shock.  1. >2 ng/mL represents a high risk of severe sepsis and/or septic shock.    As a Marker for Lower Respiratory Tract Infections that require antibiotic therapy:  PCT on Admission     Antibiotic Therapy             6-12 Hrs later  > 0.5                Strongly Recommended            >0.25 - <0.5         Recommended  0.1 - 0.25           Discouraged                   Remeasure/reassess PCT  <0.1                 Strongly Discouraged          Remeasure/reassess PCT      As 28 day mortality risk marker: \"Change in Procalcitonin Result\" (> 80 % or <=80 %) if Day 0 (or Day 1) and Day 4 values are available. Refer to http://[email or web address]-[email or web address]/   Change in PCT <=80 %   A decrease of PCT levels below or equal to 80 % defines a positive change in PCT test result representing a higher risk for 28-day all-cause mortality of patients diagnosed with severe sepsis or septic shock.  Change in PCT > 80 %   A decrease of PCT levels of more than 80 % defines a negative change in PCT result representing a lower risk for 28-day all-cause mortality of patients diagnosed with severe sepsis or septic shock.                  Troponin [[id number]]  (Abnormal) Collected:  01/01/20 2245    Specimen:  Blood Updated:  01/01/20 2332     Troponin T 0.031 ng/mL     Narrative:       Troponin T Reference Range:  <= 0.03 ng/mL-   Negative for AMI  >0.03 ng/mL-     Abnormal for myocardial necrosis.  Clinicians would have to utilize clinical acumen, EKG, Troponin and serial changes to determine if it is an Acute Myocardial Infarction or myocardial injury due to an underlying " chronic condition.     Comprehensive Metabolic Panel [[id number]]  (Abnormal) Collected:  01/01/20 2245    Specimen:  Blood Updated:  01/01/20 2331     Glucose 180 mg/dL      BUN 30 mg/dL      Creatinine 1.37 mg/dL      Sodium 146 mmol/L      Potassium 4.3 mmol/L      Chloride 103 mmol/L      CO2 28.2 mmol/L      Calcium 9.0 mg/dL      Total Protein 7.6 g/dL      Albumin 4.70 g/dL      ALT (SGPT) 9 U/L      AST (SGOT) 14 U/L      Alkaline Phosphatase 80 U/L      Total Bilirubin 0.7 mg/dL      eGFR Non African Amer 50 mL/min/1.73      Globulin 2.9 gm/dL      A/G Ratio 1.6 g/dL      BUN/Creatinine Ratio 21.9     Anion Gap 14.8 mmol/L     Narrative:       GFR Normal >60  Chronic Kidney Disease <60  Kidney Failure <15      BNP [[id number]]  (Abnormal) Collected:  01/01/20 2245    Specimen:  Blood Updated:  01/01/20 2328     proBNP 7,026.0 pg/mL     Narrative:       Among patients with dyspnea, NT-proBNP is highly sensitive for the detection of acute congestive heart failure. In addition NT-proBNP of <300 pg/ml effectively rules out acute congestive heart failure with 99% negative predictive value.      Lactic Acid, Plasma [[id number]]  (Abnormal) Collected:  01/01/20 2245    Specimen:  Blood Updated:  01/01/20 2315     Lactate 2.3 mmol/L     Protime-INR [[id number]]  (Abnormal) Collected:  01/01/20 2245    Specimen:  Blood Updated:  01/01/20 2313     Protime 15.8 Seconds      INR 1.30    CBC & Differential [[id number]] Collected:  01/01/20 2245    Specimen:  Blood Updated:  01/01/20 2308    Narrative:       The following orders were created for panel order CBC & Differential.  Procedure                               Abnormality         Status                     ---------                               -----------         ------                     CBC Auto Differential[[id number]]        Abnormal            Final result                 Please view results for these tests on the individual orders.    CBC Auto Differential  [[id number]]  (Abnormal) Collected:  01/01/20 2245    Specimen:  Blood Updated:  01/01/20 2308     WBC 7.15 10*3/mm3      RBC 2.63 10*6/mm3      Hemoglobin 9.5 g/dL      Hematocrit 27.7 %      .3 fL      MCH 36.1 pg      MCHC 34.3 g/dL      RDW 14.5 %      RDW-SD 54.9 fl      MPV 11.4 fL      Platelets 186 10*3/mm3      Neutrophil % 68.2 %      Lymphocyte % 12.7 %      Monocyte % 11.6 %      Eosinophil % 4.2 %      Basophil % 0.6 %      Neutrophils, Absolute 4.88 10*3/mm3      Lymphocytes, Absolute 0.91 10*3/mm3      Monocytes, Absolute 0.83 10*3/mm3      Eosinophils, Absolute 0.30 10*3/mm3      Basophils, Absolute 0.04 10*3/mm3      Differential Type Auto     WBC 7.15 10*3/mm3     Blood Gas, Arterial [[id number]]  (Abnormal) Collected:  01/01/20 2255    Specimen:  Arterial Blood Updated:  01/01/20 2259     Site Arterial: left radial     Tim's Test Positive     pH, Arterial 7.388 pH units      pCO2, Arterial 49.7 mm Hg      pO2, Arterial 52.8 mm Hg      Comment: Critical:Notify Dr corona (01-Jan-20 22:58:05)Read back ok        HCO3, Arterial 30.0 mmol/L      Base Excess, Arterial 4.4 mmol/L      O2 Saturation Calculated 86.0 %      Barometric Pressure for Blood Gas 744.6 mmHg      Modality Cannula     Flow Rate 6 lpm      Rate 24 Breaths/minute     Blood Culture - Blood, Arm, Left [[id number]] Collected:  01/01/20 2245    Specimen:  Blood from Arm, Left Updated:  01/01/20 2251                   Imaging Results (All)     Procedure Component Value Units Date/Time    XR Chest 1 View [[id number]] Collected:  01/01/20 2338     Updated:  01/01/20 2342    Narrative:       PORTABLE CHEST RADIOGRAPH     HISTORY: Severe weakness     COMPARISON: 12/03/2019     FINDINGS:  Cardiomegaly is identified. There does appear to be vascular congestion.  No pneumothorax is seen. There is bibasilar scarring. Patient has a  thoracic aortic stent graft. Cardiac silhouette is stable. Postsurgical  changes are seen within the left  supraclavicular region. There may be a  trace left pleural effusion.       Impression:       Vascular congestion. Similar findings were present on the prior exam.     This report was finalized on 1/1/2020 11:39 PM by Dr. Yvonne Barrios M.D.           Past Medical History:   Diagnosis Date   • Anemia    • Anxiety    • Atrial flutter (CMS/HCC)    • Chronic diastolic congestive heart failure (CMS/HCC) 12/19/2018   • Chronic respiratory failure with hypoxia (CMS/HCC)    • COPD (chronic obstructive pulmonary disease) (CMS/[facility])    • Essential hypertension    • GERD (gastroesophageal reflux disease)    • H/O complete eye exam 06/2018   • History of pulmonary function tests 06/10/2014   • Hyperglycemia    • Hyperlipidemia    • Lung cancer (CMS/[facility]) 2012    Left   • MDS (myelodysplastic syndrome) (CMS/[facility])    • Nonrheumatic aortic valve stenosis     mild 5/2019   • Obesity    • Osteoarthritis, knee    • Paroxysmal atrial fibrillation (CMS/[facility])    • Prostate cancer (CMS/[facility]) 2000   • Seizures (CMS/[facility])    • Sleep apnea    • Thoracic aortic aneurysm without rupture (CMS/[facility])     s/p stenting of descending thoracic aneurysm; the ascending aorta measured 3.7cm in 2019   • Type 2 diabetes mellitus without complication, without long-term current use of insulin (CMS/[facility])        Assessment:  Active Hospital Problems    Diagnosis  POA   • **Pneumonia of right lower lobe due to infectious organism (CMS/[facility]) [J18.1]  Yes   • Hypoxemia [R09.02]  Unknown   • Acute on chronic diastolic (congestive) heart failure (CMS/HCC) [I50.33]  Yes   • Myelodysplasia (myelodysplastic syndrome) (CMS/[facility]) [D46.9]  Yes   • History of COPD [Z87.09]  Not Applicable   • Type 2 diabetes mellitus without complication, without long-term current use of insulin (CMS/[facility]) [E11.9]  Yes   • Paroxysmal atrial fibrillation (CMS/[facility]) [I48.0]  Yes   • DOMINGO on CPAP [G47.33, Z99.89]  Not Applicable   • Cancer of lower lobe of lung (CMS/[facility]) [C34.30]  Yes   • Anxiety  [F41.9]  Yes   • Hyperlipidemia [E78.5]  Yes   • Essential hypertension [I10]  Yes      Resolved Hospital Problems   No resolved problems to display.       Plan:  The patient admitted to the hospital and will continue with bronchodilators, antibiotics, diuretics and oxygen.  We will also continue with some steroids.  We will get some follow-up lab studies and await results of cultures.  He will continue with his trilogy machine which he uses at night.  Will consult pulmonary medicine for further evaluation treatment.  We will also ask for cardiology consult.    Bull Cruz MD  1/2/2020  4:46 AM

## 2020-01-02 NOTE — ED PROVIDER NOTES
EMERGENCY DEPARTMENT ENCOUNTER    CHIEF COMPLAINT  Chief Complaint: SOA  History given by: self  History limited by: nothing  Room Number: N529/1  PMD: Gopi Lagos MD      HPI:  Pt is a 79 y.o. male who presents complaining of SOA that started yesterday. Pt also c/o subjective fever, productive cough and sore throat. Pt denies abnormal weight changes, BLE edema and CP. The pt has a hx of COPD and malignancy neoplasm of left lower lobe and started the first dose of Amoxicillin yesterday as he had a sore throat and subjective fever with SOA. He felt some improvements upon waking this morning, however as the day progressed, his SOA worsened and he developed a productive cough. Due to worsening sx's, he decided to come to the ED for further evaluation. Per triage note, the pt's O2 Sat reading was 57% and he was unable to speak in full sentences. The pt is UTD on the influenza vaccine however is not UTD on the pneumococcal vaccine. Hx of CAD, GERD, lung cancer, DM, Atrial flutter and MDS     Duration:  Yesterday  Onset: gradual  Timing: constant  Location: chest  Radiation: none stated  Quality: SOA  Intensity/Severity: moderate  Progression: worsening  Associated Symptoms: subjective fever, productive cough and sore throat  Aggravating Factors: none stated   Alleviating Factors: none stated  Previous Episodes: Pt has a hx of COPD  Treatment before arrival: none stated    PAST MEDICAL HISTORY  Active Ambulatory Problems     Diagnosis Date Noted   • Anxiety    • Hyperlipidemia    • Osteoarthritis, knee    • Essential hypertension 12/13/2012   • Cancer of lower lobe of lung (CMS/HCC) 05/16/2017   • DOMINGO on CPAP 05/22/2017   • Non-seasonal allergic rhinitis 11/04/2018   • Pancytopenia (CMS/HCC) 02/06/2018   • Aneurysm of thoracic aorta (CMS/HCC) 12/13/2012   • Paroxysmal atrial fibrillation (CMS/HCC) 11/17/2018   • Thrombocytopenia (CMS/HCC) 11/17/2018   • Type 2 diabetes mellitus without complication, without long-term  current use of insulin (CMS/[facility]) 12/04/2018   • Chronic diastolic congestive heart failure (CMS/[facility]) 12/19/2018   • History of COPD 03/13/2019   • Macrocytic anemia 06/18/2019   • Hypogammaglobulinemia (CMS/[facility]) 06/25/2019   • Myelodysplasia (myelodysplastic syndrome) (CMS/[facility]) 07/09/2019   • Class 1 obesity due to excess calories with serious comorbidity and body mass index (BMI) of 33.0 to 33.9 in adult 08/26/2019   • Atrial flutter (CMS/[facility]) 08/26/2019   • Nonrheumatic aortic valve stenosis 08/26/2019   • Acute on chronic diastolic (congestive) heart failure (CMS/[facility]) 12/03/2019     Resolved Ambulatory Problems     Diagnosis Date Noted   • Abrasion 03/04/2017   • Chronic obstructive pulmonary disease with acute exacerbation (CMS/[facility]) 12/17/2013   • 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[facility]) 10/04/2016   • COPD (chronic obstructive pulmonary disease) with acute bronchitis (CMS/[facility]) 05/27/2018   • Acute congestive heart failure (CMS/[facility]) 11/04/2018   • RSV (acute bronchiolitis due to respiratory syncytial virus) 11/06/2018   • Hemoptysis 11/17/2018   • Acute on chronic respiratory failure with hypoxemia (CMS/[facility]) 11/17/2018   • Pneumonia due to infectious organism 11/17/2018   • Hyperglycemia 11/17/2018   • Respiratory failure (CMS/[facility]) 05/25/2019   • Elevated troponin 05/25/2019     Past Medical History:   Diagnosis Date   • Anemia    • Chronic respiratory failure with hypoxia (CMS/[facility])    • COPD (chronic obstructive pulmonary disease) (CMS/[facility])    • GERD (gastroesophageal reflux disease)    • H/O complete eye exam 06/2018   • History of pulmonary function tests 06/10/2014   • Lung cancer (CMS/[facility]) 2012   • MDS (myelodysplastic syndrome) (CMS/[facility])    • Obesity    • Prostate cancer (CMS/[facility]) 2000   • Seizures (CMS/[facility])    • Sleep apnea    • Thoracic aortic aneurysm without rupture (CMS/[facility])        PAST SURGICAL HISTORY  Past Surgical History:   Procedure Laterality Date   • ABDOMINAL AORTIC ANEURYSM REPAIR       Dr. Adarsh Dennis   • CARDIAC CATHETERIZATION Left 2004   • CATARACT EXTRACTION  10/2014    also    • COLONOSCOPY     • ENDOVASCULAR THORACIC ANEURYSM REPAIR     • JOINT REPLACEMENT Left 10/2010    knee; Dr. Wolf   • JOINT REPLACEMENT Right 2011    knee; Dr. Wolf   • LUNG LOBECTOMY Left 2012    LLL; Dr. Mendoza   • LUNG LOBECTOMY Left    • PROSTATE SURGERY      Dr. Naranjo   • VASCULAR SURGERY  2009    TEVAR of thoracic aortic aneurysm   • VASCULAR SURGERY  12/15/2003    Left carotid subclavian bypass graft, ligation of proximal left subclavian following carotid subclavian bypass. Right femoral sheath. Arch angiography with descending thoracic and abdominal aortography    • VASCULAR SURGERY  2003    Selective catheterization right vertebral artery, right subclavian artery, left subclavian artery, left vertebral artery.     • VASCULAR SURGERY  2002    Tracheostomy, bronchoscopy   • VASCULAR SURGERY  2002    Thoracic arteriogram and abdominal aortogram       FAMILY HISTORY  Family History   Problem Relation Age of Onset   • Cancer Mother    • COPD Mother    • Cancer Father         lung   • Colon cancer Father    • Cancer Other         colon   • Diabetes Other    • Emphysema Other    • Hypertension Other    • Kidney disease Other    • Lung cancer Other        SOCIAL HISTORY  Social History     Socioeconomic History   • Marital status: Single     Spouse name: Not on file   • Number of children: Not on file   • Years of education: Not on file   • Highest education level: Not on file   Occupational History     Employer: RETIRED   Tobacco Use   • Smoking status: Former Smoker     Packs/day: 1.00     Types: Cigarettes     Last attempt to quit: 4/15/2010     Years since quittin.7   • Smokeless tobacco: Never Used   Substance and Sexual Activity   • Alcohol use: No     Frequency: Never     Comment: No caffeine use   • Drug use: No   • Sexual activity: Defer        ALLERGIES  Erythromycin    REVIEW OF SYSTEMS  Review of Systems   Constitutional: Positive for fever (subjective). Negative for activity change, appetite change and unexpected weight change.   HENT: Positive for sore throat. Negative for congestion.    Eyes: Negative.    Respiratory: Positive for cough (productive) and shortness of breath.    Cardiovascular: Negative for chest pain and leg swelling.   Gastrointestinal: Negative for abdominal pain, diarrhea and vomiting.   Endocrine: Negative.    Genitourinary: Negative for decreased urine volume and dysuria.   Musculoskeletal: Negative for neck pain.   Skin: Negative for rash and wound.   Allergic/Immunologic: Negative.    Neurological: Negative for weakness, numbness and headaches.   Hematological: Negative.    Psychiatric/Behavioral: Negative.    All other systems reviewed and are negative.      PHYSICAL EXAM  ED Triage Vitals   Temp Heart Rate Resp BP SpO2   01/01/20 2234 01/01/20 2233 01/01/20 2234 -- 01/01/20 2230   100.3 °F (37.9 °C) 103 (!) 31  (!) 69 %      Temp src Heart Rate Source Patient Position BP Location FiO2 (%)   01/01/20 2234 01/01/20 2251 -- -- --   Tympanic Monitor          Physical Exam   Constitutional: He is oriented to person, place, and time. No distress.   HENT:   Head: Normocephalic and atraumatic.   Eyes: Pupils are equal, round, and reactive to light. EOM are normal.   Neck: Normal range of motion. Neck supple.   Cardiovascular: Regular rhythm and normal heart sounds. Tachycardia present.   Tachycardic with occasional irregularities    Pulmonary/Chest: He is in respiratory distress (moderate dyspnea). He has decreased breath sounds. He has no wheezes. He has rhonchi (mild). He has no rales.   Abdominal: Soft. There is no tenderness. There is no rebound and no guarding.   Musculoskeletal: Normal range of motion. He exhibits no edema.        Right lower leg: He exhibits no tenderness and no edema.        Left lower leg: He exhibits  no tenderness and no edema.   Neurological: He is alert and oriented to person, place, and time. He has normal sensation and normal strength.   Skin: Skin is warm and dry.   Psychiatric: Mood and affect normal.   Nursing note and vitals reviewed.      LAB RESULTS  Lab Results (last 24 hours)     Procedure Component Value Units Date/Time    CBC & Differential [[id number]] Collected:  01/01/20 2245    Specimen:  Blood Updated:  01/01/20 2308    Narrative:       The following orders were created for panel order CBC & Differential.  Procedure                               Abnormality         Status                     ---------                               -----------         ------                     CBC Auto Differential[[id number]]        Abnormal            Final result                 Please view results for these tests on the individual orders.    Comprehensive Metabolic Panel [[id number]]  (Abnormal) Collected:  01/01/20 2245    Specimen:  Blood Updated:  01/01/20 2331     Glucose 180 mg/dL      BUN 30 mg/dL      Creatinine 1.37 mg/dL      Sodium 146 mmol/L      Potassium 4.3 mmol/L      Chloride 103 mmol/L      CO2 28.2 mmol/L      Calcium 9.0 mg/dL      Total Protein 7.6 g/dL      Albumin 4.70 g/dL      ALT (SGPT) 9 U/L      AST (SGOT) 14 U/L      Alkaline Phosphatase 80 U/L      Total Bilirubin 0.7 mg/dL      eGFR Non African Amer 50 mL/min/1.73      Globulin 2.9 gm/dL      A/G Ratio 1.6 g/dL      BUN/Creatinine Ratio 21.9     Anion Gap 14.8 mmol/L     Narrative:       GFR Normal >60  Chronic Kidney Disease <60  Kidney Failure <15      Protime-INR [[id number]]  (Abnormal) Collected:  01/01/20 2245    Specimen:  Blood Updated:  01/01/20 2313     Protime 15.8 Seconds      INR 1.30    BNP [[id number]]  (Abnormal) Collected:  01/01/20 2245    Specimen:  Blood Updated:  01/01/20 2328     proBNP 7,026.0 pg/mL     Narrative:       Among patients with dyspnea, NT-proBNP is highly sensitive for the detection of acute  "congestive heart failure. In addition NT-proBNP of <300 pg/ml effectively rules out acute congestive heart failure with 99% negative predictive value.      Troponin [[id number]]  (Abnormal) Collected:  01/01/20 2245    Specimen:  Blood Updated:  01/01/20 2332     Troponin T 0.031 ng/mL     Narrative:       Troponin T Reference Range:  <= 0.03 ng/mL-   Negative for AMI  >0.03 ng/mL-     Abnormal for myocardial necrosis.  Clinicians would have to utilize clinical acumen, EKG, Troponin and serial changes to determine if it is an Acute Myocardial Infarction or myocardial injury due to an underlying chronic condition.     Blood Culture - Blood, Arm, Left [[id number]] Collected:  01/01/20 2245    Specimen:  Blood from Arm, Left Updated:  01/01/20 2251    Lactic Acid, Plasma [[id number]]  (Abnormal) Collected:  01/01/20 2245    Specimen:  Blood Updated:  01/01/20 2315     Lactate 2.3 mmol/L     Procalcitonin [[id number]]  (Normal) Collected:  01/01/20 2245    Specimen:  Blood Updated:  01/01/20 2338     Procalcitonin 0.18 ng/mL     Narrative:       As a Marker for Sepsis (Non-Neonates):   1. <0.5 ng/mL represents a low risk of severe sepsis and/or septic shock.  1. >2 ng/mL represents a high risk of severe sepsis and/or septic shock.    As a Marker for Lower Respiratory Tract Infections that require antibiotic therapy:  PCT on Admission     Antibiotic Therapy             6-12 Hrs later  > 0.5                Strongly Recommended            >0.25 - <0.5         Recommended  0.1 - 0.25           Discouraged                   Remeasure/reassess PCT  <0.1                 Strongly Discouraged          Remeasure/reassess PCT      As 28 day mortality risk marker: \"Change in Procalcitonin Result\" (> 80 % or <=80 %) if Day 0 (or Day 1) and Day 4 values are available. Refer to http://www.[employer]-pct-calculator.com/   Change in PCT <=80 %   A decrease of PCT levels below or equal to 80 % defines a positive change in PCT test result " representing a higher risk for 28-day all-cause mortality of patients diagnosed with severe sepsis or septic shock.  Change in PCT > 80 %   A decrease of PCT levels of more than 80 % defines a negative change in PCT result representing a lower risk for 28-day all-cause mortality of patients diagnosed with severe sepsis or septic shock.                  CBC Auto Differential [[id number]]  (Abnormal) Collected:  01/01/20 2245    Specimen:  Blood Updated:  01/01/20 2308     WBC 7.15 10*3/mm3      RBC 2.63 10*6/mm3      Hemoglobin 9.5 g/dL      Hematocrit 27.7 %      .3 fL      MCH 36.1 pg      MCHC 34.3 g/dL      RDW 14.5 %      RDW-SD 54.9 fl      MPV 11.4 fL      Platelets 186 10*3/mm3      Neutrophil % 68.2 %      Lymphocyte % 12.7 %      Monocyte % 11.6 %      Eosinophil % 4.2 %      Basophil % 0.6 %      Neutrophils, Absolute 4.88 10*3/mm3      Lymphocytes, Absolute 0.91 10*3/mm3      Monocytes, Absolute 0.83 10*3/mm3      Eosinophils, Absolute 0.30 10*3/mm3      Basophils, Absolute 0.04 10*3/mm3      Differential Type Auto     WBC 7.15 10*3/mm3     Lactic Acid, Reflex Timer (This will reflex a repeat order 3-3:15 hours after ordered.) [[id number]] Collected:  01/01/20 2245    Specimen:  Blood Updated:  01/02/20 0230     Extra Tube Hold for add-ons.     Comment: Auto resulted.       Hemoglobin A1c [[id number]]  (Normal) Collected:  01/01/20 2245    Specimen:  Blood Updated:  01/02/20 0458     Hemoglobin A1C 5.40 %     Narrative:       Hemoglobin A1C Ranges:    Increased Risk for Diabetes  5.7% to 6.4%  Diabetes                     >= 6.5%  Diabetic Goal                < 7.0%    Respiratory Panel, PCR - Swab, Nasopharynx [[id number]]  (Normal) Collected:  01/01/20 2246    Specimen:  Swab from Nasopharynx Updated:  01/02/20 0013     ADENOVIRUS, PCR Not Detected     Coronavirus 229E Not Detected     Coronavirus HKU1 Not Detected     Coronavirus NL63 Not Detected     Coronavirus OC43 Not Detected     Human  Metapneumovirus Not Detected     Human Rhinovirus/Enterovirus Not Detected     Influenza B PCR Not Detected     Parainfluenza Virus 1 Not Detected     Parainfluenza Virus 2 Not Detected     Parainfluenza Virus 3 Not Detected     Parainfluenza Virus 4 Not Detected     Bordetella pertussis pcr Not Detected     Influenza A H1 2009 PCR Not Detected     Chlamydophila pneumoniae PCR Not Detected     Mycoplasma pneumo by PCR Not Detected     Influenza A PCR Not Detected     Influenza A H3 Not Detected     Influenza A H1 Not Detected     RSV, PCR Not Detected     Bordetella parapertussis PCR Not Detected    Blood Gas, Arterial [[id number]]  (Abnormal) Collected:  01/01/20 2255    Specimen:  Arterial Blood Updated:  01/01/20 2259     Site Arterial: left radial     Tim's Test Positive     pH, Arterial 7.388 pH units      pCO2, Arterial 49.7 mm Hg      pO2, Arterial 52.8 mm Hg      Comment: Critical:Notify Dr corona (01-Jan-20 22:58:05)Read back ok        HCO3, Arterial 30.0 mmol/L      Base Excess, Arterial 4.4 mmol/L      O2 Saturation Calculated 86.0 %      Barometric Pressure for Blood Gas 744.6 mmHg      Modality Cannula     Flow Rate 6 lpm      Rate 24 Breaths/minute     Blood Culture - Blood, Arm, Right [[id number]] Collected:  01/01/20 2349    Specimen:  Blood from Arm, Right Updated:  01/01/20 2353    Urinalysis With Microscopic If Indicated (No Culture) - Urine, Clean Catch [[id number]]  (Abnormal) Collected:  01/01/20 2357    Specimen:  Urine, Clean Catch Updated:  01/02/20 0010     Color, UA Yellow     Appearance, UA Clear     pH, UA 7.0     Specific Gravity, UA 1.019     Glucose, UA Negative     Ketones, UA Negative     Bilirubin, UA Negative     Blood, UA Small (1+)     Protein, UA >=300 mg/dL (3+)     Leuk Esterase, UA Negative     Nitrite, UA Negative     Urobilinogen, UA 1.0 E.U./dL    Urinalysis, Microscopic Only - Urine, Clean Catch [[id number]]  (Abnormal) Collected:  01/01/20 2357    Specimen:  Urine, Clean  Catch Updated:  01/02/20 0010     RBC, UA 13-20 /HPF      WBC, UA 0-2 /HPF      Bacteria, UA None Seen /HPF      Squamous Epithelial Cells, UA 0-2 /HPF      Hyaline Casts, UA 0-2 /LPF      Methodology Automated Microscopy    S. Pneumo Ag Urine or CSF - Urine, Urine, Clean Catch [[id number]] Collected:  01/01/20 2357    Specimen:  Urine, Clean Catch Updated:  01/02/20 0543    Legionella Antigen, Urine - Urine, Urine, Clean Catch [[id number]] Collected:  01/01/20 2357    Specimen:  Urine, Clean Catch Updated:  01/02/20 0543    Lactic Acid, Reflex [[id number]]  (Abnormal) Collected:  01/02/20 0311    Specimen:  Blood Updated:  01/02/20 0352     Lactate 2.1 mmol/L     CBC Auto Differential [[id number]]  (Abnormal) Collected:  01/02/20 0437    Specimen:  Blood Updated:  01/02/20 0554     WBC 6.42 10*3/mm3      RBC 2.39 10*6/mm3      Hemoglobin 8.6 g/dL      Hematocrit 25.4 %      .3 fL      MCH 36.0 pg      MCHC 33.9 g/dL      RDW 14.4 %      RDW-SD 55.2 fl      MPV 11.8 fL      Platelets 162 10*3/mm3     Manual Differential [[id number]]  (Abnormal) Collected:  01/02/20 0437    Specimen:  Blood Updated:  01/02/20 0554     Neutrophil % 92.6 %      Lymphocyte % 6.4 %      Eosinophil % 1.1 %      Neutrophils Absolute 5.94 10*3/mm3      Lymphocytes Absolute 0.41 10*3/mm3      Eosinophils Absolute 0.07 10*3/mm3      nRBC 1.1 /100 WBC      Anisocytosis Mod/2+     Polychromasia Mod/2+     WBC Morphology Normal     Platelet Morphology Normal          I ordered the above labs and reviewed the results    RADIOLOGY  XR Chest 1 View   Final Result   Vascular congestion. Similar findings were present on the prior exam.       This report was finalized on 1/1/2020 11:39 PM by Dr. Yvonne Barrios M.D.               I ordered the above noted radiological studies. Interpreted by radiologist.  Reviewed by me in PACS.       PROCEDURES  EKG          EKG time: 2237  Rhythm/Rate: SR, 97  P waves and MN: normal  QRS, axis: RBBB, LAD   ST  and T waves: no st changes     Interpreted Contemporaneously by me, independently viewed  Unchanged compared to prior 12/3/19      PROGRESS AND CONSULTS     2324 Zosyn ordered.    2337 Call made to [facility].    0008 Discussed pt w/Dr. Callahan ([facility]) who agrees to admit pt to telemetry.     0011 Patient is resting comfortably and in NAD. Patient is stable. BP- 166/76 HR- 91 Temp- 100.3 °F (37.9 °C) (Tympanic) O2 sat- 98%. Informed the patient results of labs and CXR are indicative of a R lower lobe PNA which is exacerbating pt's COPD. Discussed the plan to treat with IV abx and admit pt for further evaluation. Pt understands and agrees with the plan, all questions answered.    0012 Pt's O2 was turned down from 8 L to 6 L NC at this time.     MEDICAL DECISION MAKING  Results were reviewed/discussed with the patient and they were also made aware of online access. Pt also made aware that some labs, such as cultures, will not be resulted during ER visit and follow up with PMD is necessary.     MDM  Number of Diagnoses or Management Options  COPD with acute exacerbation (CMS/HCC):   Hypoxemia:   Pneumonia of right lower lobe due to infectious organism (CMS/HCC):      Amount and/or Complexity of Data Reviewed  Clinical lab tests: ordered and reviewed (Lactate 2.3  Creatinine 1.37  BUN 30  PO2 Arterial 52.8)  Tests in the radiology section of CPT®: ordered and reviewed (CXR)  Tests in the medicine section of CPT®: ordered and reviewed (See EKG procedure notes)  Decide to obtain previous medical records or to obtain history from someone other than the patient: yes  Review and summarize past medical records: yes (The pt has a hx of CAD and COPD)  Discuss the patient with other providers: yes (Dr. Callahan ([facility]))  Independent visualization of images, tracings, or specimens: yes           DIAGNOSIS  Final diagnoses:   Pneumonia of right lower lobe due to infectious organism (CMS/HCC)   Hypoxemia   COPD with acute exacerbation (CMS/HCC)        DISPOSITION  ADMISSION    Discussed treatment plan and reason for admission with pt/family and admitting physician.  Pt/family voiced understanding of the plan for admission for further testing/treatment as needed.       Latest Documented Vital Signs:  As of 6:19 AM  BP- 178/92 HR- 83 Temp- 97.4 °F (36.3 °C) (Oral) O2 sat- 96%    --  Documentation assistance provided by ramon Khan for Dr. Brennon Durant.  Information recorded by the scribe was done at my direction and has been verified and validated by me.     Bill Webber  01/02/20 0021       Brennon Durant MD  01/02/20 0619

## 2020-01-03 ENCOUNTER — RESULTS ENCOUNTER (OUTPATIENT)
Dept: ONCOLOGY | Facility: CLINIC | Age: 80
End: 2020-01-03

## 2020-01-03 ENCOUNTER — APPOINTMENT (OUTPATIENT)
Dept: ONCOLOGY | Facility: HOSPITAL | Age: 80
End: 2020-01-03

## 2020-01-03 ENCOUNTER — APPOINTMENT (OUTPATIENT)
Dept: OTHER | Facility: HOSPITAL | Age: 80
End: 2020-01-03

## 2020-01-03 DIAGNOSIS — D46.9 MYELODYSPLASIA (MYELODYSPLASTIC SYNDROME) (HCC): ICD-10-CM

## 2020-01-03 DIAGNOSIS — C34.32 MALIGNANT NEOPLASM OF LOWER LOBE OF LEFT LUNG (HCC): ICD-10-CM

## 2020-01-03 DIAGNOSIS — D53.9 MACROCYTIC ANEMIA: ICD-10-CM

## 2020-01-03 DIAGNOSIS — D80.1 HYPOGAMMAGLOBULINEMIA (HCC): ICD-10-CM

## 2020-01-03 DIAGNOSIS — D61.818 PANCYTOPENIA (HCC): ICD-10-CM

## 2020-01-03 LAB
ANION GAP SERPL CALCULATED.3IONS-SCNC: 13.5 MMOL/L (ref 5–15)
BASOPHILS # BLD AUTO: 0.01 10*3/MM3 (ref 0–0.2)
BASOPHILS NFR BLD AUTO: 0.5 % (ref 0–1.5)
BUN BLD-MCNC: 36 MG/DL (ref 8–23)
BUN/CREAT SERPL: 24.5 (ref 7–25)
CALCIUM SPEC-SCNC: 8.6 MG/DL (ref 8.6–10.5)
CHLORIDE SERPL-SCNC: 103 MMOL/L (ref 98–107)
CO2 SERPL-SCNC: 27.5 MMOL/L (ref 22–29)
CREAT BLD-MCNC: 1.47 MG/DL (ref 0.76–1.27)
DEPRECATED RDW RBC AUTO: 54.1 FL (ref 37–54)
EOSINOPHIL # BLD AUTO: 0 10*3/MM3 (ref 0–0.4)
EOSINOPHIL NFR BLD AUTO: 0 % (ref 0.3–6.2)
ERYTHROCYTE [DISTWIDTH] IN BLOOD BY AUTOMATED COUNT: 14.4 % (ref 12.3–15.4)
FERRITIN SERPL-MCNC: 357 NG/ML (ref 30–400)
GFR SERPL CREATININE-BSD FRML MDRD: 46 ML/MIN/1.73
GLUCOSE BLD-MCNC: 170 MG/DL (ref 65–99)
GLUCOSE BLDC GLUCOMTR-MCNC: 138 MG/DL (ref 70–130)
GLUCOSE BLDC GLUCOMTR-MCNC: 172 MG/DL (ref 70–130)
GLUCOSE BLDC GLUCOMTR-MCNC: 188 MG/DL (ref 70–130)
GLUCOSE BLDC GLUCOMTR-MCNC: 268 MG/DL (ref 70–130)
HCT VFR BLD AUTO: 21.9 % (ref 37.5–51)
HGB BLD-MCNC: 7.5 G/DL (ref 13–17.7)
IGA1 MFR SER: 198 MG/DL (ref 70–400)
IGG1 SER-MCNC: 548 MG/DL (ref 700–1600)
IGM SERPL-MCNC: 46 MG/DL (ref 40–230)
IMM GRANULOCYTES # BLD AUTO: 0.09 10*3/MM3 (ref 0–0.05)
IMM GRANULOCYTES NFR BLD AUTO: 4.8 % (ref 0–0.5)
IRON 24H UR-MRATE: 80 MCG/DL (ref 59–158)
IRON SATN MFR SERPL: 33 % (ref 20–50)
LYMPHOCYTES # BLD AUTO: 0.32 10*3/MM3 (ref 0.7–3.1)
LYMPHOCYTES NFR BLD AUTO: 16.9 % (ref 19.6–45.3)
MCH RBC QN AUTO: 35.9 PG (ref 26.6–33)
MCHC RBC AUTO-ENTMCNC: 34.2 G/DL (ref 31.5–35.7)
MCV RBC AUTO: 104.8 FL (ref 79–97)
MONOCYTES # BLD AUTO: 0.29 10*3/MM3 (ref 0.1–0.9)
MONOCYTES NFR BLD AUTO: 15.3 % (ref 5–12)
NEUTROPHILS # BLD AUTO: 1.18 10*3/MM3 (ref 1.7–7)
NEUTROPHILS NFR BLD AUTO: 62.5 % (ref 42.7–76)
NRBC BLD AUTO-RTO: 1.6 /100 WBC (ref 0–0.2)
PLATELET # BLD AUTO: 141 10*3/MM3 (ref 140–450)
PMV BLD AUTO: 12 FL (ref 6–12)
POTASSIUM BLD-SCNC: 4.4 MMOL/L (ref 3.5–5.2)
RBC # BLD AUTO: 2.09 10*6/MM3 (ref 4.14–5.8)
SODIUM BLD-SCNC: 144 MMOL/L (ref 136–145)
TIBC SERPL-MCNC: 246 MCG/DL (ref 298–536)
TRANSFERRIN SERPL-MCNC: 165 MG/DL (ref 200–360)
WBC NRBC COR # BLD: 1.89 10*3/MM3 (ref 3.4–10.8)

## 2020-01-03 PROCEDURE — 82962 GLUCOSE BLOOD TEST: CPT

## 2020-01-03 PROCEDURE — 94799 UNLISTED PULMONARY SVC/PX: CPT

## 2020-01-03 PROCEDURE — 25010000002 IMMUNE GLOBULIN (HUMAN) 20 GM/200ML SOLUTION: Performed by: INTERNAL MEDICINE

## 2020-01-03 PROCEDURE — 63710000001 PREDNISONE PER 1 MG: Performed by: INTERNAL MEDICINE

## 2020-01-03 PROCEDURE — 80048 BASIC METABOLIC PNL TOTAL CA: CPT | Performed by: HOSPITALIST

## 2020-01-03 PROCEDURE — 63710000001 INSULIN LISPRO (HUMAN) PER 5 UNITS: Performed by: HOSPITALIST

## 2020-01-03 PROCEDURE — 85025 COMPLETE CBC W/AUTO DIFF WBC: CPT | Performed by: NURSE PRACTITIONER

## 2020-01-03 PROCEDURE — 84466 ASSAY OF TRANSFERRIN: CPT | Performed by: NURSE PRACTITIONER

## 2020-01-03 PROCEDURE — 82784 ASSAY IGA/IGD/IGG/IGM EACH: CPT | Performed by: NURSE PRACTITIONER

## 2020-01-03 PROCEDURE — 25010000002 PIPERACILLIN SOD-TAZOBACTAM PER 1 G: Performed by: HOSPITALIST

## 2020-01-03 PROCEDURE — 83540 ASSAY OF IRON: CPT | Performed by: NURSE PRACTITIONER

## 2020-01-03 PROCEDURE — 25010000002 IMMUNE GLOBULIN (HUMAN) 10 GM/100ML SOLUTION: Performed by: INTERNAL MEDICINE

## 2020-01-03 PROCEDURE — 94660 CPAP INITIATION&MGMT: CPT

## 2020-01-03 PROCEDURE — 82728 ASSAY OF FERRITIN: CPT | Performed by: NURSE PRACTITIONER

## 2020-01-03 PROCEDURE — 99222 1ST HOSP IP/OBS MODERATE 55: CPT | Performed by: INTERNAL MEDICINE

## 2020-01-03 RX ORDER — AMOXICILLIN AND CLAVULANATE POTASSIUM 875; 125 MG/1; MG/1
1 TABLET, FILM COATED ORAL EVERY 12 HOURS SCHEDULED
Status: DISCONTINUED | OUTPATIENT
Start: 2020-01-03 | End: 2020-01-04 | Stop reason: HOSPADM

## 2020-01-03 RX ADMIN — INSULIN LISPRO 2 UNITS: 100 INJECTION, SOLUTION INTRAVENOUS; SUBCUTANEOUS at 12:40

## 2020-01-03 RX ADMIN — ALPRAZOLAM 1 MG: 1 TABLET ORAL at 23:31

## 2020-01-03 RX ADMIN — APIXABAN 5 MG: 5 TABLET, FILM COATED ORAL at 20:08

## 2020-01-03 RX ADMIN — APIXABAN 5 MG: 5 TABLET, FILM COATED ORAL at 08:59

## 2020-01-03 RX ADMIN — AMOXICILLIN AND CLAVULANATE POTASSIUM 1 TABLET: 875; 125 TABLET, FILM COATED ORAL at 20:07

## 2020-01-03 RX ADMIN — TAZOBACTAM SODIUM AND PIPERACILLIN SODIUM 3.38 G: 375; 3 INJECTION, SOLUTION INTRAVENOUS at 08:59

## 2020-01-03 RX ADMIN — SODIUM CHLORIDE, PRESERVATIVE FREE 10 ML: 5 INJECTION INTRAVENOUS at 20:08

## 2020-01-03 RX ADMIN — SODIUM CHLORIDE, PRESERVATIVE FREE 10 ML: 5 INJECTION INTRAVENOUS at 09:00

## 2020-01-03 RX ADMIN — BUDESONIDE 0.5 MG: 0.5 SUSPENSION RESPIRATORY (INHALATION) at 20:44

## 2020-01-03 RX ADMIN — DILTIAZEM HYDROCHLORIDE 60 MG: 60 TABLET, FILM COATED ORAL at 20:07

## 2020-01-03 RX ADMIN — FUROSEMIDE 40 MG: 40 TABLET ORAL at 20:07

## 2020-01-03 RX ADMIN — ATORVASTATIN CALCIUM 10 MG: 10 TABLET, FILM COATED ORAL at 08:59

## 2020-01-03 RX ADMIN — IPRATROPIUM BROMIDE AND ALBUTEROL SULFATE 3 ML: 2.5; .5 SOLUTION RESPIRATORY (INHALATION) at 07:30

## 2020-01-03 RX ADMIN — IMMUNE GLOBULIN (HUMAN) 20 G: 10 INJECTION INTRAVENOUS; SUBCUTANEOUS at 15:37

## 2020-01-03 RX ADMIN — METFORMIN HYDROCHLORIDE 1000 MG: 1000 TABLET ORAL at 08:59

## 2020-01-03 RX ADMIN — DILTIAZEM HYDROCHLORIDE 60 MG: 60 TABLET, FILM COATED ORAL at 08:59

## 2020-01-03 RX ADMIN — FUROSEMIDE 40 MG: 40 TABLET ORAL at 08:59

## 2020-01-03 RX ADMIN — PREDNISONE 20 MG: 20 TABLET ORAL at 17:32

## 2020-01-03 RX ADMIN — FOLIC ACID 1 MG: 1 TABLET ORAL at 08:59

## 2020-01-03 RX ADMIN — PREDNISONE 20 MG: 20 TABLET ORAL at 08:59

## 2020-01-03 RX ADMIN — IMMUNE GLOBULIN (HUMAN) 10 G: 10 INJECTION INTRAVENOUS; SUBCUTANEOUS at 15:38

## 2020-01-03 RX ADMIN — IPRATROPIUM BROMIDE AND ALBUTEROL SULFATE 3 ML: 2.5; .5 SOLUTION RESPIRATORY (INHALATION) at 11:43

## 2020-01-03 RX ADMIN — INSULIN LISPRO 2 UNITS: 100 INJECTION, SOLUTION INTRAVENOUS; SUBCUTANEOUS at 22:04

## 2020-01-03 RX ADMIN — BUDESONIDE 0.5 MG: 0.5 SUSPENSION RESPIRATORY (INHALATION) at 07:34

## 2020-01-03 RX ADMIN — IPRATROPIUM BROMIDE AND ALBUTEROL SULFATE 3 ML: 2.5; .5 SOLUTION RESPIRATORY (INHALATION) at 20:44

## 2020-01-03 RX ADMIN — METOPROLOL TARTRATE 100 MG: 100 TABLET, FILM COATED ORAL at 08:59

## 2020-01-03 RX ADMIN — IPRATROPIUM BROMIDE AND ALBUTEROL SULFATE 3 ML: 2.5; .5 SOLUTION RESPIRATORY (INHALATION) at 00:32

## 2020-01-03 RX ADMIN — METOPROLOL TARTRATE 100 MG: 100 TABLET, FILM COATED ORAL at 20:07

## 2020-01-03 RX ADMIN — IPRATROPIUM BROMIDE AND ALBUTEROL SULFATE 3 ML: 2.5; .5 SOLUTION RESPIRATORY (INHALATION) at 14:21

## 2020-01-03 RX ADMIN — INSULIN LISPRO 2 UNITS: 100 INJECTION, SOLUTION INTRAVENOUS; SUBCUTANEOUS at 08:59

## 2020-01-03 RX ADMIN — TAZOBACTAM SODIUM AND PIPERACILLIN SODIUM 3.38 G: 375; 3 INJECTION, SOLUTION INTRAVENOUS at 00:04

## 2020-01-03 NOTE — PROGRESS NOTES
PROGRESS NOTE  Patient Name: Ankit Mack Sr.  Age/Sex: 79 y.o. male  : 1940  MRN: [id number]    Date of Admission: 2020  Date of Encounter Visit: 20   LOS: 1 day   Patient Care Team:  Gopi Lagos MD as PCP - General (Family Medicine)  Tanner Mendoza III, MD as Surgeon (Thoracic Surgery)  Trey Salcedo MD as Consulting Physician (Urology)  Fred Alejandro MD as Consulting Physician (Pulmonary Disease)  Amna Gomez MD as Consulting Physician (Cardiology)  Gopi Pemberton MD as Consulting Physician (Dermatology)  Gopi Lagos MD as Referring Physician (Family Medicine)  Gopi Mills II, MD as Consulting Physician (Hematology and Oncology)  Tanner Mendoza III, MD as Surgeon (Thoracic Surgery)  Beto Hayes MD as Consulting Physician (Hematology and Oncology)  Demetria Licona, STEPHY as Ambulatory  ([facility])    Chief Complaint:doing better, no respiratory complaints     Hospital course: admitted with respiratory failure, CXR showed mainly chronic changes , he did have drop in his Hg on follow up, followed by CBC for his MDS.  He is down to 5 lpm O2, he has controlled heart rate  He was started on the zosyn and is still on it because of the concern about his weak immune system  He is ok to go on PO antibiotics from my stand point.   Was seen by Dr Ruiz and his note was reviewed  No chest pain, no productive cough, no fever       Interval History: no specific resp complaints    REVIEW OF SYSTEMS:   CONSTITUTIONAL: no fever or chills  CARDIOVASCULAR: No chest pain, chest pressure or chest discomfort. No palpitations , minimal edema .   RESPIRATORY: No shortness of breath, cough or sputum.   GASTROINTESTINAL: No anorexia, nausea, vomiting or diarrhea. No abdominal pain or blood.   HEMATOLOGIC: No bleeding or bruising.     Ventilator/Non-Invasive Ventilation Settings (From admission, onward)    Nasal o2            Vital Signs  Temp:  [97.3  "°F (36.3 °C)-98.1 °F (36.7 °C)] 97.3 °F (36.3 °C)  Heart Rate:  [66-99] 77  Resp:  [18-24] 20  BP: (123-155)/(65-76) 130/70  SpO2:  [93 %-100 %] 94 %  on  Flow (L/min):  [4-7] 4 Device (Oxygen Therapy): humidified;nasal cannula  No intake or output data in the 24 hours ending 01/03/20 1357  Flowsheet Rows      First Filed Value   Admission Height  177.8 cm (70\") Documented at 01/01/2020 2234   Admission Weight  99.7 kg (219 lb 12.8 oz) Documented at 01/01/2020 2306        Body mass index is 31.92 kg/m².      01/02/20  0252 01/02/20  1222 01/03/20  0500   Weight: 103 kg (227 lb 15.3 oz) 100 kg (220 lb 11.2 oz) 101 kg (222 lb 7.1 oz)       Physical Exam:  GEN:  No acute distress, alert, cooperative, well developed , on 5 lpm O2  EYES:   Sclera clear. No icterus. PERRL. Normal EOM  ENT:   External ears/nose normal, no oral lesions, no thrush, mucous membranes moist  NECK:  Supple, midline trachea, no JVD  LUNGS: Normal chest on inspection, decreased BS, no wheez, minimal rale . Respirations regular, even and unlabored.   CV:  Irregular rhythm, controlled  rate. Normal S1/S2. No murmurs, gallops, or rubs noted.  ABD:  Soft, nontender and nondistended. Normal bowel sounds. No guarding  EXT:  Moves all extremities well. No cyanosis. No redness. Trace edema.   Skin: dry, intact, no bleeding    Results Review:      Results from last 7 days   Lab Units 01/03/20  0343 01/02/20  0554 01/01/20  2245   SODIUM mmol/L 144 143 146*   POTASSIUM mmol/L 4.4 4.3 4.3   CHLORIDE mmol/L 103 103 103   CO2 mmol/L 27.5 26.6 28.2   BUN mg/dL 36* 27* 30*   CREATININE mg/dL 1.47* 1.27 1.37*   CALCIUM mg/dL 8.6 8.9 9.0   AST (SGOT) U/L  --   --  14   ALT (SGPT) U/L  --   --  9   ANION GAP mmol/L 13.5 13.4 14.8   ALBUMIN g/dL  --   --  4.70     Results from last 7 days   Lab Units 01/01/20  2245   TROPONIN T ng/mL 0.031*         Results from last 7 days   Lab Units 01/01/20  2245   PROBNP pg/mL 7,026.0*     Results from last 7 days   Lab Units " 01/03/20  0343 01/02/20  0437 01/01/20  2245   WBC 10*3/mm3 1.89* 6.42 7.15  7.15   HEMOGLOBIN g/dL 7.5* 8.6* 9.5*   HEMATOCRIT % 21.9* 25.4* 27.7*   PLATELETS 10*3/mm3 141 162 186   MCV fL 104.8* 106.3* 105.3*   NEUTROPHIL % % 62.5  --  68.2   LYMPHOCYTE % % 16.9*  --  12.7*   MONOCYTES % % 15.3*  --  11.6   EOSINOPHIL % % 0.0*  --  4.2   BASOPHIL % % 0.5  --  0.6   IMM GRAN % % 4.8*  --   --      Results from last 7 days   Lab Units 01/01/20  2245   INR  1.30*               Invalid input(s): LDLCALC  Results from last 7 days   Lab Units 01/01/20  2255   PH, ARTERIAL pH units 7.388   PCO2, ARTERIAL mm Hg 49.7*   PO2 ART mm Hg 52.8*   HCO3 ART mmol/L 30.0*     Results from last 7 days   Lab Units 01/01/20  2245   HEMOGLOBIN A1C % 5.40     Glucose   Date/Time Value Ref Range Status   01/03/2020 1106 268 (H) 70 - 130 mg/dL Final   01/03/2020 0636 188 (H) 70 - 130 mg/dL Final   01/02/2020 2113 250 (H) 70 - 130 mg/dL Final   01/02/2020 1601 255 (H) 70 - 130 mg/dL Final   01/02/2020 1104 360 (H) 70 - 130 mg/dL Final   01/02/2020 0745 218 (H) 70 - 130 mg/dL Final     Results from last 7 days   Lab Units 01/02/20  0311 01/01/20  2245   PROCALCITONIN ng/mL  --  0.18   LACTATE mmol/L 2.1* 2.3*     Results from last 7 days   Lab Units 01/02/20  0736 01/01/20  2357 01/01/20  2349 01/01/20  2245   BLOODCX   --   --  No growth at 24 hours No growth at 24 hours   RESPCX  Moderate growth (3+) Normal Respiratory Harleen  --   --   --    STREP PNEUMO AG   --  Negative  --   --    L. PNEUMOPHILA SEROGP 1 UR AG   --  Negative  --   --      Results from last 7 days   Lab Units 01/01/20  2357   NITRITE UA  Negative   WBC UA /HPF 0-2   BACTERIA UA /HPF None Seen   SQUAM EPITHEL UA /HPF 0-2     Results from last 7 days   Lab Units 01/01/20  2246   ADENOVIRUS DETECTION BY PCR  Not Detected   CORONAVIRUS 229E  Not Detected   CORONAVIRUS HKU1  Not Detected   CORONAVIRUS NL63  Not Detected   CORONAVIRUS OC43  Not Detected   HUMAN  METAPNEUMOVIRUS  Not Detected   HUMAN RHINOVIRUS/ENTEROVIRUS  Not Detected   INFLUENZA B PCR  Not Detected   PARAINFLUENZA 1  Not Detected   PARAINFLUENZA VIRUS 2  Not Detected   PARAINFLUENZA VIRUS 3  Not Detected   PARAINFLUENZA VIRUS 4  Not Detected   BORDETELLA PERTUSSIS PCR  Not Detected   CHLAMYDOPHILA PNEUMONIAE PCR  Not Detected   MYCOPLAMA PNEUMO PCR  Not Detected   INFLUENZA A PCR  Not Detected   INFLUENZA A H3  Not Detected   INFLUENZA A H1  Not Detected   RSV, PCR  Not Detected           Imaging:   Imaging Results (All)     Procedure Component Value Units Date/Time     I personally viewed and interpreted the patient's imaging results: no new films         Medication Review:     apixaban 5 mg Oral Q12H   atorvastatin 10 mg Oral Daily   budesonide 0.5 mg Nebulization BID - RT   dilTIAZem 60 mg Oral BID   folic acid 1 mg Oral Daily   furosemide 40 mg Oral BID   immune globulin (human) 10 g Intravenous Once   And      immune globulin (human) 20 g Intravenous Once   insulin lispro 0-7 Units Subcutaneous 4x Daily With Meals & Nightly   ipratropium-albuterol 3 mL Nebulization 4x Daily - RT   metFORMIN 1,000 mg Oral Daily With Breakfast   metoprolol tartrate 100 mg Oral Q12H   piperacillin-tazobactam 3.375 g Intravenous Q8H   predniSONE 20 mg Oral BID With Meals   sodium chloride 10 mL Intravenous Q12H            ASSESSMENT:   1. A/C respiratory  failure, back to baseline  2. MDS  3. Obesity  4. DOMINGO  5. d-CHF  6. COPD  7. Questionable pneumonia, I really doubt    PLAN:  Ok with transition to PO antibiotics   Anemia per oncology  Will follow on the respiratory status  Discussed with patient and daughter     Disposition: per primary team    Fred Alejandro MD  01/03/20  1:57 PM           Dictated utilizing Dragon dictation

## 2020-01-03 NOTE — CONSULTS
Subjective     REASON FOR CONSULTATION:  MDS, ANEMIA RELATED TO THIS, HYPOGAMMAGLOBULINEMIA  Provide an opinion on any further workup or treatment                             REQUESTING PHYSICIAN:  DR COLBY BOYER    RECORDS OBTAINED:  Records of the patients history including those obtained from the referring provider were reviewed and summarized in detail.    HISTORY OF PRESENT ILLNESS:  The patient is a 79 y.o. year old male who is here for an opinion about the above issue.    History of Present Illness The patient has been admitted to the hospital yesterday with several days history of low grade temperature associated with cough with mucous sputum production. He has been feeling very short of breath. His appetite has been good, he has not had any passage of blood in the stool or hematuria and he has not had any nausea, vomiting, diarrhea. Urination has been ongoing with no difficulties. He has not had any pain. He has a lot of limitations in regard to physical activity because of the shortness of breath associated with his Lyme disease. The patient has been admitted to receive IV antibiotics. He has history of myelodysplastic syndrome undergoing therapy with Procrit and also history of hypogammaglobulinemia for which he will receive immunoglobulin infusion today that he receives by the way on a monthly basis.     Otherwise the patient has not had any other issues. He feels well, he is able to function and he does believe that his Procrit has been beneficial in regard to keeping the hematocrit to a decent level. Today his hemoglobin is 7.5 though. His white count is also low at 1.9.         Past Medical History:   Diagnosis Date   • Anemia    • Anxiety    • Atrial flutter (CMS/[facility])    • Chronic diastolic congestive heart failure (CMS/[facility]) 12/19/2018   • Chronic respiratory failure with hypoxia (CMS/[facility])    • COPD (chronic obstructive pulmonary disease) (CMS/[facility])    • Essential hypertension    • GERD  (gastroesophageal reflux disease)    • H/O complete eye exam 06/2018   • History of pulmonary function tests 06/10/2014   • Hyperglycemia    • Hyperlipidemia    • Lung cancer (CMS/[facility]) 2012    Left   • MDS (myelodysplastic syndrome) (CMS/[facility])    • Nonrheumatic aortic valve stenosis     mild 5/2019   • Obesity    • Osteoarthritis, knee    • Paroxysmal atrial fibrillation (CMS/[facility])    • Prostate cancer (CMS/[facility]) 2000   • Seizures (CMS/[facility])    • Sleep apnea    • Thoracic aortic aneurysm without rupture (CMS/[facility])     s/p stenting of descending thoracic aneurysm; the ascending aorta measured 3.7cm in 2019   • Type 2 diabetes mellitus without complication, without long-term current use of insulin (CMS/[facility])         Past Surgical History:   Procedure Laterality Date   • ABDOMINAL AORTIC ANEURYSM REPAIR  2010    Dr. Adarsh Dennis   • CARDIAC CATHETERIZATION Left 02/06/2004   • CATARACT EXTRACTION  10/2014    also 11/14   • COLONOSCOPY     • ENDOVASCULAR THORACIC ANEURYSM REPAIR     • JOINT REPLACEMENT Left 10/2010    knee; Dr. Wolf   • JOINT REPLACEMENT Right 09/2011    knee; Dr. Wolf   • LUNG LOBECTOMY Left 01/16/2012    LLL; Dr. Mendoza   • LUNG LOBECTOMY Left 2012   • PROSTATE SURGERY  2000    Dr. Naranjo   • VASCULAR SURGERY  05/2009    TEVAR of thoracic aortic aneurysm   • VASCULAR SURGERY  12/15/2003    Left carotid subclavian bypass graft, ligation of proximal left subclavian following carotid subclavian bypass. Right femoral sheath. Arch angiography with descending thoracic and abdominal aortography    • VASCULAR SURGERY  11/17/2003    Selective catheterization right vertebral artery, right subclavian artery, left subclavian artery, left vertebral artery.     • VASCULAR SURGERY  06/05/2002    Tracheostomy, bronchoscopy   • VASCULAR SURGERY  05/21/2002    Thoracic arteriogram and abdominal aortogram        No current facility-administered medications on file prior to encounter.      Current Outpatient Medications on  File Prior to Encounter   Medication Sig Dispense Refill   • albuterol (PROVENTIL HFA;VENTOLIN HFA) 108 (90 Base) MCG/ACT inhaler Inhale 2 puffs Every 4 (Four) Hours As Needed for Wheezing.     • albuterol (PROVENTIL) (2.5 MG/3ML) 0.083% nebulizer solution Take 2.5 mg by nebulization 4 (Four) Times a Day.     • ALPRAZolam (XANAX) 1 MG tablet Take 1 tablet by mouth At Night As Needed for Anxiety. 30 tablet 2   • apixaban (ELIQUIS) 5 MG tablet tablet Take 1 tablet by mouth Every 12 (Twelve) Hours. 180 tablet 2   • budesonide (PULMICORT) 0.5 MG/2ML nebulizer solution Take 2 mL by nebulization 2 (Two) Times a Day. 120 each 1   • dilTIAZem (CARDIZEM) 60 MG tablet TAKE 1 TABLET BY MOUTH TWICE A DAY 60 tablet 2   • folic acid (FOLVITE) 1 MG tablet Take 1 mg by mouth Daily.     • furosemide (LASIX) 40 MG tablet Take 1 tablet by mouth 2 (Two) times a day. Take twice daily for one week and then daily. (Patient taking differently: Take 40 mg by mouth Daily.) 90 tablet 1   • metFORMIN (GLUCOPHAGE) 1000 MG tablet Take 1 tablet by mouth Daily With Breakfast. (Patient taking differently: Take 1,000 mg by mouth Every Night.) 90 tablet 1   • metoprolol tartrate (LOPRESSOR) 100 MG tablet TAKE ONE AND ONE - HALF TABLETS BY MOUTH TWICE DAILY 270 tablet 1   • atorvastatin (LIPITOR) 10 MG tablet TAKE 1 TABLET BY MOUTH DAILY FOR CHOLESTEROL 90 tablet 1   • benazepril (LOTENSIN) 20 MG tablet Take 20 mg by mouth Daily.     • ipratropium (ATROVENT) 0.02 % nebulizer solution Take 500 mcg by nebulization Every 4 (Four) Hours As Needed for Wheezing or Shortness of Air.     • Revefenacin (YUPELRI) 175 MCG/3ML solution Inhale 3 mL (1 vial) by nebulization Daily. 90 mL 1        ALLERGIES:    Allergies   Allergen Reactions   • Erythromycin Itching        Social History     Socioeconomic History   • Marital status: Single     Spouse name: Not on file   • Number of children: Not on file   • Years of education: Not on file   • Highest education level:  Not on file   Occupational History     Employer: RETIRED   Tobacco Use   • Smoking status: Former Smoker     Packs/day: 1.00     Types: Cigarettes     Last attempt to quit: 4/15/2010     Years since quittin.7   • Smokeless tobacco: Never Used   Substance and Sexual Activity   • Alcohol use: No     Frequency: Never     Comment: No caffeine use   • Drug use: No   • Sexual activity: Defer      Oncologic history:1.  New diagnosis of myelodysplasia with chronic anemia and leukopenia.  Bone marrow biopsy seem to be consistent with low-grade myelodysplasia with refractory anemia with ringed sideroblasts.  Blasts in the marrow were less than 5%.  Marrow karyotype shows a 7 q. deletion.  2.  Severe emphysema/COPD.  Patient requires oxygen around-the-clock  3.  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  Patient received 1 dose of IV immune globulin 40 g during his hospitalization on 2019 and has been receiving monthly IV immunoglobulin infusions as an outpatient at a dose of 30 g monthly.  4.  Pneumonia which has resolved clinically following antibiotics and IV immunoglobulin.  5.  History of non-small cell lung cancer from the left lower lobe resected by Dr. Hart Linker .  No definite evidence of recurrent malignancy noted on the most recent CT scan of the chest from 2019 as noted above.  6.  Atrial fibrillation which further impacts his symptomatology.  He now is taking Cardizem twice daily and Lopressor  Family History   Problem Relation Age of Onset   • Cancer Mother    • COPD Mother    • Cancer Father         lung   • Colon cancer Father    • Cancer Other         colon   • Diabetes Other    • Emphysema Other    • Hypertension Other    • Kidney disease Other    • Lung cancer Other         Review of Systems   Constitutional: Positive for activity change, chills, fatigue and fever.   HENT: Positive for congestion.    Respiratory: Positive for cough and shortness of breath.       Cardiovascular: Negative.    Gastrointestinal: Negative.    Genitourinary: Negative.    Musculoskeletal: Negative.    Skin: Negative.    Neurological: Negative.    Hematological: Negative.    Psychiatric/Behavioral: Negative.       Objective     Vitals:    01/03/20 0500 01/03/20 0730 01/03/20 0742 01/03/20 0819   BP:    130/70   BP Location:    Right arm   Patient Position:    Lying   Pulse:  66  99   Resp:  24  22   Temp:    97.6 °F (36.4 °C)   TempSrc:    Oral   SpO2:  96% 100% 93%   Weight: 101 kg (222 lb 7.1 oz)      Height:         Current Status 11/1/2019   ECOG score 1       Physical Exam   Constitutional: He is oriented to person, place, and time. He appears well-developed and well-nourished. No distress.   HENT:   Head: Normocephalic.   Nose: Nose normal.   Mouth/Throat: Oropharynx is clear and moist. No oropharyngeal exudate.   Eyes: Pupils are equal, round, and reactive to light. EOM are normal. Right eye exhibits no discharge. Left eye exhibits no discharge. No scleral icterus.   Neck: Normal range of motion. No JVD present. No tracheal deviation present. No thyromegaly present.   Cardiovascular: Normal rate. Exam reveals no gallop and no friction rub.   No murmur heard.  Pulmonary/Chest: No stridor. He is in respiratory distress. He has wheezes. He has rales. He exhibits no tenderness.   Abdominal: Soft. Bowel sounds are normal. He exhibits no distension and no mass. There is no tenderness. There is no rebound and no guarding. No hernia.   Musculoskeletal: He exhibits edema. He exhibits no tenderness or deformity.   Lymphadenopathy:     He has no cervical adenopathy.   Neurological: He is alert and oriented to person, place, and time. He displays normal reflexes. No cranial nerve deficit or sensory deficit. He exhibits normal muscle tone. Coordination normal.   Skin: Skin is warm and dry. Capillary refill takes 2 to 3 seconds. No rash noted. He is not diaphoretic. No erythema. No pallor.    Psychiatric: He has a normal mood and affect. His behavior is normal.         RECENT LABS:  Hematology WBC   Date Value Ref Range Status   01/03/2020 1.89 (C) 3.40 - 10.80 10*3/mm3 Final   01/01/2020 7.15 3.40 - 10.80 10*3/mm3 Final     RBC   Date Value Ref Range Status   01/03/2020 2.09 (L) 4.14 - 5.80 10*6/mm3 Final     Hemoglobin   Date Value Ref Range Status   01/03/2020 7.5 (L) 13.0 - 17.7 g/dL Final     Hematocrit   Date Value Ref Range Status   01/03/2020 21.9 (L) 37.5 - 51.0 % Final     Platelets   Date Value Ref Range Status   01/03/2020 141 140 - 450 10*3/mm3 Final      PORTABLE CHEST RADIOGRAPH     HISTORY: Severe weakness     COMPARISON: 12/03/2019     FINDINGS:  Cardiomegaly is identified. There does appear to be vascular congestion.  No pneumothorax is seen. There is bibasilar scarring. Patient has a  thoracic aortic stent graft. Cardiac silhouette is stable. Postsurgical  changes are seen within the left supraclavicular region. There may be a  trace left pleural effusion.     IMPRESSION:  Vascular congestion. Similar findings were present on the prior exam.     This report was finalized on 1/1/2020 11:39 PM by Dr. Yvonne Barrios M.D.       Component  Ref Range & Units 03:43   Iron  59 - 158 mcg/dL 80    Iron Saturation  20 - 50 % 33    Transferrin  200 - 360 mg/dL 165Low     TIBC  298 - 536 mcg/dL 246Low              Related Result Highlights         IgG, IgA, IgM  Final result 1/3/2020             Ferritin  Final result 1/3/2020             Basic Metabolic Panel  Final result 1/3/2020                  Ferritin   Order: [id number]   Collected:  1/3/2020 03:43   View Full Report  Component  Ref Range & Units 03:43   Ferritin  30.00 - 400.00 ng/mL 357.00             Related Result Highlights         IgG, IgA, IgM  Final result 1/3/2020             Basic Metabolic Panel  Final result 1/3/2020             Iron Profile  Final result 1/3/2020                  Contains abnormal data IgG, IgA, IgM    Order: [id number]   Collected:  1/3/2020 03:43   View Full Report  Component  Ref Range & Units 03:43   IgG  700-1,600 mg/dL 548Low     IgM  40 - 230 mg/dL 46    IgA  70 - 400 mg/dL 198             Related Result Highlights         Ferritin  Final result 1/3/2020             Basic Metabolic Panel  Final result 1/3/2020             Iron Profile  Final result 1/3/2020                  Contains abnormal data Basic Metabolic Panel   Order: [id number]   Collected:  1/3/2020 03:43   View Full Report  Component  Ref Range & Units 03:43   Glucose  65 - 99 mg/dL 170High     BUN  8 - 23 mg/dL 36High     Creatinine  0.76 - 1.27 mg/dL 1.47High     Sodium  136 - 145 mmol/L 144    Potassium  3.5 - 5.2 mmol/L 4.4    Chloride  98 - 107 mmol/L 103    CO2  22.0 - 29.0 mmol/L 27.5    Calcium  8.6 - 10.5 mg/dL 8.6    eGFR Non African Amer  >60 mL/min/1.73 46Low     BUN/Creatinine Ratio  7.0 - 25.0 24.5    Anion Gap  5.0 - 15.0 mmol/L 13.5       Narrative     GFR Normal >60  Chronic Kidney Disease <60  Kidney Failure <15         Related Result Highlights         IgG, IgA, IgM  Final result 1/3/2020             Ferritin  Final result 1/3/2020             Iron Profile  Final result 1/3/2020                    Component      Latest Ref Rng & Units 6/18/2019 7/23/2019 8/9/2019 9/6/2019   IgG      700-1,600 mg/dL 365 (L) 606 (L) 511 (L) 618 (L)     Component      Latest Ref Rng & Units 10/4/2019 11/1/2019 12/2/2019 1/3/2020   IgG      700-1,600 mg/dL 561 (L) 626 (L) 586 (L) 548 (L)       Assessment/Plan  In summary this patient has myelodysplastic syndrome with 5q minus syndrome. He has been undergoing therapy with Procrit. This has been useful in controlling hemoglobin but unfortunately infection has produced further deterioration of production of red cells. That is a common phenomenon. Typically Procrit under these circumstances is not reimbursed to the hospital therefore we need to wait for him to get his Procrit next week.    From the  point of view of his hypogammaglobulinemia and now he has bronchitis the patient will proceed with infusion of immunoglobulin today. This medicine could be reimbursed.    His white count is low, this is probably related to infection. Maybe that is the reason he does not show any obvious pneumonia in his chest x-ray. He is receiving antibiotics anyway. I expect that his white count will improve some and again the leukopenia is related to his myelodysplasia.     I will follow him up with you.     The patient already has appointments to followup for blood counts, Procrit infusion and doctor visit in the office as well as immunoglobulin infusion on a monthly basis.

## 2020-01-03 NOTE — PLAN OF CARE
Problem: Patient Care Overview  Goal: Plan of Care Review  Outcome: Ongoing (interventions implemented as appropriate)  Flowsheets (Taken 1/3/2020 8051)  Progress: improving  Plan of Care Reviewed With: patient     Aox4. VSS on 5LPM O2 via nasal cannula. Trilogy QHS. IV abx continue to piv per mar. CBC consult called. No complaints of pain. No s/s of distress observed. Will cont to monitor.

## 2020-01-03 NOTE — NURSING NOTE
Patient had a 10:30 AM outpatient appointment scheduled, for labs, and to receive Procrit and IVIG at CBC in AM. CBC group consulted per order. Call back received from Dr. Aaron at [facility] group regarding Heme/Onc consult. Per Dr. Aaron, CBC group will see patient in the AM.

## 2020-01-03 NOTE — PROGRESS NOTES
Progress Note    Name: Ankit Mack . ADMIT: 2020   : 1940  PCP: Gopi Lagos MD    MRN: [id number] LOS: 1 days   AGE/SEX: 79 y.o. male  ROOM: [facility]   Date of Encounter Visit: 20    Subjective:     Interval History: overall feeling better    REVIEW OF SYSTEMS:   no fever or chills.   No chest pain, palpitations.  SOA better. Minimal yellow secretions  No nausea, vomiting or diarrhea. Bm yesterday      Objective:   Temp:  [97.3 °F (36.3 °C)-98.1 °F (36.7 °C)] 97.3 °F (36.3 °C)  Heart Rate:  [66-99] 73  Resp:  [18-24] 18  BP: (126-155)/(65-76) 127/74   SpO2:  [93 %-100 %] 95 %  on  Flow (L/min):  [4-5] 4 Device (Oxygen Therapy): humidified;nasal cannula  No intake or output data in the 24 hours ending 20 1551  Body mass index is 31.92 kg/m².      20  0252 20  1222 20  0500   Weight: 103 kg (227 lb 15.3 oz) 100 kg (220 lb 11.2 oz) 101 kg (222 lb 7.1 oz)     Weight change: 0.408 kg (14.4 oz)    Physical Exam   Constitutional: No distress.   HENT:   Head: Atraumatic.   Nose: Nose normal.   Eyes: Conjunctivae are normal.   Cardiovascular: Normal rate. An irregularly irregular rhythm present.   No murmur heard.  Pulmonary/Chest: Effort normal. He has no wheezes. He has rales (RLL).   Diminished bases   Abdominal: Soft. Bowel sounds are normal. There is no tenderness.   Musculoskeletal: He exhibits edema (trace).   Neurological: He is alert.   Skin: Skin is warm and dry. He is not diaphoretic.       Results Review:      Results from last 7 days   Lab Units 20  0343 20  0554 20  2245   SODIUM mmol/L 144 143 146*   POTASSIUM mmol/L 4.4 4.3 4.3   CHLORIDE mmol/L 103 103 103   CO2 mmol/L 27.5 26.6 28.2   BUN mg/dL 36* 27* 30*   CREATININE mg/dL 1.47* 1.27 1.37*   GLUCOSE mg/dL 170* 205* 180*   CALCIUM mg/dL 8.6 8.9 9.0   AST (SGOT) U/L  --   --  14   ALT (SGPT) U/L  --   --  9     Estimated Creatinine Clearance: 48.5 mL/min (A) (by C-G formula based on SCr  of 1.47 mg/dL (H)).  Results from last 7 days   Lab Units 01/01/20  2245   HEMOGLOBIN A1C % 5.40     Results from last 7 days   Lab Units 01/03/20  1549 01/03/20  1106 01/03/20  0636 01/02/20  2113 01/02/20  1601 01/02/20  1104 01/02/20  0745   GLUCOSE mg/dL 138* 268* 188* 250* 255* 360* 218*     Results from last 7 days   Lab Units 01/01/20  2245   TROPONIN T ng/mL 0.031*     Results from last 7 days   Lab Units 01/01/20  2245   PROBNP pg/mL 7,026.0*               Invalid input(s):  PHOS        Invalid input(s): LDLCALC  Results from last 7 days   Lab Units 01/03/20  0343 01/02/20  0437 01/01/20  2245   WBC 10*3/mm3 1.89* 6.42 7.15  7.15   HEMOGLOBIN g/dL 7.5* 8.6* 9.5*   HEMATOCRIT % 21.9* 25.4* 27.7*   PLATELETS 10*3/mm3 141 162 186   MCV fL 104.8* 106.3* 105.3*   MCH pg 35.9* 36.0* 36.1*   MCHC g/dL 34.2 33.9 34.3   RDW % 14.4 14.4 14.5   RDW-SD fl 54.1* 55.2* 54.9*   MPV fL 12.0 11.8 11.4   NEUTROPHIL % % 62.5  --  68.2   LYMPHOCYTE % % 16.9*  --  12.7*   MONOCYTES % % 15.3*  --  11.6   EOSINOPHIL % % 0.0*  --  4.2   BASOPHIL % % 0.5  --  0.6   IMM GRAN % % 4.8*  --   --    NEUTROS ABS 10*3/mm3 1.18* 5.94 4.88   LYMPHS ABS 10*3/mm3 0.32*  --  0.91   MONOS ABS 10*3/mm3 0.29  --  0.83   EOS ABS 10*3/mm3 0.00 0.07 0.30   BASOS ABS 10*3/mm3 0.01  --  0.04   IMMATURE GRANS (ABS) 10*3/mm3 0.09*  --   --    NRBC /100 WBC 1.6* 1.1*  --      Results from last 7 days   Lab Units 01/01/20 2245   INR  1.30*     Results from last 7 days   Lab Units 01/01/20 2255   PH, ARTERIAL pH units 7.388   PO2 ART mm Hg 52.8*   PCO2, ARTERIAL mm Hg 49.7*   HCO3 ART mmol/L 30.0*     Results from last 7 days   Lab Units 01/02/20  0311 01/01/20 2245   PROCALCITONIN ng/mL  --  0.18   LACTATE mmol/L 2.1* 2.3*             Results from last 7 days   Lab Units 01/02/20  0736 01/01/20  2349 01/01/20  2245   BLOODCX   --  No growth at 24 hours No growth at 24 hours   RESPCX  Moderate growth (3+) Normal Respiratory Harleen  --   --      Results  from last 7 days   Lab Units 01/01/20  2246   ADENOVIRUS DETECTION BY PCR  Not Detected   CORONAVIRUS 229E  Not Detected   CORONAVIRUS HKU1  Not Detected   CORONAVIRUS NL63  Not Detected   CORONAVIRUS OC43  Not Detected   HUMAN METAPNEUMOVIRUS  Not Detected   HUMAN RHINOVIRUS/ENTEROVIRUS  Not Detected   INFLUENZA B PCR  Not Detected   PARAINFLUENZA 1  Not Detected   PARAINFLUENZA VIRUS 2  Not Detected   PARAINFLUENZA VIRUS 3  Not Detected   PARAINFLUENZA VIRUS 4  Not Detected   BORDETELLA PERTUSSIS PCR  Not Detected   CHLAMYDOPHILA PNEUMONIAE PCR  Not Detected   MYCOPLAMA PNEUMO PCR  Not Detected   INFLUENZA A PCR  Not Detected   INFLUENZA A H3  Not Detected   INFLUENZA A H1  Not Detected   RSV, PCR  Not Detected     Results from last 7 days   Lab Units 01/01/20  2357   NITRITE UA  Negative   WBC UA /HPF 0-2   BACTERIA UA /HPF None Seen   SQUAM EPITHEL UA /HPF 0-2           Imaging:  Imaging Results (Last 24 Hours)     ** No results found for the last 24 hours. **          I reviewed the patient's new clinical results and medications.         Medication Review:   Scheduled Meds:  apixaban 5 mg Oral Q12H   atorvastatin 10 mg Oral Daily   budesonide 0.5 mg Nebulization BID - RT   dilTIAZem 60 mg Oral BID   folic acid 1 mg Oral Daily   furosemide 40 mg Oral BID   insulin lispro 0-7 Units Subcutaneous 4x Daily With Meals & Nightly   ipratropium-albuterol 3 mL Nebulization 4x Daily - RT   metFORMIN 1,000 mg Oral Daily With Breakfast   metoprolol tartrate 100 mg Oral Q12H   piperacillin-tazobactam 3.375 g Intravenous Q8H   predniSONE 20 mg Oral BID With Meals   sodium chloride 10 mL Intravenous Q12H     Continuous Infusions:   PRN Meds:.•  acetaminophen **OR** acetaminophen **OR** acetaminophen  •  ALPRAZolam  •  dextrose  •  dextrose  •  glucagon (human recombinant)  •  ipratropium-albuterol  •  ondansetron **OR** ondansetron  •  sodium chloride    Problem List:     Active Hospital Problems    Diagnosis  POA   •  **Pneumonia of right lower lobe due to infectious organism (CMS/[facility]) [J18.1]  Yes   • Hypoxemia [R09.02]  Unknown   • Acute on chronic diastolic (congestive) heart failure (CMS/[facility]) [I50.33]  Yes   • Myelodysplasia (myelodysplastic syndrome) (CMS/[facility]) [D46.9]  Yes   • History of COPD [Z87.09]  Not Applicable   • Type 2 diabetes mellitus without complication, without long-term current use of insulin (CMS/[facility]) [E11.9]  Yes   • Paroxysmal atrial fibrillation (CMS/[facility]) [I48.0]  Yes   • DOMINGO on CPAP [G47.33, Z99.89]  Not Applicable   • Cancer of lower lobe of lung (CMS/[facility]) [C34.30]  Yes   • Anxiety [F41.9]  Yes   • Hyperlipidemia [E78.5]  Yes   • Essential hypertension [I10]  Yes      Resolved Hospital Problems   No resolved problems to display.       Assessment and Plan:     Acute on chronic respiratory failure  -PO2 58% on 6 LPM on admit.   -wean oxygen to keep saturations greater than 88% to goal 4 LPM    DOMINGO/ nocturnal hypoxia  -continue home trilogy with O2 bled in and wean oxygen to goal of home dose of 7 LPM.     MDS/ Anemia/ hypogammaglobinemia  -WBC down to 1.89 and Hgb down to 7.5   - appreciate heme/onc input  - plan for procrit as outpatient given issues with reimbursement while inpatient  - IVIG today    CAP/COPD with AE  -appreciate pulmonary input  - viral panel negative and CXR unremarkable. Respiratory culture showed moderate growth, but is not final  - switch zosyn to Augmentin for total course of 5 days  -continue steroids and bronchodilators     DM  -sugars increased likely from steroids. A1c 5.4  -continue metformin and SS insulin    Diastolic CHF, acute on chronic  -appreciate cards input.   -continue lasix BID. Creatinine slightly increased and will repeat in am     PAF  -rate controlled on cardizem and lopressor  -on home Eliquis     VTE prophylaxis: Eliquis (home med)  CODE status: full code  Disposition: TBD- likely dc in 1-2 days pending oxygen needs.     I discussed the patients findings and  my recommendations with patient. Patient is new to me and I reviewed prior imaging and notes.     SCOTTY Mojica  01/03/20  3:51 PM

## 2020-01-03 NOTE — PROGRESS NOTES
Discharge Planning Assessment  [facility]     Patient Name: Ankit Mack Sr.  MRN: [id number]  Today's Date: 1/3/2020    Admit Date: 1/1/2020    Discharge Needs Assessment     Row Name 01/03/20 1406       Living Environment    Lives With  child(reece), adult    Current Living Arrangements  home/apartment/condo    Potentially Unsafe Housing Conditions  other (see comments) no concerns     Primary Care Provided by  self    Provides Primary Care For  no one    Family Caregiver if Needed  child(reece), adult    Quality of Family Relationships  helpful;involved;supportive    Able to Return to Prior Arrangements  yes       Resource/Environmental Concerns    Resource/Environmental Concerns  none    Transportation Concerns  car, none       Transition Planning    Patient/Family Anticipates Transition to  home    Patient/Family Anticipated Services at Transition  home health care    Transportation Anticipated  family or friend will provide       Discharge Needs Assessment    Readmission Within the Last 30 Days  no previous admission in last 30 days    Concerns to be Addressed  no discharge needs identified;denies needs/concerns at this time    Equipment Currently Used at Home  grab bar;shower chair;wheelchair;nebulizer;oxygen    Anticipated Changes Related to Illness  none    Equipment Needed After Discharge  none    Outpatient/Agency/Support Group Needs  homecare agency    Discharge Facility/Level of Care Needs  home with home health    Provided post acute provider list?  Yes    Post Acute Provider Lists  Home Health    Post Acuite Provider List  Delivered    Delivered To  Patient    Method of Delivery  In person        Discharge Plan     Row Name 01/03/20 1407       Plan    Plan  Home with [facility]     Post Acute Provider Lists  Home Health;Nursing Home    Post Acuite Provider List  Delivered    Delivered To  Patient    Method of Delivery  In person    Patient/Family in Agreement with Plan  yes    Plan Comments  [facility] met with  patient at bedside. CCP role explained and discharge planning discussed. Face sheet verified and IMM noted. Patient's PCP is Dr. Lagos. Patient lives with his daughter, with two steps to the entrance of his home. Patient's bedroom and bathroom are on the main level. Patient has grab bars and shower chair present in his bathroom. Patient has a wheelchair he uses for long distances. Patient has home 02 (24/7), nebulizer and Trilogy through [facility]. Patient is independent with his ADLS and does not use DME. Patient uses [facility] in [place] for his medications. Patient has used [facility] and has been to [facility] in the past for SNF. CCP discussed home health due to readmission to the hospital; patient agreeable and would like to use [facility] again. Referral sent in Epic. CCP will follow for any needs to arise. Keke Whitehead CSW          Destination      Coordination has not been started for this encounter.      Durable Medical Equipment      Coordination has not been started for this encounter.      Dialysis/Infusion      Coordination has not been started for this encounter.      Home Medical Care      Service Provider Request Status Selected Services Address Phone Number Fax Number    [facility] Pending - Request Sent N/A [address] [address] [phone]      Therapy      Coordination has not been started for this encounter.      Community Resources      Coordination has not been started for this encounter.          Demographic Summary     Row Name 01/03/20 140       General Information    Admission Type  inpatient    Arrived From  emergency department    Required Notices Provided  Important Message from Medicare    Referral Source  admission list    Reason for Consult  discharge planning    Preferred Language  English     Used During This Interaction  no        Functional Status     Row Name 01/03/20 1403       Functional Status    Usual  Activity Tolerance  good    Current Activity Tolerance  good       Functional Status, IADL    Medications  independent    Meal Preparation  independent    Housekeeping  independent    Laundry  independent    Shopping  independent       Mental Status    General Appearance WDL  WDL       Mental Status Summary    Recent Changes in Mental Status/Cognitive Functioning  no changes        Psychosocial    No documentation.       Abuse/Neglect    No documentation.       Legal    No documentation.       Substance Abuse    No documentation.       Patient Forms    No documentation.           BRYN Carrera

## 2020-01-03 NOTE — DISCHARGE PLACEMENT REQUEST
"Tatiana Mack Sr. (79 y.o. Male)     Date of Birth Social Security Number Address Home Phone MRN    1940  [id number]   [facility] [address] [phone] [id number]    Holiness Marital Status          Orthodoxy Single       Admission Date Admission Type Admitting Provider Attending Provider Department, Room/Bed    1/1/20 Emergency Bull Cruz MD Jackson, Alan David, MD [address], [address]    Discharge Date Discharge Disposition Discharge Destination                       Attending Provider:  Attila San MD    Allergies:  Erythromycin    Isolation:  None   Infection:  None   Code Status:  CPR    Ht:  177.8 cm (70\")   Wt:  101 kg (222 lb 7.1 oz)    Admission Cmt:  None   Principal Problem:  Pneumonia of right lower lobe due to infectious organism (CMS/[facility]) [J18.1]                 Active Insurance as of 1/1/2020     Primary Coverage     Payor Plan Insurance Group Employer/Plan Group    MEDICARE MEDICARE A & B      Payor Plan Address Payor Plan Phone Number Payor Plan Fax Number Effective Dates    [address] [phone]  2/1/2005 - None Entered    [facility] [id number]       Subscriber Name Subscriber Birth Date Member ID       TATIANA MACK SR. 1940 [id number]           Secondary Coverage     Payor Plan Insurance Group Employer/Plan Group    [facility] SUPP [id number]     Payor Plan Address Payor Plan Phone Number Payor Plan Fax Number Effective Dates    [address]   12/1/2016 - None Entered    [facility] [id number]       Subscriber Name Subscriber Birth Date Member ID       TATIANA MACK SR. 1940 [id number]                 Emergency Contacts      (Rel.) Home Phone Work Phone Mobile Phone    Say Villar (Power of ) -- [phone]    aleksandra villar (Daughter) [phone] -- [phone]    dorinda morales (Grandchild) -- -- [phone]    Afsaneh Manzanares (Daughter) [phone] -- [phone]    "

## 2020-01-04 VITALS
SYSTOLIC BLOOD PRESSURE: 140 MMHG | TEMPERATURE: 98.2 F | HEIGHT: 70 IN | OXYGEN SATURATION: 94 % | DIASTOLIC BLOOD PRESSURE: 81 MMHG | BODY MASS INDEX: 31.69 KG/M2 | RESPIRATION RATE: 20 BRPM | WEIGHT: 221.34 LBS | HEART RATE: 78 BPM

## 2020-01-04 LAB
ANION GAP SERPL CALCULATED.3IONS-SCNC: 13 MMOL/L (ref 5–15)
BACTERIA SPEC RESP CULT: NORMAL
BUN BLD-MCNC: 34 MG/DL (ref 8–23)
BUN/CREAT SERPL: 24.6 (ref 7–25)
CALCIUM SPEC-SCNC: 8.9 MG/DL (ref 8.6–10.5)
CHLORIDE SERPL-SCNC: 97 MMOL/L (ref 98–107)
CO2 SERPL-SCNC: 29 MMOL/L (ref 22–29)
CREAT BLD-MCNC: 1.38 MG/DL (ref 0.76–1.27)
DEPRECATED RDW RBC AUTO: 53.8 FL (ref 37–54)
ERYTHROCYTE [DISTWIDTH] IN BLOOD BY AUTOMATED COUNT: 14.5 % (ref 12.3–15.4)
GFR SERPL CREATININE-BSD FRML MDRD: 50 ML/MIN/1.73
GLUCOSE BLD-MCNC: 152 MG/DL (ref 65–99)
GLUCOSE BLDC GLUCOMTR-MCNC: 137 MG/DL (ref 70–130)
GLUCOSE BLDC GLUCOMTR-MCNC: 164 MG/DL (ref 70–130)
GRAM STN SPEC: NORMAL
HCT VFR BLD AUTO: 24.4 % (ref 37.5–51)
HGB BLD-MCNC: 8.3 G/DL (ref 13–17.7)
MCH RBC QN AUTO: 34.9 PG (ref 26.6–33)
MCHC RBC AUTO-ENTMCNC: 34 G/DL (ref 31.5–35.7)
MCV RBC AUTO: 102.5 FL (ref 79–97)
PLATELET # BLD AUTO: 160 10*3/MM3 (ref 140–450)
PMV BLD AUTO: 11.9 FL (ref 6–12)
POTASSIUM BLD-SCNC: 4.4 MMOL/L (ref 3.5–5.2)
RBC # BLD AUTO: 2.38 10*6/MM3 (ref 4.14–5.8)
SODIUM BLD-SCNC: 139 MMOL/L (ref 136–145)
WBC NRBC COR # BLD: 2.91 10*3/MM3 (ref 3.4–10.8)

## 2020-01-04 PROCEDURE — 63710000001 PREDNISONE PER 1 MG: Performed by: INTERNAL MEDICINE

## 2020-01-04 PROCEDURE — 99232 SBSQ HOSP IP/OBS MODERATE 35: CPT | Performed by: INTERNAL MEDICINE

## 2020-01-04 PROCEDURE — 82962 GLUCOSE BLOOD TEST: CPT

## 2020-01-04 PROCEDURE — 80048 BASIC METABOLIC PNL TOTAL CA: CPT | Performed by: HOSPITALIST

## 2020-01-04 PROCEDURE — 94799 UNLISTED PULMONARY SVC/PX: CPT

## 2020-01-04 PROCEDURE — 63710000001 INSULIN LISPRO (HUMAN) PER 5 UNITS: Performed by: HOSPITALIST

## 2020-01-04 PROCEDURE — 85027 COMPLETE CBC AUTOMATED: CPT | Performed by: NURSE PRACTITIONER

## 2020-01-04 RX ORDER — PREDNISONE 20 MG/1
20 TABLET ORAL 2 TIMES DAILY WITH MEALS
Qty: 3 TABLET | Refills: 0 | Status: SHIPPED | OUTPATIENT
Start: 2020-01-04 | End: 2020-01-07

## 2020-01-04 RX ORDER — AMOXICILLIN AND CLAVULANATE POTASSIUM 875; 125 MG/1; MG/1
1 TABLET, FILM COATED ORAL EVERY 12 HOURS SCHEDULED
Qty: 16 TABLET | Refills: 0 | Status: SHIPPED | OUTPATIENT
Start: 2020-01-04 | End: 2020-01-12

## 2020-01-04 RX ADMIN — SODIUM CHLORIDE, PRESERVATIVE FREE 10 ML: 5 INJECTION INTRAVENOUS at 08:35

## 2020-01-04 RX ADMIN — IPRATROPIUM BROMIDE AND ALBUTEROL SULFATE 3 ML: 2.5; .5 SOLUTION RESPIRATORY (INHALATION) at 08:40

## 2020-01-04 RX ADMIN — IPRATROPIUM BROMIDE AND ALBUTEROL SULFATE 3 ML: 2.5; .5 SOLUTION RESPIRATORY (INHALATION) at 01:11

## 2020-01-04 RX ADMIN — FUROSEMIDE 40 MG: 40 TABLET ORAL at 08:34

## 2020-01-04 RX ADMIN — APIXABAN 5 MG: 5 TABLET, FILM COATED ORAL at 08:34

## 2020-01-04 RX ADMIN — ATORVASTATIN CALCIUM 10 MG: 10 TABLET, FILM COATED ORAL at 08:34

## 2020-01-04 RX ADMIN — IPRATROPIUM BROMIDE AND ALBUTEROL SULFATE 3 ML: 2.5; .5 SOLUTION RESPIRATORY (INHALATION) at 12:23

## 2020-01-04 RX ADMIN — METFORMIN HYDROCHLORIDE 1000 MG: 1000 TABLET ORAL at 08:34

## 2020-01-04 RX ADMIN — METOPROLOL TARTRATE 100 MG: 100 TABLET, FILM COATED ORAL at 08:35

## 2020-01-04 RX ADMIN — PREDNISONE 20 MG: 20 TABLET ORAL at 08:34

## 2020-01-04 RX ADMIN — AMOXICILLIN AND CLAVULANATE POTASSIUM 1 TABLET: 875; 125 TABLET, FILM COATED ORAL at 08:34

## 2020-01-04 RX ADMIN — FOLIC ACID 1 MG: 1 TABLET ORAL at 08:34

## 2020-01-04 RX ADMIN — IPRATROPIUM BROMIDE AND ALBUTEROL SULFATE 3 ML: 2.5; .5 SOLUTION RESPIRATORY (INHALATION) at 16:03

## 2020-01-04 RX ADMIN — BUDESONIDE 0.5 MG: 0.5 SUSPENSION RESPIRATORY (INHALATION) at 08:40

## 2020-01-04 RX ADMIN — INSULIN LISPRO 2 UNITS: 100 INJECTION, SOLUTION INTRAVENOUS; SUBCUTANEOUS at 08:33

## 2020-01-04 RX ADMIN — DILTIAZEM HYDROCHLORIDE 60 MG: 60 TABLET, FILM COATED ORAL at 08:34

## 2020-01-04 NOTE — DISCHARGE SUMMARY
PHYSICIAN DISCHARGE SUMMARY                                                                        [facility]    Patient Identification:  Name: Ankit Mack Sr.  Age: 79 y.o.  Sex: male  :  1940  MRN: [id number]  Primary Care Physician: Gopi Lagos MD    Admit date: 2020  Discharge date and time:2020  Discharged Condition: good    Discharge Diagnoses:  Active Hospital Problems    Diagnosis  POA   • **Pneumonia of right lower lobe due to infectious organism (CMS/[facility]) [J18.1]  Yes   • Hypogammaglobulinemia (CMS/[facility]) [D80.1]  Unknown   • Hypoxemia [R09.02]  Unknown   • Acute on chronic diastolic (congestive) heart failure (CMS/[facility]) [I50.33]  Yes   • Myelodysplasia (myelodysplastic syndrome) (CMS/[facility]) [D46.9]  Yes   • History of COPD [Z87.09]  Not Applicable   • Type 2 diabetes mellitus without complication, without long-term current use of insulin (CMS/[facility]) [E11.9]  Yes   • Paroxysmal atrial fibrillation (CMS/[facility]) [I48.0]  Yes   • DOMINGO on CPAP [G47.33, Z99.89]  Not Applicable   • Cancer of lower lobe of lung (CMS/[facility]) [C34.30]  Yes   • Anxiety [F41.9]  Yes   • Hyperlipidemia [E78.5]  Yes   • Essential hypertension [I10]  Yes      Resolved Hospital Problems   No resolved problems to display.          PMHX:   Past Medical History:   Diagnosis Date   • Anemia    • Anxiety    • Atrial flutter (CMS/[facility])    • Chronic diastolic congestive heart failure (CMS/[facility]) 2018   • Chronic respiratory failure with hypoxia (CMS/[facility])    • COPD (chronic obstructive pulmonary disease) (CMS/[facility])    • Essential hypertension    • GERD (gastroesophageal reflux disease)    • H/O complete eye exam 2018   • History of pulmonary function tests 06/10/2014   • Hyperglycemia    • Hyperlipidemia    • Lung cancer (CMS/[facility]) 2012    Left   • MDS (myelodysplastic syndrome) (CMS/[facility])    • Nonrheumatic aortic valve stenosis     mild 2019   •  Obesity    • Osteoarthritis, knee    • Paroxysmal atrial fibrillation (CMS/HCC)    • Prostate cancer (CMS/HCC) 2000   • Seizures (CMS/HCC)    • Sleep apnea    • Thoracic aortic aneurysm without rupture (CMS/[facility])     s/p stenting of descending thoracic aneurysm; the ascending aorta measured 3.7cm in 2019   • Type 2 diabetes mellitus without complication, without long-term current use of insulin (CMS/[facility])      PSHX:   Past Surgical History:   Procedure Laterality Date   • ABDOMINAL AORTIC ANEURYSM REPAIR  2010    Dr. Adarsh Dennis   • CARDIAC CATHETERIZATION Left 02/06/2004   • CATARACT EXTRACTION  10/2014    also 11/14   • COLONOSCOPY     • ENDOVASCULAR THORACIC ANEURYSM REPAIR     • JOINT REPLACEMENT Left 10/2010    knee; Dr. Wolf   • JOINT REPLACEMENT Right 09/2011    knee; Dr. Wolf   • LUNG LOBECTOMY Left 01/16/2012    LLL; Dr. Mendoza   • LUNG LOBECTOMY Left 2012   • PROSTATE SURGERY  2000    Dr. Naranjo   • VASCULAR SURGERY  05/2009    TEVAR of thoracic aortic aneurysm   • VASCULAR SURGERY  12/15/2003    Left carotid subclavian bypass graft, ligation of proximal left subclavian following carotid subclavian bypass. Right femoral sheath. Arch angiography with descending thoracic and abdominal aortography    • VASCULAR SURGERY  11/17/2003    Selective catheterization right vertebral artery, right subclavian artery, left subclavian artery, left vertebral artery.     • VASCULAR SURGERY  06/05/2002    Tracheostomy, bronchoscopy   • VASCULAR SURGERY  05/21/2002    Thoracic arteriogram and abdominal aortogram       Hospital Course: Ankit Mack Sr.  is a 79-year-old white male with history of anxiety, a flutter, chronic diastolic heart failure, chronic respiratory failure, COPD, hypertension, hyperlipidemia, myelodysplastic syndrome, paroxysmal atrial fib, prostate cancer, seizures, sleep apnea and type 2 diabetes who was admitted with history of increasing shortness of air with cough congestion and wheezing. The  patient was evaluated in the ER and appeared to have some degree of pneumonia on top of his chronic CHF. He has had a low-grade fever. He was started on some broad-spectrum IV antibiotics and cultures were obtained. He is gotten some bronchodilator treatments and is admitted for further evaluation treatment of pneumonia on top of CHF.          The patient was admitted to hospital and seen by cardiology hematology and pulmonary.  He was treated with antibiotics for pneumonia and received oxygen bronchodilators and was using his trilogy machine.  The patient was feeling better after being in the hospital for a few days and looked well enough to go home.  He will continue with a few more days of oral antibiotics and also some prednisone for a few more days.  He will continue with his oxygen at home and his trilogy machine and most of his other medicines.  He will follow-up with his primary care in 1 week for continuing care and also will follow-up with his specialist.    Consults:     Consults     Date and Time Order Name Status Description    1/2/2020 1955 Hematology & Oncology Inpatient Consult Completed     1/2/2020 0453 Inpatient Cardiology Consult Completed     1/2/2020 0422 Inpatient Pulmonology Consult Completed     1/1/2020 2337 [facility] (on-call MD unless specified) Details Completed     12/4/2019 2159 Inpatient Pulmonology Consult Completed     12/3/2019 1721 Inpatient Cardiology Consult Completed     12/3/2019 1720 Inpatient Pulmonology Consult Completed     12/3/2019 1357 [facility] (on-call MD unless specified) Details Completed         Results from last 7 days   Lab Units 01/04/20  0441   WBC 10*3/mm3 2.91*   HEMOGLOBIN g/dL 8.3*   HEMATOCRIT % 24.4*   PLATELETS 10*3/mm3 160     Results from last 7 days   Lab Units 01/04/20  0441   SODIUM mmol/L 139   POTASSIUM mmol/L 4.4   CHLORIDE mmol/L 97*   CO2 mmol/L 29.0   BUN mg/dL 34*   CREATININE mg/dL 1.38*   GLUCOSE mg/dL 152*   CALCIUM mg/dL 8.9     Significant  Diagnostic Studies:   WBC   Date Value Ref Range Status   01/04/2020 2.91 (L) 3.40 - 10.80 10*3/mm3 Final   01/01/2020 7.15 3.40 - 10.80 10*3/mm3 Final     Hemoglobin   Date Value Ref Range Status   01/04/2020 8.3 (L) 13.0 - 17.7 g/dL Final     Hematocrit   Date Value Ref Range Status   01/04/2020 24.4 (L) 37.5 - 51.0 % Final     Platelets   Date Value Ref Range Status   01/04/2020 160 140 - 450 10*3/mm3 Final     Sodium   Date Value Ref Range Status   01/04/2020 139 136 - 145 mmol/L Final     Potassium   Date Value Ref Range Status   01/04/2020 4.4 3.5 - 5.2 mmol/L Final     Chloride   Date Value Ref Range Status   01/04/2020 97 (L) 98 - 107 mmol/L Final     CO2   Date Value Ref Range Status   01/04/2020 29.0 22.0 - 29.0 mmol/L Final     BUN   Date Value Ref Range Status   01/04/2020 34 (H) 8 - 23 mg/dL Final     Creatinine   Date Value Ref Range Status   01/04/2020 1.38 (H) 0.76 - 1.27 mg/dL Final     Glucose   Date Value Ref Range Status   01/04/2020 152 (H) 65 - 99 mg/dL Final     Calcium   Date Value Ref Range Status   01/04/2020 8.9 8.6 - 10.5 mg/dL Final     AST (SGOT)   Date Value Ref Range Status   01/01/2020 14 1 - 40 U/L Final     ALT (SGPT)   Date Value Ref Range Status   01/01/2020 9 1 - 41 U/L Final     Alkaline Phosphatase   Date Value Ref Range Status   01/01/2020 80 39 - 117 U/L Final     INR   Date Value Ref Range Status   01/01/2020 1.30 (H) 0.90 - 1.10 Final     Color, UA   Date Value Ref Range Status   01/01/2020 Yellow Yellow, Straw Final     Appearance, UA   Date Value Ref Range Status   01/01/2020 Clear Clear Final     pH, UA   Date Value Ref Range Status   01/01/2020 7.0 5.0 - 8.0 Final     Glucose, UA   Date Value Ref Range Status   01/01/2020 Negative Negative Final     Ketones, UA   Date Value Ref Range Status   01/01/2020 Negative Negative Final     Blood, UA   Date Value Ref Range Status   01/01/2020 Small (1+) (A) Negative Final     Leuk Esterase, UA   Date Value Ref Range Status      01/01/2020 Negative Negative Final     Bilirubin, UA   Date Value Ref Range Status   01/01/2020 Negative Negative Final     Urobilinogen, UA   Date Value Ref Range Status   01/01/2020 1.0 E.U./dL 0.2 - 1.0 E.U./dL Final     RBC, UA   Date Value Ref Range Status   01/01/2020 13-20 (A) None Seen, 0-2 /HPF Final     WBC, UA   Date Value Ref Range Status   01/01/2020 0-2 None Seen, 0-2 /HPF Final     Bacteria, UA   Date Value Ref Range Status   01/01/2020 None Seen None Seen /HPF Final     Troponin T   Date Value Ref Range Status   01/01/2020 0.031 (C) 0.000 - 0.030 ng/mL Final     No components found for: HGBA1C;2  No components found for: TSH;2  Imaging Results (All)     Procedure Component Value Units Date/Time    XR Chest 1 View [[id number]] Collected:  01/01/20 2338     Updated:  01/01/20 2342    Narrative:       PORTABLE CHEST RADIOGRAPH     HISTORY: Severe weakness     COMPARISON: 12/03/2019     FINDINGS:  Cardiomegaly is identified. There does appear to be vascular congestion.  No pneumothorax is seen. There is bibasilar scarring. Patient has a  thoracic aortic stent graft. Cardiac silhouette is stable. Postsurgical  changes are seen within the left supraclavicular region. There may be a  trace left pleural effusion.       Impression:       Vascular congestion. Similar findings were present on the prior exam.     This report was finalized on 1/1/2020 11:39 PM by Dr. Yvonne Barrios M.D.           Lab Results (last 7 days)     Procedure Component Value Units Date/Time    Respiratory Culture - Sputum, Cough [[id number]] Collected:  01/02/20 0736    Specimen:  Sputum from Cough Updated:  01/04/20 1249     Respiratory Culture Moderate growth (3+) Normal Respiratory Harleen     Gram Stain Few (2+) Mixed bacterial morphotypes seen on Gram Stain      Rare (1+) WBCs per low power field      Few (2+) Epithelial cells per low power field    POC Glucose Once [[id number]]  (Abnormal) Collected:  01/04/20 1107    Specimen:   Blood Updated:  01/04/20 1125     Glucose 137 mg/dL     POC Glucose Once [[id number]]  (Abnormal) Collected:  01/04/20 0635    Specimen:  Blood Updated:  01/04/20 0638     Glucose 164 mg/dL     CBC (No Diff) [[id number]]  (Abnormal) Collected:  01/04/20 0441    Specimen:  Blood Updated:  01/04/20 0607     WBC 2.91 10*3/mm3      RBC 2.38 10*6/mm3      Hemoglobin 8.3 g/dL      Hematocrit 24.4 %      .5 fL      MCH 34.9 pg      MCHC 34.0 g/dL      RDW 14.5 %      RDW-SD 53.8 fl      MPV 11.9 fL      Platelets 160 10*3/mm3     Basic Metabolic Panel [[id number]]  (Abnormal) Collected:  01/04/20 0441    Specimen:  Blood Updated:  01/04/20 0606     Glucose 152 mg/dL      BUN 34 mg/dL      Creatinine 1.38 mg/dL      Sodium 139 mmol/L      Potassium 4.4 mmol/L      Chloride 97 mmol/L      CO2 29.0 mmol/L      Calcium 8.9 mg/dL      eGFR Non African Amer 50 mL/min/1.73      BUN/Creatinine Ratio 24.6     Anion Gap 13.0 mmol/L     Narrative:       GFR Normal >60  Chronic Kidney Disease <60  Kidney Failure <15      Blood Culture - Blood, Arm, Right [[id number]] Collected:  01/01/20 2349    Specimen:  Blood from Arm, Right Updated:  01/04/20 0000     Blood Culture No growth at 2 days    Blood Culture - Blood, Arm, Left [[id number]] Collected:  01/01/20 2245    Specimen:  Blood from Arm, Left Updated:  01/03/20 2300     Blood Culture No growth at 2 days    POC Glucose Once [[id number]]  (Abnormal) Collected:  01/03/20 2109    Specimen:  Blood Updated:  01/03/20 2128     Glucose 172 mg/dL     POC Glucose Once [[id number]]  (Abnormal) Collected:  01/03/20 1549    Specimen:  Blood Updated:  01/03/20 1551     Glucose 138 mg/dL     POC Glucose Once [[id number]]  (Abnormal) Collected:  01/03/20 1106    Specimen:  Blood Updated:  01/03/20 1108     Glucose 268 mg/dL     IgG, IgA, IgM [[id number]]  (Abnormal) Collected:  01/03/20 0343    Specimen:  Blood Updated:  01/03/20 0751     IgG 548 mg/dL      IgM 46 mg/dL      IgA 198 mg/dL     POC  Glucose Once [[id number]]  (Abnormal) Collected:  01/03/20 0636    Specimen:  Blood Updated:  01/03/20 0637     Glucose 188 mg/dL     Ferritin [[id number]]  (Normal) Collected:  01/03/20 0343    Specimen:  Blood Updated:  01/03/20 0448     Ferritin 357.00 ng/mL     Basic Metabolic Panel [[id number]]  (Abnormal) Collected:  01/03/20 0343    Specimen:  Blood Updated:  01/03/20 0439     Glucose 170 mg/dL      BUN 36 mg/dL      Creatinine 1.47 mg/dL      Sodium 144 mmol/L      Potassium 4.4 mmol/L      Chloride 103 mmol/L      CO2 27.5 mmol/L      Calcium 8.6 mg/dL      eGFR Non African Amer 46 mL/min/1.73      BUN/Creatinine Ratio 24.5     Anion Gap 13.5 mmol/L     Narrative:       GFR Normal >60  Chronic Kidney Disease <60  Kidney Failure <15      Iron Profile [[id number]]  (Abnormal) Collected:  01/03/20 0343    Specimen:  Blood Updated:  01/03/20 0439     Iron 80 mcg/dL      Iron Saturation 33 %      Transferrin 165 mg/dL      TIBC 246 mcg/dL     CBC & Differential [[id number]] Collected:  01/03/20 0343    Specimen:  Blood Updated:  01/03/20 0412    Narrative:       The following orders were created for panel order CBC & Differential.  Procedure                               Abnormality         Status                     ---------                               -----------         ------                     CBC Auto Differential[[id number]]        Abnormal            Final result                 Please view results for these tests on the individual orders.    CBC Auto Differential [[id number]]  (Abnormal) Collected:  01/03/20 0343    Specimen:  Blood Updated:  01/03/20 0412     WBC 1.89 10*3/mm3      RBC 2.09 10*6/mm3      Hemoglobin 7.5 g/dL      Hematocrit 21.9 %      .8 fL      MCH 35.9 pg      MCHC 34.2 g/dL      RDW 14.4 %      RDW-SD 54.1 fl      MPV 12.0 fL      Platelets 141 10*3/mm3      Neutrophil % 62.5 %      Lymphocyte % 16.9 %      Monocyte % 15.3 %      Eosinophil % 0.0 %      Basophil % 0.5 %       Immature Grans % 4.8 %      Neutrophils, Absolute 1.18 10*3/mm3      Lymphocytes, Absolute 0.32 10*3/mm3      Monocytes, Absolute 0.29 10*3/mm3      Eosinophils, Absolute 0.00 10*3/mm3      Basophils, Absolute 0.01 10*3/mm3      Immature Grans, Absolute 0.09 10*3/mm3      nRBC 1.6 /100 WBC     POC Glucose Once [[id number]]  (Abnormal) Collected:  01/02/20 2113    Specimen:  Blood Updated:  01/02/20 2114     Glucose 250 mg/dL     POC Glucose Once [[id number]]  (Abnormal) Collected:  01/02/20 1601    Specimen:  Blood Updated:  01/02/20 1606     Glucose 255 mg/dL     POC Glucose Once [[id number]]  (Abnormal) Collected:  01/02/20 1104    Specimen:  Blood Updated:  01/02/20 1118     Glucose 360 mg/dL     S. Pneumo Ag Urine or CSF - Urine, Urine, Clean Catch [[id number]]  (Normal) Collected:  01/01/20 2357    Specimen:  Urine, Clean Catch Updated:  01/02/20 0822     Strep Pneumo Ag Negative    Legionella Antigen, Urine - Urine, Urine, Clean Catch [[id number]]  (Normal) Collected:  01/01/20 2357    Specimen:  Urine, Clean Catch Updated:  01/02/20 0821     LEGIONELLA ANTIGEN, URINE Negative    POC Glucose Once [[id number]]  (Abnormal) Collected:  01/02/20 0745    Specimen:  Blood Updated:  01/02/20 0746     Glucose 218 mg/dL     Basic Metabolic Panel [[id number]]  (Abnormal) Collected:  01/02/20 0554    Specimen:  Blood Updated:  01/02/20 0651     Glucose 205 mg/dL      BUN 27 mg/dL      Creatinine 1.27 mg/dL      Sodium 143 mmol/L      Potassium 4.3 mmol/L      Chloride 103 mmol/L      CO2 26.6 mmol/L      Calcium 8.9 mg/dL      eGFR Non African Amer 55 mL/min/1.73      BUN/Creatinine Ratio 21.3     Anion Gap 13.4 mmol/L     Narrative:       GFR Normal >60  Chronic Kidney Disease <60  Kidney Failure <15      Manual Differential [[id number]]  (Abnormal) Collected:  01/02/20 0437    Specimen:  Blood Updated:  01/02/20 0554     Neutrophil % 92.6 %      Lymphocyte % 6.4 %      Eosinophil % 1.1 %      Neutrophils Absolute 5.94 10*3/mm3       Lymphocytes Absolute 0.41 10*3/mm3      Eosinophils Absolute 0.07 10*3/mm3      nRBC 1.1 /100 WBC      Anisocytosis Mod/2+     Polychromasia Mod/2+     WBC Morphology Normal     Platelet Morphology Normal    CBC Auto Differential [[id number]]  (Abnormal) Collected:  01/02/20 0437    Specimen:  Blood Updated:  01/02/20 0554     WBC 6.42 10*3/mm3      RBC 2.39 10*6/mm3      Hemoglobin 8.6 g/dL      Hematocrit 25.4 %      .3 fL      MCH 36.0 pg      MCHC 33.9 g/dL      RDW 14.4 %      RDW-SD 55.2 fl      MPV 11.8 fL      Platelets 162 10*3/mm3     Hemoglobin A1c [[id number]]  (Normal) Collected:  01/01/20 2245    Specimen:  Blood Updated:  01/02/20 0458     Hemoglobin A1C 5.40 %     Narrative:       Hemoglobin A1C Ranges:    Increased Risk for Diabetes  5.7% to 6.4%  Diabetes                     >= 6.5%  Diabetic Goal                < 7.0%    Lactic Acid, Reflex [[id number]]  (Abnormal) Collected:  01/02/20 0311    Specimen:  Blood Updated:  01/02/20 0352     Lactate 2.1 mmol/L     Lactic Acid, Reflex Timer (This will reflex a repeat order 3-3:15 hours after ordered.) [[id number]] Collected:  01/01/20 2245    Specimen:  Blood Updated:  01/02/20 0230     Extra Tube Hold for add-ons.     Comment: Auto resulted.       Respiratory Panel, PCR - Swab, Nasopharynx [[id number]]  (Normal) Collected:  01/01/20 2246    Specimen:  Swab from Nasopharynx Updated:  01/02/20 0013     ADENOVIRUS, PCR Not Detected     Coronavirus 229E Not Detected     Coronavirus HKU1 Not Detected     Coronavirus NL63 Not Detected     Coronavirus OC43 Not Detected     Human Metapneumovirus Not Detected     Human Rhinovirus/Enterovirus Not Detected     Influenza B PCR Not Detected     Parainfluenza Virus 1 Not Detected     Parainfluenza Virus 2 Not Detected     Parainfluenza Virus 3 Not Detected     Parainfluenza Virus 4 Not Detected     Bordetella pertussis pcr Not Detected     Influenza A H1 2009 PCR Not Detected     Chlamydophila pneumoniae PCR  "Not Detected     Mycoplasma pneumo by PCR Not Detected     Influenza A PCR Not Detected     Influenza A H3 Not Detected     Influenza A H1 Not Detected     RSV, PCR Not Detected     Bordetella parapertussis PCR Not Detected    Urinalysis With Microscopic If Indicated (No Culture) - Urine, Clean Catch [[id number]]  (Abnormal) Collected:  01/01/20 2357    Specimen:  Urine, Clean Catch Updated:  01/02/20 0010     Color, UA Yellow     Appearance, UA Clear     pH, UA 7.0     Specific Gravity, UA 1.019     Glucose, UA Negative     Ketones, UA Negative     Bilirubin, UA Negative     Blood, UA Small (1+)     Protein, UA >=300 mg/dL (3+)     Leuk Esterase, UA Negative     Nitrite, UA Negative     Urobilinogen, UA 1.0 E.U./dL    Urinalysis, Microscopic Only - Urine, Clean Catch [[id number]]  (Abnormal) Collected:  01/01/20 2357    Specimen:  Urine, Clean Catch Updated:  01/02/20 0010     RBC, UA 13-20 /HPF      WBC, UA 0-2 /HPF      Bacteria, UA None Seen /HPF      Squamous Epithelial Cells, UA 0-2 /HPF      Hyaline Casts, UA 0-2 /LPF      Methodology Automated Microscopy    Procalcitonin [[id number]]  (Normal) Collected:  01/01/20 2245    Specimen:  Blood Updated:  01/01/20 2338     Procalcitonin 0.18 ng/mL     Narrative:       As a Marker for Sepsis (Non-Neonates):   1. <0.5 ng/mL represents a low risk of severe sepsis and/or septic shock.  1. >2 ng/mL represents a high risk of severe sepsis and/or septic shock.    As a Marker for Lower Respiratory Tract Infections that require antibiotic therapy:  PCT on Admission     Antibiotic Therapy             6-12 Hrs later  > 0.5                Strongly Recommended            >0.25 - <0.5         Recommended  0.1 - 0.25           Discouraged                   Remeasure/reassess PCT  <0.1                 Strongly Discouraged          Remeasure/reassess PCT      As 28 day mortality risk marker: \"Change in Procalcitonin Result\" (> 80 % or <=80 %) if Day 0 (or Day 1) and Day 4 values are " available. Refer to [email or web address]   Change in PCT <=80 %   A decrease of PCT levels below or equal to 80 % defines a positive change in PCT test result representing a higher risk for 28-day all-cause mortality of patients diagnosed with severe sepsis or septic shock.  Change in PCT > 80 %   A decrease of PCT levels of more than 80 % defines a negative change in PCT result representing a lower risk for 28-day all-cause mortality of patients diagnosed with severe sepsis or septic shock.                  Troponin [[id number]]  (Abnormal) Collected:  01/01/20 2245    Specimen:  Blood Updated:  01/01/20 2332     Troponin T 0.031 ng/mL     Narrative:       Troponin T Reference Range:  <= 0.03 ng/mL-   Negative for AMI  >0.03 ng/mL-     Abnormal for myocardial necrosis.  Clinicians would have to utilize clinical acumen, EKG, Troponin and serial changes to determine if it is an Acute Myocardial Infarction or myocardial injury due to an underlying chronic condition.     Comprehensive Metabolic Panel [[id number]]  (Abnormal) Collected:  01/01/20 2245    Specimen:  Blood Updated:  01/01/20 2331     Glucose 180 mg/dL      BUN 30 mg/dL      Creatinine 1.37 mg/dL      Sodium 146 mmol/L      Potassium 4.3 mmol/L      Chloride 103 mmol/L      CO2 28.2 mmol/L      Calcium 9.0 mg/dL      Total Protein 7.6 g/dL      Albumin 4.70 g/dL      ALT (SGPT) 9 U/L      AST (SGOT) 14 U/L      Alkaline Phosphatase 80 U/L      Total Bilirubin 0.7 mg/dL      eGFR Non African Amer 50 mL/min/1.73      Globulin 2.9 gm/dL      A/G Ratio 1.6 g/dL      BUN/Creatinine Ratio 21.9     Anion Gap 14.8 mmol/L     Narrative:       GFR Normal >60  Chronic Kidney Disease <60  Kidney Failure <15      BNP [[id number]]  (Abnormal) Collected:  01/01/20 2245    Specimen:  Blood Updated:  01/01/20 2328     proBNP 7,026.0 pg/mL     Narrative:       Among patients with dyspnea, NT-proBNP is highly sensitive for the detection of acute congestive  heart failure. In addition NT-proBNP of <300 pg/ml effectively rules out acute congestive heart failure with 99% negative predictive value.      Lactic Acid, Plasma [[id number]]  (Abnormal) Collected:  01/01/20 2245    Specimen:  Blood Updated:  01/01/20 2315     Lactate 2.3 mmol/L     Protime-INR [[id number]]  (Abnormal) Collected:  01/01/20 2245    Specimen:  Blood Updated:  01/01/20 2313     Protime 15.8 Seconds      INR 1.30    CBC & Differential [[id number]] Collected:  01/01/20 2245    Specimen:  Blood Updated:  01/01/20 2308    Narrative:       The following orders were created for panel order CBC & Differential.  Procedure                               Abnormality         Status                     ---------                               -----------         ------                     CBC Auto Differential[[id number]]        Abnormal            Final result                 Please view results for these tests on the individual orders.    CBC Auto Differential [[id number]]  (Abnormal) Collected:  01/01/20 2245    Specimen:  Blood Updated:  01/01/20 2308     WBC 7.15 10*3/mm3      RBC 2.63 10*6/mm3      Hemoglobin 9.5 g/dL      Hematocrit 27.7 %      .3 fL      MCH 36.1 pg      MCHC 34.3 g/dL      RDW 14.5 %      RDW-SD 54.9 fl      MPV 11.4 fL      Platelets 186 10*3/mm3      Neutrophil % 68.2 %      Lymphocyte % 12.7 %      Monocyte % 11.6 %      Eosinophil % 4.2 %      Basophil % 0.6 %      Neutrophils, Absolute 4.88 10*3/mm3      Lymphocytes, Absolute 0.91 10*3/mm3      Monocytes, Absolute 0.83 10*3/mm3      Eosinophils, Absolute 0.30 10*3/mm3      Basophils, Absolute 0.04 10*3/mm3      Differential Type Auto     WBC 7.15 10*3/mm3     Blood Gas, Arterial [[id number]]  (Abnormal) Collected:  01/01/20 2255    Specimen:  Arterial Blood Updated:  01/01/20 2259     Site Arterial: left radial     Tim's Test Positive     pH, Arterial 7.388 pH units      pCO2, Arterial 49.7 mm Hg      pO2, Arterial 52.8 mm Hg      " Comment: Critical:Notify Dr corona (01-Jan-20 22:58:05)Read back ok        HCO3, Arterial 30.0 mmol/L      Base Excess, Arterial 4.4 mmol/L      O2 Saturation Calculated 86.0 %      Barometric Pressure for Blood Gas 744.6 mmHg      Modality Cannula     Flow Rate 6 lpm      Rate 24 Breaths/minute         /81 (BP Location: Left arm, Patient Position: Sitting)   Pulse 78   Temp 98.2 °F (36.8 °C) (Oral)   Resp 20   Ht 177.8 cm (70\")   Wt 100 kg (221 lb 5.5 oz)   SpO2 94%   BMI 31.76 kg/m²     Discharge Exam:  General Appearance:    Alert, cooperative, no distress                          Head:    Normocephalic, without obvious abnormality, atraumatic                          Eyes:                            Throat:   Lips, tongue, gums normal                          Neck:   Supple, symmetrical, trachea midline, no JVD                        Lungs:     Clear to auscultation bilaterally, respirations unlabored                Chest Wall:    No tenderness or deformity                        Heart:    Regular rate and rhythm, S1 and S2 normal, no murmur,no  Rub or gallop                  Abdomen:     Soft, non-tender, bowel sounds active, no masses, no organomegaly                  Extremities:   Extremities normal, atraumatic, no cyanosis or edema                             Skin:   Skin is warm and dry,  no rashes or palpable lesions                  Neurologic:   no focal deficits noted     Disposition:  Home    Patient Instructions:      Discharge Medications      New Medications      Instructions Start Date   amoxicillin-clavulanate 875-125 MG per tablet  Commonly known as:  AUGMENTIN   1 tablet, Oral, Every 12 Hours Scheduled      predniSONE 20 MG tablet  Commonly known as:  DELTASONE   20 mg, Oral, 2 Times Daily With Meals         Changes to Medications      Instructions Start Date   furosemide 40 MG tablet  Commonly known as:  LASIX  What changed:  when to take this   40 mg, Oral, 2 Times Daily      "   metFORMIN 1000 MG tablet  Commonly known as:  GLUCOPHAGE  What changed:  when to take this   1,000 mg, Oral, Daily With Breakfast         Continue These Medications      Instructions Start Date   albuterol (2.5 MG/3ML) 0.083% nebulizer solution  Commonly known as:  PROVENTIL   2.5 mg, Nebulization, 4 Times Daily - RT      albuterol sulfate  (90 Base) MCG/ACT inhaler  Commonly known as:  PROVENTIL HFA;VENTOLIN HFA;PROAIR HFA   2 puffs, Inhalation, Every 4 Hours PRN      ALPRAZolam 1 MG tablet  Commonly known as:  XANAX   1 mg, Oral, Nightly PRN      apixaban 5 MG tablet tablet  Commonly known as:  ELIQUIS   5 mg, Oral, Every 12 Hours Scheduled      atorvastatin 10 MG tablet  Commonly known as:  LIPITOR   10 mg, Oral, Daily, For cholesterol      benazepril 20 MG tablet  Commonly known as:  LOTENSIN   20 mg, Oral, Daily      budesonide 0.5 MG/2ML nebulizer solution  Commonly known as:  PULMICORT   0.5 mg, Nebulization, 2 Times Daily - RT      dilTIAZem 60 MG tablet  Commonly known as:  CARDIZEM   TAKE 1 TABLET BY MOUTH TWICE A DAY      folic acid 1 MG tablet  Commonly known as:  FOLVITE   1 mg, Oral, Daily      ipratropium 0.02 % nebulizer solution  Commonly known as:  ATROVENT   500 mcg, Nebulization, Every 4 Hours PRN      metoprolol tartrate 100 MG tablet  Commonly known as:  LOPRESSOR   TAKE ONE AND ONE - HALF TABLETS BY MOUTH TWICE DAILY      YUPELRI 175 MCG/3ML solution  Generic drug:  Revefenacin   175 mcg, Inhalation, Daily           Future Appointments   Date Time Provider Department Center   1/10/2020 10:10 AM LAB CHAIR [facility] None   1/10/2020 10:30 AM INJECTION CHAIR [facility] INFUS EP LAG   1/17/2020 10:10 AM LAB CHAIR [facility] None   1/17/2020 10:45 AM INJECTION CHAIR [facility] INFUS EP LAG   1/24/2020 10:00 AM LAB CHAIR [facility] None   1/24/2020 10:30 AM INJECTION CHAIR [facility] INFUS EP LAG   1/27/2020  9:30  AM Sunshine Del Cid APRN [facility] CD [facility] None   1/31/2020  9:40 AM [facility]IR [facility]   1/31/2020 10:00 AM Beto Hayes MD [facility]  [facility]   1/31/2020 10:30 AM [facility]AIR 03 [facility]   2/19/2020 11:00 AM GERMANIA CT 2 BH GERMAN[facility] CT GER[facility]   2/24/2020 10:00 AM Moraima Malone MD [facility] TS [facility] None     Follow-up Information     Gopi Lagos MD Follow up in 1 week(s).    Specialty:  Family Medicine  Contact information:  [address] [address]  [phone]                 Discharge Order (From admission, onward)     Start     Ordered    01/04/20 1624  Discharge patient  Once     Expected Discharge Date:  01/04/20    Discharge Disposition:  Home or Self Care    Physician of Record for Attribution - Please select from Treatment Team:  CURTIS CRUZ [[id number]]    Review needed by CMO to determine Physician of Record:  No       Question Answer Comment   Physician of Record for Attribution - Please select from Treatment Team CURTIS CRUZ    Review needed by CMO to determine Physician of Record No        01/04/20 1627                Total time spent discharging patient including evaluation,post hospitalization follow up,  medication and post hospitalization instructions and education total time exceeds 30 minutes.    Signed:  Curtis Cruz MD  1/4/2020  4:27 PM

## 2020-01-04 NOTE — PLAN OF CARE
Problem: Patient Care Overview  Goal: Plan of Care Review  Outcome: Ongoing (interventions implemented as appropriate)  Flowsheets (Taken 1/4/2020 1234)  Progress: no change  Plan of Care Reviewed With: patient  Outcome Summary: Continue on po atb r/t PNA. Pt on 4 L of O2 per n/c. Pt uses trilogy vent at night.Pt asked for PRN breathing treatment x2 with relief noted. Pt also requested for Xanax. Will continue to monitor. possible d/c today.     Problem: Pneumonia (Adult)  Goal: Signs and Symptoms of Listed Potential Problems Will be Absent, Minimized or Managed (Pneumonia)  Outcome: Ongoing (interventions implemented as appropriate)     Problem: Cardiac: Heart Failure (Adult)  Goal: Signs and Symptoms of Listed Potential Problems Will be Absent, Minimized or Managed (Cardiac: Heart Failure)  Outcome: Ongoing (interventions implemented as appropriate)

## 2020-01-04 NOTE — PROGRESS NOTES
Subjective  MDS, ANEMIA RELATED TO THIS, HYPOGAMMAGLOBULINEMIA        History of Present Illness  several days history of low grade temperature associated with cough with mucous sputum production. He has been feeling very short of breath. His appetite has been good, he has not had any passage of blood in the stool or hematuria and he has not had any nausea, vomiting, diarrhea. Urination has been ongoing with no difficulties. He has not had any pain. He has a lot of limitations in regard to physical activity because of the shortness of breath associated with his Lyme disease. The patient has been admitted to receive IV antibiotics. He has history of myelodysplastic syndrome undergoing therapy with Procrit and also history of hypogammaglobulinemia for which he will receive immunoglobulin infusion today that he receives by the way on a monthly basis.      Otherwise the patient has not had any other issues. He feels well, he is able to function and he does believe that his Procrit has been beneficial in regard to keeping the hematocrit to a decent level. Today his hemoglobin is 7.5 though. His white count is also low at 1.9.    On interim assessment on 01/04/2020 the patient feels substantially better today with minimal cough, clear sputum, no hemoptysis, no pleuritic pain. He still has a significant degree of shortness of breath associated with his primary lung problem.  He had a great meal today.  His bowel activity is ongoing.  No passage of blood in the stools.  Urination is ongoing.  He received immune globulin infusion yesterday.       Past Medical History, Past Surgical History, Social History, Family History have been reviewed and are without significant changes except as mentioned.    Review of Systems   Respiratory: Positive for cough and shortness of breath.       A comprehensive 14 point review of systems was performed and was negative except as mentioned.    Medications:  The current medication list was  reviewed in the EMR    ALLERGIES:    Allergies   Allergen Reactions   • Erythromycin Itching       Objective      Vitals:    01/04/20 0757 01/04/20 0840 01/04/20 0850 01/04/20 1223   BP: 152/92      BP Location: Right arm      Patient Position: Lying      Pulse: 74 98 86 67   Resp: 18 20 20 20   Temp: 97.8 °F (36.6 °C)      TempSrc: Oral      SpO2: 94%   95%   Weight:       Height:         Current Status 11/1/2019   ECOG score 1       Physical Exam   Constitutional: He is oriented to person, place, and time.   HENT:   Head: Normocephalic and atraumatic.   Right Ear: External ear normal.   Left Ear: External ear normal.   Nose: Nose normal.   Eyes: Pupils are equal, round, and reactive to light. Conjunctivae and EOM are normal.   Neck: Normal range of motion. Neck supple. No tracheal deviation present. No thyromegaly present.   Cardiovascular: Normal rate, normal heart sounds and intact distal pulses. Exam reveals no friction rub.   Pulmonary/Chest: No stridor. He has no wheezes. He has no rales.   Decreased bs bilaterally   Abdominal: Soft. He exhibits no distension. There is no rebound and no guarding. No hernia.   Neurological: He is alert and oriented to person, place, and time.   Skin: Skin is warm and dry. There is pallor.   Psychiatric: His behavior is normal.   Nursing note and vitals reviewed.        RECENT LABS:  Hematology WBC   Date Value Ref Range Status   01/04/2020 2.91 (L) 3.40 - 10.80 10*3/mm3 Final   01/01/2020 7.15 3.40 - 10.80 10*3/mm3 Final     RBC   Date Value Ref Range Status   01/04/2020 2.38 (L) 4.14 - 5.80 10*6/mm3 Final     Hemoglobin   Date Value Ref Range Status   01/04/2020 8.3 (L) 13.0 - 17.7 g/dL Final     Hematocrit   Date Value Ref Range Status   01/04/2020 24.4 (L) 37.5 - 51.0 % Final     Platelets   Date Value Ref Range Status   01/04/2020 160 140 - 450 10*3/mm3 Final          Assessment/Plan  In summary this patient has myelodysplastic syndrome with 5q minus syndrome. He has been  undergoing therapy with Procrit. This has been useful in controlling hemoglobin but unfortunately infection has produced further deterioration of production of red cells. That is a common phenomenon. Typically Procrit under these circumstances is not reimbursed to the hospital therefore we need to wait for him to get his Procrit next week.     From the point of view of his hypogammaglobulinemia and now he has bronchitis the patient will proceed with infusion of immunoglobulin     On 01/04/2020 the patient feels better. He received his immune globuli infusion yesterday with no difficulty or fever.  His hemoglobin is stable at 8.3, his white count is up to 2.9.  His platelet count is 160,000.  From my point of view the patient should be almost ready to go home as early as tomorrow.  This will be up to the primary team.  He already has an appointment to come back to see us in the office in a week to receive his Procrit injection.    I will sign off on his case.    I discussed this with the patient and his family in the room.                      1/4/2020      CC:

## 2020-01-05 ENCOUNTER — READMISSION MANAGEMENT (OUTPATIENT)
Dept: CALL CENTER | Facility: HOSPITAL | Age: 80
End: 2020-01-05

## 2020-01-05 ENCOUNTER — NURSE TRIAGE (OUTPATIENT)
Dept: CALL CENTER | Facility: HOSPITAL | Age: 80
End: 2020-01-05

## 2020-01-05 NOTE — TELEPHONE ENCOUNTER
"States he was discharged from [facility] yesterday. States he was supposed to have abx and steroids, but the steroids were called in wrong so the pharmacy won't fill it. Looking at order sent in, appears to be incorrect script. Notified case management.     Reason for Disposition  • [1] Prescription not at pharmacy AND [2] was prescribed today by PCP    Additional Information  • Negative: Drug overdose and nurse unable to answer question  • Negative: Caller requesting information not related to medicine  • Negative: Caller requesting a prescription for Strep throat and has a positive culture result  • Negative: Rash while taking a medication or within 3 days of stopping it  • Negative: Immunization reaction suspected  • Negative: [1] Asthma and [2] having symptoms of asthma (cough, wheezing, etc)  • Negative: MORE THAN A DOUBLE DOSE of a prescription or over-the-counter (OTC) drug  • Negative: [1] DOUBLE DOSE (an extra dose or lesser amount) of over-the-counter (OTC) drug AND [2] any symptoms (e.g., dizziness, nausea, pain, sleepiness)  • Negative: [1] DOUBLE DOSE (an extra dose or lesser amount) of prescription drug AND [2] any symptoms (e.g., dizziness, nausea, pain, sleepiness)  • Negative: Took another person's prescription drug  • Negative: [1] DOUBLE DOSE (an extra dose or lesser amount) of prescription drug AND [2] NO symptoms (Exception: a double dose of antibiotics)  • Negative: Diabetes drug error or overdose (e.g., insulin or extra dose)  • Negative: [1] Request for URGENT new prescription or refill of \"essential\" medication (i.e., likelihood of harm to patient if not taken) AND [2] triager unable to fill per unit policy    Answer Assessment - Initial Assessment Questions  1. SYMPTOMS: \"Do you have any symptoms?\"      See note  2. SEVERITY: If symptoms are present, ask \"Are they mild, moderate or severe?\"      See note    Protocols used: MEDICATION QUESTION CALL-ADULT-      "

## 2020-01-05 NOTE — OUTREACH NOTE
Prep Survey      Responses   Facility patient discharged from?  [facility]   Is patient eligible?  Yes   Discharge diagnosis  Pneumonia of right lower lobe due to infectious organism, Acute on chronic diastolic (congestive) heart failure    Does the patient have one of the following disease processes/diagnoses(primary or secondary)?  COPD/Pneumonia   Does the patient have Home health ordered?  Yes   What is the Home health agency?   [facility]    Is there a DME ordered?  No   Prep survey completed?  Yes          Maame Moon RN

## 2020-01-06 ENCOUNTER — TRANSITIONAL CARE MANAGEMENT TELEPHONE ENCOUNTER (OUTPATIENT)
Dept: FAMILY MEDICINE CLINIC | Facility: CLINIC | Age: 80
End: 2020-01-06

## 2020-01-06 LAB — BACTERIA SPEC AEROBE CULT: NORMAL

## 2020-01-06 NOTE — OUTREACH NOTE
OF NOTE for PCP FYI: Pt d/c home on Sat 01/04/20 and is still without one of his medications. Pt in hosp for SOB, pneumonia. Pt ofcourse has history of COPD, CHF, AFIB among other issues. Pt did get Amoxicillin from [facility] but was told Deltasone had been called in incorrectly. Correct dosage is Prednisone 20mg, 1 po bid x 3 days. Rx called in as 3 pills only. Per phar tech, Phar has reached out to hospital and I have [facility] for Pt Mgmt Services at [facility]. I will fwp with pt to see that this is resolved. Pt is now sched for TCM Hosp fwp with Dr Lagos on 01/14/20. Pt is feeling better, appetite is ok, states breathing is better but definite audible wheezing over phone. No fever, chills, chest pain.

## 2020-01-06 NOTE — OUTREACH NOTE
Case Management Call Center Follow-up      Responses   [facility] Call Center Tracking Reason?  Medication Clarification   Has the Call Center Case Management Follow-up issue been resolved?  Yes   Follow-up Comments  HR CCP reviewed notes.  Call placed to Yary MARIA for [facility].  She will ask Dr. Cruz to correct the prescription for prednisone so that the pharmacy will fill it.  Call then received back from Yary stating that he had called in the correction.  Call placed to pt to alert him of the correction.  He states that his pharmacy just alerted him that it was ready for pickup.            Alanna Russell RN    1/6/2020, 1:59 PM

## 2020-01-06 NOTE — PROGRESS NOTES
Case Management Discharge Note      Final Note: Discharged home with [facility]    Provided post acute provider list?: Yes  Post Acute Provider Lists: Home Health, Nursing Home  Post Acuite Provider List: Delivered  Delivered To: Patient  Method of Delivery: In person    Destination      No service has been selected for the patient.      Durable Medical Equipment      No service has been selected for the patient.      Dialysis/Infusion      No service has been selected for the patient.      Home Medical Care      No service has been selected for the patient.      Therapy      No service has been selected for the patient.      Community Resources      No service has been selected for the patient.        Transportation Services  Private: Car    Final Discharge Disposition Code: 06 - home with home health care

## 2020-01-07 LAB — BACTERIA SPEC AEROBE CULT: NORMAL

## 2020-01-08 ENCOUNTER — READMISSION MANAGEMENT (OUTPATIENT)
Dept: CALL CENTER | Facility: HOSPITAL | Age: 80
End: 2020-01-08

## 2020-01-08 NOTE — OUTREACH NOTE
COPD/PN Week 1 Survey      Responses   Facility patient discharged from?  [facility]   Does the patient have one of the following disease processes/diagnoses(primary or secondary)?  COPD/Pneumonia   Is there a successful TCM telephone encounter documented?  Yes          Oleg Cesar RN

## 2020-01-10 ENCOUNTER — RESULTS ENCOUNTER (OUTPATIENT)
Dept: ONCOLOGY | Facility: CLINIC | Age: 80
End: 2020-01-10

## 2020-01-10 ENCOUNTER — LAB (OUTPATIENT)
Dept: OTHER | Facility: HOSPITAL | Age: 80
End: 2020-01-10

## 2020-01-10 ENCOUNTER — INFUSION (OUTPATIENT)
Dept: ONCOLOGY | Facility: HOSPITAL | Age: 80
End: 2020-01-10

## 2020-01-10 VITALS
TEMPERATURE: 97.8 F | OXYGEN SATURATION: 90 % | BODY MASS INDEX: 31.68 KG/M2 | WEIGHT: 220.8 LBS | SYSTOLIC BLOOD PRESSURE: 116 MMHG | HEART RATE: 69 BPM | DIASTOLIC BLOOD PRESSURE: 65 MMHG

## 2020-01-10 DIAGNOSIS — C34.32 MALIGNANT NEOPLASM OF LOWER LOBE OF LEFT LUNG (HCC): ICD-10-CM

## 2020-01-10 DIAGNOSIS — D61.818 PANCYTOPENIA (HCC): ICD-10-CM

## 2020-01-10 DIAGNOSIS — D46.9 MYELODYSPLASIA (MYELODYSPLASTIC SYNDROME) (HCC): ICD-10-CM

## 2020-01-10 DIAGNOSIS — D80.1 HYPOGAMMAGLOBULINEMIA (HCC): ICD-10-CM

## 2020-01-10 DIAGNOSIS — D53.9 MACROCYTIC ANEMIA: ICD-10-CM

## 2020-01-10 DIAGNOSIS — D46.9 MYELODYSPLASIA (MYELODYSPLASTIC SYNDROME) (HCC): Primary | ICD-10-CM

## 2020-01-10 LAB
DEPRECATED RDW RBC AUTO: 55.8 FL (ref 37–54)
ERYTHROCYTE [DISTWIDTH] IN BLOOD BY AUTOMATED COUNT: 14.8 % (ref 12.3–15.4)
HCT VFR BLD AUTO: 25.8 % (ref 37.5–51)
HGB BLD-MCNC: 8.7 G/DL (ref 13–17.7)
LYMPHOCYTES # BLD MANUAL: 1.2 10*3/MM3 (ref 0.7–3.1)
LYMPHOCYTES NFR BLD MANUAL: 13 % (ref 5–12)
LYMPHOCYTES NFR BLD MANUAL: 41 % (ref 19.6–45.3)
MCH RBC QN AUTO: 34.8 PG (ref 26.6–33)
MCHC RBC AUTO-ENTMCNC: 33.7 G/DL (ref 31.5–35.7)
MCV RBC AUTO: 103.2 FL (ref 79–97)
METAMYELOCYTES NFR BLD MANUAL: 4 % (ref 0–0)
MONOCYTES # BLD AUTO: 0.38 10*3/MM3 (ref 0.1–0.9)
NEUTROPHILS # BLD AUTO: 1.23 10*3/MM3 (ref 1.7–7)
NEUTROPHILS NFR BLD MANUAL: 42 % (ref 42.7–76)
PLAT MORPH BLD: NORMAL
PLATELET # BLD AUTO: 163 10*3/MM3 (ref 140–450)
PMV BLD AUTO: 12 FL (ref 6–12)
RBC # BLD AUTO: 2.5 10*6/MM3 (ref 4.14–5.8)
RBC MORPH BLD: NORMAL
SCAN SLIDE: NORMAL
WBC MORPH BLD: NORMAL
WBC NRBC COR # BLD: 2.93 10*3/MM3 (ref 3.4–10.8)

## 2020-01-10 PROCEDURE — 25010000002 EPOETIN ALFA PER 1000 UNITS: Performed by: INTERNAL MEDICINE

## 2020-01-10 PROCEDURE — 36415 COLL VENOUS BLD VENIPUNCTURE: CPT

## 2020-01-10 PROCEDURE — 85025 COMPLETE CBC W/AUTO DIFF WBC: CPT | Performed by: INTERNAL MEDICINE

## 2020-01-10 PROCEDURE — 85007 BL SMEAR W/DIFF WBC COUNT: CPT | Performed by: INTERNAL MEDICINE

## 2020-01-10 PROCEDURE — 96372 THER/PROPH/DIAG INJ SC/IM: CPT

## 2020-01-10 RX ADMIN — ERYTHROPOIETIN 25000 UNITS: 20000 INJECTION, SOLUTION INTRAVENOUS; SUBCUTANEOUS at 11:03

## 2020-01-10 NOTE — PROGRESS NOTES
Copy of labs given and pt v/u.  No complaints at this time.    Lab Results   Component Value Date    WBC 2.93 (L) 01/10/2020    HGB 8.7 (L) 01/10/2020    HCT 25.8 (L) 01/10/2020    .2 (H) 01/10/2020     01/10/2020

## 2020-01-13 ENCOUNTER — READMISSION MANAGEMENT (OUTPATIENT)
Dept: CALL CENTER | Facility: HOSPITAL | Age: 80
End: 2020-01-13

## 2020-01-13 NOTE — OUTREACH NOTE
COPD/PN Week 1 Survey      Responses   Facility patient discharged from?  [facility]   Does the patient have one of the following disease processes/diagnoses(primary or secondary)?  COPD/Pneumonia   Was the primary reason for admission:  Pneumonia   Call start time  1039   Call end time  1042   Discharge diagnosis  Pneumonia of right lower lobe due to infectious organism, Acute on chronic diastolic (congestive) heart failure    Meds reviewed with patient/caregiver?  Yes   Is the patient having any side effects they believe may be caused by any medication additions or changes?  No   Does the patient have all medications ordered at discharge?  Yes   Is the patient taking all medications as directed (includes completed medication regime)?  Yes   Does the patient have a primary care provider?   Yes   Does the patient have an appointment with their PCP or pulmonologist within 7 days of discharge?  Yes   Comments regarding PCP  Dr. Lagos   Has the patient kept scheduled appointments due by today?  Yes   What is the Home health agency?   [facility]    Has home health visited the patient within 72 hours of discharge?  Yes   Psychosocial issues?  No   Did the patient receive a copy of their discharge instructions?  Yes   Nursing interventions  Reviewed instructions with patient   What is the patient's perception of their health status since discharge?  Same   Nursing Interventions  Nurse provided patient education   Are the patient's immunizations up to date?   Yes   Nursing interventions  Educated on importance of maintaining up to date immunizations as advised by provider   Is the patient/caregiver able to teach back the hierarchy of who to call/visit for symptoms/problems? PCP, Specialist, Home health nurse, Urgent Care, ED, 911  Yes   Is the patient able to teach back COPD zones?  Yes   Nursing interventions  Education provided on various zones   Patient reports what zone on this call?  Green Zone   Green Zone  Sleeping well,  Appetite is good   Green Zone interventions:  Take daily medications, Use oxygen as prescribed          Nicole Cisneros RN

## 2020-01-13 NOTE — OUTREACH NOTE
COPD/PN Week 2 Survey      Responses   Facility patient discharged from?  [facility]   Does the patient have one of the following disease processes/diagnoses(primary or secondary)?  COPD/Pneumonia   Was the primary reason for admission:  Pneumonia   Week 2 attempt successful?  Yes   Call start time  1039   Call end time  1042   Discharge diagnosis  Pneumonia of right lower lobe due to infectious organism, Acute on chronic diastolic (congestive) heart failure    Meds reviewed with patient/caregiver?  Yes   Is the patient having any side effects they believe may be caused by any medication additions or changes?  No   Does the patient have all medications ordered at discharge?  Yes   Is the patient taking all medications as directed (includes completed medication regime)?  Yes   Does the patient have a primary care provider?   Yes   Does the patient have an appointment with their PCP or pulmonologist within 7 days of discharge?  Yes   Comments regarding PCP  Dr. Lagos   Has the patient kept scheduled appointments due by today?  Yes   What is the Home health agency?   [facility]    Has home health visited the patient within 72 hours of discharge?  Yes   Psychosocial issues?  No   Did the patient receive a copy of their discharge instructions?  Yes   Nursing interventions  Reviewed instructions with patient   What is the patient's perception of their health status since discharge?  Same   Nursing Interventions  Nurse provided patient education   Are the patient's immunizations up to date?   Yes   Nursing interventions  Educated on importance of maintaining up to date immunizations as advised by provider   Is the patient/caregiver able to teach back the hierarchy of who to call/visit for symptoms/problems? PCP, Specialist, Home health nurse, Urgent Care, ED, 911  Yes   Is the patient able to teach back COPD zones?  Yes   Nursing interventions  Education provided on various zones   Patient reports what zone on this call?  Green  Zone   Green Zone  Sleeping well, Appetite is good   Green Zone interventions:  Take daily medications, Use oxygen as prescribed   Week 2 call completed?  Yes          Nicole Cisneros RN

## 2020-01-14 ENCOUNTER — OFFICE VISIT (OUTPATIENT)
Dept: FAMILY MEDICINE CLINIC | Facility: CLINIC | Age: 80
End: 2020-01-14

## 2020-01-14 VITALS
SYSTOLIC BLOOD PRESSURE: 132 MMHG | HEART RATE: 66 BPM | WEIGHT: 217 LBS | RESPIRATION RATE: 16 BRPM | DIASTOLIC BLOOD PRESSURE: 70 MMHG | HEIGHT: 70 IN | BODY MASS INDEX: 31.07 KG/M2 | TEMPERATURE: 97.7 F

## 2020-01-14 DIAGNOSIS — F41.9 ANXIETY: ICD-10-CM

## 2020-01-14 DIAGNOSIS — I50.33 ACUTE ON CHRONIC DIASTOLIC (CONGESTIVE) HEART FAILURE (HCC): ICD-10-CM

## 2020-01-14 DIAGNOSIS — J18.9 PNEUMONIA OF RIGHT LOWER LOBE DUE TO INFECTIOUS ORGANISM: Primary | ICD-10-CM

## 2020-01-14 DIAGNOSIS — Z09 HOSPITAL DISCHARGE FOLLOW-UP: ICD-10-CM

## 2020-01-14 PROCEDURE — 99495 TRANSJ CARE MGMT MOD F2F 14D: CPT | Performed by: FAMILY MEDICINE

## 2020-01-14 PROCEDURE — 71046 X-RAY EXAM CHEST 2 VIEWS: CPT | Performed by: FAMILY MEDICINE

## 2020-01-14 RX ORDER — ALPRAZOLAM 1 MG/1
1 TABLET ORAL NIGHTLY PRN
Qty: 90 TABLET | Refills: 0 | Status: SHIPPED | OUTPATIENT
Start: 2020-01-14 | End: 2020-01-01 | Stop reason: SDUPTHER

## 2020-01-14 NOTE — PROGRESS NOTES
Transitional Care Follow Up Visit  Subjective     Ankit Mack  is a 79 y.o. male who presents for a transitional care management visit.    Within 48 business hours after discharge our office contacted him via telephone to coordinate his care and needs.      I reviewed and discussed the details of that call along with the discharge summary, hospital problems, inpatient lab results, inpatient diagnostic studies, and consultation reports with Ankit.     Current outpatient and discharge medications have been reconciled for the patient.  Reviewed by: Gopi Lagos MD      Date of TCM Phone Call 1/6/2020   [facility]   Date of Admission 1/1/2020   Date of Discharge 1/4/2020   Discharge Disposition Home or Self Care     Risk for Readmission (LACE) Score: 14 (1/4/2020  6:00 AM)      History of Present Illness   Course During Hospital Stay:      Admit date: 1/1/2020  Discharge date and time:1/4/2020  Discharged Condition: good     Discharge Diagnoses:        Active Hospital Problems     Diagnosis   POA   • **Pneumonia of right lower lobe due to infectious organism (CMS/HCC) [J18.1]   Yes   • Hypogammaglobulinemia (CMS/HCC) [D80.1]   Unknown   • Hypoxemia [R09.02]   Unknown   • Acute on chronic diastolic (congestive) heart failure (CMS/HCC) [I50.33]   Yes   • Myelodysplasia (myelodysplastic syndrome) (CMS/HCC) [D46.9]   Yes   • History of COPD [Z87.09]   Not Applicable   • Type 2 diabetes mellitus without complication, without long-term current use of insulin (CMS/HCC) [E11.9]   Yes   • Paroxysmal atrial fibrillation (CMS/HCC) [I48.0]   Yes   • DOMINGO on CPAP [G47.33, Z99.89]   Not Applicable   • Cancer of lower lobe of lung (CMS/HCC) [C34.30]   Yes   • Anxiety [F41.9]   Yes   • Hyperlipidemia [E78.5]   Yes   • Essential hypertension [I10]   Yes       Resolved Hospital Problems   No resolved problems to display.            PMHX:   Medical History        Past Medical History:   Diagnosis Date   • Anemia     •  Anxiety     • Atrial flutter (CMS/[facility])     • Chronic diastolic congestive heart failure (CMS/[facility]) 12/19/2018   • Chronic respiratory failure with hypoxia (CMS/[facility])     • COPD (chronic obstructive pulmonary disease) (CMS/[facility])     • Essential hypertension     • GERD (gastroesophageal reflux disease)     • H/O complete eye exam 06/2018   • History of pulmonary function tests 06/10/2014   • Hyperglycemia     • Hyperlipidemia     • Lung cancer (CMS/[facility]) 2012     Left   • MDS (myelodysplastic syndrome) (CMS/[facility])     • Nonrheumatic aortic valve stenosis       mild 5/2019   • Obesity     • Osteoarthritis, knee     • Paroxysmal atrial fibrillation (CMS/[facility])     • Prostate cancer (CMS/[facility]) 2000   • Seizures (CMS/[facility])     • Sleep apnea     • Thoracic aortic aneurysm without rupture (CMS/[facility])       s/p stenting of descending thoracic aneurysm; the ascending aorta measured 3.7cm in 2019   • Type 2 diabetes mellitus without complication, without long-term current use of insulin (CMS/[facility])           PSHX:   Surgical History         Past Surgical History:   Procedure Laterality Date   • ABDOMINAL AORTIC ANEURYSM REPAIR   2010     Dr. Adarsh Dennis   • CARDIAC CATHETERIZATION Left 02/06/2004   • CATARACT EXTRACTION   10/2014     also 11/14   • COLONOSCOPY       • ENDOVASCULAR THORACIC ANEURYSM REPAIR       • JOINT REPLACEMENT Left 10/2010     anette; Dr. Wolf   • JOINT REPLACEMENT Right 09/2011     knee; Dr. Wolf   • LUNG LOBECTOMY Left 01/16/2012     LLL; Dr. Mendoza   • LUNG LOBECTOMY Left 2012   • PROSTATE SURGERY   2000     Dr. Naranjo   • VASCULAR SURGERY   05/2009     TEVAR of thoracic aortic aneurysm   • VASCULAR SURGERY   12/15/2003     Left carotid subclavian bypass graft, ligation of proximal left subclavian following carotid subclavian bypass. Right femoral sheath. Arch angiography with descending thoracic and abdominal aortography    • VASCULAR SURGERY   11/17/2003     Selective catheterization right vertebral artery,  right subclavian artery, left subclavian artery, left vertebral artery.     • VASCULAR SURGERY   06/05/2002     Tracheostomy, bronchoscopy   • VASCULAR SURGERY   05/21/2002     Thoracic arteriogram and abdominal aortogram            Hospital Course: Ankit Mack Sr.  is a 79-year-old white male with history of anxiety, a flutter, chronic diastolic heart failure, chronic respiratory failure, COPD, hypertension, hyperlipidemia, myelodysplastic syndrome, paroxysmal atrial fib, prostate cancer, seizures, sleep apnea and type 2 diabetes who was admitted with history of increasing shortness of air with cough congestion and wheezing. The patient was evaluated in the ER and appeared to have some degree of pneumonia on top of his chronic CHF. He has had a low-grade fever. He was started on some broad-spectrum IV antibiotics and cultures were obtained. He is gotten some bronchodilator treatments and is admitted for further evaluation treatment of pneumonia on top of CHF.          The patient was admitted to hospital and seen by cardiology hematology and pulmonary.  He was treated with antibiotics for pneumonia and received oxygen bronchodilators and was using his trilogy machine.  The patient was feeling better after being in the hospital for a few days and looked well enough to go home.  He will continue with a few more days of oral antibiotics and also some prednisone for a few more days.  He will continue with his oxygen at home and his trilogy machine and most of his other medicines.  He will follow-up with his primary care in 1 week for continuing care and also will follow-up with his specialist.    Current outpatient and discharge medications have been reconciled for the patient.  Reviewed by: Gopi Lagos MD     Pt is feeling quite a bit better at this time. Has apt in 2 weeks with his cardiologist. Denies LE edema or orthopnea/PND.    The following portions of the patient's history were reviewed and updated as  "appropriate: allergies, current medications, past family history, past medical history, past social history, past surgical history and problem list.    Review of Systems   Constitutional: Negative for activity change, chills, fatigue and fever.   Respiratory: Negative for cough and shortness of breath.    Cardiovascular: Negative for chest pain and palpitations.   Gastrointestinal: Negative for abdominal pain.   Endocrine: Negative for cold intolerance.   Psychiatric/Behavioral: Negative for behavioral problems and dysphoric mood. The patient is not nervous/anxious.        /70   Pulse 66   Temp 97.7 °F (36.5 °C) (Oral)   Resp 16   Ht 177.8 cm (70\")   Wt 98.4 kg (217 lb)   BMI 31.14 kg/m²     Objective   Physical Exam   Constitutional: He appears well-developed and well-nourished.   Neck: Neck supple. No thyromegaly present.   Cardiovascular: Normal rate and regular rhythm.   No murmur heard.  Pulmonary/Chest: Effort normal and breath sounds normal.   Abdominal: Bowel sounds are normal. There is no tenderness.   Psychiatric: He has a normal mood and affect. His behavior is normal.   Nursing note and vitals reviewed.  Hospital records reviewed with pt confirming HPI.  CXR: ordered due to recent pneumonia and CHF, no comparison today, read by me: mild clearing infiltrate right LL.    Assessment/Plan   Ankit was seen today for tcm and copd.    Diagnoses and all orders for this visit:    Pneumonia of right lower lobe due to infectious organism (CMS/HCC)    Acute on chronic diastolic (congestive) heart failure (CMS/HCC)    Hospital discharge follow-up    Pt improving well. Encouraged to keep pulmonary and cardiology appts.             "

## 2020-01-17 NOTE — NURSING NOTE
Lab Results   Component Value Date    WBC 3.19 (L) 01/17/2020    HGB 9.4 (L) 01/17/2020    HCT 27.8 (L) 01/17/2020    .9 (H) 01/17/2020     01/17/2020     Procrit given per protocol for Hgb 9.4.  Pt voices feeling well and denies complaints.  Next appt reviewed and he knows to call sooner with concerns.

## 2020-01-22 NOTE — OUTREACH NOTE
COPD/PN Week 3 Survey      Responses   Facility patient discharged from?  [facility]   Does the patient have one of the following disease processes/diagnoses(primary or secondary)?  COPD/Pneumonia   Was the primary reason for admission:  Pneumonia   Week 3 attempt successful?  Yes   Call start time  1001   Call end time  1012   Discharge diagnosis  Pneumonia    Is patient permission given to speak with other caregiver?  No   Meds reviewed with patient/caregiver?  Yes   Is the patient having any side effects they believe may be caused by any medication additions or changes?  No   Does the patient have all medications ordered at discharge?  Yes   Is the patient taking all medications as directed (includes completed medication regime)?  Yes   Does the patient have a primary care provider?   Yes   Has the patient kept scheduled appointments due by today?  Yes   What is the Home health agency?   [facility] continued.    Psychosocial issues?  No   Did the patient receive a copy of their discharge instructions?  Yes   Nursing interventions  Reviewed instructions with patient   What is the patient's perception of their health status since discharge?  New symptoms unrelated to diagnosis [Patient states that he is having sinus / ear issues. ]   Nursing Interventions  Nurse provided patient education, Advised patient to call provider   Is the patient/caregiver able to teach back the hierarchy of who to call/visit for symptoms/problems? PCP, Specialist, Home health nurse, Urgent Care, ED, 911  Yes   Is the patient/caregiver able to teach back signs and symptoms of worsening condition:  Fever/chills, Shortness of breath, Chest pain   Is the patient/caregiver able to teach back importance of completing antibiotic course of treatment?  Yes   Week 3 call completed?  Yes          Yara Snell RN

## 2020-01-24 NOTE — PROGRESS NOTES
hgb 8.3 today.  Procrit increased to 31K per protocol.  Pt says he feels pretty good and was surprised the hgb dropped from last week.  ANC 0.58.  He says for the last week his right ear has been painful and has been draining some.  Currently the pain is gone but he says still a little drainage.  He has been using OTC ear drops and he has not contacted any provider about the ear infection.  We discussed the importance of him notifying us or his PCP when he suspects any sort of infection.  His white count runs low at times and it is important that we know about that.  Pt said he wasn't aware of that, but knows now.  I discussed pt symptoms and labs today with SCOTTY Lowry.  Orders given for levaquin 500mg daily x 7 days with the ear drainage and low ANC today.  Pt and daughter v/u.     Copy of labs given.  We discussed neutropenic precautions and when to call the office.  Written instructions given on his lab printout.  Pt and his daughter v/u.  Next week appt with Dr. Hayes.      Lab Results   Component Value Date    WBC 1.93 (C) 01/24/2020    HGB 8.3 (L) 01/24/2020    HCT 24.8 (L) 01/24/2020    .2 (H) 01/24/2020     (L) 01/24/2020

## 2020-01-27 PROBLEM — I50.32 CHRONIC DIASTOLIC CONGESTIVE HEART FAILURE (HCC): Status: RESOLVED | Noted: 2018-12-19 | Resolved: 2020-01-01

## 2020-01-27 PROBLEM — I48.0 PAROXYSMAL ATRIAL FIBRILLATION (HCC): Status: RESOLVED | Noted: 2018-11-17 | Resolved: 2020-01-01

## 2020-01-27 PROBLEM — I48.92 ATRIAL FLUTTER (HCC): Status: RESOLVED | Noted: 2019-08-26 | Resolved: 2020-01-01

## 2020-01-27 NOTE — PROGRESS NOTES
Date of Office Visit: 2020  Encounter Provider: SCOTTY Higgins  Place of Service: [facility] CARDIOLOGY  Patient Name: Ankit Mack Sr.  :1940  Primary Cardiologist: Dr. Catrachito Rdz    Chief Complaint   Patient presents with   • Congestive Heart Failure   • Atrial Fibrillation   • Follow-up     Hospital    :     Dear Dr. Alejandro,    HPI: Ankit Mack Sr. is a pleasant 79 y.o. male who presents today for a hospital follow-up visit.    He has a history of descending thoracic aortic aneurysm in which he underwent stent placement in [place], [place] and still follows with his cardiothoracic surgeon.  He has been diagnosed with paroxysmal atrial fibrillation/flutter, chronic diastolic heart failure, mild aortic stenosis per echocardiogram 2019, oxygen dependent COPD, and myelodysplastic syndrome.  He has a history of chronic fatigue and dyspnea.    In 2019, he established care with Catrachito Rdz.  He said the diltiazem CD was too expensive so the medication was discontinued and he was changed to metoprolol 150 mg twice per day.  He was enrolled in the apixaban 360 program which has made the medication more affordable.    On 10/16/2019 he presented to the [facility] emergency department after spitting up 3 big red blood clots and he complained of a sore throat.  After discussion it was determined that he did not have hemoptysis.  It seems that the blood was coming from his nasal passages or sinuses and came down through the mouth in which he spit it up.  He said he was diagnosed with an upper respiratory infection and treated.    In 2019, he followed up with me in the office for tachycardia.  I started him on diltiazem 60 mg twice per day and he said that has made him feel so good.    In 2019, he was hospitalized for worsening shortness of breath and edema.  His oxygen saturation at home were 60s-70s.  He was diagnosed with acute on chronic  diastolic heart failure and was felt to be due to too much sodium intake.  He was diuresed and discharged on 12/3/2019.    In January of this year he was hospitalized for shortness of air, cough congestion, and wheezing.  He was diagnosed with pneumonia on top of his chronic heart failure.  He was recommended to increase his furosemide to 40 mg twice per day.    He presents today with his daughter accompanying him.  Since being discharged from the hospital he feels much better and his shortness of breath has improved.  He says while sitting sometimes he can hear his heartbeat, but denies palpitations.  He denies chest pain, PND, orthopnea, cough, edema, dizziness, syncope, or bleeding.  His blood pressure is borderline elevated today and his weight is down 8 pounds from his October visit.  He is currently wearing 4 L of oxygen nasal cannula.  He was discharged from the hospital on furosemide 40 mg twice a day, but he said he was supposed to only do that for 1 week.  He is now on furosemide 40 mg daily and he thinks that is a good dose for him.  His metoprolol was increased to 150 mg twice per day.      Past Medical History:   Diagnosis Date   • Anemia    • Anxiety    • Atrial flutter (CMS/HCC)    • Chronic diastolic congestive heart failure (CMS/HCC) 12/19/2018   • Chronic respiratory failure with hypoxia (CMS/HCC)    • COPD (chronic obstructive pulmonary disease) (CMS/HCC)    • Essential hypertension    • GERD (gastroesophageal reflux disease)    • H/O complete eye exam 06/2018   • History of pulmonary function tests 06/10/2014   • Hyperglycemia    • Hyperlipidemia    • Hypogammaglobulinemia (CMS/HCC)    • Hypoxemia    • Lung cancer (CMS/HCC) 2012    Left   • MDS (myelodysplastic syndrome) (CMS/HCC)    • Nonrheumatic aortic valve stenosis     mild 5/2019   • Obesity    • Osteoarthritis, knee    • Paroxysmal atrial fibrillation (CMS/HCC)    • Pneumonia of right lower lobe due to infectious organism (CMS/HCC)    •  Prostate cancer (CMS/[facility])    • Seizures (CMS/[facility])    • Sleep apnea    • Thoracic aortic aneurysm without rupture (CMS/[facility])     s/p stenting of descending thoracic aneurysm; the ascending aorta measured 3.7cm in 2019   • Type 2 diabetes mellitus without complication, without long-term current use of insulin (CMS/[facility])        Past Surgical History:   Procedure Laterality Date   • ABDOMINAL AORTIC ANEURYSM REPAIR      Dr. Adarsh Dennis   • CARDIAC CATHETERIZATION Left 2004   • CATARACT EXTRACTION  10/2014    also    • COLONOSCOPY     • ENDOVASCULAR THORACIC ANEURYSM REPAIR     • JOINT REPLACEMENT Left 10/2010    knee; Dr. Wolf   • JOINT REPLACEMENT Right 2011    knee; Dr. Wolf   • LUNG LOBECTOMY Left 2012    LLL; Dr. Mendoza   • LUNG LOBECTOMY Left    • PROSTATE SURGERY      Dr. Naranjo   • VASCULAR SURGERY  2009    TEVAR of thoracic aortic aneurysm   • VASCULAR SURGERY  12/15/2003    Left carotid subclavian bypass graft, ligation of proximal left subclavian following carotid subclavian bypass. Right femoral sheath. Arch angiography with descending thoracic and abdominal aortography    • VASCULAR SURGERY  2003    Selective catheterization right vertebral artery, right subclavian artery, left subclavian artery, left vertebral artery.     • VASCULAR SURGERY  2002    Tracheostomy, bronchoscopy   • VASCULAR SURGERY  2002    Thoracic arteriogram and abdominal aortogram       Social History     Socioeconomic History   • Marital status: Single     Spouse name: Not on file   • Number of children: Not on file   • Years of education: Not on file   • Highest education level: Not on file   Occupational History     Employer: RETIRED   Tobacco Use   • Smoking status: Former Smoker     Packs/day: 1.00     Types: Cigarettes     Last attempt to quit: 4/15/2010     Years since quittin.7   • Smokeless tobacco: Never Used   Substance and Sexual Activity   • Alcohol use: No      Frequency: Never     Comment: No caffeine use   • Drug use: No   • Sexual activity: Defer       Family History   Problem Relation Age of Onset   • Cancer Mother    • COPD Mother    • Cancer Father         lung   • Colon cancer Father    • Cancer Other         colon   • Diabetes Other    • Emphysema Other    • Hypertension Other    • Kidney disease Other    • Lung cancer Other        The following portion of the patient's history were reviewed and updated as appropriate: past medical history, past surgical history, past social history, past family history, allergies, current medications, and problem list.    Review of Systems   Constitution: Positive for malaise/fatigue. Negative for chills, diaphoresis, fever, night sweats, weight gain and weight loss.   HENT: Positive for ear pain and hearing loss. Negative for nosebleeds, sore throat and tinnitus.    Eyes: Negative for blurred vision, double vision, pain and visual disturbance.   Cardiovascular: Negative for chest pain, claudication, cyanosis, dyspnea on exertion, irregular heartbeat, leg swelling, near-syncope, orthopnea, palpitations, paroxysmal nocturnal dyspnea and syncope.   Respiratory: Positive for shortness of breath. Negative for cough, hemoptysis, snoring and wheezing.    Endocrine: Negative for cold intolerance, heat intolerance and polyuria.   Hematologic/Lymphatic: Negative for bleeding problem. Does not bruise/bleed easily.   Skin: Positive for color change. Negative for dry skin, flushing and itching.   Musculoskeletal: Negative for falls, joint pain, joint swelling, muscle cramps, muscle weakness and myalgias.   Gastrointestinal: Negative for abdominal pain, constipation, heartburn, melena, nausea and vomiting.   Genitourinary: Positive for frequency. Negative for dysuria and hematuria.   Neurological: Negative for excessive daytime sleepiness, dizziness, headaches, light-headedness, loss of balance, numbness, paresthesias, seizures and vertigo.    Psychiatric/Behavioral: Negative for altered mental status, depression, memory loss and substance abuse. The patient does not have insomnia and is not nervous/anxious.    Allergic/Immunologic: Negative for environmental allergies.       Allergies   Allergen Reactions   • Erythromycin Itching         Current Outpatient Medications:   •  albuterol (PROVENTIL HFA;VENTOLIN HFA) 108 (90 Base) MCG/ACT inhaler, Inhale 2 puffs Every 4 (Four) Hours As Needed for Wheezing., Disp: , Rfl:   •  albuterol (PROVENTIL) (2.5 MG/3ML) 0.083% nebulizer solution, Take 2.5 mg by nebulization 4 (Four) Times a Day., Disp: , Rfl:   •  ALPRAZolam (XANAX) 1 MG tablet, Take 1 tablet by mouth At Night As Needed for Anxiety., Disp: 90 tablet, Rfl: 0  •  apixaban (ELIQUIS) 5 MG tablet tablet, Take 1 tablet by mouth Every 12 (Twelve) Hours., Disp: 180 tablet, Rfl: 2  •  atorvastatin (LIPITOR) 10 MG tablet, TAKE 1 TABLET BY MOUTH DAILY FOR CHOLESTEROL, Disp: 90 tablet, Rfl: 1  •  benazepril (LOTENSIN) 20 MG tablet, Take 20 mg by mouth Daily., Disp: , Rfl:   •  budesonide (PULMICORT) 0.5 MG/2ML nebulizer solution, Take 2 mL by nebulization 2 (Two) Times a Day., Disp: 120 each, Rfl: 1  •  dilTIAZem (CARDIZEM) 60 MG tablet, TAKE 1 TABLET BY MOUTH TWICE A DAY, Disp: 60 tablet, Rfl: 2  •  folic acid (FOLVITE) 1 MG tablet, Take 1 mg by mouth Daily., Disp: , Rfl:   •  furosemide (LASIX) 40 MG tablet, Take 1 tablet by mouth 2 (Two) times a day. Take twice daily for one week and then daily. (Patient taking differently: Take 40 mg by mouth Daily.), Disp: 90 tablet, Rfl: 1  •  ipratropium (ATROVENT) 0.02 % nebulizer solution, Take 500 mcg by nebulization Every 4 (Four) Hours As Needed for Wheezing or Shortness of Air., Disp: , Rfl:   •  levoFLOXacin (LEVAQUIN) 500 MG tablet, Take 1 tablet by mouth Daily., Disp: 7 tablet, Rfl: 0  •  metFORMIN (GLUCOPHAGE) 1000 MG tablet, Take 1 tablet by mouth Daily With Breakfast. (Patient taking differently: Take 1,000 mg  "by mouth Every Night.), Disp: 90 tablet, Rfl: 1  •  metoprolol tartrate (LOPRESSOR) 100 MG tablet, TAKE ONE AND ONE - HALF TABLETS BY MOUTH TWICE DAILY (Patient taking differently: 100 mg. TAKE ONE AND ONE - HALF TABLETS BY MOUTH TWICE DAILY), Disp: 270 tablet, Rfl: 1  •  Revefenacin (YUPELRI) 175 MCG/3ML solution, Inhale 3 mL (1 vial) by nebulization Daily., Disp: 90 mL, Rfl: 1        Objective:     Vitals:    01/27/20 0937 01/27/20 0938   BP: 138/70 140/70   BP Location: Left arm Right arm   Pulse: 69    Weight: 101 kg (222 lb 6.4 oz)    Height: 177.8 cm (70\")      Body mass index is 31.91 kg/m².    PHYSICAL EXAM:    Vitals Reviewed.   General Appearance: No acute distress, well developed and well nourished. Obese.    Eyes: Conjunctiva and lids: No erythema, swelling, or discharge. Sclera non-icteric.   HENT: Atraumatic, normocephalic. External eyes, ears, and nose normal. Hearing loss noted. Mucous membranes normal. Lips not cyanotic. Neck supple with no tenderness.  Respiratory: No signs of respiratory distress. Respiration rhythm and depth normal.  Lung sounds are clear.  4 L of oxygen nasal cannula.  Cardiovascular:  Jugular Venous Pressure: Normal  Heart Rate and Rhythm: Normal, Heart Sounds: Normal S1 and S2. No S3 or S4 noted.  Murmurs: No murmurs noted. No rubs, thrills, or gallops.   Arterial Pulses: Carotid pulses normal. No carotid bruit noted. Posterior tibialis and dorsalis pedis pulses normal.   Lower Extremities: No edema noted.  Gastrointestinal:  Abdomen distended and obese.   Musculoskeletal: Normal movement of extremities  Skin and Nails: General appearance normal. Pale. No cyanosis, diaphoresis. Skin temperature normal. No clubbing of fingernails.  Small bruise on back.  Psychiatric: Patient alert and oriented to person, place, and time. Speech and behavior appropriate. Normal mood and affect.       ECG 12 Lead  Date/Time: 1/27/2020 9:38 AM  Performed by: Sunshine Del Cid APRN  Authorized by: " Sunshine Del Cid, APRN   Comparison: compared with previous ECG from 1/1/2020  Similar to previous ECG  Rhythm: sinus rhythm  Rate: normal  BPM: 69  Conduction: right bundle branch block, left anterior fascicular block and 1st degree AV block  ST Segments: ST segments normal  T Waves: T waves normal  QRS axis: normal    Clinical impression: abnormal EKG              Assessment:       Diagnosis Plan   1. Chronic diastolic congestive heart failure (CMS/HCC)  Comprehensive Metabolic Panel   2. Pneumonia of right lower lobe due to infectious organism (CMS/HCC)     3. Chronic obstructive pulmonary disease, unspecified COPD type (CMS/HCC)     4. Thoracic aortic aneurysm without rupture (CMS/HCC)     5. Nonrheumatic aortic valve stenosis     6. Essential hypertension     7. Class 1 obesity due to excess calories with serious comorbidity and body mass index (BMI) of 33.0 to 33.9 in adult     8. DOMINGO on CPAP            Plan:       1.  Chronic Diastolic Heart Failure: Appears euvolemic today and wants to continue with furosemide 40 mg daily.  He remains on metoprolol tartrate and benazepril.  He has a normal ejection fraction of 56-60%.    2.  Paroxysmal Atrial Fibrillation/Flutter: He is currently in a sinus rhythm and has previously been documented to have wandering pacemaker and APCs noted. He will continue with apixaban for stroke prevention.  His palpitations have improved on the diltiazem.    Atrial Fibrillation and Atrial Flutter  Assessment  • The patient has paroxysmal atrial fibrillation and paroxysmal atrial flutter  • This is non-valvular in etiology  • The patient's CHADS2-VASc score is 6  • A [id number] score of 2 or more is considered a high risk for a thromboembolic event  • Apixaban prescribed    Plan  • Attempt to maintain sinus rhythm  • Continue apixaban for antithrombotic therapy, bleeding issues discussed  • Continue beta blocker for rhythm control  • Continue beta blocker for rate control  • Add  diltiazem for rate control    3.  COPD: Oxygen dependent.  Follows with Dr. Alejandro.  He states his shortness of breath is stable at this time.    4. Thoracic Aortic Aneurysm: Status post stent in the past.  Cardiothoracic surgeon in [place].    5. Aortic Stenosis: Mild per echocardiogram in May 2019.      6. Hypertension: Blood pressure is borderline elevated today in the office.      7.  Hyperlipidemia: He remains on atorvastatin.    8.  Obesity: BMI is 31.9.  He would benefit from weight loss.     9.  Myelodysplastic Syndrome: Follows with Dr. Beto Hayes.     10.  I recommended follow-up with Dr. Rdz in 6 months and will repeat an echo at that time.    As always, it has been a pleasure to participate in your patient's care. Thank you.       Sincerely,         SCOTTY Llamas        Dictated utilizing Dragon dictation

## 2020-01-29 NOTE — PROGRESS NOTES
"Subjective   Ankit Mack . is a 79 y.o. male.     CC: Ear Pain         Dyspnea    History of Present Illness     Pt comes in today c/o right ear pain and d/c. Saw his hematologist who placed him on Levaquin and just finished it up this AM. Blood count low and seeing hematology with infusions (next week next visit). Cardiology is UTD and doing well.   No cough and no sputum production. Pt reports not dyspneic at home on his regular [place] O2 but gets winded when having to travel in here on the portable. No f/c      The following portions of the patient's history were reviewed and updated as appropriate: allergies, current medications, past family history, past medical history, past social history, past surgical history and problem list.    Review of Systems   Constitutional: Negative for chills and fever.   HENT: Positive for ear pain.    Respiratory: Negative for cough and wheezing.    Cardiovascular: Negative for chest pain.       /68   Pulse 68   Temp 98.6 °F (37 °C) (Oral)   Resp 16   Ht 177.8 cm (70\")   Wt 101 kg (222 lb)   SpO2 (!) 83%   BMI 31.85 kg/m²     Objective   Physical Exam   Constitutional: He appears well-developed and well-nourished. No distress.   HENT:   Right Ear: Ear canal normal. Tympanic membrane is erythematous and bulging.   Left Ear: Tympanic membrane and ear canal normal.   Cardiovascular: An irregularly irregular rhythm present.   Psychiatric: He has a normal mood and affect.       Assessment/Plan   Ankit was seen today for right ear pain and short of breath.    Diagnoses and all orders for this visit:    Acute otitis media, unspecified otitis media type  -     cefuroxime (CEFTIN) 500 MG tablet; Take 1 tablet by mouth 2 (Two) Times a Day for 10 days.    Pt's O2 sat.is deceiving currently as his lungs are clear and he's comfortable. Pt reports fine when on his home O2 and is encouraged to get home and plug back in.           "

## 2020-01-30 NOTE — OUTREACH NOTE
COPD/PN Week 4 Survey      Responses   Facility patient discharged from?  [facility]   Does the patient have one of the following disease processes/diagnoses(primary or secondary)?  COPD/Pneumonia   Was the primary reason for admission:  Pneumonia   Week 4 attempt successful?  Yes   Call start time  1218   Call end time  1220   Discharge diagnosis  Pneumonia    Meds reviewed with patient/caregiver?  Yes   Is the patient having any side effects they believe may be caused by any medication additions or changes?  No   Is the patient taking all medications as directed (includes completed medication regime)?  Yes   Has the patient kept scheduled appointments due by today?  N/A   Is the patient still receiving Home Health Services?  N/A   Psychosocial issues?  No   What is the patient's perception of their health status since discharge?  Improving   Nursing Interventions  Nurse provided patient education   Are the patient's immunizations up to date?   Yes   Is the patient/caregiver able to teach back the hierarchy of who to call/visit for symptoms/problems? PCP, Specialist, Home health nurse, Urgent Care, ED, 911  Yes   Is the patient able to teach back COPD zones?  Yes   Nursing interventions  Education provided on various zones   Patient reports what zone on this call?  Green Zone   Green Zone  Sleeping well, Appetite is good   Green Zone interventions:  Take daily medications, Use oxygen as prescribed   Is the patient/caregiver able to teach back signs and symptoms of worsening condition:  Fever/chills, Shortness of breath, Chest pain   Is the patient/caregiver able to teach back importance of completing antibiotic course of treatment?  Yes   Week 4 call completed?  Yes   Would the patient like one additional call?  No   Graduated  Yes   Did the patient feel the follow up calls were helpful during their recovery period?  Yes   Was the number of calls appropriate?  Yes          Maximo Ochoa RN

## 2020-01-31 PROBLEM — J18.9 PNEUMONIA OF RIGHT LOWER LOBE DUE TO INFECTIOUS ORGANISM: Status: RESOLVED | Noted: 2020-01-02 | Resolved: 2020-01-01

## 2020-01-31 NOTE — PROGRESS NOTES
Subjective     REASON FOR CONSULTATION:    1.  Myelodysplasia (refractory anemia with ring sideroblasts) diagnosed on bone marrow biopsy 6/26/2019  2. Lung mass on CT chest 11/17/2018  3.  Hypogammaglobulinemia.  First dose of IV immunoglobulin 40 g delivered 6/25/2019  4.  Hospitalized for right lower lobe pneumonia January 2020    HISTORY OF PRESENT ILLNESS:  The patient is a 79 y.o. year old male who was referred to us by his primary care office for evaluation of macrocytic anemia.  His MCV has been markedly elevated around 107-108..  He was hospitalized from 5/25/2019 through 5/31/2019 with COPD exacerbation and CHF.  During the hospitalization his hemoglobin was around 9.5 g/dL with an MCV as high as 113.    With his initial office consult on 6/18/2019 we ordered additional lab studies to try to further define the etiology of his anemia.  He was due to return to our office for follow-up to review those results but ended up being admitted to the hospital from 6/20/2019 to 7/1/2019 for treatment of pneumonia and COPD exacerbation.  He was seen for hematology consultation as an inpatient by Dr. Gupta on 6/25/2019.  Bone marrow biopsy was ordered which was performed on 6/26/2019 and showed dysplastic changes and ring sideroblasts.  Blasts were less than 5%.  Chromosome karyotype is pending.    Also during the hospitalization he was found to be profoundly hypogammaglobulinemic with an IgG level of 365.  He had a history of recurring pulmonary infections and during the admission received 1 dose of IV immunoglobulin at 40 g total dose on 6/25/2019.    He returns today for follow-up and further IVIG infusion and Procrit.  Since his last visit here, he required hospitalization from 1/1/2020 through 1/4/2020 for right lower lobe pneumonia.  During the hospitalization he received his dose of immunoglobulin on 1/3/2020.  Since discharge he has resumed his Procrit injections weekly with his most recent Procrit shot on  "1/24/2020 at a dose of 31,000 units.    He was seen earlier this week ( 1/29 ) by his primary care physician and was prescribed Ceftin for 10 days for possible otitis.    He remains in a wheelchair today and is wearing oxygen around-the-clock.  He reports he is feeling well with the exception of being very fatigued.  His hemoglobin today is 8.2 we will increase his Procrit shot to 40,000 units.    History of Present Illness     Past medical history, family history, and social history reviewed and unchanged.    ALLERGIES:    Allergies   Allergen Reactions   • Erythromycin Itching        Review of Systems   Constitutional: Positive for fatigue. Negative for activity change, chills and fever.   HENT: Negative for mouth sores, trouble swallowing and voice change.    Eyes: Negative for pain and visual disturbance.   Respiratory: Positive for shortness of breath. Negative for cough and wheezing.    Cardiovascular: Positive for leg swelling. Negative for chest pain and palpitations.   Gastrointestinal: Positive for abdominal pain and nausea. Negative for constipation, diarrhea and vomiting.   Genitourinary: Negative for difficulty urinating, frequency and urgency.   Musculoskeletal: Positive for arthralgias and gait problem. Negative for joint swelling.   Skin: Negative for rash.   Neurological: Positive for weakness. Negative for dizziness, seizures and headaches.   Hematological: Negative for adenopathy. Bruises/bleeds easily.   Psychiatric/Behavioral: Negative for behavioral problems and confusion. The patient is not nervous/anxious.         Objective     Vitals:    01/31/20 0925   BP: 132/62   Pulse: 63   Resp: 16   Temp: 98.1 °F (36.7 °C)   TempSrc: Oral   SpO2: (!) 77%  Comment: patient on oxygen   Weight: 101 kg (222 lb 12.8 oz)   Height: 177.8 cm (70\")   PainSc: 0-No pain     Current Status 1/31/2020   ECOG score 0       Physical Exam   Constitutional: He is oriented to person, place, and time. He appears " well-developed and well-nourished. No distress.   Chronically ill appearing obese gentleman wearing nasal oxygen   HENT:   Head: Normocephalic.   Eyes: Pupils are equal, round, and reactive to light. Conjunctivae and EOM are normal. No scleral icterus.   Neck: Normal range of motion. Neck supple. No JVD present. No thyromegaly present.   Cardiovascular: Normal rate. An irregular rhythm present. Exam reveals distant heart sounds. Exam reveals no gallop and no friction rub.   No murmur heard.  Pulmonary/Chest: Effort normal. He has decreased breath sounds in the right lower field and the left lower field. He has rales.   Abdominal: Soft. He exhibits distension. He exhibits no mass. There is no tenderness.   Musculoskeletal: Normal range of motion. He exhibits edema. He exhibits no deformity.   Lymphadenopathy:     He has no cervical adenopathy.   Neurological: He is alert and oriented to person, place, and time. He has normal reflexes. No cranial nerve deficit.   Skin: Skin is warm and dry. Purpura and rash noted. No erythema.   Psychiatric: He has a normal mood and affect. His behavior is normal. Judgment normal.         RECENT LABS:  Hematology WBC   Date Value Ref Range Status   01/31/2020 1.63 (C) 3.40 - 10.80 10*3/mm3 Preliminary   01/01/2020 7.15 3.40 - 10.80 10*3/mm3 Final   05/15/2019 3.79 3.40 - 10.80 10*3/mm3 Final   02/08/2018 4.11 (L) 4.5 - 11.0 10*3/uL Final     RBC   Date Value Ref Range Status   01/31/2020 2.30 (L) 4.14 - 5.80 10*6/mm3 Preliminary   05/15/2019 3.05 (L) 4.14 - 5.80 10*6/mm3 Final   02/08/2018 3.65 (L) 4.5 - 5.9 10*6/uL Final     Hemoglobin   Date Value Ref Range Status   01/31/2020 8.2 (L) 13.0 - 17.7 g/dL Preliminary   02/08/2018 13.0 (L) 13.5 - 17.5 g/dL Final     Hematocrit   Date Value Ref Range Status   01/31/2020 25.0 (L) 37.5 - 51.0 % Preliminary   02/08/2018 37.5 (L) 41.0 - 53.0 % Final     Platelets   Date Value Ref Range Status   01/31/2020 175 140 - 450 10*3/mm3 Preliminary    02/08/2018 164 140 - 440 10*3/uL Final        Lab Results   Component Value Date    GLUCOSE 152 (H) 01/04/2020    CALCIUM 8.9 01/04/2020     01/04/2020    K 4.4 01/04/2020    CO2 29.0 01/04/2020    CL 97 (L) 01/04/2020    BUN 34 (H) 01/04/2020    CREATININE 1.38 (H) 01/04/2020    EGFRIFAFRI 70 05/15/2019    EGFRIFNONA 50 (L) 01/04/2020    BCR 24.6 01/04/2020    ANIONGAP 13.0 01/04/2020     Lab Results   Component Value Date    IRON 80 01/03/2020    TIBC 246 (L) 01/03/2020    FERRITIN 357.00 01/03/2020     Lab Results   Component Value Date    AUOVISYX19 1,596 (H) 06/18/2019     Lab Results   Component Value Date    FOLATE >20.00 06/18/2019      Ref Range & Units 2wk ago   IgG 700-1,600 mg/dL 606 Abnormally low     IgM 40 - 230 mg/dL 45    IgA 70 - 400 mg/dL 201        Erythropoietin 2.6 - 18.5 mIU/mL 34.9 Abnormally high         Bone marrow biopsy 6/26/2019  Final Diagnosis   CONSULTANT DIAGNOSIS:  Hypercellular Bone Marrow (60% total marrow cellularity) demonstrating significant dysplasia present in all three hematopoietic cell lines with 2-3+ stainable hemosiderin and 14% ringed sideroblasts.       No metastatic carcinoma, granulomas, vasculitis, viral inclusions, increase in myeloblasts, plasma cells or malignant lymphoma.       CONSULTANT COMMENT:  These changes would be most supportive of a low grade myelodysplasia; however, this must be correlated with appropriate B12 and RBC folate levels as well as pending karyotype for final disease classification.     PORTABLE CHEST RADIOGRAPH  1/1/2020   HISTORY: Severe weakness   COMPARISON: 12/03/2019     FINDINGS:  Cardiomegaly is identified. There does appear to be vascular congestion.  No pneumothorax is seen. There is bibasilar scarring. Patient has a  thoracic aortic stent graft. Cardiac silhouette is stable. Postsurgical  changes are seen within the left supraclavicular region. There may be a  trace left pleural effusion.     IMPRESSION:  Vascular  congestion. Similar findings were present on the prior exam.      CT CHEST WITHOUT CONTRAST- 8/5/2019     Radiation dose reduction techniques were utilized, including automated  exposure control and exposure modulation based on body size.     CLINICAL: Pulmonary infiltrate.     COMPARISON: 06/23/2019.     FINDINGS: Resolution of the right pleural effusion. Trace amount of  pleural fluid along the left costophrenic sulcus resolved as well.  Multifocal areas of consolidation/atelectasis at both lung bases have  either resolved or significantly improved within the interim. Few small  nodular areas of airspace disease remain and should be followed to  resolution. No new area of infiltrate or nodularity has developed. There  is extensive bullae formation compatible with emphysema. Mediastinal  lymph nodes decreasing in size. Reference pretracheal node is currently  2.2 cm compared to 2.6 cm previously. Stable cardiac enlargement. No  pericardial abnormality. The esophagus is satisfactory in course and  caliber. There is atherosclerotic calcification of the aorta with a  graft demonstrated again in position. There has been no interval change  in size or appearance of the 2 cm right adrenal nodule.     CONCLUSION:  1. Resolved pleural effusions.  2. Multifocal areas of consolidation/atelectasis have either resolved or  significantly improved within the interim with few remaining areas of  nodularity, 4-6 month follow-up CT recommended.  3. Mediastinal lymph nodes decreasing in size.  4. Cardiomegaly.        Assessment/Plan   1.  New diagnosis of myelodysplasia with chronic anemia and leukopenia.  Bone marrow biopsy seem to be consistent with low-grade myelodysplasia with refractory anemia with ringed sideroblasts.  Blasts in the marrow were less than 5%.  Marrow karyotype shows a 7 q. deletion.  2.  Severe emphysema/COPD.  Patient requires oxygen around-the-clock  3.  Severe hypogammaglobulinemia with recurring pulmonary  infections.  Most recently hospitalized for right lower lobe pneumonia 1/1/2020 through 1/4/2020.  Patient received 1 dose of IV immune globulin 30 g during his hospitalization on 1/3/2020 and will need to continue receiving monthly IV immunoglobulin infusions as an outpatient at a dose of 30 g monthly.  4.  Pneumonia which has resolved clinically following antibiotics and IV immunoglobulin.  5.  History of non-small cell lung cancer from the left lower lobe resected by Dr. Hart Linker 2013.  No definite evidence of recurrent malignancy noted on the most recent CT scan of the chest from 8/5/2019 as noted above.  6.  Atrial fibrillation which further impacts his symptomatology.  He now is taking Cardizem twice daily and Lopressor.  He also is on Eliquis anticoagulation.    Plan  1.  Patient will be due again for his monthly IVIG infusion at 30 g today in the office.  2.  He will be scheduled to return weekly for blood count and receive Procrit if his hemoglobin is less than 10 g/dL.  Dose was increased today to 40,000 units weekly.  3.  He will return for lab and further IV immunoglobulin infusions in 1 month and 2 months.  4.  MD follow-up in 3 months.  We will continue to monitor his iron studies every 3 months.  For now he will stay on oral iron but if he becomes iron deficient we may need to order IV Injectafer as well.

## 2020-02-03 NOTE — TELEPHONE ENCOUNTER
I reviewed his CMP from 1/31/2020 at the [facility].  Blood glucose elevated at 170, BUN 32 and creatinine 1.49.  Creatinine was up slightly since the beginning of January, but no significant change.    He will continue with the furosemide 40 mg daily as this helps to keep the swelling down.  He may need to follow-up with the nephrologist if Dr. Lagos feels that that is necessary.  His kidney function has elevated since July 2019.    Patient informed.  He will follow-up with Dr. Rdz in October 2020.

## 2020-02-04 NOTE — TELEPHONE ENCOUNTER
Gopi Lagos MD  Pt daughter called and ask that   a nurse call her back her father is confused about his previous conversation with the nurse   Please callback @ [phone]   ( Amanda )     Thanks

## 2020-02-04 NOTE — TELEPHONE ENCOUNTER
Returned daughter's call. Pt received a call yesterday and was confused about what he was told. Informed pt that call was not from us but appeared to be from his cardiologist's office but that the labs reviewed were done in our office. Told daughter what was in the telephone note and that for anything more detailed she would need to call the cardiologist's office. She v/u.

## 2020-02-04 NOTE — TELEPHONE ENCOUNTER
Spoke with Pt's daughter and gave her the information. Pt's daughter verbalized understanding and will wait for call from nephrology to have appointment scheduled

## 2020-02-07 NOTE — NURSING NOTE
Lab Results   Component Value Date    WBC 2.47 (L) 02/07/2020    HGB 8.1 (L) 02/07/2020    HCT 24.2 (L) 02/07/2020    .2 (H) 02/07/2020     02/07/2020     Procrit given per protocol.  Pt denies complaints.  He was instructed to call with new or worsening symptoms prior to his next appt and v/u.

## 2020-02-24 NOTE — PROGRESS NOTES
Subjective   Patient ID: Ankit Mack Sr. is a 80 y.o. male is here today for follow-up.    History of Present Illness  Dear Colleague,  Ankit Mack Sr. 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  He denies any complaints of fever, chills, cough, hemoptysis, pleuritic chest pain, shortness of air, dyspnea with exertion, night sweats, hoarseness, or unintentional weight loss. Underlying medical conditions including COPD remain stable.  He has no other somatic complaints or alleviating or exacerbating factors aside from those mentioned above.  Since his last visit, he states he is feeling better.  He has no new complaints.  The following portions of the patient's history were reviewed and updated as appropriate: allergies, current medications, past family history, past medical history, past social history, past surgical history and problem list.  Review of Systems   Constitution: Negative.   HENT: Negative.    Eyes: Negative.    Cardiovascular: Negative.    Respiratory: Positive for shortness of breath and wheezing.    Endocrine: Negative.    Hematologic/Lymphatic: Negative.    Skin: Negative.    Musculoskeletal: Negative.    Gastrointestinal: Negative.    Genitourinary: Negative.    Neurological: Negative.    Psychiatric/Behavioral: Negative.    Allergic/Immunologic: Negative.      Patient Active Problem List   Diagnosis   • Anxiety   • Hyperlipidemia   • Osteoarthritis, knee   • Essential hypertension   • Cancer of lower lobe of lung (CMS/HCC)   • DOMINGO on CPAP   • Non-seasonal allergic rhinitis   • Pancytopenia (CMS/HCC)   • Aneurysm of thoracic aorta (CMS/HCC)   • Thrombocytopenia (CMS/HCC)   • Type 2 diabetes mellitus without complication, without long-term current use of insulin (CMS/HCC)   • History of COPD   • Macrocytic anemia   • Hypogammaglobulinemia (CMS/HCC)   • Myelodysplasia (myelodysplastic  syndrome) (CMS/HCC)   • Class 1 obesity due to excess calories with serious comorbidity and body mass index (BMI) of 33.0 to 33.9 in adult   • Nonrheumatic aortic valve stenosis   • Acute on chronic diastolic (congestive) heart failure (CMS/[facility])   • Hypoxemia   • Hypogammaglobulinemia (CMS/HCC)     Past Medical History:   Diagnosis Date   • Anemia    • Anxiety    • Atrial flutter (CMS/[facility])    • Chronic diastolic congestive heart failure (CMS/[facility]) 12/19/2018   • Chronic respiratory failure with hypoxia (CMS/[facility])    • COPD (chronic obstructive pulmonary disease) (CMS/[facility])    • Essential hypertension    • GERD (gastroesophageal reflux disease)    • H/O complete eye exam 06/2018   • History of pulmonary function tests 06/10/2014   • Hyperglycemia    • Hyperlipidemia    • Hypogammaglobulinemia (CMS/[facility])    • Hypoxemia    • Lung cancer (CMS/[facility]) 2012    Left   • MDS (myelodysplastic syndrome) (CMS/[facility])    • Nonrheumatic aortic valve stenosis     mild 5/2019   • Obesity    • Osteoarthritis, knee    • Paroxysmal atrial fibrillation (CMS/[facility])    • Pneumonia of right lower lobe due to infectious organism (CMS/[facility])    • Prostate cancer (CMS/[facility]) 2000   • Seizures (CMS/[facility])    • Sleep apnea    • Thoracic aortic aneurysm without rupture (CMS/[facility])     s/p stenting of descending thoracic aneurysm; the ascending aorta measured 3.7cm in 2019   • Type 2 diabetes mellitus without complication, without long-term current use of insulin (CMS/[facility])      Past Surgical History:   Procedure Laterality Date   • ABDOMINAL AORTIC ANEURYSM REPAIR  2010    Dr. Adarsh Dennis   • CARDIAC CATHETERIZATION Left 02/06/2004   • CATARACT EXTRACTION  10/2014    also 11/14   • COLONOSCOPY     • ENDOVASCULAR THORACIC ANEURYSM REPAIR     • JOINT REPLACEMENT Left 10/2010    anette; Dr. Wolf   • JOINT REPLACEMENT Right 09/2011    knee; Dr. Wolf   • LUNG LOBECTOMY Left 01/16/2012    LLL; Dr. Mendoza   • LUNG LOBECTOMY Left 2012   • PROSTATE SURGERY  2000      Marcella   • VASCULAR SURGERY  2009    TEVAR of thoracic aortic aneurysm   • VASCULAR SURGERY  12/15/2003    Left carotid subclavian bypass graft, ligation of proximal left subclavian following carotid subclavian bypass. Right femoral sheath. Arch angiography with descending thoracic and abdominal aortography    • VASCULAR SURGERY  2003    Selective catheterization right vertebral artery, right subclavian artery, left subclavian artery, left vertebral artery.     • VASCULAR SURGERY  2002    Tracheostomy, bronchoscopy   • VASCULAR SURGERY  2002    Thoracic arteriogram and abdominal aortogram     Family History   Problem Relation Age of Onset   • Cancer Mother    • COPD Mother    • Cancer Father         lung   • Colon cancer Father    • Cancer Other         colon   • Diabetes Other    • Emphysema Other    • Hypertension Other    • Kidney disease Other    • Lung cancer Other      Social History     Socioeconomic History   • Marital status: Single     Spouse name: Not on file   • Number of children: Not on file   • Years of education: Not on file   • Highest education level: Not on file   Occupational History     Employer: RETIRED   Tobacco Use   • Smoking status: Former Smoker     Packs/day: 1.00     Types: Cigarettes     Last attempt to quit: 4/15/2010     Years since quittin.8   • Smokeless tobacco: Never Used   Substance and Sexual Activity   • Alcohol use: No     Frequency: Never     Comment: No caffeine use   • Drug use: No   • Sexual activity: Defer       Current Outpatient Medications:   •  albuterol (PROVENTIL HFA;VENTOLIN HFA) 108 (90 Base) MCG/ACT inhaler, Inhale 2 puffs Every 4 (Four) Hours As Needed for Wheezing., Disp: , Rfl:   •  albuterol (PROVENTIL) (2.5 MG/3ML) 0.083% nebulizer solution, Take 2.5 mg by nebulization 4 (Four) Times a Day., Disp: , Rfl:   •  ALPRAZolam (XANAX) 1 MG tablet, Take 1 tablet by mouth At Night As Needed for Anxiety., Disp: 90 tablet, Rfl: 0  •   apixaban (ELIQUIS) 5 MG tablet tablet, Take 1 tablet by mouth Every 12 (Twelve) Hours. Indications: Atrial Fibrillation, Disp: 180 tablet, Rfl: 2  •  atorvastatin (LIPITOR) 10 MG tablet, TAKE 1 TABLET BY MOUTH DAILY FOR CHOLESTEROL, Disp: 90 tablet, Rfl: 1  •  benazepril (LOTENSIN) 20 MG tablet, Take 20 mg by mouth Daily., Disp: , Rfl:   •  budesonide (PULMICORT) 0.5 MG/2ML nebulizer solution, Take 2 mL by nebulization 2 (Two) Times a Day., Disp: 120 each, Rfl: 1  •  dilTIAZem (CARDIZEM) 60 MG tablet, TAKE 1 TABLET BY MOUTH TWICE A DAY, Disp: 180 tablet, Rfl: 1  •  folic acid (FOLVITE) 1 MG tablet, Take 1 mg by mouth Daily., Disp: , Rfl:   •  furosemide (LASIX) 40 MG tablet, Take 1 tablet by mouth 2 (Two) times a day. Take twice daily for one week and then daily. (Patient taking differently: Take 40 mg by mouth Daily.), Disp: 90 tablet, Rfl: 1  •  ipratropium (ATROVENT) 0.02 % nebulizer solution, Take 500 mcg by nebulization Every 4 (Four) Hours As Needed for Wheezing or Shortness of Air., Disp: , Rfl:   •  metFORMIN (GLUCOPHAGE) 1000 MG tablet, Take 1 tablet by mouth Daily With Breakfast. (Patient taking differently: Take 1,000 mg by mouth Every Night.), Disp: 90 tablet, Rfl: 1  •  Revefenacin (YUPELRI) 175 MCG/3ML solution, Inhale 3 mL (1 vial) by nebulization Daily., Disp: 90 mL, Rfl: 1  •  amoxicillin (AMOXIL) 500 MG capsule, Take 1 capsule by mouth 3 (Three) Times a Day., Disp: 30 capsule, Rfl: 0  •  metoprolol tartrate (LOPRESSOR) 100 MG tablet, TAKE 1 AND 1/2 TABLETS BY MOUTH TWICE DAILY, Disp: 270 tablet, Rfl: 1  Allergies   Allergen Reactions   • Erythromycin Itching        Objective   Vitals:    02/24/20 1011   BP: 132/62   Pulse: 67   SpO2: 90%     Physical Exam   Constitutional: He is oriented to person, place, and time. He appears well-developed and well-nourished.   HENT:   Head: Normocephalic and atraumatic.   Nose: Nose normal.   Mouth/Throat: Oropharynx is clear and moist.   Eyes: Pupils are equal,  round, and reactive to light. Conjunctivae and EOM are normal.   Neck: Normal range of motion. Neck supple.   Cardiovascular: Normal rate, regular rhythm, normal heart sounds and intact distal pulses.   Pulmonary/Chest: Effort normal and breath sounds normal.   Abdominal: Soft. Bowel sounds are normal.   Musculoskeletal: Normal range of motion.   Neurological: He is alert and oriented to person, place, and time.   Skin: Skin is warm and dry. Capillary refill takes less than 2 seconds.   Psychiatric: He has a normal mood and affect. His behavior is normal. Judgment and thought content normal.   Nursing note and vitals reviewed.    Independent Review of Radiographic Studies:    I have independently reviewed the CT chest performed on 2/19/2020 which demonstrates 3 mm right upper lobe nodule in the area of the prior consolidation that is clear.  The groundglass opacities and airspace disease on the right have resolved.  The aortic stent is unchanged.  There is no pleural pericardial effusions.  There is no mediastinal or hilar lymphadenopathy.  Assessment/Plan   Mr. Mack is a pleasant 80-year-old gentleman with pulmonary nodules and a history of a solitary fibrous tumor of the thorax.  On his most recent CT of the chest, all of the prior airspace opacities have resolved except for a 3 mm nodule in the right upper lobe.  He will need follow-up per Fleischner guidelines with a CT of the chest in 1 year to continue surveillance.    Diagnoses and all orders for this visit:    Pulmonary infiltrate  -     CT Chest Without Contrast; Future    Solitary fibrous tumor  -     CT Chest Without Contrast; Future

## 2020-02-28 NOTE — NURSING NOTE
Pt here for IVIG and poss procrit. states that he feels good and does not have any worsening of soa and remains above 90% on 4 L oxygen. Denies any signs of infection or fevers. Reviewed hgb of 7.6 and ANC of 660 and above with Dr Hayes with verbal orders to dose pt at 40,000 units today and ok to proceed with IVIG. Verified with Juhi that he is authorized for this dose.     Lab Results   Component Value Date    WBC 2.00 (L) 02/28/2020    HGB 7.6 (L) 02/28/2020    HCT 22.8 (L) 02/28/2020    .1 (H) 02/28/2020     02/28/2020

## 2020-02-28 NOTE — NURSING NOTE
At completion of IVIG,   1207: Pt c/o SOA and feeling hoarse  1208: COTY Lowry at chairside with v/o for Solucortef 100mg IV x 1  158/76, HR 69, oxygen sat 82%  1209: Solucortef 100mg given  1210: oxygen tank empty and replaced  Sats up to 97%, patient voices feeling better with no more SOB.  1216: Ptc/o chest pain, /83 sats 100%, HR 66  Essence at chairside with v/o for benadryl 25mg IV  1218: Benadryl 25mg IV given   1219:Pt states chest pain a little better but still mild pain  1220: v/o COTY Lowry Pepcid 20mg IV  1221: Pepcid 20mg IV given  1224: 153/73, HR 67, oxygen 100% 4 liters NC  1250: reviewed with Dr. Hayes with v/o to d/c patient if vitals stable  1256: 162/85, hr 64. 99%.  Pt reports all symptoms resolved and denies any further chest pain  1257: pt discharged with daughter via wheelchair

## 2020-03-06 NOTE — NURSING NOTE
Lab Results   Component Value Date    WBC 2.23 (L) 03/06/2020    HGB 7.6 (L) 03/06/2020    HCT 22.7 (L) 03/06/2020    .1 (H) 03/06/2020     (L) 03/06/2020     Hgb 7.6, same as last week.  Pt denies increased SOB and states he feels the same as his normal.  He stated he fell on Tuesday attempting to get into his wheelchair.  He is experiencing some right rib pain with inhalation.  Pt instructed to go to Urgent Care for xray if symptoms do not improve.  Reviewed low Hgb with COTY Lowry with v/o to give procrit and hold off on transfusion for now since patient denies worsening symptoms.  Pt to return next week for poss procrit and was instructed to call to come in sooner if symptoms worsen.  He and his daughter v/u.

## 2020-03-08 NOTE — NURSING NOTE
"Noted rib xray resulted by Dr. Peñaloza.  I phoned him for permission to call it to [facility].  Then phoned [facility] to report this.  Answering service took down my number.  Dr. Mendoza called me back.  I read the xray result to him.  States \"ok to go home and follow up (call the office) tomorrow\".  Patient informed of this. Patient discharged to home with family member pushing him in the wheelchair.  Patient remains on his oxygen at this time.  "

## 2020-03-09 NOTE — TELEPHONE ENCOUNTER
"Pt called and said he is fatigued and short winded and \"can't hardly go\". He thinks he needs a blood transfusion but understands its too late in the day to get it today. Reviewed with COTY Lowry, ok to set up a transfusion of 2 units of blood. Pt made aware and understands to go to the ER if symptoms worsen tonight. Message sent to the Informatics nurse to place the orders and to scheduling to set up the appt.  "

## 2020-04-09 NOTE — PROGRESS NOTES
Subjective   Ankit Mack Sr. is a 80 y.o. male.     CC: Telehealth Visit for Medial Management    History of Present Illness     Chief Complaint:   Chief Complaint   Patient presents with   • Anxiety   • Diabetes   • Hyperlipidemia       Ankit Mack Sr. 80 y.o. male who presents today for Medical Management of the below listed issues and medication refills.  he has a problem list of   Patient Active Problem List   Diagnosis   • Anxiety   • Hyperlipidemia   • Osteoarthritis, knee   • Essential hypertension   • Cancer of lower lobe of lung (CMS/HCC)   • DOMINGO on CPAP   • Non-seasonal allergic rhinitis   • Pancytopenia (CMS/HCC)   • Aneurysm of thoracic aorta (CMS/HCC)   • Thrombocytopenia (CMS/HCC)   • Type 2 diabetes mellitus without complication, without long-term current use of insulin (CMS/HCC)   • History of COPD   • Macrocytic anemia   • Hypogammaglobulinemia (CMS/HCC)   • Myelodysplasia (myelodysplastic syndrome) (CMS/HCC)   • Class 1 obesity due to excess calories with serious comorbidity and body mass index (BMI) of 33.0 to 33.9 in adult   • Nonrheumatic aortic valve stenosis   • Acute on chronic diastolic (congestive) heart failure (CMS/HCC)   • Hypoxemia   • Hypogammaglobulinemia (CMS/HCC)   .  Since the last visit, he has overall felt well.  he has been compliant with   Current Outpatient Medications:   •  albuterol (PROVENTIL HFA;VENTOLIN HFA) 108 (90 Base) MCG/ACT inhaler, Inhale 2 puffs Every 4 (Four) Hours As Needed for Wheezing., Disp: , Rfl:   •  albuterol (PROVENTIL) (2.5 MG/3ML) 0.083% nebulizer solution, Take 2.5 mg by nebulization 4 (Four) Times a Day., Disp: , Rfl:   •  ALPRAZolam (XANAX) 1 MG tablet, Take 1 tablet by mouth At Night As Needed for Anxiety., Disp: 90 tablet, Rfl: 0  •  apixaban (ELIQUIS) 5 MG tablet tablet, Take 1 tablet by mouth Every 12 (Twelve) Hours. Indications: Atrial Fibrillation, Disp: 180 tablet, Rfl: 2  •  atorvastatin (LIPITOR) 10 MG tablet, TAKE 1 TABLET BY MOUTH  DAILY FOR CHOLESTEROL, Disp: 90 tablet, Rfl: 1  •  benazepril (LOTENSIN) 20 MG tablet, Take 20 mg by mouth Daily., Disp: , Rfl:   •  budesonide (PULMICORT) 0.5 MG/2ML nebulizer solution, Take 2 mL by nebulization 2 (Two) Times a Day., Disp: 120 each, Rfl: 1  •  dilTIAZem (CARDIZEM) 60 MG tablet, TAKE 1 TABLET BY MOUTH TWICE A DAY, Disp: 180 tablet, Rfl: 1  •  folic acid (FOLVITE) 1 MG tablet, Take 1 mg by mouth Daily., Disp: , Rfl:   •  furosemide (LASIX) 40 MG tablet, Take 1 tablet by mouth 2 (Two) times a day. Take twice daily for one week and then daily. (Patient taking differently: Take 40 mg by mouth Daily.), Disp: 90 tablet, Rfl: 1  •  ipratropium (ATROVENT) 0.02 % nebulizer solution, Take 500 mcg by nebulization Every 4 (Four) Hours As Needed for Wheezing or Shortness of Air., Disp: , Rfl:   •  metFORMIN (GLUCOPHAGE) 1000 MG tablet, Take 1 tablet by mouth Daily With Breakfast. (Patient taking differently: Take 1,000 mg by mouth Every Night.), Disp: 90 tablet, Rfl: 1  •  metoprolol tartrate (LOPRESSOR) 100 MG tablet, TAKE 1 AND 1/2 TABLETS BY MOUTH TWICE DAILY, Disp: 270 tablet, Rfl: 1  •  Revefenacin (YUPELRI) 175 MCG/3ML solution, Inhale 3 mL (1 vial) by nebulization Daily., Disp: 90 mL, Rfl: 1.  he denies medication side effects.    All of the other chronic condition(s) listed above are stable w/o issues.    There were no vitals taken for this visit.    Results for orders placed or performed in visit on 04/03/20   CBC Auto Differential   Result Value Ref Range    WBC 3.17 (L) 3.40 - 10.80 10*3/mm3    RBC 2.43 (L) 4.14 - 5.80 10*6/mm3    Hemoglobin 8.4 (L) 13.0 - 17.7 g/dL    Hematocrit 25.1 (L) 37.5 - 51.0 %    .3 (H) 79.0 - 97.0 fL    MCH 34.6 (H) 26.6 - 33.0 pg    MCHC 33.5 31.5 - 35.7 g/dL    RDW 16.8 (H) 12.3 - 15.4 %    RDW-SD 63.1 (H) 37.0 - 54.0 fl    MPV 12.3 (H) 6.0 - 12.0 fL    Platelets 170 140 - 450 10*3/mm3    Neutrophil % 61.2 42.7 - 76.0 %    Lymphocyte % 18.3 (L) 19.6 - 45.3 %     Monocyte % 7.9 5.0 - 12.0 %    Eosinophil % 7.3 (H) 0.3 - 6.2 %    Basophil % 0.6 0.0 - 1.5 %    Immature Grans % 4.7 (H) 0.0 - 0.5 %    Neutrophils, Absolute 1.94 1.70 - 7.00 10*3/mm3    Lymphocytes, Absolute 0.58 (L) 0.70 - 3.10 10*3/mm3    Monocytes, Absolute 0.25 0.10 - 0.90 10*3/mm3    Eosinophils, Absolute 0.23 0.00 - 0.40 10*3/mm3    Basophils, Absolute 0.02 0.00 - 0.20 10*3/mm3    Immature Grans, Absolute 0.15 (H) 0.00 - 0.05 10*3/mm3    nRBC 1.3 (H) 0.0 - 0.2 /100 WBC           The following portions of the patient's history were reviewed and updated as appropriate: allergies, current medications, past family history, past medical history, past social history, past surgical history and problem list.    Review of Systems   Constitutional: Negative for appetite change, chills and fever.   Respiratory: Negative for cough.    Cardiovascular: Negative for chest pain.   Psychiatric/Behavioral: Negative for dysphoric mood.       Objective   Physical Exam   Constitutional: No distress.   Psychiatric: He has a normal mood and affect. His behavior is normal. Thought content normal.   Labs reviewed with pt today during visit. All questions answered.      Assessment/Plan   Ankit was seen today for anxiety, diabetes and hyperlipidemia.    Diagnoses and all orders for this visit:    Anxiety    Mixed hyperlipidemia    Type 2 diabetes mellitus without complication, without long-term current use of insulin (CMS/[facility])    Spent 14 minutes with chart and interview.

## 2020-04-10 NOTE — NURSING NOTE
Labs reviewed with Dr Hayes and patient was set up for 2 units of blood on Sunday at the hospital.  Lab Results   Component Value Date    WBC 2.10 (L) 04/10/2020    HGB 7.6 (L) 04/10/2020    HCT 22.9 (L) 04/10/2020    .2 (H) 04/10/2020     04/10/2020

## 2020-04-10 NOTE — PROGRESS NOTES
Two units PRBCs, with referral to [facility] or [address] [place] [address], to be given as soon as possible per communication from LENNY Au RN,  MADISON Hayes

## 2020-04-12 NOTE — NURSING NOTE
1st unit of blood finished and pt started having complaints of SOA. VS taken and all WNL, see chart, no use of accessory muscles but pt is utilizing pursed lip breathing. Breath sounds all diminished. Spoke with Dr. Hayes about care and agreed to order 20mg IV lasix and a one time albuterol neb. Orders place, Lasix given and RT came to administer nebulizer. Pt states that he was already feeling better after the lasix admin, and feels even better after the breathing tx. Vital signs repeated and all were WNL, proceeding with 2nd unit of blood. Will continue to monitor.

## 2020-04-14 NOTE — TELEPHONE ENCOUNTER
LENGTHY CONVERSATION WITH PT. DAUGHTER MERRY.  STATES SHE IS WORKING ON A DEGREE IN SOCIAL WORK AND WOULD LIKE TO S/W OUR  CONCERNING SOME HOME ISSUES.  PT. DOES HAVE SOME EQUIPMENT THROUGH [employer], BUT HAS SOME OTHER ISSUES TO TALK ABOUT.  INFORMED HER I WOULD SEND IVETT MOREAU A MESSAGE TO CALL HER.  V/U.

## 2020-04-14 NOTE — TELEPHONE ENCOUNTER
Daughter, Amanda, would like to speak to .    Having trouble getting in and out of shower, etc.  She wants to see if he can get any help with this.    [phone]

## 2020-04-17 NOTE — PROGRESS NOTES
CSW received a call from patient's daughter, Amanda Colin, inquiring about programs for home modifications for people on a fixed income.  Patient's daughter stated patient lives with his other daughter, Afsaneh, and Amanda's daughter Yamini.  Yamini works for [employer] in [facility].  Amanda is in the [employer] graduate SW program.     Patient's bathtub is whirlpool style, and he's having difficulty getting in and out of it, due to the height.  Afsaneh is currently assisting patient with the process of getting in and out and bathing.  They were using a shower chair in the tub, but it doesn't fit properly.  They would like to replace the tub with something more accessible for patient, but the cost is an issue.     CSW discussed non medical home health as an option to take some of the physical burden off of Afsaneh, and increase safety for patient until modifications can possibly be made.  CSW also inquired as to other possible living arrangements that are more accessible.  Amanda stated she has offered for patient to live with her, but he doesn't want to leave his home; he is not open to the idea of moving at all.      CSW is not aware of a fund/program for home modifications but will research the request.  CSW will also forward this information to Renetta Velazquez since this patient is regularly seen at [facility].

## 2020-04-17 NOTE — PROGRESS NOTES
CSW contacted patient's daughter Amanda Colin to provide two possible resources for locating funding for bathroom modification:    [facility]; [phone]    Lung[employer]org  [phone]    CSW left message with this information.

## 2020-04-20 NOTE — PROGRESS NOTES
"Clinical Case Management/[place] (telephone):    OSW received a request from BRYN Dominguez, to follow-up with the patient's daughter, Amanda Colin, to further discuss the pt's needs.  Amanda stated that she did receive the information provided by Bonita (via voicemail message) for the [facility]; [phone] and Lungcancer.[phone].    Upon further discussion, Amanda indicated that the pt only needs a safe way to enter his tub where his shower is; the pt does not use the bathtub (per se) to bathe. Since there is only one bathtub and the pt is not interested in leaving the home for any reason, reworking the bathroom is not an option as the pt would not have anywhere to bathe in the interim when the changes were being made.     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  (2) Call [employer], a local company that assists with \"aging in place\" modifications, for ideas/discussion.  (3) Seek out a shower seat/chair (that fits more safely) through a [email or web address] company    We discussed the pt's mental health needs. Amanda re[ported the pt has been feeling low since his diagnosis and the death in February of his 93 year old female friend, Joanne. The couple has been together for 10 years. There was no , so the pt has had no closure. Additionally, the pt broke a tooth over the weekend and was discouraged about that. Also mentioned was that the pt turned 80 in February and his celebration was canceled due to his cancer tx and his immune system being compromised. Amanda stated that the pt told her that he would be \"better off dead\".    OSW stated that she would call the pt to speak with the him to further assess his emotional well-being. Amanda stated " "that the pt is \"very social\" and \"loves to talk\".  We discussed considering non-medical home health to provide respite for her sister, Afsaneh, and Amanda's daughter, Yamini, and to provide the pt with a friendly caregiver to offer attention, bathing, possibly meals, and company. Amanda stated she was comfortable with this plan. It was agreed that OSW will re-contact Amanda after speaking with the pt.    Renetta Velazquez, [facility]  Oncology Social Worker       "

## 2020-04-21 NOTE — PROGRESS NOTES
Date of Office Visit: 2020  Encounter Provider: SCOTTY Negron  Place of Service: [facility] CARDIOLOGY  Patient Name: Ankit Mack Sr.  :1940    Chief Complaint   Patient presents with   • Congestive Heart Failure   • Atrial Fibrillation   :     HPI:Ankti Mack Sr is an 80-year-old male that is a patient of Dr. Rdz.  He has seen Sunshine Del Cid our nurse practitioner in the past and is new to me today.  Patient has a history of descending thoracic aortic aneurysm from which he underwent stent placement in [facility] and still follows with a cardiothoracic surgeon.  He has a history of paroxysmal atrial flutter and is on beta-blocker and diltiazem at home and is anticoagulated with Eliquis.  He has chronic diastolic heart failure, mild aortic stenosis per echocardiogram in May 2019.  He has oxygen dependent COPD and myelodysplastic syndrome.     In 2019 he presented to the ER after spitting up 3 big red blood clots and complained of a sore throat and it was determined that it was coming from his nasal passages or sinuses.  He was treated for an upper respiratory infection.    He was admitted to the hospital in 2019 for shortness of breath and edema and low oxygen saturations.  He was treated for diastolic heart failure which was felt to be due to his high sodium intake. He had to be rehospitalized in January of this year and was diagnosed with pneumonia on top of acute exacerbation of his diastolic heart failure.  His Lasix was increased to twice a day.    His daughter called the office stating that he is been short of breath and having increased edema and we are doing a video call today to assess his status.  Yesterday morning when the patient woke up his oxygen saturation was 69% in the low 70s.  He realized he had mixed up his trilogy vent and his oxygen and does not think he was getting his oxygen at night while he was sleeping.  It  happened again this morning and they called the [employer]. and they help them troubleshoot it.  His oxygen was in the low 90s throughout the day and it was in the 90s this morning when he woke up.  It is now 92%.  He is not having any increased trouble with breathing during the day but over the last 4 to 5 days has noted some swelling in his feet.  Today the edema is pitting.  He denies any chest pain or that his heart is been racing and he does not appear to have any orthopnea.  His daughter is also on this call with me and she states she does not notice that his breathing is any more labored than his baseline.      Past Medical History:   Diagnosis Date   • Anemia    • Anxiety    • Atrial flutter (CMS/[facility])    • Chronic diastolic congestive heart failure (CMS/[facility]) 12/19/2018   • Chronic respiratory failure with hypoxia (CMS/[facility])    • COPD (chronic obstructive pulmonary disease) (CMS/[facility])    • Essential hypertension    • GERD (gastroesophageal reflux disease)    • H/O complete eye exam 06/2018   • History of pulmonary function tests 06/10/2014   • Hyperglycemia    • Hyperlipidemia    • Hypogammaglobulinemia (CMS/HCC)    • Hypoxemia    • Lung cancer (CMS/HCC) 2012    Left   • MDS (myelodysplastic syndrome) (CMS/[facility])    • Nonrheumatic aortic valve stenosis     mild 5/2019   • Obesity    • Osteoarthritis, knee    • Paroxysmal atrial fibrillation (CMS/HCC)    • Pneumonia of right lower lobe due to infectious organism (CMS/HCC)    • Prostate cancer (CMS/HCC) 2000   • Seizures (CMS/[facility])    • Sleep apnea    • Thoracic aortic aneurysm without rupture (CMS/[facility])     s/p stenting of descending thoracic aneurysm; the ascending aorta measured 3.7cm in 2019   • Type 2 diabetes mellitus without complication, without long-term current use of insulin (CMS/HCC)        Past Surgical History:   Procedure Laterality Date   • ABDOMINAL AORTIC ANEURYSM REPAIR  2010    Dr. Adarsh Dennis   • CARDIAC CATHETERIZATION Left 02/06/2004    • CATARACT EXTRACTION  10/2014    also 11/14   • COLONOSCOPY     • ENDOVASCULAR THORACIC ANEURYSM REPAIR     • JOINT REPLACEMENT Left 10/2010    knee; Dr. Wolf   • JOINT REPLACEMENT Right 09/2011    knee; Dr. Wolf   • LUNG LOBECTOMY Left 01/16/2012    LLL; Dr. Mendoza   • LUNG LOBECTOMY Left 2012   • PROSTATE SURGERY  2000    Dr. Naranjo   • VASCULAR SURGERY  05/2009    TEVAR of thoracic aortic aneurysm   • VASCULAR SURGERY  12/15/2003    Left carotid subclavian bypass graft, ligation of proximal left subclavian following carotid subclavian bypass. Right femoral sheath. Arch angiography with descending thoracic and abdominal aortography    • VASCULAR SURGERY  11/17/2003    Selective catheterization right vertebral artery, right subclavian artery, left subclavian artery, left vertebral artery.     • VASCULAR SURGERY  06/05/2002    Tracheostomy, bronchoscopy   • VASCULAR SURGERY  05/21/2002    Thoracic arteriogram and abdominal aortogram       Social History     Socioeconomic History   • Marital status: Single     Spouse name: Not on file   • Number of children: Not on file   • Years of education: Not on file   • Highest education level: Not on file   Occupational History     Employer: RETIRED   Tobacco Use   • Smoking status: Former Smoker     Packs/day: 1.00     Types: Cigarettes     Last attempt to quit: 4/15/2010     Years since quitting: 10.0   • Smokeless tobacco: Never Used   Substance and Sexual Activity   • Alcohol use: No     Frequency: Never     Comment: No caffeine use   • Drug use: No   • Sexual activity: Defer       Family History   Problem Relation Age of Onset   • Cancer Mother    • COPD Mother    • Cancer Father         lung   • Colon cancer Father    • Cancer Other         colon   • Diabetes Other    • Emphysema Other    • Hypertension Other    • Kidney disease Other    • Lung cancer Other        Review of Systems   Constitution: Negative for diaphoresis and malaise/fatigue.   Cardiovascular:  Positive for leg swelling. Negative for chest pain, claudication, dyspnea on exertion, irregular heartbeat, near-syncope, orthopnea, palpitations, paroxysmal nocturnal dyspnea and syncope.   Respiratory: Negative for cough, shortness of breath and sleep disturbances due to breathing.    Musculoskeletal: Negative for falls.   Neurological: Negative for dizziness and weakness.   Psychiatric/Behavioral: Negative for altered mental status and substance abuse.       Allergies   Allergen Reactions   • Erythromycin Itching         Current Outpatient Medications:   •  albuterol (PROVENTIL HFA;VENTOLIN HFA) 108 (90 Base) MCG/ACT inhaler, Inhale 2 puffs Every 4 (Four) Hours As Needed for Wheezing., Disp: , Rfl:   •  albuterol (PROVENTIL) (2.5 MG/3ML) 0.083% nebulizer solution, Take 2.5 mg by nebulization 4 (Four) Times a Day., Disp: , Rfl:   •  ALPRAZolam (XANAX) 1 MG tablet, TAKE 1 TABLET BY MOUTH AT NIGHT AS NEEDED FOR ANXIETY., Disp: 90 tablet, Rfl: 0  •  apixaban (ELIQUIS) 5 MG tablet tablet, Take 1 tablet by mouth Every 12 (Twelve) Hours. Indications: Atrial Fibrillation, Disp: 180 tablet, Rfl: 2  •  atorvastatin (LIPITOR) 10 MG tablet, Take 1 tablet by mouth Daily. For cholesterol, Disp: 90 tablet, Rfl: 1  •  dilTIAZem (CARDIZEM) 60 MG tablet, TAKE 1 TABLET BY MOUTH TWICE A DAY, Disp: 180 tablet, Rfl: 1  •  folic acid (FOLVITE) 1 MG tablet, Take 1 mg by mouth Daily., Disp: , Rfl:   •  furosemide (LASIX) 40 MG tablet, Take 1 tablet by mouth 2 (Two) times a day. Take twice daily for one week and then daily. (Patient taking differently: Take 40 mg by mouth Daily.), Disp: 90 tablet, Rfl: 1  •  ipratropium (ATROVENT) 0.02 % nebulizer solution, Take 500 mcg by nebulization Every 4 (Four) Hours As Needed for Wheezing or Shortness of Air., Disp: , Rfl:   •  metFORMIN (GLUCOPHAGE) 1000 MG tablet, Take 1 tablet by mouth Daily With Breakfast., Disp: 90 tablet, Rfl: 1  •  metoprolol tartrate (LOPRESSOR) 100 MG tablet, TAKE 1 AND  1/2 TABLETS BY MOUTH TWICE DAILY, Disp: 270 tablet, Rfl: 1  •  nystatin (MYCOSTATIN) 013734 UNIT/ML suspension, Swish and swallow 5 mL 4 (Four) Times a Day., Disp: 473 mL, Rfl: 0  •  Revefenacin (YUPELRI) 175 MCG/3ML solution, Inhale 3 mL (1 vial) by nebulization Daily., Disp: 90 mL, Rfl: 1  No current facility-administered medications for this visit.     Facility-Administered Medications Ordered in Other Visits:   •  acetaminophen (TYLENOL) tablet 650 mg, 650 mg, Oral, Once, Beto Hayes MD  •  diphenhydrAMINE (BENADRYL) capsule 25 mg, 25 mg, Oral, Once, Beto Hayes MD  •  sodium chloride 0.9 % infusion 250 mL, 250 mL, Intravenous, PRN, Beto Hayes MD      Objective:     Vitals:    04/21/20 0923   BP: 147/76   Pulse: 83   Weight: 101 kg (222 lb)     Body mass index is 31.85 kg/m².    PHYSICAL EXAM:    Physical Exam   Constitutional: He is oriented to person, place, and time. He appears well-developed and well-nourished.   HENT:   Head: Normocephalic.   Neck: Normal range of motion.   Musculoskeletal: He exhibits edema (1+).   Neurological: He is alert and oriented to person, place, and time.       Procedures      Assessment:       Diagnosis Plan   1. Mixed hyperlipidemia     2. Essential hypertension     3. Thoracic aortic aneurysm without rupture (CMS/HCC)     4. Nonrheumatic aortic valve stenosis     5. Acute on chronic diastolic (congestive) heart failure (CMS/HCC)     6. Malignant neoplasm of lower lobe of left lung (CMS/HCC)     7. DOMINGO on CPAP       No orders of the defined types were placed in this encounter.         Plan:       The last note I had he is supposed to be on Lasix twice a day but he states about a week after he got out of the hospital in January it was cut back to once a day and that is what his home prescription bottle says.  I am going to put him on Lasix 40 mg twice a day and will see if this helps with swelling.  His blood pressure is about where it usually is his heart rate  seems well controlled.  As far as his weight he weighs himself every Friday last Friday he was 221 and today's 222.  In his last appointment he was also 222 pounds so he does not really appear to be retaining a lot of extra weight.  However the his daughter showed me his feet and he does have a 1+ pitting edema in the feet and ankles and a little bit going up into his lower legs.  It appears bilateral.  He is going to go to the cancer center on Friday and he will be there for about 4 5 hours and I am putting an order in to get blood work that they can draw while he is there so I can recheck his kidney function.  I am also going to put him on a little low-dose potassium 10 mEq daily while on Lasix 40 twice a day.  I am going to follow-up with him again in a week I discussed with them if he has any worsening trouble breathing that they may need to go to the emergency room.    This patient has consented to a telehealth visit via video. The visit was scheduled as a video visit to comply with patient safety concerns in accordance with CDC recommendations.  All vitals recorded within this visit are reported by the patient.  I spent 30 minutes in total including but not limited to the 25 minutes spent in direct conversation with this patient.          Your medication list           Accurate as of April 21, 2020  9:42 AM. If you have any questions, ask your nurse or doctor.               CHANGE how you take these medications      Instructions Last Dose Given Next Dose Due   furosemide 40 MG tablet  Commonly known as:  LASIX  What changed:  when to take this      Take 1 tablet by mouth 2 (Two) times a day. Take twice daily for one week and then daily.          CONTINUE taking these medications      Instructions Last Dose Given Next Dose Due   albuterol (2.5 MG/3ML) 0.083% nebulizer solution  Commonly known as:  PROVENTIL      Take 2.5 mg by nebulization 4 (Four) Times a Day.       albuterol sulfate  (90 Base) MCG/ACT  inhaler  Commonly known as:  PROVENTIL HFA;VENTOLIN HFA;PROAIR HFA      Inhale 2 puffs Every 4 (Four) Hours As Needed for Wheezing.       ALPRAZolam 1 MG tablet  Commonly known as:  XANAX      TAKE 1 TABLET BY MOUTH AT NIGHT AS NEEDED FOR ANXIETY.       apixaban 5 MG tablet tablet  Commonly known as:  ELIQUIS      Take 1 tablet by mouth Every 12 (Twelve) Hours. Indications: Atrial Fibrillation       atorvastatin 10 MG tablet  Commonly known as:  LIPITOR      Take 1 tablet by mouth Daily. For cholesterol       dilTIAZem 60 MG tablet  Commonly known as:  CARDIZEM      TAKE 1 TABLET BY MOUTH TWICE A DAY       folic acid 1 MG tablet  Commonly known as:  FOLVITE      Take 1 mg by mouth Daily.       ipratropium 0.02 % nebulizer solution  Commonly known as:  ATROVENT      Take 500 mcg by nebulization Every 4 (Four) Hours As Needed for Wheezing or Shortness of Air.       metFORMIN 1000 MG tablet  Commonly known as:  GLUCOPHAGE      Take 1 tablet by mouth Daily With Breakfast.       metoprolol tartrate 100 MG tablet  Commonly known as:  LOPRESSOR      TAKE 1 AND 1/2 TABLETS BY MOUTH TWICE DAILY       nystatin 507372 UNIT/ML suspension  Commonly known as:  MYCOSTATIN      Swish and swallow 5 mL 4 (Four) Times a Day.       Yupelri 175 MCG/3ML nebulizer solution  Generic drug:  revefenacin      Inhale 3 mL (1 vial) by nebulization Daily.          STOP taking these medications    benazepril 20 MG tablet  Commonly known as:  LOTENSIN  Stopped by:  SCOTTY Negron        budesonide 0.5 MG/2ML nebulizer solution  Commonly known as:  PULMICORT  Stopped by:  SCOTTY Negron                 As always, it has been a pleasure to participate in your patient's care.      Sincerely,     Charissa FAJARDO

## 2020-04-21 NOTE — TELEPHONE ENCOUNTER
"Inbound call from pts daughter.  Patient has edema to his feet and ankles, didn't sleep well last night due to \"fullness\" feeling in his chest and SOA.  They have not gotten his wt yet this am.    I have scheduled them to be evaluated by Charissa today via video visit.  I asked them to be sure and get his VS and WT before the apt.  Thanks  Thania Ureña RN  Triage nurse    "

## 2020-04-22 NOTE — PROGRESS NOTES
"Clinical Case Management/[place] (telephone):     OSW initiated a follow-up phone call with the patient's daughter, Amanda Colin, to touch base regarding a safer shower seat for the pt.   OSW spoke with Keke at [facility] who stated that it was optimal for someone from the pt's home to come in the showroom to look at the options that will safely fit in the pt's whirlpool tub.  Amanda stated she would send the pt's granddaughter, Yamini Rizo, to [facility] to explore options.     Amanda, though networking, found a neighbor who owns [employer]; she is planning to consult him regarding possibilities relating to the pt's bathroom modifications.    OSW further discussed with Amanda the pt's recent issues with being short of breath, which were attributed to the pt having the Trilogy ventilator hooked up improperly. The pt had a telephone consultation yesterday with the ARPN in cardiology, who was able to review these concerns with the pt.      OSW then initiated a phone call to the pt to address his emotional well-being and the bathtub/shower issue.    The pt presented as alert, friendly and open. He stated that the Trilogy vent issue was \"my fault\". He did state he had a much better night's sleep.    We briefly discussed his mental health. OSW inquired about the comment the pt made to Amanda over the weekend that he would be \"better off dead\". He stated he made the comment during a moment of frustration. He denied any suicidal intent.  The pt openly shared that his quality of life has significantly declined and that he has overwhelming fatigue and shortness of breath which is a barrier to even taking a short walk on his street. The pt uses continuous O2. He stated that he walks without assist in his home. However, he uses a facility wheelchair when going for a medical appointment. The pt was agreeable to filling out the Distress Questionnarie, PHQ-9 and OLIMPIA-7 on Friday, 4/24/2020, when he comes to . OSW agreed to leave " the form with STEPHY Valera, in the infusion area. It was agreed that OSW will review the completed tools and call the pt next week to discuss.    The pt was completely agreeable to having Yamini select a new/safe shower chair for him at [place] and understands this is an out-of-pocket item as insurance does not cover shower chairs.    Renetta Velazquez, [facility]  Oncology Social Worker

## 2020-04-29 NOTE — PROGRESS NOTES
"Clinical Case Management/[place] (telephone):     OSW initiated a follow-up phone call with the patient's daughter, Amanda Colin, to touch base regarding a safer shower seat for the pt and discuss communication with staff.    Amanda stated that the shower seat issue was not yet resolved; however, she planned to follow up on it within the week. We discussed her father feeling very fatigued and Amanda being unsure who to all about this: the cardiology office or CBC.  OSW encouraged Amanda to call Triage to assist her with seeking these answers. She voiced understanding.    OSW then initiated a phone call to the pt to address his emotional well-being. The pt had completed the Distress Questionnaire, the PHQ-9 and the OLIMPIA-7 on 2020.  He scored 3/10 on the DQ; PHQ-9 = 3; OLIMPIA-7 = 2. We discussed his overwhelming fatigue and how this is frustrating and sad with which to contend. The pt shared his feelings and the story relating to Joanne (his girlfriend of 12 years) and how he is still going through his feelings of \"shock\" that she , although she was latoya 89. OSW provided active and reflective listening. A memorial service will be planned in the future and he is considering attending if he feels well enough. The pt spoke about missing his gin rummy card games at the [facility], which currently are not meeting due to COVID-19. Overall, he stated that he is doing okay. He is eager to have a follow-up contact with Dr. Hayes which is scheduled for 2020.     OSW remains available as needs arise.     Renetta Velazquez, [facility]  Oncology Social Worker  "

## 2020-05-01 NOTE — TELEPHONE ENCOUNTER
Message sent to Dr. Freddy Hayes,     Daughter is calling today with multiple questions about why he is requiring blood transfusions so suddenly when he hasn't required them before.  They are frustrated because they haven't been able to come up with him and ask questions and they don't feel like they know what is going on.  She is asking if it would be possible for you to call her on Monday and help them understand what is going on right now. They are worried about this meaning he has progressed.       Mira [place] [phone]

## 2020-05-01 NOTE — NURSING NOTE
Lab Results   Component Value Date    WBC 2.40 (L) 05/01/2020    HGB 7.9 (L) 05/01/2020    HCT 23.6 (L) 05/01/2020    MCV 98.3 (H) 05/01/2020     05/01/2020     Pt here for CBC, RN review and procrit. Procrit 60,000 given and scheduled for 2 units of PRBC. T&C drawn, pt scheduled for 2U PRBC at 0730 tomorrow morning at [facility]. No lasix ordered as pt is already on Lasix BID per cardiology. Pt v/u.

## 2020-05-01 NOTE — TELEPHONE ENCOUNTER
Pt daughter Kenna called checking in on pt.    Kenna is in parking lot and is requesting to be called at [phone] when pt appt is over so pt can be picked up at the door.

## 2020-05-01 NOTE — PROGRESS NOTES
Two units PRBCs to be given at [facility] this weekend per communication from MADAN Forte RN, MADISON Romano APRN

## 2020-05-08 NOTE — NURSING NOTE
Lab Results   Component Value Date    WBC 1.98 (C) 05/08/2020    HGB 8.5 (L) 05/08/2020    HCT 27.2 (L) 05/08/2020    .1 (H) 05/08/2020     (L) 05/08/2020     Reviewed low WBC and ANC with Dr. Hayes with no new orders. Pt denies any new complaints.  Pt instructed to call if fever or other s/s of infection occur.  Pt v/u.

## 2020-05-11 NOTE — PROGRESS NOTES
Clinical Case Management/[place]:    OSW emailed the pt's daughter, Amanda Colin, information to assist the pt with applying for the S Covid-19 fund for $250 and information for three (3) other funds for which the pt may be eligible.    OSW remains available as needs arise.    Renetta Velazquez, [facility]  Oncology Social Worker

## 2020-05-15 NOTE — NURSING NOTE
Lab Results   Component Value Date    WBC 2.38 (L) 05/15/2020    HGB 7.9 (L) 05/15/2020    HCT 24.6 (L) 05/15/2020    MCV 99.6 (H) 05/15/2020     05/15/2020     Hgb 7.9 reviewed with Essence ANSARI with VO for 2 units PRBC. Pt armband placed with instructions not to remove until after transfusion. Pt scheduled for tomorrow at 0800. Procrit given per protocol. Pt reports having fallen in his bathroom. He had bilateral bruising on lower parts of both arms with no open areas. Pt denies any other injuries. Pt instructed to call with new concerns and next appt reviewed.

## 2020-05-26 NOTE — TELEPHONE ENCOUNTER
Pt called and reports cough, high fever, and sore throat  Over the weekend and not feeling well. Told to go to get tested. He refuses because he has appt to get tested on Thursday.

## 2020-05-28 NOTE — PROGRESS NOTES
Scan rec from Nanda MCCAULEY RN in Triage as well as a VM. She sent me the HUB note that Amanda was calling about diagnosis verification form be faxed to [employer]. I forwarded this to ROOSEVELT Dominguez to see if she has rec anything.    She replied that she does have it and will be faxing it tot he program tomorrow.    I also got a call from Lucio from the HUB and he had Amanda on the phone about pt.    I spoke to Amanda and let her know that the form will be faxed to the program tomorrow by our , Bonita.    I have provided my direct phone and fax numbers to Amanda should she need assistance in the future.

## 2020-05-28 NOTE — TELEPHONE ENCOUNTER
Pt's daughter called with questions regarding pt's Eliquis.  I called the daughter back to discuss concerns, but had to leave a vm.

## 2020-05-28 NOTE — TELEPHONE ENCOUNTER
Caller-Amanda Axon    Pt-Ankit Miranda-Dr. chelita Patel called asking for the pt's diagnosis  Verification form for Dr. Hayes to be completed, signed, and returned before 6/8/20.        Amanda and pt have applied for [employer] and other grants that helps with copays, co-insurances, etc due to expensive treatment for pt's condition, Myelodysplasia.     A form was faxed over 3 different times and must be completed, signed, and returned before 6/8/20.    Company has faxed to [phone]    For further questions or details, Contact Amanda at: [phone] Anytime except between 11:40am and 12:40p today, 5/28/20.

## 2020-05-28 NOTE — TELEPHONE ENCOUNTER
----- Message from Brianne Beatty sent at 5/28/2020 12:13 PM EDT -----  Regarding: I got the scan  I got the scan that you sent me and your VM.    I asked Bonita if she has rec it and she said yes, she will be faxing it to them tomorrow.     I have spoken with Amanda as well. She was transferred to me by the HUB.

## 2020-05-29 NOTE — TELEPHONE ENCOUNTER
"Amanda, patient's daughter returned Charissa's call concerned about elevated BNP Amanda asked me to call her dad.    I spoke with patient and he reported that this Tuesday, he had increased shortness of breath and cough 4-5 times a day which was productive. He ruled out for COVID.  He has been taking Amoxicillin and steroid treatment for the past 3 days by Urgent care center. He  wears trilogy at night. He is on oxygen all day which has been at 4L during the day and they are now only using 3 L as he has been 93% during the day.  He reports feeling a lot better today breathing wise. However, at night his oxygen saturation has been 84% on trilogy/O2. He has \"no swelling\". His feet is \"perfect\". He weighs 210.    Due to elevated proBNP ( higher than 4 months ago) and lower oxygen saturation at night, I have advised that he take lasix twice daily starting now and through the weekend at 9am and 3 pm. He was not routinely taking lasix twice daily due to frequency at night.  He is to titrate his oxygen to keep oxygen level at least 88%.      I told him that I will have Charissa follow up on his status on Monday. He stated he would be at home all day to receive the call.   "

## 2020-05-29 NOTE — PROGRESS NOTES
CSW faxed the diagnosis verification form to the [employer] and received confirmation of a successful fax.

## 2020-05-29 NOTE — PROGRESS NOTES
Pt to receive procrit 60k units weekly per Dr. Asif Mckee notified to adjust careplan. Pt also reports taking two PO benadryl at 0900 this morning. PO benadryl not given in office prior to IVIG. Pt instructed to inform RN of any possible infusion reaction symptoms. He v/u. Pt tolerated IVIG infusion without reaction.

## 2020-05-29 NOTE — TELEPHONE ENCOUNTER
Spoke with patients daughter and he went to the doctor today because he was short of breath and they did the labs I ordered last month.  I tried to call him earlier but he is not answering the phone.  His BNP is pretty elevated and I wanted to check on his status.  She said he has been short of breath he actually had a COVID-19 test which was negative and has had some increased swelling in his legs.  He got his cell phone number and I also tried to call him on that which he did not answer as well.

## 2020-05-29 NOTE — PROGRESS NOTES
Subjective     REASON FOR CONSULTATION:    1.  Myelodysplasia (refractory anemia with ring sideroblasts) diagnosed on bone marrow biopsy 6/26/2019  2. Lung mass on CT chest 11/17/2018  3.  Hypogammaglobulinemia.  First dose of IV immunoglobulin 40 g delivered 6/25/2019  4.  Hospitalized for right lower lobe pneumonia January 2020    HISTORY OF PRESENT ILLNESS:  The patient is a 80 y.o. year old male who was referred to us by his primary care office for evaluation of macrocytic anemia.  His MCV has been markedly elevated around 107-108..  He was hospitalized from 5/25/2019 through 5/31/2019 with COPD exacerbation and CHF.  During the hospitalization his hemoglobin was around 9.5 g/dL with an MCV as high as 113.    With his initial office consult on 6/18/2019 we ordered additional lab studies to try to further define the etiology of his anemia.  He was due to return to our office for follow-up to review those results but ended up being admitted to the hospital from 6/20/2019 to 7/1/2019 for treatment of pneumonia and COPD exacerbation.  He was seen for hematology consultation as an inpatient by Dr. Gupta on 6/25/2019.  Bone marrow biopsy was ordered which was performed on 6/26/2019 and showed dysplastic changes and ring sideroblasts.  Blasts were less than 5%.  Chromosome karyotype is pending.    Also during the hospitalization he was found to be profoundly hypogammaglobulinemic with an IgG level of 365.  He had a history of recurring pulmonary infections and during the admission received 1 dose of IV immunoglobulin at 40 g total dose on 6/25/2019.    He returns today for follow-up and further IVIG infusion and Procrit.    He remains in a wheelchair today and is wearing oxygen around-the-clock.  He is now on a maximum dose of Procrit of 60,000 units subcu weekly and is still been requiring some transfusion once or twice a month.  He also had some recent upper respiratory symptoms and underwent COVID-19 testing which  "was negative from 5/26/2020.         History of Present Illness     Past medical history, family history, and social history reviewed and unchanged.    ALLERGIES:    Allergies   Allergen Reactions   • Erythromycin Itching        Review of Systems   Constitutional: Positive for fatigue. Negative for activity change, chills and fever.   HENT: Negative for mouth sores, trouble swallowing and voice change.    Eyes: Negative for pain and visual disturbance.   Respiratory: Positive for shortness of breath. Negative for cough and wheezing.    Cardiovascular: Positive for leg swelling. Negative for chest pain and palpitations.   Gastrointestinal: Positive for abdominal pain and nausea. Negative for constipation, diarrhea and vomiting.   Genitourinary: Negative for difficulty urinating, frequency and urgency.   Musculoskeletal: Positive for arthralgias and gait problem. Negative for joint swelling.   Skin: Negative for rash.   Neurological: Positive for weakness. Negative for dizziness, seizures and headaches.   Hematological: Negative for adenopathy. Bruises/bleeds easily.   Psychiatric/Behavioral: Negative for behavioral problems and confusion. The patient is not nervous/anxious.         Objective     Vitals:    05/29/20 0938   BP: 117/74   Pulse: 64   Resp: 16   Temp: 98.4 °F (36.9 °C)   TempSrc: Oral   SpO2: 97%   Height: 177.8 cm (70\")   PainSc: 0-No pain     Current Status 5/29/2020   ECOG score 2       Physical Exam   Constitutional: He is oriented to person, place, and time. He appears well-developed and well-nourished. No distress.   Chronically ill appearing obese gentleman wearing nasal oxygen   HENT:   Head: Normocephalic.   Eyes: Pupils are equal, round, and reactive to light. Conjunctivae and EOM are normal. No scleral icterus.   Neck: Normal range of motion. Neck supple. No JVD present. No thyromegaly present.   Cardiovascular: Normal rate. An irregular rhythm present. Exam reveals distant heart sounds. Exam " reveals no gallop and no friction rub.   No murmur heard.  Pulmonary/Chest: Effort normal. He has decreased breath sounds in the right lower field and the left lower field. He has rales.   Abdominal: Soft. He exhibits distension. He exhibits no mass. There is no tenderness.   Musculoskeletal: Normal range of motion. He exhibits edema. He exhibits no deformity.   Lymphadenopathy:     He has no cervical adenopathy.   Neurological: He is alert and oriented to person, place, and time. He has normal reflexes. No cranial nerve deficit.   Skin: Skin is warm and dry. Purpura and rash noted. No erythema.   Psychiatric: He has a normal mood and affect. His behavior is normal. Judgment normal.         RECENT LABS:  Hematology WBC   Date Value Ref Range Status   05/22/2020 2.62 (L) 3.40 - 10.80 10*3/mm3 Final   01/01/2020 7.15 3.40 - 10.80 10*3/mm3 Final   05/15/2019 3.79 3.40 - 10.80 10*3/mm3 Final   02/08/2018 4.11 (L) 4.5 - 11.0 10*3/uL Final     RBC   Date Value Ref Range Status   05/22/2020 2.80 (L) 4.14 - 5.80 10*6/mm3 Final   05/15/2019 3.05 (L) 4.14 - 5.80 10*6/mm3 Final   02/08/2018 3.65 (L) 4.5 - 5.9 10*6/uL Final     Hemoglobin   Date Value Ref Range Status   05/22/2020 8.8 (L) 13.0 - 17.7 g/dL Final   02/08/2018 13.0 (L) 13.5 - 17.5 g/dL Final     Hematocrit   Date Value Ref Range Status   05/22/2020 26.8 (L) 37.5 - 51.0 % Final   02/08/2018 37.5 (L) 41.0 - 53.0 % Final     Platelets   Date Value Ref Range Status   05/22/2020 154 140 - 450 10*3/mm3 Final   02/08/2018 164 140 - 440 10*3/uL Final        Lab Results   Component Value Date    GLUCOSE 137 (H) 05/08/2020    CALCIUM 9.1 05/08/2020     (H) 05/08/2020    K 4.2 05/08/2020    CO2 31.7 (H) 05/08/2020     05/08/2020    BUN 28 (H) 05/08/2020    CREATININE 1.48 (H) 05/08/2020    EGFRIFAFRI 70 05/15/2019    EGFRIFNONA 46 (L) 05/08/2020    BCR 18.9 05/08/2020    ANIONGAP 11.3 05/08/2020     Lab Results   Component Value Date    IRON 117 04/24/2020     TIBC 209 (L) 04/24/2020    FERRITIN 715.40 (H) 04/24/2020     Lab Results   Component Value Date    VOWGJCGZ96 1,596 (H) 06/18/2019     Lab Results   Component Value Date    FOLATE >20.00 06/18/2019      Ref Range & Units 2wk ago   IgG 700-1,600 mg/dL 606 Abnormally low     IgM 40 - 230 mg/dL 45    IgA 70 - 400 mg/dL 201        Erythropoietin 2.6 - 18.5 mIU/mL 34.9 Abnormally high         Bone marrow biopsy 6/26/2019  Final Diagnosis   CONSULTANT DIAGNOSIS:  Hypercellular Bone Marrow (60% total marrow cellularity) demonstrating significant dysplasia present in all three hematopoietic cell lines with 2-3+ stainable hemosiderin and 14% ringed sideroblasts.       No metastatic carcinoma, granulomas, vasculitis, viral inclusions, increase in myeloblasts, plasma cells or malignant lymphoma.       CONSULTANT COMMENT:  These changes would be most supportive of a low grade myelodysplasia; however, this must be correlated with appropriate B12 and RBC folate levels as well as pending karyotype for final disease classification.           Assessment/Plan   1. Myelodysplasia with chronic anemia and leukopenia.  Bone marrow biopsy  consistent with low-grade myelodysplasia with refractory anemia with ringed sideroblasts.  Blasts in the marrow were less than 5%.  Marrow karyotype shows a 7 q. deletion.  2.  Severe emphysema/COPD.  Patient requires oxygen around-the-clock  3.  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  Most recently hospitalized for right lower lobe pneumonia 1/1/2020 through 1/4/2020.  Patient received 1 dose of IV immune globulin 30 g during his hospitalization on 1/3/2020 and will need to continue receiving monthly IV immunoglobulin infusions as an outpatient at a dose of 30 g monthly.  4.  History of non-small cell lung cancer from the left lower lobe resected by Dr. Hart Linker 2013.  No definite evidence of recurrent malignancy noted on the most recent CT scan of the chest from  8/5/2019 as noted above.  5.  Atrial fibrillation which further impacts his symptomatology.  He now is taking Cardizem twice daily and Lopressor.  He also is on Eliquis anticoagulation.  His stools have been tested for occult blood and remain negative.    Plan  1.  Patient will receive his monthly IVIG infusion at 30 g today in the office.  2.  He will be scheduled to return weekly for blood count and receive Procrit if his hemoglobin is less than 10 g/dL.  Dose was increased recently to the maximum of 60,000 units weekly.  3.  He will return for lab and further IV immunoglobulin infusions in 1 month and 2 months.  4.  MD follow-up in 2 months.  We will continue to monitor his iron studies every 3 months.  For now he will stay on oral iron but if he becomes iron deficient we may need to order IV Injectafer as well.    We had a discussion today that if the patient develops cough and fever which is very common for him with his chronic bronchitis that we would likely start him on antibiotics but he may again need to be tested for COVID-19 at that point    We also discussed that if he still is requiring transfusions every 2 weeks in spite of the maximum dose of Procrit we may subsequently discontinue Procrit and consider transfusion as needed.

## 2020-05-29 NOTE — TELEPHONE ENCOUNTER
I called pt's daughter back and she was unsure if pt need a prescription sent to his pharmacy since he has been approved for pt assistance.  I submitted a new rx to his mail order pharmacy.

## 2020-06-01 NOTE — TELEPHONE ENCOUNTER
Pt daughter call, stating her dad was home and could be reached at [phone].    Jessie KRUEGER, CMA

## 2020-06-03 NOTE — PROGRESS NOTES
CSW faxed the Diagnosis Verification Form to the [employer] for the second time.  Form was originally faxed on 5/29 and confirmation of submission was received.

## 2020-06-05 NOTE — PROGRESS NOTES
Orders entered for 2 units PRBCs with referral to [facility] for transfusions per communication from LENA Hensley RN, MADISON Hayes

## 2020-06-05 NOTE — NURSING NOTE
Hgb 7.7. Per Dr. Hayes, the pt was scheduled for a 2 unit blood transfusion at [facility] tomorrow at 9:30am. The pt was informed and v/u. He also was educated regarding neutropenic precautions for ANC of 800. The pt again v/u.

## 2020-06-08 NOTE — OUTREACH NOTE
Care Plan Note      Responses   Lifestyle Goals  Routine follow-up with doctor(s), Have more energy, Eat a healthy diet, Other (See Comment) [Social Distancing ]   Barriers  Other (See Comment) [Social Distancing]   Self Management  Medication Adherence   Suggested Appointments  Other (See Comment) [Continue follow up with Dr. Alejandro. Also follow up with Dr. Lagos.]   Annual Wellness Visit:   Patient Will Schedule [Reviewed AWV and encouraged patient to schedule. ]   Specific Disease Process Teaching  Hypertension, Diabetes [Patient states a nurse visited yesterday per Dr. Alejandro and reviewed vitals. Social distancing precautions for COVID 19. ]   Does patient have depression diagnosis?  No   Advanced Directives:  Patient Has   Goal Progress  Goal(s) Met   Health Literacy  Good        The main concerns and/or symptoms the patient would like to address are: Spoke with patient regarding health and wellness and COVID 19 precautions. Patient states he goes out with a mask and practices social distancing. Patient recently received 2 units of blood Saturday and feels much better. .    Education/instruction provided by Care Coordinator: Introduced self, explained ACM RN role and provided contact information. Reviewed patient's current social distancing practices. Recommended patient wash cloth masks regularly to prevent build up of germs/bacteria.  Reviewed refraining from touching face with hands when out in public. Reviewed the need to wash hands frequently. Reviewed monitoring social interactions and ensuring family and friends you are in close proximity with do not have fevers or other signs of illness as reintegration occurs. Patient states he had a nurse visit his home yesterday per Dr. Alejandro. Patient has a follow up appointment with Dr. Alejandro tomorrow at 11 am via tele-health. No problems or concerns at this time. [facility] recommended patient schedule an AWV with Dr. Lagos as well. AD filed. MyChart active. Patient  declines need for further ACM outreach.       Follow Up Outreach Due: as needed    Camryn Mcclain RN  Ambulatory     6/8/2020, 18:26

## 2020-06-12 NOTE — NURSING NOTE
Procrit given per protocol.  Pt denies new complaints.  Next appt reviewed and pt instructed to call sooner with concerns and v/u.

## 2020-06-15 NOTE — TELEPHONE ENCOUNTER
I spoke to Mr. Mack in regards to his labs.  His BNP is little less than it was 2 weeks ago.  He states he is lost a lot of weight since the first of the year of around 30 pounds.  His renal function appears stable his electrolytes are stable he appears what he believes is at his baseline he is able to sleep flat at night.  He has a follow-up with Dr. Rdz in July and I told him to continue the same for now and if he starts to notice his breathing is worsening to let us know.

## 2020-06-19 NOTE — NURSING NOTE
Lab Results   Component Value Date    WBC 2.13 (L) 06/19/2020    HGB 7.9 (L) 06/19/2020    HCT 23.6 (L) 06/19/2020    MCV 94.0 06/19/2020     (L) 06/19/2020     Procrit given per protocol.  Pt ordered 2 units PRBC after review of Hgb 7.9 with COTY Lowry.  T&C done and armband placed with instructions not to remove until after transfusion.  Pt denies new complaints.  Will receive transfusion on Sunday at 8:15.  Pt left via wheelchair with daughter.

## 2020-06-23 PROBLEM — J96.11 CHRONIC RESPIRATORY FAILURE WITH HYPOXIA: Status: ACTIVE | Noted: 2020-01-01

## 2020-06-23 PROBLEM — I50.32 CHRONIC DIASTOLIC CHF (CONGESTIVE HEART FAILURE) (HCC): Status: ACTIVE | Noted: 2020-01-01

## 2020-06-23 PROBLEM — J18.9 MULTIFOCAL PNEUMONIA: Status: ACTIVE | Noted: 2020-01-01

## 2020-06-23 PROBLEM — Z66 DNR (DO NOT RESUSCITATE): Status: ACTIVE | Noted: 2020-01-01

## 2020-06-23 PROBLEM — J44.9 COPD (CHRONIC OBSTRUCTIVE PULMONARY DISEASE) (HCC): Status: ACTIVE | Noted: 2020-01-01

## 2020-06-23 PROBLEM — A41.9 SEPSIS DUE TO PNEUMONIA (HCC): Status: ACTIVE | Noted: 2020-01-01

## 2020-06-23 PROBLEM — J18.9 SEPSIS DUE TO PNEUMONIA (HCC): Status: ACTIVE | Noted: 2020-01-01

## 2020-06-23 NOTE — TELEPHONE ENCOUNTER
Pt's daughter called with concerns about her father having a shaking chill around noon today with a temp of 102.4.  States patient is SOB, but does have hx of COPD.  Discussed with Dr. Hayes.  Pt needs to go to the ER per Dr. Hayes.  Daughter informed of same.  Daughter v/u.  ER called and informed that pt was coming in with 102.4 temp today.  ER v/u.

## 2020-06-28 NOTE — OUTREACH NOTE
Prep Survey      Responses   [facility] patient discharged from?  [place]   Is LACE score < 7 ?  No   Eligibility  [facility]   Date of Admission  06/23/20   Date of Discharge  06/28/20   Discharge Disposition  Home or Self Care   Discharge diagnosis  Multifocal pneumonia    COVID-19 Test Status  Negative   Does the patient have one of the following disease processes/diagnoses(primary or secondary)?  COPD/Pneumonia   Does the patient have Home health ordered?  No   Is there a DME ordered?  No   Prep survey completed?  Yes          Miri Dunbar RN

## 2020-06-29 NOTE — OUTREACH NOTE
Call Center TCM Note      Responses   [facility] patient discharged from?  [place]   COVID-19 Test Status  Negative   Does the patient have one of the following disease processes/diagnoses(primary or secondary)?  COPD/Pneumonia   TCM attempt successful?  No   Unsuccessful attempts  Attempt 1          Maximo Ochoa RN    6/29/2020, 17:02

## 2020-06-30 NOTE — OUTREACH NOTE
Call Center TCM Note      Responses   [facility] [place] patient discharged from?  [place]   COVID-19 Test Status  Negative   Does the patient have one of the following disease processes/diagnoses(primary or secondary)?  COPD/Pneumonia   Was the primary reason for admission:  Pneumonia   TCM attempt successful?  No   Unsuccessful attempts  Attempt 3          Sarah Varela RN    6/30/2020, 11:19

## 2020-07-01 NOTE — TELEPHONE ENCOUNTER
"    Reason for Disposition  • [1] Caller requesting NON-URGENT health information AND [2] PCP's office is the best resource    Additional Information  • Negative: [1] Caller is not with the adult (patient) AND [2] reporting urgent symptoms  • Negative: Lab result questions  • Negative: Medication questions  • Negative: Caller can't be reached by phone  • Negative: Caller has already spoken to PCP or another triager  • Negative: RN needs further essential information from caller in order to complete triage  • Negative: Requesting regular office appointment    Answer Assessment - Initial Assessment Questions  1. REASON FOR CALL or QUESTION: \"What is your reason for calling today?\" or \"How can I best help you?\" or \"What question do you have that I can help answer?\"      Caller states he was recently discharged from [facility] and thought he was to have Home Health and Home Physical Therapy.  Checked his chart and saw no mention of these.  Advised patient his PCP can order these for him if needed.    Protocols used: INFORMATION ONLY CALL - NO TRIAGE-ADULT-      "

## 2020-07-06 NOTE — OUTREACH NOTE
COPD/PN Week 2 Survey      Responses   [facility] [place]   COVID-19 Test Status  Negative   Does the patient have one of the following disease processes/diagnoses(primary or secondary)?  COPD/Pneumonia   Was the primary reason for admission:  Pneumonia   Week 2 attempt successful?  Yes   Call start time  1341   Call end time  1344   Discharge diagnosis  Multifocal pneumonia    Meds reviewed with patient/caregiver?  Yes   Is the patient having any side effects they believe may be caused by any medication additions or changes?  No   Does the patient have all medications ordered at discharge?  Yes   Is the patient taking all medications as directed (includes completed medication regime)?  Yes   Does the patient have a primary care provider?   Yes   Does the patient have an appointment with their PCP or pulmonologist within 7 days of discharge?  Yes   Has the patient kept scheduled appointments due by today?  Yes   Has home health visited the patient within 72 hours of discharge?  N/A   Has all DME been delivered?  No   Pulse Ox monitoring  None   Psychosocial issues?  No   Did the patient receive a copy of their discharge instructions?  Yes   Nursing interventions  Reviewed instructions with patient   What is the patient's perception of their health status since discharge?  Improving   Nursing Interventions  Nurse provided patient education   Is the patient/caregiver able to teach back the hierarchy of who to call/visit for symptoms/problems? PCP, Specialist, Home health nurse, Urgent Care, ED, 911  Yes   Is the patient/caregiver able to teach back signs and symptoms of worsening condition:  Fever/chills, Shortness of breath, Chest pain   Is the patient/caregiver able to teach back importance of completing antibiotic course of treatment?  Yes   Week 2 call completed?  Yes          Oleg Cesar RN

## 2020-07-06 NOTE — NURSING NOTE
Lab Results   Component Value Date    WBC 1.81 (C) 07/06/2020    HGB 7.9 (L) 07/06/2020    HCT 23.5 (L) 07/06/2020    MCV 94.4 07/06/2020    PLT 89 (L) 07/06/2020     Hgb 7.9 reviewed with COTY Rivas with v/o to give 2 units PRBC's.  Pt voices feeling better since hospital d/c with double pneumonia.  Procrit given per protocol.  T&C done with instructions not to remove armband until after transfusion.  He v/u.

## 2020-07-07 NOTE — PROGRESS NOTES
Patient requesting mini nebulizer. He is coughing up thick large amounts of blood tinged mucus. Patient states the blood in his mucus is new for him. I spoke with Rylee from [facility] who is relaying all this information to Dr. Hayes and I am awaiting a return call.

## 2020-07-07 NOTE — PATIENT INSTRUCTIONS
Anemia    Anemia is a condition in which you do not have enough red blood cells or hemoglobin. Hemoglobin is a substance in red blood cells that carries oxygen. When you do not have enough red blood cells or hemoglobin (are anemic), your body cannot get enough oxygen and your organs may not work properly. As a result, you may feel very tired or have other problems.  What are the causes?  Common causes of anemia include:  · Excessive bleeding. Anemia can be caused by excessive bleeding inside or outside the body, including bleeding from the intestine or from periods in women.  · Poor nutrition.  · Long-lasting (chronic) kidney, thyroid, and liver disease.  · Bone marrow disorders.  · Cancer and treatments for cancer.  · HIV (human immunodeficiency virus) and AIDS (acquired immunodeficiency syndrome).  · Treatments for HIV and AIDS.  · Spleen problems.  · Blood disorders.  · Infections, medicines, and autoimmune disorders that destroy red blood cells.  What are the signs or symptoms?  Symptoms of this condition include:  · Minor weakness.  · Dizziness.  · Headache.  · Feeling heartbeats that are irregular or faster than normal (palpitations).  · Shortness of breath, especially with exercise.  · Paleness.  · Cold sensitivity.  · Indigestion.  · Nausea.  · Difficulty sleeping.  · Difficulty concentrating.  Symptoms may occur suddenly or develop slowly. If your anemia is mild, you may not have symptoms.  How is this diagnosed?  This condition is diagnosed based on:  · Blood tests.  · Your medical history.  · A physical exam.  · Bone marrow biopsy.  Your health care provider may also check your stool (feces) for blood and may do additional testing to look for the cause of your bleeding.  You may also have other tests, including:  · Imaging tests, such as a CT scan or MRI.  · Endoscopy.  · Colonoscopy.  How is this treated?  Treatment for this condition depends on the cause. If you continue to lose a lot of blood, you may  need to be treated at a hospital. Treatment may include:  · Taking supplements of iron, vitamin B12, or folic acid.  · Taking a hormone medicine (erythropoietin) that can help to stimulate red blood cell growth.  · Having a blood transfusion. This may be needed if you lose a lot of blood.  · Making changes to your diet.  · Having surgery to remove your spleen.  Follow these instructions at home:  · Take over-the-counter and prescription medicines only as told by your health care provider.  · Take supplements only as told by your health care provider.  · Follow any diet instructions that you were given.  · Keep all follow-up visits as told by your health care provider. This is important.  Contact a health care provider if:  · You develop new bleeding anywhere in the body.  Get help right away if:  · You are very weak.  · You are short of breath.  · You have pain in your abdomen or chest.  · You are dizzy or feel faint.  · You have trouble concentrating.  · You have bloody or black, tarry stools.  · You vomit repeatedly or you vomit up blood.  Summary  · Anemia is a condition in which you do not have enough red blood cells or enough of a substance in your red blood cells that carries oxygen (hemoglobin).  · Symptoms may occur suddenly or develop slowly.  · If your anemia is mild, you may not have symptoms.  · This condition is diagnosed with blood tests as well as a medical history and physical exam. Other tests may be needed.  · Treatment for this condition depends on the cause of the anemia.  This information is not intended to replace advice given to you by your health care provider. Make sure you discuss any questions you have with your health care provider.  Document Released: 01/25/2006 Document Revised: 11/30/2018 Document Reviewed: 01/19/2018  "[employer]" Patient Education © 2020 [employer]    Blood Transfusion, Adult    A blood transfusion is a procedure in which you receive donated blood, including plasma,  platelets, and red blood cells, through an IV tube. You may need a blood transfusion because of illness, surgery, or injury. The blood may come from a donor. You may also be able to donate blood for yourself (autologous blood donation) before a surgery if you know that you might require a blood transfusion.  The blood given in a transfusion is made up of different types of cells. You may receive:  · Red blood cells. These carry oxygen to the cells in the body.  · White blood cells. These help you fight infections.  · Platelets. These help your blood to clot.  · Plasma. This is the liquid part of your blood and it helps with fluid imbalances.  If you have hemophilia or another clotting disorder, you may also receive other types of blood products.  Tell a health care provider about:  · Any allergies you have.  · All medicines you are taking, including vitamins, herbs, eye drops, creams, and over-the-counter medicines.  · Any problems you or family members have had with anesthetic medicines.  · Any blood disorders you have.  · Any surgeries you have had.  · Any medical conditions you have, including any recent fever or cold symptoms.  · Whether you are pregnant or may be pregnant.  · Any previous reactions you have had during a blood transfusion.  What are the risks?  Generally, this is a safe procedure. However, problems may occur, including:  · Having an allergic reaction to something in the donated blood. Hives and itching may be symptoms of this type of reaction.  · Fever. This may be a reaction to the white blood cells in the transfused blood. Nausea or chest pain may accompany a fever.  · Iron overload. This can happen from having many transfusions.  · Transfusion-related acute lung injury (TRALI). This is a rare reaction that causes lung damage. The cause is not known. TRALI can occur within hours of a transfusion or several days later.  · Sudden (acute) or delayed hemolytic reactions. This happens if your blood  does not match the cells in your transfusion. Your body’s defense system (immune system) may try to attack the new cells. This complication is rare. The symptoms include fever, chills, nausea, and low back pain or chest pain.  · Infection or disease transmission. This is rare.  What happens before the procedure?  · You will have a blood test to determine your blood type. This is necessary to know what kind of blood your body will accept and to match it to the donor blood.  · If you are going to have a planned surgery, you may be able to do an autologous blood donation. This may be done in case you need to have a transfusion.  · If you have had an allergic reaction to a transfusion in the past, you may be given medicine to help prevent a reaction. This medicine may be given to you by mouth or through an IV tube.  · You will have your temperature, blood pressure, and pulse monitored before the transfusion.  · Follow instructions from your health care provider about eating and drinking restrictions.  · Ask your health care provider about:  ? Changing or stopping your regular medicines. This is especially important if you are taking diabetes medicines or blood thinners.  ? Taking medicines such as aspirin and ibuprofen. These medicines can thin your blood. Do not take these medicines before your procedure if your health care provider instructs you not to.  What happens during the procedure?  · An IV tube will be inserted into one of your veins.  · The bag of donated blood will be attached to your IV tube. The blood will then enter through your vein.  · Your temperature, blood pressure, and pulse will be monitored regularly during the transfusion. This monitoring is done to detect early signs of a transfusion reaction.  · If you have any signs or symptoms of a reaction, your transfusion will be stopped and you may be given medicine.  · When the transfusion is complete, your IV tube will be removed.  · Pressure may be  applied to the IV site for a few minutes.  · A bandage (dressing) will be applied.  The procedure may vary among health care providers and hospitals.  What happens after the procedure?  · Your temperature, blood pressure, heart rate, breathing rate, and blood oxygen level will be monitored often.  · Your blood may be tested to see how you are responding to the transfusion.  · You may be warmed with fluids or blankets to maintain a normal body temperature.  Summary  · A blood transfusion is a procedure in which you receive donated blood, including plasma, platelets, and red blood cells, through an IV tube.  · Your temperature, blood pressure, and pulse will be monitored before, during, and after the transfusion.  · Your blood may be tested after the transfusion to see how your body has responded.  This information is not intended to replace advice given to you by your health care provider. Make sure you discuss any questions you have with your health care provider.  Document Released: 12/15/2001 Document Revised: 11/04/2019 Document Reviewed: 09/14/2017  [employer] Patient Education © 2020 [employer]    Blood Transfusion, Adult, Care After  This sheet gives you information about how to care for yourself after your procedure. Your doctor may also give you more specific instructions. If you have problems or questions, contact your doctor.  Follow these instructions at home:    · Take over-the-counter and prescription medicines only as told by your doctor.  · Go back to your normal activities as told by your doctor.  · Follow instructions from your doctor about how to take care of the area where an IV tube was put into your vein (insertion site). Make sure you:  ? Wash your hands with soap and water before you change your bandage (dressing). If there is no soap and water, use hand .  ? Change your bandage as told by your doctor.  · Check your IV insertion site every day for signs of infection. Check  for:  ? More redness, swelling, or pain.  ? More fluid or blood.  ? Warmth.  ? Pus or a bad smell.  Contact a doctor if:  · You have more redness, swelling, or pain around the IV insertion site.  · You have more fluid or blood coming from the IV insertion site.  · Your IV insertion site feels warm to the touch.  · You have pus or a bad smell coming from the IV insertion site.  · Your pee (urine) turns pink, red, or brown.  · You feel weak after doing your normal activities.  Get help right away if:  · You have signs of a serious allergic or body defense (immune) system reaction, including:  ? Itchiness.  ? Hives.  ? Trouble breathing.  ? Anxiety.  ? Pain in your chest or lower back.  ? Fever, flushing, and chills.  ? Fast pulse.  ? Rash.  ? Watery poop (diarrhea).  ? Throwing up (vomiting).  ? Dark pee.  ? Serious headache.  ? Dizziness.  ? Stiff neck.  ? Yellow color in your face or the white parts of your eyes (jaundice).  Summary  · After a blood transfusion, return to your normal activities as told by your doctor.  · Every day, check for signs of infection where the IV tube was put into your vein.  · Some signs of infection are warm skin, more redness and pain, more fluid or blood, and pus or a bad smell where the needle went in.  · Contact your doctor if you feel weak or have any unusual symptoms.  This information is not intended to replace advice given to you by your health care provider. Make sure you discuss any questions you have with your health care provider.  Document Released: 01/08/2016 Document Revised: 04/24/2019 Document Reviewed: 08/11/2017  [employer] Patient Education © 2020 [employer].

## 2020-07-07 NOTE — PROGRESS NOTES
Patient continues to have shortness of brteath. Finally received an inhaler from the pharmacy after many calls and questions.

## 2020-07-08 NOTE — PROGRESS NOTES
Subjective   Ankit Mack Sr. is a 80 y.o. male.     History of Present Illness     Chief Complaint:   Chief Complaint   Patient presents with   • Transitional Care Management     MULTIIFOCAL PNEUMONIA  6/23/2020 - VIDEO CALL    • Anxiety     MARY - XANAX DUE FOR REFILL        Ankit Mack Sr. 80 y.o. male who presents today for Medical Management of the below listed issues and medication refills.  he has a problem list of   Patient Active Problem List   Diagnosis   • Anxiety   • Hyperlipidemia   • Osteoarthritis, knee   • Essential hypertension   • Cancer of lower lobe of lung (CMS/HCC)   • DOMINGO on CPAP   • Non-seasonal allergic rhinitis   • Pancytopenia (CMS/HCC)   • Aneurysm of thoracic aorta (CMS/HCC)   • Paroxysmal atrial fibrillation (CMS/HCC)   • Thrombocytopenia (CMS/HCC)   • Type 2 diabetes mellitus without complication, without long-term current use of insulin (CMS/HCC)   • History of COPD   • Macrocytic anemia   • Hypogammaglobulinemia (CMS/HCC)   • Myelodysplasia (myelodysplastic syndrome) (CMS/HCC)   • Class 1 obesity due to excess calories with serious comorbidity and body mass index (BMI) of 33.0 to 33.9 in adult   • Nonrheumatic aortic valve stenosis   • Acute on chronic diastolic (congestive) heart failure (CMS/HCC)   • Hypoxemia   • Hypogammaglobulinemia (CMS/HCC)   • Multifocal pneumonia   • Chronic diastolic CHF (congestive heart failure) (CMS/HCC)   • COPD (chronic obstructive pulmonary disease) (CMS/HCC)   • Chronic respiratory failure with hypoxia (CMS/HCC)   • DNR (do not resuscitate)   • Sepsis due to pneumonia (CMS/HCC)   .  Since the last visit, he has overall felt well until a recent hospitalization end last month for pneumonia. That visit was as follows:    Date of Admission:  6/23/2020  Date of Discharge:  6/28/2020     PCP: Gopi Lagos MD     CHIEF COMPLAINT  Fever        DISCHARGE DIAGNOSIS        Active Hospital Problems     Diagnosis   POA   • **Multifocal pneumonia  [J18.9]   Yes   • Chronic diastolic CHF (congestive heart failure) (CMS/[facility]) [I50.32]   Yes   • COPD (chronic obstructive pulmonary disease) (CMS/[facility]) [J44.9]   Yes   • Chronic respiratory failure with hypoxia (CMS/[facility]) [J96.11]   Yes   • DNR (do not resuscitate) [Z66]   Yes   • Sepsis due to pneumonia (CMS/[facility]) [J18.9, A41.9]   Yes   • Myelodysplasia (myelodysplastic syndrome) (CMS/[facility]) [D46.9]   Yes   • Hypogammaglobulinemia (CMS/[facility]) [D80.1]   Yes   • Type 2 diabetes mellitus without complication, without long-term current use of insulin (CMS/[facility]) [E11.9]   Yes   • Paroxysmal atrial fibrillation (CMS/[facility]) [I48.0]   Yes       Resolved Hospital Problems   No resolved problems to display.         SECONDARY DIAGNOSES  Medical History        Past Medical History:   Diagnosis Date   • Anemia     • Anxiety     • Atrial flutter (CMS/[facility])     • Chronic diastolic congestive heart failure (CMS/[facility]) 12/19/2018   • Chronic respiratory failure with hypoxia (CMS/[facility])     • COPD (chronic obstructive pulmonary disease) (CMS/[facility])     • Essential hypertension     • GERD (gastroesophageal reflux disease)     • H/O complete eye exam 06/2018   • History of pulmonary function tests 06/10/2014   • Hyperglycemia     • Hyperlipidemia     • Hypogammaglobulinemia (CMS/[facility])     • Hypoxemia     • Lung cancer (CMS/[facility]) 2012     Left   • MDS (myelodysplastic syndrome) (CMS/[facility])     • Nonrheumatic aortic valve stenosis       mild 5/2019   • Obesity     • Osteoarthritis, knee     • Paroxysmal atrial fibrillation (CMS/[facility])     • Pneumonia of right lower lobe due to infectious organism     • Prostate cancer (CMS/[facility]) 2000   • Seizures (CMS/[facility])     • Sleep apnea     • Thoracic aortic aneurysm without rupture (CMS/[facility])       s/p stenting of descending thoracic aneurysm; the ascending aorta measured 3.7cm in 2019   • Type 2 diabetes mellitus without complication, without long-term current use of insulin (CMS/[facility])              CONSULTS          HOSPITAL COURSE  Mr. Mack is a 80 y.o. with a history of  myelodysplasia, severe COPD, Severe hypogammaglobulinemia with recurring pulmonary infections, history of NSCLC s/p previous left lower lobe resection, Afib on eliquis. He presents with fever today 102 with chills.  He had been of normal state of health even yesterday with only his chronic shortness of breath but no real new symptoms and did not feel poorly until today.  He states that he has had a mild cough today slightly productive of sputum but not too severe.  Came to [facility] ER. 100.5. CXR with multifocal infiltrate. Started on IV vanc and cefepime.     1. Acute hypoxic respiratory failure secondary to pneumonia, was treated  with IV vancomycin and IV cefepime.  Currently requires 4 L of oxygen which will be continued upon discharge.  Patient was seen and cleared by pulmonary to be discharged home.  Patient will be on Zyvox and Levaquin for 5 more days.  Of note respiratory viral panel was negative.  Tested negative for urine Legionella antigen and streptococcal antigen.  Cultures were also negative.  COVID-19 test was also negative.  2. Chronic diastolic heart failure, currently patient is compensated.  On p.o. Lasix and electrolytes will be monitored very closely.  3. Pancytopenia, this is most likely secondary to underlying myelodysplastic syndrome.  Will have follow-up with hematology as an outpatient basis.  4. COPD, currently not in any major exacerbation.  On DuoNebs and p.o. Steroids which will be continued.  5. Hypertension, continue with diltiazem, metoprolol.  6. History of paroxysmal atrial fibrillation, on Eliquis and Cardizem which will be continued.  On p.o. Metoprolol as well.    7. Hyperlipidemia, will continue with statins.  8. Borderline diabetes, blood sugars are reasonably well controlled.  9. Elevated BNP, currently does not appear to be any heart failure.  Echo in May of 2019 revealed normal ejection fraction.    Current  outpatient and discharge medications have been reconciled for the patient.  Reviewed by: Gopi Lagos MD          he has been compliant with   Current Outpatient Medications:   •  ALPRAZolam (XANAX) 1 MG tablet, TAKE 1 TABLET BY MOUTH AT NIGHT AS NEEDED FOR ANXIETY., Disp: 90 tablet, Rfl: 0  •  albuterol (PROVENTIL HFA;VENTOLIN HFA) 108 (90 Base) MCG/ACT inhaler, Inhale 2 puffs Every 4 (Four) Hours As Needed for Wheezing., Disp: , Rfl:   •  albuterol (PROVENTIL) (2.5 MG/3ML) 0.083% nebulizer solution, Take 2.5 mg by nebulization 4 (Four) Times a Day., Disp: , Rfl:   •  apixaban (ELIQUIS) 5 MG tablet tablet, Take 1 tablet by mouth Every 12 (Twelve) Hours. Indications: Atrial Fibrillation, Disp: 180 tablet, Rfl: 2  •  atorvastatin (LIPITOR) 10 MG tablet, Take 1 tablet by mouth Daily. For cholesterol, Disp: 90 tablet, Rfl: 1  •  dilTIAZem (CARDIZEM) 60 MG tablet, TAKE 1 TABLET BY MOUTH TWICE A DAY, Disp: 180 tablet, Rfl: 1  •  folic acid (FOLVITE) 1 MG tablet, Take 1 mg by mouth Daily., Disp: , Rfl:   •  furosemide (LASIX) 40 MG tablet, TAKE 1 TABLET BY MOUTH EVERY DAY, Disp: 90 tablet, Rfl: 1  •  ipratropium (ATROVENT) 0.02 % nebulizer solution, Take 500 mcg by nebulization Every 4 (Four) Hours As Needed for Wheezing or Shortness of Air., Disp: , Rfl:   •  metFORMIN (GLUCOPHAGE) 1000 MG tablet, Take 1 tablet by mouth Daily With Breakfast., Disp: 90 tablet, Rfl: 1  •  metoprolol tartrate (LOPRESSOR) 100 MG tablet, TAKE 1 AND 1/2 TABLETS BY MOUTH TWICE DAILY, Disp: 270 tablet, Rfl: 1  •  nystatin (MYCOSTATIN) 415843 UNIT/ML suspension, Swish and swallow 5 mL 4 (Four) Times a Day., Disp: 473 mL, Rfl: 0  •  O2 (OXYGEN), Inhale 4 L/min Continuous., Disp: , Rfl:   •  potassium chloride (K-DUR) 10 MEQ CR tablet, Take 1 tablet by mouth Daily., Disp: 30 tablet, Rfl: 3  •  Revefenacin (YUPELRI) 175 MCG/3ML solution, Inhale 3 mL (1 vial) by nebulization Daily., Disp: 90 mL, Rfl: 1    Current Facility-Administered Medications:    •  albuterol (PROVENTIL) nebulizer solution 0.083% 2.5 mg/3mL, 2.5 mg, Nebulization, Once, Beto Hayes MD  •  ipratropium (ATROVENT) nebulizer solution 0.0002 mg, 0.2 mcg, Nebulization, Once, Beto Hayes MD  •  ipratropium-albuterol (DUO-NEB) nebulizer solution 3 mL, 3 mL, Nebulization, 4x Daily - RT, Gopi Aguilar MD, 3 mL at 06/21/20 1048    Facility-Administered Medications Ordered in Other Visits:   •  acetaminophen (TYLENOL) tablet 650 mg, 650 mg, Oral, Once, Beto Hayes MD  •  albuterol sulfate HFA (PROVENTIL HFA;VENTOLIN HFA;PROAIR HFA) inhaler 2 puff, 2 puff, Inhalation, 4x Daily PRN, Beto Hayes MD, 2 puff at 07/07/20 1142  •  diphenhydrAMINE (BENADRYL) capsule 25 mg, 25 mg, Oral, Once, Beto Hayes MD  •  ipratropium-albuterol (DUO-NEB) nebulizer solution 3 mL, 3 mL, Nebulization, PRN, Beto Hayes MD  •  ipratropium-albuterol (DUO-NEB) nebulizer solution 3 mL, 3 mL, Nebulization, BID, Rylee Kwan APRN  •  sodium chloride 0.9 % infusion 250 mL, 250 mL, Intravenous, PRN, Beto Hayes MD  •  sodium chloride 0.9 % infusion 250 mL, 250 mL, Intravenous, PRN, Mary Lou Valdez APRN.  he denies medication side effects.    All of the other chronic condition(s) listed above are stable w/o issues.    There were no vitals taken for this visit.    Results for orders placed or performed during the hospital encounter of 07/07/20   Type and screen   Result Value Ref Range    ABO Type O     RH type Positive     Antibody Screen Negative     T&S Expiration Date 7/9/2020 11:59:59 PM    Prepare RBC, 2 Units   Result Value Ref Range    Product Code W0745B54     Unit Number [id number]     UNIT  ABO O     UNIT  RH POS     Crossmatch Interpretation Compatible     Dispense Status PT     Blood Expiration Date [id number]     Blood Type Barcode 5100     Product Code I4942E48     Unit Number [id number]     UNIT  ABO O     UNIT  RH POS     Crossmatch  Interpretation Compatible     Dispense Status PT     Blood Expiration Date [id number]     Blood Type Barcode 5100            The following portions of the patient's history were reviewed and updated as appropriate: allergies, current medications, past family history, past medical history, past social history, past surgical history and problem list.    Review of Systems   Constitutional: Negative for activity change, chills and fever.   Respiratory: Negative for cough.    Cardiovascular: Negative for chest pain.   Psychiatric/Behavioral: Negative for dysphoric mood.       Objective   Physical Exam   Constitutional: He is oriented to person, place, and time. He appears well-developed and well-nourished. No distress.   Pulmonary/Chest: Effort normal.   Neurological: He is alert and oriented to person, place, and time.   Psychiatric: He has a normal mood and affect. His behavior is normal. Thought content normal.   Hospital records reviewed with pt confirming HPI.      Assessment/Plan   Ankit was seen today for transitional care management and anxiety.    Diagnoses and all orders for this visit:    Multifocal pneumonia    Hospital discharge follow-up    Anxiety    Pt to slowly increase activity back to baseline and keep specialist appts.

## 2020-07-13 NOTE — TELEPHONE ENCOUNTER
PT called to let us know he is having blood in his stool, he believes it is from excessive straining but wanted to inform us anyway. Please call pt back  [phone]

## 2020-07-13 NOTE — TELEPHONE ENCOUNTER
Patient called to report two episodes of bleeding during bowel movement. Not dark tarry stools but red in toilet bowl. Pt due to come in for cbc and procrit tomorrow, will go over his labs with nurse then but instructed him to go to ER if he felt like he was losing too much blood. Pt denies that, says he is doing fine right now.

## 2020-07-14 NOTE — NURSING NOTE
Lab Results   Component Value Date    WBC 2.76 (L) 07/14/2020    HGB 8.7 (L) 07/14/2020    HCT 25.9 (L) 07/14/2020    MCV 93.2 07/14/2020    PLT 77 (L) 07/14/2020     Procrit given per protocol.  Pt voices having some blood in his stool x 2 episodes.  One very small pink and the other minimal bright red.  He believes it could be related to hemorhoids.  Reviewed with COTY Lowry with v/o to have patient continue to monitor stools.  If symptoms persist, pt to call for repeat CBC check.  If symptoms, mild, pt will notify us at appt next Tuesday and occult stool cards will be issued.  Pt v/u.

## 2020-07-15 NOTE — OUTREACH NOTE
COPD/PN Week 3 Survey      Responses   [facility]   COVID-19 Test Status  Negative   Does the patient have one of the following disease processes/diagnoses(primary or secondary)?  COPD/Pneumonia   Was the primary reason for admission:  Pneumonia   Week 3 attempt successful?  Yes   Call start time  1541   Call end time  1549   Discharge diagnosis  Multifocal pneumonia    Meds reviewed with patient/caregiver?  Yes   Is the patient having any side effects they believe may be caused by any medication additions or changes?  No   Does the patient have all medications ordered at discharge?  Yes   Is the patient taking all medications as directed (includes completed medication regime)?  Yes   Does the patient have a primary care provider?   Yes   Does the patient have an appointment with their PCP or pulmonologist within 7 days of discharge?  Yes   Has the patient kept scheduled appointments due by today?  Yes   Pulse Ox monitoring  Intermittent   Pulse Ox device source  Patient   O2 Sat comments  States it has been running around 95% on 3L   O2 Sat: education provided  Sat levels, Monitoring frequency, When to seek care   Did the patient receive a copy of their discharge instructions?  Yes   Nursing interventions  Reviewed instructions with patient   What is the patient's perception of their health status since discharge?  Same   Nursing Interventions  Nurse provided patient education   Is the patient/caregiver able to teach back the hierarchy of who to call/visit for symptoms/problems? PCP, Specialist, Home health nurse, Urgent Care, ED, 911  Yes   Additional teach back comments  Patient states he feels about the same. He is on 3L of oxygen with sats maintaining around 95%.  Pt had questions on home health.  States he talked with Dr. Lagos and thought he was ordering it.  Phone # given to [facility] to see if they have orders.  States he is feeling about the same.  Uses home  nebulizer and they help.     Is the patient able to teach back COPD zones?  Yes   Nursing interventions  Education provided on various zones   Patient reports what zone on this call?  Yellow Zone   Yellow Zone  Increased shortness of air, Using quick relief inhaler/nebulizer more often   Yellow interventions  Continue to use daily medications, Use quick relief inhaler as ordered, Use oxygen as ordered, Get plenty of rest, Call provider immediatly if symptoms do not improve   Is the patient/caregiver able to teach back signs and symptoms of worsening condition:  Fever/chills, Shortness of breath, Chest pain   Is the patient/caregiver able to teach back importance of completing antibiotic course of treatment?  Yes   Week 3 call completed?  Yes   Wrap up additional comments  Pt to call [facility] to see if they have orders for him to have home health.          Maxine Benavidez LPN

## 2020-07-21 NOTE — NURSING NOTE
Lab Results   Component Value Date    WBC 2.09 (L) 07/21/2020    HGB 7.8 (L) 07/21/2020    HCT 23.3 (L) 07/21/2020    MCV 93.6 07/21/2020    PLT 68 (L) 07/21/2020     Hgb reviewed with Dr. Hayes with v/o for patient to receive 2 units PRBC's.  Armband placed and per patient request, pt scheduled for Thursday at 8:30. Procrit given per protocol.  Pt voices fatigue but did not feel it would be lower than 8 based on how he feels.  Pt knows to call with concerns and next appt reviewed.

## 2020-07-22 NOTE — OUTREACH NOTE
COPD/PN Week 4 Survey      Responses   [facility]   COVID-19 Test Status  Negative   Does the patient have one of the following disease processes/diagnoses(primary or secondary)?  COPD/Pneumonia   Was the primary reason for admission:  Pneumonia   Week 4 attempt successful?  Yes   Call start time  1902   Call end time  1905   Meds reviewed with patient/caregiver?  Yes   Is the patient having any side effects they believe may be caused by any medication additions or changes?  No   Is the patient taking all medications as directed (includes completed medication regime)?  Yes   Has the patient kept scheduled appointments due by today?  Yes   Is the patient still receiving Home Health Services?  N/A   Pulse Ox monitoring  Intermittent   Pulse Ox device source  Patient   O2 Sat comments  4lPM and 92% sats   O2 Sat: education provided  Sat levels, Monitoring frequency, When to seek care   Psychosocial issues?  No   What is the patient's perception of their health status since discharge?  Same   Nursing Interventions  Nurse provided patient education   Is the patient/caregiver able to teach back the hierarchy of who to call/visit for symptoms/problems? PCP, Specialist, Home health nurse, Urgent Care, ED, 911  Yes   Additional teach back comments  No fever since d/c, Sleep is difficult, appetite is ok he says. Says he lost 30lbs, eating alot of fruit.   Is the patient/caregiver able to teach back signs and symptoms of worsening condition:  Fever/chills, Shortness of breath, Chest pain   Is the patient/caregiver able to teach back importance of completing antibiotic course of treatment?  Yes   Week 4 call completed?  Yes   Would the patient like one additional call?  No   Graduated  Yes   Did the patient feel the follow up calls were helpful during their recovery period?  Yes   Was the number of calls appropriate?  Yes   Wrap up additional comments  Therapy is still coming.           Renetta Patton, RN

## 2020-07-23 NOTE — PROGRESS NOTES
1000-Ate all of breakfast.  1205-Complained of extreme SOB.  Skin cool, dry, and pale.  Has a chronic productive cough.  Dr. Hayes's office was called.  1315-Ate all of lunch.  Has voided per urinal.

## 2020-07-23 NOTE — PATIENT INSTRUCTIONS
Blood Transfusion, Adult    A blood transfusion is a procedure in which you receive donated blood, including plasma, platelets, and red blood cells, through an IV tube. You may need a blood transfusion because of illness, surgery, or injury. The blood may come from a donor. You may also be able to donate blood for yourself (autologous blood donation) before a surgery if you know that you might require a blood transfusion.  The blood given in a transfusion is made up of different types of cells. You may receive:  · Red blood cells. These carry oxygen to the cells in the body.  · White blood cells. These help you fight infections.  · Platelets. These help your blood to clot.  · Plasma. This is the liquid part of your blood and it helps with fluid imbalances.  If you have hemophilia or another clotting disorder, you may also receive other types of blood products.  Tell a health care provider about:  · Any allergies you have.  · All medicines you are taking, including vitamins, herbs, eye drops, creams, and over-the-counter medicines.  · Any problems you or family members have had with anesthetic medicines.  · Any blood disorders you have.  · Any surgeries you have had.  · Any medical conditions you have, including any recent fever or cold symptoms.  · Whether you are pregnant or may be pregnant.  · Any previous reactions you have had during a blood transfusion.  What are the risks?  Generally, this is a safe procedure. However, problems may occur, including:  · Having an allergic reaction to something in the donated blood. Hives and itching may be symptoms of this type of reaction.  · Fever. This may be a reaction to the white blood cells in the transfused blood. Nausea or chest pain may accompany a fever.  · Iron overload. This can happen from having many transfusions.  · Transfusion-related acute lung injury (TRALI). This is a rare reaction that causes lung damage. The cause is not known. TRALI can occur within hours  of a transfusion or several days later.  · Sudden (acute) or delayed hemolytic reactions. This happens if your blood does not match the cells in your transfusion. Your body’s defense system (immune system) may try to attack the new cells. This complication is rare. The symptoms include fever, chills, nausea, and low back pain or chest pain.  · Infection or disease transmission. This is rare.  What happens before the procedure?  · You will have a blood test to determine your blood type. This is necessary to know what kind of blood your body will accept and to match it to the donor blood.  · If you are going to have a planned surgery, you may be able to do an autologous blood donation. This may be done in case you need to have a transfusion.  · If you have had an allergic reaction to a transfusion in the past, you may be given medicine to help prevent a reaction. This medicine may be given to you by mouth or through an IV tube.  · You will have your temperature, blood pressure, and pulse monitored before the transfusion.  · Follow instructions from your health care provider about eating and drinking restrictions.  · Ask your health care provider about:  ? Changing or stopping your regular medicines. This is especially important if you are taking diabetes medicines or blood thinners.  ? Taking medicines such as aspirin and ibuprofen. These medicines can thin your blood. Do not take these medicines before your procedure if your health care provider instructs you not to.  What happens during the procedure?  · An IV tube will be inserted into one of your veins.  · The bag of donated blood will be attached to your IV tube. The blood will then enter through your vein.  · Your temperature, blood pressure, and pulse will be monitored regularly during the transfusion. This monitoring is done to detect early signs of a transfusion reaction.  · If you have any signs or symptoms of a reaction, your transfusion will be stopped and  you may be given medicine.  · When the transfusion is complete, your IV tube will be removed.  · Pressure may be applied to the IV site for a few minutes.  · A bandage (dressing) will be applied.  The procedure may vary among health care providers and hospitals.  What happens after the procedure?  · Your temperature, blood pressure, heart rate, breathing rate, and blood oxygen level will be monitored often.  · Your blood may be tested to see how you are responding to the transfusion.  · You may be warmed with fluids or blankets to maintain a normal body temperature.  Summary  · A blood transfusion is a procedure in which you receive donated blood, including plasma, platelets, and red blood cells, through an IV tube.  · Your temperature, blood pressure, and pulse will be monitored before, during, and after the transfusion.  · Your blood may be tested after the transfusion to see how your body has responded.  This information is not intended to replace advice given to you by your health care provider. Make sure you discuss any questions you have with your health care provider.  Document Released: 12/15/2001 Document Revised: 11/04/2019 Document Reviewed: 09/14/2017  [employer] Patient Education © 2020 [employer]    Blood Transfusion, Adult    A blood transfusion is a procedure in which you receive donated blood, including plasma, platelets, and red blood cells, through an IV tube. You may need a blood transfusion because of illness, surgery, or injury. The blood may come from a donor. You may also be able to donate blood for yourself (autologous blood donation) before a surgery if you know that you might require a blood transfusion.  The blood given in a transfusion is made up of different types of cells. You may receive:  · Red blood cells. These carry oxygen to the cells in the body.  · White blood cells. These help you fight infections.  · Platelets. These help your blood to clot.  · Plasma. This is the liquid  part of your blood and it helps with fluid imbalances.  If you have hemophilia or another clotting disorder, you may also receive other types of blood products.  Tell a health care provider about:  · Any allergies you have.  · All medicines you are taking, including vitamins, herbs, eye drops, creams, and over-the-counter medicines.  · Any problems you or family members have had with anesthetic medicines.  · Any blood disorders you have.  · Any surgeries you have had.  · Any medical conditions you have, including any recent fever or cold symptoms.  · Whether you are pregnant or may be pregnant.  · Any previous reactions you have had during a blood transfusion.  What are the risks?  Generally, this is a safe procedure. However, problems may occur, including:  · Having an allergic reaction to something in the donated blood. Hives and itching may be symptoms of this type of reaction.  · Fever. This may be a reaction to the white blood cells in the transfused blood. Nausea or chest pain may accompany a fever.  · Iron overload. This can happen from having many transfusions.  · Transfusion-related acute lung injury (TRALI). This is a rare reaction that causes lung damage. The cause is not known. TRALI can occur within hours of a transfusion or several days later.  · Sudden (acute) or delayed hemolytic reactions. This happens if your blood does not match the cells in your transfusion. Your body’s defense system (immune system) may try to attack the new cells. This complication is rare. The symptoms include fever, chills, nausea, and low back pain or chest pain.  · Infection or disease transmission. This is rare.  What happens before the procedure?  · You will have a blood test to determine your blood type. This is necessary to know what kind of blood your body will accept and to match it to the donor blood.  · If you are going to have a planned surgery, you may be able to do an autologous blood donation. This may be done  in case you need to have a transfusion.  · If you have had an allergic reaction to a transfusion in the past, you may be given medicine to help prevent a reaction. This medicine may be given to you by mouth or through an IV tube.  · You will have your temperature, blood pressure, and pulse monitored before the transfusion.  · Follow instructions from your health care provider about eating and drinking restrictions.  · Ask your health care provider about:  ? Changing or stopping your regular medicines. This is especially important if you are taking diabetes medicines or blood thinners.  ? Taking medicines such as aspirin and ibuprofen. These medicines can thin your blood. Do not take these medicines before your procedure if your health care provider instructs you not to.  What happens during the procedure?  · An IV tube will be inserted into one of your veins.  · The bag of donated blood will be attached to your IV tube. The blood will then enter through your vein.  · Your temperature, blood pressure, and pulse will be monitored regularly during the transfusion. This monitoring is done to detect early signs of a transfusion reaction.  · If you have any signs or symptoms of a reaction, your transfusion will be stopped and you may be given medicine.  · When the transfusion is complete, your IV tube will be removed.  · Pressure may be applied to the IV site for a few minutes.  · A bandage (dressing) will be applied.  The procedure may vary among health care providers and hospitals.  What happens after the procedure?  · Your temperature, blood pressure, heart rate, breathing rate, and blood oxygen level will be monitored often.  · Your blood may be tested to see how you are responding to the transfusion.  · You may be warmed with fluids or blankets to maintain a normal body temperature.  Summary  · A blood transfusion is a procedure in which you receive donated blood, including plasma, platelets, and red blood cells,  through an IV tube.  · Your temperature, blood pressure, and pulse will be monitored before, during, and after the transfusion.  · Your blood may be tested after the transfusion to see how your body has responded.  This information is not intended to replace advice given to you by your health care provider. Make sure you discuss any questions you have with your health care provider.  Document Released: 12/15/2001 Document Revised: 11/04/2019 Document Reviewed: 09/14/2017  [employer] Patient Education © 2020 [employer]

## 2020-07-24 NOTE — PROGRESS NOTES
Mr. 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  He has tolerated the infusions well and certainly appears to be benefiting from the infusions clinically.  We feel he will need to continue his monthly IVIG with next dose to be delivered 7/28/2020.

## 2020-07-28 NOTE — NURSING NOTE
Per Dr. Hayes, pt is approved for Rebozyl.  Pt will get procrit today and next week, pt will start rebozyl. Informed pt and he v/u. Dr. Hayes sending new scheduling info to appt desk. They will call pt with new appt times.

## 2020-07-28 NOTE — PROGRESS NOTES
Subjective     REASON FOR CONSULTATION:    1.  Myelodysplasia (refractory anemia with ring sideroblasts) diagnosed on bone marrow biopsy 6/26/2019  2. Lung mass on CT chest 11/17/2018  3.  Hypogammaglobulinemia.  First dose of IV immunoglobulin 40 g delivered 6/25/2019  4.  Hospitalized for right lower lobe pneumonia January 2020    HISTORY OF PRESENT ILLNESS:  The patient is a 80 y.o. year old male who was referred to us by his primary care office for evaluation of macrocytic anemia.  His MCV has been markedly elevated around 107-108..  He was hospitalized from 5/25/2019 through 5/31/2019 with COPD exacerbation and CHF.  During the hospitalization his hemoglobin was around 9.5 g/dL with an MCV as high as 113.    With his initial office consult on 6/18/2019 we ordered additional lab studies to try to further define the etiology of his anemia.  He was due to return to our office for follow-up to review those results but ended up being admitted to the hospital from 6/20/2019 to 7/1/2019 for treatment of pneumonia and COPD exacerbation.  He was seen for hematology consultation as an inpatient by Dr. Gupta on 6/25/2019.  Bone marrow biopsy was ordered which was performed on 6/26/2019 and showed dysplastic changes and ring sideroblasts.  Blasts were less than 5%.  Chromosome karyotype is pending.    Also during the hospitalization he was found to be profoundly hypogammaglobulinemic with an IgG level of 365.  He had a history of recurring pulmonary infections and during the admission received 1 dose of IV immunoglobulin at 40 g total dose on 6/25/2019.    He returns today for follow-up and further IVIG infusion and Procrit.    He remains in a wheelchair today and is wearing oxygen around-the-clock.  He is now on a maximum dose of Procrit of 60,000 units subcu weekly and is still been requiring some transfusion once or twice a month.  He also had some recent upper respiratory symptoms and underwent COVID-19 testing which  "was negative from 5/26/2020.    He had required admission to the hospital from 6/23/2020 to 6/28/2020 for treatment of pneumonia and CHF.  He received his most recent transfusion of packed red blood cells on 7/23/2020 and hemoglobin today is   Plan to investigate whether or not the patient may be a candidate for Reblozyl.       History of Present Illness     Past medical history, family history, and social history reviewed and unchanged.    ALLERGIES:    Allergies   Allergen Reactions   • Erythromycin Itching        Review of Systems   Constitutional: Positive for fatigue. Negative for activity change, chills and fever.   HENT: Negative for mouth sores, trouble swallowing and voice change.    Eyes: Negative for pain and visual disturbance.   Respiratory: Positive for shortness of breath. Negative for cough and wheezing.    Cardiovascular: Positive for leg swelling. Negative for chest pain and palpitations.   Gastrointestinal: Positive for abdominal pain and nausea. Negative for constipation, diarrhea and vomiting.   Genitourinary: Negative for difficulty urinating, frequency and urgency.   Musculoskeletal: Positive for arthralgias and gait problem. Negative for joint swelling.   Skin: Negative for rash.   Neurological: Positive for weakness. Negative for dizziness, seizures and headaches.   Hematological: Negative for adenopathy. Bruises/bleeds easily.   Psychiatric/Behavioral: Negative for behavioral problems and confusion. The patient is not nervous/anxious.         Objective     Vitals:    07/28/20 0929   BP: 123/77   Pulse: 78   Resp: 18   Temp: 98.6 °F (37 °C)   TempSrc: Skin   SpO2: 95%   Height: 177.8 cm (70\")   PainSc: 0-No pain     Current Status 7/28/2020   ECOG score 1       Physical Exam   Constitutional: He is oriented to person, place, and time. He appears well-developed and well-nourished. No distress.   Chronically ill appearing obese gentleman wearing nasal oxygen   HENT:   Head: Normocephalic. "   Eyes: Pupils are equal, round, and reactive to light. Conjunctivae and EOM are normal. No scleral icterus.   Neck: Normal range of motion. Neck supple. No JVD present. No thyromegaly present.   Cardiovascular: Normal rate. An irregular rhythm present. Exam reveals distant heart sounds. Exam reveals no gallop and no friction rub.   No murmur heard.  Pulmonary/Chest: Effort normal. He has decreased breath sounds in the right lower field and the left lower field. He has rales.   Abdominal: Soft. He exhibits distension. He exhibits no mass. There is no tenderness.   Musculoskeletal: Normal range of motion. He exhibits edema. He exhibits no deformity.   Lymphadenopathy:     He has no cervical adenopathy.   Neurological: He is alert and oriented to person, place, and time. He has normal reflexes. No cranial nerve deficit.   Skin: Skin is warm and dry. Purpura and rash noted. No erythema.   Psychiatric: He has a normal mood and affect. His behavior is normal. Judgment normal.         RECENT LABS:  Hematology WBC   Date Value Ref Range Status   07/21/2020 2.09 (L) 3.40 - 10.80 10*3/mm3 Final   01/01/2020 7.15 3.40 - 10.80 10*3/mm3 Final   05/15/2019 3.79 3.40 - 10.80 10*3/mm3 Final   02/08/2018 4.11 (L) 4.5 - 11.0 10*3/uL Final     RBC   Date Value Ref Range Status   07/21/2020 2.49 (L) 4.14 - 5.80 10*6/mm3 Final   05/15/2019 3.05 (L) 4.14 - 5.80 10*6/mm3 Final   02/08/2018 3.65 (L) 4.5 - 5.9 10*6/uL Final     Hemoglobin   Date Value Ref Range Status   07/21/2020 7.8 (L) 13.0 - 17.7 g/dL Final   02/08/2018 13.0 (L) 13.5 - 17.5 g/dL Final     Hematocrit   Date Value Ref Range Status   07/21/2020 23.3 (L) 37.5 - 51.0 % Final   02/08/2018 37.5 (L) 41.0 - 53.0 % Final     Platelets   Date Value Ref Range Status   07/21/2020 68 (L) 140 - 450 10*3/mm3 Final   02/08/2018 164 140 - 440 10*3/uL Final        Lab Results   Component Value Date    GLUCOSE 100 (H) 06/28/2020    CALCIUM 8.3 (L) 06/28/2020     06/28/2020    K 3.6  06/28/2020    CO2 26.1 06/28/2020     06/28/2020    BUN 22 06/28/2020    CREATININE 1.19 06/28/2020    EGFRIFAFRI 70 05/15/2019    EGFRIFNONA 59 (L) 06/28/2020    BCR 18.5 06/28/2020    ANIONGAP 9.9 06/28/2020     Lab Results   Component Value Date    IRON 87 06/12/2020    TIBC 213 (L) 06/12/2020    FERRITIN 1,189.50 (H) 06/12/2020     Lab Results   Component Value Date    EPNIRVCI75 1,596 (H) 06/18/2019     Lab Results   Component Value Date    FOLATE >20.00 06/18/2019      Ref Range & Units 2wk ago   IgG 700-1,600 mg/dL 606 Abnormally low     IgM 40 - 230 mg/dL 45    IgA 70 - 400 mg/dL 201        Erythropoietin 2.6 - 18.5 mIU/mL 34.9 Abnormally high         Bone marrow biopsy 6/26/2019  Final Diagnosis   CONSULTANT DIAGNOSIS:  Hypercellular Bone Marrow (60% total marrow cellularity) demonstrating significant dysplasia present in all three hematopoietic cell lines with 2-3+ stainable hemosiderin and 14% ringed sideroblasts.       No metastatic carcinoma, granulomas, vasculitis, viral inclusions, increase in myeloblasts, plasma cells or malignant lymphoma.       CONSULTANT COMMENT:  These changes would be most supportive of a low grade myelodysplasia; however, this must be correlated with appropriate B12 and RBC folate levels as well as pending karyotype for final disease classification.       Assessment/Plan   1. Myelodysplasia with chronic anemia and leukopenia.  Bone marrow biopsy  consistent with low-grade myelodysplasia with refractory anemia with ringed sideroblasts.  Blasts in the marrow were less than 5%.  Marrow karyotype shows a 7 q. deletion.  2.  Severe emphysema/COPD.  Patient requires oxygen around-the-clock  3.  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  Most recently hospitalized for pneumonia 6/23/2020-6/28/2020.  He received a 20 g dose of IVIG during his hospitalization on 6/25/2020.   4.  History of non-small cell lung cancer from the left lower lobe resected by   Tanner Mendoza 2013.  No definite evidence of recurrent malignancy noted on the most recent CT scan of the chest from 8/5/2019 as noted above.  5.  Atrial fibrillation which further impacts his symptomatology.  He now is taking Cardizem twice daily and Lopressor.  He also is on Eliquis anticoagulation.  His stools have been tested for occult blood and remain negative.    Plan  1.  Patient will receive his monthly IVIG infusion at 30 g today in the office.  2.  He will be scheduled to return weekly for blood count and receive Procrit if his hemoglobin is less than 10 g/dL.  Dose was increased recently to the maximum of 60,000 units weekly.  3.  He will return for MD follow-up and his next IVIG infusion in 4 weeks.  In the meantime we will investigate whether or not he may be a candidate for Reblozyl since he does not appear to be responding to Procrit therapy.  4.  We discussed with Jaspreet that if he does not qualify for Reblozyl or if it is not financially feasible then probably stopping Procrit and strictly providing transfusion support would be recommended.    Addendum  We received word that he would qualify for Reblozyl and therefore today will be his last dose of Procrit and we will schedule the first Reblozyl treatment next week.  When he returns for MD follow-up in 4 weeks he should be ready to receive his second dose (reblozyl 1mg/kg SC q 3 wks )

## 2020-08-04 NOTE — NURSING NOTE
Lab Results   Component Value Date    WBC 2.81 (L) 08/04/2020    HGB 7.8 (L) 08/04/2020    HCT 23.6 (L) 08/04/2020    MCV 92.2 08/04/2020    PLT 68 (L) 08/04/2020     First dose of Reblozyl given per protocol.  Pt voice no new complaints.  Hgb 7.8 reviewed with COTY Woo with v/o for 2 units PRBC's.  Pt scheduled for Thursday and T& C done with armband placed.  Pt knows to call with concerns. Pt to return the next 2 weeks for CBC and RN review.  On week 3, pt to come in for lab and possible Reblozyl.  Pt switching appts to Friday.

## 2020-08-14 NOTE — NURSING NOTE
CBC r/w pt. Results stable with hgb of 8.9 and platelets of 52. Pt states he is feeling well and has the usual level of fatigue. He was instructed to followup in one week as scheduled for cbc with RN review, then back in 2 weeks for MD visit, IVIG and 2nd Reblozyl injection. The pt v/u.

## 2020-08-18 NOTE — PROGRESS NOTES
RM:________     PCP: Gopi Lagos MD    : 1940  AGE: 80 y.o.  EST PATIENT   REASON FOR VISIT/  CC:        WT: ____________ BP: __________L __________R HR______    CHEST PAIN: _____________    SOA: _____________PALPS: _______________     LIGHTHEADED: ___________FATIGUE: ________________ EDEMA __________    ALLERGIES:Erythromycin SMOKING HISTORY:  Social History     Tobacco Use   • Smoking status: Former Smoker     Packs/day: 1.00     Types: Cigarettes     Start date: 1956     Last attempt to quit: 4/15/2010     Years since quitting: 10.3   • Smokeless tobacco: Never Used   Substance Use Topics   • Alcohol use: No     Frequency: Never     Comment: No caffeine use   • Drug use: No     CAFFEINE USE_________________  ALCOHOL ______________________    Below is the patient's most recent value for Albumin, ALT, AST, BUN, Calcium, Chloride, Cholesterol, CO2, Creatinine, GFR, Glucose, HDL, Hematocrit, Hemoglobin, Hemoglobin A1C, LDL, Magnesium, Phosphorus, Platelets, Potassium, PSA, Sodium, Triglycerides, TSH and WBC.   Lab Results   Component Value Date    ALBUMIN 3.90 2020    ALT 8 2020    AST 18 2020    BUN 31 (H) 2020    CALCIUM 8.8 2020     2020    CO2 28.2 2020    CREATININE 1.36 (H) 2020     (H) 05/15/2019    HDL 43 05/15/2019    HCT 26.9 (L) 2020    HGB 8.9 (L) 2020    HGBA1C 5.40 2020    LDL 25 05/15/2019    MG 2.2 2019    PLT 52 (L) 2020    K 4.1 2020     2020    TRIG 89 05/15/2019    TSH 2.090 05/15/2019    WBC 2.54 (L) 2020          NEW DIAGNOSIS/ SURGERY/ HOSP OR ED VISITS: ______________________    __________________________________________________________________      RECENT LABS OR DIAGNOSTIC TESTING:  _____________________________    __________________________________________________________________      ASSESSMENT/ PLAN:  _______________________________________________    __________________________________________________________________

## 2020-08-19 PROBLEM — I50.33 ACUTE ON CHRONIC DIASTOLIC (CONGESTIVE) HEART FAILURE (HCC): Status: RESOLVED | Noted: 2019-12-03 | Resolved: 2020-01-01

## 2020-08-19 NOTE — PROGRESS NOTES
Date of Office Visit: 2020  Encounter Provider: Catrachito Rdz MD  Place of Service: [facility]  Patient Name: Ankit Mack Sr.  :1940    Chief Complaint   Patient presents with   • Congestive Heart Failure   :     HPI: Ankit Mack Sr. is a 80 y.o. male who presents today to follow up.     He has a history of a descending thoracic aortic aneurysm which was stented in [place].  He has ectasia of the ascending aorta; it most recently measured 3.7cm.  He has paroxysmal atrial fibrillation/flutter.  He has chronic diastolic CHF.  He had mild AS by an echo in May 2019.  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  He has myelodysplastic syndrome.    He is chronically fatigued and chronically dyspneic, but these are stable.  He bruises very easily but has not had any life threatening bleeding. He denies leg swelling.  He has had some hospitalizations for acute diastolic CHF due to dietary sodium. He feels that his heart is doing fairly well, all things considered.      Past Medical History:   Diagnosis Date   • Anemia    • Anxiety    • Atrial flutter (CMS/HCC)    • Chronic diastolic congestive heart failure (CMS/HCC) 2018   • Chronic respiratory failure with hypoxia (CMS/HCC)    • COPD (chronic obstructive pulmonary disease) (CMS/HCC)    • Essential hypertension    • GERD (gastroesophageal reflux disease)    • H/O complete eye exam 2018   • History of pulmonary function tests 06/10/2014   • Hyperglycemia    • Hyperlipidemia    • Hypogammaglobulinemia (CMS/HCC)    • Hypoxemia    • Lung cancer (CMS/HCC)     Left   • MDS (myelodysplastic syndrome) (CMS/HCC)    • Nonrheumatic aortic valve stenosis     mild 2019   • Obesity    • Osteoarthritis, knee    • Paroxysmal atrial fibrillation (CMS/HCC)    • Pneumonia of right lower lobe due to infectious organism    • Prostate cancer (CMS/HCC)    •  Seizures (CMS/[facility])    • Sleep apnea    • Thoracic aortic aneurysm without rupture (CMS/[facility])     s/p stenting of descending thoracic aneurysm; the ascending aorta measured 3.7cm in 2019   • Type 2 diabetes mellitus without complication, without long-term current use of insulin (CMS/[facility])        Past Surgical History:   Procedure Laterality Date   • ABDOMINAL AORTIC ANEURYSM REPAIR  2010    Dr. Adarsh Dennis   • CARDIAC CATHETERIZATION Left 02/06/2004   • CATARACT EXTRACTION  10/2014    also 11/14   • COLONOSCOPY     • ENDOVASCULAR THORACIC ANEURYSM REPAIR     • JOINT REPLACEMENT Left 10/2010    knee; Dr. Wolf   • JOINT REPLACEMENT Right 09/2011    knee; Dr. Wolf   • LUNG LOBECTOMY Left 01/16/2012    LLL; Dr. Mendoza   • LUNG LOBECTOMY Left 2012   • PROSTATE SURGERY  2000    Dr. Naranjo   • VASCULAR SURGERY  05/2009    TEVAR of thoracic aortic aneurysm   • VASCULAR SURGERY  12/15/2003    Left carotid subclavian bypass graft, ligation of proximal left subclavian following carotid subclavian bypass. Right femoral sheath. Arch angiography with descending thoracic and abdominal aortography    • VASCULAR SURGERY  11/17/2003    Selective catheterization right vertebral artery, right subclavian artery, left subclavian artery, left vertebral artery.     • VASCULAR SURGERY  06/05/2002    Tracheostomy, bronchoscopy   • VASCULAR SURGERY  05/21/2002    Thoracic arteriogram and abdominal aortogram       Social History     Socioeconomic History   • Marital status: Single     Spouse name: Not on file   • Number of children: Not on file   • Years of education: Not on file   • Highest education level: Not on file   Occupational History     Employer: RETIRED   Tobacco Use   • Smoking status: Former Smoker     Packs/day: 1.00     Types: Cigarettes     Start date: 4/24/1956     Last attempt to quit: 4/15/2010     Years since quitting: 10.3   • Smokeless tobacco: Never Used   Substance and Sexual Activity   • Alcohol use: No      Frequency: Never     Comment: No caffeine use   • Drug use: No   • Sexual activity: Defer       Family History   Problem Relation Age of Onset   • Cancer Mother    • COPD Mother    • Cancer Father         lung   • Colon cancer Father    • Cancer Other         colon   • Diabetes Other    • Emphysema Other    • Hypertension Other    • Kidney disease Other    • Lung cancer Other        Review of Systems   Constitution: Positive for malaise/fatigue.   Cardiovascular: Positive for dyspnea on exertion.   Respiratory: Positive for shortness of breath and wheezing.    Hematologic/Lymphatic: Bruises/bleeds easily.   Genitourinary: Positive for frequency.   Neurological: Positive for excessive daytime sleepiness and headaches.   All other systems reviewed and are negative.      Allergies   Allergen Reactions   • Erythromycin Itching         Current Outpatient Medications:   •  albuterol (PROVENTIL HFA;VENTOLIN HFA) 108 (90 Base) MCG/ACT inhaler, Inhale 2 puffs Every 4 (Four) Hours As Needed for Wheezing., Disp: , Rfl:   •  albuterol (PROVENTIL) (2.5 MG/3ML) 0.083% nebulizer solution, Take 2.5 mg by nebulization 4 (Four) Times a Day., Disp: , Rfl:   •  ALPRAZolam (XANAX) 1 MG tablet, Take 1 tablet by mouth At Night As Needed for Anxiety., Disp: 90 tablet, Rfl: 0  •  apixaban (ELIQUIS) 5 MG tablet tablet, Take 1 tablet by mouth Every 12 (Twelve) Hours. Indications: Atrial Fibrillation, Disp: 180 tablet, Rfl: 2  •  atorvastatin (LIPITOR) 10 MG tablet, Take 1 tablet by mouth Daily. For cholesterol, Disp: 90 tablet, Rfl: 1  •  dilTIAZem (CARDIZEM) 60 MG tablet, TAKE 1 TABLET BY MOUTH TWICE A DAY, Disp: 180 tablet, Rfl: 1  •  folic acid (FOLVITE) 1 MG tablet, Take 1 mg by mouth Daily., Disp: , Rfl:   •  furosemide (LASIX) 40 MG tablet, TAKE 1 TABLET BY MOUTH EVERY DAY, Disp: 90 tablet, Rfl: 1  •  ipratropium (ATROVENT) 0.02 % nebulizer solution, Take 500 mcg by nebulization Every 4 (Four) Hours As Needed for Wheezing or Shortness  "of Air., Disp: , Rfl:   •  metFORMIN (GLUCOPHAGE) 1000 MG tablet, Take 1 tablet by mouth Daily With Breakfast., Disp: 90 tablet, Rfl: 1  •  metoprolol tartrate (LOPRESSOR) 100 MG tablet, TAKE 1 AND 1/2 TABLETS BY MOUTH TWICE DAILY, Disp: 270 tablet, Rfl: 1  •  nystatin (MYCOSTATIN) 246928 UNIT/ML suspension, Swish and swallow 5 mL 4 (Four) Times a Day., Disp: 473 mL, Rfl: 0  •  O2 (OXYGEN), Inhale 4 L/min Continuous., Disp: , Rfl:   •  potassium chloride (K-DUR) 10 MEQ CR tablet, TAKE 1 TABLET BY MOUTH EVERY DAY, Disp: 90 tablet, Rfl: 1  •  Revefenacin (YUPELRI) 175 MCG/3ML solution, Inhale 3 mL (1 vial) by nebulization Daily., Disp: 90 mL, Rfl: 1    Current Facility-Administered Medications:   •  albuterol (PROVENTIL) nebulizer solution 0.083% 2.5 mg/3mL, 2.5 mg, Nebulization, Once, Beto Hayes MD  •  ipratropium (ATROVENT) nebulizer solution 0.0002 mg, 0.2 mcg, Nebulization, Once, Beto Hayes MD  •  ipratropium-albuterol (DUO-NEB) nebulizer solution 3 mL, 3 mL, Nebulization, 4x Daily - RT, Gopi Aguilar MD, 3 mL at 06/21/20 1048    Facility-Administered Medications Ordered in Other Visits:   •  acetaminophen (TYLENOL) tablet 650 mg, 650 mg, Oral, Once, Beto Hayes MD  •  diphenhydrAMINE (BENADRYL) capsule 25 mg, 25 mg, Oral, Once, Beto Hayes MD  •  sodium chloride 0.9 % infusion 250 mL, 250 mL, Intravenous, PRN, Beto Hayes MD      Objective:     Vitals:    08/19/20 1426   BP: 116/50   BP Location: Left arm   Pulse: 67   Weight: 93.4 kg (206 lb)   Height: 177.8 cm (70\")     Body mass index is 29.56 kg/m².    Physical Exam   Constitutional: He is oriented to person, place, and time.   overweight   HENT:   Head: Normocephalic.   Nose: Nose normal.   Mouth/Throat: Oropharynx is clear and moist.   Eyes: Pupils are equal, round, and reactive to light. Conjunctivae and EOM are normal.   Neck: Normal range of motion. No JVD present.   Cardiovascular: Normal rate, regular rhythm and " intact distal pulses. Exam reveals distant heart sounds.   Murmur heard.   Systolic murmur is present with a grade of 1/6.  Pulmonary/Chest: Effort normal.   Abdominal: Soft. There is no tenderness.   Obesity limits abdominal exam   Musculoskeletal: Normal range of motion. He exhibits deformity (bruising right forefoot). He exhibits no edema.   Neurological: He is alert and oriented to person, place, and time. No cranial nerve deficit.   Skin: Skin is warm and dry. No rash noted.   Psychiatric: He has a normal mood and affect. His behavior is normal.   Vitals reviewed.        ECG 12 Lead  Date/Time: 8/19/2020 3:31 PM  Performed by: Catrachito Rdz MD  Authorized by: Catrachito Rdz MD   Comparison: compared with previous ECG   Similar to previous ECG  Rhythm: sinus rhythm  Conduction: right bundle branch block and left anterior fascicular block  QRS axis: left  Other: no other findings    Clinical impression: abnormal EKG              Assessment:       Diagnosis Plan   1. Chronic diastolic CHF (congestive heart failure) (CMS/HCC)     2. Paroxysmal atrial fibrillation (CMS/HCC)     3. Nonrheumatic aortic valve stenosis     4. Thoracic aortic aneurysm without rupture (CMS/HCC)     5. Essential hypertension            Plan:       1.  This appears stable at this point in time.  He will continue with furosemide.      2.  Atrial Fibrillation and Atrial Flutter  Assessment  • The patient has paroxysmal atrial fibrillation and paroxysmal atrial flutter  • This is non-valvular in etiology  • The patient's CHADS2-VASc score is 5  • A [id number] score of 2 or more is considered a high risk for a thromboembolic event  • Apixaban prescribed    Plan  • Attempt to maintain sinus rhythm  • Continue apixaban for antithrombotic therapy, bleeding issues discussed  • Continue beta blocker for rhythm control    3. He had reported mild AS in May 2019, but an echo today shows sclerosis without stenosis.    4. He follows closely with his  cardiothoracic surgeon in [place].    5.  His BP is well controlled.    Sincerely,       Catrachito Rdz MD

## 2020-08-21 NOTE — NURSING NOTE
"Here per W/C for cbc and nurse review. Fatigued. Reports increase SOA. Uses home o2 at 4 liters per minute.   Lab Results   Component Value Date    WBC 3.03 (L) 08/21/2020    HGB 7.6 (L) 08/21/2020    HCT 23.6 (L) 08/21/2020    MCV 91.1 08/21/2020    PLT 59 (L) 08/21/2020     Hgb7.6 and per Dr. Hayes has been scheduled for transfusion with 2 units prbc's with benadryl and tylenol premeds for the AM at [facility]. He then returns 8-28 to see Dr. Hayes, IVIG and 2nd dose of Reblozyl. Left unit with family member attending. Copy of labs given    It is of note that Mr. Mack showed to this nurse his rt. foot which extensive ecchymosis from 2nd toe across anterior aspect with a small dime shaped area of erythema superior to great toe. Denies \"stubbing\" toe and/or foot. Pulse adequate. States that his cardiologist examined it \"two days ago and told me that it would go away\". Platelet count has increased from 52K to 59K. Denies pain. No edema.  "

## 2020-08-25 NOTE — OUTREACH NOTE
Care Plan Note      Responses   Lifestyle Goals  Routine follow-up with doctor(s), Eat a healthy diet, Medication management, Self monitor blood sugar   Barriers  Disease education [Patient has altered sense of taste. ]   Self Management  Medication Adherence   Suggested Appointments  Other (See Comment) [Follow up with Demond]   Annual Wellness Visit:   Patient Will Schedule   AWV Materials  Send Materials   Care Gaps Addressed  Diabetic A1C [Patient encouraged to schedule AWV and complete A1C with Dr. Lagos. ]   Specific Disease Process Teaching  Diabetes [Patient did not provide glucose readings. Patient states nothing tastes good. ACM recommended necterines. No energy even after 2 units of blood. ]   Does patient have depression diagnosis?  No   Advanced Directives:  Patient Has   Medication Adherence  Medications understood   Goal Progress  Making Progress Toward Goal(s)   Readiness Scale  7   Confidence Scale  7   Health Literacy  Good        The main concerns and/or symptoms the patient would like to address are: Spoke with patient regarding health and wellness. Patient states he has no energy after receiving 2 units of blood Saturday. He is discouraged. Patient did not provide glucose readings, but states nothing tastes good. .    Education/instruction provided by Care Coordinator: Introduced self, explained ACM RN role and provided contact information. Reviewed follow up care with patient. Patient is following up with Oncology frequently. Patient also recently had an appointment with cardiology. AC recommended patient schedule an AWV with Dr. Lagos and have his HgbA1C updated. Patient was appreciative of the call. Declined need for further calls. AWV information sent via mail. [facility] active. AD filed.     Follow Up Outreach Due: as needed    Camryn Mcclain RN  Ambulatory     8/25/2020, 16:42

## 2020-08-28 NOTE — PROGRESS NOTES
Subjective     REASON FOR CONSULTATION:    1.  Myelodysplasia (refractory anemia with ring sideroblasts) diagnosed on bone marrow biopsy 6/26/2019.  Unresponsive to Procrit.  Patient is now receiving Reblozyl Q3wks  2. Lung mass on CT chest 11/17/2018  3.  Hypogammaglobulinemia.  First dose of IV immunoglobulin 40 g delivered 6/25/2019  4.  Hospitalized for right lower lobe pneumonia January 2020  5.  Anticoagulation with Eliquis for atrial fibrillation.    HISTORY OF PRESENT ILLNESS:  The patient is a 80 y.o. year old male who was referred to us by his primary care office for evaluation of macrocytic anemia.  His MCV has been markedly elevated around 107-108..  He was hospitalized from 5/25/2019 through 5/31/2019 with COPD exacerbation and CHF.  During the hospitalization his hemoglobin was around 9.5 g/dL with an MCV as high as 113.    With his initial office consult on 6/18/2019 we ordered additional lab studies to try to further define the etiology of his anemia.  He was due to return to our office for follow-up to review those results but ended up being admitted to the hospital from 6/20/2019 to 7/1/2019 for treatment of pneumonia and COPD exacerbation.  He was seen for hematology consultation as an inpatient by Dr. Gupta on 6/25/2019.  Bone marrow biopsy was ordered which was performed on 6/26/2019 and showed dysplastic changes and ring sideroblasts.  Blasts were less than 5%.  Chromosome karyotype is pending.    Also during the hospitalization he was found to be profoundly hypogammaglobulinemic with an IgG level of 365.  He had a history of recurring pulmonary infections and during the admission received 1 dose of IV immunoglobulin at 40 g total dose on 6/25/2019.    He returns today for follow-up and further IVIG infusion and Procrit.    He remains in a wheelchair today and is wearing oxygen around-the-clock.  He had been on a maximum dose of Procrit of 60,000 units subcu weekly and is still been requiring  "some transfusion once or twice a month.     We elected to switch his treatment to Reblozyl and he received his first dose on 8/7/2020.  He returns today for reevaluation and his second shot of Reblozyl.  He also required transfusion of 2 units packed red blood cells on 8/22/2020.  His hemoglobin today only improved from 7.6-8.2.  He reports he did not feel any better after the transfusion.    He is having quite a bit of bruising.  He is on Eliquis 5 mg twice daily for A. fib.  With his platelet count being only 58,000 and at 80 years old I think we probably need to decrease his dose to 2.5 mg every 12 hours.     History of Present Illness     Past medical history, family history, and social history reviewed and unchanged.    ALLERGIES:    Allergies   Allergen Reactions   • Erythromycin Itching        Review of Systems   Constitutional: Positive for fatigue. Negative for activity change, chills and fever.   HENT: Negative for mouth sores, trouble swallowing and voice change.    Eyes: Negative for pain and visual disturbance.   Respiratory: Positive for shortness of breath. Negative for cough and wheezing.    Cardiovascular: Positive for leg swelling. Negative for chest pain and palpitations.   Gastrointestinal: Positive for abdominal pain and nausea. Negative for constipation, diarrhea and vomiting.   Genitourinary: Negative for difficulty urinating, frequency and urgency.   Musculoskeletal: Positive for arthralgias and gait problem. Negative for joint swelling.   Skin: Negative for rash.   Neurological: Positive for weakness. Negative for dizziness, seizures and headaches.   Hematological: Negative for adenopathy. Bruises/bleeds easily.   Psychiatric/Behavioral: Negative for behavioral problems and confusion. The patient is not nervous/anxious.         Objective     Vitals:    08/28/20 0906   BP: 117/57   Pulse: 69   Resp: 18   Temp: 98.4 °F (36.9 °C)   TempSrc: Skin   SpO2: 97%   Height: 177.8 cm (70\")   PainSc: " 0-No pain     Current Status 8/28/2020   ECOG score 1       Physical Exam   Constitutional: He is oriented to person, place, and time. He appears well-developed and well-nourished. No distress.   Chronically ill appearing obese gentleman wearing nasal oxygen   HENT:   Head: Normocephalic.   Eyes: Pupils are equal, round, and reactive to light. Conjunctivae and EOM are normal. No scleral icterus.   Neck: Normal range of motion. Neck supple. No JVD present. No thyromegaly present.   Cardiovascular: Normal rate. An irregular rhythm present. Exam reveals distant heart sounds. Exam reveals no gallop and no friction rub.   No murmur heard.  Pulmonary/Chest: Effort normal. He has decreased breath sounds in the right lower field and the left lower field. He has rales.   Abdominal: Soft. He exhibits distension. He exhibits no mass. There is no tenderness.   Musculoskeletal: Normal range of motion. He exhibits edema. He exhibits no deformity.   Lymphadenopathy:     He has no cervical adenopathy.   Neurological: He is alert and oriented to person, place, and time. He has normal reflexes. No cranial nerve deficit.   Skin: Skin is warm and dry. Purpura and rash noted. No erythema.   Psychiatric: He has a normal mood and affect. His behavior is normal. Judgment normal.         RECENT LABS:  Hematology WBC   Date Value Ref Range Status   08/28/2020 2.28 (L) 3.40 - 10.80 10*3/mm3 Final   01/01/2020 7.15 3.40 - 10.80 10*3/mm3 Final   05/15/2019 3.79 3.40 - 10.80 10*3/mm3 Final   02/08/2018 4.11 (L) 4.5 - 11.0 10*3/uL Final     RBC   Date Value Ref Range Status   08/28/2020 2.73 (L) 4.14 - 5.80 10*6/mm3 Final   05/15/2019 3.05 (L) 4.14 - 5.80 10*6/mm3 Final   02/08/2018 3.65 (L) 4.5 - 5.9 10*6/uL Final     Hemoglobin   Date Value Ref Range Status   08/28/2020 8.2 (L) 13.0 - 17.7 g/dL Final   02/08/2018 13.0 (L) 13.5 - 17.5 g/dL Final     Hematocrit   Date Value Ref Range Status   08/28/2020 25.6 (L) 37.5 - 51.0 % Final   02/08/2018  37.5 (L) 41.0 - 53.0 % Final     Platelets   Date Value Ref Range Status   08/28/2020 58 (L) 140 - 450 10*3/mm3 Final   02/08/2018 164 140 - 440 10*3/uL Final        Lab Results   Component Value Date    GLUCOSE 157 (H) 08/21/2020    CALCIUM 9.0 08/21/2020     08/21/2020    K 4.6 08/21/2020    CO2 29.1 (H) 08/21/2020     08/21/2020    BUN 33 (H) 08/21/2020    CREATININE 1.62 (H) 08/21/2020    EGFRIFAFRI 70 05/15/2019    EGFRIFNONA 41 (L) 08/21/2020    BCR 20.4 08/21/2020    ANIONGAP 8.9 08/21/2020     Lab Results   Component Value Date    IRON 87 06/12/2020    TIBC 213 (L) 06/12/2020    FERRITIN 1,189.50 (H) 06/12/2020     Lab Results   Component Value Date    WMDVVDLQ40 1,596 (H) 06/18/2019     Lab Results   Component Value Date    FOLATE >20.00 06/18/2019      Ref Range & Units 2wk ago   IgG 700-1,600 mg/dL 606 Abnormally low     IgM 40 - 230 mg/dL 45    IgA 70 - 400 mg/dL 201        Erythropoietin 2.6 - 18.5 mIU/mL 34.9 Abnormally high         Bone marrow biopsy 6/26/2019  Final Diagnosis   CONSULTANT DIAGNOSIS:  Hypercellular Bone Marrow (60% total marrow cellularity) demonstrating significant dysplasia present in all three hematopoietic cell lines with 2-3+ stainable hemosiderin and 14% ringed sideroblasts.       No metastatic carcinoma, granulomas, vasculitis, viral inclusions, increase in myeloblasts, plasma cells or malignant lymphoma.       CONSULTANT COMMENT:  These changes would be most supportive of a low grade myelodysplasia; however, this must be correlated with appropriate B12 and RBC folate levels as well as pending karyotype for final disease classification.       Assessment/Plan   1. Myelodysplasia with chronic anemia and leukopenia.  Bone marrow biopsy  consistent with low-grade myelodysplasia with refractory anemia with ringed sideroblasts.  Blasts in the marrow were less than 5%.  Marrow karyotype shows a 7 q. deletion.  2.  Severe emphysema/COPD.  Patient requires oxygen  around-the-clock  3.  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  Most recently hospitalized for pneumonia 6/23/2020-6/28/2020.  He received a 20 g dose of IVIG during his hospitalization on 6/25/2020.   4.  History of non-small cell lung cancer from the left lower lobe resected by Dr. Hart Linker 2013.  No definite evidence of recurrent malignancy noted on the most recent CT scan of the chest from 8/5/2019 as noted above.  5.  Atrial fibrillation which further impacts his symptomatology.  He now is taking Cardizem twice daily and Lopressor.  He also is on Eliquis anticoagulation.  His stools have been tested for occult blood and remain negative.  Because he is having significant purpura with a platelet count of 58,000 we plan to decrease his Eliquis to 2.5 mg twice daily.    Plan  1.  Patient will receive his  IVIG infusion at 30 g today in the office.  We will now plan to deliver his IVIG every 6 weeks to coordinate with his Reblozyl injection.  2.  He will also receive his every 3-week  Reblozyl injection   3.  For now, he will continue to have weekly blood counts and receive transfusion as needed as an outpatient.  4.  We will decrease his Eliquis from 5 mg twice daily to 2.5 mg twice daily due to his severe thrombocytopenia and significant purpura.  5.  MD follow-up in the office in 6 weeks when he should be ready to receive another shot of Reblozyl and his next IVIG infusion.  If he is not showing more of a response we may need to escalate his dose of Reblozyl.

## 2020-08-29 NOTE — CONSULTS
Date of Hospital Visit: 20  Encounter Provider: Catrachito Rdz MD  Place of Service: [facility]  Patient Name: Ankit Mack Sr.  :1940  Referral Provider: Bull Cruz MD    Chief complaint: SOA    History of Present Illness     Mr Ankit Mack is a 79 year old man with oxygen dependent COPD (4L), paroxysmal atrial fibrillation, descending TAA, chronic dCHF, mild AS, obesity, and myelodysplastic syndrom who presents with several days of a cough/sore throat and then acutely worsening dyspnea.      He established care with me in August of this year.  At the time, he was on diltiazem CD for his PAF, but it became too expensive, so I switched him to metoprolol.  In October, he was seen in our office for an irregular heart rhythm; he was noted to be in SR with PACs.  We added short acting diltiazem and he states that this helped immensely!    He was admitted with a mild exacerbation of diastolic CHF after [facility].  He had been doing pretty well until a few days ago.  He developed a cough and and a sore throat and started some amoxicillin that he had at home.  Last night, he became much more short of breath and hypoxic so he came to the ED.   CXR showed vascular congestion.  He was given antibiotics, and his oral furosemide was increased from 40mg daily to BID.  This morning he states he feels well and wants to go home.      ECHO 19     · The left ventricular cavity is mildly dilated.  · Estimated EF appears to be in the range of 56 - 60%  · Left ventricular wall thickness is consistent with mild-to-moderate concentric hypertrophy.  · Normal right ventricular systolic function noted. Right ventricular cavity is mildly dilated.  · Left atrial cavity size is moderately dilated.  · Mild aortic valve stenosis is present.  · Aortic valve maximum pressure gradient is 15.0 mmHg. Aortic valve mean pressure gradient is 10.0 mmHg  · Mild mitral valve  regurgitation is present.  · The ascending aorta is mildly dilated at 3.7 cm.  · There is no evidence of pericardial effusion.      Past Medical History:   Diagnosis Date   • Anemia    • Anxiety    • Atrial flutter (CMS/[facility])    • Chronic diastolic congestive heart failure (CMS/[facility]) 12/19/2018   • Chronic respiratory failure with hypoxia (CMS/[facility])    • COPD (chronic obstructive pulmonary disease) (CMS/[facility])    • Essential hypertension    • GERD (gastroesophageal reflux disease)    • H/O complete eye exam 06/2018   • History of pulmonary function tests 06/10/2014   • Hyperglycemia    • Hyperlipidemia    • Lung cancer (CMS/[facility]) 2012    Left   • MDS (myelodysplastic syndrome) (CMS/[facility])    • Nonrheumatic aortic valve stenosis     mild 5/2019   • Obesity    • Osteoarthritis, knee    • Paroxysmal atrial fibrillation (CMS/[facility])    • Prostate cancer (CMS/[facility]) 2000   • Seizures (CMS/[facility])    • Sleep apnea    • Thoracic aortic aneurysm without rupture (CMS/[facility])     s/p stenting of descending thoracic aneurysm; the ascending aorta measured 3.7cm in 2019   • Type 2 diabetes mellitus without complication, without long-term current use of insulin (CMS/[facility])        Past Surgical History:   Procedure Laterality Date   • ABDOMINAL AORTIC ANEURYSM REPAIR  2010    Dr. Adarsh Dennis   • CARDIAC CATHETERIZATION Left 02/06/2004   • CATARACT EXTRACTION  10/2014    also 11/14   • COLONOSCOPY     • ENDOVASCULAR THORACIC ANEURYSM REPAIR     • JOINT REPLACEMENT Left 10/2010    anette; Dr. Wolf   • JOINT REPLACEMENT Right 09/2011    knee; Dr. Wolf   • LUNG LOBECTOMY Left 01/16/2012    LLL; Dr. Mendoza   • LUNG LOBECTOMY Left 2012   • PROSTATE SURGERY  2000    Dr. Naranjo   • VASCULAR SURGERY  05/2009    TEVAR of thoracic aortic aneurysm   • VASCULAR SURGERY  12/15/2003    Left carotid subclavian bypass graft, ligation of proximal left subclavian following carotid subclavian bypass. Right femoral sheath. Arch angiography with descending thoracic and  abdominal aortography    • VASCULAR SURGERY  11/17/2003    Selective catheterization right vertebral artery, right subclavian artery, left subclavian artery, left vertebral artery.     • VASCULAR SURGERY  06/05/2002    Tracheostomy, bronchoscopy   • VASCULAR SURGERY  05/21/2002    Thoracic arteriogram and abdominal aortogram       Prior to Admission medications    Medication Sig Start Date End Date Taking? Authorizing Provider   albuterol (PROVENTIL HFA;VENTOLIN HFA) 108 (90 Base) MCG/ACT inhaler Inhale 2 puffs Every 4 (Four) Hours As Needed for Wheezing.   Yes Risa Antunez MD   albuterol (PROVENTIL) (2.5 MG/3ML) 0.083% nebulizer solution Take 2.5 mg by nebulization 4 (Four) Times a Day.   Yes Risa Antunez MD   ALPRAZolam (XANAX) 1 MG tablet Take 1 tablet by mouth At Night As Needed for Anxiety. 10/14/19  Yes Gopi Lagos MD   apixaban (ELIQUIS) 5 MG tablet tablet Take 1 tablet by mouth Every 12 (Twelve) Hours. 9/23/19  Yes Catrachito Rdz MD   budesonide (PULMICORT) 0.5 MG/2ML nebulizer solution Take 2 mL by nebulization 2 (Two) Times a Day. 11/21/18  Yes Fred Alejandro MD   dilTIAZem (CARDIZEM) 60 MG tablet TAKE 1 TABLET BY MOUTH TWICE A DAY 11/18/19  Yes Sunshine Del Cid APRN   folic acid (FOLVITE) 1 MG tablet Take 1 mg by mouth Daily.   Yes ProviderRisa MD   furosemide (LASIX) 40 MG tablet Take 1 tablet by mouth 2 (Two) times a day. Take twice daily for one week and then daily.  Patient taking differently: Take 40 mg by mouth Daily. 12/5/19  Yes Veronika Rosen MD   metFORMIN (GLUCOPHAGE) 1000 MG tablet Take 1 tablet by mouth Daily With Breakfast.  Patient taking differently: Take 1,000 mg by mouth Every Night. 10/14/19  Yes Gopi Lagos MD   metoprolol tartrate (LOPRESSOR) 100 MG tablet TAKE ONE AND ONE - HALF TABLETS BY MOUTH TWICE DAILY 8/30/19  Yes Catrachito Rdz MD   atorvastatin (LIPITOR) 10 MG tablet TAKE 1 TABLET BY MOUTH DAILY FOR CHOLESTEROL 12/30/19   Demond  "MD Gopi   benazepril (LOTENSIN) 20 MG tablet Take 20 mg by mouth Daily. 13   Provider, MD Risa   ipratropium (ATROVENT) 0.02 % nebulizer solution Take 500 mcg by nebulization Every 4 (Four) Hours As Needed for Wheezing or Shortness of Air.    Provider, MD Risa   Revefenacin (YUPELRI) 175 MCG/3ML solution Inhale 3 mL (1 vial) by nebulization Daily. 19   Fred Alejandro MD       Social History     Socioeconomic History   • Marital status: Single     Spouse name: Not on file   • Number of children: Not on file   • Years of education: Not on file   • Highest education level: Not on file   Occupational History     Employer: RETIRED   Tobacco Use   • Smoking status: Former Smoker     Packs/day: 1.00     Types: Cigarettes     Last attempt to quit: 4/15/2010     Years since quittin.7   • Smokeless tobacco: Never Used   Substance and Sexual Activity   • Alcohol use: No     Frequency: Never     Comment: No caffeine use   • Drug use: No   • Sexual activity: Defer       Family History   Problem Relation Age of Onset   • Cancer Mother    • COPD Mother    • Cancer Father         lung   • Colon cancer Father    • Cancer Other         colon   • Diabetes Other    • Emphysema Other    • Hypertension Other    • Kidney disease Other    • Lung cancer Other        Review of Systems   Constitutional: Positive for fatigue.   HENT: Positive for sore throat.    Respiratory: Positive for cough and shortness of breath.    Musculoskeletal: Positive for arthralgias.   All other systems reviewed and are negative.       Objective:     Vitals:    20 0314 20 0742 20 0926 20 0931   BP:  122/65     BP Location:  Left arm     Patient Position:  Lying     Pulse: 83 86 100 104   Resp:    Temp:  98.4 °F (36.9 °C)     TempSrc:  Oral     SpO2: 96% 99% 98% 98%   Weight:       Height:         Body mass index is 32.71 kg/m².  Flowsheet Rows      First Filed Value   Admission Height  177.8 cm (70\") " Documented at 01/01/2020 2234   Admission Weight  99.7 kg (219 lb 12.8 oz) Documented at 01/01/2020 2306          Physical Exam   Constitutional: He is oriented to person, place, and time. He has a sickly appearance.   obese   HENT:   Head: Normocephalic.   Nose: Nose normal.   Mouth/Throat: Oropharynx is clear and moist.   Eyes: Pupils are equal, round, and reactive to light. Conjunctivae and EOM are normal.   Neck: Normal range of motion.   Cannot assess JVD due to body habitus   Cardiovascular: Normal rate, regular rhythm and intact distal pulses.   Murmur heard.   Systolic murmur is present with a grade of 2/6.  Pulmonary/Chest: Effort normal. He has decreased breath sounds.   Abdominal: Soft. There is no tenderness.   Cannot feel organs or aorta   Musculoskeletal: Normal range of motion. He exhibits no edema.   Neurological: He is alert and oriented to person, place, and time. No cranial nerve deficit.   Skin: Skin is warm and dry. No erythema.   Psychiatric: He has a normal mood and affect. His behavior is normal. Thought content normal.   Vitals reviewed.              Lab Review:                Results from last 7 days   Lab Units 01/02/20  0554   SODIUM mmol/L 143   POTASSIUM mmol/L 4.3   CHLORIDE mmol/L 103   CO2 mmol/L 26.6   BUN mg/dL 27*   CREATININE mg/dL 1.27   GLUCOSE mg/dL 205*   CALCIUM mg/dL 8.9     Results from last 7 days   Lab Units 01/01/20  2245   TROPONIN T ng/mL 0.031*     Results from last 7 days   Lab Units 01/02/20  0437   WBC 10*3/mm3 6.42   HEMOGLOBIN g/dL 8.6*   HEMATOCRIT % 25.4*   PLATELETS 10*3/mm3 162     Results from last 7 days   Lab Units 01/01/20  2245   INR  1.30*                                     I personally viewed and interpreted the patient's EKG/Telemetry data -- SR vs ectopic rhythm, RBBB, LAFB, no change from prior    Assessment/Plan:     1.  Acute on chronic dCHF    2.  Presumed PNA    3.  Morbid obesity    4.  Oxygen dependent COPD    5.  DOMINGO    At this point he  appears to be fairly stable.  He did decompensate quickly and has vascular congestion.  I agree with furosemide 40mg po BID.  He has an appointment with us on January 27 and he should stay on BID dosing until that time.    He feels comfortable going home today and I'm okay with that too.              pregnant

## 2020-08-31 NOTE — TELEPHONE ENCOUNTER
I returned a call to patients daughter, Amanda Colin on Friday, 8/28/20.  Pts daughter concerned that during pts IVIG treatment today at  he ran out of O2 in the tank.  He became short of air and called his daughter that was waiting in car to bring him his inhalers.  I discussed with pts daughter that the O2 tanks that we use do not have an alarm when they are low/empty.  I encouraged daughter to instruct her father to reach out to the nurses that he feels like his tank is empty and they could bring him another one.  I also reviewed with her that if he ever feels bad during his infusion, we want him to get his nurse.  He could be having a reaction to his IVIG and we need to the nurse to evaluate.  I discussed with her that when he comes back in our office the nursing staff will review with him symptoms of reaction and to call for a nurse if he ever feels bad.  I advised I would review with the nursing staff and MA to watch the O2 tanks to make sure they are full and working appropriately.  Daughter expressed understanding.

## 2020-09-04 NOTE — PROGRESS NOTES
I reviewed labs with Mr Mack today. His hgb has dropped further to 7.6.  He denies bleeding but continues to bruise easily.  He is quite symptomatic and we have ordered 2 units of packed red blood cells to be scheduled in the next day or 2.  I have asked the patient to call the office with any new or worsening symptoms before his next scheduled visit next Friday, September 11, 2020.    Lab Results   Component Value Date    WBC 2.75 (L) 09/04/2020    HGB 7.6 (L) 09/04/2020    HCT 24.6 (L) 09/04/2020    MCV 96.5 09/04/2020    PLT 54 (L) 09/04/2020

## 2020-09-11 NOTE — NURSING NOTE
Patient arrived via wheelchair for lab and RN Review. Patient has extensive bruising noted and no active bleeding. Patient received 2 units PRBC os 9/5/2020. Discussed labs with Amna FAJARDO and patient is to hold his Eliquis 2.5 mg BID for the next week until his next appointment. Patient v/u that if he sees increased bruising or active bleeding or experiences a fall he is to go to the nearest ER immediately. Patient's daughter was also given all of this information at patient's side. Patient discharged via wheelchair with daughter. Patient v/u if any concerns before the next visit to contact the physician.   Lab Results   Component Value Date    WBC 2.81 (L) 09/11/2020    HGB 8.8 (L) 09/11/2020    HCT 27.3 (L) 09/11/2020    MCV 92.2 09/11/2020    PLT 39 (C) 09/11/2020

## 2020-09-11 NOTE — TELEPHONE ENCOUNTER
Pt's daughter called to inform you that Dr. Gifford has discontinued pt's Eliquis due to low platelets.  Dr. Hayes cut pt's dose down to 2.5 mg last week, but took him off all together.  Pt will go back next week for more labs.

## 2020-09-18 NOTE — NURSING NOTE
Lab Results   Component Value Date    WBC 2.36 (L) 09/18/2020    HGB 8.0 (L) 09/18/2020    HCT 24.1 (L) 09/18/2020    MCV 90.3 09/18/2020    PLT 31 (C) 09/18/2020     Reviewed Hgb with COTY Mckee.  Reblozyl dose increased to 1.33mg/kg and 2 units PRBC's ordered per v/o.  T&C done and pt scheduled to receive tomorrow.  Pt knows to call with concerns before his next appt.

## 2020-09-25 NOTE — NURSING NOTE
Pt here today for CBC review. Hgb is stable at 8.9. However, platelets are lower today at 24K. The pt denies bleeding and states that the bruising is even a little better since he is off the Eliquis. Nurse s/w CSOTTY Dennis who suggested the pt return on Tuesday, September 29th for a repeat CBC to make sure the platelets have not further declined. Nurse informed both the pt and his daughter (via telephone). Both v/u.     Pt and dtr explained that the pt went to [facility] yesterday for a CT with contrast for followup of his aortic aneurysm. However, this was cancelled due to the pt's recent platelets of 31K. The pt's daughter explained that they will contact them back after reviewing his last CT to determine how to proceed.

## 2020-09-29 NOTE — NURSING NOTE
"Here for CBC for review of platelet count which had dropped previous visit.  Reports fatigue but \"that's nothing new\".   Lab Results   Component Value Date    WBC 2.55 (L) 09/29/2020    HGB 8.0 (L) 09/29/2020    HCT 24.1 (L) 09/29/2020    MCV 91.6 09/29/2020    PLT 23 (C) 09/29/2020     Previous platelet count was 24K and Hgb was 8.9. Todays lab values discussed tith Dr. Hayes who states that he is ok to \"hold off on transfusion at this time and to have Mr. Mack return in 1 week for CBC. Mr. Mack is in aggrement with no transfusion and knows to call for increase in SOA, fatigue, dizziness. Mr. Mack has an appointment for this Friday 10- and prefers to keep it and then his next appointment will be the following Friday at which time he is to see Dr. Hayes. Also noted that Mr. Mack received Reblozyl 09-18 as it is due q 3 weeks.   "

## 2020-10-02 NOTE — PROGRESS NOTES
2 units PRBCs with referral to [facility] for transfusion per communication from LENA Hensley RN,  MADISON Hayes

## 2020-10-02 NOTE — NURSING NOTE
Pt here for CBC review. He states that he is feeling relatively well and has no new complaints. CBC was reviewed with Dr. Hayes who wants the pt to receive 2 units of blood this weekend. The pt was informed. He was scheduled for this at [facility] on Sunday, October 4th at 8am. He will follow-up as scheduled in 1 week for MD visit and treatment to follow.     Lab Results   Component Value Date    WBC 1.75 (C) 10/02/2020    HGB 7.9 (L) 10/02/2020    HCT 24.5 (L) 10/02/2020    MCV 94.2 10/02/2020    PLT 24 (C) 10/02/2020

## 2020-10-06 NOTE — TELEPHONE ENCOUNTER
PT CALLED REPORTING HIS TEMP IS 92. TOLD PT THAT HE PROB NEEDS TO CHECK OUT HIS THERMOMETER, CHANGE BATTERIES. PT V/U.    Price (Use Numbers Only, No Special Characters Or $): 80 Detail Level: Detailed Tattoo Color Treated: Black Laser Type: Q-switched Nd:Yag 1064nm Spot Size In Mm: 3 Area In Cm^2: 1 Fluence: 5.5 plan for hd. Wavelength Used (Optional - Include Units): 1064 Anesthesia Type: 1% lidocaine with epinephrine Anesthesia Volume In Cc: 0 Pre-Procedure Text: The treatment areas were cleaned with alcohol followed by hypochlorous 0.01% soon.  Ice was applied immediately before and after the treatment.  The tattoo was then treated with the laser parameters noted above. Post-Procedure Text: ice applied. Post care reviewed with patient. Endpoint Text: Immediate endpoint: skin whitening. Spot Size In Mm: 2 Laser Type: Q-switched Alexandrite 755nm Consent: Written consent obtained, risks reviewed including but not limited to crusting, scabbing, blistering, scarring, darker or lighter pigmentary change, systemic reactions, ulceration, incidental hair removal, bruising, and/or incomplete removal. Post-Care Instructions: I reviewed with the patient in detail post-care instructions. Patient should apply vaseline twice daily to tattoo and keep it covered with a non-stick adhesive dressing.

## 2020-10-06 NOTE — TELEPHONE ENCOUNTER
Caller: TATIANA GARIBAY    Relationship to patient: SELF    Best call back number: [phone]    PT STATES HIS TEMPERATURE IS 92 DEGREES, AND NEEDS TO SPEAK TO SOMEONE.

## 2020-10-09 NOTE — PROGRESS NOTES
Subjective     REASON FOR CONSULTATION:    1.  Myelodysplasia (refractory anemia with ring sideroblasts) diagnosed on bone marrow biopsy 6/26/2019.  Unresponsive to Procrit.  Patient is now receiving Reblozyl Q3wks  2. Lung mass on CT chest 11/17/2018  3.  Hypogammaglobulinemia.  First dose of IV immunoglobulin 40 g delivered 6/25/2019  4.  Hospitalized for right lower lobe pneumonia January 2020  5.  Anticoagulation with Eliquis for atrial fibrillation.    HISTORY OF PRESENT ILLNESS:  The patient is a 80 y.o. year old male who was referred to us by his primary care office for evaluation of macrocytic anemia.  His MCV has been markedly elevated around 107-108..  He was hospitalized from 5/25/2019 through 5/31/2019 with COPD exacerbation and CHF.  During the hospitalization his hemoglobin was around 9.5 g/dL with an MCV as high as 113.    With his initial office consult on 6/18/2019 we ordered additional lab studies to try to further define the etiology of his anemia.  He was due to return to our office for follow-up to review those results but ended up being admitted to the hospital from 6/20/2019 to 7/1/2019 for treatment of pneumonia and COPD exacerbation.  He was seen for hematology consultation as an inpatient by Dr. Gupta on 6/25/2019.  Bone marrow biopsy was ordered which was performed on 6/26/2019 and showed dysplastic changes and ring sideroblasts.  Blasts were less than 5%.  Chromosome karyotype is pending.    Also during the hospitalization he was found to be profoundly hypogammaglobulinemic with an IgG level of 365.  He had a history of recurring pulmonary infections and during the admission received 1 dose of IV immunoglobulin at 40 g total dose on 6/25/2019.    He returns today for follow-up and further IVIG infusion and   He had been on a maximum dose of Procrit of 60,000 units subcu weekly and was still  requiring some transfusion once or twice a month. Therefore, we elected to switch his treatment  "to Reblozyl and he received his first dose on 8/7/2020.  He returns today for reevaluation and his 4th shot of Reblozyl.  He also required transfusion of 2 units packed red blood cells on 10/4/2020.  His hemoglobin today only improved from 7.9-8.5.  He reports he did not feel any better after the transfusion.    He is having quite a bit of bruising.  He has been on Eliquis 5 mg twice daily for A. fib.  With his platelet count being down to 22,000 and at 80 years old we are holding Eliquis for now.     History of Present Illness     Past medical history, family history, and social history reviewed and unchanged.    ALLERGIES:    Allergies   Allergen Reactions   • Erythromycin Itching        Review of Systems   Constitutional: Positive for fatigue. Negative for activity change, chills and fever.   HENT: Negative for mouth sores, trouble swallowing and voice change.    Eyes: Negative for pain and visual disturbance.   Respiratory: Positive for shortness of breath. Negative for cough and wheezing.    Cardiovascular: Positive for leg swelling. Negative for chest pain and palpitations.   Gastrointestinal: Positive for abdominal pain and nausea. Negative for constipation, diarrhea and vomiting.   Genitourinary: Negative for difficulty urinating, frequency and urgency.   Musculoskeletal: Positive for arthralgias and gait problem. Negative for joint swelling.   Skin: Negative for rash.   Neurological: Positive for weakness. Negative for dizziness, seizures and headaches.   Hematological: Negative for adenopathy. Bruises/bleeds easily.   Psychiatric/Behavioral: Negative for behavioral problems and confusion. The patient is not nervous/anxious.      Unchanged on 10/9/2020    Objective     Vitals:    10/09/20 0848   BP: 167/83   Pulse: 63   Resp: 18   Temp: 98.4 °F (36.9 °C)   TempSrc: Skin   SpO2: 97%   Weight: 93.3 kg (205 lb 9.6 oz)   Height: 175.3 cm (69.02\")   PainSc: 0-No pain     Current Status 10/9/2020   ECOG score 1 "       Physical Exam   Constitutional: He is oriented to person, place, and time. He appears well-developed. No distress.   Chronically ill appearing obese gentleman wearing nasal oxygen   HENT:   Head: Normocephalic.   Eyes: Pupils are equal, round, and reactive to light. Conjunctivae are normal. No scleral icterus.   Neck: Normal range of motion. Neck supple. No JVD present. No thyromegaly present.   Cardiovascular: Normal rate. An irregular rhythm present. Exam reveals distant heart sounds. Exam reveals no gallop and no friction rub.   No murmur heard.  Pulmonary/Chest: Effort normal. He has decreased breath sounds in the right lower field and the left lower field. He has rales.   Abdominal: Soft. He exhibits distension. He exhibits no mass. There is no abdominal tenderness.   Musculoskeletal: Normal range of motion. No deformity.   Lymphadenopathy:     He has no cervical adenopathy.   Neurological: He is alert and oriented to person, place, and time. He has normal reflexes. No cranial nerve deficit.   Skin: Skin is warm and dry. Purpura and rash noted. No erythema.   Psychiatric: His behavior is normal. Judgment normal.   Repeated and unchanged on 10/9/2020    RECENT LABS:  Hematology WBC   Date Value Ref Range Status   10/09/2020 1.50 (C) 3.40 - 10.80 10*3/mm3 Preliminary   01/01/2020 7.15 3.40 - 10.80 10*3/mm3 Final   05/15/2019 3.79 3.40 - 10.80 10*3/mm3 Final   02/08/2018 4.11 (L) 4.5 - 11.0 10*3/uL Final     RBC   Date Value Ref Range Status   10/09/2020 2.91 (L) 4.14 - 5.80 10*6/mm3 Preliminary   05/15/2019 3.05 (L) 4.14 - 5.80 10*6/mm3 Final   02/08/2018 3.65 (L) 4.5 - 5.9 10*6/uL Final     Hemoglobin   Date Value Ref Range Status   10/09/2020 8.5 (L) 13.0 - 17.7 g/dL Preliminary   02/08/2018 13.0 (L) 13.5 - 17.5 g/dL Final     Hematocrit   Date Value Ref Range Status   10/09/2020 27.4 (L) 37.5 - 51.0 % Preliminary   02/08/2018 37.5 (L) 41.0 - 53.0 % Final     Platelets   Date Value Ref Range Status    10/09/2020 22 (C) 140 - 450 10*3/mm3 Preliminary   02/08/2018 164 140 - 440 10*3/uL Final        Lab Results   Component Value Date    GLUCOSE 157 (H) 08/21/2020    CALCIUM 9.0 08/21/2020     08/21/2020    K 4.6 08/21/2020    CO2 29.1 (H) 08/21/2020     08/21/2020    BUN 33 (H) 08/21/2020    CREATININE 1.62 (H) 08/21/2020    EGFRIFAFRI 70 05/15/2019    EGFRIFNONA 41 (L) 08/21/2020    BCR 20.4 08/21/2020    ANIONGAP 8.9 08/21/2020     Lab Results   Component Value Date    IRON 87 06/12/2020    TIBC 213 (L) 06/12/2020    FERRITIN 1,189.50 (H) 06/12/2020     Lab Results   Component Value Date    DRFRKVID62 1,596 (H) 06/18/2019     Lab Results   Component Value Date    FOLATE >20.00 06/18/2019 8/28/2020  IgG   700-1,600 mg/dL 889    IgM   40 - 230 mg/dL 54    IgA   70 - 400 mg/dL 444High         Bone marrow biopsy 6/26/2019  Final Diagnosis   CONSULTANT DIAGNOSIS:  Hypercellular Bone Marrow (60% total marrow cellularity) demonstrating significant dysplasia present in all three hematopoietic cell lines with 2-3+ stainable hemosiderin and 14% ringed sideroblasts.       No metastatic carcinoma, granulomas, vasculitis, viral inclusions, increase in myeloblasts, plasma cells or malignant lymphoma.       CONSULTANT COMMENT:  These changes would be most supportive of a low grade myelodysplasia; however, this must be correlated with appropriate B12 and RBC folate levels as well as pending karyotype for final disease classification.       Assessment/Plan   1. Myelodysplasia with chronic pancytopenia.  He currently is red cell transfusion dependent and failed Procrit therapy has now been switched to a trial of Reblozyl. Bone marrow biopsy  consistent with low-grade myelodysplasia with refractory anemia with ringed sideroblasts.  Blasts in the marrow were less than 5%.  Marrow karyotype shows a 7 q. deletion.  2.  Severe emphysema/COPD.  Patient requires oxygen around-the-clock  3.  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  Most recently hospitalized for pneumonia 6/23/2020-6/28/2020.  He receives a 30 g dose of IVIG and is due for treatment today.   4.  History of non-small cell lung cancer from the left lower lobe resected by Dr. Hart Linker 2013.  No definite evidence of recurrent malignancy noted on the most recent CT scan of the chest from 8/5/2019 as noted above.  5.  Atrial fibrillation which further impacts his symptomatology.  He now is taking Cardizem twice daily and Lopressor.  He had been on Eliquis but this is currently on hold due to his thrombocytopenia.    Plan  1.  Patient will receive his  IVIG infusion at 30 g today in the office.  We will now plan to deliver his IVIG every 6 weeks to coordinate with his Reblozyl injection.  2.  He will also receive his every 3-week  Reblozyl injection.  Today's dose will be escalated slightly.  This is cycle #4.  3.  For now, he will continue to have weekly blood counts and receive transfusion as needed as an outpatient.  4.  We will continue to hold Eliquis for now.  5.  MD follow-up in the office in 6 weeks when he should be ready to receive another shot of Reblozyl and his next IVIG infusion.  If he is not showing more of a response we may need to discuss stopping Reblozyl and providing strictly supportive care.

## 2020-10-09 NOTE — NURSING NOTE
Seen per Dr. Hayes; Mr Mack concerned re: elevated B/P; this was discussed with Dr. Hayes and clonidine 0.1 mg ordered, given and with benefit. B/P 's recorded on vital sign sheet.

## 2020-10-09 NOTE — PROGRESS NOTES
Therapy plan for IVIG discontinued and supportive plan entered using exact same orders per request from nursing V.O. Dr. Hayes

## 2020-10-12 NOTE — NURSING NOTE
Patient is tolerating IVIG without difficulty. Verbal order per Dr. Morrow* to continue IVIG as ordered.    This note post dated.

## 2020-10-16 NOTE — NURSING NOTE
Patient arrived via wheelchair for lab and RN review. Patient's labs reviewed with Deirdre FAJARDO and patient set up for 2 units PRBC's . No transfusion of platelets at this time. Patient v/u that if he has active bleeding to seek emergent care. Patient discharged via wheelchair with family at side.

## 2020-10-19 NOTE — TELEPHONE ENCOUNTER
"Pt's daughter calling because Dr. Hayes told them to call with any issues including any w/ his PCP. She states pt ran out of xanax over the weekend. Pt was unable to sleep last night and is a \"hot mess\" today. They've contacted Dr. Lagos's office as he's the prescriber of pt's Xanax but daughter reports his staff told them they were \"too busy to discuss it\" with Dr. Lagos and that pt would have to wait until his appt tomorrow. She is concerned that pt will be unable to sleep again tonight and is worried about the effects of this. They are wondering if Dr. Hayes would send in a couple of tablets until pt is able to see Dr. Lagos tomorrow and obtain a full refill. Message sent to Dr. Hayes, daughter v/u.  "

## 2020-10-19 NOTE — TELEPHONE ENCOUNTER
Pt's daughter called and reports her dad is very anxious about getting his Xanax for bed tonight. She asked that he be called and reassured. Contacted pt and he is anxious about this stating he hasn't slept in over 36 hours. R/w Dr. Hayes, okay given to send in 3 tablets. Script routed to Dr. Hayes and pt v/u.

## 2020-10-20 NOTE — PROGRESS NOTES
Subjective   Ankit Mack Sr. is a 80 y.o. male.     CC: Video Visit for Medical Management    History of Present Illness     Chief Complaint:   Chief Complaint   Patient presents with   • Hyperlipidemia     dox visit - med refill    • Diabetes       Ankit Mack Sr. 80 y.o. male who presents today for Medical Management of the below listed issues and medication refills.  he has a problem list of   Patient Active Problem List   Diagnosis   • Anxiety   • Hyperlipidemia   • Osteoarthritis, knee   • Essential hypertension   • Cancer of lower lobe of lung (CMS/HCC)   • DOMINGO on CPAP   • Non-seasonal allergic rhinitis   • Pancytopenia (CMS/HCC)   • Aneurysm of thoracic aorta (CMS/HCC)   • Paroxysmal atrial fibrillation (CMS/HCC)   • Thrombocytopenia (CMS/HCC)   • Type 2 diabetes mellitus without complication, without long-term current use of insulin (CMS/HCC)   • History of COPD   • Macrocytic anemia   • Hypogammaglobulinemia (CMS/HCC)   • Myelodysplasia (myelodysplastic syndrome) (CMS/HCC)   • Class 1 obesity due to excess calories with serious comorbidity and body mass index (BMI) of 33.0 to 33.9 in adult   • Nonrheumatic aortic valve stenosis   • Hypoxemia   • Hypogammaglobulinemia (CMS/HCC)   • Multifocal pneumonia   • Chronic diastolic CHF (congestive heart failure) (CMS/HCC)   • COPD (chronic obstructive pulmonary disease) (CMS/HCC)   • Chronic respiratory failure with hypoxia (CMS/HCC)   • DNR (do not resuscitate)   • Sepsis due to pneumonia (CMS/HCC)   .  Since the last visit, he has been seeing his hematologist for Myelodysplasia and recent labs shows a severe thrombocytopenia and Hg of 8.0.    he has been compliant with   Current Outpatient Medications:   •  atorvastatin (LIPITOR) 10 MG tablet, Take 1 tablet by mouth Daily. For cholesterol, Disp: 90 tablet, Rfl: 1  •  metFORMIN (GLUCOPHAGE) 1000 MG tablet, Take 1 tablet by mouth Daily With Breakfast., Disp: 90 tablet, Rfl: 1  •  albuterol (PROVENTIL  HFA;VENTOLIN HFA) 108 (90 Base) MCG/ACT inhaler, Inhale 2 puffs Every 4 (Four) Hours As Needed for Wheezing., Disp: , Rfl:   •  albuterol (PROVENTIL) (2.5 MG/3ML) 0.083% nebulizer solution, Take 2.5 mg by nebulization 4 (Four) Times a Day., Disp: , Rfl:   •  ALPRAZolam (XANAX) 1 MG tablet, Take 1 tablet by mouth At Night As Needed for Anxiety., Disp: 90 tablet, Rfl: 0  •  dilTIAZem (CARDIZEM) 60 MG tablet, TAKE 1 TABLET BY MOUTH TWICE A DAY, Disp: 180 tablet, Rfl: 1  •  folic acid (FOLVITE) 1 MG tablet, Take 1 mg by mouth Daily., Disp: , Rfl:   •  furosemide (LASIX) 40 MG tablet, TAKE 1 TABLET BY MOUTH EVERY DAY, Disp: 90 tablet, Rfl: 1  •  ipratropium (ATROVENT) 0.02 % nebulizer solution, Take 500 mcg by nebulization Every 4 (Four) Hours As Needed for Wheezing or Shortness of Air., Disp: , Rfl:   •  metoprolol tartrate (LOPRESSOR) 100 MG tablet, TAKE 1 AND 1/2 TABLETS BY MOUTH TWICE DAILY, Disp: 270 tablet, Rfl: 1  •  nystatin (MYCOSTATIN) 832391 UNIT/ML suspension, Swish and swallow 5 mL 4 (Four) Times a Day., Disp: 473 mL, Rfl: 0  •  O2 (OXYGEN), Inhale 4 L/min Continuous., Disp: , Rfl:   •  potassium chloride (K-DUR) 10 MEQ CR tablet, TAKE 1 TABLET BY MOUTH EVERY DAY, Disp: 90 tablet, Rfl: 1  •  Revefenacin (YUPELRI) 175 MCG/3ML solution, Inhale 3 mL (1 vial) by nebulization Daily., Disp: 90 mL, Rfl: 1    Current Facility-Administered Medications:   •  albuterol (PROVENTIL) nebulizer solution 0.083% 2.5 mg/3mL, 2.5 mg, Nebulization, Once, Beto Hayes MD  •  ipratropium (ATROVENT) nebulizer solution 0.0002 mg, 0.2 mcg, Nebulization, Once, Beto Hayes MD  •  ipratropium-albuterol (DUO-NEB) nebulizer solution 3 mL, 3 mL, Nebulization, 4x Daily - RT, Gopi Aguilar MD, 3 mL at 06/21/20 1048    Facility-Administered Medications Ordered in Other Visits:   •  acetaminophen (TYLENOL) tablet 650 mg, 650 mg, Oral, Once, Beto Hayes MD  •  diphenhydrAMINE (BENADRYL) capsule 25 mg, 25 mg, Oral, Once,  Beto Hayes MD  •  sodium chloride 0.9 % infusion 250 mL, 250 mL, Intravenous, PRN, Beto Hayes MD.  he denies medication side effects.    All of the other chronic condition(s) listed above are stable w/o issues.    There were no vitals taken for this visit.    Results for orders placed or performed in visit on 10/17/20   Type and screen    Specimen: Blood   Result Value Ref Range    ABO Type O     RH type Positive     Antibody Screen Negative     T&S Expiration Date 10/19/2020 11:59:59 PM    Prepare RBC, 2 Units   Result Value Ref Range    Product Code H1994D69     Unit Number [id number]     UNIT  ABO O     UNIT  RH POS     Crossmatch Interpretation Compatible     Dispense Status PT     Blood Expiration Date 202011172359     Blood Type Barcode 5100     Product Code R0975S19     Unit Number [id number]     UNIT  ABO O     UNIT  RH POS     Crossmatch Interpretation Compatible     Dispense Status PT     Blood Expiration Date 202011182359     Blood Type Barcode 5100            The following portions of the patient's history were reviewed and updated as appropriate: allergies, current medications, past family history, past medical history, past social history, past surgical history and problem list.    Review of Systems   Constitutional: Negative for activity change, chills and fever.   Respiratory: Negative for cough.    Cardiovascular: Negative for chest pain.   Psychiatric/Behavioral: Negative for dysphoric mood.       Objective   Physical Exam  Constitutional:       General: He is not in acute distress.     Appearance: He is well-developed.   Pulmonary:      Effort: Pulmonary effort is normal.   Neurological:      Mental Status: He is alert and oriented to person, place, and time.   Psychiatric:         Behavior: Behavior normal.         Thought Content: Thought content normal.     Review of the chart labs (from different provider) also shows a Cr of 1.62, thus precluding him taking his  Metformin any more.    Assessment/Plan   Diagnoses and all orders for this visit:    1. Type 2 diabetes mellitus without complication, without long-term current use of insulin (CMS/[facility])  -     Hemoglobin A1c  -     MicroAlbumin, Urine, Random - Urine, Clean Catch  -     Comprehensive Metabolic Panel    2. Mixed hyperlipidemia  -     atorvastatin (LIPITOR) 10 MG tablet; Take 1 tablet by mouth Daily. For cholesterol  Dispense: 90 tablet; Refill: 1  -     Lipid Panel    3. Anxiety  -     ALPRAZolam (XANAX) 1 MG tablet; Take 1 tablet by mouth At Night As Needed for Anxiety.  Dispense: 90 tablet; Refill: 0    Other orders  -     Cancel: metFORMIN (GLUCOPHAGE) 1000 MG tablet; Take 1 tablet by mouth Daily With Breakfast.  Dispense: 90 tablet; Refill: 1  -     Cancel: metFORMIN (GLUCOPHAGE) 1000 MG tablet; Take 1 tablet by mouth Daily With Breakfast.  Dispense: 90 tablet; Refill: 1    Although 10 months ago, pt's A1C was excellent. Thus, will d/c the Metformin and hold adding anything else pending new lab results. With his current health issue, he may be able to control his DM with diet at this time. But, if labs come back with normal creatinine, will restart the Metformin then.  Spent  18   minutes with chart and interview and consent for this encounter given by the patient.  You have chosen to receive care through a telehealth visit.  Do you consent to use a TELEPHONE connection for your medical care today? Yes

## 2020-10-23 NOTE — PROGRESS NOTES
Patient here for lab review today.  He reports that he is doing well.  No bleeding issues.  No fevers or chills.    Lab Results   Component Value Date    WBC 1.92 (C) 10/23/2020    HGB 8.9 (L) 10/23/2020    HCT 27.9 (L) 10/23/2020    MCV 92.7 10/23/2020    PLT 23 (C) 10/23/2020     Return next week for repeat lab review and Reblozyl injection.  Patient continues weekly CBC with RN reviews.  Return in about 4 weeks for follow-up with Dr. Hayes when he is due for his next IVIG, now given on an every 6-week basis to coordinate with his Reblozyl injections.       SCOTTY Walton

## 2020-10-26 NOTE — PROGRESS NOTES
Clinical Case Management/[place]: (email)     OSW received a return email from the pt's daughter, Amanda Colin. She stated that the pt recently received a $10,000 alon from the [facility] to assist the pt with his Medicare Supplement premiums. She stated that the pt recently visited her for a few days and that he is doing well.     OSW remains available as needs arise.     Renetta Velazquez, [facility]  Oncology Social Worker

## 2020-10-30 NOTE — NURSING NOTE
Pt is here for lab with RN review.  CBC reviewed with pt, Hbg 8.2, plt count 19,000. Pt has no complaints states that he has felt good this past week and did not feel like he needed a blood transfusion at this time. Denies any bleeding or signs of infection. Per reblozyl treatment protocol, pt will need an increase in dose to 1.75mg/kg and verified with Risa in pharmacy and Rylee FAJARDO. Copy of labs given to pt and f/u appt reviewed. Pt is instructed to call the office with any concerns or new symptoms prior to next visit. Pt vu    Lab Results   Component Value Date    WBC 1.62 (C) 10/30/2020    HGB 8.2 (L) 10/30/2020    HCT 25.8 (L) 10/30/2020    MCV 92.8 10/30/2020    PLT 19 (C) 10/30/2020

## 2020-11-06 NOTE — PROGRESS NOTES
I reviewed labs with the patient today.  He is severely anemic with a hemoglobin of just 6.9.  He denies bleeding or bruising.  He denies heart palpitations, chest pain or increased shortness of breath.  His platelet count is slightly higher today at 26,000 and white count 2.22.    I have ordered a transfusion of 2 units packed red blood cells to be scheduled as soon as possible.  I have also asked the patient that should he experience new or worsening symptoms that he proceed to the emergency department.    He is currently scheduled to return on 11/13/2020 for RN review.    Lab Results   Component Value Date    WBC 2.22 (L) 11/06/2020    HGB 6.9 (C) 11/06/2020    HCT 22.1 (L) 11/06/2020    MCV 95.7 11/06/2020    PLT 26 (C) 11/06/2020

## 2020-11-13 NOTE — PROGRESS NOTES
Patient here for lab review.  He had a blood transfusion last week, and reports just a slight improvement in his fatigue.  He denies bleeding issues.  No fevers or chills.    Lab Results   Component Value Date    WBC 2.39 (L) 11/13/2020    HGB 8.7 (L) 11/13/2020    HCT 27.2 (L) 11/13/2020    MCV 91.3 11/13/2020    PLT 22 (C) 11/13/2020     Patient will return in 1 week for follow-up with Dr. Hayes for reevaluation when he is due for his next dose of Reblozyl and IVIG.    SCOTTY Walton

## 2020-11-20 NOTE — PROGRESS NOTES
To whom it may concern    Mr. Ankit Mack, Sr. (date of birth 1940) has been under our care for treatment of myelodysplasia.  He is chronically transfusion dependent and has severe thrombocytopenia.    Because of his myelodysplasia he is disabled and unable to work.  We do not expect this to improve in the future.    Your consideration in this matter is appreciated.    Sincerely        Tracy BOEYR

## 2020-11-20 NOTE — PROGRESS NOTES
Subjective     REASON FOR CONSULTATION:    1.  Myelodysplasia (refractory anemia with ring sideroblasts) diagnosed on bone marrow biopsy 6/26/2019.  Unresponsive to Procrit.  Patient is now receiving Reblozyl Q3wks  2. Lung mass on CT chest 11/17/2018  3.  Hypogammaglobulinemia.  First dose of IV immunoglobulin 40 g delivered 6/25/2019  4.  Hospitalized for right lower lobe pneumonia January 2020  5.  Anticoagulation with Eliquis for atrial fibrillation.    HISTORY OF PRESENT ILLNESS:  The patient is a 80 y.o. year old male who was referred to us by his primary care office for evaluation of macrocytic anemia.  His MCV has been markedly elevated around 107-108..  He was hospitalized from 5/25/2019 through 5/31/2019 with COPD exacerbation and CHF.  During the hospitalization his hemoglobin was around 9.5 g/dL with an MCV as high as 113.    With his initial office consult on 6/18/2019 we ordered additional lab studies to try to further define the etiology of his anemia.  He was due to return to our office for follow-up to review those results but ended up being admitted to the hospital from 6/20/2019 to 7/1/2019 for treatment of pneumonia and COPD exacerbation.  He was seen for hematology consultation as an inpatient by Dr. Gupta on 6/25/2019.  Bone marrow biopsy was ordered which was performed on 6/26/2019 and showed dysplastic changes and ring sideroblasts.  Blasts were less than 5%.  Chromosome karyotype is pending.    Also during the hospitalization he was found to be profoundly hypogammaglobulinemic with an IgG level of 365.  He had a history of recurring pulmonary infections and during the admission received 1 dose of IV immunoglobulin at 40 g total dose on 6/25/2019.    He returns today for follow-up and further IVIG infusion and evaluation of response to Reblozyl for his transfusion dependent myelodysplasia.  Unfortunately he is still transfusion dependent in spite of the Reblozyl injections and at this point  "we decided to forego weakening additional injections and continue to support him with transfusions as needed.    He was having quite a bit of bruising.  He has been on Eliquis 5 mg twice daily for A. fib.  With his platelet count being down to 22,000 and at 80 years old we are holding Eliquis for now.     History of Present Illness     Past medical history, family history, and social history reviewed and unchanged.    ALLERGIES:    Allergies   Allergen Reactions   • Erythromycin Itching        Review of Systems   Constitutional: Positive for fatigue. Negative for activity change, chills and fever.   HENT: Negative for mouth sores, trouble swallowing and voice change.    Eyes: Negative for pain and visual disturbance.   Respiratory: Positive for shortness of breath. Negative for cough and wheezing.    Cardiovascular: Positive for leg swelling. Negative for chest pain and palpitations.   Gastrointestinal: Positive for abdominal pain and nausea. Negative for constipation, diarrhea and vomiting.   Genitourinary: Negative for difficulty urinating, frequency and urgency.   Musculoskeletal: Positive for arthralgias and gait problem. Negative for joint swelling.   Skin: Negative for rash.   Neurological: Positive for weakness. Negative for dizziness, seizures and headaches.   Hematological: Negative for adenopathy. Bruises/bleeds easily.   Psychiatric/Behavioral: Negative for behavioral problems and confusion. The patient is not nervous/anxious.      Unchanged on 10/9/2020    Objective     Vitals:    11/20/20 1109   BP: 145/72   Pulse: 61   Resp: 20   Temp: 97.7 °F (36.5 °C)   TempSrc: Skin   SpO2: 98%   Weight: Comment: unable   Height: 177.8 cm (70\")   PainSc: 0-No pain     Current Status 10/9/2020   ECOG score 1       Physical Exam   Constitutional: He is oriented to person, place, and time. He appears well-developed. No distress.   Chronically ill appearing obese gentleman wearing nasal oxygen   HENT:   Head: " Normocephalic.   Eyes: Pupils are equal, round, and reactive to light. Conjunctivae are normal. No scleral icterus.   Neck: Normal range of motion. Neck supple. No JVD present. No thyromegaly present.   Cardiovascular: Normal rate. An irregular rhythm present. Exam reveals distant heart sounds. Exam reveals no gallop and no friction rub.   No murmur heard.  Pulmonary/Chest: Effort normal. He has decreased breath sounds in the right lower field and the left lower field. He has rales.   Abdominal: Soft. He exhibits distension. He exhibits no mass. There is no abdominal tenderness.   Musculoskeletal: Normal range of motion. No deformity.   Lymphadenopathy:     He has no cervical adenopathy.   Neurological: He is alert and oriented to person, place, and time. He has normal reflexes. No cranial nerve deficit.   Skin: Skin is warm and dry. Purpura and rash noted. No erythema.   Psychiatric: His behavior is normal. Judgment normal.   Repeated and unchanged on 11/20/2020    RECENT LABS:  Hematology WBC   Date Value Ref Range Status   11/20/2020 2.17 (L) 3.40 - 10.80 10*3/mm3 Final   01/01/2020 7.15 3.40 - 10.80 10*3/mm3 Final   05/15/2019 3.79 3.40 - 10.80 10*3/mm3 Final   02/08/2018 4.11 (L) 4.5 - 11.0 10*3/uL Final     RBC   Date Value Ref Range Status   11/20/2020 2.68 (L) 4.14 - 5.80 10*6/mm3 Final   05/15/2019 3.05 (L) 4.14 - 5.80 10*6/mm3 Final   02/08/2018 3.65 (L) 4.5 - 5.9 10*6/uL Final     Hemoglobin   Date Value Ref Range Status   11/20/2020 7.9 (L) 13.0 - 17.7 g/dL Final   02/08/2018 13.0 (L) 13.5 - 17.5 g/dL Final     Hematocrit   Date Value Ref Range Status   11/20/2020 25.5 (L) 37.5 - 51.0 % Final   02/08/2018 37.5 (L) 41.0 - 53.0 % Final     Platelets   Date Value Ref Range Status   11/20/2020 23 (C) 140 - 450 10*3/mm3 Final   02/08/2018 164 140 - 440 10*3/uL Final        Lab Results   Component Value Date    GLUCOSE 147 (H) 11/20/2020    CALCIUM 8.8 11/20/2020     11/20/2020    K 4.8 11/20/2020    CO2  30.5 (H) 11/20/2020     11/20/2020    BUN 32 (H) 11/20/2020    CREATININE 1.67 (H) 11/20/2020    EGFRIFAFRI 70 05/15/2019    EGFRIFNONA 40 (L) 11/20/2020    BCR 19.2 11/20/2020    ANIONGAP 6.5 11/20/2020     Lab Results   Component Value Date    IRON 87 06/12/2020    TIBC 213 (L) 06/12/2020    FERRITIN 1,189.50 (H) 06/12/2020     Lab Results   Component Value Date    BKTKPOQP59 1,596 (H) 06/18/2019     Lab Results   Component Value Date    FOLATE >20.00 06/18/2019       Bone marrow biopsy 6/26/2019  Final Diagnosis   CONSULTANT DIAGNOSIS:  Hypercellular Bone Marrow (60% total marrow cellularity) demonstrating significant dysplasia present in all three hematopoietic cell lines with 2-3+ stainable hemosiderin and 14% ringed sideroblasts.       No metastatic carcinoma, granulomas, vasculitis, viral inclusions, increase in myeloblasts, plasma cells or malignant lymphoma.       CONSULTANT COMMENT:  These changes would be most supportive of a low grade myelodysplasia; however, this must be correlated with appropriate B12 and RBC folate levels as well as pending karyotype for final disease classification.       Assessment/Plan   1. Myelodysplasia with chronic pancytopenia.  He currently is red cell transfusion dependent and failed Procrit therapy .  He was switched to a trial of Reblozyl but unfortunately he has not shown any appreciable response to this intervention either.  We discussed today discontinuing the Reblozyl and continuing to provide supportive care.      Bone marrow biopsy  consistent with low-grade myelodysplasia with refractory anemia with ringed sideroblasts.  Blasts in the marrow were less than 5%.  Marrow karyotype shows a 7 q. Deletion.    2.  Severe emphysema/COPD.  Patient requires oxygen around-the-clock  3.  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  Most recently hospitalized for pneumonia 6/23/2020-6/28/2020.  He receives a 30 g dose of IVIG and is due for treatment  today.   4.  History of non-small cell lung cancer from the left lower lobe resected by Dr. Hart Linker 2013.  No definite evidence of recurrent malignancy noted on the most recent CT scan of the chest from 8/5/2019 as noted above.  5.  Atrial fibrillation which further impacts his symptomatology.  He now is taking Cardizem twice daily and Lopressor.  He had been on Eliquis but this is currently on hold due to his thrombocytopenia.    Plan  1.  Patient will receive his  IVIG infusion at 30 g today in the office.  We will now plan to deliver his IVIG every 6 weeks .  2.  Elected to discontinue Reblozyl due to lack of effectiveness.  3.  For now, he will  have weekly blood counts and receive transfusion as needed as an outpatient.  4.  We will continue to hold Eliquis for now.  5.  MD follow-up in the office in 3 months

## 2020-11-20 NOTE — NURSING NOTE
Hgb 7.9. A blood transfusion was ordered by Dr. Hayes; this was scheduled for Sunday, November 22nd. Blood was obtained for a T&C and the pt was banded and instructed to leave the armband on for Sunday's transfusion. The pt v/u.

## 2020-11-24 NOTE — PROGRESS NOTES
Clinical Case Management/[place]: (email)     OSW received a return email from the pt's daughter, Amanda Colin. She requested assistance with completion of a transportation alon application from [facility] on behalf of the pt.  OSW completed the medical portion and faxed the documents to [facility].       OSW remains available as needs arise.     Renetta Velazquez, [facility]  Oncology Social Worker

## 2020-12-04 NOTE — NURSING NOTE
Reviewed pt's CBC with SCOTTY Hagan. Per Essence schedule pt for 2 units PRBC and inform pt if he develops any sign of infection to let our office know. This information relayed to pt and daughter. Augustina from lab placed blood armband on pt and pt aware to leave on until after his transfusion. Pt and daughter aware of his appointment for transfusion tomorrow at 8:15AM and reminded pt to call our office if he develops fever, chills or any sign of infection. Tonya pereira.

## 2020-12-09 NOTE — TELEPHONE ENCOUNTER
Called patient regarding his labs from last week. He also talked to Sarai over the weekend. Over the weekend he took lasix 40mg BID and has lost 5 lbs. He is also not requiring as many breathing treatments. I would recommend taking the lasix BID from here out and recheck labs in a week.   Intermediate Repair Preamble Text (Leave Blank If You Do Not Want): Undermining was performed with blunt dissection.

## 2020-12-17 NOTE — TELEPHONE ENCOUNTER
PT'S DGT CALLING. PT HAVING ISSUES SWALLOWING. ATTEMPTED TO RTN HER CALL AND L/M ASKING HER TO CALL TRIAGE BACK.

## 2020-12-23 NOTE — PROGRESS NOTES
Patient neutropenic ANC 0.26, which is lower than typical for him.  He remains afebrile.  We will start him on prophylactic antibiotics.  Creatinine clearance based on his last creatinine calculates as 46.  Patient will be started on Levaquin 500 mg on day 1, followed by 250 mg daily for the next week.      SCOTTY Walton

## 2020-12-23 NOTE — NURSING NOTE
Patient arrived in wheelchair for lab and RN Review. Patient labs reviewed with Amna FAJARDO. Levaquin 250 mg tabs called in to pharmacy [facility]. Patient to take 2 today then one pill daily for the next week until he returns. Patient v/u what medication for. Patient v/u if any concerns to contact physician.   Lab Results   Component Value Date    WBC 1.24 (C) 12/23/2020    HGB 9.3 (L) 12/23/2020    HCT 29.6 (L) 12/23/2020    MCV 90.8 12/23/2020    PLT 22 (C) 12/23/2020     Lab Results   Component Value Date    NEUTROABS 0.26 (L) 12/23/2020

## 2020-12-31 NOTE — PROGRESS NOTES
Subjective     REASON FOR CONSULTATION:    1.  Myelodysplasia (refractory anemia with ring sideroblasts) diagnosed on bone marrow biopsy 6/26/2019.  Unresponsive to Procrit.  Patient is now receiving Reblozyl Q3wks  2. Lung mass on CT chest 11/17/2018  3.  Hypogammaglobulinemia.  First dose of IV immunoglobulin 40 g delivered 6/25/2019  4.  Hospitalized for right lower lobe pneumonia January 2020  5.  Anticoagulation with Eliquis for atrial fibrillation.    HISTORY OF PRESENT ILLNESS:  The patient is a 80 y.o. year old male who was referred to us by his primary care office for evaluation of macrocytic anemia.  His MCV has been markedly elevated around 107-108..  He was hospitalized from 5/25/2019 through 5/31/2019 with COPD exacerbation and CHF.  During the hospitalization his hemoglobin was around 9.5 g/dL with an MCV as high as 113.    With his initial office consult on 6/18/2019 we ordered additional lab studies to try to further define the etiology of his anemia.  He was due to return to our office for follow-up to review those results but ended up being admitted to the hospital from 6/20/2019 to 7/1/2019 for treatment of pneumonia and COPD exacerbation.  He was seen for hematology consultation as an inpatient by Dr. Gupat on 6/25/2019.  Bone marrow biopsy was ordered which was performed on 6/26/2019 and showed dysplastic changes and ring sideroblasts.  Blasts were less than 5%.  Chromosome karyotype is pending.    Also during the hospitalization he was found to be profoundly hypogammaglobulinemic with an IgG level of 365.  He had a history of recurring pulmonary infections and during the admission received 1 dose of IV immunoglobulin at 40 g total dose on 6/25/2019.    Mr. Mack returns today for his every 6-week IVIG infusion.  As noted above, he had tried a trial of Reblozyl but unfortunately did not respond and therefore this was discontinued.  We have decided to proceed with transfusion support as  required.  Of note, the patient was seen in our office on December 23 with an ANC that was lower than his typical baseline at 0.26.  Given his decreased creatinine clearance, he was given a prescription for 500 mg of Levaquin on day 1, followed by 250 mg daily for the next week.    He has now completed a course of Levaquin and his ANC today is improved to 0.53. Eliquis had been on hold for a platelet count of 22,000.  His platelets today are only marginally improved at 25,000.  We will continue to hold his Eliquis.  He is hemoglobin is down to 8 today and he is symptomatic with exertional dyspnea and profound fatigue.    He denies any recent infectious symptoms including fevers or chills.  He does have easy bruising but nothing more than usual.    He has no other concerns today.         History of Present Illness     Past medical history, family history, and social history reviewed and unchanged.    ALLERGIES:    Allergies   Allergen Reactions   • Erythromycin Itching        Review of Systems   Constitutional: Positive for fatigue. Negative for activity change, chills and fever.   HENT: Negative for mouth sores, trouble swallowing and voice change.    Eyes: Negative for pain and visual disturbance.   Respiratory: Positive for shortness of breath (worse ). Negative for cough and wheezing.    Cardiovascular: Positive for leg swelling (stable ). Negative for chest pain and palpitations.   Gastrointestinal: Negative for abdominal pain, constipation, diarrhea, nausea and vomiting.   Genitourinary: Negative for difficulty urinating, frequency and urgency.   Musculoskeletal: Positive for arthralgias and gait problem. Negative for joint swelling.   Skin: Negative for rash.   Neurological: Positive for weakness. Negative for dizziness, seizures and headaches.   Hematological: Negative for adenopathy. Bruises/bleeds easily.   Psychiatric/Behavioral: Negative for behavioral problems and confusion. The patient is not  nervous/anxious.      Unchanged on 12/31/20    Objective     There were no vitals filed for this visit.  Current Status 10/9/2020   ECOG score 1       Physical Exam   Constitutional: He is oriented to person, place, and time. He appears well-developed. No distress.   Chronically ill appearing obese gentleman wearing nasal oxygen   HENT:   Head: Normocephalic.   Eyes: Pupils are equal, round, and reactive to light. Conjunctivae are normal. No scleral icterus.   Neck: Normal range of motion. Neck supple. No JVD present. No thyromegaly present.   Cardiovascular: Normal rate. An irregular rhythm present. Exam reveals distant heart sounds. Exam reveals no gallop and no friction rub.   No murmur heard.  Pulmonary/Chest: Effort normal. He has decreased breath sounds in the right lower field and the left lower field. He has rales.   Abdominal: Soft. He exhibits distension. He exhibits no mass. There is no abdominal tenderness.   Musculoskeletal: Normal range of motion. No deformity.   Lymphadenopathy:     He has no cervical adenopathy.   Neurological: He is alert and oriented to person, place, and time. He has normal reflexes. No cranial nerve deficit.   Skin: Skin is warm and dry. No rash noted. No erythema.   Scattered ecchymoses on bilateral arms   Psychiatric: His behavior is normal. Judgment normal.   I have reexamined the patient and the results are consistent with the previously documented exam. SCOTTY Hagan       RECENT LABS:  Hematology WBC   Date Value Ref Range Status   12/23/2020 1.24 (C) 3.40 - 10.80 10*3/mm3 Final   01/01/2020 7.15 3.40 - 10.80 10*3/mm3 Final   05/15/2019 3.79 3.40 - 10.80 10*3/mm3 Final   02/08/2018 4.11 (L) 4.5 - 11.0 10*3/uL Final     RBC   Date Value Ref Range Status   12/23/2020 3.26 (L) 4.14 - 5.80 10*6/mm3 Final   05/15/2019 3.05 (L) 4.14 - 5.80 10*6/mm3 Final   02/08/2018 3.65 (L) 4.5 - 5.9 10*6/uL Final     Hemoglobin   Date Value Ref Range Status   12/23/2020 9.3 (L) 13.0 -  17.7 g/dL Final   02/08/2018 13.0 (L) 13.5 - 17.5 g/dL Final     Hematocrit   Date Value Ref Range Status   12/23/2020 29.6 (L) 37.5 - 51.0 % Final   02/08/2018 37.5 (L) 41.0 - 53.0 % Final     Platelets   Date Value Ref Range Status   12/23/2020 22 (C) 140 - 450 10*3/mm3 Final   02/08/2018 164 140 - 440 10*3/uL Final        Lab Results   Component Value Date    GLUCOSE 147 (H) 11/20/2020    CALCIUM 8.8 11/20/2020     11/20/2020    K 4.8 11/20/2020    CO2 30.5 (H) 11/20/2020     11/20/2020    BUN 32 (H) 11/20/2020    CREATININE 1.67 (H) 11/20/2020    EGFRIFAFRI 70 05/15/2019    EGFRIFNONA 40 (L) 11/20/2020    BCR 19.2 11/20/2020    ANIONGAP 6.5 11/20/2020     Lab Results   Component Value Date    IRON 87 06/12/2020    TIBC 213 (L) 06/12/2020    FERRITIN 1,189.50 (H) 06/12/2020     Lab Results   Component Value Date    [id number] 1,596 (H) 06/18/2019     Lab Results   Component Value Date    FOLATE >20.00 06/18/2019       Bone marrow biopsy 6/26/2019  Final Diagnosis   CONSULTANT DIAGNOSIS:  Hypercellular Bone Marrow (60% total marrow cellularity) demonstrating significant dysplasia present in all three hematopoietic cell lines with 2-3+ stainable hemosiderin and 14% ringed sideroblasts.       No metastatic carcinoma, granulomas, vasculitis, viral inclusions, increase in myeloblasts, plasma cells or malignant lymphoma.       CONSULTANT COMMENT:  These changes would be most supportive of a low grade myelodysplasia; however, this must be correlated with appropriate B12 and RBC folate levels as well as pending karyotype for final disease classification.       Assessment/Plan   1. Myelodysplasia with chronic pancytopenia.  He currently is red cell transfusion dependent and failed Procrit therapy .  He was switched to a trial of Reblozyl but unfortunately he has not shown any appreciable response to this intervention either.  The decision was made to discontinue Reblozyl and provide transfusion support as  needed.    Bone marrow biopsy  consistent with low-grade myelodysplasia with refractory anemia with ringed sideroblasts.  Blasts in the marrow were less than 5%.  Marrow karyotype shows a 7 q. Deletion.    2.  Severe emphysema/COPD.  Patient requires oxygen around-the-clock  3.  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  Most recently hospitalized for pneumonia 6/23/2020-6/28/2020.  He receives a 30 g dose of IVIG every 6 weeks and is due for a dose today.   4.  History of non-small cell lung cancer from the left lower lobe resected by Dr. Hart Linker 2013.  No definite evidence of recurrent malignancy noted on the most recent CT scan of the chest from 8/5/2019 as noted above.  5.  Atrial fibrillation which further impacts his symptomatology.  He now is taking Cardizem twice daily and Lopressor. He has been on Eliquis and this was held secondary to thrombocytopenia.  Today, his platelets remain quite low at 25,000.  He has been asked to continue holding his Eliquis    Plan  1.  Patient will receive his  IVIG infusion at 30 g today in the office.  We will now plan to deliver his IVIG every 6 weeks .  2. Mr. Mack will continue weekly blood counts and will receive transfusion support as required.  We will schedule him for 2 units of packed red blood cells within the next few days for hemoglobin of 8  4.  We will continue to hold Eliquis for now  5.  MD follow-up on February 12, 2021  6.  I have asked the patient to call our office with any new issues or concerns prior to his next visit.  He has verbalized understanding

## 2020-12-31 NOTE — NURSING NOTE
Patient is tolerating IVIG without difficulty. Verbal order per Essence Sanchez APRN to continue IVIG as ordered.

## 2021-01-01 ENCOUNTER — TRANSCRIBE ORDERS (OUTPATIENT)
Dept: SLEEP MEDICINE | Facility: HOSPITAL | Age: 81
End: 2021-01-01

## 2021-01-01 ENCOUNTER — APPOINTMENT (OUTPATIENT)
Dept: GENERAL RADIOLOGY | Facility: HOSPITAL | Age: 81
End: 2021-01-01

## 2021-01-01 ENCOUNTER — APPOINTMENT (OUTPATIENT)
Dept: CARDIOLOGY | Facility: HOSPITAL | Age: 81
End: 2021-01-01

## 2021-01-01 ENCOUNTER — APPOINTMENT (OUTPATIENT)
Dept: OTHER | Facility: HOSPITAL | Age: 81
End: 2021-01-01

## 2021-01-01 ENCOUNTER — TELEPHONE (OUTPATIENT)
Dept: ONCOLOGY | Facility: CLINIC | Age: 81
End: 2021-01-01

## 2021-01-01 ENCOUNTER — READMISSION MANAGEMENT (OUTPATIENT)
Dept: CALL CENTER | Facility: HOSPITAL | Age: 81
End: 2021-01-01

## 2021-01-01 ENCOUNTER — APPOINTMENT (OUTPATIENT)
Dept: CT IMAGING | Facility: HOSPITAL | Age: 81
End: 2021-01-01

## 2021-01-01 ENCOUNTER — HOSPITAL ENCOUNTER (INPATIENT)
Facility: HOSPITAL | Age: 81
LOS: 3 days | End: 2021-01-15
Attending: EMERGENCY MEDICINE | Admitting: INTERNAL MEDICINE

## 2021-01-01 ENCOUNTER — INFUSION (OUTPATIENT)
Dept: ONCOLOGY | Facility: HOSPITAL | Age: 81
End: 2021-01-01

## 2021-01-01 ENCOUNTER — APPOINTMENT (OUTPATIENT)
Dept: ONCOLOGY | Facility: HOSPITAL | Age: 81
End: 2021-01-01

## 2021-01-01 ENCOUNTER — HOSPITAL ENCOUNTER (INPATIENT)
Facility: HOSPITAL | Age: 81
LOS: 3 days | Discharge: HOME | End: 2021-01-09
Attending: EMERGENCY MEDICINE | Admitting: HOSPITALIST

## 2021-01-01 ENCOUNTER — TRANSITIONAL CARE MANAGEMENT TELEPHONE ENCOUNTER (OUTPATIENT)
Dept: CALL CENTER | Facility: HOSPITAL | Age: 81
End: 2021-01-01

## 2021-01-01 VITALS
OXYGEN SATURATION: 88 % | BODY MASS INDEX: 40.38 KG/M2 | DIASTOLIC BLOOD PRESSURE: 55 MMHG | HEIGHT: 60 IN | WEIGHT: 205.7 LBS | TEMPERATURE: 103 F | SYSTOLIC BLOOD PRESSURE: 102 MMHG

## 2021-01-01 VITALS
SYSTOLIC BLOOD PRESSURE: 155 MMHG | RESPIRATION RATE: 20 BRPM | HEART RATE: 90 BPM | WEIGHT: 209.22 LBS | OXYGEN SATURATION: 96 % | DIASTOLIC BLOOD PRESSURE: 100 MMHG | HEIGHT: 70 IN | TEMPERATURE: 97.6 F | BODY MASS INDEX: 29.95 KG/M2

## 2021-01-01 VITALS
TEMPERATURE: 98 F | DIASTOLIC BLOOD PRESSURE: 80 MMHG | HEART RATE: 82 BPM | OXYGEN SATURATION: 95 % | RESPIRATION RATE: 18 BRPM | SYSTOLIC BLOOD PRESSURE: 146 MMHG

## 2021-01-01 DIAGNOSIS — J96.11 CHRONIC RESPIRATORY FAILURE WITH HYPOXIA (HCC): ICD-10-CM

## 2021-01-01 DIAGNOSIS — E11.9 TYPE 2 DIABETES MELLITUS WITHOUT COMPLICATION, WITHOUT LONG-TERM CURRENT USE OF INSULIN (HCC): ICD-10-CM

## 2021-01-01 DIAGNOSIS — D46.9 MYELODYSPLASIA (MYELODYSPLASTIC SYNDROME) (HCC): ICD-10-CM

## 2021-01-01 DIAGNOSIS — I50.9 ACUTE ON CHRONIC CONGESTIVE HEART FAILURE, UNSPECIFIED HEART FAILURE TYPE (HCC): ICD-10-CM

## 2021-01-01 DIAGNOSIS — R77.8 ELEVATED TROPONIN: ICD-10-CM

## 2021-01-01 DIAGNOSIS — D64.9 ANEMIA, UNSPECIFIED TYPE: ICD-10-CM

## 2021-01-01 DIAGNOSIS — I50.32 CHRONIC DIASTOLIC CONGESTIVE HEART FAILURE (HCC): ICD-10-CM

## 2021-01-01 DIAGNOSIS — J18.9 PNEUMONIA OF BOTH LOWER LOBES DUE TO INFECTIOUS ORGANISM: Primary | ICD-10-CM

## 2021-01-01 DIAGNOSIS — R91.8 PULMONARY INFILTRATE: Primary | ICD-10-CM

## 2021-01-01 DIAGNOSIS — R91.8 PULMONARY INFILTRATES: ICD-10-CM

## 2021-01-01 DIAGNOSIS — R07.9 CHEST PAIN, UNSPECIFIED TYPE: Primary | ICD-10-CM

## 2021-01-01 DIAGNOSIS — D61.818 PANCYTOPENIA (HCC): ICD-10-CM

## 2021-01-01 LAB
ABO GROUP BLD: NORMAL
ALBUMIN SERPL-MCNC: 3.5 G/DL (ref 3.5–5.2)
ALBUMIN SERPL-MCNC: 3.7 G/DL (ref 3.5–5.2)
ALBUMIN SERPL-MCNC: 3.7 G/DL (ref 3.5–5.2)
ALBUMIN SERPL-MCNC: 4.1 G/DL (ref 3.5–5.2)
ALBUMIN SERPL-MCNC: 4.1 G/DL (ref 3.5–5.2)
ALBUMIN/GLOB SERPL: 1.3 G/DL
ALBUMIN/GLOB SERPL: 1.4 G/DL
ALBUMIN/GLOB SERPL: 1.4 G/DL
ALBUMIN/GLOB SERPL: 1.6 G/DL
ALBUMIN/GLOB SERPL: 1.9 G/DL
ALP SERPL-CCNC: 75 U/L (ref 39–117)
ALP SERPL-CCNC: 75 U/L (ref 39–117)
ALP SERPL-CCNC: 77 U/L (ref 39–117)
ALP SERPL-CCNC: 82 U/L (ref 39–117)
ALP SERPL-CCNC: 88 U/L (ref 39–117)
ALT SERPL W P-5'-P-CCNC: 22 U/L (ref 1–41)
ALT SERPL W P-5'-P-CCNC: 22 U/L (ref 1–41)
ALT SERPL W P-5'-P-CCNC: 23 U/L (ref 1–41)
ALT SERPL W P-5'-P-CCNC: 24 U/L (ref 1–41)
ALT SERPL W P-5'-P-CCNC: 26 U/L (ref 1–41)
ANION GAP SERPL CALCULATED.3IONS-SCNC: 10 MMOL/L (ref 5–15)
ANION GAP SERPL CALCULATED.3IONS-SCNC: 10.6 MMOL/L (ref 5–15)
ANION GAP SERPL CALCULATED.3IONS-SCNC: 15.5 MMOL/L (ref 5–15)
ANION GAP SERPL CALCULATED.3IONS-SCNC: 7.5 MMOL/L (ref 5–15)
ANION GAP SERPL CALCULATED.3IONS-SCNC: 7.7 MMOL/L (ref 5–15)
ANION GAP SERPL CALCULATED.3IONS-SCNC: 8.6 MMOL/L (ref 5–15)
ANION GAP SERPL CALCULATED.3IONS-SCNC: 9.8 MMOL/L (ref 5–15)
AORTIC DIMENSIONLESS INDEX: 0.6 (DI)
ARTERIAL PATENCY WRIST A: POSITIVE
AST SERPL-CCNC: 37 U/L (ref 1–40)
AST SERPL-CCNC: 38 U/L (ref 1–40)
AST SERPL-CCNC: 48 U/L (ref 1–40)
AST SERPL-CCNC: 59 U/L (ref 1–40)
AST SERPL-CCNC: 67 U/L (ref 1–40)
ATMOSPHERIC PRESS: 753.1 MMHG
B PARAPERT DNA SPEC QL NAA+PROBE: NOT DETECTED
B PERT DNA SPEC QL NAA+PROBE: NOT DETECTED
BACTERIA SPEC AEROBE CULT: NORMAL
BACTERIA SPEC AEROBE CULT: NORMAL
BACTERIA SPEC RESP CULT: NORMAL
BASE EXCESS BLDA CALC-SCNC: 4.1 MMOL/L (ref 0–2)
BDY SITE: ABNORMAL
BH BB BLOOD EXPIRATION DATE: NORMAL
BH BB BLOOD TYPE BARCODE: 5100
BH BB DISPENSE STATUS: NORMAL
BH BB PRODUCT CODE: NORMAL
BH BB UNIT NUMBER: NORMAL
BH CV ECHO MEAS - ACS: 2 CM
BH CV ECHO MEAS - AO MAX PG: 8 MMHG
BH CV ECHO MEAS - AO MEAN PG (FULL): 3 MMHG
BH CV ECHO MEAS - AO MEAN PG: 5 MMHG
BH CV ECHO MEAS - AO ROOT AREA (BSA CORRECTED): 1.6
BH CV ECHO MEAS - AO ROOT AREA: 8 CM^2
BH CV ECHO MEAS - AO ROOT DIAM: 3.2 CM
BH CV ECHO MEAS - AO V2 MAX: 144 CM/SEC
BH CV ECHO MEAS - AO V2 MEAN: 99.2 CM/SEC
BH CV ECHO MEAS - AO V2 VTI: 26.8 CM
BH CV ECHO MEAS - AVA(I,A): 2.1 CM^2
BH CV ECHO MEAS - AVA(I,D): 2.1 CM^2
BH CV ECHO MEAS - BSA(HAYCOCK): 2.1 M^2
BH CV ECHO MEAS - BSA: 2 M^2
BH CV ECHO MEAS - BZI_BMI: 27.3 KILOGRAMS/M^2
BH CV ECHO MEAS - BZI_METRIC_HEIGHT: 177.8 CM
BH CV ECHO MEAS - BZI_METRIC_WEIGHT: 86.2 KG
BH CV ECHO MEAS - EDV(CUBED): 195.1 ML
BH CV ECHO MEAS - EDV(MOD-SP2): 130 ML
BH CV ECHO MEAS - EDV(MOD-SP4): 164 ML
BH CV ECHO MEAS - EDV(TEICH): 166.6 ML
BH CV ECHO MEAS - EF(CUBED): 67.2 %
BH CV ECHO MEAS - EF(MOD-BP): 57 %
BH CV ECHO MEAS - EF(MOD-SP2): 53.8 %
BH CV ECHO MEAS - EF(MOD-SP4): 60.4 %
BH CV ECHO MEAS - EF(TEICH): 58 %
BH CV ECHO MEAS - ESV(CUBED): 64 ML
BH CV ECHO MEAS - ESV(MOD-SP2): 60 ML
BH CV ECHO MEAS - ESV(MOD-SP4): 65 ML
BH CV ECHO MEAS - ESV(TEICH): 70 ML
BH CV ECHO MEAS - FS: 31 %
BH CV ECHO MEAS - IVS/LVPW: 0.88
BH CV ECHO MEAS - IVSD: 1.5 CM
BH CV ECHO MEAS - LAT PEAK E' VEL: 12.6 CM/SEC
BH CV ECHO MEAS - LV DIASTOLIC VOL/BSA (35-75): 80.3 ML/M^2
BH CV ECHO MEAS - LV MASS(C)D: 444.8 GRAMS
BH CV ECHO MEAS - LV MASS(C)DI: 217.8 GRAMS/M^2
BH CV ECHO MEAS - LV MAX PG: 4.1 MMHG
BH CV ECHO MEAS - LV MEAN PG: 2 MMHG
BH CV ECHO MEAS - LV SYSTOLIC VOL/BSA (12-30): 31.8 ML/M^2
BH CV ECHO MEAS - LV V1 MAX: 100 CM/SEC
BH CV ECHO MEAS - LV V1 MEAN: 60.7 CM/SEC
BH CV ECHO MEAS - LV V1 VTI: 16.2 CM
BH CV ECHO MEAS - LVIDD: 5.8 CM
BH CV ECHO MEAS - LVIDS: 4 CM
BH CV ECHO MEAS - LVLD AP2: 8 CM
BH CV ECHO MEAS - LVLD AP4: 8.4 CM
BH CV ECHO MEAS - LVLS AP2: 7.1 CM
BH CV ECHO MEAS - LVLS AP4: 7.5 CM
BH CV ECHO MEAS - LVOT AREA (M): 3.5 CM^2
BH CV ECHO MEAS - LVOT AREA: 3.5 CM^2
BH CV ECHO MEAS - LVOT DIAM: 2.1 CM
BH CV ECHO MEAS - LVPWD: 1.5 CM
BH CV ECHO MEAS - MED PEAK E' VEL: 9.58 CM/SEC
BH CV ECHO MEAS - MV A MAX VEL: 87.9 CM/SEC
BH CV ECHO MEAS - MV DEC SLOPE: 451 CM/SEC^2
BH CV ECHO MEAS - MV DEC TIME: 236 SEC
BH CV ECHO MEAS - MV E MAX VEL: 45.4 CM/SEC
BH CV ECHO MEAS - MV E/A: 0.52
BH CV ECHO MEAS - MV MEAN PG: 2 MMHG
BH CV ECHO MEAS - MV P1/2T MAX VEL: 85.5 CM/SEC
BH CV ECHO MEAS - MV P1/2T: 55.5 MSEC
BH CV ECHO MEAS - MV V2 MEAN: 67.6 CM/SEC
BH CV ECHO MEAS - MV V2 VTI: 22.3 CM
BH CV ECHO MEAS - MVA P1/2T LCG: 2.6 CM^2
BH CV ECHO MEAS - MVA(P1/2T): 4 CM^2
BH CV ECHO MEAS - MVA(VTI): 2.5 CM^2
BH CV ECHO MEAS - PA MAX PG: 7.8 MMHG
BH CV ECHO MEAS - PA V2 MAX: 140 CM/SEC
BH CV ECHO MEAS - QP/QS: 2
BH CV ECHO MEAS - RAP SYSTOLE: 3 MMHG
BH CV ECHO MEAS - RV MEAN PG: 2 MMHG
BH CV ECHO MEAS - RV V1 MEAN: 62.4 CM/SEC
BH CV ECHO MEAS - RV V1 VTI: 13.1 CM
BH CV ECHO MEAS - RVOT AREA: 8.6 CM^2
BH CV ECHO MEAS - RVOT DIAM: 3.3 CM
BH CV ECHO MEAS - SI(AO): 105.5 ML/M^2
BH CV ECHO MEAS - SI(CUBED): 64.2 ML/M^2
BH CV ECHO MEAS - SI(LVOT): 27.5 ML/M^2
BH CV ECHO MEAS - SI(MOD-SP2): 34.3 ML/M^2
BH CV ECHO MEAS - SI(MOD-SP4): 48.5 ML/M^2
BH CV ECHO MEAS - SI(TEICH): 47.3 ML/M^2
BH CV ECHO MEAS - SV(AO): 215.5 ML
BH CV ECHO MEAS - SV(CUBED): 131.1 ML
BH CV ECHO MEAS - SV(LVOT): 56.1 ML
BH CV ECHO MEAS - SV(MOD-SP2): 70 ML
BH CV ECHO MEAS - SV(MOD-SP4): 99 ML
BH CV ECHO MEAS - SV(RVOT): 112 ML
BH CV ECHO MEAS - SV(TEICH): 96.6 ML
BH CV ECHO MEAS - TAPSE (>1.6): 2.6 CM
BH CV ECHO MEAS - TR MAX VEL: 304 CM/SEC
BH CV ECHO MEASUREMENTS AVERAGE E/E' RATIO: 4.09
BH CV LOWER VASCULAR LEFT COMMON FEMORAL AUGMENT: NORMAL
BH CV LOWER VASCULAR LEFT COMMON FEMORAL AUGMENT: NORMAL
BH CV LOWER VASCULAR LEFT COMMON FEMORAL COMPETENT: NORMAL
BH CV LOWER VASCULAR LEFT COMMON FEMORAL COMPETENT: NORMAL
BH CV LOWER VASCULAR LEFT COMMON FEMORAL COMPRESS: NORMAL
BH CV LOWER VASCULAR LEFT COMMON FEMORAL COMPRESS: NORMAL
BH CV LOWER VASCULAR LEFT COMMON FEMORAL PHASIC: NORMAL
BH CV LOWER VASCULAR LEFT COMMON FEMORAL PHASIC: NORMAL
BH CV LOWER VASCULAR LEFT COMMON FEMORAL SPONT: NORMAL
BH CV LOWER VASCULAR LEFT COMMON FEMORAL SPONT: NORMAL
BH CV LOWER VASCULAR LEFT DISTAL FEMORAL COMPRESS: NORMAL
BH CV LOWER VASCULAR LEFT DISTAL FEMORAL COMPRESS: NORMAL
BH CV LOWER VASCULAR LEFT GASTRONEMIUS COMPRESS: NORMAL
BH CV LOWER VASCULAR LEFT GASTRONEMIUS COMPRESS: NORMAL
BH CV LOWER VASCULAR LEFT GREATER SAPH AK COMPRESS: NORMAL
BH CV LOWER VASCULAR LEFT GREATER SAPH AK COMPRESS: NORMAL
BH CV LOWER VASCULAR LEFT GREATER SAPH BK COMPRESS: NORMAL
BH CV LOWER VASCULAR LEFT GREATER SAPH BK COMPRESS: NORMAL
BH CV LOWER VASCULAR LEFT LESSER SAPH COMPRESS: NORMAL
BH CV LOWER VASCULAR LEFT LESSER SAPH COMPRESS: NORMAL
BH CV LOWER VASCULAR LEFT MID FEMORAL AUGMENT: NORMAL
BH CV LOWER VASCULAR LEFT MID FEMORAL AUGMENT: NORMAL
BH CV LOWER VASCULAR LEFT MID FEMORAL COMPETENT: NORMAL
BH CV LOWER VASCULAR LEFT MID FEMORAL COMPETENT: NORMAL
BH CV LOWER VASCULAR LEFT MID FEMORAL COMPRESS: NORMAL
BH CV LOWER VASCULAR LEFT MID FEMORAL COMPRESS: NORMAL
BH CV LOWER VASCULAR LEFT MID FEMORAL PHASIC: NORMAL
BH CV LOWER VASCULAR LEFT MID FEMORAL PHASIC: NORMAL
BH CV LOWER VASCULAR LEFT MID FEMORAL SPONT: NORMAL
BH CV LOWER VASCULAR LEFT MID FEMORAL SPONT: NORMAL
BH CV LOWER VASCULAR LEFT PERONEAL COMPRESS: NORMAL
BH CV LOWER VASCULAR LEFT PERONEAL COMPRESS: NORMAL
BH CV LOWER VASCULAR LEFT POPLITEAL AUGMENT: NORMAL
BH CV LOWER VASCULAR LEFT POPLITEAL AUGMENT: NORMAL
BH CV LOWER VASCULAR LEFT POPLITEAL COMPETENT: NORMAL
BH CV LOWER VASCULAR LEFT POPLITEAL COMPETENT: NORMAL
BH CV LOWER VASCULAR LEFT POPLITEAL COMPRESS: NORMAL
BH CV LOWER VASCULAR LEFT POPLITEAL COMPRESS: NORMAL
BH CV LOWER VASCULAR LEFT POPLITEAL PHASIC: NORMAL
BH CV LOWER VASCULAR LEFT POPLITEAL PHASIC: NORMAL
BH CV LOWER VASCULAR LEFT POPLITEAL SPONT: NORMAL
BH CV LOWER VASCULAR LEFT POPLITEAL SPONT: NORMAL
BH CV LOWER VASCULAR LEFT POSTERIOR TIBIAL COMPRESS: NORMAL
BH CV LOWER VASCULAR LEFT POSTERIOR TIBIAL COMPRESS: NORMAL
BH CV LOWER VASCULAR LEFT PROFUNDA FEMORAL COMPRESS: NORMAL
BH CV LOWER VASCULAR LEFT PROFUNDA FEMORAL COMPRESS: NORMAL
BH CV LOWER VASCULAR LEFT PROXIMAL FEMORAL COMPRESS: NORMAL
BH CV LOWER VASCULAR LEFT PROXIMAL FEMORAL COMPRESS: NORMAL
BH CV LOWER VASCULAR LEFT SAPHENOFEMORAL JUNCTION COMPRESS: NORMAL
BH CV LOWER VASCULAR LEFT SAPHENOFEMORAL JUNCTION COMPRESS: NORMAL
BH CV LOWER VASCULAR RIGHT COMMON FEMORAL AUGMENT: NORMAL
BH CV LOWER VASCULAR RIGHT COMMON FEMORAL AUGMENT: NORMAL
BH CV LOWER VASCULAR RIGHT COMMON FEMORAL COMPETENT: NORMAL
BH CV LOWER VASCULAR RIGHT COMMON FEMORAL COMPETENT: NORMAL
BH CV LOWER VASCULAR RIGHT COMMON FEMORAL COMPRESS: NORMAL
BH CV LOWER VASCULAR RIGHT COMMON FEMORAL COMPRESS: NORMAL
BH CV LOWER VASCULAR RIGHT COMMON FEMORAL PHASIC: NORMAL
BH CV LOWER VASCULAR RIGHT COMMON FEMORAL PHASIC: NORMAL
BH CV LOWER VASCULAR RIGHT COMMON FEMORAL SPONT: NORMAL
BH CV LOWER VASCULAR RIGHT COMMON FEMORAL SPONT: NORMAL
BH CV LOWER VASCULAR RIGHT DISTAL FEMORAL COMPRESS: NORMAL
BH CV LOWER VASCULAR RIGHT DISTAL FEMORAL COMPRESS: NORMAL
BH CV LOWER VASCULAR RIGHT GASTRONEMIUS COMPRESS: NORMAL
BH CV LOWER VASCULAR RIGHT GASTRONEMIUS COMPRESS: NORMAL
BH CV LOWER VASCULAR RIGHT GREATER SAPH AK COMPRESS: NORMAL
BH CV LOWER VASCULAR RIGHT GREATER SAPH AK COMPRESS: NORMAL
BH CV LOWER VASCULAR RIGHT GREATER SAPH BK COMPRESS: NORMAL
BH CV LOWER VASCULAR RIGHT GREATER SAPH BK COMPRESS: NORMAL
BH CV LOWER VASCULAR RIGHT LESSER SAPH COMPRESS: NORMAL
BH CV LOWER VASCULAR RIGHT LESSER SAPH COMPRESS: NORMAL
BH CV LOWER VASCULAR RIGHT MID FEMORAL AUGMENT: NORMAL
BH CV LOWER VASCULAR RIGHT MID FEMORAL AUGMENT: NORMAL
BH CV LOWER VASCULAR RIGHT MID FEMORAL COMPETENT: NORMAL
BH CV LOWER VASCULAR RIGHT MID FEMORAL COMPETENT: NORMAL
BH CV LOWER VASCULAR RIGHT MID FEMORAL COMPRESS: NORMAL
BH CV LOWER VASCULAR RIGHT MID FEMORAL COMPRESS: NORMAL
BH CV LOWER VASCULAR RIGHT MID FEMORAL PHASIC: NORMAL
BH CV LOWER VASCULAR RIGHT MID FEMORAL PHASIC: NORMAL
BH CV LOWER VASCULAR RIGHT MID FEMORAL SPONT: NORMAL
BH CV LOWER VASCULAR RIGHT MID FEMORAL SPONT: NORMAL
BH CV LOWER VASCULAR RIGHT PERONEAL COMPRESS: NORMAL
BH CV LOWER VASCULAR RIGHT PERONEAL COMPRESS: NORMAL
BH CV LOWER VASCULAR RIGHT POPLITEAL AUGMENT: NORMAL
BH CV LOWER VASCULAR RIGHT POPLITEAL AUGMENT: NORMAL
BH CV LOWER VASCULAR RIGHT POPLITEAL COMPETENT: NORMAL
BH CV LOWER VASCULAR RIGHT POPLITEAL COMPETENT: NORMAL
BH CV LOWER VASCULAR RIGHT POPLITEAL COMPRESS: NORMAL
BH CV LOWER VASCULAR RIGHT POPLITEAL COMPRESS: NORMAL
BH CV LOWER VASCULAR RIGHT POPLITEAL PHASIC: NORMAL
BH CV LOWER VASCULAR RIGHT POPLITEAL PHASIC: NORMAL
BH CV LOWER VASCULAR RIGHT POPLITEAL SPONT: NORMAL
BH CV LOWER VASCULAR RIGHT POPLITEAL SPONT: NORMAL
BH CV LOWER VASCULAR RIGHT POSTERIOR TIBIAL COMPRESS: NORMAL
BH CV LOWER VASCULAR RIGHT POSTERIOR TIBIAL COMPRESS: NORMAL
BH CV LOWER VASCULAR RIGHT PROFUNDA FEMORAL COMPRESS: NORMAL
BH CV LOWER VASCULAR RIGHT PROFUNDA FEMORAL COMPRESS: NORMAL
BH CV LOWER VASCULAR RIGHT PROXIMAL FEMORAL COMPRESS: NORMAL
BH CV LOWER VASCULAR RIGHT PROXIMAL FEMORAL COMPRESS: NORMAL
BH CV LOWER VASCULAR RIGHT SAPHENOFEMORAL JUNCTION COMPRESS: NORMAL
BH CV LOWER VASCULAR RIGHT SAPHENOFEMORAL JUNCTION COMPRESS: NORMAL
BH CV VAS BP LEFT ARM: NORMAL MMHG
BH CV XLRA - TDI S': 16.9 CM/SEC
BILIRUB SERPL-MCNC: 0.6 MG/DL (ref 0–1.2)
BILIRUB SERPL-MCNC: 0.7 MG/DL (ref 0–1.2)
BILIRUB SERPL-MCNC: 0.7 MG/DL (ref 0–1.2)
BILIRUB SERPL-MCNC: 0.8 MG/DL (ref 0–1.2)
BILIRUB SERPL-MCNC: 0.9 MG/DL (ref 0–1.2)
BLASTS NFR BLD MANUAL: 15 % (ref 0–0)
BLASTS NFR BLD MANUAL: 3 % (ref 0–0)
BLASTS NFR BLD MANUAL: 51 % (ref 0–0)
BLASTS NFR BLD MANUAL: 54 % (ref 0–0)
BLASTS NFR BLD MANUAL: 59 % (ref 0–0)
BLD GP AB SCN SERPL QL: NEGATIVE
BUN SERPL-MCNC: 38 MG/DL (ref 8–23)
BUN SERPL-MCNC: 40 MG/DL (ref 8–23)
BUN SERPL-MCNC: 40 MG/DL (ref 8–23)
BUN SERPL-MCNC: 43 MG/DL (ref 8–23)
BUN SERPL-MCNC: 45 MG/DL (ref 8–23)
BUN SERPL-MCNC: 48 MG/DL (ref 8–23)
BUN SERPL-MCNC: 51 MG/DL (ref 8–23)
BUN/CREAT SERPL: 19.9 (ref 7–25)
BUN/CREAT SERPL: 20.2 (ref 7–25)
BUN/CREAT SERPL: 21 (ref 7–25)
BUN/CREAT SERPL: 21.4 (ref 7–25)
BUN/CREAT SERPL: 23.1 (ref 7–25)
BUN/CREAT SERPL: 23.6 (ref 7–25)
BUN/CREAT SERPL: 25.7 (ref 7–25)
C PNEUM DNA NPH QL NAA+NON-PROBE: NOT DETECTED
C3 FRG RBC-MCNC: ABNORMAL
CALCIUM SPEC-SCNC: 8.2 MG/DL (ref 8.6–10.5)
CALCIUM SPEC-SCNC: 8.2 MG/DL (ref 8.6–10.5)
CALCIUM SPEC-SCNC: 8.3 MG/DL (ref 8.6–10.5)
CALCIUM SPEC-SCNC: 8.5 MG/DL (ref 8.6–10.5)
CALCIUM SPEC-SCNC: 8.6 MG/DL (ref 8.6–10.5)
CALCIUM SPEC-SCNC: 8.7 MG/DL (ref 8.6–10.5)
CALCIUM SPEC-SCNC: 8.9 MG/DL (ref 8.6–10.5)
CHLORIDE SERPL-SCNC: 102 MMOL/L (ref 98–107)
CHLORIDE SERPL-SCNC: 103 MMOL/L (ref 98–107)
CHLORIDE SERPL-SCNC: 107 MMOL/L (ref 98–107)
CHLORIDE SERPL-SCNC: 99 MMOL/L (ref 98–107)
CO2 SERPL-SCNC: 25.4 MMOL/L (ref 22–29)
CO2 SERPL-SCNC: 26 MMOL/L (ref 22–29)
CO2 SERPL-SCNC: 26.5 MMOL/L (ref 22–29)
CO2 SERPL-SCNC: 27.2 MMOL/L (ref 22–29)
CO2 SERPL-SCNC: 28.5 MMOL/L (ref 22–29)
CO2 SERPL-SCNC: 31.3 MMOL/L (ref 22–29)
CO2 SERPL-SCNC: 31.4 MMOL/L (ref 22–29)
CREAT SERPL-MCNC: 1.81 MG/DL (ref 0.76–1.27)
CREAT SERPL-MCNC: 1.82 MG/DL (ref 0.76–1.27)
CREAT SERPL-MCNC: 1.87 MG/DL (ref 0.76–1.27)
CREAT SERPL-MCNC: 1.87 MG/DL (ref 0.76–1.27)
CREAT SERPL-MCNC: 1.95 MG/DL (ref 0.76–1.27)
CREAT SERPL-MCNC: 1.98 MG/DL (ref 0.76–1.27)
CREAT SERPL-MCNC: 2.56 MG/DL (ref 0.76–1.27)
CROSSMATCH INTERPRETATION: NORMAL
D DIMER PPP FEU-MCNC: 4.59 MCGFEU/ML (ref 0–0.49)
D DIMER PPP FEU-MCNC: >20 MCGFEU/ML (ref 0–0.49)
D-LACTATE SERPL-SCNC: 0.6 MMOL/L (ref 0.5–2)
DEPRECATED RDW RBC AUTO: 47.6 FL (ref 37–54)
DEPRECATED RDW RBC AUTO: 48.1 FL (ref 37–54)
DEPRECATED RDW RBC AUTO: 48.3 FL (ref 37–54)
DEPRECATED RDW RBC AUTO: 48.3 FL (ref 37–54)
DEPRECATED RDW RBC AUTO: 48.7 FL (ref 37–54)
DEPRECATED RDW RBC AUTO: 49.3 FL (ref 37–54)
DEPRECATED RDW RBC AUTO: 49.6 FL (ref 37–54)
DEPRECATED RDW RBC AUTO: 50.1 FL (ref 37–54)
EOSINOPHIL # BLD MANUAL: 0.03 10*3/MM3 (ref 0–0.4)
EOSINOPHIL # BLD MANUAL: 0.04 10*3/MM3 (ref 0–0.4)
EOSINOPHIL # BLD MANUAL: 0.06 10*3/MM3 (ref 0–0.4)
EOSINOPHIL # BLD MANUAL: 0.16 10*3/MM3 (ref 0–0.4)
EOSINOPHIL # BLD MANUAL: 0.44 10*3/MM3 (ref 0–0.4)
EOSINOPHIL # BLD MANUAL: 0.5 10*3/MM3 (ref 0–0.4)
EOSINOPHIL NFR BLD MANUAL: 1 % (ref 0.3–6.2)
EOSINOPHIL NFR BLD MANUAL: 2 % (ref 0.3–6.2)
ERYTHROCYTE [DISTWIDTH] IN BLOOD BY AUTOMATED COUNT: 15.3 % (ref 12.3–15.4)
ERYTHROCYTE [DISTWIDTH] IN BLOOD BY AUTOMATED COUNT: 15.6 % (ref 12.3–15.4)
ERYTHROCYTE [DISTWIDTH] IN BLOOD BY AUTOMATED COUNT: 15.7 % (ref 12.3–15.4)
ERYTHROCYTE [DISTWIDTH] IN BLOOD BY AUTOMATED COUNT: 15.9 % (ref 12.3–15.4)
ERYTHROCYTE [DISTWIDTH] IN BLOOD BY AUTOMATED COUNT: 15.9 % (ref 12.3–15.4)
ERYTHROCYTE [DISTWIDTH] IN BLOOD BY AUTOMATED COUNT: 16.1 % (ref 12.3–15.4)
FERRITIN SERPL-MCNC: 2294 NG/ML (ref 30–400)
FLUAV SUBTYP SPEC NAA+PROBE: NOT DETECTED
FLUBV RNA ISLT QL NAA+PROBE: NOT DETECTED
GAS FLOW AIRWAY: 4 LPM
GFR SERPL CREATININE-BSD FRML MDRD: 24 ML/MIN/1.73
GFR SERPL CREATININE-BSD FRML MDRD: 33 ML/MIN/1.73
GFR SERPL CREATININE-BSD FRML MDRD: 33 ML/MIN/1.73
GFR SERPL CREATININE-BSD FRML MDRD: 35 ML/MIN/1.73
GFR SERPL CREATININE-BSD FRML MDRD: 35 ML/MIN/1.73
GFR SERPL CREATININE-BSD FRML MDRD: 36 ML/MIN/1.73
GFR SERPL CREATININE-BSD FRML MDRD: 36 ML/MIN/1.73
GLOBULIN UR ELPH-MCNC: 2.2 GM/DL
GLOBULIN UR ELPH-MCNC: 2.5 GM/DL
GLOBULIN UR ELPH-MCNC: 2.6 GM/DL
GLOBULIN UR ELPH-MCNC: 2.7 GM/DL
GLOBULIN UR ELPH-MCNC: 2.7 GM/DL
GLUCOSE BLDC GLUCOMTR-MCNC: 104 MG/DL (ref 70–130)
GLUCOSE BLDC GLUCOMTR-MCNC: 141 MG/DL (ref 70–130)
GLUCOSE BLDC GLUCOMTR-MCNC: 161 MG/DL (ref 70–130)
GLUCOSE BLDC GLUCOMTR-MCNC: 199 MG/DL (ref 70–130)
GLUCOSE SERPL-MCNC: 110 MG/DL (ref 65–99)
GLUCOSE SERPL-MCNC: 113 MG/DL (ref 65–99)
GLUCOSE SERPL-MCNC: 129 MG/DL (ref 65–99)
GLUCOSE SERPL-MCNC: 132 MG/DL (ref 65–99)
GLUCOSE SERPL-MCNC: 192 MG/DL (ref 65–99)
GLUCOSE SERPL-MCNC: 88 MG/DL (ref 65–99)
GLUCOSE SERPL-MCNC: 95 MG/DL (ref 65–99)
GRAM STN SPEC: NORMAL
HADV DNA SPEC NAA+PROBE: NOT DETECTED
HCO3 BLDA-SCNC: 30.2 MMOL/L (ref 22–28)
HCOV 229E RNA SPEC QL NAA+PROBE: NOT DETECTED
HCOV HKU1 RNA SPEC QL NAA+PROBE: NOT DETECTED
HCOV NL63 RNA SPEC QL NAA+PROBE: NOT DETECTED
HCOV OC43 RNA SPEC QL NAA+PROBE: NOT DETECTED
HCT VFR BLD AUTO: 23.5 % (ref 37.5–51)
HCT VFR BLD AUTO: 25.9 % (ref 37.5–51)
HCT VFR BLD AUTO: 27.3 % (ref 37.5–51)
HCT VFR BLD AUTO: 28.8 % (ref 37.5–51)
HCT VFR BLD AUTO: 29.2 % (ref 37.5–51)
HCT VFR BLD AUTO: 30.2 % (ref 37.5–51)
HCT VFR BLD AUTO: 31 % (ref 37.5–51)
HCT VFR BLD AUTO: 31.4 % (ref 37.5–51)
HGB BLD-MCNC: 10.3 G/DL (ref 13–17.7)
HGB BLD-MCNC: 10.4 G/DL (ref 13–17.7)
HGB BLD-MCNC: 10.4 G/DL (ref 13–17.7)
HGB BLD-MCNC: 7.8 G/DL (ref 13–17.7)
HGB BLD-MCNC: 8.4 G/DL (ref 13–17.7)
HGB BLD-MCNC: 8.9 G/DL (ref 13–17.7)
HGB BLD-MCNC: 9.6 G/DL (ref 13–17.7)
HGB BLD-MCNC: 9.8 G/DL (ref 13–17.7)
HMPV RNA NPH QL NAA+NON-PROBE: NOT DETECTED
HOLD SPECIMEN: NORMAL
HOLD SPECIMEN: NORMAL
HPIV1 RNA SPEC QL NAA+PROBE: NOT DETECTED
HPIV2 RNA SPEC QL NAA+PROBE: NOT DETECTED
HPIV3 RNA NPH QL NAA+PROBE: NOT DETECTED
HPIV4 P GENE NPH QL NAA+PROBE: NOT DETECTED
HYPOCHROMIA BLD QL: ABNORMAL
L PNEUMO1 AG UR QL IA: NEGATIVE
LAB AP CASE REPORT: NORMAL
LEFT ATRIUM VOLUME INDEX: 39 ML/M2
LV EF 2D ECHO EST: 55 %
LYMPHOCYTES # BLD MANUAL: 1.47 10*3/MM3 (ref 0.7–3.1)
LYMPHOCYTES # BLD MANUAL: 1.64 10*3/MM3 (ref 0.7–3.1)
LYMPHOCYTES # BLD MANUAL: 2.2 10*3/MM3 (ref 0.7–3.1)
LYMPHOCYTES # BLD MANUAL: 3.49 10*3/MM3 (ref 0.7–3.1)
LYMPHOCYTES # BLD MANUAL: 4.49 10*3/MM3 (ref 0.7–3.1)
LYMPHOCYTES # BLD MANUAL: 9.28 10*3/MM3 (ref 0.7–3.1)
LYMPHOCYTES NFR BLD MANUAL: 10 % (ref 5–12)
LYMPHOCYTES NFR BLD MANUAL: 18 % (ref 19.6–45.3)
LYMPHOCYTES NFR BLD MANUAL: 21 % (ref 19.6–45.3)
LYMPHOCYTES NFR BLD MANUAL: 22 % (ref 19.6–45.3)
LYMPHOCYTES NFR BLD MANUAL: 3 % (ref 5–12)
LYMPHOCYTES NFR BLD MANUAL: 35 % (ref 19.6–45.3)
LYMPHOCYTES NFR BLD MANUAL: 35 % (ref 19.6–45.3)
LYMPHOCYTES NFR BLD MANUAL: 4 % (ref 5–12)
LYMPHOCYTES NFR BLD MANUAL: 52 % (ref 19.6–45.3)
LYMPHOCYTES NFR BLD MANUAL: 7 % (ref 5–12)
LYMPHOCYTES NFR BLD MANUAL: 8 % (ref 5–12)
LYMPHOCYTES NFR BLD MANUAL: 9 % (ref 5–12)
M PNEUMO IGG SER IA-ACNC: NOT DETECTED
MAGNESIUM SERPL-MCNC: 1.9 MG/DL (ref 1.6–2.4)
MCH RBC QN AUTO: 28.7 PG (ref 26.6–33)
MCH RBC QN AUTO: 29.3 PG (ref 26.6–33)
MCH RBC QN AUTO: 29.3 PG (ref 26.6–33)
MCH RBC QN AUTO: 29.4 PG (ref 26.6–33)
MCH RBC QN AUTO: 29.6 PG (ref 26.6–33)
MCH RBC QN AUTO: 29.8 PG (ref 26.6–33)
MCH RBC QN AUTO: 30.1 PG (ref 26.6–33)
MCH RBC QN AUTO: 30.2 PG (ref 26.6–33)
MCHC RBC AUTO-ENTMCNC: 32.4 G/DL (ref 31.5–35.7)
MCHC RBC AUTO-ENTMCNC: 32.6 G/DL (ref 31.5–35.7)
MCHC RBC AUTO-ENTMCNC: 32.9 G/DL (ref 31.5–35.7)
MCHC RBC AUTO-ENTMCNC: 33.1 G/DL (ref 31.5–35.7)
MCHC RBC AUTO-ENTMCNC: 33.2 G/DL (ref 31.5–35.7)
MCHC RBC AUTO-ENTMCNC: 33.5 G/DL (ref 31.5–35.7)
MCHC RBC AUTO-ENTMCNC: 34 G/DL (ref 31.5–35.7)
MCHC RBC AUTO-ENTMCNC: 34.1 G/DL (ref 31.5–35.7)
MCV RBC AUTO: 88.3 FL (ref 79–97)
MCV RBC AUTO: 88.3 FL (ref 79–97)
MCV RBC AUTO: 88.4 FL (ref 79–97)
MCV RBC AUTO: 88.8 FL (ref 79–97)
MCV RBC AUTO: 88.9 FL (ref 79–97)
MCV RBC AUTO: 89.5 FL (ref 79–97)
MCV RBC AUTO: 89.6 FL (ref 79–97)
MCV RBC AUTO: 89.8 FL (ref 79–97)
METAMYELOCYTES NFR BLD MANUAL: 1 % (ref 0–0)
METAMYELOCYTES NFR BLD MANUAL: 2 % (ref 0–0)
METAMYELOCYTES NFR BLD MANUAL: 3 % (ref 0–0)
METAMYELOCYTES NFR BLD MANUAL: 3 % (ref 0–0)
MODALITY: ABNORMAL
MONOCYTES # BLD AUTO: 0.13 10*3/MM3 (ref 0.1–0.9)
MONOCYTES # BLD AUTO: 0.29 10*3/MM3 (ref 0.1–0.9)
MONOCYTES # BLD AUTO: 0.48 10*3/MM3 (ref 0.1–0.9)
MONOCYTES # BLD AUTO: 0.5 10*3/MM3 (ref 0.1–0.9)
MONOCYTES # BLD AUTO: 2.25 10*3/MM3 (ref 0.1–0.9)
MONOCYTES # BLD AUTO: 4.42 10*3/MM3 (ref 0.1–0.9)
MRSA DNA SPEC QL NAA+PROBE: NORMAL
MYELOCYTES NFR BLD MANUAL: 1 % (ref 0–0)
MYELOCYTES NFR BLD MANUAL: 1 % (ref 0–0)
MYELOCYTES NFR BLD MANUAL: 4 % (ref 0–0)
MYELOCYTES NFR BLD MANUAL: 4 % (ref 0–0)
NEUTROPHILS # BLD AUTO: 1.17 10*3/MM3 (ref 1.7–7)
NEUTROPHILS # BLD AUTO: 1.98 10*3/MM3 (ref 1.7–7)
NEUTROPHILS # BLD AUTO: 2.2 10*3/MM3 (ref 1.7–7)
NEUTROPHILS # BLD AUTO: 2.22 10*3/MM3 (ref 1.7–7)
NEUTROPHILS # BLD AUTO: 4.74 10*3/MM3 (ref 1.7–7)
NEUTROPHILS # BLD AUTO: 5.75 10*3/MM3 (ref 1.7–7)
NEUTROPHILS NFR BLD MANUAL: 13 % (ref 42.7–76)
NEUTROPHILS NFR BLD MANUAL: 14 % (ref 42.7–76)
NEUTROPHILS NFR BLD MANUAL: 19 % (ref 42.7–76)
NEUTROPHILS NFR BLD MANUAL: 35 % (ref 42.7–76)
NEUTROPHILS NFR BLD MANUAL: 37 % (ref 42.7–76)
NEUTROPHILS NFR BLD MANUAL: 47 % (ref 42.7–76)
NRBC BLD AUTO-RTO: 21.5 /100 WBC (ref 0–0.2)
NRBC BLD AUTO-RTO: 22.1 /100 WBC (ref 0–0.2)
NRBC SPEC MANUAL: 12 /100 WBC (ref 0–0.2)
NRBC SPEC MANUAL: 18 /100 WBC (ref 0–0.2)
NRBC SPEC MANUAL: 19 /100 WBC (ref 0–0.2)
NRBC SPEC MANUAL: 6 /100 WBC (ref 0–0.2)
NT-PROBNP SERPL-MCNC: ABNORMAL PG/ML (ref 0–1800)
NT-PROBNP SERPL-MCNC: ABNORMAL PG/ML (ref 0–1800)
PATH REPORT.FINAL DX SPEC: NORMAL
PATHOLOGY REVIEW: YES
PCO2 BLDA: 51.5 MM HG (ref 35–45)
PH BLDA: 7.38 PH UNITS (ref 7.35–7.45)
PLAT MORPH BLD: NORMAL
PLATELET # BLD AUTO: 10 10*3/MM3 (ref 140–450)
PLATELET # BLD AUTO: 14 10*3/MM3 (ref 140–450)
PLATELET # BLD AUTO: 15 10*3/MM3 (ref 140–450)
PLATELET # BLD AUTO: 15 10*3/MM3 (ref 140–450)
PLATELET # BLD AUTO: 17 10*3/MM3 (ref 140–450)
PLATELET # BLD AUTO: 9 10*3/MM3 (ref 140–450)
PO2 BLDA: 93.9 MM HG (ref 80–100)
POLYCHROMASIA BLD QL SMEAR: ABNORMAL
POTASSIUM SERPL-SCNC: 3.9 MMOL/L (ref 3.5–5.2)
POTASSIUM SERPL-SCNC: 3.9 MMOL/L (ref 3.5–5.2)
POTASSIUM SERPL-SCNC: 4 MMOL/L (ref 3.5–5.2)
POTASSIUM SERPL-SCNC: 4 MMOL/L (ref 3.5–5.2)
POTASSIUM SERPL-SCNC: 4.2 MMOL/L (ref 3.5–5.2)
POTASSIUM SERPL-SCNC: 4.3 MMOL/L (ref 3.5–5.2)
POTASSIUM SERPL-SCNC: 4.4 MMOL/L (ref 3.5–5.2)
PROCALCITONIN SERPL-MCNC: 0.49 NG/ML (ref 0–0.25)
PROCALCITONIN SERPL-MCNC: 1.26 NG/ML (ref 0–0.25)
PROMYELOCYTES NFR BLD MANUAL: 1 % (ref 0–0)
PROMYELOCYTES NFR BLD MANUAL: 2 % (ref 0–0)
PROT SERPL-MCNC: 6.2 G/DL (ref 6–8.5)
PROT SERPL-MCNC: 6.3 G/DL (ref 6–8.5)
PROT SERPL-MCNC: 6.3 G/DL (ref 6–8.5)
PROT SERPL-MCNC: 6.4 G/DL (ref 6–8.5)
PROT SERPL-MCNC: 6.6 G/DL (ref 6–8.5)
QT INTERVAL: 450 MS
QT INTERVAL: 455 MS
QT INTERVAL: 490 MS
RBC # BLD AUTO: 2.66 10*6/MM3 (ref 4.14–5.8)
RBC # BLD AUTO: 2.93 10*6/MM3 (ref 4.14–5.8)
RBC # BLD AUTO: 3.04 10*6/MM3 (ref 4.14–5.8)
RBC # BLD AUTO: 3.24 10*6/MM3 (ref 4.14–5.8)
RBC # BLD AUTO: 3.26 10*6/MM3 (ref 4.14–5.8)
RBC # BLD AUTO: 3.42 10*6/MM3 (ref 4.14–5.8)
RBC # BLD AUTO: 3.49 10*6/MM3 (ref 4.14–5.8)
RBC # BLD AUTO: 3.51 10*6/MM3 (ref 4.14–5.8)
RBC MORPH BLD: NORMAL
REF LAB TEST METHOD: NORMAL
RH BLD: POSITIVE
RHINOVIRUS RNA SPEC NAA+PROBE: NOT DETECTED
RSV RNA NPH QL NAA+NON-PROBE: NOT DETECTED
S PNEUM AG SPEC QL LA: NEGATIVE
SAO2 % BLDCOA: 97 % (ref 92–99)
SARS-COV-2 ORF1AB RESP QL NAA+PROBE: NOT DETECTED
SARS-COV-2 RNA NPH QL NAA+NON-PROBE: NOT DETECTED
SINUS: 3.6 CM
SODIUM SERPL-SCNC: 137 MMOL/L (ref 136–145)
SODIUM SERPL-SCNC: 139 MMOL/L (ref 136–145)
SODIUM SERPL-SCNC: 140 MMOL/L (ref 136–145)
SODIUM SERPL-SCNC: 141 MMOL/L (ref 136–145)
SODIUM SERPL-SCNC: 142 MMOL/L (ref 136–145)
SODIUM SERPL-SCNC: 143 MMOL/L (ref 136–145)
SODIUM SERPL-SCNC: 144 MMOL/L (ref 136–145)
STJ: 3.1 CM
T&S EXPIRATION DATE: NORMAL
TOTAL RATE: 18 BREATHS/MINUTE
TROPONIN T SERPL-MCNC: 0.04 NG/ML (ref 0–0.03)
TROPONIN T SERPL-MCNC: 0.05 NG/ML (ref 0–0.03)
TROPONIN T SERPL-MCNC: 0.07 NG/ML (ref 0–0.03)
TROPONIN T SERPL-MCNC: 0.1 NG/ML (ref 0–0.03)
UNIT  ABO: NORMAL
UNIT  RH: NORMAL
WBC # BLD AUTO: 15.87 10*3/MM3 (ref 3.4–10.8)
WBC # BLD AUTO: 2.89 10*3/MM3 (ref 3.4–10.8)
WBC # BLD AUTO: 24.96 10*3/MM3 (ref 3.4–10.8)
WBC # BLD AUTO: 3.16 10*3/MM3 (ref 3.4–10.8)
WBC # BLD AUTO: 4.21 10*3/MM3 (ref 3.4–10.8)
WBC # BLD AUTO: 44.21 10*3/MM3 (ref 3.4–10.8)
WBC # BLD AUTO: 44.31 10*3/MM3 (ref 3.4–10.8)
WBC # BLD AUTO: 6.29 10*3/MM3 (ref 3.4–10.8)
WBC MORPH BLD: NORMAL
WHOLE BLOOD HOLD SPECIMEN: NORMAL
WHOLE BLOOD HOLD SPECIMEN: NORMAL

## 2021-01-01 PROCEDURE — 36415 COLL VENOUS BLD VENIPUNCTURE: CPT

## 2021-01-01 PROCEDURE — 71045 X-RAY EXAM CHEST 1 VIEW: CPT

## 2021-01-01 PROCEDURE — 93005 ELECTROCARDIOGRAM TRACING: CPT

## 2021-01-01 PROCEDURE — 99231 SBSQ HOSP IP/OBS SF/LOW 25: CPT | Performed by: NURSE PRACTITIONER

## 2021-01-01 PROCEDURE — 94669 MECHANICAL CHEST WALL OSCILL: CPT

## 2021-01-01 PROCEDURE — 99223 1ST HOSP IP/OBS HIGH 75: CPT | Performed by: INTERNAL MEDICINE

## 2021-01-01 PROCEDURE — 25010000002 MORPHINE PER 10 MG: Performed by: INTERNAL MEDICINE

## 2021-01-01 PROCEDURE — 25010000002 CEFEPIME 2 G/NS 100 ML SOLUTION: Performed by: NURSE PRACTITIONER

## 2021-01-01 PROCEDURE — 63710000001 PREDNISONE PER 1 MG: Performed by: INTERNAL MEDICINE

## 2021-01-01 PROCEDURE — 94799 UNLISTED PULMONARY SVC/PX: CPT

## 2021-01-01 PROCEDURE — 63710000001 PREDNISONE PER 1 MG: Performed by: NURSE PRACTITIONER

## 2021-01-01 PROCEDURE — 99222 1ST HOSP IP/OBS MODERATE 55: CPT | Performed by: INTERNAL MEDICINE

## 2021-01-01 PROCEDURE — 80053 COMPREHEN METABOLIC PANEL: CPT

## 2021-01-01 PROCEDURE — 99285 EMERGENCY DEPT VISIT HI MDM: CPT

## 2021-01-01 PROCEDURE — 99232 SBSQ HOSP IP/OBS MODERATE 35: CPT | Performed by: INTERNAL MEDICINE

## 2021-01-01 PROCEDURE — 97112 NEUROMUSCULAR REEDUCATION: CPT

## 2021-01-01 PROCEDURE — 80048 BASIC METABOLIC PNL TOTAL CA: CPT | Performed by: NURSE PRACTITIONER

## 2021-01-01 PROCEDURE — 63710000001 DIPHENHYDRAMINE PER 50 MG: Performed by: NURSE PRACTITIONER

## 2021-01-01 PROCEDURE — 87641 MR-STAPH DNA AMP PROBE: CPT | Performed by: NURSE PRACTITIONER

## 2021-01-01 PROCEDURE — 87040 BLOOD CULTURE FOR BACTERIA: CPT | Performed by: HOSPITALIST

## 2021-01-01 PROCEDURE — 25010000002 CEFEPIME PER 500 MG: Performed by: NURSE PRACTITIONER

## 2021-01-01 PROCEDURE — 94640 AIRWAY INHALATION TREATMENT: CPT

## 2021-01-01 PROCEDURE — 93306 TTE W/DOPPLER COMPLETE: CPT | Performed by: INTERNAL MEDICINE

## 2021-01-01 PROCEDURE — 85027 COMPLETE CBC AUTOMATED: CPT | Performed by: INTERNAL MEDICINE

## 2021-01-01 PROCEDURE — 25010000002 FUROSEMIDE PER 20 MG: Performed by: NURSE PRACTITIONER

## 2021-01-01 PROCEDURE — 86900 BLOOD TYPING SEROLOGIC ABO: CPT

## 2021-01-01 PROCEDURE — 80053 COMPREHEN METABOLIC PANEL: CPT | Performed by: INTERNAL MEDICINE

## 2021-01-01 PROCEDURE — 85027 COMPLETE CBC AUTOMATED: CPT | Performed by: NURSE PRACTITIONER

## 2021-01-01 PROCEDURE — 80053 COMPREHEN METABOLIC PANEL: CPT | Performed by: HOSPITALIST

## 2021-01-01 PROCEDURE — 85025 COMPLETE CBC W/AUTO DIFF WBC: CPT | Performed by: PHYSICIAN ASSISTANT

## 2021-01-01 PROCEDURE — P9016 RBC LEUKOCYTES REDUCED: HCPCS

## 2021-01-01 PROCEDURE — 25010000002 LORAZEPAM PER 2 MG: Performed by: INTERNAL MEDICINE

## 2021-01-01 PROCEDURE — 93306 TTE W/DOPPLER COMPLETE: CPT

## 2021-01-01 PROCEDURE — 85025 COMPLETE CBC W/AUTO DIFF WBC: CPT | Performed by: EMERGENCY MEDICINE

## 2021-01-01 PROCEDURE — 85379 FIBRIN DEGRADATION QUANT: CPT | Performed by: NURSE PRACTITIONER

## 2021-01-01 PROCEDURE — 88185 FLOWCYTOMETRY/TC ADD-ON: CPT | Performed by: INTERNAL MEDICINE

## 2021-01-01 PROCEDURE — 99233 SBSQ HOSP IP/OBS HIGH 50: CPT | Performed by: INTERNAL MEDICINE

## 2021-01-01 PROCEDURE — 36430 TRANSFUSION BLD/BLD COMPNT: CPT

## 2021-01-01 PROCEDURE — 83880 ASSAY OF NATRIURETIC PEPTIDE: CPT | Performed by: EMERGENCY MEDICINE

## 2021-01-01 PROCEDURE — 84145 PROCALCITONIN (PCT): CPT | Performed by: PHYSICIAN ASSISTANT

## 2021-01-01 PROCEDURE — 93970 EXTREMITY STUDY: CPT

## 2021-01-01 PROCEDURE — 85025 COMPLETE CBC W/AUTO DIFF WBC: CPT | Performed by: INTERNAL MEDICINE

## 2021-01-01 PROCEDURE — 83735 ASSAY OF MAGNESIUM: CPT | Performed by: PHYSICIAN ASSISTANT

## 2021-01-01 PROCEDURE — 25010000002 FUROSEMIDE PER 20 MG: Performed by: INTERNAL MEDICINE

## 2021-01-01 PROCEDURE — 87899 AGENT NOS ASSAY W/OPTIC: CPT | Performed by: NURSE PRACTITIONER

## 2021-01-01 PROCEDURE — 25010000002 CEFEPIME PER 500 MG: Performed by: INTERNAL MEDICINE

## 2021-01-01 PROCEDURE — 83880 ASSAY OF NATRIURETIC PEPTIDE: CPT | Performed by: NURSE PRACTITIONER

## 2021-01-01 PROCEDURE — 94760 N-INVAS EAR/PLS OXIMETRY 1: CPT

## 2021-01-01 PROCEDURE — 93010 ELECTROCARDIOGRAM REPORT: CPT | Performed by: INTERNAL MEDICINE

## 2021-01-01 PROCEDURE — 25010000002 CEFTRIAXONE PER 250 MG: Performed by: PHYSICIAN ASSISTANT

## 2021-01-01 PROCEDURE — 97162 PT EVAL MOD COMPLEX 30 MIN: CPT

## 2021-01-01 PROCEDURE — 86923 COMPATIBILITY TEST ELECTRIC: CPT

## 2021-01-01 PROCEDURE — 93005 ELECTROCARDIOGRAM TRACING: CPT | Performed by: EMERGENCY MEDICINE

## 2021-01-01 PROCEDURE — 85025 COMPLETE CBC W/AUTO DIFF WBC: CPT | Performed by: HOSPITALIST

## 2021-01-01 PROCEDURE — 80048 BASIC METABOLIC PNL TOTAL CA: CPT | Performed by: INTERNAL MEDICINE

## 2021-01-01 PROCEDURE — 97535 SELF CARE MNGMENT TRAINING: CPT

## 2021-01-01 PROCEDURE — 86850 RBC ANTIBODY SCREEN: CPT | Performed by: PHYSICIAN ASSISTANT

## 2021-01-01 PROCEDURE — 36600 WITHDRAWAL OF ARTERIAL BLOOD: CPT

## 2021-01-01 PROCEDURE — 97166 OT EVAL MOD COMPLEX 45 MIN: CPT

## 2021-01-01 PROCEDURE — 84484 ASSAY OF TROPONIN QUANT: CPT

## 2021-01-01 PROCEDURE — 25010000002 CEFTRIAXONE PER 250 MG: Performed by: INTERNAL MEDICINE

## 2021-01-01 PROCEDURE — 85007 BL SMEAR W/DIFF WBC COUNT: CPT | Performed by: INTERNAL MEDICINE

## 2021-01-01 PROCEDURE — 97110 THERAPEUTIC EXERCISES: CPT

## 2021-01-01 PROCEDURE — 93005 ELECTROCARDIOGRAM TRACING: CPT | Performed by: INTERNAL MEDICINE

## 2021-01-01 PROCEDURE — 85007 BL SMEAR W/DIFF WBC COUNT: CPT | Performed by: HOSPITALIST

## 2021-01-01 PROCEDURE — 87205 SMEAR GRAM STAIN: CPT | Performed by: NURSE PRACTITIONER

## 2021-01-01 PROCEDURE — 63710000001 DIPHENHYDRAMINE PER 50 MG: Performed by: INTERNAL MEDICINE

## 2021-01-01 PROCEDURE — 82803 BLOOD GASES ANY COMBINATION: CPT

## 2021-01-01 PROCEDURE — 82728 ASSAY OF FERRITIN: CPT | Performed by: NURSE PRACTITIONER

## 2021-01-01 PROCEDURE — 84484 ASSAY OF TROPONIN QUANT: CPT | Performed by: NURSE PRACTITIONER

## 2021-01-01 PROCEDURE — 86901 BLOOD TYPING SEROLOGIC RH(D): CPT | Performed by: PHYSICIAN ASSISTANT

## 2021-01-01 PROCEDURE — 86900 BLOOD TYPING SEROLOGIC ABO: CPT | Performed by: PHYSICIAN ASSISTANT

## 2021-01-01 PROCEDURE — 88184 FLOWCYTOMETRY/ TC 1 MARKER: CPT | Performed by: INTERNAL MEDICINE

## 2021-01-01 PROCEDURE — 88182 CELL MARKER STUDY: CPT

## 2021-01-01 PROCEDURE — 82962 GLUCOSE BLOOD TEST: CPT

## 2021-01-01 PROCEDURE — 87040 BLOOD CULTURE FOR BACTERIA: CPT | Performed by: PHYSICIAN ASSISTANT

## 2021-01-01 PROCEDURE — 0202U NFCT DS 22 TRGT SARS-COV-2: CPT | Performed by: PHYSICIAN ASSISTANT

## 2021-01-01 PROCEDURE — 94660 CPAP INITIATION&MGMT: CPT

## 2021-01-01 PROCEDURE — 85007 BL SMEAR W/DIFF WBC COUNT: CPT | Performed by: EMERGENCY MEDICINE

## 2021-01-01 PROCEDURE — 99284 EMERGENCY DEPT VISIT MOD MDM: CPT

## 2021-01-01 PROCEDURE — 84484 ASSAY OF TROPONIN QUANT: CPT | Performed by: PHYSICIAN ASSISTANT

## 2021-01-01 PROCEDURE — 63710000001 INSULIN LISPRO (HUMAN) PER 5 UNITS: Performed by: INTERNAL MEDICINE

## 2021-01-01 PROCEDURE — U0004 COV-19 TEST NON-CDC HGH THRU: HCPCS | Performed by: PHYSICIAN ASSISTANT

## 2021-01-01 PROCEDURE — 85379 FIBRIN DEGRADATION QUANT: CPT | Performed by: INTERNAL MEDICINE

## 2021-01-01 PROCEDURE — 80053 COMPREHEN METABOLIC PANEL: CPT | Performed by: PHYSICIAN ASSISTANT

## 2021-01-01 PROCEDURE — 83605 ASSAY OF LACTIC ACID: CPT | Performed by: PHYSICIAN ASSISTANT

## 2021-01-01 PROCEDURE — 71250 CT THORAX DX C-: CPT

## 2021-01-01 PROCEDURE — 85007 BL SMEAR W/DIFF WBC COUNT: CPT | Performed by: PHYSICIAN ASSISTANT

## 2021-01-01 PROCEDURE — 87070 CULTURE OTHR SPECIMN AEROBIC: CPT | Performed by: NURSE PRACTITIONER

## 2021-01-01 PROCEDURE — 84484 ASSAY OF TROPONIN QUANT: CPT | Performed by: INTERNAL MEDICINE

## 2021-01-01 RX ORDER — SODIUM CHLORIDE 0.9 % (FLUSH) 0.9 %
10 SYRINGE (ML) INJECTION AS NEEDED
Status: DISCONTINUED | OUTPATIENT
Start: 2021-01-01 | End: 2021-01-01 | Stop reason: HOSPADM

## 2021-01-01 RX ORDER — LORAZEPAM 2 MG/ML
0.5 INJECTION INTRAMUSCULAR
Status: DISCONTINUED | OUTPATIENT
Start: 2021-01-01 | End: 2021-01-01

## 2021-01-01 RX ORDER — KETOROLAC TROMETHAMINE 30 MG/ML
15 INJECTION, SOLUTION INTRAMUSCULAR; INTRAVENOUS EVERY 6 HOURS PRN
Status: DISCONTINUED | OUTPATIENT
Start: 2021-01-01 | End: 2021-01-01 | Stop reason: HOSPADM

## 2021-01-01 RX ORDER — ATORVASTATIN CALCIUM 20 MG/1
10 TABLET, FILM COATED ORAL DAILY
Status: DISCONTINUED | OUTPATIENT
Start: 2021-01-01 | End: 2021-01-01 | Stop reason: HOSPADM

## 2021-01-01 RX ORDER — ALBUTEROL SULFATE 2.5 MG/3ML
2.5 SOLUTION RESPIRATORY (INHALATION) EVERY 6 HOURS PRN
Status: DISCONTINUED | OUTPATIENT
Start: 2021-01-01 | End: 2021-01-01

## 2021-01-01 RX ORDER — IPRATROPIUM BROMIDE AND ALBUTEROL SULFATE 2.5; .5 MG/3ML; MG/3ML
3 SOLUTION RESPIRATORY (INHALATION) EVERY 4 HOURS PRN
Status: DISCONTINUED | OUTPATIENT
Start: 2021-01-01 | End: 2021-01-01 | Stop reason: HOSPADM

## 2021-01-01 RX ORDER — ALPRAZOLAM 0.5 MG/1
0.5 TABLET ORAL NIGHTLY PRN
Status: DISCONTINUED | OUTPATIENT
Start: 2021-01-01 | End: 2021-01-01 | Stop reason: HOSPADM

## 2021-01-01 RX ORDER — LORAZEPAM 2 MG/ML
0.5 CONCENTRATE ORAL
Status: DISCONTINUED | OUTPATIENT
Start: 2021-01-01 | End: 2021-01-01 | Stop reason: HOSPADM

## 2021-01-01 RX ORDER — BUDESONIDE AND FORMOTEROL FUMARATE DIHYDRATE 80; 4.5 UG/1; UG/1
2 AEROSOL RESPIRATORY (INHALATION)
Status: DISCONTINUED | OUTPATIENT
Start: 2021-01-01 | End: 2021-01-01 | Stop reason: HOSPADM

## 2021-01-01 RX ORDER — SODIUM CHLORIDE 0.9 % (FLUSH) 0.9 %
10 SYRINGE (ML) INJECTION EVERY 12 HOURS SCHEDULED
Status: DISCONTINUED | OUTPATIENT
Start: 2021-01-01 | End: 2021-01-01 | Stop reason: HOSPADM

## 2021-01-01 RX ORDER — BUDESONIDE AND FORMOTEROL FUMARATE DIHYDRATE 80; 4.5 UG/1; UG/1
2 AEROSOL RESPIRATORY (INHALATION)
Status: DISCONTINUED | OUTPATIENT
Start: 2021-01-01 | End: 2021-01-01

## 2021-01-01 RX ORDER — MORPHINE SULFATE 2 MG/ML
2 INJECTION, SOLUTION INTRAMUSCULAR; INTRAVENOUS
Status: DISCONTINUED | OUTPATIENT
Start: 2021-01-01 | End: 2021-01-01 | Stop reason: HOSPADM

## 2021-01-01 RX ORDER — MORPHINE SULFATE 20 MG/ML
20 SOLUTION ORAL
Status: DISCONTINUED | OUTPATIENT
Start: 2021-01-01 | End: 2021-01-01 | Stop reason: HOSPADM

## 2021-01-01 RX ORDER — ACETAMINOPHEN 160 MG/5ML
650 SOLUTION ORAL EVERY 4 HOURS PRN
Status: DISCONTINUED | OUTPATIENT
Start: 2021-01-01 | End: 2021-01-01 | Stop reason: HOSPADM

## 2021-01-01 RX ORDER — SODIUM CHLORIDE 9 MG/ML
250 INJECTION, SOLUTION INTRAVENOUS AS NEEDED
Status: DISCONTINUED | OUTPATIENT
Start: 2021-01-01 | End: 2021-01-01 | Stop reason: HOSPADM

## 2021-01-01 RX ORDER — IPRATROPIUM BROMIDE AND ALBUTEROL SULFATE 2.5; .5 MG/3ML; MG/3ML
3 SOLUTION RESPIRATORY (INHALATION)
Status: DISCONTINUED | OUTPATIENT
Start: 2021-01-01 | End: 2021-01-01 | Stop reason: HOSPADM

## 2021-01-01 RX ORDER — METOPROLOL TARTRATE 50 MG/1
150 TABLET, FILM COATED ORAL 2 TIMES DAILY
Status: DISCONTINUED | OUTPATIENT
Start: 2021-01-01 | End: 2021-01-01 | Stop reason: HOSPADM

## 2021-01-01 RX ORDER — NITROGLYCERIN 0.4 MG/1
0.4 TABLET SUBLINGUAL
Status: DISCONTINUED | OUTPATIENT
Start: 2021-01-01 | End: 2021-01-01

## 2021-01-01 RX ORDER — LORAZEPAM 2 MG/ML
1 CONCENTRATE ORAL
Status: DISCONTINUED | OUTPATIENT
Start: 2021-01-01 | End: 2021-01-01 | Stop reason: HOSPADM

## 2021-01-01 RX ORDER — LORAZEPAM 2 MG/ML
2 INJECTION INTRAMUSCULAR
Status: DISCONTINUED | OUTPATIENT
Start: 2021-01-01 | End: 2021-01-01 | Stop reason: HOSPADM

## 2021-01-01 RX ORDER — ACETAMINOPHEN 325 MG/1
650 TABLET ORAL EVERY 4 HOURS PRN
Status: DISCONTINUED | OUTPATIENT
Start: 2021-01-01 | End: 2021-01-01 | Stop reason: HOSPADM

## 2021-01-01 RX ORDER — IPRATROPIUM BROMIDE AND ALBUTEROL SULFATE 2.5; .5 MG/3ML; MG/3ML
3 SOLUTION RESPIRATORY (INHALATION)
Status: DISCONTINUED | OUTPATIENT
Start: 2021-01-01 | End: 2021-01-01

## 2021-01-01 RX ORDER — CALCIUM CARBONATE 200(500)MG
2 TABLET,CHEWABLE ORAL 3 TIMES DAILY PRN
Status: DISCONTINUED | OUTPATIENT
Start: 2021-01-01 | End: 2021-01-01 | Stop reason: HOSPADM

## 2021-01-01 RX ORDER — HYDROMORPHONE HYDROCHLORIDE 1 MG/ML
0.5 INJECTION, SOLUTION INTRAMUSCULAR; INTRAVENOUS; SUBCUTANEOUS
Status: DISCONTINUED | OUTPATIENT
Start: 2021-01-01 | End: 2021-01-01 | Stop reason: HOSPADM

## 2021-01-01 RX ORDER — LORAZEPAM 2 MG/ML
0.5 INJECTION INTRAMUSCULAR
Status: DISCONTINUED | OUTPATIENT
Start: 2021-01-01 | End: 2021-01-01 | Stop reason: HOSPADM

## 2021-01-01 RX ORDER — MORPHINE SULFATE 2 MG/ML
4 INJECTION, SOLUTION INTRAMUSCULAR; INTRAVENOUS
Status: DISCONTINUED | OUTPATIENT
Start: 2021-01-01 | End: 2021-01-01

## 2021-01-01 RX ORDER — IPRATROPIUM BROMIDE AND ALBUTEROL SULFATE 2.5; .5 MG/3ML; MG/3ML
3 SOLUTION RESPIRATORY (INHALATION) ONCE
Status: COMPLETED | OUTPATIENT
Start: 2021-01-01 | End: 2021-01-01

## 2021-01-01 RX ORDER — DEXTROSE MONOHYDRATE 25 G/50ML
25 INJECTION, SOLUTION INTRAVENOUS
Status: DISCONTINUED | OUTPATIENT
Start: 2021-01-01 | End: 2021-01-01

## 2021-01-01 RX ORDER — ACETAMINOPHEN 325 MG/1
650 TABLET ORAL AS NEEDED
Status: DISCONTINUED | OUTPATIENT
Start: 2021-01-01 | End: 2021-01-01

## 2021-01-01 RX ORDER — INSULIN LISPRO 100 [IU]/ML
0-7 INJECTION, SOLUTION INTRAVENOUS; SUBCUTANEOUS
Status: DISCONTINUED | OUTPATIENT
Start: 2021-01-01 | End: 2021-01-01

## 2021-01-01 RX ORDER — ALPRAZOLAM 0.25 MG/1
0.25 TABLET ORAL ONCE
Status: COMPLETED | OUTPATIENT
Start: 2021-01-01 | End: 2021-01-01

## 2021-01-01 RX ORDER — NITROGLYCERIN 0.4 MG/1
0.4 TABLET SUBLINGUAL
Status: DISCONTINUED | OUTPATIENT
Start: 2021-01-01 | End: 2021-01-01 | Stop reason: HOSPADM

## 2021-01-01 RX ORDER — ONDANSETRON 2 MG/ML
4 INJECTION INTRAMUSCULAR; INTRAVENOUS EVERY 6 HOURS PRN
Status: DISCONTINUED | OUTPATIENT
Start: 2021-01-01 | End: 2021-01-01 | Stop reason: HOSPADM

## 2021-01-01 RX ORDER — FUROSEMIDE 40 MG/1
40 TABLET ORAL 2 TIMES DAILY
Status: DISCONTINUED | OUTPATIENT
Start: 2021-01-01 | End: 2021-01-01

## 2021-01-01 RX ORDER — ACETAMINOPHEN 325 MG/1
650 TABLET ORAL EVERY 4 HOURS PRN
Status: DISCONTINUED | OUTPATIENT
Start: 2021-01-01 | End: 2021-01-01

## 2021-01-01 RX ORDER — ATORVASTATIN CALCIUM 20 MG/1
10 TABLET, FILM COATED ORAL DAILY
Status: DISCONTINUED | OUTPATIENT
Start: 2021-01-01 | End: 2021-01-01

## 2021-01-01 RX ORDER — ACETAMINOPHEN 650 MG/1
650 SUPPOSITORY RECTAL EVERY 4 HOURS PRN
Status: DISCONTINUED | OUTPATIENT
Start: 2021-01-01 | End: 2021-01-01 | Stop reason: HOSPADM

## 2021-01-01 RX ORDER — CEFTRIAXONE SODIUM 1 G/50ML
1 INJECTION, SOLUTION INTRAVENOUS EVERY 24 HOURS
Status: DISCONTINUED | OUTPATIENT
Start: 2021-01-01 | End: 2021-01-01

## 2021-01-01 RX ORDER — LORAZEPAM 2 MG/ML
1 INJECTION INTRAMUSCULAR
Status: DISCONTINUED | OUTPATIENT
Start: 2021-01-01 | End: 2021-01-01 | Stop reason: HOSPADM

## 2021-01-01 RX ORDER — DILTIAZEM HYDROCHLORIDE 60 MG/1
60 TABLET, FILM COATED ORAL 2 TIMES DAILY
Status: DISCONTINUED | OUTPATIENT
Start: 2021-01-01 | End: 2021-01-01 | Stop reason: HOSPADM

## 2021-01-01 RX ORDER — FOLIC ACID 1 MG/1
1 TABLET ORAL DAILY
Status: DISCONTINUED | OUTPATIENT
Start: 2021-01-01 | End: 2021-01-01 | Stop reason: HOSPADM

## 2021-01-01 RX ORDER — METOPROLOL TARTRATE 50 MG/1
150 TABLET, FILM COATED ORAL 2 TIMES DAILY
Status: DISCONTINUED | OUTPATIENT
Start: 2021-01-01 | End: 2021-01-01

## 2021-01-01 RX ORDER — MORPHINE SULFATE 20 MG/ML
10 SOLUTION ORAL
Status: DISCONTINUED | OUTPATIENT
Start: 2021-01-01 | End: 2021-01-01 | Stop reason: HOSPADM

## 2021-01-01 RX ORDER — MORPHINE SULFATE 10 MG/ML
6 INJECTION INTRAMUSCULAR; INTRAVENOUS; SUBCUTANEOUS
Status: DISCONTINUED | OUTPATIENT
Start: 2021-01-01 | End: 2021-01-01 | Stop reason: HOSPADM

## 2021-01-01 RX ORDER — PREDNISONE 20 MG/1
40 TABLET ORAL
Status: COMPLETED | OUTPATIENT
Start: 2021-01-01 | End: 2021-01-01

## 2021-01-01 RX ORDER — DIPHENOXYLATE HYDROCHLORIDE AND ATROPINE SULFATE 2.5; .025 MG/1; MG/1
1 TABLET ORAL
Status: DISCONTINUED | OUTPATIENT
Start: 2021-01-01 | End: 2021-01-01 | Stop reason: HOSPADM

## 2021-01-01 RX ORDER — ACETAMINOPHEN 325 MG/1
650 TABLET ORAL AS NEEDED
Status: DISCONTINUED | OUTPATIENT
Start: 2021-01-01 | End: 2021-01-01 | Stop reason: HOSPADM

## 2021-01-01 RX ORDER — FOLIC ACID 1 MG/1
1 TABLET ORAL DAILY
Status: DISCONTINUED | OUTPATIENT
Start: 2021-01-01 | End: 2021-01-01

## 2021-01-01 RX ORDER — ONDANSETRON 2 MG/ML
4 INJECTION INTRAMUSCULAR; INTRAVENOUS EVERY 6 HOURS PRN
Status: DISCONTINUED | OUTPATIENT
Start: 2021-01-01 | End: 2021-01-01

## 2021-01-01 RX ORDER — CEFDINIR 300 MG/1
300 CAPSULE ORAL EVERY 12 HOURS SCHEDULED
Status: DISCONTINUED | OUTPATIENT
Start: 2021-01-01 | End: 2021-01-01 | Stop reason: HOSPADM

## 2021-01-01 RX ORDER — FUROSEMIDE 10 MG/ML
80 INJECTION INTRAMUSCULAR; INTRAVENOUS ONCE
Status: COMPLETED | OUTPATIENT
Start: 2021-01-01 | End: 2021-01-01

## 2021-01-01 RX ORDER — NICOTINE POLACRILEX 4 MG
15 LOZENGE BUCCAL
Status: DISCONTINUED | OUTPATIENT
Start: 2021-01-01 | End: 2021-01-01

## 2021-01-01 RX ORDER — GUAIFENESIN 600 MG/1
1200 TABLET, EXTENDED RELEASE ORAL EVERY 12 HOURS
Status: DISCONTINUED | OUTPATIENT
Start: 2021-01-01 | End: 2021-01-01 | Stop reason: HOSPADM

## 2021-01-01 RX ORDER — CEFDINIR 300 MG/1
300 CAPSULE ORAL EVERY 12 HOURS SCHEDULED
Qty: 8 CAPSULE | Refills: 0 | Status: SHIPPED | OUTPATIENT
Start: 2021-01-01 | End: 2021-01-01

## 2021-01-01 RX ORDER — CALCIUM CARBONATE 200(500)MG
2 TABLET,CHEWABLE ORAL ONCE
Status: COMPLETED | OUTPATIENT
Start: 2021-01-01 | End: 2021-01-01

## 2021-01-01 RX ORDER — GUAIFENESIN 600 MG/1
1200 TABLET, EXTENDED RELEASE ORAL EVERY 12 HOURS
Status: DISCONTINUED | OUTPATIENT
Start: 2021-01-01 | End: 2021-01-01

## 2021-01-01 RX ORDER — MORPHINE SULFATE 20 MG/ML
5 SOLUTION ORAL
Status: DISCONTINUED | OUTPATIENT
Start: 2021-01-01 | End: 2021-01-01 | Stop reason: HOSPADM

## 2021-01-01 RX ORDER — FUROSEMIDE 10 MG/ML
40 INJECTION INTRAMUSCULAR; INTRAVENOUS EVERY 8 HOURS
Status: COMPLETED | OUTPATIENT
Start: 2021-01-01 | End: 2021-01-01

## 2021-01-01 RX ORDER — HYDROMORPHONE HCL 110MG/55ML
1.5 PATIENT CONTROLLED ANALGESIA SYRINGE INTRAVENOUS
Status: DISCONTINUED | OUTPATIENT
Start: 2021-01-01 | End: 2021-01-01 | Stop reason: HOSPADM

## 2021-01-01 RX ORDER — ISOSORBIDE DINITRATE 10 MG/1
10 TABLET ORAL
Status: DISCONTINUED | OUTPATIENT
Start: 2021-01-01 | End: 2021-01-01 | Stop reason: HOSPADM

## 2021-01-01 RX ORDER — ALPRAZOLAM 0.5 MG/1
0.5 TABLET ORAL ONCE
Status: COMPLETED | OUTPATIENT
Start: 2021-01-01 | End: 2021-01-01

## 2021-01-01 RX ORDER — PREDNISONE 20 MG/1
40 TABLET ORAL
Status: DISCONTINUED | OUTPATIENT
Start: 2021-01-01 | End: 2021-01-01 | Stop reason: HOSPADM

## 2021-01-01 RX ORDER — FUROSEMIDE 40 MG/1
40 TABLET ORAL 2 TIMES DAILY
Status: DISCONTINUED | OUTPATIENT
Start: 2021-01-01 | End: 2021-01-01 | Stop reason: HOSPADM

## 2021-01-01 RX ORDER — ALPRAZOLAM 0.5 MG/1
0.5 TABLET ORAL NIGHTLY PRN
Status: DISCONTINUED | OUTPATIENT
Start: 2021-01-01 | End: 2021-01-01

## 2021-01-01 RX ORDER — FUROSEMIDE 40 MG/1
TABLET ORAL
Qty: 180 TABLET | Refills: 3 | Status: SHIPPED | OUTPATIENT
Start: 2021-01-01

## 2021-01-01 RX ORDER — DIPHENHYDRAMINE HCL 25 MG
25 CAPSULE ORAL AS NEEDED
Status: DISCONTINUED | OUTPATIENT
Start: 2021-01-01 | End: 2021-01-01

## 2021-01-01 RX ORDER — SODIUM CHLORIDE 0.9 % (FLUSH) 0.9 %
10 SYRINGE (ML) INJECTION EVERY 12 HOURS SCHEDULED
Status: DISCONTINUED | OUTPATIENT
Start: 2021-01-01 | End: 2021-01-01

## 2021-01-01 RX ORDER — DILTIAZEM HYDROCHLORIDE 60 MG/1
60 TABLET, FILM COATED ORAL 2 TIMES DAILY
Status: DISCONTINUED | OUTPATIENT
Start: 2021-01-01 | End: 2021-01-01

## 2021-01-01 RX ORDER — MORPHINE SULFATE 4 MG/ML
4 INJECTION, SOLUTION INTRAMUSCULAR; INTRAVENOUS
Status: DISCONTINUED | OUTPATIENT
Start: 2021-01-01 | End: 2021-01-01 | Stop reason: HOSPADM

## 2021-01-01 RX ORDER — METHYLPREDNISOLONE SODIUM SUCCINATE 40 MG/ML
40 INJECTION, POWDER, LYOPHILIZED, FOR SOLUTION INTRAMUSCULAR; INTRAVENOUS EVERY 8 HOURS
Status: DISCONTINUED | OUTPATIENT
Start: 2021-01-01 | End: 2021-01-01

## 2021-01-01 RX ORDER — CEFTRIAXONE SODIUM 1 G/50ML
1 INJECTION, SOLUTION INTRAVENOUS ONCE
Status: COMPLETED | OUTPATIENT
Start: 2021-01-01 | End: 2021-01-01

## 2021-01-01 RX ORDER — DIPHENHYDRAMINE HCL 25 MG
25 CAPSULE ORAL AS NEEDED
Status: DISCONTINUED | OUTPATIENT
Start: 2021-01-01 | End: 2021-01-01 | Stop reason: HOSPADM

## 2021-01-01 RX ORDER — LORAZEPAM 2 MG/ML
0.5 CONCENTRATE ORAL
Status: DISCONTINUED | OUTPATIENT
Start: 2021-01-01 | End: 2021-01-01

## 2021-01-01 RX ORDER — IPRATROPIUM BROMIDE AND ALBUTEROL SULFATE 2.5; .5 MG/3ML; MG/3ML
3 SOLUTION RESPIRATORY (INHALATION) EVERY 4 HOURS PRN
Status: DISCONTINUED | OUTPATIENT
Start: 2021-01-01 | End: 2021-01-01

## 2021-01-01 RX ORDER — ALBUTEROL SULFATE 90 UG/1
2 AEROSOL, METERED RESPIRATORY (INHALATION) EVERY 6 HOURS PRN
Status: DISCONTINUED | OUTPATIENT
Start: 2021-01-01 | End: 2021-01-01 | Stop reason: HOSPADM

## 2021-01-01 RX ORDER — LORAZEPAM 2 MG/ML
2 CONCENTRATE ORAL
Status: DISCONTINUED | OUTPATIENT
Start: 2021-01-01 | End: 2021-01-01 | Stop reason: HOSPADM

## 2021-01-01 RX ORDER — IPRATROPIUM BROMIDE AND ALBUTEROL SULFATE 2.5; .5 MG/3ML; MG/3ML
3 SOLUTION RESPIRATORY (INHALATION) EVERY 4 HOURS PRN
Status: DISCONTINUED | OUTPATIENT
Start: 2021-01-01 | End: 2021-01-01 | Stop reason: SDUPTHER

## 2021-01-01 RX ORDER — DIPHENHYDRAMINE HCL 25 MG
25 CAPSULE ORAL ONCE
Status: COMPLETED | OUTPATIENT
Start: 2021-01-01 | End: 2021-01-01

## 2021-01-01 RX ADMIN — ALPRAZOLAM 0.25 MG: 0.25 TABLET ORAL at 23:23

## 2021-01-01 RX ADMIN — IPRATROPIUM BROMIDE AND ALBUTEROL SULFATE 3 ML: 2.5; .5 SOLUTION RESPIRATORY (INHALATION) at 15:51

## 2021-01-01 RX ADMIN — GUAIFENESIN 1200 MG: 600 TABLET, EXTENDED RELEASE ORAL at 23:37

## 2021-01-01 RX ADMIN — CEFEPIME HYDROCHLORIDE 2 G: 2 INJECTION, POWDER, FOR SOLUTION INTRAVENOUS at 05:43

## 2021-01-01 RX ADMIN — SODIUM CHLORIDE, PRESERVATIVE FREE 10 ML: 5 INJECTION INTRAVENOUS at 21:16

## 2021-01-01 RX ADMIN — ACETAMINOPHEN 650 MG: 325 TABLET, FILM COATED ORAL at 19:54

## 2021-01-01 RX ADMIN — METOPROLOL TARTRATE 150 MG: 50 TABLET, FILM COATED ORAL at 23:37

## 2021-01-01 RX ADMIN — CEFEPIME HYDROCHLORIDE 2 G: 2 INJECTION, POWDER, FOR SOLUTION INTRAVENOUS at 11:38

## 2021-01-01 RX ADMIN — PREDNISONE 40 MG: 20 TABLET ORAL at 09:32

## 2021-01-01 RX ADMIN — DILTIAZEM HYDROCHLORIDE 60 MG: 60 TABLET, FILM COATED ORAL at 08:32

## 2021-01-01 RX ADMIN — LORAZEPAM 0.5 MG: 2 INJECTION INTRAMUSCULAR; INTRAVENOUS at 21:07

## 2021-01-01 RX ADMIN — CEFDINIR 300 MG: 300 CAPSULE ORAL at 09:34

## 2021-01-01 RX ADMIN — ATORVASTATIN CALCIUM 10 MG: 20 TABLET, FILM COATED ORAL at 08:31

## 2021-01-01 RX ADMIN — IPRATROPIUM BROMIDE AND ALBUTEROL SULFATE 3 ML: 2.5; .5 SOLUTION RESPIRATORY (INHALATION) at 11:12

## 2021-01-01 RX ADMIN — IPRATROPIUM BROMIDE AND ALBUTEROL SULFATE 3 ML: 2.5; .5 SOLUTION RESPIRATORY (INHALATION) at 10:59

## 2021-01-01 RX ADMIN — BUDESONIDE AND FORMOTEROL FUMARATE DIHYDRATE 2 PUFF: 80; 4.5 AEROSOL RESPIRATORY (INHALATION) at 08:32

## 2021-01-01 RX ADMIN — SODIUM CHLORIDE, PRESERVATIVE FREE 10 ML: 5 INJECTION INTRAVENOUS at 10:23

## 2021-01-01 RX ADMIN — MORPHINE SULFATE 5 MG: 100 SOLUTION ORAL at 16:45

## 2021-01-01 RX ADMIN — SODIUM CHLORIDE, PRESERVATIVE FREE 10 ML: 5 INJECTION INTRAVENOUS at 20:07

## 2021-01-01 RX ADMIN — BUDESONIDE AND FORMOTEROL FUMARATE DIHYDRATE 2 PUFF: 80; 4.5 AEROSOL RESPIRATORY (INHALATION) at 11:20

## 2021-01-01 RX ADMIN — PREDNISONE 40 MG: 20 TABLET ORAL at 11:59

## 2021-01-01 RX ADMIN — GUAIFENESIN 1200 MG: 600 TABLET, EXTENDED RELEASE ORAL at 23:07

## 2021-01-01 RX ADMIN — ATORVASTATIN CALCIUM 10 MG: 20 TABLET, FILM COATED ORAL at 08:44

## 2021-01-01 RX ADMIN — DILTIAZEM HYDROCHLORIDE 60 MG: 60 TABLET, FILM COATED ORAL at 08:08

## 2021-01-01 RX ADMIN — IPRATROPIUM BROMIDE AND ALBUTEROL SULFATE 3 ML: 2.5; .5 SOLUTION RESPIRATORY (INHALATION) at 16:00

## 2021-01-01 RX ADMIN — FUROSEMIDE 40 MG: 40 TABLET ORAL at 23:36

## 2021-01-01 RX ADMIN — GUAIFENESIN 1200 MG: 600 TABLET, EXTENDED RELEASE ORAL at 11:58

## 2021-01-01 RX ADMIN — IPRATROPIUM BROMIDE AND ALBUTEROL SULFATE 3 ML: 2.5; .5 SOLUTION RESPIRATORY (INHALATION) at 12:00

## 2021-01-01 RX ADMIN — ATORVASTATIN CALCIUM 10 MG: 20 TABLET, FILM COATED ORAL at 13:04

## 2021-01-01 RX ADMIN — SODIUM CHLORIDE, PRESERVATIVE FREE 10 ML: 5 INJECTION INTRAVENOUS at 11:54

## 2021-01-01 RX ADMIN — METOPROLOL TARTRATE 150 MG: 50 TABLET, FILM COATED ORAL at 09:34

## 2021-01-01 RX ADMIN — PREDNISONE 40 MG: 20 TABLET ORAL at 09:23

## 2021-01-01 RX ADMIN — FOLIC ACID 1 MG: 1 TABLET ORAL at 13:04

## 2021-01-01 RX ADMIN — DIPHENHYDRAMINE HYDROCHLORIDE 25 MG: 25 CAPSULE ORAL at 09:16

## 2021-01-01 RX ADMIN — ANTACID TABLETS 2 TABLET: 500 TABLET, CHEWABLE ORAL at 22:35

## 2021-01-01 RX ADMIN — BUDESONIDE AND FORMOTEROL FUMARATE DIHYDRATE 2 PUFF: 80; 4.5 AEROSOL RESPIRATORY (INHALATION) at 19:36

## 2021-01-01 RX ADMIN — FUROSEMIDE 80 MG: 10 INJECTION, SOLUTION INTRAMUSCULAR; INTRAVENOUS at 15:50

## 2021-01-01 RX ADMIN — METOPROLOL TARTRATE 150 MG: 50 TABLET, FILM COATED ORAL at 08:08

## 2021-01-01 RX ADMIN — GUAIFENESIN 1200 MG: 600 TABLET, EXTENDED RELEASE ORAL at 12:46

## 2021-01-01 RX ADMIN — METOPROLOL TARTRATE 150 MG: 50 TABLET, FILM COATED ORAL at 20:00

## 2021-01-01 RX ADMIN — DILTIAZEM HYDROCHLORIDE 60 MG: 60 TABLET, FILM COATED ORAL at 09:34

## 2021-01-01 RX ADMIN — ATORVASTATIN CALCIUM 10 MG: 20 TABLET, FILM COATED ORAL at 09:34

## 2021-01-01 RX ADMIN — SODIUM CHLORIDE, PRESERVATIVE FREE 10 ML: 5 INJECTION INTRAVENOUS at 09:37

## 2021-01-01 RX ADMIN — FUROSEMIDE 40 MG: 10 INJECTION, SOLUTION INTRAMUSCULAR; INTRAVENOUS at 22:48

## 2021-01-01 RX ADMIN — PREDNISONE 40 MG: 20 TABLET ORAL at 08:32

## 2021-01-01 RX ADMIN — DILTIAZEM HYDROCHLORIDE 60 MG: 60 TABLET, FILM COATED ORAL at 21:17

## 2021-01-01 RX ADMIN — CEFDINIR 300 MG: 300 CAPSULE ORAL at 21:17

## 2021-01-01 RX ADMIN — CEFEPIME HYDROCHLORIDE 2 G: 2 INJECTION, POWDER, FOR SOLUTION INTRAVENOUS at 08:33

## 2021-01-01 RX ADMIN — METOPROLOL TARTRATE 150 MG: 50 TABLET, FILM COATED ORAL at 21:08

## 2021-01-01 RX ADMIN — LORAZEPAM 1 MG: 2 INJECTION INTRAMUSCULAR; INTRAVENOUS at 05:24

## 2021-01-01 RX ADMIN — FOLIC ACID 1 MG: 1 TABLET ORAL at 08:08

## 2021-01-01 RX ADMIN — FUROSEMIDE 40 MG: 40 TABLET ORAL at 09:34

## 2021-01-01 RX ADMIN — PREDNISONE 40 MG: 20 TABLET ORAL at 08:08

## 2021-01-01 RX ADMIN — IPRATROPIUM BROMIDE AND ALBUTEROL SULFATE 3 ML: 2.5; .5 SOLUTION RESPIRATORY (INHALATION) at 13:30

## 2021-01-01 RX ADMIN — METOPROLOL TARTRATE 150 MG: 50 TABLET, FILM COATED ORAL at 08:45

## 2021-01-01 RX ADMIN — IPRATROPIUM BROMIDE AND ALBUTEROL SULFATE 3 ML: 2.5; .5 SOLUTION RESPIRATORY (INHALATION) at 21:05

## 2021-01-01 RX ADMIN — TIOTROPIUM BROMIDE INHALATION SPRAY 2 PUFF: 3.12 SPRAY, METERED RESPIRATORY (INHALATION) at 09:12

## 2021-01-01 RX ADMIN — ISOSORBIDE DINITRATE 10 MG: 10 TABLET ORAL at 08:44

## 2021-01-01 RX ADMIN — ALPRAZOLAM 0.5 MG: 0.5 TABLET ORAL at 00:02

## 2021-01-01 RX ADMIN — MORPHINE SULFATE 2 MG: 2 INJECTION, SOLUTION INTRAMUSCULAR; INTRAVENOUS at 05:24

## 2021-01-01 RX ADMIN — FOLIC ACID 1 MG: 1 TABLET ORAL at 09:34

## 2021-01-01 RX ADMIN — GUAIFENESIN 1200 MG: 600 TABLET, EXTENDED RELEASE ORAL at 22:36

## 2021-01-01 RX ADMIN — GUAIFENESIN 1200 MG: 600 TABLET, EXTENDED RELEASE ORAL at 22:18

## 2021-01-01 RX ADMIN — FOLIC ACID 1 MG: 1 TABLET ORAL at 08:44

## 2021-01-01 RX ADMIN — LORAZEPAM 0.5 MG: 2 INJECTION INTRAMUSCULAR; INTRAVENOUS at 17:30

## 2021-01-01 RX ADMIN — MORPHINE SULFATE 2 MG: 2 INJECTION, SOLUTION INTRAMUSCULAR; INTRAVENOUS at 23:08

## 2021-01-01 RX ADMIN — BUDESONIDE AND FORMOTEROL FUMARATE DIHYDRATE 2 PUFF: 80; 4.5 AEROSOL RESPIRATORY (INHALATION) at 19:50

## 2021-01-01 RX ADMIN — SODIUM CHLORIDE, PRESERVATIVE FREE 10 ML: 5 INJECTION INTRAVENOUS at 08:36

## 2021-01-01 RX ADMIN — TIOTROPIUM BROMIDE INHALATION SPRAY 2 PUFF: 3.12 SPRAY, METERED RESPIRATORY (INHALATION) at 11:04

## 2021-01-01 RX ADMIN — ANTACID TABLETS 2 TABLET: 500 TABLET, CHEWABLE ORAL at 16:45

## 2021-01-01 RX ADMIN — CEFTRIAXONE SODIUM 1 G: 1 INJECTION, SOLUTION INTRAVENOUS at 16:31

## 2021-01-01 RX ADMIN — BUDESONIDE AND FORMOTEROL FUMARATE DIHYDRATE 2 PUFF: 80; 4.5 AEROSOL RESPIRATORY (INHALATION) at 07:08

## 2021-01-01 RX ADMIN — IPRATROPIUM BROMIDE AND ALBUTEROL SULFATE 3 ML: 2.5; .5 SOLUTION RESPIRATORY (INHALATION) at 20:16

## 2021-01-01 RX ADMIN — MORPHINE SULFATE 2 MG: 2 INJECTION, SOLUTION INTRAMUSCULAR; INTRAVENOUS at 18:35

## 2021-01-01 RX ADMIN — MORPHINE SULFATE 2 MG: 2 INJECTION, SOLUTION INTRAMUSCULAR; INTRAVENOUS at 02:54

## 2021-01-01 RX ADMIN — NITROGLYCERIN 1 INCH: 20 OINTMENT TOPICAL at 06:08

## 2021-01-01 RX ADMIN — FUROSEMIDE 40 MG: 40 TABLET ORAL at 21:17

## 2021-01-01 RX ADMIN — FUROSEMIDE 40 MG: 10 INJECTION, SOLUTION INTRAMUSCULAR; INTRAVENOUS at 16:31

## 2021-01-01 RX ADMIN — DILTIAZEM HYDROCHLORIDE 60 MG: 60 TABLET, FILM COATED ORAL at 23:37

## 2021-01-01 RX ADMIN — FUROSEMIDE 40 MG: 40 TABLET ORAL at 08:32

## 2021-01-01 RX ADMIN — SODIUM CHLORIDE, PRESERVATIVE FREE 10 ML: 5 INJECTION INTRAVENOUS at 23:38

## 2021-01-01 RX ADMIN — IPRATROPIUM BROMIDE AND ALBUTEROL SULFATE 3 ML: 2.5; .5 SOLUTION RESPIRATORY (INHALATION) at 23:25

## 2021-01-01 RX ADMIN — ALPRAZOLAM 0.25 MG: 0.25 TABLET ORAL at 23:11

## 2021-01-01 RX ADMIN — MORPHINE SULFATE 2 MG: 2 INJECTION, SOLUTION INTRAMUSCULAR; INTRAVENOUS at 20:07

## 2021-01-01 RX ADMIN — DIPHENHYDRAMINE HYDROCHLORIDE 25 MG: 25 CAPSULE ORAL at 19:54

## 2021-01-01 RX ADMIN — LORAZEPAM 1 MG: 2 INJECTION INTRAMUSCULAR; INTRAVENOUS at 02:54

## 2021-01-01 RX ADMIN — INSULIN LISPRO 2 UNITS: 100 INJECTION, SOLUTION INTRAVENOUS; SUBCUTANEOUS at 18:01

## 2021-01-01 RX ADMIN — IPRATROPIUM BROMIDE AND ALBUTEROL SULFATE 3 ML: 2.5; .5 SOLUTION RESPIRATORY (INHALATION) at 20:47

## 2021-01-01 RX ADMIN — DILTIAZEM HYDROCHLORIDE 60 MG: 60 TABLET, FILM COATED ORAL at 08:45

## 2021-01-01 RX ADMIN — CEFEPIME HYDROCHLORIDE 2 G: 2 INJECTION, POWDER, FOR SOLUTION INTRAVENOUS at 19:38

## 2021-01-01 RX ADMIN — IPRATROPIUM BROMIDE AND ALBUTEROL SULFATE 3 ML: 2.5; .5 SOLUTION RESPIRATORY (INHALATION) at 01:04

## 2021-01-01 RX ADMIN — TIOTROPIUM BROMIDE INHALATION SPRAY 2 PUFF: 3.12 SPRAY, METERED RESPIRATORY (INHALATION) at 08:32

## 2021-01-01 RX ADMIN — FOLIC ACID 1 MG: 1 TABLET ORAL at 08:32

## 2021-01-01 RX ADMIN — IPRATROPIUM BROMIDE AND ALBUTEROL SULFATE 3 ML: 2.5; .5 SOLUTION RESPIRATORY (INHALATION) at 07:37

## 2021-01-01 RX ADMIN — GUAIFENESIN 1200 MG: 600 TABLET, EXTENDED RELEASE ORAL at 11:59

## 2021-01-01 RX ADMIN — ALPRAZOLAM 0.5 MG: 0.5 TABLET ORAL at 22:48

## 2021-01-01 RX ADMIN — GUAIFENESIN 1200 MG: 600 TABLET, EXTENDED RELEASE ORAL at 13:04

## 2021-01-01 RX ADMIN — IPRATROPIUM BROMIDE AND ALBUTEROL SULFATE 3 ML: 2.5; .5 SOLUTION RESPIRATORY (INHALATION) at 09:21

## 2021-01-01 RX ADMIN — GUAIFENESIN 1200 MG: 600 TABLET, EXTENDED RELEASE ORAL at 10:54

## 2021-01-01 RX ADMIN — LORAZEPAM 1 MG: 2 INJECTION INTRAMUSCULAR; INTRAVENOUS at 23:08

## 2021-01-01 RX ADMIN — GUAIFENESIN 1200 MG: 600 TABLET, EXTENDED RELEASE ORAL at 23:09

## 2021-01-01 RX ADMIN — FUROSEMIDE 40 MG: 40 TABLET ORAL at 08:08

## 2021-01-01 RX ADMIN — IPRATROPIUM BROMIDE AND ALBUTEROL SULFATE 3 ML: 2.5; .5 SOLUTION RESPIRATORY (INHALATION) at 23:46

## 2021-01-01 RX ADMIN — ALPRAZOLAM 0.5 MG: 0.5 TABLET ORAL at 00:38

## 2021-01-01 RX ADMIN — LORAZEPAM 0.5 MG: 2 INJECTION INTRAMUSCULAR; INTRAVENOUS at 20:03

## 2021-01-01 RX ADMIN — METOPROLOL TARTRATE 150 MG: 50 TABLET, FILM COATED ORAL at 08:32

## 2021-01-01 RX ADMIN — METOPROLOL TARTRATE 150 MG: 50 TABLET, FILM COATED ORAL at 21:16

## 2021-01-01 RX ADMIN — ISOSORBIDE DINITRATE 10 MG: 10 TABLET ORAL at 18:01

## 2021-01-01 RX ADMIN — SODIUM CHLORIDE, PRESERVATIVE FREE 10 ML: 5 INJECTION INTRAVENOUS at 08:08

## 2021-01-01 RX ADMIN — CEFTRIAXONE SODIUM 1 G: 1 INJECTION, SOLUTION INTRAVENOUS at 17:00

## 2021-01-01 RX ADMIN — IPRATROPIUM BROMIDE AND ALBUTEROL SULFATE 3 ML: 2.5; .5 SOLUTION RESPIRATORY (INHALATION) at 11:11

## 2021-01-01 RX ADMIN — DILTIAZEM HYDROCHLORIDE 60 MG: 60 TABLET, FILM COATED ORAL at 22:35

## 2021-01-01 RX ADMIN — BUDESONIDE AND FORMOTEROL FUMARATE DIHYDRATE 2 PUFF: 80; 4.5 AEROSOL RESPIRATORY (INHALATION) at 01:16

## 2021-01-01 RX ADMIN — DILTIAZEM HYDROCHLORIDE 60 MG: 60 TABLET, FILM COATED ORAL at 13:04

## 2021-01-01 RX ADMIN — METOPROLOL TARTRATE 150 MG: 50 TABLET, FILM COATED ORAL at 00:38

## 2021-01-01 RX ADMIN — ATORVASTATIN CALCIUM 10 MG: 20 TABLET, FILM COATED ORAL at 08:08

## 2021-01-01 RX ADMIN — SODIUM CHLORIDE, PRESERVATIVE FREE 10 ML: 5 INJECTION INTRAVENOUS at 21:08

## 2021-01-01 RX ADMIN — CEFEPIME HYDROCHLORIDE 2 G: 2 INJECTION, POWDER, FOR SOLUTION INTRAVENOUS at 18:36

## 2021-01-01 RX ADMIN — IPRATROPIUM BROMIDE AND ALBUTEROL SULFATE 3 ML: 2.5; .5 SOLUTION RESPIRATORY (INHALATION) at 15:20

## 2021-01-06 PROBLEM — J96.10 CHRONIC RESPIRATORY FAILURE (HCC): Status: ACTIVE | Noted: 2021-01-01

## 2021-01-06 PROBLEM — R07.9 CHEST PAIN: Status: ACTIVE | Noted: 2021-01-01

## 2021-01-06 PROBLEM — I25.10 CORONARY ARTERY DISEASE INVOLVING NATIVE CORONARY ARTERY OF NATIVE HEART: Status: ACTIVE | Noted: 2021-01-01

## 2021-01-06 PROBLEM — Z85.118 HISTORY OF LUNG CANCER: Status: ACTIVE | Noted: 2021-01-01

## 2021-01-06 PROBLEM — N18.32 STAGE 3B CHRONIC KIDNEY DISEASE (HCC): Status: ACTIVE | Noted: 2021-01-01

## 2021-01-06 PROBLEM — E66.3 OVERWEIGHT (BMI 25.0-29.9): Status: ACTIVE | Noted: 2021-01-01

## 2021-01-08 NOTE — TELEPHONE ENCOUNTER
Returned call to daughter regarding update on her father in the hospital.  Reviewed lab results with daughter and orders for transfusion.  She was asking why her father was moved to a new room.  Explained I was not able to answer that question.  Informed her that Dr. Aabrca has been following him in the hospital.  Encouraged her to call and ask for the unit her dad is on.  Once reaching the unit she should ask for the nurse taking care of her dad.  She was concerned about discharge to home and was asking about speaking to a .  Encouraged to ask for a call from a CCP RN when she speaks to the nurse.  Daughter v/u.

## 2021-01-09 PROBLEM — D46.9 MYELODYSPLASIA (MYELODYSPLASTIC SYNDROME) (HCC): Chronic | Status: ACTIVE | Noted: 2019-07-09

## 2021-01-09 NOTE — OUTREACH NOTE
Prep Survey      Responses   [facility] patient discharged from?  [place]   Is LACE score < 7 ?  No   Emergency Room discharge w/ pulse ox?  No   Eligibility  [facility]   Date of Admission  01/06/21   Date of Discharge  01/09/21   Discharge Disposition  Home or Self Care   Discharge diagnosis  bilateral lower lobe PNA,    acute/chronic diastolic CHF    Does the patient have one of the following disease processes/diagnoses(primary or secondary)?  COPD/Pneumonia   Does the patient have Home health ordered?  No   Is there a DME ordered?  Yes   What DME was ordered?  [facility] [employer]   Prep survey completed?  Yes          Miri Dunbar RN

## 2021-01-11 NOTE — OUTREACH NOTE
Call Center TCM Note      Responses   [facility] patient discharged from?  [place]   Does the patient have one of the following disease processes/diagnoses(primary or secondary)?  COPD/Pneumonia   Was the primary reason for admission:  Pneumonia   TCM attempt successful?  Yes   Call start time  1521   Call end time  1528   Discharge diagnosis  bilateral lower lobe PNA,    acute/chronic diastolic CHF    Meds reviewed with patient/caregiver?  Yes   Is the patient having any side effects they believe may be caused by any medication additions or changes?  No   Does the patient have all medications ordered at discharge?  Yes   Is the patient taking all medications as directed (includes completed medication regime)?  Yes   Does the patient have a primary care provider?   Yes   Does the patient have an appointment with their PCP or specialist within 7 days of discharge?  Yes   Comments regarding PCP  Dr. Lagos has a telehealth hospital followup scheduled on 1/13/2021   Has the patient kept scheduled appointments due by today?  N/A   Has home health visited the patient within 72 hours of discharge?  N/A   What DME was ordered?  [place] - [employer]   Has all DME been delivered?  Yes   Comments  Patient is having SOB. I encouraged him to weigh daily. He is using his 02 as directed. He has a telehealth followup on 1/13/2021   Did the patient receive a copy of their discharge instructions?  Yes   Nursing interventions  Reviewed instructions with patient   What is the patient's perception of their health status since discharge?  Improving   Nursing Interventions  Nurse provided patient education   Are the patient's immunizations up to date?   Yes   Nursing interventions  Educated on importance of maintaining up to date immunizations as advised by provider   If the patient is a current smoker, are they able to teach back resources for cessation?  Not a smoker   Is the patient/caregiver able to teach back the hierarchy of  who to call/visit for symptoms/problems? PCP, Specialist, Home health nurse, Urgent Care, ED, 911  Yes   Additional teach back comments  If worsening of symptoms or breathing will seek medical care.    Is the patient/caregiver able to teach back signs and symptoms of worsening condition:  Fever/chills, Shortness of breath, Chest pain   Is the patient/caregiver able to teach back importance of completing antibiotic course of treatment?  Yes   TCM call completed?  Yes          Maximo Ochoa RN    1/11/2021, 15:28 EST

## 2021-01-12 PROBLEM — J18.9 PNEUMONIA OF BOTH LOWER LOBES DUE TO INFECTIOUS ORGANISM: Status: ACTIVE | Noted: 2021-01-01

## 2021-01-13 NOTE — OUTREACH NOTE
COPD/PN Week 2 Survey      Responses   [facility] patient discharged from?  [place]   Does the patient have one of the following disease processes/diagnoses(primary or secondary)?  COPD/Pneumonia   Was the primary reason for admission:  Pneumonia   Week 2 attempt successful?  No   Revoke  Readmitted          Miri Dunbar RN

## 2021-01-13 NOTE — TELEPHONE ENCOUNTER
Wants to talk to Dr. Hayes.  Pt was readmitted to hosp yesterday he was put on Covid floor and Dr. Hayes doesn't want him on the Covid floor.  Said they called this morning and were moving him across the [place] but she thinks that's still the Covid unit.  Also about his HGB said they were going to give him more blood but he had just received blood the other day.

## 2021-01-17 LAB
BACTERIA SPEC AEROBE CULT: NORMAL
BACTERIA SPEC AEROBE CULT: NORMAL

## 2021-01-20 ENCOUNTER — TELEPHONE (OUTPATIENT)
Dept: ONCOLOGY | Facility: CLINIC | Age: 81
End: 2021-01-20

## 2021-01-20 NOTE — TELEPHONE ENCOUNTER
Pt  on 1-15, Dr. Morrow saw him in the hospital he talked with her .  She wants to know if Dr. Morrow or Dr. Hayes can call her and explain how his condition changed so fast, just so she understands.  She said it doesn't have to be today.  Also wanted to thank us for the wonderful care we took of him for the past 2 years.

## 2021-01-22 ENCOUNTER — APPOINTMENT (OUTPATIENT)
Dept: OTHER | Facility: HOSPITAL | Age: 81
End: 2021-01-22

## 2021-01-22 ENCOUNTER — APPOINTMENT (OUTPATIENT)
Dept: ONCOLOGY | Facility: HOSPITAL | Age: 81
End: 2021-01-22

## 2021-02-03 NOTE — PROGRESS NOTES
Enter Query Response Below      Query Response:       Acute Kidney Injury       If applicable, please update the problem list.        Patient: Ankit Mack Sr.        : 1940  Account: [id number]           Admit Date: 2021        Options to Respond to Query:    1. Access the Encounter     a. From the To-Do Side bar, click Respond With Note.     b. Click New Note     c. Answer query within the yellow box.                d. Update the Problem List if applicable.     Dr. Arroyo,    Patient admitted with myelodysplastic syndrome, pneumonia, acute on chronic diastolic CHF and CKD stage 3b.  H&P states  chronic kidney disease - creatinine seems to be stable, plan is to monitor, avoid nephrotoxic agents and hypotensive episodes.   Per labs, creatinine levels before admission were on  - 1.81, on 1.7 - 1.95, on  - 1.87 and on  - 1.82.  Then during this admission creatinine levels were on  - 1.87 and GFR 35, on  - 1.98 with GFR 33 and on  - 2.56 with GFR 24.  Patient had significant thrombocytopenia with PLTS 9 and WBCs were markedly elevated at 44.31.  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  Patient was made comfort care and .  After study, can patient's condition be further specified as:    Acute Kidney Injury  CKD stage 3b only  Other____________________  Unable to determine    [employer] KDIGO conference defines JOSE as any of the following:     -Increase in creatinine level to > 1.5x baseline (historical or measure), which is known or presumed to have occurred within the prior 7 days; or     -Increase in creatinine > 0.3 mg/dl from a measured baseline within 48 hours or less; or     -Urine output < 0.5 ml/kg/hr for 6 hrs     When baseline creatinine is unknown, KDIGO advises: The lowest SCr obtained during a hospitalization is usually equal to or greater than the baseline. This SCr should be used to diagnose (and  stage) JOSE     By submitting this query, we are merely seeking further clarification of documentation to accurately reflect all conditions that you are monitoring, evaluating, treating or that extend the hospitalization or utilize additional resources of care. Please utilize your independent clinical judgment when addressing the question(s) above.     This query and your response, once completed, will be entered into the legal medical record.    Sincerely,  Juhi Owen RN, CCDS, CDIP, CCS  [email or web address]  Clinical Documentation Integrity Program

## 2021-02-04 RX ORDER — POTASSIUM CHLORIDE 750 MG/1
TABLET, FILM COATED, EXTENDED RELEASE ORAL
Qty: 90 TABLET | Refills: 1 | OUTPATIENT
Start: 2021-02-04

## 2021-03-31 ENCOUNTER — APPOINTMENT (OUTPATIENT)
Dept: CT IMAGING | Facility: HOSPITAL | Age: 81
End: 2021-03-31

## 2021-04-14 NOTE — OUTREACH NOTE
Call Center TCM Note      Responses   [facility] patient discharged from?  [place]   COVID-19 Test Status  Negative   Does the patient have one of the following disease processes/diagnoses(primary or secondary)?  COPD/Pneumonia   Was the primary reason for admission:  Pneumonia   TCM attempt successful?  No   Unsuccessful attempts  Attempt 2          Maximo Ochoa RN    6/29/2020, 17:09       Hypertension

## 2021-12-29 NOTE — PROGRESS NOTES
Rylee returned call and ordered albuterol MDI. Also stated that Dr. Hayes did not want to do any other treatments at this point; only monitor patient temp.   General

## 2023-10-16 NOTE — TELEPHONE ENCOUNTER
----- Message from Beto Hayes MD sent at 1/27/2020 10:58 AM EST -----  Of course.  ----- Message -----  From: Sunshine Del Cid APRN  Sent: 1/27/2020  10:01 AM EST  To: Beto Hayes MD      [facility]. I saw Mr. Mack today in office. He needs a CMP and said he will be at your office on Friday. He was wondering if you could draw my lab then. I have placed the order. Thanks so much. Sunshine Del Cid     patient

## 2024-11-14 NOTE — PROGRESS NOTES
Pt to infusion room for CBC and RN review  Lab Results   Component Value Date    WBC 1.54 (C) 12/11/2020    HGB 8.2 (L) 12/11/2020    HCT 25.6 (L) 12/11/2020    MCV 90.8 12/11/2020    PLT 22 (C) 12/11/2020       Discussed lab results with Dr Hayes.  No new orders at this time  Pt has no c/o's , no issues voice.  
0
